# Patient Record
Sex: FEMALE | Race: WHITE | NOT HISPANIC OR LATINO | Employment: OTHER | URBAN - METROPOLITAN AREA
[De-identification: names, ages, dates, MRNs, and addresses within clinical notes are randomized per-mention and may not be internally consistent; named-entity substitution may affect disease eponyms.]

---

## 2017-07-18 ENCOUNTER — HOSPITAL ENCOUNTER (OUTPATIENT)
Dept: LAB | Facility: MEDICAL CENTER | Age: 77
End: 2017-07-18
Attending: INTERNAL MEDICINE
Payer: COMMERCIAL

## 2017-07-18 LAB
25(OH)D3 SERPL-MCNC: 98 NG/ML (ref 30–100)
ALBUMIN SERPL BCP-MCNC: 4.1 G/DL (ref 3.2–4.9)
ALBUMIN/GLOB SERPL: 1.6 G/DL
ALP SERPL-CCNC: 63 U/L (ref 30–99)
ALT SERPL-CCNC: 18 U/L (ref 2–50)
ANION GAP SERPL CALC-SCNC: 12 MMOL/L (ref 0–11.9)
AST SERPL-CCNC: 22 U/L (ref 12–45)
BILIRUB SERPL-MCNC: 0.9 MG/DL (ref 0.1–1.5)
BUN SERPL-MCNC: 15 MG/DL (ref 8–22)
CALCIUM SERPL-MCNC: 9.8 MG/DL (ref 8.5–10.5)
CHLORIDE SERPL-SCNC: 106 MMOL/L (ref 96–112)
CHOLEST SERPL-MCNC: 105 MG/DL (ref 100–199)
CO2 SERPL-SCNC: 20 MMOL/L (ref 20–33)
CREAT SERPL-MCNC: 1.48 MG/DL (ref 0.5–1.4)
CREAT UR-MCNC: 92 MG/DL
EST. AVERAGE GLUCOSE BLD GHB EST-MCNC: 214 MG/DL
GFR SERPL CREATININE-BSD FRML MDRD: 34 ML/MIN/1.73 M 2
GLOBULIN SER CALC-MCNC: 2.5 G/DL (ref 1.9–3.5)
GLUCOSE SERPL-MCNC: 166 MG/DL (ref 65–99)
HBA1C MFR BLD: 9.1 % (ref 0–5.6)
HDLC SERPL-MCNC: 39 MG/DL
LDLC SERPL CALC-MCNC: 38 MG/DL
MICROALBUMIN UR-MCNC: 26.3 MG/DL
MICROALBUMIN/CREAT UR: 286 MG/G (ref 0–30)
POTASSIUM SERPL-SCNC: 3.8 MMOL/L (ref 3.6–5.5)
PROT SERPL-MCNC: 6.6 G/DL (ref 6–8.2)
SODIUM SERPL-SCNC: 138 MMOL/L (ref 135–145)
T4 FREE SERPL-MCNC: 1.24 NG/DL (ref 0.53–1.43)
TRIGL SERPL-MCNC: 141 MG/DL (ref 0–149)
TSH SERPL DL<=0.005 MIU/L-ACNC: 3.28 UIU/ML (ref 0.3–3.7)

## 2017-07-18 PROCEDURE — 82306 VITAMIN D 25 HYDROXY: CPT

## 2017-07-18 PROCEDURE — 82570 ASSAY OF URINE CREATININE: CPT

## 2017-07-18 PROCEDURE — 80053 COMPREHEN METABOLIC PANEL: CPT

## 2017-07-18 PROCEDURE — 84439 ASSAY OF FREE THYROXINE: CPT

## 2017-07-18 PROCEDURE — 84443 ASSAY THYROID STIM HORMONE: CPT

## 2017-07-18 PROCEDURE — 83036 HEMOGLOBIN GLYCOSYLATED A1C: CPT | Mod: GA

## 2017-07-18 PROCEDURE — 36415 COLL VENOUS BLD VENIPUNCTURE: CPT

## 2017-07-18 PROCEDURE — 82043 UR ALBUMIN QUANTITATIVE: CPT

## 2017-07-18 PROCEDURE — 80061 LIPID PANEL: CPT

## 2017-08-03 ENCOUNTER — TELEPHONE (OUTPATIENT)
Dept: RADIOLOGY | Facility: MEDICAL CENTER | Age: 77
End: 2017-08-03

## 2017-08-03 NOTE — TELEPHONE ENCOUNTER
LM to conf apt @ [facility] on 8/4 @ 9:30 check in @ 9:15, reviewed prep and location

## 2017-08-04 ENCOUNTER — HOSPITAL ENCOUNTER (OUTPATIENT)
Dept: RADIOLOGY | Facility: MEDICAL CENTER | Age: 77
End: 2017-08-04
Attending: FAMILY MEDICINE
Payer: MEDICARE

## 2017-08-14 ENCOUNTER — APPOINTMENT (OUTPATIENT)
Dept: RADIOLOGY | Facility: MEDICAL CENTER | Age: 77
DRG: 481 | End: 2017-08-14
Attending: ORTHOPAEDIC SURGERY
Payer: COMMERCIAL

## 2017-08-14 ENCOUNTER — HOSPITAL ENCOUNTER (INPATIENT)
Facility: MEDICAL CENTER | Age: 77
LOS: 2 days | DRG: 481 | End: 2017-08-16
Attending: EMERGENCY MEDICINE | Admitting: INTERNAL MEDICINE
Payer: COMMERCIAL

## 2017-08-14 ENCOUNTER — APPOINTMENT (OUTPATIENT)
Dept: RADIOLOGY | Facility: MEDICAL CENTER | Age: 77
DRG: 481 | End: 2017-08-14
Attending: EMERGENCY MEDICINE
Payer: COMMERCIAL

## 2017-08-14 ENCOUNTER — RESOLUTE PROFESSIONAL BILLING HOSPITAL PROF FEE (OUTPATIENT)
Dept: HOSPITALIST | Facility: MEDICAL CENTER | Age: 77
End: 2017-08-14
Payer: COMMERCIAL

## 2017-08-14 DIAGNOSIS — S72.001M: ICD-10-CM

## 2017-08-14 DIAGNOSIS — E55.9 VITAMIN D DEFICIENCY DISEASE: ICD-10-CM

## 2017-08-14 DIAGNOSIS — I10 ESSENTIAL HYPERTENSION, MALIGNANT: ICD-10-CM

## 2017-08-14 DIAGNOSIS — S72.001Q: ICD-10-CM

## 2017-08-14 DIAGNOSIS — E78.5 DYSLIPIDEMIA: ICD-10-CM

## 2017-08-14 DIAGNOSIS — N28.9 RENAL INSUFFICIENCY: ICD-10-CM

## 2017-08-14 DIAGNOSIS — E87.1 HYPONATREMIA: ICD-10-CM

## 2017-08-14 DIAGNOSIS — E87.6 HYPOKALEMIA: ICD-10-CM

## 2017-08-14 DIAGNOSIS — R73.9 HYPERGLYCEMIA: ICD-10-CM

## 2017-08-14 DIAGNOSIS — N18.30 CHRONIC KIDNEY DISEASE, STAGE III (MODERATE) (HCC): ICD-10-CM

## 2017-08-14 DIAGNOSIS — I10 ESSENTIAL HYPERTENSION: ICD-10-CM

## 2017-08-14 DIAGNOSIS — R80.9 MICROALBUMINURIA: ICD-10-CM

## 2017-08-14 DIAGNOSIS — S72.001A CLOSED FRACTURE OF NECK OF RIGHT FEMUR, INITIAL ENCOUNTER (HCC): ICD-10-CM

## 2017-08-14 PROBLEM — E11.9 DIABETES MELLITUS, TYPE II (HCC): Status: ACTIVE | Noted: 2017-08-14

## 2017-08-14 PROBLEM — S72.90XA FEMUR FRACTURE (HCC): Status: ACTIVE | Noted: 2017-08-14

## 2017-08-14 LAB
ANION GAP SERPL CALC-SCNC: 8 MMOL/L (ref 0–11.9)
APTT PPP: 26.3 SEC (ref 24.7–36)
BUN SERPL-MCNC: 34 MG/DL (ref 8–22)
CALCIUM SERPL-MCNC: 10 MG/DL (ref 8.5–10.5)
CHLORIDE SERPL-SCNC: 101 MMOL/L (ref 96–112)
CO2 SERPL-SCNC: 23 MMOL/L (ref 20–33)
CREAT SERPL-MCNC: 1.49 MG/DL (ref 0.5–1.4)
EKG IMPRESSION: NORMAL
ERYTHROCYTE [DISTWIDTH] IN BLOOD BY AUTOMATED COUNT: 43.2 FL (ref 35.9–50)
GFR SERPL CREATININE-BSD FRML MDRD: 34 ML/MIN/1.73 M 2
GLUCOSE BLD-MCNC: 337 MG/DL (ref 65–99)
GLUCOSE BLD-MCNC: 348 MG/DL (ref 65–99)
GLUCOSE BLD-MCNC: 361 MG/DL (ref 65–99)
GLUCOSE SERPL-MCNC: 459 MG/DL (ref 65–99)
HCT VFR BLD AUTO: 38.5 % (ref 37–47)
HGB BLD-MCNC: 13.7 G/DL (ref 12–16)
INR PPP: 1.02 (ref 0.87–1.13)
MCH RBC QN AUTO: 32.5 PG (ref 27–33)
MCHC RBC AUTO-ENTMCNC: 35.6 G/DL (ref 33.6–35)
MCV RBC AUTO: 91.4 FL (ref 81.4–97.8)
PLATELET # BLD AUTO: 169 K/UL (ref 164–446)
PMV BLD AUTO: 10.3 FL (ref 9–12.9)
POTASSIUM SERPL-SCNC: 3.7 MMOL/L (ref 3.6–5.5)
PROTHROMBIN TIME: 13.7 SEC (ref 12–14.6)
RBC # BLD AUTO: 4.21 M/UL (ref 4.2–5.4)
SODIUM SERPL-SCNC: 132 MMOL/L (ref 135–145)
WBC # BLD AUTO: 7.3 K/UL (ref 4.8–10.8)

## 2017-08-14 PROCEDURE — 501838 HCHG SUTURE GENERAL: Performed by: ORTHOPAEDIC SURGERY

## 2017-08-14 PROCEDURE — C1713 ANCHOR/SCREW BN/BN,TIS/BN: HCPCS | Performed by: ORTHOPAEDIC SURGERY

## 2017-08-14 PROCEDURE — 72170 X-RAY EXAM OF PELVIS: CPT

## 2017-08-14 PROCEDURE — 85610 PROTHROMBIN TIME: CPT

## 2017-08-14 PROCEDURE — 80048 BASIC METABOLIC PNL TOTAL CA: CPT

## 2017-08-14 PROCEDURE — 700101 HCHG RX REV CODE 250

## 2017-08-14 PROCEDURE — 160035 HCHG PACU - 1ST 60 MINS PHASE I: Performed by: ORTHOPAEDIC SURGERY

## 2017-08-14 PROCEDURE — A9270 NON-COVERED ITEM OR SERVICE: HCPCS | Performed by: INTERNAL MEDICINE

## 2017-08-14 PROCEDURE — 0QS634Z REPOSITION RIGHT UPPER FEMUR WITH INTERNAL FIXATION DEVICE, PERCUTANEOUS APPROACH: ICD-10-PCS | Performed by: ORTHOPAEDIC SURGERY

## 2017-08-14 PROCEDURE — 93010 ELECTROCARDIOGRAM REPORT: CPT | Performed by: INTERNAL MEDICINE

## 2017-08-14 PROCEDURE — 93010 ELECTROCARDIOGRAM REPORT: CPT | Mod: 77 | Performed by: INTERNAL MEDICINE

## 2017-08-14 PROCEDURE — 700102 HCHG RX REV CODE 250 W/ 637 OVERRIDE(OP): Performed by: INTERNAL MEDICINE

## 2017-08-14 PROCEDURE — 500891 HCHG PACK, ORTHO MAJOR: Performed by: ORTHOPAEDIC SURGERY

## 2017-08-14 PROCEDURE — 770006 HCHG ROOM/CARE - MED/SURG/GYN SEMI*

## 2017-08-14 PROCEDURE — 700111 HCHG RX REV CODE 636 W/ 250 OVERRIDE (IP): Performed by: EMERGENCY MEDICINE

## 2017-08-14 PROCEDURE — A9270 NON-COVERED ITEM OR SERVICE: HCPCS | Performed by: ORTHOPAEDIC SURGERY

## 2017-08-14 PROCEDURE — 96361 HYDRATE IV INFUSION ADD-ON: CPT

## 2017-08-14 PROCEDURE — 73502 X-RAY EXAM HIP UNI 2-3 VIEWS: CPT | Mod: RT

## 2017-08-14 PROCEDURE — 82962 GLUCOSE BLOOD TEST: CPT

## 2017-08-14 PROCEDURE — 160002 HCHG RECOVERY MINUTES (STAT): Performed by: ORTHOPAEDIC SURGERY

## 2017-08-14 PROCEDURE — 160009 HCHG ANES TIME/MIN: Performed by: ORTHOPAEDIC SURGERY

## 2017-08-14 PROCEDURE — 96375 TX/PRO/DX INJ NEW DRUG ADDON: CPT

## 2017-08-14 PROCEDURE — 160048 HCHG OR STATISTICAL LEVEL 1-5: Performed by: ORTHOPAEDIC SURGERY

## 2017-08-14 PROCEDURE — 99291 CRITICAL CARE FIRST HOUR: CPT

## 2017-08-14 PROCEDURE — 503036 HCHG GUIDE PIN,OIC: Performed by: ORTHOPAEDIC SURGERY

## 2017-08-14 PROCEDURE — 700102 HCHG RX REV CODE 250 W/ 637 OVERRIDE(OP)

## 2017-08-14 PROCEDURE — 160041 HCHG SURGERY MINUTES - EA ADDL 1 MIN LEVEL 4: Performed by: ORTHOPAEDIC SURGERY

## 2017-08-14 PROCEDURE — 700111 HCHG RX REV CODE 636 W/ 250 OVERRIDE (IP): Performed by: ORTHOPAEDIC SURGERY

## 2017-08-14 PROCEDURE — 700105 HCHG RX REV CODE 258: Performed by: EMERGENCY MEDICINE

## 2017-08-14 PROCEDURE — A9270 NON-COVERED ITEM OR SERVICE: HCPCS

## 2017-08-14 PROCEDURE — 85730 THROMBOPLASTIN TIME PARTIAL: CPT

## 2017-08-14 PROCEDURE — 700102 HCHG RX REV CODE 250 W/ 637 OVERRIDE(OP): Performed by: ORTHOPAEDIC SURGERY

## 2017-08-14 PROCEDURE — 160036 HCHG PACU - EA ADDL 30 MINS PHASE I: Performed by: ORTHOPAEDIC SURGERY

## 2017-08-14 PROCEDURE — 700111 HCHG RX REV CODE 636 W/ 250 OVERRIDE (IP)

## 2017-08-14 PROCEDURE — 99223 1ST HOSP IP/OBS HIGH 75: CPT | Performed by: INTERNAL MEDICINE

## 2017-08-14 PROCEDURE — 93005 ELECTROCARDIOGRAM TRACING: CPT | Performed by: ORTHOPAEDIC SURGERY

## 2017-08-14 PROCEDURE — 73552 X-RAY EXAM OF FEMUR 2/>: CPT | Mod: RT

## 2017-08-14 PROCEDURE — 85027 COMPLETE CBC AUTOMATED: CPT

## 2017-08-14 PROCEDURE — 93005 ELECTROCARDIOGRAM TRACING: CPT | Performed by: INTERNAL MEDICINE

## 2017-08-14 PROCEDURE — 700112 HCHG RX REV CODE 229: Performed by: ORTHOPAEDIC SURGERY

## 2017-08-14 PROCEDURE — 700105 HCHG RX REV CODE 258: Performed by: INTERNAL MEDICINE

## 2017-08-14 PROCEDURE — 160029 HCHG SURGERY MINUTES - 1ST 30 MINS LEVEL 4: Performed by: ORTHOPAEDIC SURGERY

## 2017-08-14 PROCEDURE — 96374 THER/PROPH/DIAG INJ IV PUSH: CPT

## 2017-08-14 PROCEDURE — A6402 STERILE GAUZE <= 16 SQ IN: HCPCS | Performed by: ORTHOPAEDIC SURGERY

## 2017-08-14 DEVICE — SCREW CANNNULATED 16MM THREAD 7.3MM X 90MM (3TX3=9): Type: IMPLANTABLE DEVICE | Site: HIP | Status: FUNCTIONAL

## 2017-08-14 DEVICE — SCREW CANNNULATED 16MM THREAD 7.3MM X 85MM (3TX3=9): Type: IMPLANTABLE DEVICE | Site: HIP | Status: FUNCTIONAL

## 2017-08-14 RX ORDER — POLYETHYLENE GLYCOL 3350 17 G/17G
1 POWDER, FOR SOLUTION ORAL
Status: DISCONTINUED | OUTPATIENT
Start: 2017-08-14 | End: 2017-08-14

## 2017-08-14 RX ORDER — AMOXICILLIN 250 MG
2 CAPSULE ORAL 2 TIMES DAILY
Status: DISCONTINUED | OUTPATIENT
Start: 2017-08-14 | End: 2017-08-14

## 2017-08-14 RX ORDER — SODIUM CHLORIDE 9 MG/ML
INJECTION, SOLUTION INTRAVENOUS CONTINUOUS
Status: DISCONTINUED | OUTPATIENT
Start: 2017-08-14 | End: 2017-08-14

## 2017-08-14 RX ORDER — DEXAMETHASONE SODIUM PHOSPHATE 4 MG/ML
4 INJECTION, SOLUTION INTRA-ARTICULAR; INTRALESIONAL; INTRAMUSCULAR; INTRAVENOUS; SOFT TISSUE
Status: DISCONTINUED | OUTPATIENT
Start: 2017-08-14 | End: 2017-08-16 | Stop reason: HOSPADM

## 2017-08-14 RX ORDER — CEFAZOLIN SODIUM 2 G/100ML
2 INJECTION, SOLUTION INTRAVENOUS EVERY 8 HOURS
Status: COMPLETED | OUTPATIENT
Start: 2017-08-15 | End: 2017-08-15

## 2017-08-14 RX ORDER — DOCUSATE SODIUM 100 MG/1
100 CAPSULE, LIQUID FILLED ORAL 2 TIMES DAILY
Status: DISCONTINUED | OUTPATIENT
Start: 2017-08-14 | End: 2017-08-16 | Stop reason: HOSPADM

## 2017-08-14 RX ORDER — ENEMA 19; 7 G/133ML; G/133ML
1 ENEMA RECTAL
Status: DISCONTINUED | OUTPATIENT
Start: 2017-08-14 | End: 2017-08-16 | Stop reason: HOSPADM

## 2017-08-14 RX ORDER — AMOXICILLIN 250 MG
1 CAPSULE ORAL
Status: DISCONTINUED | OUTPATIENT
Start: 2017-08-14 | End: 2017-08-16 | Stop reason: HOSPADM

## 2017-08-14 RX ORDER — SODIUM CHLORIDE 9 MG/ML
1000 INJECTION, SOLUTION INTRAVENOUS ONCE
Status: COMPLETED | OUTPATIENT
Start: 2017-08-14 | End: 2017-08-14

## 2017-08-14 RX ORDER — ONDANSETRON 2 MG/ML
4 INJECTION INTRAMUSCULAR; INTRAVENOUS EVERY 4 HOURS PRN
Status: DISCONTINUED | OUTPATIENT
Start: 2017-08-14 | End: 2017-08-14

## 2017-08-14 RX ORDER — AMOXICILLIN 250 MG
1 CAPSULE ORAL NIGHTLY
Status: DISCONTINUED | OUTPATIENT
Start: 2017-08-14 | End: 2017-08-16 | Stop reason: HOSPADM

## 2017-08-14 RX ORDER — ACETAMINOPHEN 325 MG/1
650 TABLET ORAL EVERY 6 HOURS PRN
Status: DISCONTINUED | OUTPATIENT
Start: 2017-08-14 | End: 2017-08-16 | Stop reason: HOSPADM

## 2017-08-14 RX ORDER — HALOPERIDOL 5 MG/ML
1 INJECTION INTRAMUSCULAR EVERY 6 HOURS PRN
Status: DISCONTINUED | OUTPATIENT
Start: 2017-08-14 | End: 2017-08-16 | Stop reason: HOSPADM

## 2017-08-14 RX ORDER — OXYCODONE HYDROCHLORIDE 5 MG/1
5 TABLET ORAL
Status: DISCONTINUED | OUTPATIENT
Start: 2017-08-14 | End: 2017-08-16 | Stop reason: HOSPADM

## 2017-08-14 RX ORDER — OXYCODONE HYDROCHLORIDE 5 MG/1
5 TABLET ORAL
Status: DISCONTINUED | OUTPATIENT
Start: 2017-08-14 | End: 2017-08-14

## 2017-08-14 RX ORDER — POLYETHYLENE GLYCOL 3350 17 G/17G
1 POWDER, FOR SOLUTION ORAL 2 TIMES DAILY PRN
Status: DISCONTINUED | OUTPATIENT
Start: 2017-08-14 | End: 2017-08-16 | Stop reason: HOSPADM

## 2017-08-14 RX ORDER — ONDANSETRON 2 MG/ML
4 INJECTION INTRAMUSCULAR; INTRAVENOUS EVERY 4 HOURS PRN
Status: DISCONTINUED | OUTPATIENT
Start: 2017-08-14 | End: 2017-08-16 | Stop reason: HOSPADM

## 2017-08-14 RX ORDER — ATORVASTATIN CALCIUM 20 MG/1
20 TABLET, FILM COATED ORAL
Status: DISCONTINUED | OUTPATIENT
Start: 2017-08-14 | End: 2017-08-16 | Stop reason: HOSPADM

## 2017-08-14 RX ORDER — ONDANSETRON 4 MG/1
4 TABLET, ORALLY DISINTEGRATING ORAL EVERY 4 HOURS PRN
Status: DISCONTINUED | OUTPATIENT
Start: 2017-08-14 | End: 2017-08-16 | Stop reason: HOSPADM

## 2017-08-14 RX ORDER — ONDANSETRON 2 MG/ML
4 INJECTION INTRAMUSCULAR; INTRAVENOUS ONCE
Status: COMPLETED | OUTPATIENT
Start: 2017-08-14 | End: 2017-08-14

## 2017-08-14 RX ORDER — MORPHINE SULFATE 4 MG/ML
4 INJECTION, SOLUTION INTRAMUSCULAR; INTRAVENOUS
Status: DISCONTINUED | OUTPATIENT
Start: 2017-08-14 | End: 2017-08-14

## 2017-08-14 RX ORDER — SODIUM CHLORIDE, SODIUM LACTATE, POTASSIUM CHLORIDE, CALCIUM CHLORIDE 600; 310; 30; 20 MG/100ML; MG/100ML; MG/100ML; MG/100ML
INJECTION, SOLUTION INTRAVENOUS CONTINUOUS
Status: DISCONTINUED | OUTPATIENT
Start: 2017-08-14 | End: 2017-08-15

## 2017-08-14 RX ORDER — LISINOPRIL 10 MG/1
10 TABLET ORAL DAILY
COMMUNITY
End: 2022-01-01

## 2017-08-14 RX ORDER — METOPROLOL SUCCINATE 25 MG/1
25 TABLET, EXTENDED RELEASE ORAL DAILY
Status: DISCONTINUED | OUTPATIENT
Start: 2017-08-14 | End: 2017-08-14

## 2017-08-14 RX ORDER — OXYCODONE HYDROCHLORIDE 10 MG/1
10 TABLET ORAL
Status: DISCONTINUED | OUTPATIENT
Start: 2017-08-14 | End: 2017-08-14

## 2017-08-14 RX ORDER — BISACODYL 10 MG
10 SUPPOSITORY, RECTAL RECTAL
Status: DISCONTINUED | OUTPATIENT
Start: 2017-08-14 | End: 2017-08-14

## 2017-08-14 RX ORDER — BISACODYL 10 MG
10 SUPPOSITORY, RECTAL RECTAL
Status: DISCONTINUED | OUTPATIENT
Start: 2017-08-14 | End: 2017-08-16 | Stop reason: HOSPADM

## 2017-08-14 RX ORDER — KETOROLAC TROMETHAMINE 30 MG/ML
15 INJECTION, SOLUTION INTRAMUSCULAR; INTRAVENOUS EVERY 6 HOURS
Status: DISCONTINUED | OUTPATIENT
Start: 2017-08-14 | End: 2017-08-16 | Stop reason: HOSPADM

## 2017-08-14 RX ORDER — SCOLOPAMINE TRANSDERMAL SYSTEM 1 MG/1
1 PATCH, EXTENDED RELEASE TRANSDERMAL
Status: DISCONTINUED | OUTPATIENT
Start: 2017-08-14 | End: 2017-08-16 | Stop reason: HOSPADM

## 2017-08-14 RX ORDER — MAGNESIUM HYDROXIDE 1200 MG/15ML
LIQUID ORAL
Status: DISCONTINUED | OUTPATIENT
Start: 2017-08-14 | End: 2017-08-14 | Stop reason: HOSPADM

## 2017-08-14 RX ORDER — DEXTROSE MONOHYDRATE 25 G/50ML
25 INJECTION, SOLUTION INTRAVENOUS
Status: DISCONTINUED | OUTPATIENT
Start: 2017-08-14 | End: 2017-08-16 | Stop reason: HOSPADM

## 2017-08-14 RX ORDER — HEPARIN SODIUM 5000 [USP'U]/ML
5000 INJECTION, SOLUTION INTRAVENOUS; SUBCUTANEOUS EVERY 8 HOURS
Status: DISCONTINUED | OUTPATIENT
Start: 2017-08-14 | End: 2017-08-14

## 2017-08-14 RX ORDER — LABETALOL HYDROCHLORIDE 5 MG/ML
10 INJECTION, SOLUTION INTRAVENOUS EVERY 4 HOURS PRN
Status: DISCONTINUED | OUTPATIENT
Start: 2017-08-14 | End: 2017-08-16 | Stop reason: HOSPADM

## 2017-08-14 RX ORDER — OXYCODONE HYDROCHLORIDE 10 MG/1
10 TABLET ORAL
Status: DISCONTINUED | OUTPATIENT
Start: 2017-08-14 | End: 2017-08-16 | Stop reason: HOSPADM

## 2017-08-14 RX ORDER — DIPHENHYDRAMINE HYDROCHLORIDE 50 MG/ML
25 INJECTION INTRAMUSCULAR; INTRAVENOUS EVERY 6 HOURS PRN
Status: DISCONTINUED | OUTPATIENT
Start: 2017-08-14 | End: 2017-08-16 | Stop reason: HOSPADM

## 2017-08-14 RX ADMIN — STANDARDIZED SENNA CONCENTRATE AND DOCUSATE SODIUM 1 TABLET: 8.6; 5 TABLET, FILM COATED ORAL at 20:58

## 2017-08-14 RX ADMIN — INSULIN HUMAN 6 UNITS: 100 INJECTION, SOLUTION PARENTERAL at 15:03

## 2017-08-14 RX ADMIN — ATORVASTATIN CALCIUM 20 MG: 20 TABLET, FILM COATED ORAL at 20:58

## 2017-08-14 RX ADMIN — SODIUM CHLORIDE, POTASSIUM CHLORIDE, SODIUM LACTATE AND CALCIUM CHLORIDE: 600; 310; 30; 20 INJECTION, SOLUTION INTRAVENOUS at 17:58

## 2017-08-14 RX ADMIN — INSULIN HUMAN 10 UNITS: 100 INJECTION, SOLUTION PARENTERAL at 14:15

## 2017-08-14 RX ADMIN — INSULIN LISPRO 5 UNITS: 100 INJECTION, SOLUTION INTRAVENOUS; SUBCUTANEOUS at 19:08

## 2017-08-14 RX ADMIN — SODIUM CHLORIDE 1000 ML: 9 INJECTION, SOLUTION INTRAVENOUS at 12:39

## 2017-08-14 RX ADMIN — KETOROLAC TROMETHAMINE 15 MG: 30 INJECTION, SOLUTION INTRAMUSCULAR at 18:54

## 2017-08-14 RX ADMIN — FENTANYL CITRATE 25 MCG: 50 INJECTION, SOLUTION INTRAMUSCULAR; INTRAVENOUS at 15:15

## 2017-08-14 RX ADMIN — HYDROCODONE BITARTRATE AND ACETAMINOPHEN 15 ML: 2.5; 108 SOLUTION ORAL at 16:30

## 2017-08-14 RX ADMIN — DOCUSATE SODIUM 100 MG: 100 CAPSULE ORAL at 20:58

## 2017-08-14 RX ADMIN — ONDANSETRON 4 MG: 2 INJECTION INTRAMUSCULAR; INTRAVENOUS at 12:38

## 2017-08-14 RX ADMIN — HYDROMORPHONE HYDROCHLORIDE 0.5 MG: 1 INJECTION, SOLUTION INTRAMUSCULAR; INTRAVENOUS; SUBCUTANEOUS at 12:39

## 2017-08-14 ASSESSMENT — ENCOUNTER SYMPTOMS
INSOMNIA: 0
NERVOUS/ANXIOUS: 0
WEAKNESS: 0
VOMITING: 0
CONSTIPATION: 0
CHILLS: 0
FOCAL WEAKNESS: 0
DOUBLE VISION: 0
NECK PAIN: 0
NAUSEA: 0
FEVER: 0
BLOOD IN STOOL: 0
SENSORY CHANGE: 0
BACK PAIN: 0
DEPRESSION: 0
TINGLING: 0
MYALGIAS: 0
FALLS: 1
PALPITATIONS: 0
WHEEZING: 0
WEIGHT LOSS: 0
ORTHOPNEA: 0
HALLUCINATIONS: 0
HEARTBURN: 0
MEMORY LOSS: 0
LOSS OF CONSCIOUSNESS: 0
SPEECH CHANGE: 0
DIARRHEA: 0
HEMOPTYSIS: 0
HEADACHES: 0
SEIZURES: 0
PND: 0
TREMORS: 0
FLANK PAIN: 0
COUGH: 0
SHORTNESS OF BREATH: 0
DIZZINESS: 0
DIAPHORESIS: 0
BLURRED VISION: 0
CLAUDICATION: 0
SPUTUM PRODUCTION: 0
ABDOMINAL PAIN: 0

## 2017-08-14 ASSESSMENT — PATIENT HEALTH QUESTIONNAIRE - PHQ9
SUM OF ALL RESPONSES TO PHQ QUESTIONS 1-9: 0
SUM OF ALL RESPONSES TO PHQ9 QUESTIONS 1 AND 2: 0
1. LITTLE INTEREST OR PLEASURE IN DOING THINGS: NOT AT ALL
2. FEELING DOWN, DEPRESSED, IRRITABLE, OR HOPELESS: NOT AT ALL

## 2017-08-14 ASSESSMENT — PAIN SCALES - GENERAL
PAINLEVEL_OUTOF10: 2
PAINLEVEL_OUTOF10: 2
PAINLEVEL_OUTOF10: 0
PAINLEVEL_OUTOF10: 6
PAINLEVEL_OUTOF10: 2
PAINLEVEL_OUTOF10: 1
PAINLEVEL_OUTOF10: 2

## 2017-08-14 ASSESSMENT — LIFESTYLE VARIABLES
ALCOHOL_USE: NO
EVER_SMOKED: NEVER
EVER_SMOKED: NEVER
SUBSTANCE_ABUSE: 0
DO YOU DRINK ALCOHOL: NO

## 2017-08-14 ASSESSMENT — COPD QUESTIONNAIRES
COPD SCREENING SCORE: 2
HAVE YOU SMOKED AT LEAST 100 CIGARETTES IN YOUR ENTIRE LIFE: NO/DON'T KNOW
DO YOU EVER COUGH UP ANY MUCUS OR PHLEGM?: NO/ONLY WITH OCCASIONAL COLDS OR INFECTIONS
DURING THE PAST 4 WEEKS HOW MUCH DID YOU FEEL SHORT OF BREATH: NONE/LITTLE OF THE TIME

## 2017-08-14 NOTE — IP AVS SNAPSHOT
" <p align=\"LEFT\"><IMG SRC=\"//[email or web address]$/[email or web address]\" alt=\"Image\" WIDTH=\"50%\" HEIGHT=\"200\" BORDER=\"\"></p>                   Name:Jannet Bruno  Medical Record Number:[id number]  CSN: [id number]    YOB: 1940   Age: 76 y.o.  Sex: female  HT:1.575 m (5' 2.01\") WT: 72.576 kg (160 lb)          Admit Date: 8/14/2017     Discharge Date:   Today's Date: 8/16/2017  Attending Doctor:  Sidney Parker M.D.                  Allergies:  Codeine; Percocet; and Tape          Your appointments     Aug 18, 2017 12:30 PM   MA DX30 with [facility] [place] [facility]   [facility] ([place])    [address]  [place] [id number] [phone]              Follow-up Information     1. Follow up with Ar Huerta M.D. In 2 weeks.    Specialty:  Orthopaedics    Contact information    [address]  [address]  [place] [address]  [phone]           Medication List      Take these Medications        Instructions    amlodipine 10 MG Tabs   Commonly known as:  NORVASC    Take 1 Tab by mouth every day.   Dose:  10 mg       atorvastatin 20 MG Tabs   Commonly known as:  LIPITOR    Take 1 Tab by mouth every bedtime.   Dose:  20 mg       furosemide 40 MG Tabs   Commonly known as:  LASIX    Take 40 mg by mouth every day.   Dose:  40 mg       lisinopril 10 MG Tabs   Commonly known as:  PRINIVIL    Take 10 mg by mouth every day.   Dose:  10 mg       oxycodone immediate-release 5 MG Tabs   Commonly known as:  ROXICODONE    Take 1 Tab by mouth every 6 hours as needed.   Dose:  5 mg       potassium chloride ER 10 MEQ tablet   Commonly known as:  KLOR-CON    Take 10 mEq by mouth every day.   Dose:  10 mEq       VICTOZA 18 MG/3ML Sopn injection   Generic drug:  liraglutide    Inject 1.8 mg as instructed every day.   Dose:  1.8 mg         "

## 2017-08-14 NOTE — ED PROVIDER NOTES
ED Provider Note    Scribed for Kevin Thomas M.D. by Tay Anaya. 8/14/2017  11:32 AM    Primary care provider: Henry Forrest D.O.  Means of arrival: EMS  History obtained from: Patient  History limited by: None    CHIEF COMPLAINT  Chief Complaint   Patient presents with   • Hip Pain     right   • T-5000 FALL       HPI  Jannet Bruno is a 76 y.o. female who presents to the Emergency Department for evaluation after a ground level fall just prior to arrival. Patient was stepping off of a curb at work when she fell and landed on her right side. She reports associated right hip pain with mild muscle spasm to the area. She does not report any alleviating factors to the pain. Patient does not report any recent fevers or shortness of breath. She denies any prior hip surgeries.    REVIEW OF SYSTEMS  Pertinent positives include right hip pain, muscle spasm to right hip area.   Pertinent negatives include no recent fevers, shortness of breath.    All other systems reviewed and negative.    C    PAST MEDICAL HISTORY   has a past medical history of Hypertension; Arthritis; Unspecified urinary incontinence; Heart burn; Indigestion; Other specified disorder of intestines; Unspecified hemorrhagic conditions; Urinary bladder disorder; Breath shortness; Snoring; Anesthesia; Sleep apnea, obstructive; Diabetes; and CATARACT.    SURGICAL HISTORY   has past surgical history that includes other; other orthopedic surgery; other abdominal surgery; appendectomy; gyn surgery; ventral hernia repair laparoscopic (3/7/2012); cataract phaco with iol (5/30/2014); and cataract phaco with iol (6/11/2014).    SOCIAL HISTORY  Social History   Substance Use Topics   • Smoking status: Former Smoker -- 1.00 packs/day for 20 years     Types: Cigarettes     Quit date: 03/05/1979   • Smokeless tobacco: Never Used   • Alcohol Use: No      History   Drug Use No       FAMILY HISTORY  History reviewed. No pertinent family  "history.    CURRENT MEDICATIONS  Home Medications     Reviewed by Gennaro Márquez R.N. (Registered Nurse) on 08/14/17 at 1116  Med List Status: Partial    Medication Last Dose Status    alendronate (FOSAMAX) 70 MG Tab  Active    amlodipine (NORVASC) 10 MG Tab  Active    atorvastatin (LIPITOR) 20 MG Tab  Active    exenatide (BYETTA 10 MCG PEN) 10 MCG/0.04ML Solution Pen-injector  Active    furosemide (LASIX) 40 MG TABS  Active    glimepiride (AMARYL) 4 MG Tab  Active    metoprolol SR (TOPROL XL) 25 MG TB24  Active    oxycodone immediate-release (ROXICODONE) 5 MG Tab  Active    potassium chloride CR (K-DUR) 10 MEQ tablet  Active                ALLERGIES  Allergies   Allergen Reactions   • Codeine      Personality changes   • Percocet [Oxycodone-Acetaminophen]      Personality change   • Tape Rash     Adhesive tape-rash, paper tape ok.       PHYSICAL EXAM  VITAL SIGNS: Pulse 73  Temp(Src) 36.3 °C (97.3 °F)  Resp 16  Ht 1.575 m (5' 2.01\")  Wt 72.576 kg (160 lb)  BMI 29.26 kg/m2  SpO2 97%  Nursing note and vitals reviewed.  Constitutional: Well-developed and well-nourished. Mild distress.   HENT: Head is normocephalic and atraumatic. Oropharynx is clear and moist without exudate or erythema.   Eyes: Pupils are equal, round, and reactive to light. Conjunctiva are normal.   Cardiovascular: Normal rate and regular rhythm. No murmur heard. Normal radial pulses.  Pulmonary/Chest: Breath sounds normal. No wheezes or rales.   Abdominal: Soft and non-tender. No distention    Musculoskeletal: Extremities exhibit normal range of motion without edema. Tenderness of right hip with minimal shortening of right lower extremity.  Neurological: Awake, alert and oriented to person, place, and time. No focal deficits noted.  Skin: Skin is warm and dry. No rash.   Psychiatric: Normal mood and affect. Appropriate for clinical situation      DIAGNOSTIC STUDIES / PROCEDURES    LABS  Results for orders placed or performed during the " hospital encounter of 08/14/17   CBC WITHOUT DIFFERENTIAL   Result Value Ref Range    WBC 7.3 4.8 - 10.8 K/uL    RBC 4.21 4.20 - 5.40 M/uL    Hemoglobin 13.7 12.0 - 16.0 g/dL    Hematocrit 38.5 37.0 - 47.0 %    MCV 91.4 81.4 - 97.8 fL    MCH 32.5 27.0 - 33.0 pg    MCHC 35.6 (H) 33.6 - 35.0 g/dL    RDW 43.2 35.9 - 50.0 fL    Platelet Count 169 164 - 446 K/uL    MPV 10.3 9.0 - 12.9 fL   BASIC METABOLIC PANEL   Result Value Ref Range    Sodium 132 (L) 135 - 145 mmol/L    Potassium 3.7 3.6 - 5.5 mmol/L    Chloride 101 96 - 112 mmol/L    Co2 23 20 - 33 mmol/L    Glucose 459 (H) 65 - 99 mg/dL    Bun 34 (H) 8 - 22 mg/dL    Creatinine 1.49 (H) 0.50 - 1.40 mg/dL    Calcium 10.0 8.5 - 10.5 mg/dL    Anion Gap 8.0 0.0 - 11.9   PROTHROMBIN TIME   Result Value Ref Range    PT 13.7 12.0 - 14.6 sec    INR 1.02 0.87 - 1.13   APTT   Result Value Ref Range    APTT 26.3 24.7 - 36.0 sec   ESTIMATED GFR   Result Value Ref Range    GFR If  41 (A) >60 mL/min/1.73 m 2    GFR If Non  34 (A) >60 mL/min/1.73 m 2      All labs reviewed by me.    RADIOLOGY  DX-PELVIS-1 OR 2 VIEWS   Final Result      1.  Impacted RIGHT femoral neck fracture.   2.  No pelvic fracture.   3.  Moderate degenerative change of both hips.      DX-FEMUR-2+ RIGHT   Final Result      1.  Acute impacted RIGHT femoral neck fracture.   2.  No RIGHT hip dislocation.        The radiologist's interpretation of all radiological studies have been reviewed by me.    COURSE & MEDICAL DECISION MAKING  Nursing notes, VS, PMSFHx reviewed in chart.     Review of past medical records shows the patient was last in the ER October 2016 for elevated blood sugar.     11:32 AM - Patient seen and examined at bedside. Patient declined pain medication on exam. The patient will be resuscitated with NS IV due to hyperglycemia. Ordered Dx pelvis, DX femur right, CBC, BMP, PT, APTT, estimated GFR to evaluate her symptoms. The differential diagnoses include but are not  limited to: highly suspicious for right hip fracture     12:32 PM Paged ortho    12:36 PM - I discussed the patient's case and the above findings with Dr. Schulte (hospitalist) who will admit the patient     12:57 PM - I discussed the patient's case and the above findings with Dr. Huerta (ortho) who will take patient to OR this afternoon.       DISPOSITION:  Patient will be admitted to Dr. Schulte in stable condition.     FINAL IMPRESSION  1. Closed fracture of neck of right femur, initial encounter (CMS-[facility])    2. Hyperglycemia          Tay ESCOBAR (Scribe), am scribing for, and in the presence of, Kevin Thomas M.D..    Electronically signed by: Tay Anaya (Scribe), 8/14/2017    Kevin ESCOBAR M.D. personally performed the services described in this documentation, as scribed by Tay Anaya in my presence, and it is both accurate and complete.    The note accurately reflects work and decisions made by me.  Kevin Thomas  8/14/2017  4:52 PM

## 2017-08-14 NOTE — ED NOTES
Pt bib EMS with c/o right hip pain after fall when stepping off of curb at work before getting on her bus. Pt unable to extend right hip. Pulses present. Pre arrival VS: 168/89, 71, 18, 96%RA, [id number].

## 2017-08-14 NOTE — OP REPORT
DATE OF SERVICE:  08/14/2017    PREOPERATIVE DIAGNOSIS:  Right valgus impacted femoral neck fracture.    POSTOPERATIVE DIAGNOSIS:  Right valgus impacted femoral neck fracture.    PROCEDURE:  Right hip pinning.    SURGEON:  Ar Samayoa MD    ASSISTANT:  Alvaro Marcus PA-C.    ESTIMATED BLOOD LOSS:  Minimal.    INDICATIONS:  This 76-year-old female status post fall who sustained a   minimally displaced femoral neck fracture.  Risks and benefits of percutaneous   pin fixation versus arthroplasty were discussed at length which include but   not limited to bleeding, infection, neurovascular damage, pain, stiffness,   malunion, nonunion, need for further surgery.  She understands all these risks   and wishes to proceed with pinning.    DESCRIPTION OF PROCEDURE:  Patient was sedated with LMA anesthesia,   administered preoperative antibiotics.  She was placed on fracture table and   the fracture reduced with site traction and internal rotation.  Right hip and   lower extremity were prepped and draped in the sterile fashion and 3 guide   pins for OIC 7.0 mm cannulated screws were inserted in an inverted triangular   fashion.  This was followed by 3 partially threaded cannulated screws, each   screw had good bite and good overall bony alignment.  Wounds were irrigated,   closed with staples.  Sterile dressings were applied.  Patient tolerated the   procedure well.      POSTOPERATIVE PLAN:  The patient will be weightbearing as tolerated, admitted   by the medicine service for perioperative antibiotics, DVT prophylaxis, and   pain control.       ____________________________________     AR SAMAYOA MD [facility] / [facility]    DD:  08/14/2017 14:40:25  DT:  08/14/2017 15:17:03    D#:  [id number]  Job#:  [id number]

## 2017-08-14 NOTE — CONSULTS
8/14/2017    Jannet Bruno is a 76 y.o. female who presents after a fall with a right hip fracture and is here for operative management. Patient denies numbness, parasthesias, loss of concousness or other trauma    Past Medical History   Diagnosis Date   • Hypertension    • Arthritis    • Unspecified urinary incontinence    • Heart burn    • Indigestion    • Other specified disorder of intestines    • Unspecified hemorrhagic conditions      bruise easily   • Urinary bladder disorder      incontinence   • Breath shortness    • Snoring    • Anesthesia      oxygen level dropped   • Sleep apnea, obstructive      no c-pap   • Diabetes    • CATARACT      right IOL       Past Surgical History   Procedure Laterality Date   • Other       T&A   • Other orthopedic surgery       R & l Shoulder repair   • Other abdominal surgery       abd tramua- horse stepped on abd   • Appendectomy     • Gyn surgery       hysterectomy   • Ventral hernia repair laparoscopic  3/7/2012     Performed by HUNTER SERNA at SURGERY SAME DAY [facility] ORS   • Cataract phaco with iol  5/30/2014     Performed by Rudolph Barclay M.D. at SURGERY SAME DAY [facility] ORS   • Cataract phaco with iol  6/11/2014     Performed by Rudolph Barclay M.D. at SURGERY SAME DAY [facility] ORS       Medications  No current facility-administered medications on file prior to encounter.     Current Outpatient Prescriptions on File Prior to Encounter   Medication Sig Dispense Refill   • amlodipine (NORVASC) 10 MG Tab Take 1 Tab by mouth every day. 30 Tab 1   • glimepiride (AMARYL) 4 MG Tab Take 4 mg by mouth every morning.     • alendronate (FOSAMAX) 70 MG Tab Take 70 mg by mouth every 7 days. On Mon     • exenatide (BYETTA 10 MCG PEN) 10 MCG/0.04ML Solution Pen-injector Inject 10 mcg as instructed 2 times daily, before breakfast and dinner.     • atorvastatin (LIPITOR) 20 MG Tab Take 1 Tab by mouth every bedtime. 30 Tab 11   • oxycodone immediate-release  "(ROXICODONE) 5 MG Tab Take 1 Tab by mouth every four hours as needed (moderate pain). 30 Tab 0   • metoprolol SR (TOPROL XL) 25 MG TB24 Take 25 mg by mouth every day.     • furosemide (LASIX) 40 MG TABS Take 40 mg by mouth every day.     • potassium chloride CR (K-DUR) 10 MEQ tablet Take 10 mEq by mouth every day.         Allergies  Codeine; Percocet; and Tape    ROS  Right hip pain. All other systems were reviewed and found to be negative    History reviewed. No pertinent family history.    Social History     Social History   • Marital Status: Single     Spouse Name: N/A   • Number of Children: N/A   • Years of Education: N/A     Social History Main Topics   • Smoking status: Former Smoker -- 1.00 packs/day for 20 years     Types: Cigarettes     Quit date: 03/05/1979   • Smokeless tobacco: Never Used   • Alcohol Use: No   • Drug Use: No   • Sexual Activity: Not Asked     Other Topics Concern   • None     Social History Narrative       Physical Exam  Vitals  Pulse 66, temperature 36.3 °C (97.3 °F), resp. rate 16, height 1.575 m (5' 2.01\"), weight 72.576 kg (160 lb), SpO2 96 %.  General: Well Developed, Well Nourished, no acute distress  HEENT: Normocephalic, atraumatic  Eyes: Anicteric, PERRLA, EOMI  Neck: Supple, nontender, no masses  Lungs: CTA, no wheezes or crackles  Heart: RRR, no murmurs, rubs or gallops  Abdomen: Soft, NT, ND  Pelvis: Stable to AP and Lateral Compression  Skin: Intact, no open wounds  Extremities: Right hip pain with motion, LLE  Neuro: NVI  Vascular: 2+DP/PT, Capillary refill <2 seconds    Radiographs:  DX-PELVIS-1 OR 2 VIEWS   Final Result      1.  Impacted RIGHT femoral neck fracture.   2.  No pelvic fracture.   3.  Moderate degenerative change of both hips.      DX-FEMUR-2+ RIGHT   Final Result      1.  Acute impacted RIGHT femoral neck fracture.   2.  No RIGHT hip dislocation.          Laboratory Values  Recent Labs      08/14/17   1121   WBC  7.3   RBC  4.21   HEMOGLOBIN  13.7 "   HEMATOCRIT  38.5   MCV  91.4   MCH  32.5   MCHC  35.6*   RDW  43.2   PLATELETCT  169   MPV  10.3     Recent Labs      08/14/17   1121   SODIUM  132*   POTASSIUM  3.7   CHLORIDE  101   CO2  23   GLUCOSE  459*   BUN  34*     Recent Labs      08/14/17   1121   APTT  26.3   INR  1.02         Impression:Right femoral neck fracture    Plan:Operative intervention. Risks and benefits of surgery were discussed which include but are not limited to bleeding, infection, neurovascular damage, malunion, nonunion, instability, limb length discrepancy, DVT, PE, MI, Stroke and death. They understand these risks and wish to proceed.

## 2017-08-14 NOTE — IP AVS SNAPSHOT
" Home Care Instructions                                                                                                                  Name:Jannet Bruno  Medical Record Number:[id number]  CSN: [id number]    YOB: 1940   Age: 76 y.o.  Sex: female  HT:1.575 m (5' 2.01\") WT: 72.576 kg (160 lb)          Admit Date: 8/14/2017     Discharge Date:   Today's Date: 8/16/2017  Attending Doctor:  Sidney Parker M.D.                  Allergies:  Codeine; Percocet; and Tape            Discharge Instructions       Discharge Instructions    Discharged to home by car with relative. Discharged via wheelchair, hospital escort: Yes.  Special equipment needed: Not Applicable    Be sure to schedule a follow-up appointment with your primary care doctor or any specialists as instructed.     Discharge Plan:   Influenza Vaccine Indication: Not indicated: Previously immunized this influenza season and > 8 years of age    I understand that a diet low in cholesterol, fat, and sodium is recommended for good health. Unless I have been given specific instructions below for another diet, I accept this instruction as my diet prescription.   Other diet: Diabetic diet    Special Instructions: Discharge instructions for the Orthopedic Patient    Follow up with Dr. Huerta in 2 weeks  Take medications as prescribed  For any questions or concerns contact MD    Follow up with Primary Care Physician within 2 weeks of discharge to home, regarding:  Review of medications and diagnostic testing.  Surveillance for medical complications.  Workup and treatment of osteoporosis, if appropriate.     -Is this a Joint Replacement patient? No    -Is this patient being discharged with medication to prevent blood clots?  No    · Is patient discharged on Warfarin / Coumadin?   No     · Is patient Post Blood Transfusion?  No    Femoral Shaft Fracture  A femoral shaft fracture is a break (fracture) in the shaft of the thigh bone (femur). The femur is " the long bone that connects the hip joint to the knee joint. Most femoral shaft fractures are closed fractures. A closed fracture is a break in a bone that happens without any cuts (lacerations) through the skin that is near the fracture site. Some femoral shaft fractures are open fractures. An open fracture is a break in a bone that happens along with lacerations through the skin that is near the fracture site.   CAUSES  A healthy femur may break from a forceful impact, such as from:  · A fall, especially from a great height.  · A high-impact sports injury.  · A car or motorcycle accident.  A weakened femur may break from minimal impact or force due to:  · Certain medical conditions.  · Age.  RISK FACTORS  This condition is more likely to develop in:  · Older people.  · People who have certain medical conditions that cause bones to become weak or thin, such as:  · Osteoporosis.  · Cancer.  · Osteogenesis imperfecta. This is a condition that involves bone weakness that is due to abnormal bone development.  · People who take certain medicines (bisphosphonates) that are used to treat osteoporosis.  · People who participate in high-risk sports or impact sports.  SYMPTOMS  Symptoms of this condition include:  · Severe pain.  · Inability to walk.  · Bruising.  · Swelling or visible deformity of the leg.  · Substantial bleeding, if it is an open fracture.  DIAGNOSIS  This condition is diagnosed based on your symptoms and a physical exam. You may also have other tests, including:  · X-rays of the femur. Since the force required to break a healthy femur can break other bones, X-rays are often taken of the hip, knee, and pelvis as well.  · Evaluation of the blood vessels with specialized X-rays (arteriogram).  · Evaluation of the nerves in the area of the break.  · CT scan or MRI.  TREATMENT  A femoral shaft fracture usually requires surgery. You may have one or more of the following surgical treatments:  · External  fixation. This involves using pins and screws to hold the bones in place if there is extensive soft tissue injury. After some time, you may need an additional surgical treatment, such as intramedullary nailing.  · Intramedullary nailing. This involves inserting a radha (intramedullary nail) through an incision. The intramedullary nail goes down the center of the shaft of the femur. It may be inserted in the knee joint or from the top of the femur near the hip. Generally, screws are placed through the radha at both ends to prevent shortening or rotation of the femur as it heals.  · Plates to stabilize the fracture. These may be used, especially when the fracture is at either end of the bone, near the hip or the knee.  In rare cases for which surgery is not an option, a cast or a splint may be used to hold (immobilize) the bone while it heals.  PREVENTION  If factors such as certain medical conditions or age increase your risk for another femoral shaft fracture, you may be able to prevent one if you follow these instructions:  · Use aids for walking, such as a walker or cane, as directed by your health care provider.  · Follow instructions from your health care provider about how to strengthen your bones if you have osteoporosis.     This information is not intended to replace advice given to you by your health care provider. Make sure you discuss any questions you have with your health care provider.     Document Released: 09/27/2006 Document Revised: 05/03/2016 Document Reviewed: 08/03/2015  [employer] Interactive Patient Education ©2016 [employer]      Incision Care  An incision is when a surgeon cuts into your body. After surgery, the incision needs to be cared for properly to prevent infection.   HOW TO CARE FOR YOUR INCISION  · Take medicines only as directed by your health care provider.  · There are many different ways to close and cover an incision, including stitches, skin glue, and adhesive strips. Follow your  health care provider's instructions on:  ¨ Incision care.  ¨ Bandage (dressing) changes and removal.  ¨ Incision closure removal.  · Do not take baths, swim, or use a hot tub until your health care provider approves. You may shower as directed by your health care provider.  · Resume your normal diet and activities as directed.  · Use anti-itch medicine (such as an antihistamine) as directed by your health care provider. The incision may itch while it is healing. Do not pick or scratch at the incision.  · Drink enough fluid to keep your urine clear or pale yellow.  SEEK MEDICAL CARE IF:   · You have drainage, redness, swelling, or pain at your incision site.  · You have muscle aches, chills, or a general ill feeling.  · You notice a bad smell coming from the incision or dressing.  · Your incision edges separate after the sutures, staples, or skin adhesive strips have been removed.  · You have persistent nausea or vomiting.  · You have a fever.  · You are dizzy.  SEEK IMMEDIATE MEDICAL CARE IF:   · You have a rash.  · You faint.  · You have difficulty breathing.  MAKE SURE YOU:   · Understand these instructions.  · Will watch your condition.  · Will get help right away if you are not doing well or get worse.     This information is not intended to replace advice given to you by your health care provider. Make sure you discuss any questions you have with your health care provider.     Document Released: 07/07/2006 Document Revised: 01/08/2016 Document Reviewed: 02/11/2015  [employer] Interactive Patient Education ©2016 [employer]    Depression / Suicide Risk    As you are discharged from this [facility] facility, it is important to learn how to keep safe from harming yourself.    Recognize the warning signs:  · Abrupt changes in personality, positive or negative- including increase in energy   · Giving away possessions  · Change in eating patterns- significant weight changes-  positive or negative  · Change in  sleeping patterns- unable to sleep or sleeping all the time   · Unwillingness or inability to communicate  · Depression  · Unusual sadness, discouragement and loneliness  · Talk of wanting to die  · Neglect of personal appearance   · Rebelliousness- reckless behavior  · Withdrawal from people/activities they love  · Confusion- inability to concentrate     If you or a loved one observes any of these behaviors or has concerns about self-harm, here's what you can do:  · Talk about it- your feelings and reasons for harming yourself  · Remove any means that you might use to hurt yourself (examples: pills, rope, extension cords, firearm)  · Get professional help from the community (Mental Health, Substance Abuse, psychological counseling)  · Do not be alone:Call your Safe Contact- someone whom you trust who will be there for you.  · Call your local CRISIS HOTLINE 899-5988 or 306-295-6664  · Call your local Children's Mobile Crisis Response Team [place] (879) 940-3445 or www.Thinkful  · Call the toll free National Suicide Prevention Hotlines   · National Suicide Prevention Lifeline 658-927-PSPG (9719)  · National Hope Line Network 800-SUICIDE (387-0641)        Your appointments     Aug 18, 2017 12:30 PM   MA DX30 with CATHERINE AMES MG 1   [facility] ([place])    [address]  [place] [id number] [phone]              Follow-up Information     1. Follow up with Ar Huerta M.D. In 2 weeks.    Specialty:  Orthopaedics    Contact information    [address]  [place] [address]  [phone]           Discharge Medication Instructions:    Below are the medications your physician expects you to take upon discharge:    Review all your home medications and newly ordered medications with your doctor and/or pharmacist. Follow medication instructions as directed by your doctor and/or pharmacist.    Please keep your medication list with you and  share with your physician.               Medication List      START taking these medications        Instructions    Morning Afternoon Evening Bedtime    oxycodone immediate-release 5 MG Tabs   Last time this was given:  10 mg on 8/15/2017  8:08 AM   Commonly known as:  ROXICODONE        Take 1 Tab by mouth every 6 hours as needed.   Dose:  5 mg                          CONTINUE taking these medications        Instructions    Morning Afternoon Evening Bedtime    amlodipine 10 MG Tabs   Commonly known as:  NORVASC        Take 1 Tab by mouth every day.   Dose:  10 mg                        atorvastatin 20 MG Tabs   Last time this was given:  20 mg on 8/15/2017  7:46 PM   Commonly known as:  LIPITOR        Take 1 Tab by mouth every bedtime.   Dose:  20 mg                        furosemide 40 MG Tabs   Commonly known as:  LASIX        Take 40 mg by mouth every day.   Dose:  40 mg                        lisinopril 10 MG Tabs   Commonly known as:  PRINIVIL        Take 10 mg by mouth every day.   Dose:  10 mg                        potassium chloride ER 10 MEQ tablet   Commonly known as:  KLOR-CON        Take 10 mEq by mouth every day.   Dose:  10 mEq                        VICTOZA 18 MG/3ML Sopn injection   Generic drug:  liraglutide        Inject 1.8 mg as instructed every day.   Dose:  1.8 mg                             Where to Get Your Medications      Information about where to get these medications is not yet available     ! Ask your nurse or doctor about these medications    - oxycodone immediate-release 5 MG Tabs            Orders for after discharge     DME Walker    Complete by:  As directed        REFERRAL TO HOME HEALTH    Complete by:  As directed    Home health will create and establish a plan of care       REFERRAL TO SKILLED NURSING FACILITY    Complete by:  As directed              Instructions           Diet / Nutrition:    Follow any diet instructions given to you by your doctor or the dietician,  including how much salt (sodium) you are allowed each day.    If you are overweight, talk to your doctor about a weight reduction plan.    Activity:    Remain physically active following your doctor's instructions about exercise and activity.    Rest often.     Any time you become even a little tired or short of breath, SIT DOWN and rest.    Worsening Symptoms:    Report any of the following signs and symptoms to the doctor's office immediately:    *Pain of jaw, arm, or neck  *Chest pain not relieved by medication                               *Dizziness or loss of consciousness  *Difficulty breathing even when at rest   *More tired than usual                                       *Bleeding drainage or swelling of surgical site  *Swelling of feet, ankles, legs or stomach                 *Fever (>100ºF)  *Pink or blood tinged sputum  *Weight gain (3lbs/day or 5lbs /week)           *Shock from internal defibrillator (if applicable)  *Palpitations or irregular heartbeats                *Cool and/or numb extremities    Stroke Awareness    Common Risk Factors for Stroke include:    Age  Atrial Fibrillation  Carotid Artery Stenosis  Diabetes Mellitus  Excessive alcohol consumption  High blood pressure  Overweight   Physical inactivity  Smoking    Warning signs and symptoms of a stroke include:    *Sudden numbness or weakness of the face, arm or leg (especially on one side of the body).  *Sudden confusion, trouble speaking or understanding.  *Sudden trouble seeing in one or both eyes.  *Sudden trouble walking, dizziness, loss of balance or coordination.Sudden severe headache with no known cause.    It is very important to get treatment quickly when a stroke occurs. If you experience any of the above warning signs, call 911 immediately.                   Disclaimer         Quit Smoking / Tobacco Use:    I understand the use of any tobacco products increases my chance of suffering from future heart disease or stroke and  could cause other illnesses which may shorten my life. Quitting the use of tobacco products is the single most important thing I can do to improve my health. For further information on smoking / tobacco cessation call a Toll Free Quit Line at 1-534.348.9571 (*National Cancer [place]) or 1-656.993.1134 (American Lung Association) or you can access the web based program at www.lungusa.org.    [place] Tobacco Users Help Line:  (744) 245-7461       Toll Free: 1-345.860.3587  Quit Tobacco Program [facility] (286)186-5533    Crisis Hotline:    [place] Crisis Hotline:  6-968-ECFNOJB or 1-619.765.3488    [place] Crisis Hotline:    1-943.871.4481 or 499-458-3333    Discharge Survey:   Thank you for choosing [facility]. We hope we did everything we could to make your hospital stay a pleasant one. You may be receiving a phone survey and we would appreciate your time and participation in answering the questions. Your input is very valuable to us in our efforts to improve our service to our patients and their families.        My signature on this form indicates that:    1. I have reviewed and understand the above information.  2. My questions regarding this information have been answered to my satisfaction.  3. I have formulated a plan with my discharge nurse to obtain my prescribed medications for home.                  Disclaimer         __________________________________                     __________       ________                       Patient Signature                                                 Date                    Time

## 2017-08-14 NOTE — IP AVS SNAPSHOT
[employer] Access Code: [email or web address]  Expires: 9/15/2017  5:17 PM    [employer]  A secure, online tool to manage your health information     [employer]’s [employer]® is a secure, online tool that connects you to your personalized health information from the privacy of your home -- day or night - making it very easy for you to manage your healthcare. Once the activation process is completed, you can even access your medical information using the [employer] ta, which is available for free in the Apple Ta store or Google Play store.     [employer] provides the following levels of access (as shown below):   My Chart Features   [facility] Primary Care Doctor [facility]  Specialists [facility]  Urgent  Care Non-[facility]  Primary Care  Doctor   Email your healthcare team securely and privately 24/7 X X X X   Manage appointments: schedule your next appointment; view details of past/upcoming appointments X      Request prescription refills. X      View recent personal medical records, including lab and immunizations X X X X   View health record, including health history, allergies, medications X X X X   Read reports about your outpatient visits, procedures, consult and ER notes X X X X   See your discharge summary, which is a recap of your hospital and/or ER visit that includes your diagnosis, lab results, and care plan. X X       How to register for [employer]:  1. Go to  [email or web address].  2. Click on the Sign Up Now box, which takes you to the New Member Sign Up page. You will need to provide the following information:  a. Enter your [employer] Access Code exactly as it appears at the top of this page. (You will not need to use this code after you’ve completed the sign-up process. If you do not sign up before the expiration date, you must request a new code.)   b. Enter your date of birth.   c. Enter your home email address.   d. Click Submit, and follow the next screen’s instructions.  3. Create a [employer] ID. This will be your [employer]  login ID and cannot be changed, so think of one that is secure and easy to remember.  4. Create a [employer] password. You can change your password at any time.  5. Enter your Password Reset Question and Answer. This can be used at a later time if you forget your password.   6. Enter your e-mail address. This allows you to receive e-mail notifications when new information is available in [employer]and[employer].  7. Click Sign Up. You can now view your health information.    For assistance activating your [employer] account, call [phone]

## 2017-08-14 NOTE — IP AVS SNAPSHOT
8/16/2017    Jannet Bruno  [address]  [place] [address]    Dear Jannet:    [facility] wants to ensure your discharge home is safe and you or your loved ones have had all of your questions answered regarding your care after you leave the hospital.    Below is a list of resources and contact information should you have any questions regarding your hospital stay, follow-up instructions, or active medical symptoms.    Questions or Concerns Regarding… Contact   Medical Questions Related to Your Discharge  (7 days a week, 8am-5pm) Contact a Nurse Care Coordinator   [phone]   Medical Questions Not Related to Your Discharge  (24 hours a day / 7 days a week)  Contact the Nurse Health Line   [phone]    Medications or Discharge Instructions Refer to your discharge packet   or contact your [facility] Primary Care Provider   [phone]   Follow-up Appointment(s) Schedule your appointment via MyCube   or contact Scheduling [phone]   Billing Review your statement via MyCube  or contact Billing [phone]   Medical Records Review your records via MyCube   or contact Medical Records [phone]     You may receive a telephone call within two days of discharge. This call is to make certain you understand your discharge instructions and have the opportunity to have any questions answered. You can also easily access your medical information, test results and upcoming appointments via the MyCube free online health management tool. You can learn more and sign up at [employer]/MyCube. For assistance setting up your MyCube account, please call [phone].    Once again, we want to ensure your discharge home is safe and that you have a clear understanding of any next steps in your care. If you have any questions or concerns, please do not hesitate to contact us, we are here for you. Thank you for choosing [facility] for your healthcare needs.    Sincerely,    Your [facility] Healthcare Team

## 2017-08-14 NOTE — LETTER
"  FORM C-4:  EMPLOYEE’S CLAIM FOR COMPENSATION/ REPORT OF INITIAL TREATMENT  EMPLOYEE’S CLAIM - PROVIDE ALL INFORMATION REQUESTED   First Name  Jannet Last Name  Damion Birthdate             Age  1940 76 y.o. Sex  female Claim Number   Home Employee Address  [address]  [facility]                                     Zip  [address] Height  1.575 m (5' 2.01\") Weight  72.576 kg (160 lb) SSN  [id number]   Mailing Employee Address                           [address]   [facility]               Zip  [address] Telephone  [phone] (home)  Primary Language Spoken  ENGLISH   Insurer  [employer]   [employer] Employee's Occupation (Job Title) When Injury or Occupational Disease Occurred     Employer's Name  [employer] Telephone  [phone]    Employer Address  [address] [facility] [address]] Zip  [address]   Date of Injury  8/14/2017       Hour of Injury  10:55 AM Date Employer Notified  8/14/2017 Last Day of Work after Injury or Occupational Disease  8/14/2017 Supervisor to Whom Injury Reported  [facility]   Address or Location of Accident (if applicable)  [[address] ]   What were you doing at the time of accident? (if applicable)  Walking thru the garage    How did this injury or occupational disease occur? Be specific and answer in detail. Use additional sheet if necessary)  Walking thru the garage the level change to a 6 inch drop which caused me to stumble and fall to the floor.   If you believe that you have an occupational disease, when did you first have knowledge of the disability and it relationship to your employment?  n/a Witnesses to the Accident  None     Nature of Injury or Occupational Disease  Contusion  Part(s) of Body Injured or Affected  Hip (R), N/A, N/A    I certify that the above is true and correct to the best of my knowledge and that I have provided " this information in order to obtain the benefits of [place]  I hereby authorize any physician, chiropractor, surgeon, practitioner, or other person, any hospital, including [facility] or [facility], any medical service organization, any insurance company, or other institution or organization to release to each other, any medical or other information, including benefits paid or payable, pertinent to this injury or disease, except information relative to diagnosis, treatment and/or counseling for AIDS, psychological conditions, alcohol or controlled substances, for which I must give specific authorization.  A Photostat of this authorization shall be as valid as the original.   Date 08/14/2017 [facility] Employee’s Signature   THIS REPORT MUST BE COMPLETED AND MAILED WITHIN 3 WORKING DAYS OF TREATMENT   Place  [facility], EMERGENCY DEPT  Name of Facility   [facility]   Date  8/14/2017 Diagnosis  (S72.001A) Closed fracture of neck of right femur, initial encounter (CMS-HCC)  (R73.9) Hyperglycemia Is there evidence the injured employee was under the influence of alcohol and/or another controlled substance at the time of accident?   Hour  1:19 PM Description of Injury or Disease  Closed fracture of neck of right femur, initial encounter (CMS-HCC)  Hyperglycemia No   Treatment  Exam, admission for surgery  Have you advised the patient to remain off work five days or more?         Yes   X-Ray Findings  Positive   If Yes   From Date    To Date      From information given by the employee, together with medical evidence, can you directly connect this injury or occupational disease as job incurred?  Yes If No, is the employee capable of: Full Duty  No Modified Duty  No   Is additional medical care by a physician indicated?  Yes If Modified Duty,  "Specify any Limitations / Restrictions        Do you know of any previous injury or disease contributing to this condition or occupational disease?  No   Date  8/14/2017 Print Doctor’s Name  Kevin Thomas certify the employer’s copy of this form was mailed on: 08/14/2017   Address  [address] [place] [address]  [place] [address] [phone] Insurer’s Use Only   [facility]  [address]    Provider’s Tax ID Number  [id number] Telephone  Dept: [phone]    Doctor’s Signature  e-KEVIN Segura M.D. Degree   MD    Original - TREATING PHYSICIAN OR CHIROPRACTOR   Pg 2-Insurer/TPA   Pg 3-Employer   Pg 4-Employee                                                                                                  Form C-4 (rev01/03)     BRIEF DESCRIPTION OF RIGHTS AND BENEFITS  (Pursuant to [place] 616C.050)    Notice of Injury or Occupational Disease (Incident Report Form C-1): If an injury or occupational disease (OD) arises out of and in the course of employment, you must provide written notice to your employer as soon as practicable, but no later than 7 days after the accident or OD. Your employer shall maintain a sufficient supply of the required forms.    Claim for Compensation (Form C-4): If medical treatment is sought, the form C-4 is available at the place of initial treatment. A completed \"Claim for Compensation\" (Form C-4) must be filed within 90 days after an accident or OD. The treating physician or chiropractor must, within 3 working days after treatment, complete and mail to the employer, the employer's insurer and third-party , the Claim for Compensation.    Medical Treatment: If you require medical treatment for your on-the-job injury or OD, you may be required to select a physician or chiropractor from a list provided by your workers’ compensation insurer, if it has contracted with an Organization for Managed Care (MCO) or Preferred Provider Organization (PPO) or providers of " health care. If your employer has not entered into a contract with an MCO or PPO, you may select a physician or chiropractor from the Panel of Physicians and Chiropractors. Any medical costs related to your industrial injury or OD will be paid by your insurer.    Temporary Total Disability (TTD): If your doctor has certified that you are unable to work for a period of at least 5 consecutive days, or 5 cumulative days in a 20-day period, or places restrictions on you that your employer does not accommodate, you may be entitled to TTD compensation.    Temporary Partial Disability (TPD): If the wage you receive upon reemployment is less than the compensation for TTD to which you are entitled, the insurer may be required to pay you TPD compensation to make up the difference. TPD can only be paid for a maximum of 24 months.    Permanent Partial Disability (PPD): When your medical condition is stable and there is an indication of a PPD as a result of your injury or OD, within 30 days, your insurer must arrange for an evaluation by a rating physician or chiropractor to determine the degree of your PPD. The amount of your PPD award depends on the date of injury, the results of the PPD evaluation and your age and wage.    Permanent Total Disability (PTD): If you are medically certified by a treating physician or chiropractor as permanently and totally disabled and have been granted a PTD status by your insurer, you are entitled to receive monthly benefits not to exceed 66 2/3% of your average monthly wage. The amount of your PTD payments is subject to reduction if you previously received a PPD award.    Vocational Rehabilitation Services: You may be eligible for vocational rehabilitation services if you are unable to return to the job due to a permanent physical impairment or permanent restrictions as a result of your injury or occupational disease.    Transportation and Per Georgina Reimbursement: You may be eligible for travel  expenses and per fartun associated with medical treatment.  Reopening: You may be able to reopen your claim if your condition worsens after claim closure.    Appeal Process: If you disagree with a written determination issued by the insurer or the insurer does not respond to your request, you may appeal to the Department of Administration, , by following the instructions contained in your determination letter. You must appeal the determination within 70 days from the date of the determination letter at [address], [address], [place], [place] [address], or [address], [place], [place] [address]. If you disagree with the  decision, you may appeal to the Department of Administration, . You must file your appeal within 30 days from the date of the  decision letter at [address], [address], [place], [place] [address], or [address] [facility] [address], [place], [place] [address]. If you disagree with a decision of an , you may file a petition for judicial review with the District Court. You must do so within 30 days of the Appeal Officer’s decision. You may be represented by an  at your own expense or you may contact the [facility] for possible representation.    [place]  for Injured Workers (NAIW): If you disagree with a  decision, you may request that NAIW represent you without charge at an  Hearing. For information regarding denial of benefits, you may contact the [facility] at: [address], [address], [place] [address], [phone], or [address] [facility] [address], [place] [address], [phone]    To File a Complaint with the Division: If you wish to file a complaint with the  of the Division of Industrial Relations (DIR), please contact the Workers’ Compensation Section, [address], [address], [place], [place] [address],  telephone [phone], or [address], [address], [place], [place] [address], telephone [phone].    For assistance with Workers’ Compensation Issues: you may contact the Office of the Governor Consumer Health Assistance, [address] [place] [address], [address], [place], [place] [address], Toll Free 1-320.533.6274, Web site: [email or web address]., E-mail [email or web address].                                                                                                                                                                               __________________________________________________________________                                    _________08/14/2017________            Employee Name / Signature                                                                                                                            Date                                       D-2 (rev. 10/07)

## 2017-08-15 LAB
ALBUMIN SERPL BCP-MCNC: 3.2 G/DL (ref 3.2–4.9)
ALBUMIN/GLOB SERPL: 1.3 G/DL
ALP SERPL-CCNC: 72 U/L (ref 30–99)
ALT SERPL-CCNC: 45 U/L (ref 2–50)
ANION GAP SERPL CALC-SCNC: 9 MMOL/L (ref 0–11.9)
AST SERPL-CCNC: 50 U/L (ref 12–45)
BASOPHILS # BLD AUTO: 0.7 % (ref 0–1.8)
BASOPHILS # BLD: 0.06 K/UL (ref 0–0.12)
BILIRUB SERPL-MCNC: 0.6 MG/DL (ref 0.1–1.5)
BUN SERPL-MCNC: 25 MG/DL (ref 8–22)
CALCIUM SERPL-MCNC: 9.2 MG/DL (ref 8.5–10.5)
CHLORIDE SERPL-SCNC: 104 MMOL/L (ref 96–112)
CO2 SERPL-SCNC: 25 MMOL/L (ref 20–33)
CREAT SERPL-MCNC: 1.36 MG/DL (ref 0.5–1.4)
EKG IMPRESSION: NORMAL
EOSINOPHIL # BLD AUTO: 0.3 K/UL (ref 0–0.51)
EOSINOPHIL NFR BLD: 3.6 % (ref 0–6.9)
ERYTHROCYTE [DISTWIDTH] IN BLOOD BY AUTOMATED COUNT: 44.5 FL (ref 35.9–50)
GFR SERPL CREATININE-BSD FRML MDRD: 38 ML/MIN/1.73 M 2
GLOBULIN SER CALC-MCNC: 2.5 G/DL (ref 1.9–3.5)
GLUCOSE BLD-MCNC: 188 MG/DL (ref 65–99)
GLUCOSE BLD-MCNC: 267 MG/DL (ref 65–99)
GLUCOSE BLD-MCNC: 281 MG/DL (ref 65–99)
GLUCOSE BLD-MCNC: 314 MG/DL (ref 65–99)
GLUCOSE BLD-MCNC: 315 MG/DL (ref 65–99)
GLUCOSE SERPL-MCNC: 199 MG/DL (ref 65–99)
HCT VFR BLD AUTO: 34.5 % (ref 37–47)
HGB BLD-MCNC: 12.2 G/DL (ref 12–16)
IMM GRANULOCYTES # BLD AUTO: 0.04 K/UL (ref 0–0.11)
IMM GRANULOCYTES NFR BLD AUTO: 0.5 % (ref 0–0.9)
LYMPHOCYTES # BLD AUTO: 1.28 K/UL (ref 1–4.8)
LYMPHOCYTES NFR BLD: 15.4 % (ref 22–41)
MAGNESIUM SERPL-MCNC: 1.9 MG/DL (ref 1.5–2.5)
MCH RBC QN AUTO: 32.6 PG (ref 27–33)
MCHC RBC AUTO-ENTMCNC: 35.4 G/DL (ref 33.6–35)
MCV RBC AUTO: 92.2 FL (ref 81.4–97.8)
MONOCYTES # BLD AUTO: 0.46 K/UL (ref 0–0.85)
MONOCYTES NFR BLD AUTO: 5.5 % (ref 0–13.4)
NEUTROPHILS # BLD AUTO: 6.16 K/UL (ref 2–7.15)
NEUTROPHILS NFR BLD: 74.3 % (ref 44–72)
NRBC # BLD AUTO: 0 K/UL
NRBC BLD AUTO-RTO: 0 /100 WBC
PHOSPHATE SERPL-MCNC: 3.1 MG/DL (ref 2.5–4.5)
PLATELET # BLD AUTO: 133 K/UL (ref 164–446)
PMV BLD AUTO: 10.1 FL (ref 9–12.9)
POTASSIUM SERPL-SCNC: 3.5 MMOL/L (ref 3.6–5.5)
PROT SERPL-MCNC: 5.7 G/DL (ref 6–8.2)
RBC # BLD AUTO: 3.74 M/UL (ref 4.2–5.4)
SODIUM SERPL-SCNC: 138 MMOL/L (ref 135–145)
WBC # BLD AUTO: 8.3 K/UL (ref 4.8–10.8)

## 2017-08-15 PROCEDURE — 700102 HCHG RX REV CODE 250 W/ 637 OVERRIDE(OP): Performed by: INTERNAL MEDICINE

## 2017-08-15 PROCEDURE — A9270 NON-COVERED ITEM OR SERVICE: HCPCS | Performed by: INTERNAL MEDICINE

## 2017-08-15 PROCEDURE — 700112 HCHG RX REV CODE 229: Performed by: ORTHOPAEDIC SURGERY

## 2017-08-15 PROCEDURE — 700111 HCHG RX REV CODE 636 W/ 250 OVERRIDE (IP): Performed by: ORTHOPAEDIC SURGERY

## 2017-08-15 PROCEDURE — 82962 GLUCOSE BLOOD TEST: CPT

## 2017-08-15 PROCEDURE — 700105 HCHG RX REV CODE 258: Performed by: INTERNAL MEDICINE

## 2017-08-15 PROCEDURE — 700102 HCHG RX REV CODE 250 W/ 637 OVERRIDE(OP): Performed by: ORTHOPAEDIC SURGERY

## 2017-08-15 PROCEDURE — 80053 COMPREHEN METABOLIC PANEL: CPT

## 2017-08-15 PROCEDURE — 97166 OT EVAL MOD COMPLEX 45 MIN: CPT

## 2017-08-15 PROCEDURE — 97162 PT EVAL MOD COMPLEX 30 MIN: CPT

## 2017-08-15 PROCEDURE — 84100 ASSAY OF PHOSPHORUS: CPT

## 2017-08-15 PROCEDURE — G8978 MOBILITY CURRENT STATUS: HCPCS | Mod: CJ

## 2017-08-15 PROCEDURE — A9270 NON-COVERED ITEM OR SERVICE: HCPCS | Performed by: ORTHOPAEDIC SURGERY

## 2017-08-15 PROCEDURE — 99232 SBSQ HOSP IP/OBS MODERATE 35: CPT | Performed by: HOSPITALIST

## 2017-08-15 PROCEDURE — G8979 MOBILITY GOAL STATUS: HCPCS | Mod: CI

## 2017-08-15 PROCEDURE — 700102 HCHG RX REV CODE 250 W/ 637 OVERRIDE(OP): Performed by: HOSPITALIST

## 2017-08-15 PROCEDURE — 85025 COMPLETE CBC W/AUTO DIFF WBC: CPT

## 2017-08-15 PROCEDURE — G8988 SELF CARE GOAL STATUS: HCPCS | Mod: CI

## 2017-08-15 PROCEDURE — 36415 COLL VENOUS BLD VENIPUNCTURE: CPT

## 2017-08-15 PROCEDURE — 83735 ASSAY OF MAGNESIUM: CPT

## 2017-08-15 PROCEDURE — G8987 SELF CARE CURRENT STATUS: HCPCS | Mod: CJ

## 2017-08-15 PROCEDURE — 770006 HCHG ROOM/CARE - MED/SURG/GYN SEMI*

## 2017-08-15 RX ORDER — INSULIN GLARGINE 100 [IU]/ML
20 INJECTION, SOLUTION SUBCUTANEOUS EVERY EVENING
Status: DISCONTINUED | OUTPATIENT
Start: 2017-08-15 | End: 2017-08-16 | Stop reason: HOSPADM

## 2017-08-15 RX ADMIN — STANDARDIZED SENNA CONCENTRATE AND DOCUSATE SODIUM 1 TABLET: 8.6; 5 TABLET, FILM COATED ORAL at 19:46

## 2017-08-15 RX ADMIN — DOCUSATE SODIUM 100 MG: 100 CAPSULE ORAL at 19:46

## 2017-08-15 RX ADMIN — KETOROLAC TROMETHAMINE 15 MG: 30 INJECTION, SOLUTION INTRAMUSCULAR at 05:44

## 2017-08-15 RX ADMIN — INSULIN LISPRO 2 UNITS: 100 INJECTION, SOLUTION INTRAVENOUS; SUBCUTANEOUS at 00:22

## 2017-08-15 RX ADMIN — KETOROLAC TROMETHAMINE 15 MG: 30 INJECTION, SOLUTION INTRAMUSCULAR at 00:17

## 2017-08-15 RX ADMIN — CEFAZOLIN SODIUM 2 G: 2 INJECTION, SOLUTION INTRAVENOUS at 00:17

## 2017-08-15 RX ADMIN — ATORVASTATIN CALCIUM 20 MG: 20 TABLET, FILM COATED ORAL at 19:46

## 2017-08-15 RX ADMIN — KETOROLAC TROMETHAMINE 15 MG: 30 INJECTION, SOLUTION INTRAMUSCULAR at 11:50

## 2017-08-15 RX ADMIN — CEFAZOLIN SODIUM 2 G: 2 INJECTION, SOLUTION INTRAVENOUS at 10:29

## 2017-08-15 RX ADMIN — INSULIN LISPRO 6 UNITS: 100 INJECTION, SOLUTION INTRAVENOUS; SUBCUTANEOUS at 16:47

## 2017-08-15 RX ADMIN — SODIUM CHLORIDE, POTASSIUM CHLORIDE, SODIUM LACTATE AND CALCIUM CHLORIDE: 600; 310; 30; 20 INJECTION, SOLUTION INTRAVENOUS at 05:56

## 2017-08-15 RX ADMIN — DOCUSATE SODIUM 100 MG: 100 CAPSULE ORAL at 08:08

## 2017-08-15 RX ADMIN — INSULIN LISPRO 6 UNITS: 100 INJECTION, SOLUTION INTRAVENOUS; SUBCUTANEOUS at 11:37

## 2017-08-15 RX ADMIN — OXYCODONE HYDROCHLORIDE 10 MG: 10 TABLET ORAL at 08:08

## 2017-08-15 RX ADMIN — KETOROLAC TROMETHAMINE 15 MG: 30 INJECTION, SOLUTION INTRAMUSCULAR at 17:19

## 2017-08-15 RX ADMIN — INSULIN LISPRO 5 UNITS: 100 INJECTION, SOLUTION INTRAVENOUS; SUBCUTANEOUS at 05:52

## 2017-08-15 RX ADMIN — ENOXAPARIN SODIUM 40 MG: 100 INJECTION SUBCUTANEOUS at 05:43

## 2017-08-15 RX ADMIN — INSULIN GLARGINE 20 UNITS: 100 INJECTION, SOLUTION SUBCUTANEOUS at 21:09

## 2017-08-15 ASSESSMENT — COGNITIVE AND FUNCTIONAL STATUS - GENERAL
SUGGESTED CMS G CODE MODIFIER MOBILITY: CK
DRESSING REGULAR LOWER BODY CLOTHING: A LITTLE
MOBILITY SCORE: 18
SUGGESTED CMS G CODE MODIFIER DAILY ACTIVITY: CJ
TURNING FROM BACK TO SIDE WHILE IN FLAT BAD: A LITTLE
HELP NEEDED FOR BATHING: A LITTLE
MOVING TO AND FROM BED TO CHAIR: A LITTLE
CLIMB 3 TO 5 STEPS WITH RAILING: A LITTLE
STANDING UP FROM CHAIR USING ARMS: A LITTLE
MOVING FROM LYING ON BACK TO SITTING ON SIDE OF FLAT BED: A LITTLE
DAILY ACTIVITIY SCORE: 22
WALKING IN HOSPITAL ROOM: A LITTLE

## 2017-08-15 ASSESSMENT — ENCOUNTER SYMPTOMS
HEADACHES: 0
TINGLING: 0
WEIGHT LOSS: 0
SENSORY CHANGE: 0
VOMITING: 0
NAUSEA: 0
CHILLS: 0
ABDOMINAL PAIN: 0
FEVER: 0
SPUTUM PRODUCTION: 0
PALPITATIONS: 0
COUGH: 0
MYALGIAS: 1
SPEECH CHANGE: 0
CLAUDICATION: 0
FLANK PAIN: 0
HEARTBURN: 0
TREMORS: 0
DIZZINESS: 0

## 2017-08-15 ASSESSMENT — GAIT ASSESSMENTS
ASSISTIVE DEVICE: FRONT WHEEL WALKER
DISTANCE (FEET): 100
GAIT LEVEL OF ASSIST: CONTACT GUARD ASSIST

## 2017-08-15 ASSESSMENT — PAIN SCALES - GENERAL
PAINLEVEL_OUTOF10: 3
PAINLEVEL_OUTOF10: 0
PAINLEVEL_OUTOF10: 5
PAINLEVEL_OUTOF10: 5
PAINLEVEL_OUTOF10: ASSUMED PAIN PRESENT
PAINLEVEL_OUTOF10: 3
PAINLEVEL_OUTOF10: 1

## 2017-08-15 ASSESSMENT — ACTIVITIES OF DAILY LIVING (ADL): TOILETING: INDEPENDENT

## 2017-08-15 NOTE — PROGRESS NOTES
"      Orthopaedic Progress Note    Interval changes:  Patient doing well  Possible DC home if cleared by therapy -as soon as tomorrow  Nausea and emesis with initial mobilization today    ROS - Patient denies any new issues.  Pain well controlled.    Blood pressure 136/58, pulse 78, temperature 36.4 °C (97.6 °F), resp. rate 16, height 1.575 m (5' 2.01\"), weight 72.576 kg (160 lb), SpO2 93 %.      Patient seen and examined  No acute distress  Breathing non labored  RRR  Left hip surgical dressing is clean, dry, and intact. Patient clearly fires tibialis anterior, EHL, and gastrocnemius/soleus. Sensation is intact to light touch throughout superficial peroneal, deep peroneal, tibial, saphenous, and sural nerve distributions. Strong and palpable 2+ dorsalis pedis and posterior tibial pulses with capillary refill less than 2 seconds. No lower leg tenderness or discomfort.        Recent Labs      08/14/17   1121  08/15/17   0320   WBC  7.3  8.3   RBC  4.21  3.74*   HEMOGLOBIN  13.7  12.2   HEMATOCRIT  38.5  34.5*   MCV  91.4  92.2   MCH  32.5  32.6   MCHC  35.6*  35.4*   RDW  43.2  44.5   PLATELETCT  169  133*   MPV  10.3  10.1       Active Hospital Problems    Diagnosis   • Femur fracture (CMS-[facility]) [S72.90XA]     Priority: High   • Diabetes mellitus, type II (CMS-[facility]) [E11.9]     Priority: Low   • Hypertension [I10]     Priority: Low   • Chronic kidney disease, stage III (moderate) [N18.3]     Priority: Low   • Hyponatremia [E87.1]     Priority: Low   • Dyslipidemia [E78.5]     Priority: Low               Assessment/Plan:  Cleared for DC home by ortho pending medicine and therapy clearance  POD#1 S/P right hip percutaneous pin fixation  Wt bearing status - WBAT  Wound care/Drains - dressing change tomorrow  Future Procedures - none planned   Lovenox: Start 8/15, Duration-until ambulatory > 150'  Sutures/Staples out- 10-14 days post operatively  PT/OT-initiated  Antibiotics: completed  DVT Prophylaxis- TEDS/SCDs/Foot " pumps  López-none  Case Coordination for Discharge Planning - Disposition home vs SNF

## 2017-08-15 NOTE — ASSESSMENT & PLAN NOTE
Uncontrolled. Manifestations of nephropathy.    Sliding scale insulin during hospital stay.  Added lantus 20 units qhs

## 2017-08-15 NOTE — THERAPY
"Occupational Therapy Evaluation completed.   Functional Status:  Up in chair, SBA w/LB dressing to don/doff sock,  walked to bathroom w/SBA and fww, sba w/toilet txf and standing at sink c/o pain, spv w/sit>Supine BTB sudden c/o nausea pt vomitted RN notified, remained in bed w/RN   Plan of Care: Will benefit from Occupational Therapy 4 times per week  Discharge Recommendations:  Equipment: Front-Wheel Walker and Will Continue to Assess for Equipment Needs. Post-acute therapy Discharge to home with outpatient or home health for additional skilled therapy services.    76 yr old female s/p fall at work resulting in femur fx pt is s/p R hip pinning, pt is WBAT pt has good support at home, pt presents w/decreased activity tolerance and pain however will likely progress during hospital course and recommend HH after d/c home     See \"Rehab Therapy-Acute\" Patient Summary Report for complete documentation.    "

## 2017-08-15 NOTE — PROGRESS NOTES
Pt arrived to the unit with the transporter via gurney.   Dressing to right hip in place. CDI  LR running at 125 mL/hr.   SCDs on   on.    Safety precautions in place. Call light within reach. Bed in low and locked position. Hourly rounding in place. Updated on plan of care. Questions answered.

## 2017-08-15 NOTE — PROGRESS NOTES
[facility] Hospitalist Progress Note    Date of Service: 8/15/2017    Chief Complaint  76 y.o. female admitted 2017 with right leg pain s/p fall    Interval Problem Update  Pod #1 repair right leg'    Afebrile    No sob    Sugars are uncontrolled and elevated    Consultants/Specialty  ortho    Disposition  snf vs home        Review of Systems   Constitutional: Negative for fever, chills and weight loss.   HENT: Negative for hearing loss and tinnitus.    Respiratory: Negative for cough and sputum production.    Cardiovascular: Negative for chest pain, palpitations and claudication.   Gastrointestinal: Negative for heartburn, nausea, vomiting and abdominal pain.   Genitourinary: Negative for dysuria, urgency, frequency, hematuria and flank pain.   Musculoskeletal: Positive for myalgias and joint pain.   Neurological: Negative for dizziness, tingling, tremors, sensory change, speech change and headaches.   All other systems reviewed and are negative.     Physical Exam  Laboratory/Imaging   Hemodynamics  Temp (24hrs), Av.3 °C (97.3 °F), Min:35.8 °C (96.5 °F), Max:36.7 °C (98.1 °F)   Temperature: 36.4 °C (97.6 °F)  Pulse  Av.7  Min: 55  Max: 98 Heart Rate (Monitored): 65  Blood Pressure : 136/58 mmHg, NIBP: 155/65 mmHg      Respiratory      Respiration: 16, Pulse Oximetry: 93 %, O2 Daily Delivery Respiratory : Silicone Nasal Cannula     Work Of Breathing / Effort: Mild  RUL Breath Sounds: Clear, RML Breath Sounds: Clear, RLL Breath Sounds: Diminished, POOL Breath Sounds: Clear, LLL Breath Sounds: Diminished    Fluids    Intake/Output Summary (Last 24 hours) at 08/15/17 1429  Last data filed at 08/15/17 1200   Gross per 24 hour   Intake   1680 ml   Output    800 ml   Net    880 ml       Nutrition  Orders Placed This Encounter   Procedures   • DIET ORDER     Standing Status: Standing      Number of Occurrences: 1      Standing Expiration Date:      Order Specific Question:  Diet:     Answer:  Diabetic [3]      Physical Exam   Constitutional: She is oriented to person, place, and time. No distress.   Neck: No tracheal deviation present. No thyromegaly present.   Cardiovascular: Normal rate.  Exam reveals no gallop and no friction rub.    No murmur heard.  Pulmonary/Chest: No respiratory distress. She has no wheezes. She has no rales.   Abdominal: She exhibits no distension. There is no tenderness. There is no rebound.   Musculoskeletal:   Right hip tenderness   Neurological: She is alert and oriented to person, place, and time. No cranial nerve deficit. Coordination normal.   Skin: She is not diaphoretic.       Recent Labs      08/14/17   1121  08/15/17   0320   WBC  7.3  8.3   RBC  4.21  3.74*   HEMOGLOBIN  13.7  12.2   HEMATOCRIT  38.5  34.5*   MCV  91.4  92.2   MCH  32.5  32.6   MCHC  35.6*  35.4*   RDW  43.2  44.5   PLATELETCT  169  133*   MPV  10.3  10.1     Recent Labs      08/14/17   1121  08/15/17   0320   SODIUM  132*  138   POTASSIUM  3.7  3.5*   CHLORIDE  101  104   CO2  23  25   GLUCOSE  459*  199*   BUN  34*  25*   CREATININE  1.49*  1.36   CALCIUM  10.0  9.2     Recent Labs      08/14/17   1121   APTT  26.3   INR  1.02                  Assessment/Plan     Femur fracture (CMS-[facility]) (present on admission)  Assessment & Plan  Closed. Acute impacted RIGHT femoral neck fracture.  Pod # 1 repair    Pt and ot ordered    Dyslipidemia (present on admission)  Assessment & Plan  Statins    Hyponatremia (present on admission)  Assessment & Plan  Hold diuretics. Monitor. Repeat in the morning.    Diabetes mellitus, type II (CMS-[facility]) (present on admission)  Assessment & Plan  Uncontrolled. Manifestations of nephropathy.    Sliding scale insulin during hospital stay.  Added lantus 20 units qhs    Hypertension (present on admission)  Assessment & Plan  Hold Ace inhibitor in the perioperative setting.     Will probably resume tomorrow morning    Chronic kidney disease, stage III (moderate) (present on  admission)  Assessment & Plan  Monitor renal function, avoid nephrotoxins and dosage of medications renally    heplock IV        López catheter: No López      DVT Prophylaxis: Enoxaparin (Lovenox)

## 2017-08-15 NOTE — CARE PLAN
Problem: Communication  Goal: The ability to communicate needs accurately and effectively will improve  Intervention: Educate patient and significant other/support system about the plan of care, procedures, treatments, medications and allow for questions  POC discussed along with medications and night shift routine.       Problem: Mobility  Goal: Risk for activity intolerance will decrease  Intervention: Encourage patient to increase activity level in collaboration with Interdisciplinary Team  Pt at first wanted to only use bedpan but was able to encourage pt to get up and use bedside commode and was able to do so with FWW, pt encouraged to ambulate to restroom next time.

## 2017-08-15 NOTE — THERAPY
"Physical Therapy Evaluation completed.   Bed Mobility:  Supine to Sit:  (up in chair)  Transfers: Sit to Stand: Contact Guard Assist  Gait: Level Of Assist: Contact Guard Assist with Front-Wheel Walker       Plan of Care: Will benefit from Physical Therapy 4 times per week  Discharge Recommendations: Equipment: Front-Wheel Walker. Post-acute therapy Discharge to home with outpatient or home health for additional skilled therapy services.    See \"Rehab Therapy-Acute\" Patient Summary Report for complete documentation.     "

## 2017-08-15 NOTE — CARE PLAN
Problem: Safety  Goal: Will remain free from injury  Outcome: PROGRESSING AS EXPECTED  Call light within reach, pt calls for assistance at all times. Bed in low position and locked, upper bedside rails up, proper mobility signs placed, yellow slipper socks on pt, yellow fall risk sign outside room, personal possessions within reach, treaded socks on.Hourly rounding in place.          Problem: Venous Thromboembolism (VTW)/Deep Vein Thrombosis (DVT) Prevention:  Goal: Patient will participate in Venous Thrombosis (VTE)/Deep Vein Thrombosis (DVT)Prevention Measures  Outcome: PROGRESSING AS EXPECTED  SCDS to BLE. Lovenox given subq per MAR for DVT prophylaxis.

## 2017-08-15 NOTE — PROGRESS NOTES
Bedside report received by MAURO Denson and care assumed for patient at 0700. Pt A&O X 4,  Lung sounds diminished in bases, On 1 L oxygen via NC. Bowel sounds active all four quadrants. Voiding via bedside commode or up to bathroom with FWW. CMS +.  SCDs on.  Pt turns self side to side.  Pt calls appropriatley.  Surgical wound to R. Hip, dressing clean, dry, intact. PIV to L AC, intact and patent. Pt sitting up in bed.  Plan of care discussed including pain management, mobilization. Hourly rounding in place.  Call light and belongings at bedside and within reach.

## 2017-08-15 NOTE — H&P
Hospital Medicine History and Physical    Date of Service  8/14/2017    Chief Complaint  Chief Complaint   Patient presents with   • Hip Pain     right   • T-5000 FALL       History of Presenting Illness  76 y.o. female who presented 8/14/2017 with fall complicated by hip fracture. Patient works as a  for the [employer]. She had a fall in the garage at her workplace today. She reports that she stumbled and fell on her right side. Subsequently she was in intractable pain and could not stand up or bear weight on her right lower extremity. Sharp right-sided hip pain. 10 out of 10 in intensity. Nonradiating. Denies any fevers or chills. Denies any chest pain. Denies history of coronary artery disease. Denies lower extremity swelling, palpitations, orthopnea or paroxysmal nocturnal dyspnea. Has history of congestive heart failure. History hypertension, hyperlipidemia, diabetes mellitus type II and chronic kidney disease. At baseline able to go up and down a flight of stairs without having any chest pain or shortness of breath. At baseline able to walk more than 4-5 blocks without any chest pain or shortness of breath. No other complaints per patient.    Primary Care Physician  Henry Forrest D.O.    Consultants  Orthopedics    Code Status  Full code    Review of Systems  Review of Systems   Constitutional: Negative for fever, chills, weight loss, malaise/fatigue and diaphoresis.   HENT: Negative for hearing loss.    Eyes: Negative for blurred vision and double vision.   Respiratory: Negative for cough, hemoptysis, sputum production, shortness of breath and wheezing.    Cardiovascular: Negative for chest pain, palpitations, orthopnea, claudication, leg swelling and PND.   Gastrointestinal: Negative for heartburn, nausea, vomiting, abdominal pain, diarrhea, constipation, blood in stool and melena.   Genitourinary: Negative for dysuria, urgency, frequency, hematuria and flank pain.    Musculoskeletal: Positive for joint pain and falls. Negative for myalgias, back pain and neck pain.   Skin: Negative for itching and rash.   Neurological: Negative for dizziness, tingling, tremors, sensory change, speech change, focal weakness, seizures, loss of consciousness, weakness and headaches.   Psychiatric/Behavioral: Negative for depression, suicidal ideas, hallucinations, memory loss and substance abuse. The patient is not nervous/anxious and does not have insomnia.           Past Medical History  Past Medical History   Diagnosis Date   • Hypertension    • Arthritis    • Unspecified urinary incontinence    • Heart burn    • Indigestion    • Other specified disorder of intestines    • Unspecified hemorrhagic conditions      bruise easily   • Urinary bladder disorder      incontinence   • Breath shortness    • Snoring    • Anesthesia      oxygen level dropped   • Sleep apnea, obstructive      no c-pap   • Diabetes    • CATARACT      right IOL       Surgical History  Past Surgical History   Procedure Laterality Date   • Other       T&A   • Other orthopedic surgery       R & l Shoulder repair   • Other abdominal surgery       abd tramua- horse stepped on abd   • Appendectomy     • Gyn surgery       hysterectomy   • Ventral hernia repair laparoscopic  3/7/2012     Performed by HUNTER SERNA at SURGERY SAME DAY [facility] ORS   • Cataract phaco with iol  5/30/2014     Performed by Rudolph Barclay M.D. at SURGERY SAME DAY [facility] ORS   • Cataract phaco with iol  6/11/2014     Performed by Rudolph Barclay M.D. at SURGERY SAME DAY [facility] ORS       Medications  No current facility-administered medications on file prior to encounter.     Current Outpatient Prescriptions on File Prior to Encounter   Medication Sig Dispense Refill   • amlodipine (NORVASC) 10 MG Tab Take 1 Tab by mouth every day. 30 Tab 1   • atorvastatin (LIPITOR) 20 MG Tab Take 1 Tab by mouth every bedtime. 30 Tab 11   • furosemide  (LASIX) 40 MG TABS Take 40 mg by mouth every day.     • potassium chloride CR (K-DUR) 10 MEQ tablet Take 10 mEq by mouth every day.         Family History  Father with a history of leukemia. Mother with history of Alzheimer's disease. Also congestive heart failure for her mother.    Social History  Social History   Substance Use Topics   • Smoking status: Former Smoker -- 1.00 packs/day for 20 years     Types: Cigarettes     Quit date: 1979   • Smokeless tobacco: Never Used   • Alcohol Use: No       Allergies  Allergies   Allergen Reactions   • Codeine      Personality changes   • Percocet [Oxycodone-Acetaminophen]      Personality change   • Tape Rash     Adhesive tape-rash, paper tape ok.        Physical Exam  Laboratory   Hemodynamics  Temp (24hrs), Av.4 °C (97.6 °F), Min:36.2 °C (97.2 °F), Max:36.9 °C (98.4 °F)   Temperature: 36.2 °C (97.2 °F)  Pulse  Av.3  Min: 57  Max: 98 Heart Rate (Monitored): 65  Blood Pressure : 159/77 mmHg, NIBP: 155/65 mmHg      Respiratory      Respiration: 16, Pulse Oximetry: 95 %             Physical Exam   Constitutional: She is oriented to person, place, and time. She appears well-developed and well-nourished. No distress.   HENT:   Head: Normocephalic.   Mouth/Throat: Oropharynx is clear and moist. No oropharyngeal exudate.   Eyes: Conjunctivae are normal. Pupils are equal, round, and reactive to light. No scleral icterus.   Neck: No JVD present.   Cardiovascular: Normal rate, regular rhythm and normal heart sounds.    No murmur heard.  Pulmonary/Chest: Effort normal and breath sounds normal. No stridor. No respiratory distress. She has no wheezes. She has no rales.   Abdominal: Soft. Bowel sounds are normal. She exhibits no distension. There is no tenderness. There is no rebound and no guarding.   Musculoskeletal: She exhibits tenderness. She exhibits no edema.   Right hip tenderness. Full distal pulses.   Neurological: She is alert and oriented to person, place,  and time. No cranial nerve deficit.   Skin: Skin is warm and dry. She is not diaphoretic. No erythema.   Psychiatric: She has a normal mood and affect. Her behavior is normal. Judgment and thought content normal.       Recent Labs      08/14/17   1121   WBC  7.3   RBC  4.21   HEMOGLOBIN  13.7   HEMATOCRIT  38.5   MCV  91.4   MCH  32.5   MCHC  35.6*   RDW  43.2   PLATELETCT  169   MPV  10.3     Recent Labs      08/14/17   1121   SODIUM  132*   POTASSIUM  3.7   CHLORIDE  101   CO2  23   GLUCOSE  459*   BUN  34*   CREATININE  1.49*   CALCIUM  10.0     Recent Labs      08/14/17   1121   GLUCOSE  459*     Recent Labs      08/14/17   1121   APTT  26.3   INR  1.02             No results found for: TROPONINI    Imaging  Reviewed    Assessment/Plan     I anticipate this patient will require at least two midnights for appropriate medical management, necessitating inpatient admission.    Femur fracture (CMS-[facility]) (present on admission)  Assessment & Plan  Closed. Acute impacted RIGHT femoral neck fracture.  Keep nothing by mouth. Orthopedic consultation obtained. Surgical intervention per orthopedic team.    Intermediate risk surgery.   History hypertension, diabetes mellitus, hyperlipidemia and chronic kidney disease.   Denies coronary artery disease.  Exceptional functional capacity. Denies any symptoms of active angina or congestive heart failure.  Recommend preoperative EKG. Proceed to the OR without any further evaluations or interventions.    Dyslipidemia (present on admission)  Assessment & Plan  Statins    Hyponatremia (present on admission)  Assessment & Plan  Hold diuretics. Monitor. Repeat in the morning.    Diabetes mellitus, type II (CMS-[facility]) (present on admission)  Assessment & Plan  Uncontrolled. Manifestations of nephropathy.  Holding oral hypoglycemic agents.  Sliding scale insulin during hospital stay.    Hypertension (present on admission)  Assessment & Plan  Hold Ace inhibitor in the perioperative setting.  Resume as clinically appropriate.    Chronic kidney disease, stage III (moderate) (present on admission)  Assessment & Plan  Monitor renal function, avoid nephrotoxins and dosage of medications renally      VTE prophylaxis: SCDs and subcutaneous heparin .

## 2017-08-15 NOTE — PROGRESS NOTES
2 RN skin check done with Sharon WADE. Surgical dressing to right hip in place. CDI. Scab to the right elbow noticed. Skin intact otherwise.

## 2017-08-16 ENCOUNTER — HOME HEALTH ADMISSION (OUTPATIENT)
Dept: HOME HEALTH SERVICES | Facility: HOME HEALTHCARE | Age: 77
End: 2017-08-16
Payer: COMMERCIAL

## 2017-08-16 VITALS
SYSTOLIC BLOOD PRESSURE: 133 MMHG | RESPIRATION RATE: 16 BRPM | DIASTOLIC BLOOD PRESSURE: 58 MMHG | BODY MASS INDEX: 29.44 KG/M2 | OXYGEN SATURATION: 98 % | HEIGHT: 62 IN | WEIGHT: 160 LBS | TEMPERATURE: 97.5 F | HEART RATE: 80 BPM

## 2017-08-16 PROBLEM — S72.90XA FEMUR FRACTURE (HCC): Status: RESOLVED | Noted: 2017-08-14 | Resolved: 2017-08-16

## 2017-08-16 LAB
GLUCOSE BLD-MCNC: 232 MG/DL (ref 65–99)
GLUCOSE BLD-MCNC: 365 MG/DL (ref 65–99)
GLUCOSE BLD-MCNC: 376 MG/DL (ref 65–99)

## 2017-08-16 PROCEDURE — 97535 SELF CARE MNGMENT TRAINING: CPT

## 2017-08-16 PROCEDURE — A9270 NON-COVERED ITEM OR SERVICE: HCPCS | Performed by: ORTHOPAEDIC SURGERY

## 2017-08-16 PROCEDURE — 99239 HOSP IP/OBS DSCHRG MGMT >30: CPT | Performed by: HOSPITALIST

## 2017-08-16 PROCEDURE — 700111 HCHG RX REV CODE 636 W/ 250 OVERRIDE (IP): Performed by: ORTHOPAEDIC SURGERY

## 2017-08-16 PROCEDURE — 700112 HCHG RX REV CODE 229: Performed by: ORTHOPAEDIC SURGERY

## 2017-08-16 PROCEDURE — 97116 GAIT TRAINING THERAPY: CPT

## 2017-08-16 PROCEDURE — 82962 GLUCOSE BLOOD TEST: CPT

## 2017-08-16 PROCEDURE — 700102 HCHG RX REV CODE 250 W/ 637 OVERRIDE(OP): Performed by: ORTHOPAEDIC SURGERY

## 2017-08-16 RX ORDER — OXYCODONE HYDROCHLORIDE 5 MG/1
5 TABLET ORAL EVERY 6 HOURS PRN
Qty: 30 TAB | Refills: 0 | Status: ON HOLD | OUTPATIENT
Start: 2017-08-16 | End: 2019-02-15

## 2017-08-16 RX ADMIN — DOCUSATE SODIUM 100 MG: 100 CAPSULE ORAL at 09:00

## 2017-08-16 RX ADMIN — KETOROLAC TROMETHAMINE 15 MG: 30 INJECTION, SOLUTION INTRAMUSCULAR at 06:32

## 2017-08-16 RX ADMIN — POLYETHYLENE GLYCOL 3350 1 PACKET: 17 POWDER, FOR SOLUTION ORAL at 09:00

## 2017-08-16 RX ADMIN — KETOROLAC TROMETHAMINE 15 MG: 30 INJECTION, SOLUTION INTRAMUSCULAR at 01:26

## 2017-08-16 RX ADMIN — KETOROLAC TROMETHAMINE 15 MG: 30 INJECTION, SOLUTION INTRAMUSCULAR at 10:44

## 2017-08-16 RX ADMIN — ENOXAPARIN SODIUM 40 MG: 100 INJECTION SUBCUTANEOUS at 01:27

## 2017-08-16 ASSESSMENT — COGNITIVE AND FUNCTIONAL STATUS - GENERAL
MOBILITY SCORE: 23
CLIMB 3 TO 5 STEPS WITH RAILING: A LITTLE
SUGGESTED CMS G CODE MODIFIER MOBILITY: CI

## 2017-08-16 ASSESSMENT — GAIT ASSESSMENTS
DISTANCE (FEET): 150
DEVIATION: ANTALGIC;STEP TO
GAIT LEVEL OF ASSIST: SUPERVISED
ASSISTIVE DEVICE: FRONT WHEEL WALKER

## 2017-08-16 NOTE — DISCHARGE PLANNING
TCN met with patient at bedside to discuss therapy's recommendation for HH. Patient is agreeable to HH and prefers HH to SNF. Patient requested choice be [facility] although is agreeable to anyone who contracts with her insurance. Choice faxed to [facility]. TCN to follow as needed.

## 2017-08-16 NOTE — DISCHARGE INSTRUCTIONS
Discharge Instructions    Discharged to home by car with relative. Discharged via wheelchair, hospital escort: Yes.  Special equipment needed: Not Applicable    Be sure to schedule a follow-up appointment with your primary care doctor or any specialists as instructed.     Discharge Plan:   Influenza Vaccine Indication: Not indicated: Previously immunized this influenza season and > 8 years of age    I understand that a diet low in cholesterol, fat, and sodium is recommended for good health. Unless I have been given specific instructions below for another diet, I accept this instruction as my diet prescription.   Other diet: Diabetic diet    Special Instructions: Discharge instructions for the Orthopedic Patient    Follow up with Dr. Huerta in 2 weeks  Take medications as prescribed  For any questions or concerns contact MD    Follow up with Primary Care Physician within 2 weeks of discharge to home, regarding:  Review of medications and diagnostic testing.  Surveillance for medical complications.  Workup and treatment of osteoporosis, if appropriate.     -Is this a Joint Replacement patient? No    -Is this patient being discharged with medication to prevent blood clots?  No    · Is patient discharged on Warfarin / Coumadin?   No     · Is patient Post Blood Transfusion?  No    Femoral Shaft Fracture  A femoral shaft fracture is a break (fracture) in the shaft of the thigh bone (femur). The femur is the long bone that connects the hip joint to the knee joint. Most femoral shaft fractures are closed fractures. A closed fracture is a break in a bone that happens without any cuts (lacerations) through the skin that is near the fracture site. Some femoral shaft fractures are open fractures. An open fracture is a break in a bone that happens along with lacerations through the skin that is near the fracture site.   CAUSES  A healthy femur may break from a forceful impact, such as from:  · A fall, especially from a great  height.  · A high-impact sports injury.  · A car or motorcycle accident.  A weakened femur may break from minimal impact or force due to:  · Certain medical conditions.  · Age.  RISK FACTORS  This condition is more likely to develop in:  · Older people.  · People who have certain medical conditions that cause bones to become weak or thin, such as:  · Osteoporosis.  · Cancer.  · Osteogenesis imperfecta. This is a condition that involves bone weakness that is due to abnormal bone development.  · People who take certain medicines (bisphosphonates) that are used to treat osteoporosis.  · People who participate in high-risk sports or impact sports.  SYMPTOMS  Symptoms of this condition include:  · Severe pain.  · Inability to walk.  · Bruising.  · Swelling or visible deformity of the leg.  · Substantial bleeding, if it is an open fracture.  DIAGNOSIS  This condition is diagnosed based on your symptoms and a physical exam. You may also have other tests, including:  · X-rays of the femur. Since the force required to break a healthy femur can break other bones, X-rays are often taken of the hip, knee, and pelvis as well.  · Evaluation of the blood vessels with specialized X-rays (arteriogram).  · Evaluation of the nerves in the area of the break.  · CT scan or MRI.  TREATMENT  A femoral shaft fracture usually requires surgery. You may have one or more of the following surgical treatments:  · External fixation. This involves using pins and screws to hold the bones in place if there is extensive soft tissue injury. After some time, you may need an additional surgical treatment, such as intramedullary nailing.  · Intramedullary nailing. This involves inserting a radha (intramedullary nail) through an incision. The intramedullary nail goes down the center of the shaft of the femur. It may be inserted in the knee joint or from the top of the femur near the hip. Generally, screws are placed through the radha at both ends to prevent  shortening or rotation of the femur as it heals.  · Plates to stabilize the fracture. These may be used, especially when the fracture is at either end of the bone, near the hip or the knee.  In rare cases for which surgery is not an option, a cast or a splint may be used to hold (immobilize) the bone while it heals.  PREVENTION  If factors such as certain medical conditions or age increase your risk for another femoral shaft fracture, you may be able to prevent one if you follow these instructions:  · Use aids for walking, such as a walker or cane, as directed by your health care provider.  · Follow instructions from your health care provider about how to strengthen your bones if you have osteoporosis.     This information is not intended to replace advice given to you by your health care provider. Make sure you discuss any questions you have with your health care provider.     Document Released: 09/27/2006 Document Revised: 05/03/2016 Document Reviewed: 08/03/2015  [employer] Interactive Patient Education ©2016 [employer]      Incision Care  An incision is when a surgeon cuts into your body. After surgery, the incision needs to be cared for properly to prevent infection.   HOW TO CARE FOR YOUR INCISION  · Take medicines only as directed by your health care provider.  · There are many different ways to close and cover an incision, including stitches, skin glue, and adhesive strips. Follow your health care provider's instructions on:  ¨ Incision care.  ¨ Bandage (dressing) changes and removal.  ¨ Incision closure removal.  · Do not take baths, swim, or use a hot tub until your health care provider approves. You may shower as directed by your health care provider.  · Resume your normal diet and activities as directed.  · Use anti-itch medicine (such as an antihistamine) as directed by your health care provider. The incision may itch while it is healing. Do not pick or scratch at the incision.  · Drink enough fluid to  keep your urine clear or pale yellow.  SEEK MEDICAL CARE IF:   · You have drainage, redness, swelling, or pain at your incision site.  · You have muscle aches, chills, or a general ill feeling.  · You notice a bad smell coming from the incision or dressing.  · Your incision edges separate after the sutures, staples, or skin adhesive strips have been removed.  · You have persistent nausea or vomiting.  · You have a fever.  · You are dizzy.  SEEK IMMEDIATE MEDICAL CARE IF:   · You have a rash.  · You faint.  · You have difficulty breathing.  MAKE SURE YOU:   · Understand these instructions.  · Will watch your condition.  · Will get help right away if you are not doing well or get worse.     This information is not intended to replace advice given to you by your health care provider. Make sure you discuss any questions you have with your health care provider.     Document Released: 07/07/2006 Document Revised: 01/08/2016 Document Reviewed: 02/11/2015  [employer] Interactive Patient Education ©2016 [employer]    Depression / Suicide Risk    As you are discharged from this [facility] facility, it is important to learn how to keep safe from harming yourself.    Recognize the warning signs:  · Abrupt changes in personality, positive or negative- including increase in energy   · Giving away possessions  · Change in eating patterns- significant weight changes-  positive or negative  · Change in sleeping patterns- unable to sleep or sleeping all the time   · Unwillingness or inability to communicate  · Depression  · Unusual sadness, discouragement and loneliness  · Talk of wanting to die  · Neglect of personal appearance   · Rebelliousness- reckless behavior  · Withdrawal from people/activities they love  · Confusion- inability to concentrate     If you or a loved one observes any of these behaviors or has concerns about self-harm, here's what you can do:  · Talk about it- your feelings and reasons for harming  yourself  · Remove any means that you might use to hurt yourself (examples: pills, rope, extension cords, firearm)  · Get professional help from the community (Mental Health, Substance Abuse, psychological counseling)  · Do not be alone:Call your Safe Contact- someone whom you trust who will be there for you.  · Call your local CRISIS HOTLINE 121-2464 or 252-187-3637  · Call your local Children's Mobile Crisis Response Team [place] (873) 982-9044 or www.[employer]  · Call the toll free National Suicide Prevention Hotlines   · National Suicide Prevention Lifeline 039-866-FRMG (3849)  · National Hope Line Network 800-SUICIDE (930-3498)

## 2017-08-16 NOTE — PROGRESS NOTES
Patient is calm and cooperative, updated on POC. Patient denies pain at this time. Dressing to R hip is clean, dry, intact. Edema to BLE. Patient up using walker and standby assist.

## 2017-08-16 NOTE — PROGRESS NOTES
"Patient seen and examined  Complains of  No  Complaints, \" I want to go home and get back to work\"    Blood pressure 138/49, pulse 100, temperature 36.4 °C (97.6 °F), resp. rate 16, height 1.575 m (5' 2.01\"), weight 72.576 kg (160 lb), SpO2 100 %.    Recent Labs      08/14/17   1121  08/15/17   0320   WBC  7.3  8.3   RBC  4.21  3.74*   HEMOGLOBIN  13.7  12.2   HEMATOCRIT  38.5  34.5*   MCV  91.4  92.2   MCH  32.5  32.6   MCHC  35.6*  35.4*   RDW  43.2  44.5   PLATELETCT  169  133*   MPV  10.3  10.1       No acute distress  Dressing clean dry and intact  Neurovascularly intact  Dressing CDI    POD# 2 right hip pinning    Plan:  DVT Prophylaxis- TEDS/SCDs, LMWH  Weight Bearing Status- wbat  PT/OT  Antibiotics: perioperative complete  Case Coordination  Okay for DC from ortho perspective            "

## 2017-08-16 NOTE — DISCHARGE PLANNING
Sw notified by [facility] that pt has been accepted to [facility] .  SW notified Bedside Nurse.

## 2017-08-16 NOTE — FACE TO FACE
Face to Face Supporting Documentation - Home Health    The encounter with this patient was in whole or in part the primary reason for home health admission.    Date of encounter:   Patient:                    MRN:                       YOB: 2017  Jannet Bruno  [id number]  1940     Home health to see patient for:  Physical Therapy evaluation and treatment    Skilled need for:  Surgical Aftercare hip fracture repair    Skilled nursing interventions to include:  Wound Care    Homebound status evidenced by:  Need the aid of supportive devices such as crutches, canes, wheelchairs or walkers. Leaving home requires a considerable and taxing effort. There is a normal inability to leave the home.    Community Physician to provide follow up care: Henry Forrest D.O.     Optional Interventions? No      I certify the face to face encounter for this home health care referral meets the CMS requirements and the encounter/clinical assessment with the patient was, in whole, or in part, for the medical condition(s) listed above, which is the primary reason for home health care. Based on my clinical findings: the service(s) are medically necessary, support the need for home health care, and the homebound criteria are met.  I certify that this patient has had a face to face encounter by myself.  Sidney Parker M.D. - NPI: [id number]

## 2017-08-16 NOTE — THERAPY
"Pt agreeable to tx today. Pt presents w/improved mobility compared to prev tx. Pt able to perform sit<->stands and transfers @ Leobardo level and amb @ SPV level. pt able to amb further today and ascend/descend 3 steps. Pt tolerated well. Pt tolerated seated LE therexs well. Pt R LE strength is weaker than L, but is improving. Pt educated on benefits of HH. Discussed w/pt home situation and if she would have support. Pt educated on healing and not rushing it , and that everyone heals differently. Pt understood. Pt would benefit from further acute PT txs while in house to progress amb endurance and  RLE strength. Pt ok to return home w/HH services and family support.    Physical Therapy Treatment completed.   Bed Mobility:  Supine to Sit:  (found in chair)  Transfers: Sit to Stand: Modified Independent  Gait: Level Of Assist: Supervised with Front-Wheel Walker       Plan of Care: Will benefit from Physical Therapy 4 times per week  Discharge Recommendations: Equipment: Will Continue to Assess for Equipment Needs. Post-acute therapy Discharge to home with outpatient or home health for additional skilled therapy services.     See \"Rehab Therapy-Acute\" Patient Summary Report for complete documentation.       "

## 2017-08-17 ENCOUNTER — PATIENT OUTREACH (OUTPATIENT)
Dept: HEALTH INFORMATION MANAGEMENT | Facility: OTHER | Age: 77
End: 2017-08-17

## 2017-08-17 NOTE — PROGRESS NOTES
Pt. D/C'd home with  Peter.  Discharge instructions provided to pt.  Pt states understanding.  Pt states all questions have been answered.  Copy of discharge provided to pt.  Signed copy in chart.  Prescriptions provided to pt, copy in chart. Pt states that all personal belongings are in possession.  Pt escorted off unit with assistance from CNA without incident.

## 2017-08-17 NOTE — DISCHARGE SUMMARY
CHIEF COMPLAINT ON ADMISSION  Chief Complaint   Patient presents with   • Hip Pain     right   • T-5000 FALL       CODE STATUS  Prior    HPI & HOSPITAL COURSE  76 y.o. female who presented 8/14/2017 with fall complicated by hip fracture. Patient works as a  for the [employer]. She had a fall in the garage at her workplace today. She reports that she stumbled and fell on her right side. Subsequently she was in intractable pain and could not stand up or bear weight on her right lower extremity.she had right femur fracture that was repaired on 8/14. She received pain management and pt and ot. She was stable for dc home with     Therefore, she is discharged in good and stable condition with close outpatient follow-up.    SPECIFIC OUTPATIENT FOLLOW-UP  Dr huerta 1-2 weeks    DISCHARGE PROBLEM LIST  Active Problems:    Dyslipidemia POA: Yes      Overview:      Diabetes mellitus, type II (CMS-HCC) POA: Yes    Hypertension POA: Yes    Chronic kidney disease, stage III (moderate) POA: Yes  Resolved Problems:    Femur fracture (CMS-[facility]) POA: Yes    Hyponatremia POA: Yes      FOLLOW UP  Future Appointments  Date Time Provider Department Center   8/18/2017 12:30 PM CATHERINE AMES [facility]     rA Huerta M.D.  [address]  [address]  [facility] [address]  [phone]    In 2 weeks      Henry Forrest D.O.  [address] [facility] [address]  [phone]          [facility]  [address] [place] [facility] [address]            MEDICATIONS ON DISCHARGE   Jannet Bruno   Home Medication Instructions ADNREW:[id number]    Printed on:08/17/17 0918   Medication Information                      amlodipine (NORVASC) 10 MG Tab  Take 1 Tab by mouth every day.             atorvastatin (LIPITOR) 20 MG Tab  Take 1 Tab by mouth every bedtime.             furosemide (LASIX) 40 MG TABS  Take 40 mg by mouth every day.             liraglutide (VICTOZA) 18 MG/3ML Solution Pen-injector  injection  Inject 1.8 mg as instructed every day.             lisinopril (PRINIVIL) 10 MG Tab  Take 10 mg by mouth every day.             oxycodone immediate-release (ROXICODONE) 5 MG Tab  Take 1 Tab by mouth every 6 hours as needed.             potassium chloride CR (K-DUR) 10 MEQ tablet  Take 10 mEq by mouth every day.                 DIET  No orders of the defined types were placed in this encounter.       ACTIVITY  As tolerated.  Weight bearing as tolerated      CONSULTATIONS  Dr ye ortho    PROCEDURES  DATE OF SERVICE:  08/14/2017     PREOPERATIVE DIAGNOSIS:  Right valgus impacted femoral neck fracture.     POSTOPERATIVE DIAGNOSIS:  Right valgus impacted femoral neck fracture.     PROCEDURE:  Right hip pinning.     SURGEON:  Ar Ye MD     ASSISTANT:  Alvaro Marcus PA-C.     ESTIMATED BLOOD LOSS:  Minimal.    LABORATORY  Lab Results   Component Value Date/Time    SODIUM 138 08/15/2017 03:20 AM    POTASSIUM 3.5* 08/15/2017 03:20 AM    CHLORIDE 104 08/15/2017 03:20 AM    CO2 25 08/15/2017 03:20 AM    GLUCOSE 199* 08/15/2017 03:20 AM    BUN 25* 08/15/2017 03:20 AM    CREATININE 1.36 08/15/2017 03:20 AM        Lab Results   Component Value Date/Time    WBC 8.3 08/15/2017 03:20 AM    HEMOGLOBIN 12.2 08/15/2017 03:20 AM    HEMATOCRIT 34.5* 08/15/2017 03:20 AM    PLATELET COUNT 133* 08/15/2017 03:20 AM        Total time of the discharge process exceeds 38 minutes

## 2017-08-18 ENCOUNTER — HOSPITAL ENCOUNTER (OUTPATIENT)
Dept: RADIOLOGY | Facility: MEDICAL CENTER | Age: 77
End: 2017-08-18
Attending: FAMILY MEDICINE
Payer: COMMERCIAL

## 2017-08-18 DIAGNOSIS — N64.4 BREAST PAIN: ICD-10-CM

## 2017-08-18 PROCEDURE — G0204 DX MAMMO INCL CAD BI: HCPCS

## 2017-08-25 ENCOUNTER — HOME HEALTH ADMISSION (OUTPATIENT)
Dept: HOME HEALTH SERVICES | Facility: HOME HEALTHCARE | Age: 77
End: 2017-08-25
Payer: COMMERCIAL

## 2017-08-26 ENCOUNTER — HOME CARE VISIT (OUTPATIENT)
Dept: HOME HEALTH SERVICES | Facility: HOME HEALTHCARE | Age: 77
End: 2017-08-26
Payer: COMMERCIAL

## 2017-08-26 VITALS
TEMPERATURE: 97.9 F | RESPIRATION RATE: 18 BRPM | BODY MASS INDEX: 27.29 KG/M2 | HEIGHT: 63 IN | SYSTOLIC BLOOD PRESSURE: 125 MMHG | HEART RATE: 76 BPM | DIASTOLIC BLOOD PRESSURE: 75 MMHG | OXYGEN SATURATION: 97 % | WEIGHT: 154 LBS

## 2017-08-26 PROCEDURE — 665001 SOC-HOME HEALTH

## 2017-08-26 PROCEDURE — G0162 HHC RN E&M PLAN SVS, 15 MIN: HCPCS

## 2017-08-26 SDOH — ECONOMIC STABILITY: HOUSING INSECURITY: UNSAFE APPLIANCES: 0

## 2017-08-26 SDOH — ECONOMIC STABILITY: HOUSING INSECURITY: UNSAFE COOKING RANGE AREA: 0

## 2017-08-26 ASSESSMENT — ACTIVITIES OF DAILY LIVING (ADL)
HOME_HEALTH_OASIS: 01
OASIS_M1830: 03

## 2017-08-26 ASSESSMENT — PATIENT HEALTH QUESTIONNAIRE - PHQ9
1. LITTLE INTEREST OR PLEASURE IN DOING THINGS: 00
2. FEELING DOWN, DEPRESSED, IRRITABLE, OR HOPELESS: 00

## 2017-08-29 ENCOUNTER — HOME CARE VISIT (OUTPATIENT)
Dept: HOME HEALTH SERVICES | Facility: HOME HEALTHCARE | Age: 77
End: 2017-08-29
Payer: COMMERCIAL

## 2017-08-29 VITALS
TEMPERATURE: 99 F | RESPIRATION RATE: 18 BRPM | OXYGEN SATURATION: 97 % | DIASTOLIC BLOOD PRESSURE: 68 MMHG | HEART RATE: 91 BPM | SYSTOLIC BLOOD PRESSURE: 118 MMHG

## 2017-08-29 VITALS
OXYGEN SATURATION: 97 % | SYSTOLIC BLOOD PRESSURE: 118 MMHG | HEART RATE: 91 BPM | DIASTOLIC BLOOD PRESSURE: 68 MMHG | RESPIRATION RATE: 18 BRPM | TEMPERATURE: 99 F

## 2017-08-29 PROCEDURE — G0151 HHCP-SERV OF PT,EA 15 MIN: HCPCS

## 2017-08-29 PROCEDURE — G0152 HHCP-SERV OF OT,EA 15 MIN: HCPCS

## 2017-08-29 SDOH — ECONOMIC STABILITY: HOUSING INSECURITY
HOME SAFETY: PT HOME HAS SOME CLUTTER BUT CLEAN PATHWAYS FOR GETTING AROUND, HAS NO STEPS INTO HOME AND WILL HAVE HELP FROM FAMILY

## 2017-08-29 SDOH — ECONOMIC STABILITY: HOUSING INSECURITY: UNSAFE APPLIANCES: 0

## 2017-08-29 SDOH — ECONOMIC STABILITY: HOUSING INSECURITY: UNSAFE COOKING RANGE AREA: 0

## 2017-08-29 ASSESSMENT — ACTIVITIES OF DAILY LIVING (ADL)
BATHING_ASSISTANCE: 0
DRESSING_LB_ASSISTANCE: 0
BATHING_ASSISTIVE_EQUIPMENT_USED: WALKER
GROOMING_ASSISTANCE: 0
MEAL_PREP_ASSISTANCE: 0
TELEPHONE_ASSISTANCE: 0
EATING_ASSISTANCE: 0
TOILETING_ASSISTANCE: 0
MEAL_PREP_ASSISTANCE: 0
IADLS_COMMENTS: <!--EPICS-->PLEASE SEE OT EVAL FOR MORE DETAILS. <!--EPICE-->
TOILETING_ASSISTANCE: 0
SHOPPING_ASSISTANCE: 6
BATHING_ASSISTANCE: 0
SHOPPING_ASSISTANCE: 0
DRESSING_UB_ASSISTANCE: 0
TRANSPORTATION_ASSISTANCE: 6
LAUNDRY_ASSISTANCE: 0
EATING_ASSISTANCE: 0
GROOMING_ASSISTANCE: 0
BATHING_ASSISTIVE_EQUIPMENT_NEEDED: SHOWER CHAIR, GRAB BAR
ORAL_CARE_ASSISTANCE: 0
LAUNDRY_ASSISTANCE: 0
DRESSING_UB_ASSISTANCE: 0
TELEPHONE_ASSISTANCE: 0
HOUSEKEEPING_ASSISTANCE: 4
TRANSPORTATION_ASSISTANCE: 0
DRESSING_LB_ASSISTANCE: 0
HOUSEKEEPING_ASSISTANCE: 0
ORAL_CARE_ASSISTANCE: 0

## 2017-08-30 SDOH — ECONOMIC STABILITY: HOUSING INSECURITY: HOME SAFETY: PT'S HOME IS CLUTTERED BUT CLEAR WALKWAYS AVAILABLE.

## 2017-08-31 ENCOUNTER — HOME CARE VISIT (OUTPATIENT)
Dept: HOME HEALTH SERVICES | Facility: HOME HEALTHCARE | Age: 77
End: 2017-08-31
Payer: COMMERCIAL

## 2017-08-31 VITALS
DIASTOLIC BLOOD PRESSURE: 60 MMHG | TEMPERATURE: 97.2 F | SYSTOLIC BLOOD PRESSURE: 98 MMHG | RESPIRATION RATE: 18 BRPM | OXYGEN SATURATION: 98 %

## 2017-08-31 PROCEDURE — G0299 HHS/HOSPICE OF RN EA 15 MIN: HCPCS

## 2017-08-31 SDOH — ECONOMIC STABILITY: HOUSING INSECURITY: UNSAFE COOKING RANGE AREA: 0

## 2017-08-31 SDOH — ECONOMIC STABILITY: HOUSING INSECURITY: UNSAFE APPLIANCES: 0

## 2017-08-31 ASSESSMENT — ENCOUNTER SYMPTOMS
VOMITING: DENIES
NAUSEA: DENIES
RESPIRATORY SYMPTOMS COMMENTS: NO APPARENT RESPIRATORY DISTRESS

## 2017-09-01 ENCOUNTER — HOME CARE VISIT (OUTPATIENT)
Dept: HOME HEALTH SERVICES | Facility: HOME HEALTHCARE | Age: 77
End: 2017-09-01
Payer: COMMERCIAL

## 2017-09-01 PROCEDURE — G0151 HHCP-SERV OF PT,EA 15 MIN: HCPCS

## 2017-09-02 VITALS
TEMPERATURE: 98.8 F | RESPIRATION RATE: 18 BRPM | DIASTOLIC BLOOD PRESSURE: 80 MMHG | OXYGEN SATURATION: 97 % | SYSTOLIC BLOOD PRESSURE: 142 MMHG | HEART RATE: 80 BPM

## 2017-09-05 ENCOUNTER — HOME CARE VISIT (OUTPATIENT)
Dept: HOME HEALTH SERVICES | Facility: HOME HEALTHCARE | Age: 77
End: 2017-09-05
Payer: COMMERCIAL

## 2017-09-05 VITALS
OXYGEN SATURATION: 98 % | RESPIRATION RATE: 18 BRPM | HEART RATE: 73 BPM | SYSTOLIC BLOOD PRESSURE: 118 MMHG | TEMPERATURE: 99.1 F | DIASTOLIC BLOOD PRESSURE: 70 MMHG

## 2017-09-05 VITALS
TEMPERATURE: 99.1 F | RESPIRATION RATE: 20 BRPM | DIASTOLIC BLOOD PRESSURE: 70 MMHG | HEART RATE: 73 BPM | SYSTOLIC BLOOD PRESSURE: 118 MMHG | OXYGEN SATURATION: 98 %

## 2017-09-05 PROCEDURE — G0299 HHS/HOSPICE OF RN EA 15 MIN: HCPCS

## 2017-09-05 PROCEDURE — G0151 HHCP-SERV OF PT,EA 15 MIN: HCPCS

## 2017-09-06 SDOH — ECONOMIC STABILITY: HOUSING INSECURITY: UNSAFE APPLIANCES: 0

## 2017-09-06 SDOH — ECONOMIC STABILITY: HOUSING INSECURITY: UNSAFE COOKING RANGE AREA: 0

## 2017-09-06 ASSESSMENT — ENCOUNTER SYMPTOMS
DEBILITATING PAIN: 1
RESPIRATORY SYMPTOMS COMMENTS: NO APPARENT RESPIRATORY DISTRESS

## 2017-09-07 ENCOUNTER — HOME CARE VISIT (OUTPATIENT)
Dept: HOME HEALTH SERVICES | Facility: HOME HEALTHCARE | Age: 77
End: 2017-09-07
Payer: COMMERCIAL

## 2017-09-07 VITALS
OXYGEN SATURATION: 97 % | RESPIRATION RATE: 20 BRPM | SYSTOLIC BLOOD PRESSURE: 128 MMHG | DIASTOLIC BLOOD PRESSURE: 72 MMHG | TEMPERATURE: 98.8 F

## 2017-09-07 VITALS
DIASTOLIC BLOOD PRESSURE: 72 MMHG | RESPIRATION RATE: 18 BRPM | TEMPERATURE: 98.8 F | HEART RATE: 75 BPM | SYSTOLIC BLOOD PRESSURE: 128 MMHG | OXYGEN SATURATION: 97 %

## 2017-09-07 PROCEDURE — G0151 HHCP-SERV OF PT,EA 15 MIN: HCPCS

## 2017-09-07 PROCEDURE — G0299 HHS/HOSPICE OF RN EA 15 MIN: HCPCS

## 2017-09-11 SDOH — ECONOMIC STABILITY: HOUSING INSECURITY: UNSAFE APPLIANCES: 0

## 2017-09-11 SDOH — ECONOMIC STABILITY: HOUSING INSECURITY: UNSAFE COOKING RANGE AREA: 0

## 2017-09-14 ENCOUNTER — HOME CARE VISIT (OUTPATIENT)
Dept: HOME HEALTH SERVICES | Facility: HOME HEALTHCARE | Age: 77
End: 2017-09-14
Payer: COMMERCIAL

## 2017-09-14 VITALS
HEART RATE: 77 BPM | OXYGEN SATURATION: 96 % | TEMPERATURE: 99.2 F | RESPIRATION RATE: 18 BRPM | SYSTOLIC BLOOD PRESSURE: 114 MMHG | DIASTOLIC BLOOD PRESSURE: 68 MMHG

## 2017-09-14 PROCEDURE — G0151 HHCP-SERV OF PT,EA 15 MIN: HCPCS

## 2017-09-19 ENCOUNTER — HOME CARE VISIT (OUTPATIENT)
Dept: HOME HEALTH SERVICES | Facility: HOME HEALTHCARE | Age: 77
End: 2017-09-19
Payer: COMMERCIAL

## 2017-09-19 PROCEDURE — G0151 HHCP-SERV OF PT,EA 15 MIN: HCPCS

## 2017-09-20 ENCOUNTER — HOME CARE VISIT (OUTPATIENT)
Dept: HOME HEALTH SERVICES | Facility: HOME HEALTHCARE | Age: 77
End: 2017-09-20
Payer: COMMERCIAL

## 2017-09-20 VITALS
RESPIRATION RATE: 18 BRPM | TEMPERATURE: 99.5 F | HEART RATE: 79 BPM | DIASTOLIC BLOOD PRESSURE: 78 MMHG | SYSTOLIC BLOOD PRESSURE: 118 MMHG | OXYGEN SATURATION: 99 %

## 2017-09-20 SDOH — ECONOMIC STABILITY: HOUSING INSECURITY
HOME SAFETY: PT LIVES WITH HUSBAND AND SON AND THE HOME HAS SOME CLUTTER BUT CLEARED PATHWAYS FOR AMB, PT IS ABLE TO GET AROUND VERY EASILY.

## 2017-09-20 SDOH — ECONOMIC STABILITY: HOUSING INSECURITY: UNSAFE COOKING RANGE AREA: 0

## 2017-09-20 SDOH — ECONOMIC STABILITY: HOUSING INSECURITY: UNSAFE APPLIANCES: 0

## 2017-09-20 ASSESSMENT — ACTIVITIES OF DAILY LIVING (ADL)
OASIS_M1830: 00
HOME_HEALTH_OASIS: 00

## 2017-09-21 ENCOUNTER — PATIENT OUTREACH (OUTPATIENT)
Dept: HEALTH INFORMATION MANAGEMENT | Facility: OTHER | Age: 77
End: 2017-09-21

## 2017-09-21 DIAGNOSIS — E11.65 UNCONTROLLED TYPE 2 DIABETES MELLITUS WITH COMPLICATION, UNSPECIFIED LONG TERM INSULIN USE STATUS: ICD-10-CM

## 2017-09-21 DIAGNOSIS — E11.8 UNCONTROLLED TYPE 2 DIABETES MELLITUS WITH COMPLICATION, UNSPECIFIED LONG TERM INSULIN USE STATUS: ICD-10-CM

## 2017-09-25 ENCOUNTER — PATIENT OUTREACH (OUTPATIENT)
Dept: HEALTH INFORMATION MANAGEMENT | Facility: OTHER | Age: 77
End: 2017-09-25

## 2017-09-25 NOTE — PROGRESS NOTES
Outbound call to patient, introduced self and explained Chronic Care Management Program. Patient I snot interested as she states she has everything she needs right now.    Patient will not be followed

## 2019-02-08 ENCOUNTER — HOSPITAL ENCOUNTER (OUTPATIENT)
Facility: MEDICAL CENTER | Age: 79
End: 2019-02-08
Attending: SURGERY | Admitting: SURGERY
Payer: COMMERCIAL

## 2019-02-15 ENCOUNTER — HOSPITAL ENCOUNTER (OUTPATIENT)
Facility: MEDICAL CENTER | Age: 79
End: 2019-02-15
Attending: SURGERY | Admitting: SURGERY
Payer: COMMERCIAL

## 2019-02-15 VITALS
BODY MASS INDEX: 22.03 KG/M2 | TEMPERATURE: 98.2 F | OXYGEN SATURATION: 92 % | DIASTOLIC BLOOD PRESSURE: 92 MMHG | SYSTOLIC BLOOD PRESSURE: 191 MMHG | HEART RATE: 90 BPM | HEIGHT: 63 IN | WEIGHT: 124.34 LBS | RESPIRATION RATE: 20 BRPM

## 2019-02-15 DIAGNOSIS — G89.18 POST-OP PAIN: ICD-10-CM

## 2019-02-15 LAB
ANION GAP SERPL CALC-SCNC: 14 MMOL/L (ref 0–11.9)
BUN SERPL-MCNC: 39 MG/DL (ref 8–22)
CALCIUM SERPL-MCNC: 11 MG/DL (ref 8.5–10.5)
CHLORIDE SERPL-SCNC: 99 MMOL/L (ref 96–112)
CO2 SERPL-SCNC: 26 MMOL/L (ref 20–33)
CREAT SERPL-MCNC: 5.42 MG/DL (ref 0.5–1.4)
EKG IMPRESSION: NORMAL
ERYTHROCYTE [DISTWIDTH] IN BLOOD BY AUTOMATED COUNT: 50.8 FL (ref 35.9–50)
GLUCOSE SERPL-MCNC: 152 MG/DL (ref 65–99)
HCT VFR BLD AUTO: 33.8 % (ref 37–47)
HGB BLD-MCNC: 11.5 G/DL (ref 12–16)
MCH RBC QN AUTO: 33 PG (ref 27–33)
MCHC RBC AUTO-ENTMCNC: 34 G/DL (ref 33.6–35)
MCV RBC AUTO: 96.8 FL (ref 81.4–97.8)
PLATELET # BLD AUTO: 177 K/UL (ref 164–446)
PMV BLD AUTO: 9.5 FL (ref 9–12.9)
POTASSIUM SERPL-SCNC: 3.8 MMOL/L (ref 3.6–5.5)
RBC # BLD AUTO: 3.49 M/UL (ref 4.2–5.4)
SODIUM SERPL-SCNC: 139 MMOL/L (ref 135–145)
WBC # BLD AUTO: 6.2 K/UL (ref 4.8–10.8)

## 2019-02-15 PROCEDURE — 85027 COMPLETE CBC AUTOMATED: CPT

## 2019-02-15 PROCEDURE — 160041 HCHG SURGERY MINUTES - EA ADDL 1 MIN LEVEL 4: Performed by: SURGERY

## 2019-02-15 PROCEDURE — 93010 ELECTROCARDIOGRAM REPORT: CPT | Performed by: INTERNAL MEDICINE

## 2019-02-15 PROCEDURE — A9270 NON-COVERED ITEM OR SERVICE: HCPCS | Performed by: ANESTHESIOLOGY

## 2019-02-15 PROCEDURE — 700111 HCHG RX REV CODE 636 W/ 250 OVERRIDE (IP): Performed by: ANESTHESIOLOGY

## 2019-02-15 PROCEDURE — 160036 HCHG PACU - EA ADDL 30 MINS PHASE I: Performed by: SURGERY

## 2019-02-15 PROCEDURE — 160029 HCHG SURGERY MINUTES - 1ST 30 MINS LEVEL 4: Performed by: SURGERY

## 2019-02-15 PROCEDURE — 160009 HCHG ANES TIME/MIN: Performed by: SURGERY

## 2019-02-15 PROCEDURE — 80048 BASIC METABOLIC PNL TOTAL CA: CPT

## 2019-02-15 PROCEDURE — 700101 HCHG RX REV CODE 250

## 2019-02-15 PROCEDURE — A6402 STERILE GAUZE <= 16 SQ IN: HCPCS | Performed by: SURGERY

## 2019-02-15 PROCEDURE — 160035 HCHG PACU - 1ST 60 MINS PHASE I: Performed by: SURGERY

## 2019-02-15 PROCEDURE — 501838 HCHG SUTURE GENERAL: Performed by: SURGERY

## 2019-02-15 PROCEDURE — 160048 HCHG OR STATISTICAL LEVEL 1-5: Performed by: SURGERY

## 2019-02-15 PROCEDURE — 700102 HCHG RX REV CODE 250 W/ 637 OVERRIDE(OP): Performed by: ANESTHESIOLOGY

## 2019-02-15 PROCEDURE — 160025 RECOVERY II MINUTES (STATS): Performed by: SURGERY

## 2019-02-15 PROCEDURE — 700111 HCHG RX REV CODE 636 W/ 250 OVERRIDE (IP)

## 2019-02-15 PROCEDURE — 93005 ELECTROCARDIOGRAM TRACING: CPT | Performed by: SURGERY

## 2019-02-15 PROCEDURE — 160046 HCHG PACU - 1ST 60 MINS PHASE II: Performed by: SURGERY

## 2019-02-15 PROCEDURE — 500002 HCHG ADHESIVE, DERMABOND: Performed by: SURGERY

## 2019-02-15 PROCEDURE — 160002 HCHG RECOVERY MINUTES (STAT): Performed by: SURGERY

## 2019-02-15 PROCEDURE — 501837 HCHG SUTURE CV: Performed by: SURGERY

## 2019-02-15 RX ORDER — ACETAMINOPHEN 500 MG
500 TABLET ORAL EVERY 6 HOURS PRN
COMMUNITY
End: 2022-01-01

## 2019-02-15 RX ORDER — HALOPERIDOL 5 MG/ML
0.5 INJECTION INTRAMUSCULAR
Status: DISCONTINUED | OUTPATIENT
Start: 2019-02-15 | End: 2019-02-15 | Stop reason: HOSPADM

## 2019-02-15 RX ORDER — CALCITRIOL 0.25 UG/1
0.25 CAPSULE, LIQUID FILLED ORAL DAILY
COMMUNITY
End: 2022-01-01

## 2019-02-15 RX ORDER — ATORVASTATIN CALCIUM 20 MG/1
20 TABLET, FILM COATED ORAL DAILY
COMMUNITY
End: 2022-01-01

## 2019-02-15 RX ORDER — ONDANSETRON 2 MG/ML
INJECTION INTRAMUSCULAR; INTRAVENOUS
Status: DISCONTINUED
Start: 2019-02-15 | End: 2019-02-15 | Stop reason: HOSPADM

## 2019-02-15 RX ORDER — TRAMADOL HYDROCHLORIDE 50 MG/1
50 TABLET ORAL EVERY 4 HOURS PRN
Qty: 10 TAB | Refills: 0 | Status: SHIPPED | OUTPATIENT
Start: 2019-02-15 | End: 2019-02-17

## 2019-02-15 RX ORDER — ONDANSETRON 2 MG/ML
4 INJECTION INTRAMUSCULAR; INTRAVENOUS
Status: DISCONTINUED | OUTPATIENT
Start: 2019-02-15 | End: 2019-02-15 | Stop reason: HOSPADM

## 2019-02-15 RX ORDER — MAGNESIUM HYDROXIDE 1200 MG/15ML
LIQUID ORAL
Status: COMPLETED | OUTPATIENT
Start: 2019-02-15 | End: 2019-02-15

## 2019-02-15 RX ORDER — SEVELAMER HYDROCHLORIDE 800 MG/1
800 TABLET, FILM COATED ORAL
COMMUNITY
End: 2022-01-01

## 2019-02-15 RX ORDER — SODIUM CHLORIDE 9 MG/ML
INJECTION, SOLUTION INTRAVENOUS ONCE
Status: DISCONTINUED | OUTPATIENT
Start: 2019-02-15 | End: 2019-02-15 | Stop reason: HOSPADM

## 2019-02-15 RX ORDER — NIFEDIPINE 90 MG/1
90 TABLET, FILM COATED, EXTENDED RELEASE ORAL DAILY
COMMUNITY
End: 2022-01-01

## 2019-02-15 RX ORDER — ROPINIROLE 0.25 MG/1
0.25 TABLET, FILM COATED ORAL DAILY
COMMUNITY
End: 2022-01-01

## 2019-02-15 RX ORDER — HEPARIN SODIUM,PORCINE 1000/ML
VIAL (ML) INJECTION
Status: DISCONTINUED | OUTPATIENT
Start: 2019-02-15 | End: 2019-02-15 | Stop reason: HOSPADM

## 2019-02-15 RX ORDER — SODIUM CHLORIDE 9 MG/ML
INJECTION, SOLUTION INTRAVENOUS CONTINUOUS
Status: DISCONTINUED | OUTPATIENT
Start: 2019-02-15 | End: 2019-02-15 | Stop reason: HOSPADM

## 2019-02-15 RX ADMIN — HYDROCODONE BITARTRATE AND ACETAMINOPHEN 15 ML: 2.5; 108 SOLUTION ORAL at 13:42

## 2019-02-15 NOTE — H&P
Date of Service:  2/15/2019      Dialysis Access History and Physical     PCP: Henry Forrest D.O.    CC:  Need for AVF revision    HPI: This is a 78 y.o. female with chronic kidney disease who needs AVF revision.    ROS: As above. The remainder of a complete review of systems is negative in all systems except as noted.    PMHx:  Active Ambulatory Problems     Diagnosis Date Noted   • DM (diabetes mellitus), type 2, uncontrolled ([facility]) 10/15/2013   • Albuminuria manifested in a patient with type 2 DM 10/15/2013   • Essential hypertension, malignant 10/15/2013   • Dyslipidemia 10/15/2013   • Vitamin D deficiency 10/15/2013   • Senile nuclear sclerosis 06/11/2014   • Renal insufficiency 07/23/2015   • Closed right humeral fracture 12/13/2015   • Fall due to slipping on ice or snow 12/14/2015   • Hypokalemia 12/14/2015   • Diabetic nephropathy ([facility]) 12/15/2015   • Essential hypertension 10/23/2016   • Diabetes mellitus, type II ([facility]) 08/14/2017   • Hypertension 08/14/2017   • Chronic kidney disease, stage III (moderate) ([facility]) 08/14/2017     Resolved Ambulatory Problems     Diagnosis Date Noted   • CKD (chronic kidney disease) stage 4, GFR 15-29 ml/min ([facility]) 12/14/2015   • Hyponatremia 12/14/2015   • Hyperosmolar syndrome 10/22/2016   • Hyperglycemia 10/23/2016   • Transaminitis 10/23/2016   • Femur fracture ([facility]) 08/14/2017     Past Medical History:   Diagnosis Date   • Anesthesia    • Arthritis    • Breath shortness    • CATARACT    • Diabetes    • Heart burn    • Hypertension    • Indigestion    • Other specified disorder of intestines    • Sleep apnea, obstructive    • Snoring    • Unspecified hemorrhagic conditions    • Unspecified urinary incontinence    • Urinary bladder disorder        SHx:  Social History     Social History   • Marital status: Single     Spouse name: N/A   • Number of children: N/A   • Years of education: N/A     Occupational History   • Not on file.     Social History Main Topics   • Smoking  "status: Former Smoker     Packs/day: 1.00     Years: 20.00     Types: Cigarettes     Quit date: 3/5/1979   • Smokeless tobacco: Never Used   • Alcohol use No   • Drug use: No   • Sexual activity: Not on file     Other Topics Concern   • Not on file     Social History Narrative   • No narrative on file       FHx:  family history is not on file.    Allergies:  Allergies   Allergen Reactions   • Codeine      Personality changes   • Oxycodone      Personality changes   • Tape Rash     Adhesive tape-rash, paper tape ok.       Medications:  No current facility-administered medications on file prior to encounter.      Current Outpatient Prescriptions on File Prior to Encounter   Medication Sig Dispense Refill   • liraglutide (VICTOZA) 18 MG/3ML Solution Pen-injector injection Inject 1.8 mg as instructed every day.     • lisinopril (PRINIVIL) 10 MG Tab Take 10 mg by mouth every day.     • furosemide (LASIX) 40 MG TABS Take 80 mg by mouth every day.         Objective Exam:  Vitals:    02/15/19 0922 02/15/19 0944   BP: (!) 191/92    Pulse: 89    Resp: 14    Temp: 37.1 °C (98.7 °F)    TempSrc: Temporal    SpO2: 94%    Weight:  56.4 kg (124 lb 5.4 oz)   Height:  1.6 m (5' 3\")         General: Awake, alert  HEENT: Normocephalic, atraumatic  Chest: Clear  CV: RRR  Abd: Soft  Neuro: Intact  Skin: Intact  Ext:  Left AVF      Laboratory--reviewed personally and are as follows:  Lab Results   Component Value Date/Time    WBC 6.2 02/15/2019 10:14 AM    RBC 3.49 (L) 02/15/2019 10:14 AM    HEMOGLOBIN 11.5 (L) 02/15/2019 10:14 AM    HEMATOCRIT 33.8 (L) 02/15/2019 10:14 AM    MCV 96.8 02/15/2019 10:14 AM    MCH 33.0 02/15/2019 10:14 AM    MCHC 34.0 02/15/2019 10:14 AM    MPV 9.5 02/15/2019 10:14 AM    NEUTSPOLYS 74.30 (H) 08/15/2017 03:20 AM    LYMPHOCYTES 15.40 (L) 08/15/2017 03:20 AM    MONOCYTES 5.50 08/15/2017 03:20 AM    EOSINOPHILS 3.60 08/15/2017 03:20 AM    BASOPHILS 0.70 08/15/2017 03:20 AM      Lab Results   Component Value " Date/Time    SODIUM 139 02/15/2019 10:14 AM    POTASSIUM 3.8 02/15/2019 10:14 AM    CHLORIDE 99 02/15/2019 10:14 AM    CO2 26 02/15/2019 10:14 AM    GLUCOSE 152 (H) 02/15/2019 10:14 AM    BUN 39 (H) 02/15/2019 10:14 AM    CREATININE 5.42 (HH) 02/15/2019 10:14 AM    CREATININE 1.2 12/13/2007 11:40 AM      Lab Results   Component Value Date/Time    PROTHROMBTM 13.7 08/14/2017 11:21 AM    INR 1.02 08/14/2017 11:21 AM            MDM:  78 y.o. female here for AVF revision    Assessment/Plan:  Active Problems:    * No active hospital problems. *  Resolved Problems:    * No resolved hospital problems. *      Planned Procedure:  AVF revision

## 2019-02-15 NOTE — OP REPORT
02/15/19    OPERATIVE NOTE    PRE-OPERATIVE DIAGNOSIS - Renal Failure, arterio-venous steal    POST-OPERATIVE DIAGNOSIS - Same     PROCEDURE PERFORMED - AV fistula banding, ligation tributary     SURGEON - Fernie Nazario M.D.    ASSISTANT - SARITA Barnard    TYPE OF ANESTHESIA - General     ANESTHESIOLOGIST  - Dr. Cook       SPECIMENS - none      PROCEDURE IN DETAIL -     Patient was taken to the operating suite placed in the supine position.  After adequate anesthesia the patient's left upper extremity was circumferentially prepped and draped in sterile fashion.  A longitudinal incision was made over the proximal portion of the brachiocephalic fistula.  Incision was carried down to the subcutaneous tissue and the proximal portion of the brachiocephalic fistula was circumferentially dissected and isolated.  A delicate vascular clamp was placed longitudinally over a length of approximately 2 cm of the proximal fistula.  A 5-0 Prolene suture was used to band the fistula by running a vertical mattress and subsequently running suture to exclude approximately 60% of the volume of the fistula.  There was good flow through the fistula at the completion of the banding procedure.  Next the dissection took place little further distally where a dominant tributary was emanating.  That tributary was ligated using a 3-0 silk tie.  Hemostasis was assured and 3-0 Vicryl subcutaneous sutures were placed followed by Dermabond.      Fernie Nazario M.D.

## 2019-02-15 NOTE — DISCHARGE INSTRUCTIONS
ACTIVITY: Rest and take it easy for the first 24 hours.  A responsible adult is recommended to remain with you during that time.  It is normal to feel sleepy.  We encourage you to not do anything that requires balance, judgment or coordination.    MILD FLU-LIKE SYMPTOMS ARE NORMAL. YOU MAY EXPERIENCE GENERALIZED MUSCLE ACHES, THROAT IRRITATION, HEADACHE AND/OR SOME NAUSEA.    FOR 24 HOURS DO NOT:  Drive, operate machinery or run household appliances.  Drink beer or alcoholic beverages.   Make important decisions or sign legal documents.    SPECIAL INSTRUCTIONS:     AV Fistula D/C instructions:    * Remove ace wrap 2 hours post-procedure if present.    1. DIET: Upon discharge from the hospital you may resume your normal preoperative diet. Depending on how you are feeling and whether you have nausea or not, you may wish to stay with a bland diet for the first few days. However, you can advance this as quickly as you feel ready.    2. ACTIVITIES: After discharge from the hospital, you may resume full routine activities. However, there should be no heavy lifting (greater than 15 pounds) and no strenuous activities until after your follow-up visit. Otherwise, routine activities of daily living are acceptable.    3. DRIVING: You may drive whenever you are off pain medications and are able to perform the activities needed to drive, i.e. turning, bending, twisting, etc.    4. BATHING: You may get the wound wet at any time after leaving the hospital. You may shower, but do not submerge in a bath for at least a week. Dressings may come off after 48 hours. Leave steri strips on until they fall off.  It may be necessary to trim the edges.     5. BOWEL FUNCTION: Constipation is common after an operation, especially with pain medications. The combination of pain medication and decreased activity level can cause constipation in otherwise normal patients. If you feel this is occurring, take a laxative (Milk of Magnesia, Ex-Lax,  Senokot, etc.) until the problem has resolved.    6. PAIN MEDICATION: You will be given a prescription for pain medication at discharge. Please take these as directed. It is important to remember not to take medications on an empty stomach as this may cause nausea.    7.CALL IF YOU HAVE: (1) Fevers to more than 101.0 F, (2) Unusual chest or leg pain, (3) Drainage or fluid from incision that may be foul smelling, increased tenderness or soreness at the wound or the wound edges are no longer together, redness or swelling at the incision site. Please do not hesitate to call with any other questions.     8. APPOINTMENT: Follow up [facility] as instructed.      DIET: To avoid nausea, slowly advance diet as tolerated, avoiding spicy or greasy foods for the first day.  Add more substantial food to your diet according to your physician's instructions.  Babies can be fed formula or breast milk as soon as they are hungry.  INCREASE FLUIDS AND FIBER TO AVOID CONSTIPATION.    SURGICAL DRESSING/BATHING: see above    FOLLOW-UP APPOINTMENT:  A follow-up appointment should be arranged with your doctor in as directed; call to schedule.    You should CALL YOUR PHYSICIAN if you develop:  Fever greater than 101 degrees F.  Pain not relieved by medication, or persistent nausea or vomiting.  Excessive bleeding (blood soaking through dressing) or unexpected drainage from the wound.  Extreme redness or swelling around the incision site, drainage of pus or foul smelling drainage.  Inability to urinate or empty your bladder within 8 hours.  Problems with breathing or chest pain.    You should call 911 if you develop problems with breathing or chest pain.  If you are unable to contact your doctor or surgical center, you should go to the nearest emergency room or urgent care center.  Physician's telephone #: Dr Nazario [phone]    If any questions arise, call your doctor.  If your doctor is not available, please feel free  to call the Surgical Center at [phone].  The Center is open Monday through Friday from 7AM to 7PM.  You can also call the HEALTH HOTLINE open 24 hours/day, 7 days/week and speak to a nurse at [phone], or toll free at [phone].    A registered nurse may call you a few days after your surgery to see how you are doing after your procedure.    MEDICATIONS: Resume taking daily medication.  Take prescribed pain medication with food.  If no medication is prescribed, you may take non-aspirin pain medication if needed.  PAIN MEDICATION CAN BE VERY CONSTIPATING.  Take a stool softener or laxative such as senokot, pericolace, or milk of magnesia if needed.    Prescription given for Tramadol.  Last pain medication given at 1:42pm.    If your physician has prescribed pain medication that includes Acetaminophen (Tylenol), do not take additional Acetaminophen (Tylenol) while taking the prescribed medication.    Depression / Suicide Risk    As you are discharged from this [facility] facility, it is important to learn how to keep safe from harming yourself.    Recognize the warning signs:  · Abrupt changes in personality, positive or negative- including increase in energy   · Giving away possessions  · Change in eating patterns- significant weight changes-  positive or negative  · Change in sleeping patterns- unable to sleep or sleeping all the time   · Unwillingness or inability to communicate  · Depression  · Unusual sadness, discouragement and loneliness  · Talk of wanting to die  · Neglect of personal appearance   · Rebelliousness- reckless behavior  · Withdrawal from people/activities they love  · Confusion- inability to concentrate     If you or a loved one observes any of these behaviors or has concerns about self-harm, here's what you can do:  · Talk about it- your feelings and reasons for harming yourself  · Remove any means that you might use to hurt yourself (examples: pills, rope, extension cords,  firearm)  · Get professional help from the community (Mental Health, Substance Abuse, psychological counseling)  · Do not be alone:Call your Safe Contact- someone whom you trust who will be there for you.  · Call your local CRISIS HOTLINE 552-2655 or 258-000-2213  · Call your local Children's Mobile Crisis Response Team [place] (023) 054-7240 or www.Nuve  · Call the toll free National Suicide Prevention Hotlines   · National Suicide Prevention Lifeline 436-360-KBLF (6304)  · National Hope Line Network 800-SUICIDE (368-1219)

## 2020-09-01 ENCOUNTER — HOSPITAL ENCOUNTER (EMERGENCY)
Facility: MEDICAL CENTER | Age: 80
End: 2020-09-01
Attending: EMERGENCY MEDICINE
Payer: MEDICARE

## 2020-09-01 ENCOUNTER — APPOINTMENT (OUTPATIENT)
Dept: RADIOLOGY | Facility: MEDICAL CENTER | Age: 80
End: 2020-09-01
Attending: EMERGENCY MEDICINE
Payer: MEDICARE

## 2020-09-01 VITALS
BODY MASS INDEX: 20.49 KG/M2 | HEART RATE: 73 BPM | RESPIRATION RATE: 18 BRPM | TEMPERATURE: 98.4 F | DIASTOLIC BLOOD PRESSURE: 75 MMHG | SYSTOLIC BLOOD PRESSURE: 185 MMHG | OXYGEN SATURATION: 97 % | WEIGHT: 120 LBS | HEIGHT: 64 IN
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
## 2020-09-01 DIAGNOSIS — F41.8 SITUATIONAL ANXIETY: ICD-10-CM

## 2020-09-01 DIAGNOSIS — S00.03XA CONTUSION OF SCALP, INITIAL ENCOUNTER: ICD-10-CM

## 2020-09-01 PROCEDURE — 90715 TDAP VACCINE 7 YRS/> IM: CPT | Performed by: EMERGENCY MEDICINE

## 2020-09-01 PROCEDURE — 96372 THER/PROPH/DIAG INJ SC/IM: CPT

## 2020-09-01 PROCEDURE — 90471 IMMUNIZATION ADMIN: CPT

## 2020-09-01 PROCEDURE — 70450 CT HEAD/BRAIN W/O DYE: CPT

## 2020-09-01 PROCEDURE — 700111 HCHG RX REV CODE 636 W/ 250 OVERRIDE (IP): Performed by: EMERGENCY MEDICINE

## 2020-09-01 PROCEDURE — 99284 EMERGENCY DEPT VISIT MOD MDM: CPT

## 2020-09-01 PROCEDURE — 72125 CT NECK SPINE W/O DYE: CPT

## 2020-09-01 RX ADMIN — CLOSTRIDIUM TETANI TOXOID ANTIGEN (FORMALDEHYDE INACTIVATED), CORYNEBACTERIUM DIPHTHERIAE TOXOID ANTIGEN (FORMALDEHYDE INACTIVATED), BORDETELLA PERTUSSIS TOXOID ANTIGEN (GLUTARALDEHYDE INACTIVATED), BORDETELLA PERTUSSIS FILAMENTOUS HEMAGGLUTININ ANTIGEN (FORMALDEHYDE INACTIVATED), BORDETELLA PERTUSSIS PERTACTIN ANTIGEN, AND BORDETELLA PERTUSSIS FIMBRIAE 2/3 ANTIGEN 0.5 ML: 5; 2; 2.5; 5; 3; 5 INJECTION, SUSPENSION INTRAMUSCULAR at 18:41

## 2020-09-01 RX ADMIN — FENTANYL CITRATE 25 MCG: 50 INJECTION INTRAMUSCULAR; INTRAVENOUS at 18:04

## 2020-09-02 NOTE — ED TRIAGE NOTES
Chief Complaint   Patient presents with   • T-5000 FALL     Pt was attempting to get on an escalator when she fell backwards onto the platform of the escalator.  Pt c/o head pain, cervical pain, and right ankle pain.  Pt has wound on back of head and right heal.  Bleeding controlled pta.  States takes ASA daily.  Denies LOC.  Pt to CT and then blue 13.  Report to Vidhi RN.  Connected to cardiac monitor, BP cuff, and continuous pulse ox upon arrival.  Pt in gown, call light in reach, bed in lowest position.

## 2020-09-02 NOTE — ED NOTES
"Pt had requested pain medication. Began to give pt im injection on left deltoid when she screamed and pulled arm away. Pt began crying uncontrollably and would not respond to my efforts to comfort her and told me to just \"go away\". I rounded back with pt shortly after and she was still crying offered comfort and support measures but was not open to my comfort. Made her aware she did not really receive much of the medication and if would like another rn to administer to different part of body and she declined. Pt did not want a warm pac either. Pt did not express to me what I could do to assist/releive her pain. Notified md of incident as well. Md rounded on pt as well  "

## 2020-09-02 NOTE — DISCHARGE INSTRUCTIONS
Your head CT and CT of your neck did not show any injuries from this fall, other than the bump on the back of your head.  There is no sign of any dangerous injury.  You can return to all normal activities.  You should expect to be quite sore for the next few days.  Heat, staying as active as you can, and alternating every few hours between Tylenol and ibuprofen can be very helpful.

## 2020-09-02 NOTE — ED PROVIDER NOTES
ED Provider Note    Scribed for Ha Russell M.D. by Ha Russell M.D.. 2020,  5:04 PM.    CHIEF COMPLAINT  Chief Complaint   Patient presents with   • T-5000 FALL       HPI  Jannet Bruno is a 79 y.o. female who presents to the Emergency Department brought in by EMS as a code TBI.  She was getting on an escalator at [facility], lost her balance, and fell, hitting her head on the flat part of the escalator.  She did not hit her head on the corner of the stairs.  She is on daily aspirin, otherwise no blood thinners.  She did not lose consciousness.  She denies confusion or blurry vision.  She has a small laceration on the back of her scalp, according to EMS, that is no longer bleeding.  She was complaining of some neck pain, so was brought in as a code TBI.  She is having a lot of discomfort from the c-collar, which fits her poorly.  She is awake and alert and compliant, so I removed the collar, and explained the importance of keeping her head and neck as still as she can until we have the results of her studies.    REVIEW OF SYSTEMS  See HPI for further details. All other systems are negative.     PAST MEDICAL HISTORY   has a past medical history of Anesthesia, Arthritis, Breath shortness, CATARACT, Diabetes, Heart burn, Hypertension, Indigestion, Other specified disorder of intestines, Sleep apnea, obstructive, Snoring, Unspecified hemorrhagic conditions, Unspecified urinary incontinence, and Urinary bladder disorder.    SOCIAL HISTORY  Social History     Tobacco Use   • Smoking status: Former Smoker     Packs/day: 1.00     Years: 20.00     Pack years: 20.00     Types: Cigarettes     Quit date: 3/5/1979     Years since quittin.5   • Smokeless tobacco: Never Used   Substance and Sexual Activity   • Alcohol use: No   • Drug use: No   • Sexual activity: Not on file     Social History     Substance and Sexual Activity   Drug Use No       SURGICAL HISTORY   has a past surgical history that  "includes other; other orthopedic surgery; other abdominal surgery; appendectomy; gyn surgery; ventral hernia repair laparoscopic (3/7/2012); cataract phaco with iol (5/30/2014); cataract phaco with iol (6/11/2014); hip cannulated screw (Right, 8/14/2017); and av fistula creation (Left, 2/15/2019).    CURRENT MEDICATIONS  Home Medications    **Home medications have not yet been reviewed for this encounter**         ALLERGIES  Allergies   Allergen Reactions   • Codeine      Personality changes   • Oxycodone      Personality changes   • Tape Rash     Adhesive tape-rash, paper tape ok.       PHYSICAL EXAM  VITAL SIGNS: BP (!) 185/75   Pulse 73   Temp 36.9 °C (98.4 °F) (Temporal)   Resp 18   Ht 1.626 m (5' 4\")   Wt 54.4 kg (120 lb)   SpO2 97%   BMI 20.60 kg/m²   Pulse ox interpretation: I interpret this pulse ox as normal.  Constitutional: Alert in some distress from anxiety.  HENT: Patient has a small high occipital midline scalp hematoma.  There was apparently a small associated laceration causing a small amount of bleeding, but this is closed, there is no laceration present requiring closure now.  Eyes: Conjunctiva normal, Non-icteric.   Neck: Normal range of motion, Supple, No stridor.   Lymphatic: No lymphadenopathy noted.   Cardiovascular: Regular rate and rhythm, no murmurs.   Thorax & Lungs: Normal breath sounds, No respiratory distress, No wheezing, No chest tenderness.   Abdomen: Bowel sounds normal, Soft, No tenderness, No masses, No pulsatile masses. No peritoneal signs.  Skin: Warm, Dry, No erythema, No rash.   Back: No midline bony tenderness.   Extremities: Intact distal pulses, No edema, No cyanosis.  Musculoskeletal: Good range of motion in all major joints. No or major deformities noted.   Neurologic: Alert , Normal motor function, Normal sensory function, No focal deficits noted.   Psychiatric: Affect jumpy, anxious, unusual.    DIAGNOSTIC STUDIES / PROCEDURES      RADIOLOGY  CT-HEAD W/O   Final " Result      1.  No acute intracranial findings.      2.  Diffuse atrophy and periventricular white matter changes, consistent with chronic small vessel disease         CT-CSPINE WITHOUT PLUS RECONS   Final Result      1.  No evidence of cervical spine fracture.      2.  Multilevel degenerative disc disease and facet degeneration.        The radiologist's interpretation of all radiological studies have been reviewed by me.    COURSE & MEDICAL DECISION MAKING  Nursing notes, VS, PMSFHx reviewed in chart.     5:04 PM Patient seen and examined at bedside. Differential diagnosis includes but is not limited to fall, skull fracture, intracranial bleeding, C-spine injury. Ordered for head CT and C-spine CT per protocol to evaluate. Patient will be treated with fentanyl for her symptoms.     5:28 PM CTs of the head and C-spine are negative for acute injury.  The patient's wound will be irrigated, and requested examined to determine need for closure.    6:27 PM after fully clearing the patient's spine via CT and reevaluation of the bedside, I could take a better look at her scalp, there is no laceration requiring closure.  She is appropriate for discharge.     The patient will return for new or worsening symptoms and is stable at the time of discharge.    The patient is referred to a primary physician for blood pressure management, diabetic screening, and for all other preventative health concerns.    DISPOSITION:  Patient will be discharged home in stable condition.    FOLLOW UP:  Henry Forrest D.O.  [address] [place] [address] [id number] [phone]    Schedule an appointment as soon as possible for a visit         OUTPATIENT MEDICATIONS:  Discharge Medication List as of 9/1/2020  6:37 PM            FINAL IMPRESSION  1. Fall, initial encounter    2. Contusion of scalp, initial encounter    3. Situational anxiety

## 2021-01-14 DIAGNOSIS — Z23 NEED FOR VACCINATION: ICD-10-CM

## 2021-02-17 ENCOUNTER — HOSPITAL ENCOUNTER (OUTPATIENT)
Dept: LAB | Facility: MEDICAL CENTER | Age: 81
End: 2021-02-17
Attending: INTERNAL MEDICINE
Payer: COMMERCIAL

## 2022-01-01 ENCOUNTER — APPOINTMENT (OUTPATIENT)
Dept: RADIOLOGY | Facility: MEDICAL CENTER | Age: 82
DRG: 682 | End: 2022-01-01
Attending: STUDENT IN AN ORGANIZED HEALTH CARE EDUCATION/TRAINING PROGRAM
Payer: MEDICARE

## 2022-01-01 ENCOUNTER — HOME CARE VISIT (OUTPATIENT)
Dept: HOSPICE | Facility: HOSPICE | Age: 82
End: 2022-01-01
Payer: MEDICARE

## 2022-01-01 ENCOUNTER — APPOINTMENT (OUTPATIENT)
Dept: RADIOLOGY | Facility: MEDICAL CENTER | Age: 82
DRG: 682 | End: 2022-01-01
Payer: MEDICARE

## 2022-01-01 ENCOUNTER — APPOINTMENT (OUTPATIENT)
Dept: RADIOLOGY | Facility: MEDICAL CENTER | Age: 82
DRG: 682 | End: 2022-01-01
Attending: INTERNAL MEDICINE
Payer: MEDICARE

## 2022-01-01 ENCOUNTER — APPOINTMENT (OUTPATIENT)
Dept: RADIOLOGY | Facility: MEDICAL CENTER | Age: 82
DRG: 682 | End: 2022-01-01
Attending: EMERGENCY MEDICINE
Payer: MEDICARE

## 2022-01-01 ENCOUNTER — HOSPITAL ENCOUNTER (INPATIENT)
Facility: MEDICAL CENTER | Age: 82
LOS: 68 days | DRG: 682 | End: 2022-08-31
Attending: EMERGENCY MEDICINE | Admitting: FAMILY MEDICINE
Payer: MEDICARE

## 2022-01-01 ENCOUNTER — APPOINTMENT (OUTPATIENT)
Dept: CARDIOLOGY | Facility: MEDICAL CENTER | Age: 82
DRG: 682 | End: 2022-01-01
Payer: MEDICARE

## 2022-01-01 ENCOUNTER — PHARMACY VISIT (OUTPATIENT)
Dept: PHARMACY | Facility: MEDICAL CENTER | Age: 82
End: 2022-01-01
Payer: COMMERCIAL

## 2022-01-01 ENCOUNTER — HOSPICE ADMISSION (OUTPATIENT)
Dept: HOSPICE | Facility: HOSPICE | Age: 82
End: 2022-01-01
Payer: MEDICARE

## 2022-01-01 VITALS
TEMPERATURE: 98.6 F | OXYGEN SATURATION: 95 % | DIASTOLIC BLOOD PRESSURE: 70 MMHG | BODY MASS INDEX: 20.7 KG/M2 | HEART RATE: 85 BPM | WEIGHT: 121.25 LBS | RESPIRATION RATE: 16 BRPM | SYSTOLIC BLOOD PRESSURE: 94 MMHG | HEIGHT: 64 IN

## 2022-01-01 VITALS — RESPIRATION RATE: 18 BRPM

## 2022-01-01 VITALS — HEART RATE: 76 BPM | RESPIRATION RATE: 16 BRPM

## 2022-01-01 DIAGNOSIS — R60.9 PERIPHERAL EDEMA: ICD-10-CM

## 2022-01-01 DIAGNOSIS — I35.0 SEVERE AORTIC STENOSIS: ICD-10-CM

## 2022-01-01 DIAGNOSIS — N18.6 ESRD (END STAGE RENAL DISEASE) ON DIALYSIS (HCC): ICD-10-CM

## 2022-01-01 DIAGNOSIS — G25.81 RESTLESS LEGS SYNDROME (RLS): ICD-10-CM

## 2022-01-01 DIAGNOSIS — Z99.2 ESRD (END STAGE RENAL DISEASE) ON DIALYSIS (HCC): ICD-10-CM

## 2022-01-01 DIAGNOSIS — R73.9 HYPERGLYCEMIA: ICD-10-CM

## 2022-01-01 DIAGNOSIS — Z51.5 HOSPICE CARE: ICD-10-CM

## 2022-01-01 DIAGNOSIS — R53.1 WEAKNESS: ICD-10-CM

## 2022-01-01 DIAGNOSIS — K92.1 HEMATOCHEZIA: ICD-10-CM

## 2022-01-01 DIAGNOSIS — I35.0 AORTIC STENOSIS, SEVERE: ICD-10-CM

## 2022-01-01 DIAGNOSIS — R53.1 GENERALIZED WEAKNESS: ICD-10-CM

## 2022-01-01 LAB
25(OH)D3 SERPL-MCNC: 35 NG/ML (ref 30–100)
ALBUMIN SERPL BCP-MCNC: 2.6 G/DL (ref 3.2–4.9)
ALBUMIN SERPL BCP-MCNC: 2.7 G/DL (ref 3.2–4.9)
ALBUMIN SERPL BCP-MCNC: 2.7 G/DL (ref 3.2–4.9)
ALBUMIN SERPL BCP-MCNC: 2.8 G/DL (ref 3.2–4.9)
ALBUMIN SERPL BCP-MCNC: 2.9 G/DL (ref 3.2–4.9)
ALBUMIN SERPL BCP-MCNC: 3 G/DL (ref 3.2–4.9)
ALBUMIN SERPL BCP-MCNC: 3.1 G/DL (ref 3.2–4.9)
ALBUMIN SERPL BCP-MCNC: 3.2 G/DL (ref 3.2–4.9)
ALBUMIN SERPL BCP-MCNC: 3.2 G/DL (ref 3.2–4.9)
ALBUMIN SERPL BCP-MCNC: 3.3 G/DL (ref 3.2–4.9)
ALBUMIN SERPL BCP-MCNC: 3.5 G/DL (ref 3.2–4.9)
ALBUMIN/GLOB SERPL: 0.6 G/DL
ALBUMIN/GLOB SERPL: 0.6 G/DL
ALBUMIN/GLOB SERPL: 0.7 G/DL
ALBUMIN/GLOB SERPL: 0.8 G/DL
ALBUMIN/GLOB SERPL: 0.9 G/DL
ALBUMIN/GLOB SERPL: 0.9 G/DL
ALBUMIN/GLOB SERPL: 1 G/DL
ALBUMIN/GLOB SERPL: 1 G/DL
ALP SERPL-CCNC: 114 U/L (ref 30–99)
ALP SERPL-CCNC: 114 U/L (ref 30–99)
ALP SERPL-CCNC: 115 U/L (ref 30–99)
ALP SERPL-CCNC: 116 U/L (ref 30–99)
ALP SERPL-CCNC: 117 U/L (ref 30–99)
ALP SERPL-CCNC: 119 U/L (ref 30–99)
ALP SERPL-CCNC: 135 U/L (ref 30–99)
ALP SERPL-CCNC: 137 U/L (ref 30–99)
ALP SERPL-CCNC: 139 U/L (ref 30–99)
ALP SERPL-CCNC: 147 U/L (ref 30–99)
ALP SERPL-CCNC: 149 U/L (ref 30–99)
ALP SERPL-CCNC: 149 U/L (ref 30–99)
ALP SERPL-CCNC: 152 U/L (ref 30–99)
ALP SERPL-CCNC: 158 U/L (ref 30–99)
ALP SERPL-CCNC: 177 U/L (ref 30–99)
ALP SERPL-CCNC: 178 U/L (ref 30–99)
ALP SERPL-CCNC: 181 U/L (ref 30–99)
ALP SERPL-CCNC: 188 U/L (ref 30–99)
ALP SERPL-CCNC: 197 U/L (ref 30–99)
ALP SERPL-CCNC: 206 U/L (ref 30–99)
ALP SERPL-CCNC: 98 U/L (ref 30–99)
ALT SERPL-CCNC: 10 U/L (ref 2–50)
ALT SERPL-CCNC: 11 U/L (ref 2–50)
ALT SERPL-CCNC: 13 U/L (ref 2–50)
ALT SERPL-CCNC: 5 U/L (ref 2–50)
ALT SERPL-CCNC: 5 U/L (ref 2–50)
ALT SERPL-CCNC: 6 U/L (ref 2–50)
ALT SERPL-CCNC: 9 U/L (ref 2–50)
ALT SERPL-CCNC: <5 U/L (ref 2–50)
ANION GAP SERPL CALC-SCNC: 12 MMOL/L (ref 7–16)
ANION GAP SERPL CALC-SCNC: 13 MMOL/L (ref 7–16)
ANION GAP SERPL CALC-SCNC: 14 MMOL/L (ref 7–16)
ANION GAP SERPL CALC-SCNC: 15 MMOL/L (ref 7–16)
ANION GAP SERPL CALC-SCNC: 16 MMOL/L (ref 7–16)
ANION GAP SERPL CALC-SCNC: 17 MMOL/L (ref 7–16)
ANION GAP SERPL CALC-SCNC: 18 MMOL/L (ref 7–16)
ANION GAP SERPL CALC-SCNC: 20 MMOL/L (ref 7–16)
ANION GAP SERPL CALC-SCNC: 22 MMOL/L (ref 7–16)
ANISOCYTOSIS BLD QL SMEAR: ABNORMAL
APTT PPP: 30.4 SEC (ref 24.7–36)
APTT PPP: 36.9 SEC (ref 24.7–36)
AST SERPL-CCNC: 12 U/L (ref 12–45)
AST SERPL-CCNC: 13 U/L (ref 12–45)
AST SERPL-CCNC: 14 U/L (ref 12–45)
AST SERPL-CCNC: 15 U/L (ref 12–45)
AST SERPL-CCNC: 15 U/L (ref 12–45)
AST SERPL-CCNC: 16 U/L (ref 12–45)
AST SERPL-CCNC: 17 U/L (ref 12–45)
AST SERPL-CCNC: 18 U/L (ref 12–45)
AST SERPL-CCNC: 19 U/L (ref 12–45)
AST SERPL-CCNC: 20 U/L (ref 12–45)
AST SERPL-CCNC: 22 U/L (ref 12–45)
AST SERPL-CCNC: 23 U/L (ref 12–45)
AST SERPL-CCNC: 26 U/L (ref 12–45)
B-OH-BUTYR SERPL-MCNC: 0.99 MMOL/L (ref 0.02–0.27)
BACTERIA BLD CULT: NORMAL
BACTERIA BLD CULT: NORMAL
BACTERIA WND AEROBE CULT: ABNORMAL
BACTERIA WND AEROBE CULT: ABNORMAL
BASOPHILS # BLD AUTO: 0.5 % (ref 0–1.8)
BASOPHILS # BLD AUTO: 0.6 % (ref 0–1.8)
BASOPHILS # BLD AUTO: 0.7 % (ref 0–1.8)
BASOPHILS # BLD AUTO: 0.8 % (ref 0–1.8)
BASOPHILS # BLD AUTO: 0.8 % (ref 0–1.8)
BASOPHILS # BLD AUTO: 0.9 % (ref 0–1.8)
BASOPHILS # BLD AUTO: 0.9 % (ref 0–1.8)
BASOPHILS # BLD AUTO: 1.1 % (ref 0–1.8)
BASOPHILS # BLD: 0.05 K/UL (ref 0–0.12)
BASOPHILS # BLD: 0.05 K/UL (ref 0–0.12)
BASOPHILS # BLD: 0.06 K/UL (ref 0–0.12)
BASOPHILS # BLD: 0.07 K/UL (ref 0–0.12)
BASOPHILS # BLD: 0.08 K/UL (ref 0–0.12)
BASOPHILS # BLD: 0.08 K/UL (ref 0–0.12)
BASOPHILS # BLD: 0.09 K/UL (ref 0–0.12)
BASOPHILS # BLD: 0.1 K/UL (ref 0–0.12)
BASOPHILS # BLD: 0.11 K/UL (ref 0–0.12)
BILIRUB SERPL-MCNC: 0.3 MG/DL (ref 0.1–1.5)
BILIRUB SERPL-MCNC: 0.4 MG/DL (ref 0.1–1.5)
BILIRUB SERPL-MCNC: 0.5 MG/DL (ref 0.1–1.5)
BILIRUB SERPL-MCNC: 0.6 MG/DL (ref 0.1–1.5)
BILIRUB SERPL-MCNC: 0.7 MG/DL (ref 0.1–1.5)
BILIRUB SERPL-MCNC: 0.8 MG/DL (ref 0.1–1.5)
BUN SERPL-MCNC: 22 MG/DL (ref 8–22)
BUN SERPL-MCNC: 22 MG/DL (ref 8–22)
BUN SERPL-MCNC: 23 MG/DL (ref 8–22)
BUN SERPL-MCNC: 23 MG/DL (ref 8–22)
BUN SERPL-MCNC: 24 MG/DL (ref 8–22)
BUN SERPL-MCNC: 28 MG/DL (ref 8–22)
BUN SERPL-MCNC: 30 MG/DL (ref 8–22)
BUN SERPL-MCNC: 30 MG/DL (ref 8–22)
BUN SERPL-MCNC: 31 MG/DL (ref 8–22)
BUN SERPL-MCNC: 32 MG/DL (ref 8–22)
BUN SERPL-MCNC: 33 MG/DL (ref 8–22)
BUN SERPL-MCNC: 33 MG/DL (ref 8–22)
BUN SERPL-MCNC: 34 MG/DL (ref 8–22)
BUN SERPL-MCNC: 35 MG/DL (ref 8–22)
BUN SERPL-MCNC: 36 MG/DL (ref 8–22)
BUN SERPL-MCNC: 36 MG/DL (ref 8–22)
BUN SERPL-MCNC: 37 MG/DL (ref 8–22)
BUN SERPL-MCNC: 38 MG/DL (ref 8–22)
BUN SERPL-MCNC: 38 MG/DL (ref 8–22)
BUN SERPL-MCNC: 39 MG/DL (ref 8–22)
BUN SERPL-MCNC: 40 MG/DL (ref 8–22)
BUN SERPL-MCNC: 40 MG/DL (ref 8–22)
BUN SERPL-MCNC: 42 MG/DL (ref 8–22)
BUN SERPL-MCNC: 44 MG/DL (ref 8–22)
BUN SERPL-MCNC: 45 MG/DL (ref 8–22)
BUN SERPL-MCNC: 46 MG/DL (ref 8–22)
BUN SERPL-MCNC: 48 MG/DL (ref 8–22)
BUN SERPL-MCNC: 49 MG/DL (ref 8–22)
BUN SERPL-MCNC: 50 MG/DL (ref 8–22)
BUN SERPL-MCNC: 51 MG/DL (ref 8–22)
BUN SERPL-MCNC: 52 MG/DL (ref 8–22)
BUN SERPL-MCNC: 52 MG/DL (ref 8–22)
BUN SERPL-MCNC: 54 MG/DL (ref 8–22)
BUN SERPL-MCNC: 56 MG/DL (ref 8–22)
BUN SERPL-MCNC: 57 MG/DL (ref 8–22)
BUN SERPL-MCNC: 59 MG/DL (ref 8–22)
BUN SERPL-MCNC: 61 MG/DL (ref 8–22)
BUN SERPL-MCNC: 63 MG/DL (ref 8–22)
BUN SERPL-MCNC: 64 MG/DL (ref 8–22)
BUN SERPL-MCNC: 69 MG/DL (ref 8–22)
CA-I SERPL-SCNC: 1.1 MMOL/L (ref 1.1–1.3)
CALCIUM SERPL-MCNC: 7.7 MG/DL (ref 8.5–10.5)
CALCIUM SERPL-MCNC: 7.9 MG/DL (ref 8.5–10.5)
CALCIUM SERPL-MCNC: 8.2 MG/DL (ref 8.5–10.5)
CALCIUM SERPL-MCNC: 8.4 MG/DL (ref 8.5–10.5)
CALCIUM SERPL-MCNC: 8.5 MG/DL (ref 8.5–10.5)
CALCIUM SERPL-MCNC: 8.7 MG/DL (ref 8.5–10.5)
CALCIUM SERPL-MCNC: 8.8 MG/DL (ref 8.5–10.5)
CALCIUM SERPL-MCNC: 8.9 MG/DL (ref 8.5–10.5)
CALCIUM SERPL-MCNC: 9 MG/DL (ref 8.5–10.5)
CALCIUM SERPL-MCNC: 9.1 MG/DL (ref 8.5–10.5)
CALCIUM SERPL-MCNC: 9.2 MG/DL (ref 8.5–10.5)
CALCIUM SERPL-MCNC: 9.3 MG/DL (ref 8.5–10.5)
CALCIUM SERPL-MCNC: 9.4 MG/DL (ref 8.5–10.5)
CALCIUM SERPL-MCNC: 9.6 MG/DL (ref 8.5–10.5)
CALCIUM SERPL-MCNC: 9.7 MG/DL (ref 8.5–10.5)
CALCIUM SERPL-MCNC: 9.7 MG/DL (ref 8.5–10.5)
CALCIUM SERPL-MCNC: 9.8 MG/DL (ref 8.5–10.5)
CHLORIDE SERPL-SCNC: 86 MMOL/L (ref 96–112)
CHLORIDE SERPL-SCNC: 88 MMOL/L (ref 96–112)
CHLORIDE SERPL-SCNC: 88 MMOL/L (ref 96–112)
CHLORIDE SERPL-SCNC: 89 MMOL/L (ref 96–112)
CHLORIDE SERPL-SCNC: 89 MMOL/L (ref 96–112)
CHLORIDE SERPL-SCNC: 90 MMOL/L (ref 96–112)
CHLORIDE SERPL-SCNC: 91 MMOL/L (ref 96–112)
CHLORIDE SERPL-SCNC: 92 MMOL/L (ref 96–112)
CHLORIDE SERPL-SCNC: 93 MMOL/L (ref 96–112)
CHLORIDE SERPL-SCNC: 94 MMOL/L (ref 96–112)
CHLORIDE SERPL-SCNC: 95 MMOL/L (ref 96–112)
CHLORIDE SERPL-SCNC: 96 MMOL/L (ref 96–112)
CO2 SERPL-SCNC: 21 MMOL/L (ref 20–33)
CO2 SERPL-SCNC: 23 MMOL/L (ref 20–33)
CO2 SERPL-SCNC: 24 MMOL/L (ref 20–33)
CO2 SERPL-SCNC: 25 MMOL/L (ref 20–33)
CO2 SERPL-SCNC: 26 MMOL/L (ref 20–33)
CO2 SERPL-SCNC: 27 MMOL/L (ref 20–33)
CO2 SERPL-SCNC: 28 MMOL/L (ref 20–33)
CO2 SERPL-SCNC: 29 MMOL/L (ref 20–33)
COMMENT 1642: NORMAL
CREAT SERPL-MCNC: 2.35 MG/DL (ref 0.5–1.4)
CREAT SERPL-MCNC: 2.54 MG/DL (ref 0.5–1.4)
CREAT SERPL-MCNC: 2.63 MG/DL (ref 0.5–1.4)
CREAT SERPL-MCNC: 2.75 MG/DL (ref 0.5–1.4)
CREAT SERPL-MCNC: 2.83 MG/DL (ref 0.5–1.4)
CREAT SERPL-MCNC: 2.98 MG/DL (ref 0.5–1.4)
CREAT SERPL-MCNC: 3.07 MG/DL (ref 0.5–1.4)
CREAT SERPL-MCNC: 3.42 MG/DL (ref 0.5–1.4)
CREAT SERPL-MCNC: 3.48 MG/DL (ref 0.5–1.4)
CREAT SERPL-MCNC: 3.56 MG/DL (ref 0.5–1.4)
CREAT SERPL-MCNC: 3.56 MG/DL (ref 0.5–1.4)
CREAT SERPL-MCNC: 3.62 MG/DL (ref 0.5–1.4)
CREAT SERPL-MCNC: 3.64 MG/DL (ref 0.5–1.4)
CREAT SERPL-MCNC: 3.73 MG/DL (ref 0.5–1.4)
CREAT SERPL-MCNC: 3.74 MG/DL (ref 0.5–1.4)
CREAT SERPL-MCNC: 3.74 MG/DL (ref 0.5–1.4)
CREAT SERPL-MCNC: 3.79 MG/DL (ref 0.5–1.4)
CREAT SERPL-MCNC: 3.86 MG/DL (ref 0.5–1.4)
CREAT SERPL-MCNC: 4.01 MG/DL (ref 0.5–1.4)
CREAT SERPL-MCNC: 4.07 MG/DL (ref 0.5–1.4)
CREAT SERPL-MCNC: 4.07 MG/DL (ref 0.5–1.4)
CREAT SERPL-MCNC: 4.14 MG/DL (ref 0.5–1.4)
CREAT SERPL-MCNC: 4.16 MG/DL (ref 0.5–1.4)
CREAT SERPL-MCNC: 4.25 MG/DL (ref 0.5–1.4)
CREAT SERPL-MCNC: 4.26 MG/DL (ref 0.5–1.4)
CREAT SERPL-MCNC: 4.29 MG/DL (ref 0.5–1.4)
CREAT SERPL-MCNC: 4.3 MG/DL (ref 0.5–1.4)
CREAT SERPL-MCNC: 4.49 MG/DL (ref 0.5–1.4)
CREAT SERPL-MCNC: 4.54 MG/DL (ref 0.5–1.4)
CREAT SERPL-MCNC: 4.54 MG/DL (ref 0.5–1.4)
CREAT SERPL-MCNC: 4.56 MG/DL (ref 0.5–1.4)
CREAT SERPL-MCNC: 4.56 MG/DL (ref 0.5–1.4)
CREAT SERPL-MCNC: 4.61 MG/DL (ref 0.5–1.4)
CREAT SERPL-MCNC: 4.66 MG/DL (ref 0.5–1.4)
CREAT SERPL-MCNC: 4.68 MG/DL (ref 0.5–1.4)
CREAT SERPL-MCNC: 4.79 MG/DL (ref 0.5–1.4)
CREAT SERPL-MCNC: 4.79 MG/DL (ref 0.5–1.4)
CREAT SERPL-MCNC: 5.22 MG/DL (ref 0.5–1.4)
CREAT SERPL-MCNC: 5.25 MG/DL (ref 0.5–1.4)
CREAT SERPL-MCNC: 5.35 MG/DL (ref 0.5–1.4)
CREAT SERPL-MCNC: 5.45 MG/DL (ref 0.5–1.4)
CREAT SERPL-MCNC: 5.51 MG/DL (ref 0.5–1.4)
CREAT SERPL-MCNC: 5.61 MG/DL (ref 0.5–1.4)
CREAT SERPL-MCNC: 5.94 MG/DL (ref 0.5–1.4)
CREAT SERPL-MCNC: 5.96 MG/DL (ref 0.5–1.4)
CREAT SERPL-MCNC: 6.05 MG/DL (ref 0.5–1.4)
CREAT SERPL-MCNC: 6.39 MG/DL (ref 0.5–1.4)
CREAT SERPL-MCNC: 6.43 MG/DL (ref 0.5–1.4)
CREAT SERPL-MCNC: 7.44 MG/DL (ref 0.5–1.4)
CRP SERPL HS-MCNC: 7.07 MG/DL (ref 0–0.75)
D DIMER PPP IA.FEU-MCNC: 1.06 UG/ML (FEU) (ref 0–0.5)
D DIMER PPP IA.FEU-MCNC: 1.86 UG/ML (FEU) (ref 0–0.5)
EKG IMPRESSION: NORMAL
EOSINOPHIL # BLD AUTO: 0.14 K/UL (ref 0–0.51)
EOSINOPHIL # BLD AUTO: 0.17 K/UL (ref 0–0.51)
EOSINOPHIL # BLD AUTO: 0.26 K/UL (ref 0–0.51)
EOSINOPHIL # BLD AUTO: 0.41 K/UL (ref 0–0.51)
EOSINOPHIL # BLD AUTO: 0.41 K/UL (ref 0–0.51)
EOSINOPHIL # BLD AUTO: 0.48 K/UL (ref 0–0.51)
EOSINOPHIL # BLD AUTO: 0.5 K/UL (ref 0–0.51)
EOSINOPHIL # BLD AUTO: 0.51 K/UL (ref 0–0.51)
EOSINOPHIL # BLD AUTO: 0.6 K/UL (ref 0–0.51)
EOSINOPHIL # BLD AUTO: 0.63 K/UL (ref 0–0.51)
EOSINOPHIL # BLD AUTO: 0.64 K/UL (ref 0–0.51)
EOSINOPHIL # BLD AUTO: 0.91 K/UL (ref 0–0.51)
EOSINOPHIL NFR BLD: 0.9 % (ref 0–6.9)
EOSINOPHIL NFR BLD: 1.5 % (ref 0–6.9)
EOSINOPHIL NFR BLD: 11 % (ref 0–6.9)
EOSINOPHIL NFR BLD: 2.5 % (ref 0–6.9)
EOSINOPHIL NFR BLD: 3.8 % (ref 0–6.9)
EOSINOPHIL NFR BLD: 4.3 % (ref 0–6.9)
EOSINOPHIL NFR BLD: 4.3 % (ref 0–6.9)
EOSINOPHIL NFR BLD: 4.7 % (ref 0–6.9)
EOSINOPHIL NFR BLD: 5.6 % (ref 0–6.9)
EOSINOPHIL NFR BLD: 6.8 % (ref 0–6.9)
EOSINOPHIL NFR BLD: 7.7 % (ref 0–6.9)
EOSINOPHIL NFR BLD: 8.4 % (ref 0–6.9)
ERYTHROCYTE [DISTWIDTH] IN BLOOD BY AUTOMATED COUNT: 62.9 FL (ref 35.9–50)
ERYTHROCYTE [DISTWIDTH] IN BLOOD BY AUTOMATED COUNT: 63.8 FL (ref 35.9–50)
ERYTHROCYTE [DISTWIDTH] IN BLOOD BY AUTOMATED COUNT: 63.9 FL (ref 35.9–50)
ERYTHROCYTE [DISTWIDTH] IN BLOOD BY AUTOMATED COUNT: 64.4 FL (ref 35.9–50)
ERYTHROCYTE [DISTWIDTH] IN BLOOD BY AUTOMATED COUNT: 65.6 FL (ref 35.9–50)
ERYTHROCYTE [DISTWIDTH] IN BLOOD BY AUTOMATED COUNT: 65.8 FL (ref 35.9–50)
ERYTHROCYTE [DISTWIDTH] IN BLOOD BY AUTOMATED COUNT: 66.2 FL (ref 35.9–50)
ERYTHROCYTE [DISTWIDTH] IN BLOOD BY AUTOMATED COUNT: 67 FL (ref 35.9–50)
ERYTHROCYTE [DISTWIDTH] IN BLOOD BY AUTOMATED COUNT: 67.7 FL (ref 35.9–50)
ERYTHROCYTE [DISTWIDTH] IN BLOOD BY AUTOMATED COUNT: 68.4 FL (ref 35.9–50)
ERYTHROCYTE [DISTWIDTH] IN BLOOD BY AUTOMATED COUNT: 69.1 FL (ref 35.9–50)
ERYTHROCYTE [DISTWIDTH] IN BLOOD BY AUTOMATED COUNT: 71.5 FL (ref 35.9–50)
ERYTHROCYTE [DISTWIDTH] IN BLOOD BY AUTOMATED COUNT: 72.9 FL (ref 35.9–50)
ERYTHROCYTE [DISTWIDTH] IN BLOOD BY AUTOMATED COUNT: 74.1 FL (ref 35.9–50)
ERYTHROCYTE [DISTWIDTH] IN BLOOD BY AUTOMATED COUNT: 74.4 FL (ref 35.9–50)
ERYTHROCYTE [DISTWIDTH] IN BLOOD BY AUTOMATED COUNT: 74.9 FL (ref 35.9–50)
ERYTHROCYTE [DISTWIDTH] IN BLOOD BY AUTOMATED COUNT: 76.6 FL (ref 35.9–50)
EST. AVERAGE GLUCOSE BLD GHB EST-MCNC: 289 MG/DL
FERRITIN SERPL-MCNC: 1419 NG/ML (ref 10–291)
GAMMA INTERFERON BACKGROUND BLD IA-ACNC: 0.03 IU/ML
GAMMA INTERFERON BACKGROUND BLD IA-ACNC: 0.04 IU/ML
GFR SERPLBLD CREATININE-BSD FMLA CKD-EPI: 10 ML/MIN/1.73 M 2
GFR SERPLBLD CREATININE-BSD FMLA CKD-EPI: 11 ML/MIN/1.73 M 2
GFR SERPLBLD CREATININE-BSD FMLA CKD-EPI: 12 ML/MIN/1.73 M 2
GFR SERPLBLD CREATININE-BSD FMLA CKD-EPI: 13 ML/MIN/1.73 M 2
GFR SERPLBLD CREATININE-BSD FMLA CKD-EPI: 13 ML/MIN/1.73 M 2
GFR SERPLBLD CREATININE-BSD FMLA CKD-EPI: 15 ML/MIN/1.73 M 2
GFR SERPLBLD CREATININE-BSD FMLA CKD-EPI: 15 ML/MIN/1.73 M 2
GFR SERPLBLD CREATININE-BSD FMLA CKD-EPI: 16 ML/MIN/1.73 M 2
GFR SERPLBLD CREATININE-BSD FMLA CKD-EPI: 17 ML/MIN/1.73 M 2
GFR SERPLBLD CREATININE-BSD FMLA CKD-EPI: 18 ML/MIN/1.73 M 2
GFR SERPLBLD CREATININE-BSD FMLA CKD-EPI: 18 ML/MIN/1.73 M 2
GFR SERPLBLD CREATININE-BSD FMLA CKD-EPI: 20 ML/MIN/1.73 M 2
GFR SERPLBLD CREATININE-BSD FMLA CKD-EPI: 5 ML/MIN/1.73 M 2
GFR SERPLBLD CREATININE-BSD FMLA CKD-EPI: 6 ML/MIN/1.73 M 2
GFR SERPLBLD CREATININE-BSD FMLA CKD-EPI: 7 ML/MIN/1.73 M 2
GFR SERPLBLD CREATININE-BSD FMLA CKD-EPI: 8 ML/MIN/1.73 M 2
GFR SERPLBLD CREATININE-BSD FMLA CKD-EPI: 9 ML/MIN/1.73 M 2
GIANT PLATELETS BLD QL SMEAR: NORMAL
GLOBULIN SER CALC-MCNC: 3.1 G/DL (ref 1.9–3.5)
GLOBULIN SER CALC-MCNC: 3.3 G/DL (ref 1.9–3.5)
GLOBULIN SER CALC-MCNC: 3.4 G/DL (ref 1.9–3.5)
GLOBULIN SER CALC-MCNC: 3.5 G/DL (ref 1.9–3.5)
GLOBULIN SER CALC-MCNC: 3.7 G/DL (ref 1.9–3.5)
GLOBULIN SER CALC-MCNC: 3.7 G/DL (ref 1.9–3.5)
GLOBULIN SER CALC-MCNC: 3.8 G/DL (ref 1.9–3.5)
GLOBULIN SER CALC-MCNC: 3.8 G/DL (ref 1.9–3.5)
GLOBULIN SER CALC-MCNC: 3.9 G/DL (ref 1.9–3.5)
GLOBULIN SER CALC-MCNC: 3.9 G/DL (ref 1.9–3.5)
GLOBULIN SER CALC-MCNC: 4 G/DL (ref 1.9–3.5)
GLOBULIN SER CALC-MCNC: 4 G/DL (ref 1.9–3.5)
GLOBULIN SER CALC-MCNC: 4.1 G/DL (ref 1.9–3.5)
GLOBULIN SER CALC-MCNC: 4.2 G/DL (ref 1.9–3.5)
GLOBULIN SER CALC-MCNC: 4.5 G/DL (ref 1.9–3.5)
GLOBULIN SER CALC-MCNC: 4.5 G/DL (ref 1.9–3.5)
GLUCOSE BLD STRIP.AUTO-MCNC: 100 MG/DL (ref 65–99)
GLUCOSE BLD STRIP.AUTO-MCNC: 100 MG/DL (ref 65–99)
GLUCOSE BLD STRIP.AUTO-MCNC: 102 MG/DL (ref 65–99)
GLUCOSE BLD STRIP.AUTO-MCNC: 102 MG/DL (ref 65–99)
GLUCOSE BLD STRIP.AUTO-MCNC: 103 MG/DL (ref 65–99)
GLUCOSE BLD STRIP.AUTO-MCNC: 104 MG/DL (ref 65–99)
GLUCOSE BLD STRIP.AUTO-MCNC: 105 MG/DL (ref 65–99)
GLUCOSE BLD STRIP.AUTO-MCNC: 106 MG/DL (ref 65–99)
GLUCOSE BLD STRIP.AUTO-MCNC: 107 MG/DL (ref 65–99)
GLUCOSE BLD STRIP.AUTO-MCNC: 108 MG/DL (ref 65–99)
GLUCOSE BLD STRIP.AUTO-MCNC: 109 MG/DL (ref 65–99)
GLUCOSE BLD STRIP.AUTO-MCNC: 110 MG/DL (ref 65–99)
GLUCOSE BLD STRIP.AUTO-MCNC: 111 MG/DL (ref 65–99)
GLUCOSE BLD STRIP.AUTO-MCNC: 112 MG/DL (ref 65–99)
GLUCOSE BLD STRIP.AUTO-MCNC: 114 MG/DL (ref 65–99)
GLUCOSE BLD STRIP.AUTO-MCNC: 115 MG/DL (ref 65–99)
GLUCOSE BLD STRIP.AUTO-MCNC: 115 MG/DL (ref 65–99)
GLUCOSE BLD STRIP.AUTO-MCNC: 116 MG/DL (ref 65–99)
GLUCOSE BLD STRIP.AUTO-MCNC: 116 MG/DL (ref 65–99)
GLUCOSE BLD STRIP.AUTO-MCNC: 117 MG/DL (ref 65–99)
GLUCOSE BLD STRIP.AUTO-MCNC: 118 MG/DL (ref 65–99)
GLUCOSE BLD STRIP.AUTO-MCNC: 118 MG/DL (ref 65–99)
GLUCOSE BLD STRIP.AUTO-MCNC: 119 MG/DL (ref 65–99)
GLUCOSE BLD STRIP.AUTO-MCNC: 120 MG/DL (ref 65–99)
GLUCOSE BLD STRIP.AUTO-MCNC: 121 MG/DL (ref 65–99)
GLUCOSE BLD STRIP.AUTO-MCNC: 122 MG/DL (ref 65–99)
GLUCOSE BLD STRIP.AUTO-MCNC: 123 MG/DL (ref 65–99)
GLUCOSE BLD STRIP.AUTO-MCNC: 124 MG/DL (ref 65–99)
GLUCOSE BLD STRIP.AUTO-MCNC: 125 MG/DL (ref 65–99)
GLUCOSE BLD STRIP.AUTO-MCNC: 125 MG/DL (ref 65–99)
GLUCOSE BLD STRIP.AUTO-MCNC: 126 MG/DL (ref 65–99)
GLUCOSE BLD STRIP.AUTO-MCNC: 126 MG/DL (ref 65–99)
GLUCOSE BLD STRIP.AUTO-MCNC: 127 MG/DL (ref 65–99)
GLUCOSE BLD STRIP.AUTO-MCNC: 128 MG/DL (ref 65–99)
GLUCOSE BLD STRIP.AUTO-MCNC: 129 MG/DL (ref 65–99)
GLUCOSE BLD STRIP.AUTO-MCNC: 130 MG/DL (ref 65–99)
GLUCOSE BLD STRIP.AUTO-MCNC: 131 MG/DL (ref 65–99)
GLUCOSE BLD STRIP.AUTO-MCNC: 134 MG/DL (ref 65–99)
GLUCOSE BLD STRIP.AUTO-MCNC: 134 MG/DL (ref 65–99)
GLUCOSE BLD STRIP.AUTO-MCNC: 135 MG/DL (ref 65–99)
GLUCOSE BLD STRIP.AUTO-MCNC: 136 MG/DL (ref 65–99)
GLUCOSE BLD STRIP.AUTO-MCNC: 137 MG/DL (ref 65–99)
GLUCOSE BLD STRIP.AUTO-MCNC: 138 MG/DL (ref 65–99)
GLUCOSE BLD STRIP.AUTO-MCNC: 139 MG/DL (ref 65–99)
GLUCOSE BLD STRIP.AUTO-MCNC: 139 MG/DL (ref 65–99)
GLUCOSE BLD STRIP.AUTO-MCNC: 140 MG/DL (ref 65–99)
GLUCOSE BLD STRIP.AUTO-MCNC: 141 MG/DL (ref 65–99)
GLUCOSE BLD STRIP.AUTO-MCNC: 141 MG/DL (ref 65–99)
GLUCOSE BLD STRIP.AUTO-MCNC: 142 MG/DL (ref 65–99)
GLUCOSE BLD STRIP.AUTO-MCNC: 143 MG/DL (ref 65–99)
GLUCOSE BLD STRIP.AUTO-MCNC: 145 MG/DL (ref 65–99)
GLUCOSE BLD STRIP.AUTO-MCNC: 145 MG/DL (ref 65–99)
GLUCOSE BLD STRIP.AUTO-MCNC: 147 MG/DL (ref 65–99)
GLUCOSE BLD STRIP.AUTO-MCNC: 148 MG/DL (ref 65–99)
GLUCOSE BLD STRIP.AUTO-MCNC: 149 MG/DL (ref 65–99)
GLUCOSE BLD STRIP.AUTO-MCNC: 149 MG/DL (ref 65–99)
GLUCOSE BLD STRIP.AUTO-MCNC: 150 MG/DL (ref 65–99)
GLUCOSE BLD STRIP.AUTO-MCNC: 153 MG/DL (ref 65–99)
GLUCOSE BLD STRIP.AUTO-MCNC: 153 MG/DL (ref 65–99)
GLUCOSE BLD STRIP.AUTO-MCNC: 154 MG/DL (ref 65–99)
GLUCOSE BLD STRIP.AUTO-MCNC: 154 MG/DL (ref 65–99)
GLUCOSE BLD STRIP.AUTO-MCNC: 156 MG/DL (ref 65–99)
GLUCOSE BLD STRIP.AUTO-MCNC: 158 MG/DL (ref 65–99)
GLUCOSE BLD STRIP.AUTO-MCNC: 159 MG/DL (ref 65–99)
GLUCOSE BLD STRIP.AUTO-MCNC: 160 MG/DL (ref 65–99)
GLUCOSE BLD STRIP.AUTO-MCNC: 160 MG/DL (ref 65–99)
GLUCOSE BLD STRIP.AUTO-MCNC: 161 MG/DL (ref 65–99)
GLUCOSE BLD STRIP.AUTO-MCNC: 161 MG/DL (ref 65–99)
GLUCOSE BLD STRIP.AUTO-MCNC: 164 MG/DL (ref 65–99)
GLUCOSE BLD STRIP.AUTO-MCNC: 164 MG/DL (ref 65–99)
GLUCOSE BLD STRIP.AUTO-MCNC: 165 MG/DL (ref 65–99)
GLUCOSE BLD STRIP.AUTO-MCNC: 166 MG/DL (ref 65–99)
GLUCOSE BLD STRIP.AUTO-MCNC: 167 MG/DL (ref 65–99)
GLUCOSE BLD STRIP.AUTO-MCNC: 168 MG/DL (ref 65–99)
GLUCOSE BLD STRIP.AUTO-MCNC: 169 MG/DL (ref 65–99)
GLUCOSE BLD STRIP.AUTO-MCNC: 170 MG/DL (ref 65–99)
GLUCOSE BLD STRIP.AUTO-MCNC: 172 MG/DL (ref 65–99)
GLUCOSE BLD STRIP.AUTO-MCNC: 173 MG/DL (ref 65–99)
GLUCOSE BLD STRIP.AUTO-MCNC: 173 MG/DL (ref 65–99)
GLUCOSE BLD STRIP.AUTO-MCNC: 174 MG/DL (ref 65–99)
GLUCOSE BLD STRIP.AUTO-MCNC: 175 MG/DL (ref 65–99)
GLUCOSE BLD STRIP.AUTO-MCNC: 177 MG/DL (ref 65–99)
GLUCOSE BLD STRIP.AUTO-MCNC: 178 MG/DL (ref 65–99)
GLUCOSE BLD STRIP.AUTO-MCNC: 179 MG/DL (ref 65–99)
GLUCOSE BLD STRIP.AUTO-MCNC: 180 MG/DL (ref 65–99)
GLUCOSE BLD STRIP.AUTO-MCNC: 180 MG/DL (ref 65–99)
GLUCOSE BLD STRIP.AUTO-MCNC: 181 MG/DL (ref 65–99)
GLUCOSE BLD STRIP.AUTO-MCNC: 182 MG/DL (ref 65–99)
GLUCOSE BLD STRIP.AUTO-MCNC: 183 MG/DL (ref 65–99)
GLUCOSE BLD STRIP.AUTO-MCNC: 184 MG/DL (ref 65–99)
GLUCOSE BLD STRIP.AUTO-MCNC: 185 MG/DL (ref 65–99)
GLUCOSE BLD STRIP.AUTO-MCNC: 186 MG/DL (ref 65–99)
GLUCOSE BLD STRIP.AUTO-MCNC: 187 MG/DL (ref 65–99)
GLUCOSE BLD STRIP.AUTO-MCNC: 188 MG/DL (ref 65–99)
GLUCOSE BLD STRIP.AUTO-MCNC: 188 MG/DL (ref 65–99)
GLUCOSE BLD STRIP.AUTO-MCNC: 189 MG/DL (ref 65–99)
GLUCOSE BLD STRIP.AUTO-MCNC: 190 MG/DL (ref 65–99)
GLUCOSE BLD STRIP.AUTO-MCNC: 190 MG/DL (ref 65–99)
GLUCOSE BLD STRIP.AUTO-MCNC: 191 MG/DL (ref 65–99)
GLUCOSE BLD STRIP.AUTO-MCNC: 192 MG/DL (ref 65–99)
GLUCOSE BLD STRIP.AUTO-MCNC: 193 MG/DL (ref 65–99)
GLUCOSE BLD STRIP.AUTO-MCNC: 196 MG/DL (ref 65–99)
GLUCOSE BLD STRIP.AUTO-MCNC: 200 MG/DL (ref 65–99)
GLUCOSE BLD STRIP.AUTO-MCNC: 201 MG/DL (ref 65–99)
GLUCOSE BLD STRIP.AUTO-MCNC: 202 MG/DL (ref 65–99)
GLUCOSE BLD STRIP.AUTO-MCNC: 203 MG/DL (ref 65–99)
GLUCOSE BLD STRIP.AUTO-MCNC: 204 MG/DL (ref 65–99)
GLUCOSE BLD STRIP.AUTO-MCNC: 204 MG/DL (ref 65–99)
GLUCOSE BLD STRIP.AUTO-MCNC: 205 MG/DL (ref 65–99)
GLUCOSE BLD STRIP.AUTO-MCNC: 205 MG/DL (ref 65–99)
GLUCOSE BLD STRIP.AUTO-MCNC: 206 MG/DL (ref 65–99)
GLUCOSE BLD STRIP.AUTO-MCNC: 206 MG/DL (ref 65–99)
GLUCOSE BLD STRIP.AUTO-MCNC: 207 MG/DL (ref 65–99)
GLUCOSE BLD STRIP.AUTO-MCNC: 208 MG/DL (ref 65–99)
GLUCOSE BLD STRIP.AUTO-MCNC: 208 MG/DL (ref 65–99)
GLUCOSE BLD STRIP.AUTO-MCNC: 210 MG/DL (ref 65–99)
GLUCOSE BLD STRIP.AUTO-MCNC: 211 MG/DL (ref 65–99)
GLUCOSE BLD STRIP.AUTO-MCNC: 212 MG/DL (ref 65–99)
GLUCOSE BLD STRIP.AUTO-MCNC: 213 MG/DL (ref 65–99)
GLUCOSE BLD STRIP.AUTO-MCNC: 214 MG/DL (ref 65–99)
GLUCOSE BLD STRIP.AUTO-MCNC: 215 MG/DL (ref 65–99)
GLUCOSE BLD STRIP.AUTO-MCNC: 215 MG/DL (ref 65–99)
GLUCOSE BLD STRIP.AUTO-MCNC: 219 MG/DL (ref 65–99)
GLUCOSE BLD STRIP.AUTO-MCNC: 220 MG/DL (ref 65–99)
GLUCOSE BLD STRIP.AUTO-MCNC: 220 MG/DL (ref 65–99)
GLUCOSE BLD STRIP.AUTO-MCNC: 221 MG/DL (ref 65–99)
GLUCOSE BLD STRIP.AUTO-MCNC: 221 MG/DL (ref 65–99)
GLUCOSE BLD STRIP.AUTO-MCNC: 222 MG/DL (ref 65–99)
GLUCOSE BLD STRIP.AUTO-MCNC: 224 MG/DL (ref 65–99)
GLUCOSE BLD STRIP.AUTO-MCNC: 225 MG/DL (ref 65–99)
GLUCOSE BLD STRIP.AUTO-MCNC: 227 MG/DL (ref 65–99)
GLUCOSE BLD STRIP.AUTO-MCNC: 228 MG/DL (ref 65–99)
GLUCOSE BLD STRIP.AUTO-MCNC: 231 MG/DL (ref 65–99)
GLUCOSE BLD STRIP.AUTO-MCNC: 232 MG/DL (ref 65–99)
GLUCOSE BLD STRIP.AUTO-MCNC: 235 MG/DL (ref 65–99)
GLUCOSE BLD STRIP.AUTO-MCNC: 236 MG/DL (ref 65–99)
GLUCOSE BLD STRIP.AUTO-MCNC: 237 MG/DL (ref 65–99)
GLUCOSE BLD STRIP.AUTO-MCNC: 240 MG/DL (ref 65–99)
GLUCOSE BLD STRIP.AUTO-MCNC: 240 MG/DL (ref 65–99)
GLUCOSE BLD STRIP.AUTO-MCNC: 244 MG/DL (ref 65–99)
GLUCOSE BLD STRIP.AUTO-MCNC: 248 MG/DL (ref 65–99)
GLUCOSE BLD STRIP.AUTO-MCNC: 249 MG/DL (ref 65–99)
GLUCOSE BLD STRIP.AUTO-MCNC: 252 MG/DL (ref 65–99)
GLUCOSE BLD STRIP.AUTO-MCNC: 253 MG/DL (ref 65–99)
GLUCOSE BLD STRIP.AUTO-MCNC: 253 MG/DL (ref 65–99)
GLUCOSE BLD STRIP.AUTO-MCNC: 257 MG/DL (ref 65–99)
GLUCOSE BLD STRIP.AUTO-MCNC: 259 MG/DL (ref 65–99)
GLUCOSE BLD STRIP.AUTO-MCNC: 260 MG/DL (ref 65–99)
GLUCOSE BLD STRIP.AUTO-MCNC: 262 MG/DL (ref 65–99)
GLUCOSE BLD STRIP.AUTO-MCNC: 264 MG/DL (ref 65–99)
GLUCOSE BLD STRIP.AUTO-MCNC: 264 MG/DL (ref 65–99)
GLUCOSE BLD STRIP.AUTO-MCNC: 266 MG/DL (ref 65–99)
GLUCOSE BLD STRIP.AUTO-MCNC: 269 MG/DL (ref 65–99)
GLUCOSE BLD STRIP.AUTO-MCNC: 270 MG/DL (ref 65–99)
GLUCOSE BLD STRIP.AUTO-MCNC: 272 MG/DL (ref 65–99)
GLUCOSE BLD STRIP.AUTO-MCNC: 276 MG/DL (ref 65–99)
GLUCOSE BLD STRIP.AUTO-MCNC: 284 MG/DL (ref 65–99)
GLUCOSE BLD STRIP.AUTO-MCNC: 284 MG/DL (ref 65–99)
GLUCOSE BLD STRIP.AUTO-MCNC: 319 MG/DL (ref 65–99)
GLUCOSE BLD STRIP.AUTO-MCNC: 365 MG/DL (ref 65–99)
GLUCOSE BLD STRIP.AUTO-MCNC: 40 MG/DL (ref 65–99)
GLUCOSE BLD STRIP.AUTO-MCNC: 42 MG/DL (ref 65–99)
GLUCOSE BLD STRIP.AUTO-MCNC: 57 MG/DL (ref 65–99)
GLUCOSE BLD STRIP.AUTO-MCNC: 61 MG/DL (ref 65–99)
GLUCOSE BLD STRIP.AUTO-MCNC: 69 MG/DL (ref 65–99)
GLUCOSE BLD STRIP.AUTO-MCNC: 71 MG/DL (ref 65–99)
GLUCOSE BLD STRIP.AUTO-MCNC: 72 MG/DL (ref 65–99)
GLUCOSE BLD STRIP.AUTO-MCNC: 72 MG/DL (ref 65–99)
GLUCOSE BLD STRIP.AUTO-MCNC: 74 MG/DL (ref 65–99)
GLUCOSE BLD STRIP.AUTO-MCNC: 76 MG/DL (ref 65–99)
GLUCOSE BLD STRIP.AUTO-MCNC: 77 MG/DL (ref 65–99)
GLUCOSE BLD STRIP.AUTO-MCNC: 80 MG/DL (ref 65–99)
GLUCOSE BLD STRIP.AUTO-MCNC: 80 MG/DL (ref 65–99)
GLUCOSE BLD STRIP.AUTO-MCNC: 83 MG/DL (ref 65–99)
GLUCOSE BLD STRIP.AUTO-MCNC: 83 MG/DL (ref 65–99)
GLUCOSE BLD STRIP.AUTO-MCNC: 89 MG/DL (ref 65–99)
GLUCOSE BLD STRIP.AUTO-MCNC: 90 MG/DL (ref 65–99)
GLUCOSE BLD STRIP.AUTO-MCNC: 92 MG/DL (ref 65–99)
GLUCOSE BLD STRIP.AUTO-MCNC: 92 MG/DL (ref 65–99)
GLUCOSE BLD STRIP.AUTO-MCNC: 94 MG/DL (ref 65–99)
GLUCOSE BLD STRIP.AUTO-MCNC: 95 MG/DL (ref 65–99)
GLUCOSE BLD STRIP.AUTO-MCNC: 96 MG/DL (ref 65–99)
GLUCOSE BLD STRIP.AUTO-MCNC: 97 MG/DL (ref 65–99)
GLUCOSE BLD STRIP.AUTO-MCNC: 98 MG/DL (ref 65–99)
GLUCOSE SERPL-MCNC: 101 MG/DL (ref 65–99)
GLUCOSE SERPL-MCNC: 105 MG/DL (ref 65–99)
GLUCOSE SERPL-MCNC: 112 MG/DL (ref 65–99)
GLUCOSE SERPL-MCNC: 112 MG/DL (ref 65–99)
GLUCOSE SERPL-MCNC: 116 MG/DL (ref 65–99)
GLUCOSE SERPL-MCNC: 119 MG/DL (ref 65–99)
GLUCOSE SERPL-MCNC: 119 MG/DL (ref 65–99)
GLUCOSE SERPL-MCNC: 120 MG/DL (ref 65–99)
GLUCOSE SERPL-MCNC: 121 MG/DL (ref 65–99)
GLUCOSE SERPL-MCNC: 124 MG/DL (ref 65–99)
GLUCOSE SERPL-MCNC: 124 MG/DL (ref 65–99)
GLUCOSE SERPL-MCNC: 125 MG/DL (ref 65–99)
GLUCOSE SERPL-MCNC: 126 MG/DL (ref 65–99)
GLUCOSE SERPL-MCNC: 128 MG/DL (ref 65–99)
GLUCOSE SERPL-MCNC: 130 MG/DL (ref 65–99)
GLUCOSE SERPL-MCNC: 132 MG/DL (ref 65–99)
GLUCOSE SERPL-MCNC: 133 MG/DL (ref 65–99)
GLUCOSE SERPL-MCNC: 137 MG/DL (ref 65–99)
GLUCOSE SERPL-MCNC: 138 MG/DL (ref 65–99)
GLUCOSE SERPL-MCNC: 139 MG/DL (ref 65–99)
GLUCOSE SERPL-MCNC: 145 MG/DL (ref 65–99)
GLUCOSE SERPL-MCNC: 146 MG/DL (ref 65–99)
GLUCOSE SERPL-MCNC: 153 MG/DL (ref 65–99)
GLUCOSE SERPL-MCNC: 158 MG/DL (ref 65–99)
GLUCOSE SERPL-MCNC: 160 MG/DL (ref 65–99)
GLUCOSE SERPL-MCNC: 167 MG/DL (ref 65–99)
GLUCOSE SERPL-MCNC: 168 MG/DL (ref 65–99)
GLUCOSE SERPL-MCNC: 172 MG/DL (ref 65–99)
GLUCOSE SERPL-MCNC: 172 MG/DL (ref 65–99)
GLUCOSE SERPL-MCNC: 174 MG/DL (ref 65–99)
GLUCOSE SERPL-MCNC: 180 MG/DL (ref 65–99)
GLUCOSE SERPL-MCNC: 182 MG/DL (ref 65–99)
GLUCOSE SERPL-MCNC: 190 MG/DL (ref 65–99)
GLUCOSE SERPL-MCNC: 195 MG/DL (ref 65–99)
GLUCOSE SERPL-MCNC: 198 MG/DL (ref 65–99)
GLUCOSE SERPL-MCNC: 198 MG/DL (ref 65–99)
GLUCOSE SERPL-MCNC: 203 MG/DL (ref 65–99)
GLUCOSE SERPL-MCNC: 250 MG/DL (ref 65–99)
GLUCOSE SERPL-MCNC: 42 MG/DL (ref 65–99)
GLUCOSE SERPL-MCNC: 423 MG/DL (ref 65–99)
GLUCOSE SERPL-MCNC: 86 MG/DL (ref 65–99)
GLUCOSE SERPL-MCNC: 87 MG/DL (ref 65–99)
GLUCOSE SERPL-MCNC: 89 MG/DL (ref 65–99)
GLUCOSE SERPL-MCNC: 90 MG/DL (ref 65–99)
GLUCOSE SERPL-MCNC: 99 MG/DL (ref 65–99)
GRAM STN SPEC: ABNORMAL
GRAM STN SPEC: NORMAL
H PYLORI AG STL QL IA: NOT DETECTED
HAV IGM SERPL QL IA: NORMAL
HAV IGM SERPL QL IA: NORMAL
HBA1C MFR BLD: 11.7 % (ref 4–5.6)
HBV CORE IGM SER QL: NORMAL
HBV CORE IGM SER QL: NORMAL
HBV SURFACE AB SERPL IA-ACNC: 171 MIU/ML (ref 0–10)
HBV SURFACE AB SERPL IA-ACNC: 242 MIU/ML (ref 0–10)
HBV SURFACE AG SER QL: NORMAL
HBV SURFACE AG SER QL: NORMAL
HCT VFR BLD AUTO: 22.1 % (ref 37–47)
HCT VFR BLD AUTO: 22.7 % (ref 37–47)
HCT VFR BLD AUTO: 24.5 % (ref 37–47)
HCT VFR BLD AUTO: 25.5 % (ref 37–47)
HCT VFR BLD AUTO: 25.5 % (ref 37–47)
HCT VFR BLD AUTO: 25.8 % (ref 37–47)
HCT VFR BLD AUTO: 25.8 % (ref 37–47)
HCT VFR BLD AUTO: 25.9 % (ref 37–47)
HCT VFR BLD AUTO: 26.1 % (ref 37–47)
HCT VFR BLD AUTO: 26.7 % (ref 37–47)
HCT VFR BLD AUTO: 27.2 % (ref 37–47)
HCT VFR BLD AUTO: 27.3 % (ref 37–47)
HCT VFR BLD AUTO: 28.6 % (ref 37–47)
HCT VFR BLD AUTO: 29.8 % (ref 37–47)
HCT VFR BLD AUTO: 30.4 % (ref 37–47)
HCT VFR BLD AUTO: 32.4 % (ref 37–47)
HCT VFR BLD AUTO: 32.5 % (ref 37–47)
HCT VFR BLD AUTO: 33.5 % (ref 37–47)
HCT VFR BLD AUTO: 35.1 % (ref 37–47)
HCT VFR BLD AUTO: 36.3 % (ref 37–47)
HCT VFR BLD AUTO: 36.9 % (ref 37–47)
HCT VFR BLD AUTO: 38.6 % (ref 37–47)
HCT VFR BLD AUTO: 39 % (ref 37–47)
HCT VFR BLD AUTO: 39.7 % (ref 37–47)
HCT VFR BLD AUTO: 41.4 % (ref 37–47)
HCV AB SER QL: NORMAL
HCV AB SER QL: NORMAL
HEMOCCULT STL QL: POSITIVE
HGB BLD-MCNC: 10.4 G/DL (ref 12–16)
HGB BLD-MCNC: 10.4 G/DL (ref 12–16)
HGB BLD-MCNC: 10.7 G/DL (ref 12–16)
HGB BLD-MCNC: 11.2 G/DL (ref 12–16)
HGB BLD-MCNC: 11.6 G/DL (ref 12–16)
HGB BLD-MCNC: 11.7 G/DL (ref 12–16)
HGB BLD-MCNC: 12.4 G/DL (ref 12–16)
HGB BLD-MCNC: 12.8 G/DL (ref 12–16)
HGB BLD-MCNC: 12.9 G/DL (ref 12–16)
HGB BLD-MCNC: 13 G/DL (ref 12–16)
HGB BLD-MCNC: 7 G/DL (ref 12–16)
HGB BLD-MCNC: 7.2 G/DL (ref 12–16)
HGB BLD-MCNC: 7.7 G/DL (ref 12–16)
HGB BLD-MCNC: 7.9 G/DL (ref 12–16)
HGB BLD-MCNC: 8.1 G/DL (ref 12–16)
HGB BLD-MCNC: 8.2 G/DL (ref 12–16)
HGB BLD-MCNC: 8.3 G/DL (ref 12–16)
HGB BLD-MCNC: 8.4 G/DL (ref 12–16)
HGB BLD-MCNC: 8.5 G/DL (ref 12–16)
HGB BLD-MCNC: 8.7 G/DL (ref 12–16)
HGB BLD-MCNC: 8.7 G/DL (ref 12–16)
HGB BLD-MCNC: 9.3 G/DL (ref 12–16)
HGB BLD-MCNC: 9.5 G/DL (ref 12–16)
HGB BLD-MCNC: 9.7 G/DL (ref 12–16)
HGB RETIC QN AUTO: 32.9 PG/CELL (ref 29–35)
IMM GRANULOCYTES # BLD AUTO: 0.04 K/UL (ref 0–0.11)
IMM GRANULOCYTES # BLD AUTO: 0.05 K/UL (ref 0–0.11)
IMM GRANULOCYTES # BLD AUTO: 0.07 K/UL (ref 0–0.11)
IMM GRANULOCYTES # BLD AUTO: 0.07 K/UL (ref 0–0.11)
IMM GRANULOCYTES # BLD AUTO: 0.08 K/UL (ref 0–0.11)
IMM GRANULOCYTES # BLD AUTO: 0.09 K/UL (ref 0–0.11)
IMM GRANULOCYTES # BLD AUTO: 0.1 K/UL (ref 0–0.11)
IMM GRANULOCYTES # BLD AUTO: 0.15 K/UL (ref 0–0.11)
IMM GRANULOCYTES NFR BLD AUTO: 0.5 % (ref 0–0.9)
IMM GRANULOCYTES NFR BLD AUTO: 0.6 % (ref 0–0.9)
IMM GRANULOCYTES NFR BLD AUTO: 0.6 % (ref 0–0.9)
IMM GRANULOCYTES NFR BLD AUTO: 0.7 % (ref 0–0.9)
IMM GRANULOCYTES NFR BLD AUTO: 0.8 % (ref 0–0.9)
IMM GRANULOCYTES NFR BLD AUTO: 0.9 % (ref 0–0.9)
IMM GRANULOCYTES NFR BLD AUTO: 1 % (ref 0–0.9)
IMM RETICS NFR: 23 % (ref 9.3–17.4)
INR PPP: 1.2 (ref 0.87–1.13)
INR PPP: 1.25 (ref 0.87–1.13)
IRON SATN MFR SERPL: 19 % (ref 15–55)
IRON SERPL-MCNC: 27 UG/DL (ref 40–170)
LACTATE BLD-SCNC: 1.5 MMOL/L (ref 0.5–2)
LACTATE BLD-SCNC: 1.7 MMOL/L (ref 0.5–2)
LACTATE BLD-SCNC: 3.3 MMOL/L (ref 0.5–2)
LACTATE BLD-SCNC: 3.3 MMOL/L (ref 0.5–2)
LV EJECT FRACT MOD 2C 99903: 38.3
LV EJECT FRACT MOD 4C 99902: 6.74
LV EJECT FRACT MOD BP 99901: 24.57
LYMPHOCYTES # BLD AUTO: 0.86 K/UL (ref 1–4.8)
LYMPHOCYTES # BLD AUTO: 1 K/UL (ref 1–4.8)
LYMPHOCYTES # BLD AUTO: 1.15 K/UL (ref 1–4.8)
LYMPHOCYTES # BLD AUTO: 1.24 K/UL (ref 1–4.8)
LYMPHOCYTES # BLD AUTO: 1.25 K/UL (ref 1–4.8)
LYMPHOCYTES # BLD AUTO: 1.28 K/UL (ref 1–4.8)
LYMPHOCYTES # BLD AUTO: 1.33 K/UL (ref 1–4.8)
LYMPHOCYTES # BLD AUTO: 1.34 K/UL (ref 1–4.8)
LYMPHOCYTES # BLD AUTO: 1.35 K/UL (ref 1–4.8)
LYMPHOCYTES # BLD AUTO: 1.39 K/UL (ref 1–4.8)
LYMPHOCYTES # BLD AUTO: 1.42 K/UL (ref 1–4.8)
LYMPHOCYTES # BLD AUTO: 1.64 K/UL (ref 1–4.8)
LYMPHOCYTES NFR BLD: 10 % (ref 22–41)
LYMPHOCYTES NFR BLD: 11.5 % (ref 22–41)
LYMPHOCYTES NFR BLD: 11.9 % (ref 22–41)
LYMPHOCYTES NFR BLD: 12 % (ref 22–41)
LYMPHOCYTES NFR BLD: 13.4 % (ref 22–41)
LYMPHOCYTES NFR BLD: 14.2 % (ref 22–41)
LYMPHOCYTES NFR BLD: 14.4 % (ref 22–41)
LYMPHOCYTES NFR BLD: 15 % (ref 22–41)
LYMPHOCYTES NFR BLD: 15.3 % (ref 22–41)
LYMPHOCYTES NFR BLD: 17.1 % (ref 22–41)
LYMPHOCYTES NFR BLD: 9 % (ref 22–41)
LYMPHOCYTES NFR BLD: 9.9 % (ref 22–41)
M TB IFN-G BLD-IMP: NEGATIVE
M TB IFN-G BLD-IMP: NEGATIVE
M TB IFN-G CD4+ BCKGRND COR BLD-ACNC: -0.01 IU/ML
M TB IFN-G CD4+ BCKGRND COR BLD-ACNC: 0 IU/ML
MACROCYTES BLD QL SMEAR: ABNORMAL
MAGNESIUM SERPL-MCNC: 2.1 MG/DL (ref 1.5–2.5)
MAGNESIUM SERPL-MCNC: 2.2 MG/DL (ref 1.5–2.5)
MAGNESIUM SERPL-MCNC: 2.3 MG/DL (ref 1.5–2.5)
MAGNESIUM SERPL-MCNC: 2.4 MG/DL (ref 1.5–2.5)
MCH RBC QN AUTO: 32.9 PG (ref 27–33)
MCH RBC QN AUTO: 33 PG (ref 27–33)
MCH RBC QN AUTO: 33.2 PG (ref 27–33)
MCH RBC QN AUTO: 33.3 PG (ref 27–33)
MCH RBC QN AUTO: 33.5 PG (ref 27–33)
MCH RBC QN AUTO: 33.7 PG (ref 27–33)
MCH RBC QN AUTO: 33.7 PG (ref 27–33)
MCH RBC QN AUTO: 33.8 PG (ref 27–33)
MCH RBC QN AUTO: 33.9 PG (ref 27–33)
MCH RBC QN AUTO: 33.9 PG (ref 27–33)
MCH RBC QN AUTO: 34.1 PG (ref 27–33)
MCH RBC QN AUTO: 34.8 PG (ref 27–33)
MCH RBC QN AUTO: 35.1 PG (ref 27–33)
MCH RBC QN AUTO: 35.5 PG (ref 27–33)
MCH RBC QN AUTO: 35.5 PG (ref 27–33)
MCHC RBC AUTO-ENTMCNC: 31.4 G/DL (ref 33.6–35)
MCHC RBC AUTO-ENTMCNC: 31.4 G/DL (ref 33.6–35)
MCHC RBC AUTO-ENTMCNC: 31.7 G/DL (ref 33.6–35)
MCHC RBC AUTO-ENTMCNC: 31.7 G/DL (ref 33.6–35)
MCHC RBC AUTO-ENTMCNC: 31.8 G/DL (ref 33.6–35)
MCHC RBC AUTO-ENTMCNC: 31.9 G/DL (ref 33.6–35)
MCHC RBC AUTO-ENTMCNC: 32 G/DL (ref 33.6–35)
MCHC RBC AUTO-ENTMCNC: 32.1 G/DL (ref 33.6–35)
MCHC RBC AUTO-ENTMCNC: 32.2 G/DL (ref 33.6–35)
MCHC RBC AUTO-ENTMCNC: 32.2 G/DL (ref 33.6–35)
MCHC RBC AUTO-ENTMCNC: 32.5 G/DL (ref 33.6–35)
MCHC RBC AUTO-ENTMCNC: 32.5 G/DL (ref 33.6–35)
MCHC RBC AUTO-ENTMCNC: 33.2 G/DL (ref 33.6–35)
MCV RBC AUTO: 102.1 FL (ref 81.4–97.8)
MCV RBC AUTO: 102.9 FL (ref 81.4–97.8)
MCV RBC AUTO: 103.4 FL (ref 81.4–97.8)
MCV RBC AUTO: 103.4 FL (ref 81.4–97.8)
MCV RBC AUTO: 104.5 FL (ref 81.4–97.8)
MCV RBC AUTO: 104.5 FL (ref 81.4–97.8)
MCV RBC AUTO: 104.6 FL (ref 81.4–97.8)
MCV RBC AUTO: 104.8 FL (ref 81.4–97.8)
MCV RBC AUTO: 104.9 FL (ref 81.4–97.8)
MCV RBC AUTO: 106 FL (ref 81.4–97.8)
MCV RBC AUTO: 106 FL (ref 81.4–97.8)
MCV RBC AUTO: 106.2 FL (ref 81.4–97.8)
MCV RBC AUTO: 106.3 FL (ref 81.4–97.8)
MCV RBC AUTO: 108.9 FL (ref 81.4–97.8)
MCV RBC AUTO: 109.2 FL (ref 81.4–97.8)
MCV RBC AUTO: 111.8 FL (ref 81.4–97.8)
MCV RBC AUTO: 112.2 FL (ref 81.4–97.8)
MICROCYTES BLD QL SMEAR: ABNORMAL
MICROCYTES BLD QL SMEAR: ABNORMAL
MITOGEN IGNF BCKGRD COR BLD-ACNC: 1.22 IU/ML
MITOGEN IGNF BCKGRD COR BLD-ACNC: >10 IU/ML
MONOCYTES # BLD AUTO: 0.55 K/UL (ref 0–0.85)
MONOCYTES # BLD AUTO: 0.56 K/UL (ref 0–0.85)
MONOCYTES # BLD AUTO: 0.6 K/UL (ref 0–0.85)
MONOCYTES # BLD AUTO: 0.61 K/UL (ref 0–0.85)
MONOCYTES # BLD AUTO: 0.64 K/UL (ref 0–0.85)
MONOCYTES # BLD AUTO: 0.67 K/UL (ref 0–0.85)
MONOCYTES # BLD AUTO: 0.69 K/UL (ref 0–0.85)
MONOCYTES # BLD AUTO: 0.72 K/UL (ref 0–0.85)
MONOCYTES # BLD AUTO: 0.72 K/UL (ref 0–0.85)
MONOCYTES # BLD AUTO: 0.91 K/UL (ref 0–0.85)
MONOCYTES # BLD AUTO: 0.93 K/UL (ref 0–0.85)
MONOCYTES # BLD AUTO: 1.35 K/UL (ref 0–0.85)
MONOCYTES NFR BLD AUTO: 10.3 % (ref 0–13.4)
MONOCYTES NFR BLD AUTO: 5.4 % (ref 0–13.4)
MONOCYTES NFR BLD AUTO: 5.9 % (ref 0–13.4)
MONOCYTES NFR BLD AUTO: 6.4 % (ref 0–13.4)
MONOCYTES NFR BLD AUTO: 6.9 % (ref 0–13.4)
MONOCYTES NFR BLD AUTO: 7 % (ref 0–13.4)
MONOCYTES NFR BLD AUTO: 7.1 % (ref 0–13.4)
MONOCYTES NFR BLD AUTO: 7.2 % (ref 0–13.4)
MONOCYTES NFR BLD AUTO: 7.4 % (ref 0–13.4)
MONOCYTES NFR BLD AUTO: 7.7 % (ref 0–13.4)
MONOCYTES NFR BLD AUTO: 8 % (ref 0–13.4)
MONOCYTES NFR BLD AUTO: 8.8 % (ref 0–13.4)
MORPHOLOGY BLD-IMP: NORMAL
MYELOPEROXIDASE AB SER-ACNC: 0 AU/ML (ref 0–19)
NEUTROPHILS # BLD AUTO: 10.64 K/UL (ref 2–7.15)
NEUTROPHILS # BLD AUTO: 12.24 K/UL (ref 2–7.15)
NEUTROPHILS # BLD AUTO: 5.2 K/UL (ref 2–7.15)
NEUTROPHILS # BLD AUTO: 5.33 K/UL (ref 2–7.15)
NEUTROPHILS # BLD AUTO: 5.9 K/UL (ref 2–7.15)
NEUTROPHILS # BLD AUTO: 5.98 K/UL (ref 2–7.15)
NEUTROPHILS # BLD AUTO: 6.18 K/UL (ref 2–7.15)
NEUTROPHILS # BLD AUTO: 7.03 K/UL (ref 2–7.15)
NEUTROPHILS # BLD AUTO: 7.1 K/UL (ref 2–7.15)
NEUTROPHILS # BLD AUTO: 7.73 K/UL (ref 2–7.15)
NEUTROPHILS # BLD AUTO: 8.12 K/UL (ref 2–7.15)
NEUTROPHILS # BLD AUTO: 9.19 K/UL (ref 2–7.15)
NEUTROPHILS NFR BLD: 62.6 % (ref 44–72)
NEUTROPHILS NFR BLD: 66.5 % (ref 44–72)
NEUTROPHILS NFR BLD: 71.3 % (ref 44–72)
NEUTROPHILS NFR BLD: 71.7 % (ref 44–72)
NEUTROPHILS NFR BLD: 71.8 % (ref 44–72)
NEUTROPHILS NFR BLD: 72.2 % (ref 44–72)
NEUTROPHILS NFR BLD: 73.9 % (ref 44–72)
NEUTROPHILS NFR BLD: 74.4 % (ref 44–72)
NEUTROPHILS NFR BLD: 77.3 % (ref 44–72)
NEUTROPHILS NFR BLD: 79 % (ref 44–72)
NEUTROPHILS NFR BLD: 79.5 % (ref 44–72)
NEUTROPHILS NFR BLD: 79.6 % (ref 44–72)
NRBC # BLD AUTO: 0 K/UL
NRBC # BLD AUTO: 0.02 K/UL
NRBC BLD-RTO: 0 /100 WBC
NRBC BLD-RTO: 0.2 /100 WBC
NT-PROBNP SERPL IA-MCNC: ABNORMAL PG/ML (ref 0–125)
NUCLEAR IGG SER QL IA: NORMAL
PATHOLOGY CONSULT NOTE: NORMAL
PATHOLOGY CONSULT NOTE: NORMAL
PHOSPHATE SERPL-MCNC: 2.9 MG/DL (ref 2.5–4.5)
PHOSPHATE SERPL-MCNC: 3.5 MG/DL (ref 2.5–4.5)
PHOSPHATE SERPL-MCNC: 3.5 MG/DL (ref 2.5–4.5)
PHOSPHATE SERPL-MCNC: 3.8 MG/DL (ref 2.5–4.5)
PHOSPHATE SERPL-MCNC: 4 MG/DL (ref 2.5–4.5)
PHOSPHATE SERPL-MCNC: 4.2 MG/DL (ref 2.5–4.5)
PHOSPHATE SERPL-MCNC: 4.3 MG/DL (ref 2.5–4.5)
PHOSPHATE SERPL-MCNC: 4.4 MG/DL (ref 2.5–4.5)
PHOSPHATE SERPL-MCNC: 4.6 MG/DL (ref 2.5–4.5)
PHOSPHATE SERPL-MCNC: 4.7 MG/DL (ref 2.5–4.5)
PHOSPHATE SERPL-MCNC: 4.9 MG/DL (ref 2.5–4.5)
PHOSPHATE SERPL-MCNC: 4.9 MG/DL (ref 2.5–4.5)
PHOSPHATE SERPL-MCNC: 5 MG/DL (ref 2.5–4.5)
PHOSPHATE SERPL-MCNC: 5 MG/DL (ref 2.5–4.5)
PHOSPHATE SERPL-MCNC: 5.1 MG/DL (ref 2.5–4.5)
PHOSPHATE SERPL-MCNC: 5.2 MG/DL (ref 2.5–4.5)
PHOSPHATE SERPL-MCNC: 5.5 MG/DL (ref 2.5–4.5)
PHOSPHATE SERPL-MCNC: 5.5 MG/DL (ref 2.5–4.5)
PHOSPHATE SERPL-MCNC: 6.1 MG/DL (ref 2.5–4.5)
PHOSPHATE SERPL-MCNC: 6.4 MG/DL (ref 2.5–4.5)
PHOSPHATE SERPL-MCNC: 7.6 MG/DL (ref 2.5–4.5)
PLATELET # BLD AUTO: 143 K/UL (ref 164–446)
PLATELET # BLD AUTO: 171 K/UL (ref 164–446)
PLATELET # BLD AUTO: 190 K/UL (ref 164–446)
PLATELET # BLD AUTO: 212 K/UL (ref 164–446)
PLATELET # BLD AUTO: 212 K/UL (ref 164–446)
PLATELET # BLD AUTO: 215 K/UL (ref 164–446)
PLATELET # BLD AUTO: 217 K/UL (ref 164–446)
PLATELET # BLD AUTO: 220 K/UL (ref 164–446)
PLATELET # BLD AUTO: 223 K/UL (ref 164–446)
PLATELET # BLD AUTO: 229 K/UL (ref 164–446)
PLATELET # BLD AUTO: 233 K/UL (ref 164–446)
PLATELET # BLD AUTO: 239 K/UL (ref 164–446)
PLATELET # BLD AUTO: 246 K/UL (ref 164–446)
PLATELET # BLD AUTO: 262 K/UL (ref 164–446)
PLATELET # BLD AUTO: 264 K/UL (ref 164–446)
PLATELET # BLD AUTO: 273 K/UL (ref 164–446)
PLATELET # BLD AUTO: 339 K/UL (ref 164–446)
PLATELET BLD QL SMEAR: NORMAL
PMV BLD AUTO: 8.4 FL (ref 9–12.9)
PMV BLD AUTO: 8.5 FL (ref 9–12.9)
PMV BLD AUTO: 8.6 FL (ref 9–12.9)
PMV BLD AUTO: 8.7 FL (ref 9–12.9)
PMV BLD AUTO: 8.8 FL (ref 9–12.9)
PMV BLD AUTO: 8.9 FL (ref 9–12.9)
PMV BLD AUTO: 8.9 FL (ref 9–12.9)
PMV BLD AUTO: 9.1 FL (ref 9–12.9)
PMV BLD AUTO: 9.1 FL (ref 9–12.9)
PMV BLD AUTO: 9.2 FL (ref 9–12.9)
PMV BLD AUTO: 9.7 FL (ref 9–12.9)
POLYCHROMASIA BLD QL SMEAR: NORMAL
POLYCHROMASIA BLD QL SMEAR: NORMAL
POTASSIUM SERPL-SCNC: 4.1 MMOL/L (ref 3.6–5.5)
POTASSIUM SERPL-SCNC: 4.1 MMOL/L (ref 3.6–5.5)
POTASSIUM SERPL-SCNC: 4.2 MMOL/L (ref 3.6–5.5)
POTASSIUM SERPL-SCNC: 4.3 MMOL/L (ref 3.6–5.5)
POTASSIUM SERPL-SCNC: 4.4 MMOL/L (ref 3.6–5.5)
POTASSIUM SERPL-SCNC: 4.5 MMOL/L (ref 3.6–5.5)
POTASSIUM SERPL-SCNC: 4.5 MMOL/L (ref 3.6–5.5)
POTASSIUM SERPL-SCNC: 4.6 MMOL/L (ref 3.6–5.5)
POTASSIUM SERPL-SCNC: 4.7 MMOL/L (ref 3.6–5.5)
POTASSIUM SERPL-SCNC: 4.7 MMOL/L (ref 3.6–5.5)
POTASSIUM SERPL-SCNC: 4.8 MMOL/L (ref 3.6–5.5)
POTASSIUM SERPL-SCNC: 4.9 MMOL/L (ref 3.6–5.5)
POTASSIUM SERPL-SCNC: 5 MMOL/L (ref 3.6–5.5)
POTASSIUM SERPL-SCNC: 5.1 MMOL/L (ref 3.6–5.5)
POTASSIUM SERPL-SCNC: 5.2 MMOL/L (ref 3.6–5.5)
POTASSIUM SERPL-SCNC: 5.3 MMOL/L (ref 3.6–5.5)
POTASSIUM SERPL-SCNC: 5.4 MMOL/L (ref 3.6–5.5)
POTASSIUM SERPL-SCNC: 5.5 MMOL/L (ref 3.6–5.5)
POTASSIUM SERPL-SCNC: 5.6 MMOL/L (ref 3.6–5.5)
POTASSIUM SERPL-SCNC: 5.6 MMOL/L (ref 3.6–5.5)
POTASSIUM SERPL-SCNC: 5.7 MMOL/L (ref 3.6–5.5)
POTASSIUM SERPL-SCNC: 5.7 MMOL/L (ref 3.6–5.5)
POTASSIUM SERPL-SCNC: 5.8 MMOL/L (ref 3.6–5.5)
POTASSIUM SERPL-SCNC: 5.8 MMOL/L (ref 3.6–5.5)
POTASSIUM SERPL-SCNC: 5.9 MMOL/L (ref 3.6–5.5)
POTASSIUM SERPL-SCNC: 6 MMOL/L (ref 3.6–5.5)
POTASSIUM SERPL-SCNC: 6.1 MMOL/L (ref 3.6–5.5)
POTASSIUM SERPL-SCNC: 6.2 MMOL/L (ref 3.6–5.5)
POTASSIUM SERPL-SCNC: 6.6 MMOL/L (ref 3.6–5.5)
POTASSIUM SERPL-SCNC: 6.8 MMOL/L (ref 3.6–5.5)
POTASSIUM SERPL-SCNC: 6.9 MMOL/L (ref 3.6–5.5)
PROT SERPL-MCNC: 5.9 G/DL (ref 6–8.2)
PROT SERPL-MCNC: 6 G/DL (ref 6–8.2)
PROT SERPL-MCNC: 6.3 G/DL (ref 6–8.2)
PROT SERPL-MCNC: 6.4 G/DL (ref 6–8.2)
PROT SERPL-MCNC: 6.5 G/DL (ref 6–8.2)
PROT SERPL-MCNC: 6.5 G/DL (ref 6–8.2)
PROT SERPL-MCNC: 6.6 G/DL (ref 6–8.2)
PROT SERPL-MCNC: 6.6 G/DL (ref 6–8.2)
PROT SERPL-MCNC: 6.7 G/DL (ref 6–8.2)
PROT SERPL-MCNC: 6.7 G/DL (ref 6–8.2)
PROT SERPL-MCNC: 6.8 G/DL (ref 6–8.2)
PROT SERPL-MCNC: 6.8 G/DL (ref 6–8.2)
PROT SERPL-MCNC: 6.9 G/DL (ref 6–8.2)
PROT SERPL-MCNC: 7 G/DL (ref 6–8.2)
PROT SERPL-MCNC: 7.1 G/DL (ref 6–8.2)
PROT SERPL-MCNC: 7.2 G/DL (ref 6–8.2)
PROT SERPL-MCNC: 7.3 G/DL (ref 6–8.2)
PROTEINASE3 AB SER-ACNC: 1 AU/ML (ref 0–19)
PROTHROMBIN TIME: 14.9 SEC (ref 12–14.6)
PROTHROMBIN TIME: 15.6 SEC (ref 12–14.6)
PTH-INTACT SERPL-MCNC: 99.4 PG/ML (ref 14–72)
QFT TB2 - NIL TBQ2: -0.01 IU/ML
QFT TB2 - NIL TBQ2: 0.01 IU/ML
RBC # BLD AUTO: 1.97 M/UL (ref 4.2–5.4)
RBC # BLD AUTO: 2.03 M/UL (ref 4.2–5.4)
RBC # BLD AUTO: 2.25 M/UL (ref 4.2–5.4)
RBC # BLD AUTO: 2.5 M/UL (ref 4.2–5.4)
RBC # BLD AUTO: 2.8 M/UL (ref 4.2–5.4)
RBC # BLD AUTO: 2.81 M/UL (ref 4.2–5.4)
RBC # BLD AUTO: 2.86 M/UL (ref 4.2–5.4)
RBC # BLD AUTO: 3.09 M/UL (ref 4.2–5.4)
RBC # BLD AUTO: 3.1 M/UL (ref 4.2–5.4)
RBC # BLD AUTO: 3.24 M/UL (ref 4.2–5.4)
RBC # BLD AUTO: 3.36 M/UL (ref 4.2–5.4)
RBC # BLD AUTO: 3.47 M/UL (ref 4.2–5.4)
RBC # BLD AUTO: 3.48 M/UL (ref 4.2–5.4)
RBC # BLD AUTO: 3.75 M/UL (ref 4.2–5.4)
RBC # BLD AUTO: 3.77 M/UL (ref 4.2–5.4)
RBC # BLD AUTO: 3.8 M/UL (ref 4.2–5.4)
RBC # BLD AUTO: 3.9 M/UL (ref 4.2–5.4)
RBC BLD AUTO: PRESENT
RETICS # AUTO: 0.09 M/UL (ref 0.04–0.06)
RETICS/RBC NFR: 2.6 % (ref 0.8–2.1)
SARS-COV+SARS-COV-2 AG RESP QL IA.RAPID: NOTDETECTED
SIGNIFICANT IND 70042: ABNORMAL
SIGNIFICANT IND 70042: NORMAL
SITE SITE: ABNORMAL
SITE SITE: NORMAL
SODIUM SERPL-SCNC: 127 MMOL/L (ref 135–145)
SODIUM SERPL-SCNC: 128 MMOL/L (ref 135–145)
SODIUM SERPL-SCNC: 129 MMOL/L (ref 135–145)
SODIUM SERPL-SCNC: 130 MMOL/L (ref 135–145)
SODIUM SERPL-SCNC: 131 MMOL/L (ref 135–145)
SODIUM SERPL-SCNC: 132 MMOL/L (ref 135–145)
SODIUM SERPL-SCNC: 133 MMOL/L (ref 135–145)
SODIUM SERPL-SCNC: 134 MMOL/L (ref 135–145)
SODIUM SERPL-SCNC: 135 MMOL/L (ref 135–145)
SODIUM SERPL-SCNC: 136 MMOL/L (ref 135–145)
SODIUM SERPL-SCNC: 137 MMOL/L (ref 135–145)
SODIUM SERPL-SCNC: 138 MMOL/L (ref 135–145)
SOURCE SOURCE: ABNORMAL
SOURCE SOURCE: NORMAL
SPECIMEN SOURCE: NORMAL
T4 FREE SERPL-MCNC: 0.89 NG/DL (ref 0.93–1.7)
TIBC SERPL-MCNC: 142 UG/DL (ref 250–450)
TROPONIN T SERPL-MCNC: 212 NG/L (ref 6–19)
TROPONIN T SERPL-MCNC: 221 NG/L (ref 6–19)
TROPONIN T SERPL-MCNC: 221 NG/L (ref 6–19)
TROPONIN T SERPL-MCNC: 235 NG/L (ref 6–19)
TROPONIN T SERPL-MCNC: 241 NG/L (ref 6–19)
TROPONIN T SERPL-MCNC: 374 NG/L (ref 6–19)
TROPONIN T SERPL-MCNC: 396 NG/L (ref 6–19)
TSH SERPL DL<=0.005 MIU/L-ACNC: 2.15 UIU/ML (ref 0.38–5.33)
TSH SERPL DL<=0.005 MIU/L-ACNC: 6.82 UIU/ML (ref 0.38–5.33)
UFH PPP CHRO-ACNC: 0.34 IU/ML
UFH PPP CHRO-ACNC: 0.52 IU/ML
UFH PPP CHRO-ACNC: <0.1 IU/ML
UIBC SERPL-MCNC: 115 UG/DL (ref 110–370)
VIT B12 SERPL-MCNC: 1108 PG/ML (ref 211–911)
WBC # BLD AUTO: 10.5 K/UL (ref 4.8–10.8)
WBC # BLD AUTO: 10.7 K/UL (ref 4.8–10.8)
WBC # BLD AUTO: 10.9 K/UL (ref 4.8–10.8)
WBC # BLD AUTO: 11.6 K/UL (ref 4.8–10.8)
WBC # BLD AUTO: 13.4 K/UL (ref 4.8–10.8)
WBC # BLD AUTO: 15.4 K/UL (ref 4.8–10.8)
WBC # BLD AUTO: 7.3 K/UL (ref 4.8–10.8)
WBC # BLD AUTO: 7.5 K/UL (ref 4.8–10.8)
WBC # BLD AUTO: 8.3 K/UL (ref 4.8–10.8)
WBC # BLD AUTO: 8.3 K/UL (ref 4.8–10.8)
WBC # BLD AUTO: 8.5 K/UL (ref 4.8–10.8)
WBC # BLD AUTO: 8.6 K/UL (ref 4.8–10.8)
WBC # BLD AUTO: 8.6 K/UL (ref 4.8–10.8)
WBC # BLD AUTO: 8.9 K/UL (ref 4.8–10.8)
WBC # BLD AUTO: 9.4 K/UL (ref 4.8–10.8)
WBC # BLD AUTO: 9.5 K/UL (ref 4.8–10.8)
WBC # BLD AUTO: 9.5 K/UL (ref 4.8–10.8)

## 2022-01-01 PROCEDURE — 82962 GLUCOSE BLOOD TEST: CPT | Mod: 91

## 2022-01-01 PROCEDURE — 700102 HCHG RX REV CODE 250 W/ 637 OVERRIDE(OP): Performed by: INTERNAL MEDICINE

## 2022-01-01 PROCEDURE — 80069 RENAL FUNCTION PANEL: CPT

## 2022-01-01 PROCEDURE — 700111 HCHG RX REV CODE 636 W/ 250 OVERRIDE (IP): Performed by: STUDENT IN AN ORGANIZED HEALTH CARE EDUCATION/TRAINING PROGRAM

## 2022-01-01 PROCEDURE — 700102 HCHG RX REV CODE 250 W/ 637 OVERRIDE(OP)

## 2022-01-01 PROCEDURE — A9270 NON-COVERED ITEM OR SERVICE: HCPCS

## 2022-01-01 PROCEDURE — A9270 NON-COVERED ITEM OR SERVICE: HCPCS | Performed by: STUDENT IN AN ORGANIZED HEALTH CARE EDUCATION/TRAINING PROGRAM

## 2022-01-01 PROCEDURE — 700102 HCHG RX REV CODE 250 W/ 637 OVERRIDE(OP): Performed by: NURSE PRACTITIONER

## 2022-01-01 PROCEDURE — 36415 COLL VENOUS BLD VENIPUNCTURE: CPT

## 2022-01-01 PROCEDURE — A9270 NON-COVERED ITEM OR SERVICE: HCPCS | Performed by: NURSE PRACTITIONER

## 2022-01-01 PROCEDURE — 700102 HCHG RX REV CODE 250 W/ 637 OVERRIDE(OP): Performed by: STUDENT IN AN ORGANIZED HEALTH CARE EDUCATION/TRAINING PROGRAM

## 2022-01-01 PROCEDURE — 700111 HCHG RX REV CODE 636 W/ 250 OVERRIDE (IP): Performed by: FAMILY MEDICINE

## 2022-01-01 PROCEDURE — 770001 HCHG ROOM/CARE - MED/SURG/GYN PRIV*

## 2022-01-01 PROCEDURE — 80053 COMPREHEN METABOLIC PANEL: CPT

## 2022-01-01 PROCEDURE — A9270 NON-COVERED ITEM OR SERVICE: HCPCS | Performed by: INTERNAL MEDICINE

## 2022-01-01 PROCEDURE — 90935 HEMODIALYSIS ONE EVALUATION: CPT

## 2022-01-01 PROCEDURE — 770020 HCHG ROOM/CARE - TELE (206)

## 2022-01-01 PROCEDURE — 85025 COMPLETE CBC W/AUTO DIFF WBC: CPT

## 2022-01-01 PROCEDURE — 82330 ASSAY OF CALCIUM: CPT

## 2022-01-01 PROCEDURE — 700111 HCHG RX REV CODE 636 W/ 250 OVERRIDE (IP): Performed by: INTERNAL MEDICINE

## 2022-01-01 PROCEDURE — 770006 HCHG ROOM/CARE - MED/SURG/GYN SEMI*

## 2022-01-01 PROCEDURE — 700101 HCHG RX REV CODE 250: Performed by: INTERNAL MEDICINE

## 2022-01-01 PROCEDURE — 90832 PSYTX W PT 30 MINUTES: CPT | Performed by: SOCIAL WORKER

## 2022-01-01 PROCEDURE — 97530 THERAPEUTIC ACTIVITIES: CPT

## 2022-01-01 PROCEDURE — 99231 SBSQ HOSP IP/OBS SF/LOW 25: CPT | Mod: GC | Performed by: FAMILY MEDICINE

## 2022-01-01 PROCEDURE — 700101 HCHG RX REV CODE 250

## 2022-01-01 PROCEDURE — 84100 ASSAY OF PHOSPHORUS: CPT

## 2022-01-01 PROCEDURE — 99232 SBSQ HOSP IP/OBS MODERATE 35: CPT | Performed by: INTERNAL MEDICINE

## 2022-01-01 PROCEDURE — 86480 TB TEST CELL IMMUN MEASURE: CPT

## 2022-01-01 PROCEDURE — 93005 ELECTROCARDIOGRAM TRACING: CPT | Performed by: INTERNAL MEDICINE

## 2022-01-01 PROCEDURE — 97602 WOUND(S) CARE NON-SELECTIVE: CPT

## 2022-01-01 PROCEDURE — 83605 ASSAY OF LACTIC ACID: CPT

## 2022-01-01 PROCEDURE — 99285 EMERGENCY DEPT VISIT HI MDM: CPT

## 2022-01-01 PROCEDURE — 83036 HEMOGLOBIN GLYCOSYLATED A1C: CPT

## 2022-01-01 PROCEDURE — 700111 HCHG RX REV CODE 636 W/ 250 OVERRIDE (IP)

## 2022-01-01 PROCEDURE — 83735 ASSAY OF MAGNESIUM: CPT

## 2022-01-01 PROCEDURE — 97535 SELF CARE MNGMENT TRAINING: CPT | Mod: CO

## 2022-01-01 PROCEDURE — S9126 HOSPICE CARE, IN THE HOME, P: HCPCS

## 2022-01-01 PROCEDURE — 80048 BASIC METABOLIC PNL TOTAL CA: CPT

## 2022-01-01 PROCEDURE — 80074 ACUTE HEPATITIS PANEL: CPT

## 2022-01-01 PROCEDURE — 83605 ASSAY OF LACTIC ACID: CPT | Mod: 91

## 2022-01-01 PROCEDURE — 92526 ORAL FUNCTION THERAPY: CPT

## 2022-01-01 PROCEDURE — 99232 SBSQ HOSP IP/OBS MODERATE 35: CPT | Mod: 25 | Performed by: STUDENT IN AN ORGANIZED HEALTH CARE EDUCATION/TRAINING PROGRAM

## 2022-01-01 PROCEDURE — 93010 ELECTROCARDIOGRAM REPORT: CPT | Performed by: INTERNAL MEDICINE

## 2022-01-01 PROCEDURE — 700101 HCHG RX REV CODE 250: Performed by: FAMILY MEDICINE

## 2022-01-01 PROCEDURE — 85018 HEMOGLOBIN: CPT | Mod: 91

## 2022-01-01 PROCEDURE — 87426 SARSCOV CORONAVIRUS AG IA: CPT

## 2022-01-01 PROCEDURE — 97530 THERAPEUTIC ACTIVITIES: CPT | Mod: CO

## 2022-01-01 PROCEDURE — 97530 THERAPEUTIC ACTIVITIES: CPT | Mod: CQ

## 2022-01-01 PROCEDURE — 99232 SBSQ HOSP IP/OBS MODERATE 35: CPT | Mod: GC | Performed by: STUDENT IN AN ORGANIZED HEALTH CARE EDUCATION/TRAINING PROGRAM

## 2022-01-01 PROCEDURE — 82962 GLUCOSE BLOOD TEST: CPT

## 2022-01-01 PROCEDURE — 73130 X-RAY EXAM OF HAND: CPT | Mod: LT

## 2022-01-01 PROCEDURE — 5A1D70Z PERFORMANCE OF URINARY FILTRATION, INTERMITTENT, LESS THAN 6 HOURS PER DAY: ICD-10-PCS | Performed by: INTERNAL MEDICINE

## 2022-01-01 PROCEDURE — 85027 COMPLETE CBC AUTOMATED: CPT

## 2022-01-01 PROCEDURE — 99231 SBSQ HOSP IP/OBS SF/LOW 25: CPT | Performed by: STUDENT IN AN ORGANIZED HEALTH CARE EDUCATION/TRAINING PROGRAM

## 2022-01-01 PROCEDURE — 99498 ADVNCD CARE PLAN ADDL 30 MIN: CPT | Performed by: NURSE PRACTITIONER

## 2022-01-01 PROCEDURE — 307059 PAD,EAR PROTECTOR: Performed by: FAMILY MEDICINE

## 2022-01-01 PROCEDURE — 93010 ELECTROCARDIOGRAM REPORT: CPT | Mod: 76 | Performed by: INTERNAL MEDICINE

## 2022-01-01 PROCEDURE — 87186 SC STD MICRODIL/AGAR DIL: CPT

## 2022-01-01 PROCEDURE — 99232 SBSQ HOSP IP/OBS MODERATE 35: CPT | Mod: GC | Performed by: FAMILY MEDICINE

## 2022-01-01 PROCEDURE — 87077 CULTURE AEROBIC IDENTIFY: CPT

## 2022-01-01 PROCEDURE — 82306 VITAMIN D 25 HYDROXY: CPT

## 2022-01-01 PROCEDURE — 83540 ASSAY OF IRON: CPT

## 2022-01-01 PROCEDURE — 99497 ADVNCD CARE PLAN 30 MIN: CPT | Performed by: NURSE PRACTITIONER

## 2022-01-01 PROCEDURE — C9113 INJ PANTOPRAZOLE SODIUM, VIA: HCPCS

## 2022-01-01 PROCEDURE — 99221 1ST HOSP IP/OBS SF/LOW 40: CPT | Mod: GC | Performed by: FAMILY MEDICINE

## 2022-01-01 PROCEDURE — 99233 SBSQ HOSP IP/OBS HIGH 50: CPT | Performed by: INTERNAL MEDICINE

## 2022-01-01 PROCEDURE — 700111 HCHG RX REV CODE 636 W/ 250 OVERRIDE (IP): Performed by: EMERGENCY MEDICINE

## 2022-01-01 PROCEDURE — 84439 ASSAY OF FREE THYROXINE: CPT

## 2022-01-01 PROCEDURE — 700102 HCHG RX REV CODE 250 W/ 637 OVERRIDE(OP): Performed by: EMERGENCY MEDICINE

## 2022-01-01 PROCEDURE — G0299 HHS/HOSPICE OF RN EA 15 MIN: HCPCS

## 2022-01-01 PROCEDURE — 99231 SBSQ HOSP IP/OBS SF/LOW 25: CPT | Performed by: INTERNAL MEDICINE

## 2022-01-01 PROCEDURE — 87147 CULTURE TYPE IMMUNOLOGIC: CPT

## 2022-01-01 PROCEDURE — 0054A HCHG COVID-19 30MCG/0.3ML TRIS-SUC BOOSTER: CPT

## 2022-01-01 PROCEDURE — 88305 TISSUE EXAM BY PATHOLOGIST: CPT

## 2022-01-01 PROCEDURE — 85018 HEMOGLOBIN: CPT

## 2022-01-01 PROCEDURE — 700111 HCHG RX REV CODE 636 W/ 250 OVERRIDE (IP): Performed by: NURSE PRACTITIONER

## 2022-01-01 PROCEDURE — 85379 FIBRIN DEGRADATION QUANT: CPT

## 2022-01-01 PROCEDURE — 93306 TTE W/DOPPLER COMPLETE: CPT

## 2022-01-01 PROCEDURE — 97535 SELF CARE MNGMENT TRAINING: CPT

## 2022-01-01 PROCEDURE — 93306 TTE W/DOPPLER COMPLETE: CPT | Mod: 26 | Performed by: STUDENT IN AN ORGANIZED HEALTH CARE EDUCATION/TRAINING PROGRAM

## 2022-01-01 PROCEDURE — 83516 IMMUNOASSAY NONANTIBODY: CPT | Mod: 91

## 2022-01-01 PROCEDURE — 665036 HSPC NOTICE OF ELECTION NOE

## 2022-01-01 PROCEDURE — 99221 1ST HOSP IP/OBS SF/LOW 40: CPT | Mod: 25 | Performed by: NURSE PRACTITIONER

## 2022-01-01 PROCEDURE — 99232 SBSQ HOSP IP/OBS MODERATE 35: CPT | Performed by: STUDENT IN AN ORGANIZED HEALTH CARE EDUCATION/TRAINING PROGRAM

## 2022-01-01 PROCEDURE — 93005 ELECTROCARDIOGRAM TRACING: CPT

## 2022-01-01 PROCEDURE — 91300 HCHG RX REV CODE 636 W/ 250 OVERRIDE (IP): CPT | Performed by: FAMILY MEDICINE

## 2022-01-01 PROCEDURE — 700101 HCHG RX REV CODE 250: Performed by: STUDENT IN AN ORGANIZED HEALTH CARE EDUCATION/TRAINING PROGRAM

## 2022-01-01 PROCEDURE — 87205 SMEAR GRAM STAIN: CPT

## 2022-01-01 PROCEDURE — 85046 RETICYTE/HGB CONCENTRATE: CPT

## 2022-01-01 PROCEDURE — 96372 THER/PROPH/DIAG INJ SC/IM: CPT

## 2022-01-01 PROCEDURE — 71045 X-RAY EXAM CHEST 1 VIEW: CPT

## 2022-01-01 PROCEDURE — 97162 PT EVAL MOD COMPLEX 30 MIN: CPT

## 2022-01-01 PROCEDURE — RXMED WILLOW AMBULATORY MEDICATION CHARGE: Performed by: STUDENT IN AN ORGANIZED HEALTH CARE EDUCATION/TRAINING PROGRAM

## 2022-01-01 PROCEDURE — 94760 N-INVAS EAR/PLS OXIMETRY 1: CPT

## 2022-01-01 PROCEDURE — 99221 1ST HOSP IP/OBS SF/LOW 40: CPT | Performed by: SURGERY

## 2022-01-01 PROCEDURE — 88312 SPECIAL STAINS GROUP 1: CPT

## 2022-01-01 PROCEDURE — 85014 HEMATOCRIT: CPT

## 2022-01-01 PROCEDURE — 83880 ASSAY OF NATRIURETIC PEPTIDE: CPT

## 2022-01-01 PROCEDURE — 91305 HCHG RX REV CODE 636 W/ 250 OVERRIDE (IP): CPT | Performed by: FAMILY MEDICINE

## 2022-01-01 PROCEDURE — 84484 ASSAY OF TROPONIN QUANT: CPT

## 2022-01-01 PROCEDURE — 93005 ELECTROCARDIOGRAM TRACING: CPT | Performed by: EMERGENCY MEDICINE

## 2022-01-01 PROCEDURE — C9113 INJ PANTOPRAZOLE SODIUM, VIA: HCPCS | Performed by: STUDENT IN AN ORGANIZED HEALTH CARE EDUCATION/TRAINING PROGRAM

## 2022-01-01 PROCEDURE — 83550 IRON BINDING TEST: CPT

## 2022-01-01 PROCEDURE — 86140 C-REACTIVE PROTEIN: CPT

## 2022-01-01 PROCEDURE — 87338 HPYLORI STOOL AG IA: CPT

## 2022-01-01 PROCEDURE — G0155 HHCP-SVS OF CSW,EA 15 MIN: HCPCS

## 2022-01-01 PROCEDURE — 86706 HEP B SURFACE ANTIBODY: CPT

## 2022-01-01 PROCEDURE — 700102 HCHG RX REV CODE 250 W/ 637 OVERRIDE(OP): Performed by: FAMILY MEDICINE

## 2022-01-01 PROCEDURE — 82010 KETONE BODYS QUAN: CPT

## 2022-01-01 PROCEDURE — 87070 CULTURE OTHR SPECIMN AEROBIC: CPT

## 2022-01-01 PROCEDURE — 73200 CT UPPER EXTREMITY W/O DYE: CPT | Mod: LT,MC

## 2022-01-01 PROCEDURE — 99497 ADVNCD CARE PLAN 30 MIN: CPT | Performed by: STUDENT IN AN ORGANIZED HEALTH CARE EDUCATION/TRAINING PROGRAM

## 2022-01-01 PROCEDURE — 96374 THER/PROPH/DIAG INJ IV PUSH: CPT

## 2022-01-01 PROCEDURE — 11104 PUNCH BX SKIN SINGLE LESION: CPT | Performed by: STUDENT IN AN ORGANIZED HEALTH CARE EDUCATION/TRAINING PROGRAM

## 2022-01-01 PROCEDURE — 82728 ASSAY OF FERRITIN: CPT

## 2022-01-01 PROCEDURE — 83970 ASSAY OF PARATHORMONE: CPT

## 2022-01-01 PROCEDURE — 84443 ASSAY THYROID STIM HORMONE: CPT

## 2022-01-01 PROCEDURE — 99233 SBSQ HOSP IP/OBS HIGH 50: CPT

## 2022-01-01 PROCEDURE — 99223 1ST HOSP IP/OBS HIGH 75: CPT | Mod: FS | Performed by: INTERNAL MEDICINE

## 2022-01-01 PROCEDURE — 73200 CT UPPER EXTREMITY W/O DYE: CPT | Mod: LT,ME

## 2022-01-01 PROCEDURE — 97110 THERAPEUTIC EXERCISES: CPT

## 2022-01-01 PROCEDURE — 85520 HEPARIN ASSAY: CPT

## 2022-01-01 PROCEDURE — 302015 SILVER POWDER: Performed by: INTERNAL MEDICINE

## 2022-01-01 PROCEDURE — 73206 CT ANGIO UPR EXTRM W/O&W/DYE: CPT | Mod: LT,MG

## 2022-01-01 PROCEDURE — 93922 UPR/L XTREMITY ART 2 LEVELS: CPT

## 2022-01-01 PROCEDURE — 93931 UPPER EXTREMITY STUDY: CPT | Mod: LT

## 2022-01-01 PROCEDURE — 96376 TX/PRO/DX INJ SAME DRUG ADON: CPT

## 2022-01-01 PROCEDURE — 86038 ANTINUCLEAR ANTIBODIES: CPT

## 2022-01-01 PROCEDURE — 82607 VITAMIN B-12: CPT

## 2022-01-01 PROCEDURE — 93925 LOWER EXTREMITY STUDY: CPT

## 2022-01-01 PROCEDURE — 97166 OT EVAL MOD COMPLEX 45 MIN: CPT

## 2022-01-01 PROCEDURE — 93971 EXTREMITY STUDY: CPT | Mod: LT

## 2022-01-01 PROCEDURE — 90837 PSYTX W PT 60 MINUTES: CPT | Performed by: SOCIAL WORKER

## 2022-01-01 PROCEDURE — 85730 THROMBOPLASTIN TIME PARTIAL: CPT

## 2022-01-01 PROCEDURE — 0HBGXZX EXCISION OF LEFT HAND SKIN, EXTERNAL APPROACH, DIAGNOSTIC: ICD-10-PCS | Performed by: STUDENT IN AN ORGANIZED HEALTH CARE EDUCATION/TRAINING PROGRAM

## 2022-01-01 PROCEDURE — 87040 BLOOD CULTURE FOR BACTERIA: CPT

## 2022-01-01 PROCEDURE — 82272 OCCULT BLD FECES 1-3 TESTS: CPT

## 2022-01-01 PROCEDURE — 96375 TX/PRO/DX INJ NEW DRUG ADDON: CPT

## 2022-01-01 PROCEDURE — 93005 ELECTROCARDIOGRAM TRACING: CPT | Performed by: NURSE PRACTITIONER

## 2022-01-01 PROCEDURE — 700105 HCHG RX REV CODE 258

## 2022-01-01 PROCEDURE — 85610 PROTHROMBIN TIME: CPT

## 2022-01-01 PROCEDURE — 99239 HOSP IP/OBS DSCHRG MGMT >30: CPT | Performed by: STUDENT IN AN ORGANIZED HEALTH CARE EDUCATION/TRAINING PROGRAM

## 2022-01-01 PROCEDURE — 99233 SBSQ HOSP IP/OBS HIGH 50: CPT | Mod: FS | Performed by: INTERNAL MEDICINE

## 2022-01-01 PROCEDURE — 700117 HCHG RX CONTRAST REV CODE 255

## 2022-01-01 PROCEDURE — 92610 EVALUATE SWALLOWING FUNCTION: CPT

## 2022-01-01 PROCEDURE — 93005 ELECTROCARDIOGRAM TRACING: CPT | Performed by: STUDENT IN AN ORGANIZED HEALTH CARE EDUCATION/TRAINING PROGRAM

## 2022-01-01 PROCEDURE — 700105 HCHG RX REV CODE 258: Performed by: STUDENT IN AN ORGANIZED HEALTH CARE EDUCATION/TRAINING PROGRAM

## 2022-01-01 RX ORDER — OXYCODONE HCL 20 MG/ML
10 CONCENTRATE, ORAL ORAL
Qty: 240 ML | Refills: 0 | Status: SHIPPED | OUTPATIENT
Start: 2022-01-01 | End: 2023-08-30

## 2022-01-01 RX ORDER — BACITRACIN ZINC AND POLYMYXIN B SULFATE 500; 1000 [USP'U]/G; [USP'U]/G
OINTMENT TOPICAL 2 TIMES DAILY
Status: DISCONTINUED | OUTPATIENT
Start: 2022-01-01 | End: 2022-01-01 | Stop reason: ALTCHOICE

## 2022-01-01 RX ORDER — OXYCODONE HYDROCHLORIDE 5 MG/1
2.5 TABLET ORAL
Status: CANCELLED | OUTPATIENT
Start: 2022-01-01

## 2022-01-01 RX ORDER — HYDROMORPHONE HYDROCHLORIDE 1 MG/ML
0.25 INJECTION, SOLUTION INTRAMUSCULAR; INTRAVENOUS; SUBCUTANEOUS ONCE
Status: COMPLETED | OUTPATIENT
Start: 2022-01-01 | End: 2022-01-01

## 2022-01-01 RX ORDER — OXYCODONE HCL 20 MG/ML
2.5 CONCENTRATE, ORAL ORAL
Status: DISCONTINUED | OUTPATIENT
Start: 2022-01-01 | End: 2022-01-01

## 2022-01-01 RX ORDER — SODIUM CHLORIDE 9 MG/ML
500 INJECTION, SOLUTION INTRAVENOUS ONCE
Status: COMPLETED | OUTPATIENT
Start: 2022-01-01 | End: 2022-01-01

## 2022-01-01 RX ORDER — ASPIRIN 325 MG
325 TABLET ORAL DAILY
Status: DISCONTINUED | OUTPATIENT
Start: 2022-01-01 | End: 2022-01-01

## 2022-01-01 RX ORDER — OXYCODONE HYDROCHLORIDE 5 MG/1
2.5 TABLET ORAL ONCE
Status: COMPLETED | OUTPATIENT
Start: 2022-01-01 | End: 2022-01-01

## 2022-01-01 RX ORDER — DEXTROSE MONOHYDRATE 25 G/50ML
25 INJECTION, SOLUTION INTRAVENOUS
Status: DISCONTINUED | OUTPATIENT
Start: 2022-01-01 | End: 2022-01-01

## 2022-01-01 RX ORDER — ONDANSETRON 2 MG/ML
4 INJECTION INTRAMUSCULAR; INTRAVENOUS EVERY 6 HOURS PRN
Status: DISCONTINUED | OUTPATIENT
Start: 2022-01-01 | End: 2022-01-01

## 2022-01-01 RX ORDER — GABAPENTIN 100 MG/1
100 CAPSULE ORAL DAILY
Status: DISCONTINUED | OUTPATIENT
Start: 2022-01-01 | End: 2022-01-01

## 2022-01-01 RX ORDER — HYDROCODONE BITARTRATE AND ACETAMINOPHEN 5; 325 MG/1; MG/1
1 TABLET ORAL EVERY 6 HOURS PRN
Status: DISCONTINUED | OUTPATIENT
Start: 2022-01-01 | End: 2022-01-01

## 2022-01-01 RX ORDER — ACETAMINOPHEN 325 MG/1
650 TABLET ORAL EVERY 6 HOURS PRN
Status: DISCONTINUED | OUTPATIENT
Start: 2022-01-01 | End: 2022-01-01

## 2022-01-01 RX ORDER — HYDROMORPHONE HYDROCHLORIDE 1 MG/ML
0.25 INJECTION, SOLUTION INTRAMUSCULAR; INTRAVENOUS; SUBCUTANEOUS
Status: DISCONTINUED | OUTPATIENT
Start: 2022-01-01 | End: 2022-01-01

## 2022-01-01 RX ORDER — LEVOTHYROXINE SODIUM 0.03 MG/1
25 TABLET ORAL
Status: DISCONTINUED | OUTPATIENT
Start: 2022-01-01 | End: 2022-01-01 | Stop reason: HOSPADM

## 2022-01-01 RX ORDER — ROPINIROLE 0.25 MG/1
0.25 TABLET, FILM COATED ORAL DAILY
Qty: 90 TABLET | Refills: 0
Start: 2022-01-01 | End: 2022-01-01

## 2022-01-01 RX ORDER — NIFEDIPINE 30 MG/1
30 TABLET, EXTENDED RELEASE ORAL
Status: DISCONTINUED | OUTPATIENT
Start: 2022-01-01 | End: 2022-01-01

## 2022-01-01 RX ORDER — ATORVASTATIN CALCIUM 20 MG/1
20 TABLET, FILM COATED ORAL DAILY
Status: DISCONTINUED | OUTPATIENT
Start: 2022-01-01 | End: 2022-01-01

## 2022-01-01 RX ORDER — METOPROLOL SUCCINATE 25 MG/1
12.5 TABLET, EXTENDED RELEASE ORAL
Status: DISCONTINUED | OUTPATIENT
Start: 2022-01-01 | End: 2022-01-01

## 2022-01-01 RX ORDER — MORPHINE SULFATE 4 MG/ML
1-2 INJECTION INTRAVENOUS
Status: DISCONTINUED | OUTPATIENT
Start: 2022-01-01 | End: 2022-01-01

## 2022-01-01 RX ORDER — HYDROMORPHONE HYDROCHLORIDE 1 MG/ML
0.5 INJECTION, SOLUTION INTRAMUSCULAR; INTRAVENOUS; SUBCUTANEOUS
Status: DISCONTINUED | OUTPATIENT
Start: 2022-01-01 | End: 2022-01-01

## 2022-01-01 RX ORDER — HEPARIN SODIUM 1000 [USP'U]/ML
60 INJECTION, SOLUTION INTRAVENOUS; SUBCUTANEOUS ONCE
Status: COMPLETED | OUTPATIENT
Start: 2022-01-01 | End: 2022-01-01

## 2022-01-01 RX ORDER — MIDODRINE HYDROCHLORIDE 5 MG/1
5 TABLET ORAL
Status: DISCONTINUED | OUTPATIENT
Start: 2022-01-01 | End: 2022-01-01

## 2022-01-01 RX ORDER — CHOLECALCIFEROL (VITAMIN D3) 125 MCG
5 CAPSULE ORAL NIGHTLY PRN
Status: DISCONTINUED | OUTPATIENT
Start: 2022-01-01 | End: 2022-01-01 | Stop reason: HOSPADM

## 2022-01-01 RX ORDER — HYDROMORPHONE HYDROCHLORIDE 1 MG/ML
1 INJECTION, SOLUTION INTRAMUSCULAR; INTRAVENOUS; SUBCUTANEOUS
Status: DISCONTINUED | OUTPATIENT
Start: 2022-01-01 | End: 2022-01-01

## 2022-01-01 RX ORDER — ACETAMINOPHEN 325 MG/1
650 TABLET ORAL EVERY 6 HOURS
Status: DISCONTINUED | OUTPATIENT
Start: 2022-01-01 | End: 2022-01-01

## 2022-01-01 RX ORDER — INSULIN LISPRO 100 [IU]/ML
1-6 INJECTION, SOLUTION INTRAVENOUS; SUBCUTANEOUS
Status: DISCONTINUED | OUTPATIENT
Start: 2022-01-01 | End: 2022-01-01

## 2022-01-01 RX ORDER — ONDANSETRON 4 MG/1
4 TABLET, ORALLY DISINTEGRATING ORAL EVERY 4 HOURS PRN
Status: DISCONTINUED | OUTPATIENT
Start: 2022-01-01 | End: 2022-01-01 | Stop reason: HOSPADM

## 2022-01-01 RX ORDER — OXYCODONE HYDROCHLORIDE 5 MG/1
2.5 TABLET ORAL EVERY 4 HOURS PRN
Status: DISCONTINUED | OUTPATIENT
Start: 2022-01-01 | End: 2022-01-01

## 2022-01-01 RX ORDER — OXYCODONE HCL 20 MG/ML
10 CONCENTRATE, ORAL ORAL
Status: DISCONTINUED | OUTPATIENT
Start: 2022-01-01 | End: 2022-01-01 | Stop reason: HOSPADM

## 2022-01-01 RX ORDER — LIDOCAINE 50 MG/G
2 PATCH TOPICAL EVERY 24 HOURS
Status: DISCONTINUED | OUTPATIENT
Start: 2022-01-01 | End: 2022-01-01 | Stop reason: HOSPADM

## 2022-01-01 RX ORDER — HEPARIN SODIUM 1000 [USP'U]/ML
1300 INJECTION, SOLUTION INTRAVENOUS; SUBCUTANEOUS PRN
Status: DISCONTINUED | OUTPATIENT
Start: 2022-01-01 | End: 2022-01-01

## 2022-01-01 RX ORDER — MORPHINE SULFATE 4 MG/ML
1 INJECTION INTRAVENOUS
Status: DISCONTINUED | OUTPATIENT
Start: 2022-01-01 | End: 2022-01-01

## 2022-01-01 RX ORDER — ACETAMINOPHEN 500 MG
1000 TABLET ORAL EVERY 6 HOURS
Status: DISCONTINUED | OUTPATIENT
Start: 2022-01-01 | End: 2022-01-01

## 2022-01-01 RX ORDER — LORAZEPAM 2 MG/ML
0.5 CONCENTRATE ORAL
Status: DISCONTINUED | OUTPATIENT
Start: 2022-01-01 | End: 2022-01-01 | Stop reason: HOSPADM

## 2022-01-01 RX ORDER — HEPARIN SODIUM 1000 [USP'U]/ML
30 INJECTION, SOLUTION INTRAVENOUS; SUBCUTANEOUS PRN
Status: DISCONTINUED | OUTPATIENT
Start: 2022-01-01 | End: 2022-01-01

## 2022-01-01 RX ORDER — AMOXICILLIN 250 MG
2 CAPSULE ORAL 2 TIMES DAILY
Status: DISCONTINUED | OUTPATIENT
Start: 2022-01-01 | End: 2022-01-01 | Stop reason: HOSPADM

## 2022-01-01 RX ORDER — GABAPENTIN 100 MG/1
200 CAPSULE ORAL ONCE
Status: COMPLETED | OUTPATIENT
Start: 2022-01-01 | End: 2022-01-01

## 2022-01-01 RX ORDER — SEVELAMER CARBONATE 800 MG/1
800 TABLET, FILM COATED ORAL
Status: DISCONTINUED | OUTPATIENT
Start: 2022-01-01 | End: 2022-01-01

## 2022-01-01 RX ORDER — AMOXICILLIN 250 MG
2 CAPSULE ORAL 2 TIMES DAILY
Status: DISCONTINUED | OUTPATIENT
Start: 2022-01-01 | End: 2022-01-01

## 2022-01-01 RX ORDER — NIFEDIPINE 30 MG/1
60 TABLET, EXTENDED RELEASE ORAL
Status: DISCONTINUED | OUTPATIENT
Start: 2022-01-01 | End: 2022-01-01

## 2022-01-01 RX ORDER — POLYETHYLENE GLYCOL 3350 17 G/17G
1 POWDER, FOR SOLUTION ORAL DAILY
Status: DISCONTINUED | OUTPATIENT
Start: 2022-01-01 | End: 2022-01-01 | Stop reason: HOSPADM

## 2022-01-01 RX ORDER — OXYCODONE HCL 20 MG/ML
5 CONCENTRATE, ORAL ORAL
Status: DISCONTINUED | OUTPATIENT
Start: 2022-01-01 | End: 2022-01-01 | Stop reason: HOSPADM

## 2022-01-01 RX ORDER — SEVELAMER CARBONATE 800 MG/1
1600 TABLET, FILM COATED ORAL
Status: DISCONTINUED | OUTPATIENT
Start: 2022-01-01 | End: 2022-01-01

## 2022-01-01 RX ORDER — BISACODYL 10 MG
10 SUPPOSITORY, RECTAL RECTAL
Status: DISCONTINUED | OUTPATIENT
Start: 2022-01-01 | End: 2022-01-01 | Stop reason: HOSPADM

## 2022-01-01 RX ORDER — BISACODYL 10 MG
10 SUPPOSITORY, RECTAL RECTAL
Status: DISCONTINUED | OUTPATIENT
Start: 2022-01-01 | End: 2022-01-01

## 2022-01-01 RX ORDER — ACETAMINOPHEN 500 MG
1000 TABLET ORAL EVERY 6 HOURS PRN
Status: DISCONTINUED | OUTPATIENT
Start: 2022-01-01 | End: 2022-01-01 | Stop reason: HOSPADM

## 2022-01-01 RX ORDER — HYDROMORPHONE HYDROCHLORIDE 1 MG/ML
0.5 INJECTION, SOLUTION INTRAMUSCULAR; INTRAVENOUS; SUBCUTANEOUS ONCE
Status: DISCONTINUED | OUTPATIENT
Start: 2022-01-01 | End: 2022-01-01

## 2022-01-01 RX ORDER — HALOPERIDOL 2 MG/ML
2 SOLUTION ORAL EVERY 6 HOURS PRN
Qty: 360 ML | Refills: 0 | Status: SHIPPED | OUTPATIENT
Start: 2022-01-01 | End: 2023-08-30

## 2022-01-01 RX ORDER — PANTOPRAZOLE SODIUM 40 MG/10ML
40 INJECTION, POWDER, LYOPHILIZED, FOR SOLUTION INTRAVENOUS DAILY
Status: DISCONTINUED | OUTPATIENT
Start: 2022-01-01 | End: 2022-01-01

## 2022-01-01 RX ORDER — DIAZEPAM 5 MG/ML
2.5 INJECTION, SOLUTION INTRAMUSCULAR; INTRAVENOUS
Status: DISCONTINUED | OUTPATIENT
Start: 2022-01-01 | End: 2022-01-01 | Stop reason: HOSPADM

## 2022-01-01 RX ORDER — OMEPRAZOLE 20 MG/1
20 CAPSULE, DELAYED RELEASE ORAL 2 TIMES DAILY
Status: DISCONTINUED | OUTPATIENT
Start: 2022-01-01 | End: 2022-01-01

## 2022-01-01 RX ORDER — HYDROMORPHONE HYDROCHLORIDE 1 MG/ML
0.5 INJECTION, SOLUTION INTRAMUSCULAR; INTRAVENOUS; SUBCUTANEOUS
Status: DISCONTINUED | OUTPATIENT
Start: 2022-01-01 | End: 2022-01-01 | Stop reason: HOSPADM

## 2022-01-01 RX ORDER — ONDANSETRON 2 MG/ML
4 INJECTION INTRAMUSCULAR; INTRAVENOUS EVERY 4 HOURS PRN
Status: DISCONTINUED | OUTPATIENT
Start: 2022-01-01 | End: 2022-01-01 | Stop reason: HOSPADM

## 2022-01-01 RX ORDER — OXYCODONE HYDROCHLORIDE 5 MG/1
5 TABLET ORAL
Status: DISCONTINUED | OUTPATIENT
Start: 2022-01-01 | End: 2022-01-01

## 2022-01-01 RX ORDER — OXYCODONE HCL 20 MG/ML
5 CONCENTRATE, ORAL ORAL EVERY 4 HOURS PRN
Status: DISCONTINUED | OUTPATIENT
Start: 2022-01-01 | End: 2022-01-01

## 2022-01-01 RX ORDER — ROPINIROLE 0.5 MG/1
0.25 TABLET, FILM COATED ORAL DAILY
Status: DISCONTINUED | OUTPATIENT
Start: 2022-01-01 | End: 2022-01-01 | Stop reason: HOSPADM

## 2022-01-01 RX ORDER — CALCITRIOL 0.25 UG/1
0.25 CAPSULE, LIQUID FILLED ORAL DAILY
Status: DISCONTINUED | OUTPATIENT
Start: 2022-01-01 | End: 2022-01-01

## 2022-01-01 RX ORDER — CEFAZOLIN SODIUM 1 G/50ML
1 INJECTION, SOLUTION INTRAVENOUS EVERY 24 HOURS
Status: COMPLETED | OUTPATIENT
Start: 2022-01-01 | End: 2022-01-01

## 2022-01-01 RX ORDER — POLYETHYLENE GLYCOL 3350 17 G/17G
1 POWDER, FOR SOLUTION ORAL
Status: DISCONTINUED | OUTPATIENT
Start: 2022-01-01 | End: 2022-01-01

## 2022-01-01 RX ORDER — LIDOCAINE HYDROCHLORIDE 20 MG/ML
1 JELLY TOPICAL
Status: DISCONTINUED | OUTPATIENT
Start: 2022-01-01 | End: 2022-01-01

## 2022-01-01 RX ORDER — OMEPRAZOLE 20 MG/1
20 CAPSULE, DELAYED RELEASE ORAL DAILY
Status: DISCONTINUED | OUTPATIENT
Start: 2022-01-01 | End: 2022-01-01 | Stop reason: HOSPADM

## 2022-01-01 RX ORDER — LISINOPRIL 10 MG/1
10 TABLET ORAL DAILY
Status: DISCONTINUED | OUTPATIENT
Start: 2022-01-01 | End: 2022-01-01

## 2022-01-01 RX ORDER — LIDOCAINE HYDROCHLORIDE 40 MG/ML
SOLUTION TOPICAL
Status: DISCONTINUED | OUTPATIENT
Start: 2022-01-01 | End: 2022-01-01

## 2022-01-01 RX ORDER — HYDROMORPHONE HYDROCHLORIDE 1 MG/ML
0.5 INJECTION, SOLUTION INTRAMUSCULAR; INTRAVENOUS; SUBCUTANEOUS EVERY 4 HOURS PRN
Status: DISCONTINUED | OUTPATIENT
Start: 2022-01-01 | End: 2022-01-01

## 2022-01-01 RX ORDER — OMEPRAZOLE 20 MG/1
40 CAPSULE, DELAYED RELEASE ORAL 2 TIMES DAILY
Status: DISCONTINUED | OUTPATIENT
Start: 2022-01-01 | End: 2022-01-01

## 2022-01-01 RX ORDER — FUROSEMIDE 10 MG/ML
80 INJECTION INTRAMUSCULAR; INTRAVENOUS
Status: DISCONTINUED | OUTPATIENT
Start: 2022-01-01 | End: 2022-01-01

## 2022-01-01 RX ORDER — GABAPENTIN 100 MG/1
200 CAPSULE ORAL
Status: DISCONTINUED | OUTPATIENT
Start: 2022-01-01 | End: 2022-01-01

## 2022-01-01 RX ORDER — HEPARIN SODIUM 5000 [USP'U]/100ML
0-30 INJECTION, SOLUTION INTRAVENOUS CONTINUOUS
Status: DISCONTINUED | OUTPATIENT
Start: 2022-01-01 | End: 2022-01-01

## 2022-01-01 RX ORDER — LORAZEPAM 2 MG/ML
1 CONCENTRATE ORAL EVERY 4 HOURS PRN
Qty: 360 ML | Refills: 0 | Status: SHIPPED | OUTPATIENT
Start: 2022-01-01 | End: 2023-08-30

## 2022-01-01 RX ORDER — ONDANSETRON 4 MG/1
4 TABLET, ORALLY DISINTEGRATING ORAL EVERY 4 HOURS PRN
Status: DISCONTINUED | OUTPATIENT
Start: 2022-01-01 | End: 2022-01-01

## 2022-01-01 RX ORDER — DIPHENHYDRAMINE HYDROCHLORIDE 50 MG/ML
25 INJECTION INTRAMUSCULAR; INTRAVENOUS ONCE
Status: COMPLETED | OUTPATIENT
Start: 2022-01-01 | End: 2022-01-01

## 2022-01-01 RX ORDER — HEPARIN SODIUM 1000 [USP'U]/ML
INJECTION, SOLUTION INTRAVENOUS; SUBCUTANEOUS
Status: COMPLETED
Start: 2022-01-01 | End: 2022-01-01

## 2022-01-01 RX ORDER — NIFEDIPINE 30 MG/1
90 TABLET, EXTENDED RELEASE ORAL
Status: DISCONTINUED | OUTPATIENT
Start: 2022-01-01 | End: 2022-01-01

## 2022-01-01 RX ORDER — FUROSEMIDE 40 MG/1
80 TABLET ORAL DAILY
Status: DISCONTINUED | OUTPATIENT
Start: 2022-01-01 | End: 2022-01-01

## 2022-01-01 RX ORDER — HEPARIN SODIUM 5000 [USP'U]/ML
5000 INJECTION, SOLUTION INTRAVENOUS; SUBCUTANEOUS EVERY 8 HOURS
Status: DISCONTINUED | OUTPATIENT
Start: 2022-01-01 | End: 2022-01-01

## 2022-01-01 RX ORDER — GABAPENTIN 100 MG/1
100 CAPSULE ORAL 3 TIMES DAILY
Status: DISCONTINUED | OUTPATIENT
Start: 2022-01-01 | End: 2022-01-01 | Stop reason: HOSPADM

## 2022-01-01 RX ORDER — GABAPENTIN 100 MG/1
200 CAPSULE ORAL EVERY EVENING
Status: DISCONTINUED | OUTPATIENT
Start: 2022-01-01 | End: 2022-01-01

## 2022-01-01 RX ORDER — POLYVINYL ALCOHOL 14 MG/ML
2 SOLUTION/ DROPS OPHTHALMIC EVERY 6 HOURS PRN
Status: DISCONTINUED | OUTPATIENT
Start: 2022-01-01 | End: 2022-01-01 | Stop reason: HOSPADM

## 2022-01-01 RX ORDER — OXYCODONE HYDROCHLORIDE 5 MG/1
5 TABLET ORAL ONCE
Status: COMPLETED | OUTPATIENT
Start: 2022-01-01 | End: 2022-01-01

## 2022-01-01 RX ORDER — PANTOPRAZOLE SODIUM 40 MG/10ML
40 INJECTION, POWDER, LYOPHILIZED, FOR SOLUTION INTRAVENOUS 2 TIMES DAILY
Status: DISCONTINUED | OUTPATIENT
Start: 2022-01-01 | End: 2022-01-01

## 2022-01-01 RX ORDER — LIDOCAINE HYDROCHLORIDE 20 MG/ML
20 INJECTION, SOLUTION INFILTRATION; PERINEURAL ONCE
Status: COMPLETED | OUTPATIENT
Start: 2022-01-01 | End: 2022-01-01

## 2022-01-01 RX ADMIN — INSULIN LISPRO 1 UNITS: 100 INJECTION, SOLUTION INTRAVENOUS; SUBCUTANEOUS at 21:48

## 2022-01-01 RX ADMIN — LIDOCAINE 2 PATCH: 700 PATCH TOPICAL at 12:46

## 2022-01-01 RX ADMIN — SEVELAMER CARBONATE 1600 MG: 800 TABLET, FILM COATED ORAL at 08:35

## 2022-01-01 RX ADMIN — PATIROMER 8.4 G: 8.4 POWDER, FOR SUSPENSION ORAL at 14:17

## 2022-01-01 RX ADMIN — INSULIN GLARGINE-YFGN 5 UNITS: 100 INJECTION, SOLUTION SUBCUTANEOUS at 18:35

## 2022-01-01 RX ADMIN — OXYCODONE 2.5 MG: 5 TABLET ORAL at 14:00

## 2022-01-01 RX ADMIN — ATORVASTATIN CALCIUM 20 MG: 20 TABLET, FILM COATED ORAL at 04:52

## 2022-01-01 RX ADMIN — PANTOPRAZOLE SODIUM 40 MG: 40 INJECTION, POWDER, FOR SOLUTION INTRAVENOUS at 05:06

## 2022-01-01 RX ADMIN — CALCITRIOL CAPSULES 0.25 MCG 0.25 MCG: 0.25 CAPSULE ORAL at 05:11

## 2022-01-01 RX ADMIN — GABAPENTIN 100 MG: 100 CAPSULE ORAL at 05:17

## 2022-01-01 RX ADMIN — INSULIN LISPRO 1 UNITS: 100 INJECTION, SOLUTION INTRAVENOUS; SUBCUTANEOUS at 13:41

## 2022-01-01 RX ADMIN — PANTOPRAZOLE SODIUM 40 MG: 40 INJECTION, POWDER, LYOPHILIZED, FOR SOLUTION INTRAVENOUS at 04:24

## 2022-01-01 RX ADMIN — LEVOTHYROXINE SODIUM 25 MCG: 0.03 TABLET ORAL at 05:00

## 2022-01-01 RX ADMIN — DOCUSATE SODIUM 50 MG AND SENNOSIDES 8.6 MG 2 TABLET: 8.6; 5 TABLET, FILM COATED ORAL at 16:59

## 2022-01-01 RX ADMIN — ACETAMINOPHEN 650 MG: 325 TABLET, FILM COATED ORAL at 23:22

## 2022-01-01 RX ADMIN — ROPINIROLE HYDROCHLORIDE 0.25 MG: 0.5 TABLET, FILM COATED ORAL at 05:13

## 2022-01-01 RX ADMIN — GABAPENTIN 100 MG: 100 CAPSULE ORAL at 13:29

## 2022-01-01 RX ADMIN — ACETAMINOPHEN 1000 MG: 500 TABLET, FILM COATED ORAL at 13:14

## 2022-01-01 RX ADMIN — INSULIN LISPRO 2 UNITS: 100 INJECTION, SOLUTION INTRAVENOUS; SUBCUTANEOUS at 21:07

## 2022-01-01 RX ADMIN — INSULIN LISPRO 2 UNITS: 100 INJECTION, SOLUTION INTRAVENOUS; SUBCUTANEOUS at 21:52

## 2022-01-01 RX ADMIN — ACETAMINOPHEN 650 MG: 325 TABLET, FILM COATED ORAL at 16:59

## 2022-01-01 RX ADMIN — HEPARIN SODIUM 5000 UNITS: 5000 INJECTION, SOLUTION INTRAVENOUS; SUBCUTANEOUS at 13:52

## 2022-01-01 RX ADMIN — SEVELAMER CARBONATE 1600 MG: 800 TABLET, FILM COATED ORAL at 06:33

## 2022-01-01 RX ADMIN — LIDOCAINE 2 PATCH: 700 PATCH TOPICAL at 08:40

## 2022-01-01 RX ADMIN — SEVELAMER CARBONATE 1600 MG: 800 TABLET, FILM COATED ORAL at 17:54

## 2022-01-01 RX ADMIN — INSULIN LISPRO 1 UNITS: 100 INJECTION, SOLUTION INTRAVENOUS; SUBCUTANEOUS at 14:10

## 2022-01-01 RX ADMIN — HYDROCODONE BITARTRATE AND ACETAMINOPHEN 1 TABLET: 5; 325 TABLET ORAL at 08:41

## 2022-01-01 RX ADMIN — FUROSEMIDE 80 MG: 10 INJECTION, SOLUTION INTRAMUSCULAR; INTRAVENOUS at 05:28

## 2022-01-01 RX ADMIN — GABAPENTIN 100 MG: 100 CAPSULE ORAL at 05:10

## 2022-01-01 RX ADMIN — PATIROMER 8.4 G: 8.4 POWDER, FOR SUSPENSION ORAL at 12:00

## 2022-01-01 RX ADMIN — LIDOCAINE 2 PATCH: 700 PATCH TOPICAL at 09:08

## 2022-01-01 RX ADMIN — Medication 5 MG: at 01:06

## 2022-01-01 RX ADMIN — OXYCODONE 2.5 MG: 5 TABLET ORAL at 08:43

## 2022-01-01 RX ADMIN — INSULIN LISPRO 2 UNITS: 100 INJECTION, SOLUTION INTRAVENOUS; SUBCUTANEOUS at 17:18

## 2022-01-01 RX ADMIN — HEPARIN SODIUM 5000 UNITS: 5000 INJECTION, SOLUTION INTRAVENOUS; SUBCUTANEOUS at 12:02

## 2022-01-01 RX ADMIN — OXYCODONE 2.5 MG: 5 TABLET ORAL at 08:57

## 2022-01-01 RX ADMIN — POLYETHYLENE GLYCOL 3350 1 PACKET: 17 POWDER, FOR SOLUTION ORAL at 13:04

## 2022-01-01 RX ADMIN — INSULIN LISPRO 1 UNITS: 100 INJECTION, SOLUTION INTRAVENOUS; SUBCUTANEOUS at 18:27

## 2022-01-01 RX ADMIN — SEVELAMER CARBONATE 1600 MG: 800 TABLET, FILM COATED ORAL at 18:39

## 2022-01-01 RX ADMIN — ROPINIROLE HYDROCHLORIDE 0.25 MG: 0.5 TABLET, FILM COATED ORAL at 17:06

## 2022-01-01 RX ADMIN — OXYCODONE 2.5 MG: 5 TABLET ORAL at 14:03

## 2022-01-01 RX ADMIN — ACETAMINOPHEN 1000 MG: 500 TABLET, FILM COATED ORAL at 00:29

## 2022-01-01 RX ADMIN — INSULIN LISPRO 1 UNITS: 100 INJECTION, SOLUTION INTRAVENOUS; SUBCUTANEOUS at 12:11

## 2022-01-01 RX ADMIN — OXYCODONE 2.5 MG: 5 TABLET ORAL at 09:06

## 2022-01-01 RX ADMIN — GABAPENTIN 100 MG: 100 CAPSULE ORAL at 13:13

## 2022-01-01 RX ADMIN — LEVOTHYROXINE SODIUM 25 MCG: 0.03 TABLET ORAL at 04:38

## 2022-01-01 RX ADMIN — SEVELAMER CARBONATE 1600 MG: 800 TABLET, FILM COATED ORAL at 09:20

## 2022-01-01 RX ADMIN — OXYCODONE 2.5 MG: 5 TABLET ORAL at 14:12

## 2022-01-01 RX ADMIN — ACETAMINOPHEN 1000 MG: 500 TABLET, FILM COATED ORAL at 18:06

## 2022-01-01 RX ADMIN — INSULIN LISPRO 1 UNITS: 100 INJECTION, SOLUTION INTRAVENOUS; SUBCUTANEOUS at 08:57

## 2022-01-01 RX ADMIN — ACETAMINOPHEN 1000 MG: 500 TABLET, FILM COATED ORAL at 16:34

## 2022-01-01 RX ADMIN — OXYCODONE 2.5 MG: 5 TABLET ORAL at 09:24

## 2022-01-01 RX ADMIN — GABAPENTIN 100 MG: 100 CAPSULE ORAL at 18:06

## 2022-01-01 RX ADMIN — INSULIN GLARGINE-YFGN 5 UNITS: 100 INJECTION, SOLUTION SUBCUTANEOUS at 18:24

## 2022-01-01 RX ADMIN — EPOETIN ALFA-EPBX 5000 UNITS: 3000 INJECTION, SOLUTION INTRAVENOUS; SUBCUTANEOUS at 09:33

## 2022-01-01 RX ADMIN — INSULIN LISPRO 2 UNITS: 100 INJECTION, SOLUTION INTRAVENOUS; SUBCUTANEOUS at 21:17

## 2022-01-01 RX ADMIN — OXYCODONE 2.5 MG: 5 TABLET ORAL at 21:04

## 2022-01-01 RX ADMIN — LIDOCAINE 2 PATCH: 700 PATCH TOPICAL at 12:31

## 2022-01-01 RX ADMIN — LIDOCAINE 2 PATCH: 700 PATCH TOPICAL at 08:14

## 2022-01-01 RX ADMIN — INSULIN LISPRO 3 UNITS: 100 INJECTION, SOLUTION INTRAVENOUS; SUBCUTANEOUS at 22:08

## 2022-01-01 RX ADMIN — SEVELAMER CARBONATE 1600 MG: 800 TABLET, FILM COATED ORAL at 08:59

## 2022-01-01 RX ADMIN — INSULIN GLARGINE-YFGN 5 UNITS: 100 INJECTION, SOLUTION SUBCUTANEOUS at 18:29

## 2022-01-01 RX ADMIN — INSULIN GLARGINE-YFGN 5 UNITS: 100 INJECTION, SOLUTION SUBCUTANEOUS at 18:27

## 2022-01-01 RX ADMIN — ACETAMINOPHEN 1000 MG: 500 TABLET, FILM COATED ORAL at 13:41

## 2022-01-01 RX ADMIN — INSULIN GLARGINE-YFGN 5 UNITS: 100 INJECTION, SOLUTION SUBCUTANEOUS at 17:47

## 2022-01-01 RX ADMIN — CALCITRIOL CAPSULES 0.25 MCG 0.25 MCG: 0.25 CAPSULE ORAL at 06:27

## 2022-01-01 RX ADMIN — PATIROMER 8.4 G: 8.4 POWDER, FOR SUSPENSION ORAL at 10:41

## 2022-01-01 RX ADMIN — GABAPENTIN 100 MG: 100 CAPSULE ORAL at 06:10

## 2022-01-01 RX ADMIN — OXYCODONE 2.5 MG: 5 TABLET ORAL at 20:37

## 2022-01-01 RX ADMIN — ATORVASTATIN CALCIUM 20 MG: 20 TABLET, FILM COATED ORAL at 04:50

## 2022-01-01 RX ADMIN — HYDROMORPHONE HYDROCHLORIDE 0.25 MG: 1 INJECTION, SOLUTION INTRAMUSCULAR; INTRAVENOUS; SUBCUTANEOUS at 13:30

## 2022-01-01 RX ADMIN — ACETAMINOPHEN 1000 MG: 500 TABLET, FILM COATED ORAL at 12:11

## 2022-01-01 RX ADMIN — HEPARIN SODIUM 5000 UNITS: 5000 INJECTION, SOLUTION INTRAVENOUS; SUBCUTANEOUS at 04:08

## 2022-01-01 RX ADMIN — INSULIN LISPRO 1 UNITS: 100 INJECTION, SOLUTION INTRAVENOUS; SUBCUTANEOUS at 21:12

## 2022-01-01 RX ADMIN — INSULIN GLARGINE-YFGN 10 UNITS: 100 INJECTION, SOLUTION SUBCUTANEOUS at 18:40

## 2022-01-01 RX ADMIN — DOCUSATE SODIUM 50 MG AND SENNOSIDES 8.6 MG 2 TABLET: 8.6; 5 TABLET, FILM COATED ORAL at 17:36

## 2022-01-01 RX ADMIN — HEPARIN SODIUM 5000 UNITS: 5000 INJECTION, SOLUTION INTRAVENOUS; SUBCUTANEOUS at 04:53

## 2022-01-01 RX ADMIN — GABAPENTIN 100 MG: 100 CAPSULE ORAL at 04:08

## 2022-01-01 RX ADMIN — HEPARIN SODIUM 5000 UNITS: 5000 INJECTION, SOLUTION INTRAVENOUS; SUBCUTANEOUS at 04:41

## 2022-01-01 RX ADMIN — ACETAMINOPHEN 1000 MG: 500 TABLET, FILM COATED ORAL at 12:03

## 2022-01-01 RX ADMIN — ROPINIROLE HYDROCHLORIDE 0.25 MG: 0.5 TABLET, FILM COATED ORAL at 06:02

## 2022-01-01 RX ADMIN — INSULIN GLARGINE-YFGN 5 UNITS: 100 INJECTION, SOLUTION SUBCUTANEOUS at 18:22

## 2022-01-01 RX ADMIN — DOCUSATE SODIUM 50 MG AND SENNOSIDES 8.6 MG 2 TABLET: 8.6; 5 TABLET, FILM COATED ORAL at 17:14

## 2022-01-01 RX ADMIN — PANTOPRAZOLE SODIUM 40 MG: 40 INJECTION, POWDER, FOR SOLUTION INTRAVENOUS at 09:20

## 2022-01-01 RX ADMIN — PATIROMER 16.8 G: 16.8 POWDER, FOR SUSPENSION ORAL at 19:13

## 2022-01-01 RX ADMIN — HYDROMORPHONE HYDROCHLORIDE 0.5 MG: 1 INJECTION, SOLUTION INTRAMUSCULAR; INTRAVENOUS; SUBCUTANEOUS at 10:03

## 2022-01-01 RX ADMIN — ACETAMINOPHEN 1000 MG: 500 TABLET, FILM COATED ORAL at 06:21

## 2022-01-01 RX ADMIN — LIDOCAINE 2 PATCH: 700 PATCH TOPICAL at 09:40

## 2022-01-01 RX ADMIN — CALCITRIOL CAPSULES 0.25 MCG 0.25 MCG: 0.25 CAPSULE ORAL at 05:14

## 2022-01-01 RX ADMIN — OXYCODONE HYDROCHLORIDE 5 MG: 20 SOLUTION ORAL at 06:11

## 2022-01-01 RX ADMIN — INSULIN LISPRO 1 UNITS: 100 INJECTION, SOLUTION INTRAVENOUS; SUBCUTANEOUS at 18:26

## 2022-01-01 RX ADMIN — ACETAMINOPHEN 1000 MG: 500 TABLET, FILM COATED ORAL at 17:54

## 2022-01-01 RX ADMIN — DIPHENHYDRAMINE HYDROCHLORIDE 25 MG: 50 INJECTION INTRAMUSCULAR; INTRAVENOUS at 03:56

## 2022-01-01 RX ADMIN — SEVELAMER CARBONATE 1600 MG: 800 TABLET, FILM COATED ORAL at 09:45

## 2022-01-01 RX ADMIN — INSULIN LISPRO 2 UNITS: 100 INJECTION, SOLUTION INTRAVENOUS; SUBCUTANEOUS at 21:02

## 2022-01-01 RX ADMIN — HEPARIN SODIUM 5000 UNITS: 5000 INJECTION, SOLUTION INTRAVENOUS; SUBCUTANEOUS at 13:12

## 2022-01-01 RX ADMIN — DOCUSATE SODIUM 50 MG AND SENNOSIDES 8.6 MG 2 TABLET: 8.6; 5 TABLET, FILM COATED ORAL at 05:10

## 2022-01-01 RX ADMIN — GABAPENTIN 100 MG: 100 CAPSULE ORAL at 12:00

## 2022-01-01 RX ADMIN — CALCITRIOL CAPSULES 0.25 MCG 0.25 MCG: 0.25 CAPSULE ORAL at 06:38

## 2022-01-01 RX ADMIN — INSULIN GLARGINE-YFGN 5 UNITS: 100 INJECTION, SOLUTION SUBCUTANEOUS at 18:26

## 2022-01-01 RX ADMIN — SEVELAMER CARBONATE 1600 MG: 800 TABLET, FILM COATED ORAL at 12:44

## 2022-01-01 RX ADMIN — INSULIN LISPRO 1 UNITS: 100 INJECTION, SOLUTION INTRAVENOUS; SUBCUTANEOUS at 20:54

## 2022-01-01 RX ADMIN — HEPARIN SODIUM 5000 UNITS: 5000 INJECTION, SOLUTION INTRAVENOUS; SUBCUTANEOUS at 13:38

## 2022-01-01 RX ADMIN — ACETAMINOPHEN 1000 MG: 500 TABLET, FILM COATED ORAL at 12:21

## 2022-01-01 RX ADMIN — INSULIN LISPRO 2 UNITS: 100 INJECTION, SOLUTION INTRAVENOUS; SUBCUTANEOUS at 21:16

## 2022-01-01 RX ADMIN — HYDROMORPHONE HYDROCHLORIDE 0.5 MG: 1 INJECTION, SOLUTION INTRAMUSCULAR; INTRAVENOUS; SUBCUTANEOUS at 01:47

## 2022-01-01 RX ADMIN — SEVELAMER CARBONATE 1600 MG: 800 TABLET, FILM COATED ORAL at 12:25

## 2022-01-01 RX ADMIN — ACETAMINOPHEN 650 MG: 325 TABLET, FILM COATED ORAL at 04:41

## 2022-01-01 RX ADMIN — HYDROMORPHONE HYDROCHLORIDE 1 MG: 1 INJECTION, SOLUTION INTRAMUSCULAR; INTRAVENOUS; SUBCUTANEOUS at 06:30

## 2022-01-01 RX ADMIN — SEVELAMER CARBONATE 1600 MG: 800 TABLET, FILM COATED ORAL at 13:12

## 2022-01-01 RX ADMIN — POLYETHYLENE GLYCOL 3350 1 PACKET: 17 POWDER, FOR SOLUTION ORAL at 04:28

## 2022-01-01 RX ADMIN — INSULIN GLARGINE-YFGN 10 UNITS: 100 INJECTION, SOLUTION SUBCUTANEOUS at 20:41

## 2022-01-01 RX ADMIN — GABAPENTIN 100 MG: 100 CAPSULE ORAL at 05:45

## 2022-01-01 RX ADMIN — INSULIN LISPRO 1 UNITS: 100 INJECTION, SOLUTION INTRAVENOUS; SUBCUTANEOUS at 13:11

## 2022-01-01 RX ADMIN — OXYCODONE 2.5 MG: 5 TABLET ORAL at 10:45

## 2022-01-01 RX ADMIN — ACETAMINOPHEN 1000 MG: 500 TABLET, FILM COATED ORAL at 05:16

## 2022-01-01 RX ADMIN — ACETAMINOPHEN 1000 MG: 500 TABLET, FILM COATED ORAL at 06:08

## 2022-01-01 RX ADMIN — INSULIN GLARGINE-YFGN 5 UNITS: 100 INJECTION, SOLUTION SUBCUTANEOUS at 17:48

## 2022-01-01 RX ADMIN — INSULIN LISPRO 2 UNITS: 100 INJECTION, SOLUTION INTRAVENOUS; SUBCUTANEOUS at 20:58

## 2022-01-01 RX ADMIN — HEPARIN SODIUM 5000 UNITS: 5000 INJECTION, SOLUTION INTRAVENOUS; SUBCUTANEOUS at 20:17

## 2022-01-01 RX ADMIN — INSULIN LISPRO 1 UNITS: 100 INJECTION, SOLUTION INTRAVENOUS; SUBCUTANEOUS at 17:50

## 2022-01-01 RX ADMIN — INSULIN LISPRO 3 UNITS: 100 INJECTION, SOLUTION INTRAVENOUS; SUBCUTANEOUS at 18:48

## 2022-01-01 RX ADMIN — INSULIN LISPRO 1 UNITS: 100 INJECTION, SOLUTION INTRAVENOUS; SUBCUTANEOUS at 21:30

## 2022-01-01 RX ADMIN — ACETAMINOPHEN 1000 MG: 500 TABLET, FILM COATED ORAL at 05:27

## 2022-01-01 RX ADMIN — INSULIN LISPRO 1 UNITS: 100 INJECTION, SOLUTION INTRAVENOUS; SUBCUTANEOUS at 18:51

## 2022-01-01 RX ADMIN — OXYCODONE 2.5 MG: 5 TABLET ORAL at 21:21

## 2022-01-01 RX ADMIN — INSULIN LISPRO 1 UNITS: 100 INJECTION, SOLUTION INTRAVENOUS; SUBCUTANEOUS at 17:18

## 2022-01-01 RX ADMIN — INSULIN GLARGINE-YFGN 5 UNITS: 100 INJECTION, SOLUTION SUBCUTANEOUS at 17:34

## 2022-01-01 RX ADMIN — ACETAMINOPHEN 1000 MG: 500 TABLET, FILM COATED ORAL at 04:02

## 2022-01-01 RX ADMIN — OXYCODONE 5 MG: 5 TABLET ORAL at 21:06

## 2022-01-01 RX ADMIN — ACETAMINOPHEN 1000 MG: 500 TABLET, FILM COATED ORAL at 23:34

## 2022-01-01 RX ADMIN — GABAPENTIN 100 MG: 100 CAPSULE ORAL at 18:31

## 2022-01-01 RX ADMIN — DOCUSATE SODIUM 50 MG AND SENNOSIDES 8.6 MG 2 TABLET: 8.6; 5 TABLET, FILM COATED ORAL at 18:46

## 2022-01-01 RX ADMIN — SEVELAMER CARBONATE 1600 MG: 800 TABLET, FILM COATED ORAL at 17:00

## 2022-01-01 RX ADMIN — SEVELAMER CARBONATE 1600 MG: 800 TABLET, FILM COATED ORAL at 18:30

## 2022-01-01 RX ADMIN — GABAPENTIN 100 MG: 100 CAPSULE ORAL at 13:46

## 2022-01-01 RX ADMIN — CALCITRIOL CAPSULES 0.25 MCG 0.25 MCG: 0.25 CAPSULE ORAL at 05:07

## 2022-01-01 RX ADMIN — INSULIN LISPRO 3 UNITS: 100 INJECTION, SOLUTION INTRAVENOUS; SUBCUTANEOUS at 19:12

## 2022-01-01 RX ADMIN — INSULIN GLARGINE-YFGN 5 UNITS: 100 INJECTION, SOLUTION SUBCUTANEOUS at 18:30

## 2022-01-01 RX ADMIN — ACETAMINOPHEN 1000 MG: 500 TABLET, FILM COATED ORAL at 18:14

## 2022-01-01 RX ADMIN — ATORVASTATIN CALCIUM 20 MG: 20 TABLET, FILM COATED ORAL at 05:06

## 2022-01-01 RX ADMIN — Medication 5 MG: at 23:20

## 2022-01-01 RX ADMIN — LIDOCAINE 2 PATCH: 700 PATCH TOPICAL at 12:03

## 2022-01-01 RX ADMIN — GABAPENTIN 100 MG: 100 CAPSULE ORAL at 12:43

## 2022-01-01 RX ADMIN — SEVELAMER CARBONATE 800 MG: 800 TABLET, FILM COATED ORAL at 13:42

## 2022-01-01 RX ADMIN — INSULIN GLARGINE-YFGN 5 UNITS: 100 INJECTION, SOLUTION SUBCUTANEOUS at 17:57

## 2022-01-01 RX ADMIN — ACETAMINOPHEN 1000 MG: 500 TABLET, FILM COATED ORAL at 13:07

## 2022-01-01 RX ADMIN — HEPARIN SODIUM 5000 UNITS: 5000 INJECTION, SOLUTION INTRAVENOUS; SUBCUTANEOUS at 16:33

## 2022-01-01 RX ADMIN — GABAPENTIN 100 MG: 100 CAPSULE ORAL at 13:43

## 2022-01-01 RX ADMIN — GABAPENTIN 100 MG: 100 CAPSULE ORAL at 18:23

## 2022-01-01 RX ADMIN — OXYCODONE 2.5 MG: 5 TABLET ORAL at 03:39

## 2022-01-01 RX ADMIN — ACETAMINOPHEN 1000 MG: 500 TABLET, FILM COATED ORAL at 23:54

## 2022-01-01 RX ADMIN — ATORVASTATIN CALCIUM 20 MG: 20 TABLET, FILM COATED ORAL at 04:41

## 2022-01-01 RX ADMIN — HEPARIN SODIUM 5000 UNITS: 5000 INJECTION, SOLUTION INTRAVENOUS; SUBCUTANEOUS at 21:33

## 2022-01-01 RX ADMIN — GABAPENTIN 100 MG: 100 CAPSULE ORAL at 12:14

## 2022-01-01 RX ADMIN — INSULIN LISPRO 1 UNITS: 100 INJECTION, SOLUTION INTRAVENOUS; SUBCUTANEOUS at 22:07

## 2022-01-01 RX ADMIN — ROPINIROLE HYDROCHLORIDE 0.25 MG: 0.5 TABLET, FILM COATED ORAL at 18:02

## 2022-01-01 RX ADMIN — INSULIN LISPRO 1 UNITS: 100 INJECTION, SOLUTION INTRAVENOUS; SUBCUTANEOUS at 18:33

## 2022-01-01 RX ADMIN — FENTANYL CITRATE 50 MCG: 50 INJECTION, SOLUTION INTRAMUSCULAR; INTRAVENOUS at 01:51

## 2022-01-01 RX ADMIN — EPOETIN ALFA-EPBX 3000 UNITS: 3000 INJECTION, SOLUTION INTRAVENOUS; SUBCUTANEOUS at 08:01

## 2022-01-01 RX ADMIN — GABAPENTIN 200 MG: 100 CAPSULE ORAL at 22:28

## 2022-01-01 RX ADMIN — ACETAMINOPHEN 1000 MG: 500 TABLET, FILM COATED ORAL at 12:14

## 2022-01-01 RX ADMIN — DOCUSATE SODIUM 50 MG AND SENNOSIDES 8.6 MG 2 TABLET: 8.6; 5 TABLET, FILM COATED ORAL at 05:46

## 2022-01-01 RX ADMIN — POLYETHYLENE GLYCOL 3350 1 PACKET: 17 POWDER, FOR SOLUTION ORAL at 04:47

## 2022-01-01 RX ADMIN — LEVOTHYROXINE SODIUM 25 MCG: 0.03 TABLET ORAL at 04:32

## 2022-01-01 RX ADMIN — POLYETHYLENE GLYCOL 3350 1 PACKET: 17 POWDER, FOR SOLUTION ORAL at 04:29

## 2022-01-01 RX ADMIN — LIDOCAINE 2 PATCH: 700 PATCH TOPICAL at 08:41

## 2022-01-01 RX ADMIN — SEVELAMER CARBONATE 1600 MG: 800 TABLET, FILM COATED ORAL at 17:30

## 2022-01-01 RX ADMIN — CALCITRIOL CAPSULES 0.25 MCG 0.25 MCG: 0.25 CAPSULE ORAL at 04:41

## 2022-01-01 RX ADMIN — ATORVASTATIN CALCIUM 20 MG: 20 TABLET, FILM COATED ORAL at 05:15

## 2022-01-01 RX ADMIN — GABAPENTIN 100 MG: 100 CAPSULE ORAL at 18:15

## 2022-01-01 RX ADMIN — LEVOTHYROXINE SODIUM 25 MCG: 0.03 TABLET ORAL at 05:30

## 2022-01-01 RX ADMIN — ACETAMINOPHEN 1000 MG: 500 TABLET, FILM COATED ORAL at 16:25

## 2022-01-01 RX ADMIN — ACETAMINOPHEN 1000 MG: 500 TABLET, FILM COATED ORAL at 23:19

## 2022-01-01 RX ADMIN — INSULIN LISPRO 1 UNITS: 100 INJECTION, SOLUTION INTRAVENOUS; SUBCUTANEOUS at 07:34

## 2022-01-01 RX ADMIN — INSULIN LISPRO 2 UNITS: 100 INJECTION, SOLUTION INTRAVENOUS; SUBCUTANEOUS at 18:08

## 2022-01-01 RX ADMIN — PANTOPRAZOLE SODIUM 40 MG: 40 INJECTION, POWDER, FOR SOLUTION INTRAVENOUS at 16:30

## 2022-01-01 RX ADMIN — INSULIN LISPRO 1 UNITS: 100 INJECTION, SOLUTION INTRAVENOUS; SUBCUTANEOUS at 21:26

## 2022-01-01 RX ADMIN — GABAPENTIN 100 MG: 100 CAPSULE ORAL at 11:41

## 2022-01-01 RX ADMIN — OXYCODONE 2.5 MG: 5 TABLET ORAL at 15:34

## 2022-01-01 RX ADMIN — GABAPENTIN 100 MG: 100 CAPSULE ORAL at 11:59

## 2022-01-01 RX ADMIN — LIDOCAINE 2 PATCH: 700 PATCH TOPICAL at 09:15

## 2022-01-01 RX ADMIN — HEPARIN SODIUM 5000 UNITS: 5000 INJECTION, SOLUTION INTRAVENOUS; SUBCUTANEOUS at 20:40

## 2022-01-01 RX ADMIN — HEPARIN SODIUM 5000 UNITS: 5000 INJECTION, SOLUTION INTRAVENOUS; SUBCUTANEOUS at 22:23

## 2022-01-01 RX ADMIN — ATORVASTATIN CALCIUM 20 MG: 20 TABLET, FILM COATED ORAL at 05:16

## 2022-01-01 RX ADMIN — OXYCODONE HYDROCHLORIDE 5 MG: 20 SOLUTION ORAL at 23:49

## 2022-01-01 RX ADMIN — INSULIN LISPRO 2 UNITS: 100 INJECTION, SOLUTION INTRAVENOUS; SUBCUTANEOUS at 17:59

## 2022-01-01 RX ADMIN — LIDOCAINE 2 PATCH: 700 PATCH TOPICAL at 08:57

## 2022-01-01 RX ADMIN — INSULIN LISPRO 1 UNITS: 100 INJECTION, SOLUTION INTRAVENOUS; SUBCUTANEOUS at 18:17

## 2022-01-01 RX ADMIN — INSULIN GLARGINE-YFGN 10 UNITS: 100 INJECTION, SOLUTION SUBCUTANEOUS at 18:06

## 2022-01-01 RX ADMIN — ACETAMINOPHEN 1000 MG: 500 TABLET, FILM COATED ORAL at 23:39

## 2022-01-01 RX ADMIN — ATORVASTATIN CALCIUM 20 MG: 20 TABLET, FILM COATED ORAL at 06:03

## 2022-01-01 RX ADMIN — ACETAMINOPHEN 1000 MG: 500 TABLET, FILM COATED ORAL at 16:45

## 2022-01-01 RX ADMIN — LEVOTHYROXINE SODIUM 25 MCG: 0.03 TABLET ORAL at 06:06

## 2022-01-01 RX ADMIN — ROPINIROLE HYDROCHLORIDE 0.25 MG: 0.5 TABLET, FILM COATED ORAL at 17:12

## 2022-01-01 RX ADMIN — DOCUSATE SODIUM 50 MG AND SENNOSIDES 8.6 MG 2 TABLET: 8.6; 5 TABLET, FILM COATED ORAL at 06:10

## 2022-01-01 RX ADMIN — DOCUSATE SODIUM 50 MG AND SENNOSIDES 8.6 MG 2 TABLET: 8.6; 5 TABLET, FILM COATED ORAL at 06:15

## 2022-01-01 RX ADMIN — SEVELAMER CARBONATE 1600 MG: 800 TABLET, FILM COATED ORAL at 16:45

## 2022-01-01 RX ADMIN — ACETAMINOPHEN 1000 MG: 500 TABLET, FILM COATED ORAL at 18:03

## 2022-01-01 RX ADMIN — INSULIN LISPRO 2 UNITS: 100 INJECTION, SOLUTION INTRAVENOUS; SUBCUTANEOUS at 18:15

## 2022-01-01 RX ADMIN — HEPARIN SODIUM 5000 UNITS: 5000 INJECTION, SOLUTION INTRAVENOUS; SUBCUTANEOUS at 14:15

## 2022-01-01 RX ADMIN — SEVELAMER CARBONATE 1600 MG: 800 TABLET, FILM COATED ORAL at 17:56

## 2022-01-01 RX ADMIN — DOCUSATE SODIUM 50 MG AND SENNOSIDES 8.6 MG 2 TABLET: 8.6; 5 TABLET, FILM COATED ORAL at 05:26

## 2022-01-01 RX ADMIN — SEVELAMER CARBONATE 1600 MG: 800 TABLET, FILM COATED ORAL at 09:36

## 2022-01-01 RX ADMIN — ACETAMINOPHEN 1000 MG: 500 TABLET, FILM COATED ORAL at 12:02

## 2022-01-01 RX ADMIN — HEPARIN SODIUM 5000 UNITS: 5000 INJECTION, SOLUTION INTRAVENOUS; SUBCUTANEOUS at 06:00

## 2022-01-01 RX ADMIN — GABAPENTIN 100 MG: 100 CAPSULE ORAL at 05:08

## 2022-01-01 RX ADMIN — ACETAMINOPHEN 650 MG: 325 TABLET, FILM COATED ORAL at 16:57

## 2022-01-01 RX ADMIN — DOCUSATE SODIUM 50 MG AND SENNOSIDES 8.6 MG 2 TABLET: 8.6; 5 TABLET, FILM COATED ORAL at 17:56

## 2022-01-01 RX ADMIN — ROPINIROLE HYDROCHLORIDE 0.25 MG: 0.5 TABLET, FILM COATED ORAL at 06:21

## 2022-01-01 RX ADMIN — INSULIN GLARGINE-YFGN 5 UNITS: 100 INJECTION, SOLUTION SUBCUTANEOUS at 17:55

## 2022-01-01 RX ADMIN — INSULIN GLARGINE-YFGN 5 UNITS: 100 INJECTION, SOLUTION SUBCUTANEOUS at 18:08

## 2022-01-01 RX ADMIN — DOCUSATE SODIUM 50 MG AND SENNOSIDES 8.6 MG 2 TABLET: 8.6; 5 TABLET, FILM COATED ORAL at 05:40

## 2022-01-01 RX ADMIN — SEVELAMER CARBONATE 1600 MG: 800 TABLET, FILM COATED ORAL at 18:18

## 2022-01-01 RX ADMIN — ACETAMINOPHEN 1000 MG: 500 TABLET, FILM COATED ORAL at 04:40

## 2022-01-01 RX ADMIN — LIDOCAINE HYDROCHLORIDE 20 ML: 20 INJECTION, SOLUTION INFILTRATION; PERINEURAL at 18:10

## 2022-01-01 RX ADMIN — CALCITRIOL CAPSULES 0.25 MCG 0.25 MCG: 0.25 CAPSULE ORAL at 05:28

## 2022-01-01 RX ADMIN — HEPARIN SODIUM 5000 UNITS: 5000 INJECTION, SOLUTION INTRAVENOUS; SUBCUTANEOUS at 21:31

## 2022-01-01 RX ADMIN — INSULIN GLARGINE-YFGN 5 UNITS: 100 INJECTION, SOLUTION SUBCUTANEOUS at 18:56

## 2022-01-01 RX ADMIN — SEVELAMER CARBONATE 1600 MG: 800 TABLET, FILM COATED ORAL at 08:56

## 2022-01-01 RX ADMIN — HEPARIN SODIUM 5000 UNITS: 5000 INJECTION, SOLUTION INTRAVENOUS; SUBCUTANEOUS at 13:15

## 2022-01-01 RX ADMIN — SEVELAMER CARBONATE 1600 MG: 800 TABLET, FILM COATED ORAL at 11:24

## 2022-01-01 RX ADMIN — ROPINIROLE HYDROCHLORIDE 0.25 MG: 0.5 TABLET, FILM COATED ORAL at 05:28

## 2022-01-01 RX ADMIN — INSULIN LISPRO 2 UNITS: 100 INJECTION, SOLUTION INTRAVENOUS; SUBCUTANEOUS at 18:03

## 2022-01-01 RX ADMIN — GABAPENTIN 100 MG: 100 CAPSULE ORAL at 19:13

## 2022-01-01 RX ADMIN — INSULIN LISPRO 1 UNITS: 100 INJECTION, SOLUTION INTRAVENOUS; SUBCUTANEOUS at 21:45

## 2022-01-01 RX ADMIN — GABAPENTIN 100 MG: 100 CAPSULE ORAL at 12:15

## 2022-01-01 RX ADMIN — CEFAZOLIN SODIUM 1 G: 1 INJECTION, SOLUTION INTRAVENOUS at 19:13

## 2022-01-01 RX ADMIN — OXYCODONE 2.5 MG: 5 TABLET ORAL at 00:09

## 2022-01-01 RX ADMIN — LIDOCAINE 2 PATCH: 700 PATCH TOPICAL at 08:22

## 2022-01-01 RX ADMIN — DOCUSATE SODIUM 50 MG AND SENNOSIDES 8.6 MG 2 TABLET: 8.6; 5 TABLET, FILM COATED ORAL at 06:06

## 2022-01-01 RX ADMIN — HEPARIN SODIUM 5000 UNITS: 5000 INJECTION, SOLUTION INTRAVENOUS; SUBCUTANEOUS at 21:42

## 2022-01-01 RX ADMIN — ATORVASTATIN CALCIUM 20 MG: 20 TABLET, FILM COATED ORAL at 05:27

## 2022-01-01 RX ADMIN — SEVELAMER CARBONATE 1600 MG: 800 TABLET, FILM COATED ORAL at 10:22

## 2022-01-01 RX ADMIN — GABAPENTIN 100 MG: 100 CAPSULE ORAL at 17:14

## 2022-01-01 RX ADMIN — ATORVASTATIN CALCIUM 20 MG: 20 TABLET, FILM COATED ORAL at 04:28

## 2022-01-01 RX ADMIN — OXYCODONE 2.5 MG: 5 TABLET ORAL at 16:23

## 2022-01-01 RX ADMIN — ACETAMINOPHEN 1000 MG: 500 TABLET, FILM COATED ORAL at 10:53

## 2022-01-01 RX ADMIN — ROPINIROLE HYDROCHLORIDE 0.25 MG: 0.5 TABLET, FILM COATED ORAL at 17:49

## 2022-01-01 RX ADMIN — LEVOTHYROXINE SODIUM 25 MCG: 0.03 TABLET ORAL at 05:27

## 2022-01-01 RX ADMIN — GABAPENTIN 100 MG: 100 CAPSULE ORAL at 05:06

## 2022-01-01 RX ADMIN — ATORVASTATIN CALCIUM 20 MG: 20 TABLET, FILM COATED ORAL at 06:24

## 2022-01-01 RX ADMIN — ROPINIROLE HYDROCHLORIDE 0.25 MG: 0.5 TABLET, FILM COATED ORAL at 05:27

## 2022-01-01 RX ADMIN — ACETAMINOPHEN 1000 MG: 500 TABLET, FILM COATED ORAL at 23:38

## 2022-01-01 RX ADMIN — ATORVASTATIN CALCIUM 20 MG: 20 TABLET, FILM COATED ORAL at 05:13

## 2022-01-01 RX ADMIN — GABAPENTIN 100 MG: 100 CAPSULE ORAL at 17:49

## 2022-01-01 RX ADMIN — INSULIN GLARGINE-YFGN 5 UNITS: 100 INJECTION, SOLUTION SUBCUTANEOUS at 17:12

## 2022-01-01 RX ADMIN — ROPINIROLE HYDROCHLORIDE 0.25 MG: 0.5 TABLET, FILM COATED ORAL at 17:57

## 2022-01-01 RX ADMIN — SEVELAMER CARBONATE 1600 MG: 800 TABLET, FILM COATED ORAL at 12:21

## 2022-01-01 RX ADMIN — ROPINIROLE HYDROCHLORIDE 0.25 MG: 0.5 TABLET, FILM COATED ORAL at 05:10

## 2022-01-01 RX ADMIN — SEVELAMER CARBONATE 1600 MG: 800 TABLET, FILM COATED ORAL at 14:14

## 2022-01-01 RX ADMIN — ACETAMINOPHEN 1000 MG: 500 TABLET, FILM COATED ORAL at 18:39

## 2022-01-01 RX ADMIN — GABAPENTIN 100 MG: 100 CAPSULE ORAL at 04:30

## 2022-01-01 RX ADMIN — DOCUSATE SODIUM 50 MG AND SENNOSIDES 8.6 MG 2 TABLET: 8.6; 5 TABLET, FILM COATED ORAL at 21:02

## 2022-01-01 RX ADMIN — PANTOPRAZOLE SODIUM 40 MG: 40 INJECTION, POWDER, FOR SOLUTION INTRAVENOUS at 17:00

## 2022-01-01 RX ADMIN — INSULIN LISPRO 1 UNITS: 100 INJECTION, SOLUTION INTRAVENOUS; SUBCUTANEOUS at 20:22

## 2022-01-01 RX ADMIN — GABAPENTIN 100 MG: 100 CAPSULE ORAL at 13:26

## 2022-01-01 RX ADMIN — SEVELAMER CARBONATE 1600 MG: 800 TABLET, FILM COATED ORAL at 13:04

## 2022-01-01 RX ADMIN — CALCITRIOL CAPSULES 0.25 MCG 0.25 MCG: 0.25 CAPSULE ORAL at 05:46

## 2022-01-01 RX ADMIN — GABAPENTIN 100 MG: 100 CAPSULE ORAL at 06:07

## 2022-01-01 RX ADMIN — ACETAMINOPHEN 1000 MG: 500 TABLET, FILM COATED ORAL at 14:59

## 2022-01-01 RX ADMIN — LIDOCAINE 2 PATCH: 700 PATCH TOPICAL at 09:13

## 2022-01-01 RX ADMIN — ASPIRIN 325 MG: 325 TABLET ORAL at 04:40

## 2022-01-01 RX ADMIN — LIDOCAINE 2 PATCH: 700 PATCH TOPICAL at 10:24

## 2022-01-01 RX ADMIN — ACETAMINOPHEN 1000 MG: 500 TABLET, FILM COATED ORAL at 06:27

## 2022-01-01 RX ADMIN — SEVELAMER CARBONATE 1600 MG: 800 TABLET, FILM COATED ORAL at 08:41

## 2022-01-01 RX ADMIN — HEPARIN SODIUM 5000 UNITS: 5000 INJECTION, SOLUTION INTRAVENOUS; SUBCUTANEOUS at 05:10

## 2022-01-01 RX ADMIN — SEVELAMER CARBONATE 1600 MG: 800 TABLET, FILM COATED ORAL at 08:01

## 2022-01-01 RX ADMIN — ROPINIROLE HYDROCHLORIDE 0.25 MG: 0.5 TABLET, FILM COATED ORAL at 05:39

## 2022-01-01 RX ADMIN — HEPARIN SODIUM 5000 UNITS: 5000 INJECTION, SOLUTION INTRAVENOUS; SUBCUTANEOUS at 21:39

## 2022-01-01 RX ADMIN — LIDOCAINE 2 PATCH: 700 PATCH TOPICAL at 09:56

## 2022-01-01 RX ADMIN — Medication 5 MG: at 20:32

## 2022-01-01 RX ADMIN — SEVELAMER CARBONATE 1600 MG: 800 TABLET, FILM COATED ORAL at 09:23

## 2022-01-01 RX ADMIN — INSULIN GLARGINE-YFGN 10 UNITS: 100 INJECTION, SOLUTION SUBCUTANEOUS at 17:02

## 2022-01-01 RX ADMIN — HYDROMORPHONE HYDROCHLORIDE 0.25 MG: 1 INJECTION, SOLUTION INTRAMUSCULAR; INTRAVENOUS; SUBCUTANEOUS at 13:41

## 2022-01-01 RX ADMIN — INSULIN LISPRO 3 UNITS: 100 INJECTION, SOLUTION INTRAVENOUS; SUBCUTANEOUS at 17:37

## 2022-01-01 RX ADMIN — HEPARIN SODIUM 5000 UNITS: 5000 INJECTION, SOLUTION INTRAVENOUS; SUBCUTANEOUS at 13:13

## 2022-01-01 RX ADMIN — OXYCODONE 2.5 MG: 5 TABLET ORAL at 15:26

## 2022-01-01 RX ADMIN — HEPARIN SODIUM 5000 UNITS: 5000 INJECTION, SOLUTION INTRAVENOUS; SUBCUTANEOUS at 21:50

## 2022-01-01 RX ADMIN — LEVOTHYROXINE SODIUM 25 MCG: 0.03 TABLET ORAL at 05:22

## 2022-01-01 RX ADMIN — INSULIN LISPRO 3 UNITS: 100 INJECTION, SOLUTION INTRAVENOUS; SUBCUTANEOUS at 18:00

## 2022-01-01 RX ADMIN — ACETAMINOPHEN 1000 MG: 500 TABLET, FILM COATED ORAL at 05:30

## 2022-01-01 RX ADMIN — HEPARIN SODIUM 5000 UNITS: 5000 INJECTION, SOLUTION INTRAVENOUS; SUBCUTANEOUS at 21:44

## 2022-01-01 RX ADMIN — POLYETHYLENE GLYCOL 3350 1 PACKET: 17 POWDER, FOR SOLUTION ORAL at 05:25

## 2022-01-01 RX ADMIN — SEVELAMER CARBONATE 1600 MG: 800 TABLET, FILM COATED ORAL at 17:02

## 2022-01-01 RX ADMIN — INSULIN LISPRO 2 UNITS: 100 INJECTION, SOLUTION INTRAVENOUS; SUBCUTANEOUS at 17:26

## 2022-01-01 RX ADMIN — ROPINIROLE HYDROCHLORIDE 0.25 MG: 0.5 TABLET, FILM COATED ORAL at 05:09

## 2022-01-01 RX ADMIN — INSULIN LISPRO 2 UNITS: 100 INJECTION, SOLUTION INTRAVENOUS; SUBCUTANEOUS at 18:34

## 2022-01-01 RX ADMIN — HEPARIN SODIUM 5000 UNITS: 5000 INJECTION, SOLUTION INTRAVENOUS; SUBCUTANEOUS at 20:27

## 2022-01-01 RX ADMIN — SEVELAMER CARBONATE 1600 MG: 800 TABLET, FILM COATED ORAL at 18:06

## 2022-01-01 RX ADMIN — HEPARIN SODIUM 5000 UNITS: 5000 INJECTION, SOLUTION INTRAVENOUS; SUBCUTANEOUS at 05:09

## 2022-01-01 RX ADMIN — ACETAMINOPHEN 1000 MG: 500 TABLET, FILM COATED ORAL at 15:23

## 2022-01-01 RX ADMIN — SEVELAMER CARBONATE 1600 MG: 800 TABLET, FILM COATED ORAL at 13:52

## 2022-01-01 RX ADMIN — LIDOCAINE 2 PATCH: 700 PATCH TOPICAL at 08:32

## 2022-01-01 RX ADMIN — ACETAMINOPHEN 1000 MG: 500 TABLET, FILM COATED ORAL at 18:11

## 2022-01-01 RX ADMIN — HYDROMORPHONE HYDROCHLORIDE 0.25 MG: 1 INJECTION, SOLUTION INTRAMUSCULAR; INTRAVENOUS; SUBCUTANEOUS at 03:02

## 2022-01-01 RX ADMIN — ACETAMINOPHEN 1000 MG: 500 TABLET, FILM COATED ORAL at 13:53

## 2022-01-01 RX ADMIN — INSULIN LISPRO 2 UNITS: 100 INJECTION, SOLUTION INTRAVENOUS; SUBCUTANEOUS at 13:01

## 2022-01-01 RX ADMIN — ROPINIROLE HYDROCHLORIDE 0.25 MG: 0.5 TABLET, FILM COATED ORAL at 16:34

## 2022-01-01 RX ADMIN — HEPARIN SODIUM 5000 UNITS: 5000 INJECTION, SOLUTION INTRAVENOUS; SUBCUTANEOUS at 13:14

## 2022-01-01 RX ADMIN — OXYCODONE 5 MG: 5 TABLET ORAL at 17:16

## 2022-01-01 RX ADMIN — POLYETHYLENE GLYCOL 3350 1 PACKET: 17 POWDER, FOR SOLUTION ORAL at 05:24

## 2022-01-01 RX ADMIN — ACETAMINOPHEN 1000 MG: 500 TABLET, FILM COATED ORAL at 04:48

## 2022-01-01 RX ADMIN — OXYCODONE 2.5 MG: 5 TABLET ORAL at 06:21

## 2022-01-01 RX ADMIN — ACETAMINOPHEN 1000 MG: 500 TABLET, FILM COATED ORAL at 13:09

## 2022-01-01 RX ADMIN — LIDOCAINE 2 PATCH: 700 PATCH TOPICAL at 09:42

## 2022-01-01 RX ADMIN — LEVOTHYROXINE SODIUM 25 MCG: 0.03 TABLET ORAL at 06:08

## 2022-01-01 RX ADMIN — OXYCODONE 2.5 MG: 5 TABLET ORAL at 04:46

## 2022-01-01 RX ADMIN — OMEPRAZOLE 20 MG: 20 CAPSULE, DELAYED RELEASE ORAL at 18:08

## 2022-01-01 RX ADMIN — GABAPENTIN 100 MG: 100 CAPSULE ORAL at 04:35

## 2022-01-01 RX ADMIN — CEFAZOLIN SODIUM 1 G: 1 INJECTION, SOLUTION INTRAVENOUS at 15:56

## 2022-01-01 RX ADMIN — INSULIN LISPRO 1 UNITS: 100 INJECTION, SOLUTION INTRAVENOUS; SUBCUTANEOUS at 21:39

## 2022-01-01 RX ADMIN — HEPARIN SODIUM 5000 UNITS: 5000 INJECTION, SOLUTION INTRAVENOUS; SUBCUTANEOUS at 14:13

## 2022-01-01 RX ADMIN — ATORVASTATIN CALCIUM 20 MG: 20 TABLET, FILM COATED ORAL at 04:40

## 2022-01-01 RX ADMIN — INSULIN GLARGINE-YFGN 5 UNITS: 100 INJECTION, SOLUTION SUBCUTANEOUS at 17:25

## 2022-01-01 RX ADMIN — ACETAMINOPHEN 1000 MG: 500 TABLET, FILM COATED ORAL at 16:52

## 2022-01-01 RX ADMIN — HEPARIN SODIUM 5000 UNITS: 5000 INJECTION, SOLUTION INTRAVENOUS; SUBCUTANEOUS at 20:52

## 2022-01-01 RX ADMIN — ACETAMINOPHEN 1000 MG: 500 TABLET, FILM COATED ORAL at 12:39

## 2022-01-01 RX ADMIN — LIDOCAINE 2 PATCH: 700 PATCH TOPICAL at 09:38

## 2022-01-01 RX ADMIN — CEFAZOLIN SODIUM 1 G: 1 INJECTION, SOLUTION INTRAVENOUS at 17:54

## 2022-01-01 RX ADMIN — CALCITRIOL CAPSULES 0.25 MCG 0.25 MCG: 0.25 CAPSULE ORAL at 06:23

## 2022-01-01 RX ADMIN — SEVELAMER CARBONATE 1600 MG: 800 TABLET, FILM COATED ORAL at 09:25

## 2022-01-01 RX ADMIN — SEVELAMER CARBONATE 1600 MG: 800 TABLET, FILM COATED ORAL at 13:56

## 2022-01-01 RX ADMIN — INSULIN LISPRO 2 UNITS: 100 INJECTION, SOLUTION INTRAVENOUS; SUBCUTANEOUS at 21:32

## 2022-01-01 RX ADMIN — SEVELAMER CARBONATE 1600 MG: 800 TABLET, FILM COATED ORAL at 18:11

## 2022-01-01 RX ADMIN — DOCUSATE SODIUM 50 MG AND SENNOSIDES 8.6 MG 2 TABLET: 8.6; 5 TABLET, FILM COATED ORAL at 16:26

## 2022-01-01 RX ADMIN — LEVOTHYROXINE SODIUM 25 MCG: 0.03 TABLET ORAL at 04:23

## 2022-01-01 RX ADMIN — GABAPENTIN 100 MG: 100 CAPSULE ORAL at 17:42

## 2022-01-01 RX ADMIN — ACETAMINOPHEN 650 MG: 325 TABLET, FILM COATED ORAL at 13:04

## 2022-01-01 RX ADMIN — ACETAMINOPHEN 650 MG: 325 TABLET, FILM COATED ORAL at 05:46

## 2022-01-01 RX ADMIN — LIDOCAINE 2 PATCH: 700 PATCH TOPICAL at 09:02

## 2022-01-01 RX ADMIN — OXYCODONE 2.5 MG: 5 TABLET ORAL at 09:39

## 2022-01-01 RX ADMIN — SEVELAMER CARBONATE 1600 MG: 800 TABLET, FILM COATED ORAL at 16:58

## 2022-01-01 RX ADMIN — GABAPENTIN 100 MG: 100 CAPSULE ORAL at 18:01

## 2022-01-01 RX ADMIN — ROPINIROLE HYDROCHLORIDE 0.25 MG: 0.5 TABLET, FILM COATED ORAL at 04:47

## 2022-01-01 RX ADMIN — CALCITRIOL CAPSULES 0.25 MCG 0.25 MCG: 0.25 CAPSULE ORAL at 05:24

## 2022-01-01 RX ADMIN — INSULIN LISPRO 1 UNITS: 100 INJECTION, SOLUTION INTRAVENOUS; SUBCUTANEOUS at 13:15

## 2022-01-01 RX ADMIN — GABAPENTIN 100 MG: 100 CAPSULE ORAL at 06:32

## 2022-01-01 RX ADMIN — GABAPENTIN 200 MG: 100 CAPSULE ORAL at 21:09

## 2022-01-01 RX ADMIN — SEVELAMER CARBONATE 1600 MG: 800 TABLET, FILM COATED ORAL at 17:26

## 2022-01-01 RX ADMIN — HEPARIN SODIUM 5000 UNITS: 5000 INJECTION, SOLUTION INTRAVENOUS; SUBCUTANEOUS at 13:09

## 2022-01-01 RX ADMIN — INSULIN LISPRO 1 UNITS: 100 INJECTION, SOLUTION INTRAVENOUS; SUBCUTANEOUS at 09:11

## 2022-01-01 RX ADMIN — HEPARIN SODIUM 5000 UNITS: 5000 INJECTION, SOLUTION INTRAVENOUS; SUBCUTANEOUS at 13:20

## 2022-01-01 RX ADMIN — OXYCODONE 2.5 MG: 5 TABLET ORAL at 04:49

## 2022-01-01 RX ADMIN — ROPINIROLE HYDROCHLORIDE 0.25 MG: 0.5 TABLET, FILM COATED ORAL at 16:25

## 2022-01-01 RX ADMIN — SEVELAMER CARBONATE 1600 MG: 800 TABLET, FILM COATED ORAL at 09:12

## 2022-01-01 RX ADMIN — LEVOTHYROXINE SODIUM 25 MCG: 0.03 TABLET ORAL at 04:24

## 2022-01-01 RX ADMIN — EPOETIN ALFA-EPBX 5000 UNITS: 3000 INJECTION, SOLUTION INTRAVENOUS; SUBCUTANEOUS at 17:23

## 2022-01-01 RX ADMIN — INSULIN LISPRO 1 UNITS: 100 INJECTION, SOLUTION INTRAVENOUS; SUBCUTANEOUS at 21:40

## 2022-01-01 RX ADMIN — OXYCODONE 2.5 MG: 5 TABLET ORAL at 16:16

## 2022-01-01 RX ADMIN — HEPARIN SODIUM 5000 UNITS: 5000 INJECTION, SOLUTION INTRAVENOUS; SUBCUTANEOUS at 16:39

## 2022-01-01 RX ADMIN — ACETAMINOPHEN 1000 MG: 500 TABLET, FILM COATED ORAL at 13:12

## 2022-01-01 RX ADMIN — LEVOTHYROXINE SODIUM 25 MCG: 0.03 TABLET ORAL at 05:09

## 2022-01-01 RX ADMIN — ACETAMINOPHEN 650 MG: 325 TABLET, FILM COATED ORAL at 13:41

## 2022-01-01 RX ADMIN — SEVELAMER CARBONATE 1600 MG: 800 TABLET, FILM COATED ORAL at 12:19

## 2022-01-01 RX ADMIN — OXYCODONE 2.5 MG: 5 TABLET ORAL at 03:28

## 2022-01-01 RX ADMIN — INSULIN LISPRO 1 UNITS: 100 INJECTION, SOLUTION INTRAVENOUS; SUBCUTANEOUS at 13:36

## 2022-01-01 RX ADMIN — HYDROMORPHONE HYDROCHLORIDE 0.25 MG: 1 INJECTION, SOLUTION INTRAMUSCULAR; INTRAVENOUS; SUBCUTANEOUS at 23:59

## 2022-01-01 RX ADMIN — SEVELAMER CARBONATE 1600 MG: 800 TABLET, FILM COATED ORAL at 18:04

## 2022-01-01 RX ADMIN — ATORVASTATIN CALCIUM 20 MG: 20 TABLET, FILM COATED ORAL at 05:07

## 2022-01-01 RX ADMIN — ROPINIROLE HYDROCHLORIDE 0.25 MG: 0.5 TABLET, FILM COATED ORAL at 06:16

## 2022-01-01 RX ADMIN — OXYCODONE 2.5 MG: 5 TABLET ORAL at 00:20

## 2022-01-01 RX ADMIN — DOCUSATE SODIUM 50 MG AND SENNOSIDES 8.6 MG 2 TABLET: 8.6; 5 TABLET, FILM COATED ORAL at 18:13

## 2022-01-01 RX ADMIN — ATORVASTATIN CALCIUM 20 MG: 20 TABLET, FILM COATED ORAL at 05:46

## 2022-01-01 RX ADMIN — DOCUSATE SODIUM 50 MG AND SENNOSIDES 8.6 MG 2 TABLET: 8.6; 5 TABLET, FILM COATED ORAL at 18:39

## 2022-01-01 RX ADMIN — LEVOTHYROXINE SODIUM 25 MCG: 0.03 TABLET ORAL at 06:15

## 2022-01-01 RX ADMIN — ACETAMINOPHEN 1000 MG: 500 TABLET, FILM COATED ORAL at 17:50

## 2022-01-01 RX ADMIN — HEPARIN SODIUM 5000 UNITS: 5000 INJECTION, SOLUTION INTRAVENOUS; SUBCUTANEOUS at 21:11

## 2022-01-01 RX ADMIN — INSULIN LISPRO 2 UNITS: 100 INJECTION, SOLUTION INTRAVENOUS; SUBCUTANEOUS at 18:38

## 2022-01-01 RX ADMIN — HEPARIN SODIUM 5000 UNITS: 5000 INJECTION, SOLUTION INTRAVENOUS; SUBCUTANEOUS at 21:04

## 2022-01-01 RX ADMIN — OXYCODONE 2.5 MG: 5 TABLET ORAL at 17:43

## 2022-01-01 RX ADMIN — ATORVASTATIN CALCIUM 20 MG: 20 TABLET, FILM COATED ORAL at 05:02

## 2022-01-01 RX ADMIN — INSULIN LISPRO 1 UNITS: 100 INJECTION, SOLUTION INTRAVENOUS; SUBCUTANEOUS at 21:11

## 2022-01-01 RX ADMIN — HEPARIN SODIUM 5000 UNITS: 5000 INJECTION, SOLUTION INTRAVENOUS; SUBCUTANEOUS at 05:38

## 2022-01-01 RX ADMIN — LIDOCAINE 2 PATCH: 700 PATCH TOPICAL at 09:23

## 2022-01-01 RX ADMIN — INSULIN LISPRO 2 UNITS: 100 INJECTION, SOLUTION INTRAVENOUS; SUBCUTANEOUS at 20:39

## 2022-01-01 RX ADMIN — MIDODRINE HYDROCHLORIDE 5 MG: 5 TABLET ORAL at 06:33

## 2022-01-01 RX ADMIN — INSULIN LISPRO 2 UNITS: 100 INJECTION, SOLUTION INTRAVENOUS; SUBCUTANEOUS at 12:52

## 2022-01-01 RX ADMIN — HEPARIN SODIUM 5000 UNITS: 5000 INJECTION, SOLUTION INTRAVENOUS; SUBCUTANEOUS at 14:24

## 2022-01-01 RX ADMIN — ACETAMINOPHEN 1000 MG: 500 TABLET, FILM COATED ORAL at 21:52

## 2022-01-01 RX ADMIN — INSULIN LISPRO 1 UNITS: 100 INJECTION, SOLUTION INTRAVENOUS; SUBCUTANEOUS at 19:01

## 2022-01-01 RX ADMIN — HYDROMORPHONE HYDROCHLORIDE 0.25 MG: 1 INJECTION, SOLUTION INTRAMUSCULAR; INTRAVENOUS; SUBCUTANEOUS at 21:36

## 2022-01-01 RX ADMIN — INSULIN LISPRO 2 UNITS: 100 INJECTION, SOLUTION INTRAVENOUS; SUBCUTANEOUS at 17:13

## 2022-01-01 RX ADMIN — GABAPENTIN 100 MG: 100 CAPSULE ORAL at 18:07

## 2022-01-01 RX ADMIN — ACETAMINOPHEN 1000 MG: 500 TABLET, FILM COATED ORAL at 23:43

## 2022-01-01 RX ADMIN — ATORVASTATIN CALCIUM 20 MG: 20 TABLET, FILM COATED ORAL at 04:32

## 2022-01-01 RX ADMIN — GABAPENTIN 100 MG: 100 CAPSULE ORAL at 17:15

## 2022-01-01 RX ADMIN — HEPARIN SODIUM 5000 UNITS: 5000 INJECTION, SOLUTION INTRAVENOUS; SUBCUTANEOUS at 15:22

## 2022-01-01 RX ADMIN — SEVELAMER CARBONATE 1600 MG: 800 TABLET, FILM COATED ORAL at 12:11

## 2022-01-01 RX ADMIN — SEVELAMER CARBONATE 1600 MG: 800 TABLET, FILM COATED ORAL at 09:07

## 2022-01-01 RX ADMIN — POLYETHYLENE GLYCOL 3350 1 PACKET: 17 POWDER, FOR SOLUTION ORAL at 06:06

## 2022-01-01 RX ADMIN — HEPARIN SODIUM 5000 UNITS: 5000 INJECTION, SOLUTION INTRAVENOUS; SUBCUTANEOUS at 13:51

## 2022-01-01 RX ADMIN — ACETAMINOPHEN 1000 MG: 500 TABLET, FILM COATED ORAL at 18:00

## 2022-01-01 RX ADMIN — SEVELAMER CARBONATE 800 MG: 800 TABLET, FILM COATED ORAL at 09:00

## 2022-01-01 RX ADMIN — DOCUSATE SODIUM 50 MG AND SENNOSIDES 8.6 MG 2 TABLET: 8.6; 5 TABLET, FILM COATED ORAL at 05:24

## 2022-01-01 RX ADMIN — HEPARIN SODIUM 5000 UNITS: 5000 INJECTION, SOLUTION INTRAVENOUS; SUBCUTANEOUS at 21:07

## 2022-01-01 RX ADMIN — LEVOTHYROXINE SODIUM 25 MCG: 0.03 TABLET ORAL at 06:03

## 2022-01-01 RX ADMIN — ACETAMINOPHEN 1000 MG: 500 TABLET, FILM COATED ORAL at 18:18

## 2022-01-01 RX ADMIN — INSULIN GLARGINE-YFGN 5 UNITS: 100 INJECTION, SOLUTION SUBCUTANEOUS at 18:17

## 2022-01-01 RX ADMIN — GABAPENTIN 100 MG: 100 CAPSULE ORAL at 17:50

## 2022-01-01 RX ADMIN — INSULIN GLARGINE-YFGN 15 UNITS: 100 INJECTION, SOLUTION SUBCUTANEOUS at 17:03

## 2022-01-01 RX ADMIN — Medication 5 MG: at 00:28

## 2022-01-01 RX ADMIN — LIDOCAINE 2 PATCH: 700 PATCH TOPICAL at 11:23

## 2022-01-01 RX ADMIN — GABAPENTIN 100 MG: 100 CAPSULE ORAL at 04:39

## 2022-01-01 RX ADMIN — INSULIN LISPRO 1 UNITS: 100 INJECTION, SOLUTION INTRAVENOUS; SUBCUTANEOUS at 17:51

## 2022-01-01 RX ADMIN — DOCUSATE SODIUM 50 MG AND SENNOSIDES 8.6 MG 2 TABLET: 8.6; 5 TABLET, FILM COATED ORAL at 06:39

## 2022-01-01 RX ADMIN — INSULIN GLARGINE-YFGN 5 UNITS: 100 INJECTION, SOLUTION SUBCUTANEOUS at 18:16

## 2022-01-01 RX ADMIN — HEPARIN SODIUM 5000 UNITS: 5000 INJECTION, SOLUTION INTRAVENOUS; SUBCUTANEOUS at 21:55

## 2022-01-01 RX ADMIN — DOCUSATE SODIUM 50 MG AND SENNOSIDES 8.6 MG 2 TABLET: 8.6; 5 TABLET, FILM COATED ORAL at 05:28

## 2022-01-01 RX ADMIN — SENNOSIDES AND DOCUSATE SODIUM 2 TABLET: 50; 8.6 TABLET ORAL at 18:04

## 2022-01-01 RX ADMIN — SEVELAMER CARBONATE 1600 MG: 800 TABLET, FILM COATED ORAL at 17:49

## 2022-01-01 RX ADMIN — INSULIN GLARGINE-YFGN 5 UNITS: 100 INJECTION, SOLUTION SUBCUTANEOUS at 19:12

## 2022-01-01 RX ADMIN — OMEPRAZOLE 20 MG: 20 CAPSULE, DELAYED RELEASE ORAL at 05:12

## 2022-01-01 RX ADMIN — GABAPENTIN 100 MG: 100 CAPSULE ORAL at 17:56

## 2022-01-01 RX ADMIN — LEVOTHYROXINE SODIUM 25 MCG: 0.03 TABLET ORAL at 04:35

## 2022-01-01 RX ADMIN — ACETAMINOPHEN 650 MG: 325 TABLET, FILM COATED ORAL at 18:08

## 2022-01-01 RX ADMIN — CALCITRIOL CAPSULES 0.25 MCG 0.25 MCG: 0.25 CAPSULE ORAL at 06:17

## 2022-01-01 RX ADMIN — ROPINIROLE HYDROCHLORIDE 0.25 MG: 0.5 TABLET, FILM COATED ORAL at 05:02

## 2022-01-01 RX ADMIN — DOCUSATE SODIUM 50 MG AND SENNOSIDES 8.6 MG 2 TABLET: 8.6; 5 TABLET, FILM COATED ORAL at 06:33

## 2022-01-01 RX ADMIN — Medication 1 EACH: at 10:09

## 2022-01-01 RX ADMIN — CALCITRIOL CAPSULES 0.25 MCG 0.25 MCG: 0.25 CAPSULE ORAL at 05:17

## 2022-01-01 RX ADMIN — INSULIN GLARGINE-YFGN 5 UNITS: 100 INJECTION, SOLUTION SUBCUTANEOUS at 18:51

## 2022-01-01 RX ADMIN — ACETAMINOPHEN 1000 MG: 500 TABLET, FILM COATED ORAL at 09:12

## 2022-01-01 RX ADMIN — ROPINIROLE HYDROCHLORIDE 0.25 MG: 0.5 TABLET, FILM COATED ORAL at 18:14

## 2022-01-01 RX ADMIN — GABAPENTIN 100 MG: 100 CAPSULE ORAL at 05:30

## 2022-01-01 RX ADMIN — PANTOPRAZOLE SODIUM 40 MG: 40 INJECTION, POWDER, LYOPHILIZED, FOR SOLUTION INTRAVENOUS at 04:38

## 2022-01-01 RX ADMIN — ACETAMINOPHEN 1000 MG: 500 TABLET, FILM COATED ORAL at 09:28

## 2022-01-01 RX ADMIN — INSULIN GLARGINE-YFGN 5 UNITS: 100 INJECTION, SOLUTION SUBCUTANEOUS at 18:37

## 2022-01-01 RX ADMIN — SEVELAMER CARBONATE 1600 MG: 800 TABLET, FILM COATED ORAL at 13:43

## 2022-01-01 RX ADMIN — INSULIN LISPRO 1 UNITS: 100 INJECTION, SOLUTION INTRAVENOUS; SUBCUTANEOUS at 12:28

## 2022-01-01 RX ADMIN — INSULIN LISPRO 2 UNITS: 100 INJECTION, SOLUTION INTRAVENOUS; SUBCUTANEOUS at 20:23

## 2022-01-01 RX ADMIN — OXYCODONE 5 MG: 5 TABLET ORAL at 04:28

## 2022-01-01 RX ADMIN — OXYCODONE 2.5 MG: 5 TABLET ORAL at 00:32

## 2022-01-01 RX ADMIN — ATORVASTATIN CALCIUM 20 MG: 20 TABLET, FILM COATED ORAL at 05:25

## 2022-01-01 RX ADMIN — HYDROMORPHONE HYDROCHLORIDE 0.5 MG: 1 INJECTION, SOLUTION INTRAMUSCULAR; INTRAVENOUS; SUBCUTANEOUS at 05:27

## 2022-01-01 RX ADMIN — ACETAMINOPHEN 1000 MG: 500 TABLET, FILM COATED ORAL at 17:58

## 2022-01-01 RX ADMIN — PANTOPRAZOLE SODIUM 40 MG: 40 INJECTION, POWDER, FOR SOLUTION INTRAVENOUS at 17:14

## 2022-01-01 RX ADMIN — GABAPENTIN 100 MG: 100 CAPSULE ORAL at 04:38

## 2022-01-01 RX ADMIN — POLYETHYLENE GLYCOL 3350 1 PACKET: 17 POWDER, FOR SOLUTION ORAL at 04:24

## 2022-01-01 RX ADMIN — FUROSEMIDE 80 MG: 10 INJECTION, SOLUTION INTRAMUSCULAR; INTRAVENOUS at 14:22

## 2022-01-01 RX ADMIN — HEPARIN SODIUM 5000 UNITS: 5000 INJECTION, SOLUTION INTRAVENOUS; SUBCUTANEOUS at 12:25

## 2022-01-01 RX ADMIN — ROPINIROLE HYDROCHLORIDE 0.25 MG: 0.5 TABLET, FILM COATED ORAL at 04:41

## 2022-01-01 RX ADMIN — DOCUSATE SODIUM 50 MG AND SENNOSIDES 8.6 MG 2 TABLET: 8.6; 5 TABLET, FILM COATED ORAL at 06:27

## 2022-01-01 RX ADMIN — LIDOCAINE 2 PATCH: 700 PATCH TOPICAL at 10:06

## 2022-01-01 RX ADMIN — POLYETHYLENE GLYCOL 3350 1 PACKET: 17 POWDER, FOR SOLUTION ORAL at 04:33

## 2022-01-01 RX ADMIN — HEPARIN SODIUM 5000 UNITS: 5000 INJECTION, SOLUTION INTRAVENOUS; SUBCUTANEOUS at 21:08

## 2022-01-01 RX ADMIN — FENTANYL CITRATE 50 MCG: 50 INJECTION, SOLUTION INTRAMUSCULAR; INTRAVENOUS at 03:52

## 2022-01-01 RX ADMIN — HYDROMORPHONE HYDROCHLORIDE 0.25 MG: 1 INJECTION, SOLUTION INTRAMUSCULAR; INTRAVENOUS; SUBCUTANEOUS at 03:34

## 2022-01-01 RX ADMIN — HEPARIN SODIUM 5000 UNITS: 5000 INJECTION, SOLUTION INTRAVENOUS; SUBCUTANEOUS at 06:10

## 2022-01-01 RX ADMIN — GABAPENTIN 100 MG: 100 CAPSULE ORAL at 13:19

## 2022-01-01 RX ADMIN — LIDOCAINE 2 PATCH: 700 PATCH TOPICAL at 09:00

## 2022-01-01 RX ADMIN — LEVOTHYROXINE SODIUM 25 MCG: 0.03 TABLET ORAL at 04:08

## 2022-01-01 RX ADMIN — SEVELAMER CARBONATE 1600 MG: 800 TABLET, FILM COATED ORAL at 13:09

## 2022-01-01 RX ADMIN — OXYCODONE 2.5 MG: 5 TABLET ORAL at 11:11

## 2022-01-01 RX ADMIN — SEVELAMER CARBONATE 1600 MG: 800 TABLET, FILM COATED ORAL at 13:27

## 2022-01-01 RX ADMIN — ACETAMINOPHEN 1000 MG: 500 TABLET, FILM COATED ORAL at 11:41

## 2022-01-01 RX ADMIN — OXYCODONE 2.5 MG: 5 TABLET ORAL at 13:23

## 2022-01-01 RX ADMIN — ACETAMINOPHEN 1000 MG: 500 TABLET, FILM COATED ORAL at 23:46

## 2022-01-01 RX ADMIN — HYDROMORPHONE HYDROCHLORIDE 0.5 MG: 1 INJECTION, SOLUTION INTRAMUSCULAR; INTRAVENOUS; SUBCUTANEOUS at 05:09

## 2022-01-01 RX ADMIN — INSULIN GLARGINE-YFGN 5 UNITS: 100 INJECTION, SOLUTION SUBCUTANEOUS at 18:33

## 2022-01-01 RX ADMIN — SEVELAMER CARBONATE 1600 MG: 800 TABLET, FILM COATED ORAL at 07:56

## 2022-01-01 RX ADMIN — GABAPENTIN 100 MG: 100 CAPSULE ORAL at 17:36

## 2022-01-01 RX ADMIN — ACETAMINOPHEN 1000 MG: 500 TABLET, FILM COATED ORAL at 04:08

## 2022-01-01 RX ADMIN — GABAPENTIN 100 MG: 100 CAPSULE ORAL at 16:37

## 2022-01-01 RX ADMIN — INSULIN LISPRO 1 UNITS: 100 INJECTION, SOLUTION INTRAVENOUS; SUBCUTANEOUS at 13:56

## 2022-01-01 RX ADMIN — SEVELAMER CARBONATE 1600 MG: 800 TABLET, FILM COATED ORAL at 16:35

## 2022-01-01 RX ADMIN — SEVELAMER CARBONATE 1600 MG: 800 TABLET, FILM COATED ORAL at 08:52

## 2022-01-01 RX ADMIN — LIDOCAINE 2 PATCH: 700 PATCH TOPICAL at 07:28

## 2022-01-01 RX ADMIN — OXYCODONE 2.5 MG: 5 TABLET ORAL at 09:34

## 2022-01-01 RX ADMIN — ATORVASTATIN CALCIUM 20 MG: 20 TABLET, FILM COATED ORAL at 05:30

## 2022-01-01 RX ADMIN — HEPARIN SODIUM 5000 UNITS: 5000 INJECTION, SOLUTION INTRAVENOUS; SUBCUTANEOUS at 22:06

## 2022-01-01 RX ADMIN — OXYCODONE 2.5 MG: 5 TABLET ORAL at 18:16

## 2022-01-01 RX ADMIN — HEPARIN SODIUM 5000 UNITS: 5000 INJECTION, SOLUTION INTRAVENOUS; SUBCUTANEOUS at 13:43

## 2022-01-01 RX ADMIN — ROPINIROLE HYDROCHLORIDE 0.25 MG: 0.5 TABLET, FILM COATED ORAL at 16:46

## 2022-01-01 RX ADMIN — INSULIN LISPRO 1 UNITS: 100 INJECTION, SOLUTION INTRAVENOUS; SUBCUTANEOUS at 22:03

## 2022-01-01 RX ADMIN — ROPINIROLE HYDROCHLORIDE 0.25 MG: 0.5 TABLET, FILM COATED ORAL at 16:38

## 2022-01-01 RX ADMIN — PANTOPRAZOLE SODIUM 40 MG: 40 INJECTION, POWDER, FOR SOLUTION INTRAVENOUS at 17:06

## 2022-01-01 RX ADMIN — ROPINIROLE HYDROCHLORIDE 0.25 MG: 0.5 TABLET, FILM COATED ORAL at 05:25

## 2022-01-01 RX ADMIN — ROPINIROLE HYDROCHLORIDE 0.25 MG: 0.5 TABLET, FILM COATED ORAL at 04:44

## 2022-01-01 RX ADMIN — OXYCODONE HYDROCHLORIDE 5 MG: 20 SOLUTION ORAL at 10:34

## 2022-01-01 RX ADMIN — HEPARIN SODIUM 5000 UNITS: 5000 INJECTION, SOLUTION INTRAVENOUS; SUBCUTANEOUS at 06:08

## 2022-01-01 RX ADMIN — OXYCODONE 2.5 MG: 5 TABLET ORAL at 08:23

## 2022-01-01 RX ADMIN — POLYETHYLENE GLYCOL 3350 1 PACKET: 17 POWDER, FOR SOLUTION ORAL at 06:39

## 2022-01-01 RX ADMIN — ROPINIROLE HYDROCHLORIDE 0.25 MG: 0.5 TABLET, FILM COATED ORAL at 18:31

## 2022-01-01 RX ADMIN — ACETAMINOPHEN 1000 MG: 500 TABLET, FILM COATED ORAL at 12:15

## 2022-01-01 RX ADMIN — ACETAMINOPHEN 1000 MG: 500 TABLET, FILM COATED ORAL at 16:30

## 2022-01-01 RX ADMIN — HEPARIN SODIUM 5000 UNITS: 5000 INJECTION, SOLUTION INTRAVENOUS; SUBCUTANEOUS at 06:25

## 2022-01-01 RX ADMIN — GABAPENTIN 100 MG: 100 CAPSULE ORAL at 13:41

## 2022-01-01 RX ADMIN — HEPARIN SODIUM 5000 UNITS: 5000 INJECTION, SOLUTION INTRAVENOUS; SUBCUTANEOUS at 21:06

## 2022-01-01 RX ADMIN — HEPARIN SODIUM 5000 UNITS: 5000 INJECTION, SOLUTION INTRAVENOUS; SUBCUTANEOUS at 05:03

## 2022-01-01 RX ADMIN — GABAPENTIN 100 MG: 100 CAPSULE ORAL at 05:09

## 2022-01-01 RX ADMIN — HEPARIN SODIUM 5000 UNITS: 5000 INJECTION, SOLUTION INTRAVENOUS; SUBCUTANEOUS at 05:11

## 2022-01-01 RX ADMIN — ACETAMINOPHEN 1000 MG: 500 TABLET, FILM COATED ORAL at 13:56

## 2022-01-01 RX ADMIN — ATORVASTATIN CALCIUM 20 MG: 20 TABLET, FILM COATED ORAL at 05:10

## 2022-01-01 RX ADMIN — LEVOTHYROXINE SODIUM 25 MCG: 0.03 TABLET ORAL at 05:40

## 2022-01-01 RX ADMIN — GABAPENTIN 100 MG: 100 CAPSULE ORAL at 16:46

## 2022-01-01 RX ADMIN — SEVELAMER CARBONATE 1600 MG: 800 TABLET, FILM COATED ORAL at 12:55

## 2022-01-01 RX ADMIN — SEVELAMER CARBONATE 1600 MG: 800 TABLET, FILM COATED ORAL at 17:44

## 2022-01-01 RX ADMIN — INSULIN LISPRO 1 UNITS: 100 INJECTION, SOLUTION INTRAVENOUS; SUBCUTANEOUS at 20:40

## 2022-01-01 RX ADMIN — INSULIN GLARGINE-YFGN 5 UNITS: 100 INJECTION, SOLUTION SUBCUTANEOUS at 17:58

## 2022-01-01 RX ADMIN — LIDOCAINE 2 PATCH: 700 PATCH TOPICAL at 13:30

## 2022-01-01 RX ADMIN — ROPINIROLE HYDROCHLORIDE 0.25 MG: 0.5 TABLET, FILM COATED ORAL at 18:15

## 2022-01-01 RX ADMIN — LEVOTHYROXINE SODIUM 25 MCG: 0.03 TABLET ORAL at 04:29

## 2022-01-01 RX ADMIN — LEVOTHYROXINE SODIUM 25 MCG: 0.03 TABLET ORAL at 05:10

## 2022-01-01 RX ADMIN — SEVELAMER CARBONATE 1600 MG: 800 TABLET, FILM COATED ORAL at 18:47

## 2022-01-01 RX ADMIN — SEVELAMER CARBONATE 1600 MG: 800 TABLET, FILM COATED ORAL at 08:18

## 2022-01-01 RX ADMIN — DOCUSATE SODIUM 50 MG AND SENNOSIDES 8.6 MG 2 TABLET: 8.6; 5 TABLET, FILM COATED ORAL at 05:14

## 2022-01-01 RX ADMIN — Medication 5 MG: at 23:19

## 2022-01-01 RX ADMIN — PATIROMER 16.8 G: 16.8 POWDER, FOR SUSPENSION ORAL at 15:56

## 2022-01-01 RX ADMIN — INSULIN LISPRO 2 UNITS: 100 INJECTION, SOLUTION INTRAVENOUS; SUBCUTANEOUS at 17:49

## 2022-01-01 RX ADMIN — ACETAMINOPHEN 1000 MG: 500 TABLET, FILM COATED ORAL at 11:38

## 2022-01-01 RX ADMIN — HEPARIN SODIUM 5000 UNITS: 5000 INJECTION, SOLUTION INTRAVENOUS; SUBCUTANEOUS at 13:30

## 2022-01-01 RX ADMIN — OXYCODONE 2.5 MG: 5 TABLET ORAL at 06:15

## 2022-01-01 RX ADMIN — LIDOCAINE 2 PATCH: 700 PATCH TOPICAL at 10:01

## 2022-01-01 RX ADMIN — OXYCODONE 2.5 MG: 5 TABLET ORAL at 20:16

## 2022-01-01 RX ADMIN — LEVOTHYROXINE SODIUM 25 MCG: 0.03 TABLET ORAL at 04:51

## 2022-01-01 RX ADMIN — ACETAMINOPHEN 1000 MG: 500 TABLET, FILM COATED ORAL at 17:49

## 2022-01-01 RX ADMIN — LEVOTHYROXINE SODIUM 25 MCG: 0.03 TABLET ORAL at 04:40

## 2022-01-01 RX ADMIN — CALCITRIOL CAPSULES 0.25 MCG 0.25 MCG: 0.25 CAPSULE ORAL at 05:39

## 2022-01-01 RX ADMIN — SEVELAMER CARBONATE 1600 MG: 800 TABLET, FILM COATED ORAL at 21:54

## 2022-01-01 RX ADMIN — HEPARIN SODIUM 5000 UNITS: 5000 INJECTION, SOLUTION INTRAVENOUS; SUBCUTANEOUS at 21:12

## 2022-01-01 RX ADMIN — ACETAMINOPHEN 1000 MG: 500 TABLET, FILM COATED ORAL at 23:32

## 2022-01-01 RX ADMIN — OXYCODONE 2.5 MG: 5 TABLET ORAL at 05:18

## 2022-01-01 RX ADMIN — ROPINIROLE HYDROCHLORIDE 0.25 MG: 0.5 TABLET, FILM COATED ORAL at 17:15

## 2022-01-01 RX ADMIN — ONDANSETRON 4 MG: 4 TABLET, ORALLY DISINTEGRATING ORAL at 13:02

## 2022-01-01 RX ADMIN — CALCITRIOL CAPSULES 0.25 MCG 0.25 MCG: 0.25 CAPSULE ORAL at 04:45

## 2022-01-01 RX ADMIN — SEVELAMER CARBONATE 1600 MG: 800 TABLET, FILM COATED ORAL at 06:18

## 2022-01-01 RX ADMIN — LIDOCAINE 2 PATCH: 700 PATCH TOPICAL at 09:36

## 2022-01-01 RX ADMIN — LIDOCAINE 2 PATCH: 700 PATCH TOPICAL at 09:45

## 2022-01-01 RX ADMIN — HEPARIN SODIUM 5000 UNITS: 5000 INJECTION, SOLUTION INTRAVENOUS; SUBCUTANEOUS at 05:08

## 2022-01-01 RX ADMIN — ROPINIROLE HYDROCHLORIDE 0.25 MG: 0.5 TABLET, FILM COATED ORAL at 18:47

## 2022-01-01 RX ADMIN — ACETAMINOPHEN 1000 MG: 500 TABLET, FILM COATED ORAL at 18:04

## 2022-01-01 RX ADMIN — HEPARIN SODIUM 5000 UNITS: 5000 INJECTION, SOLUTION INTRAVENOUS; SUBCUTANEOUS at 13:01

## 2022-01-01 RX ADMIN — SEVELAMER CARBONATE 1600 MG: 800 TABLET, FILM COATED ORAL at 13:28

## 2022-01-01 RX ADMIN — OXYCODONE 5 MG: 5 TABLET ORAL at 16:54

## 2022-01-01 RX ADMIN — ACETAMINOPHEN 650 MG: 325 TABLET, FILM COATED ORAL at 23:09

## 2022-01-01 RX ADMIN — HEPARIN SODIUM 5000 UNITS: 5000 INJECTION, SOLUTION INTRAVENOUS; SUBCUTANEOUS at 13:26

## 2022-01-01 RX ADMIN — GABAPENTIN 100 MG: 100 CAPSULE ORAL at 04:50

## 2022-01-01 RX ADMIN — POLYETHYLENE GLYCOL 3350 1 PACKET: 17 POWDER, FOR SOLUTION ORAL at 04:08

## 2022-01-01 RX ADMIN — HEPARIN SODIUM 5000 UNITS: 5000 INJECTION, SOLUTION INTRAVENOUS; SUBCUTANEOUS at 13:23

## 2022-01-01 RX ADMIN — OXYCODONE 2.5 MG: 5 TABLET ORAL at 20:59

## 2022-01-01 RX ADMIN — GABAPENTIN 100 MG: 100 CAPSULE ORAL at 12:29

## 2022-01-01 RX ADMIN — LIDOCAINE 2 PATCH: 700 PATCH TOPICAL at 09:21

## 2022-01-01 RX ADMIN — LEVOTHYROXINE SODIUM 25 MCG: 0.03 TABLET ORAL at 05:12

## 2022-01-01 RX ADMIN — SEVELAMER CARBONATE 1600 MG: 800 TABLET, FILM COATED ORAL at 17:58

## 2022-01-01 RX ADMIN — LIDOCAINE 2 PATCH: 700 PATCH TOPICAL at 09:07

## 2022-01-01 RX ADMIN — INSULIN GLARGINE-YFGN 5 UNITS: 100 INJECTION, SOLUTION SUBCUTANEOUS at 18:48

## 2022-01-01 RX ADMIN — LIDOCAINE 2 PATCH: 700 PATCH TOPICAL at 12:15

## 2022-01-01 RX ADMIN — DOCUSATE SODIUM 50 MG AND SENNOSIDES 8.6 MG 2 TABLET: 8.6; 5 TABLET, FILM COATED ORAL at 04:39

## 2022-01-01 RX ADMIN — ACETAMINOPHEN 1000 MG: 500 TABLET, FILM COATED ORAL at 13:51

## 2022-01-01 RX ADMIN — ACETAMINOPHEN 1000 MG: 500 TABLET, FILM COATED ORAL at 04:29

## 2022-01-01 RX ADMIN — ACETAMINOPHEN 1000 MG: 500 TABLET, FILM COATED ORAL at 10:15

## 2022-01-01 RX ADMIN — OXYCODONE HYDROCHLORIDE 10 MG: 20 SOLUTION ORAL at 08:18

## 2022-01-01 RX ADMIN — ACETAMINOPHEN 1000 MG: 500 TABLET, FILM COATED ORAL at 23:55

## 2022-01-01 RX ADMIN — HEPARIN SODIUM 5000 UNITS: 5000 INJECTION, SOLUTION INTRAVENOUS; SUBCUTANEOUS at 13:37

## 2022-01-01 RX ADMIN — INSULIN LISPRO 2 UNITS: 100 INJECTION, SOLUTION INTRAVENOUS; SUBCUTANEOUS at 17:34

## 2022-01-01 RX ADMIN — HEPARIN SODIUM 5000 UNITS: 5000 INJECTION, SOLUTION INTRAVENOUS; SUBCUTANEOUS at 21:10

## 2022-01-01 RX ADMIN — INSULIN LISPRO 2 UNITS: 100 INJECTION, SOLUTION INTRAVENOUS; SUBCUTANEOUS at 14:23

## 2022-01-01 RX ADMIN — NIFEDIPINE 90 MG: 30 TABLET, FILM COATED, EXTENDED RELEASE ORAL at 06:23

## 2022-01-01 RX ADMIN — OXYCODONE 2.5 MG: 5 TABLET ORAL at 22:14

## 2022-01-01 RX ADMIN — ACETAMINOPHEN 1000 MG: 500 TABLET, FILM COATED ORAL at 12:48

## 2022-01-01 RX ADMIN — HEPARIN SODIUM 5000 UNITS: 5000 INJECTION, SOLUTION INTRAVENOUS; SUBCUTANEOUS at 21:52

## 2022-01-01 RX ADMIN — GABAPENTIN 100 MG: 100 CAPSULE ORAL at 17:58

## 2022-01-01 RX ADMIN — INSULIN LISPRO 1 UNITS: 100 INJECTION, SOLUTION INTRAVENOUS; SUBCUTANEOUS at 18:57

## 2022-01-01 RX ADMIN — ACETAMINOPHEN 1000 MG: 500 TABLET, FILM COATED ORAL at 05:14

## 2022-01-01 RX ADMIN — SEVELAMER CARBONATE 1600 MG: 800 TABLET, FILM COATED ORAL at 14:20

## 2022-01-01 RX ADMIN — ATORVASTATIN CALCIUM 20 MG: 20 TABLET, FILM COATED ORAL at 04:08

## 2022-01-01 RX ADMIN — OXYCODONE 2.5 MG: 5 TABLET ORAL at 10:08

## 2022-01-01 RX ADMIN — FUROSEMIDE 80 MG: 10 INJECTION, SOLUTION INTRAMUSCULAR; INTRAVENOUS at 06:24

## 2022-01-01 RX ADMIN — INSULIN LISPRO 1 UNITS: 100 INJECTION, SOLUTION INTRAVENOUS; SUBCUTANEOUS at 18:40

## 2022-01-01 RX ADMIN — SEVELAMER CARBONATE 1600 MG: 800 TABLET, FILM COATED ORAL at 17:47

## 2022-01-01 RX ADMIN — HEPARIN SODIUM 5000 UNITS: 5000 INJECTION, SOLUTION INTRAVENOUS; SUBCUTANEOUS at 14:40

## 2022-01-01 RX ADMIN — GABAPENTIN 100 MG: 100 CAPSULE ORAL at 05:56

## 2022-01-01 RX ADMIN — GABAPENTIN 100 MG: 100 CAPSULE ORAL at 12:49

## 2022-01-01 RX ADMIN — LEVOTHYROXINE SODIUM 25 MCG: 0.03 TABLET ORAL at 05:14

## 2022-01-01 RX ADMIN — GABAPENTIN 100 MG: 100 CAPSULE ORAL at 12:03

## 2022-01-01 RX ADMIN — ACETAMINOPHEN 1000 MG: 500 TABLET, FILM COATED ORAL at 18:07

## 2022-01-01 RX ADMIN — ACETAMINOPHEN 1000 MG: 500 TABLET, FILM COATED ORAL at 21:37

## 2022-01-01 RX ADMIN — DOCUSATE SODIUM 50 MG AND SENNOSIDES 8.6 MG 2 TABLET: 8.6; 5 TABLET, FILM COATED ORAL at 18:11

## 2022-01-01 RX ADMIN — DOCUSATE SODIUM 50 MG AND SENNOSIDES 8.6 MG 2 TABLET: 8.6; 5 TABLET, FILM COATED ORAL at 04:29

## 2022-01-01 RX ADMIN — GABAPENTIN 100 MG: 100 CAPSULE ORAL at 16:45

## 2022-01-01 RX ADMIN — INSULIN LISPRO 3 UNITS: 100 INJECTION, SOLUTION INTRAVENOUS; SUBCUTANEOUS at 12:15

## 2022-01-01 RX ADMIN — SEVELAMER CARBONATE 1600 MG: 800 TABLET, FILM COATED ORAL at 16:38

## 2022-01-01 RX ADMIN — DOCUSATE SODIUM 50 MG AND SENNOSIDES 8.6 MG 2 TABLET: 8.6; 5 TABLET, FILM COATED ORAL at 06:24

## 2022-01-01 RX ADMIN — OXYCODONE 2.5 MG: 5 TABLET ORAL at 01:01

## 2022-01-01 RX ADMIN — HEPARIN SODIUM 5000 UNITS: 5000 INJECTION, SOLUTION INTRAVENOUS; SUBCUTANEOUS at 21:51

## 2022-01-01 RX ADMIN — ACETAMINOPHEN 1000 MG: 500 TABLET, FILM COATED ORAL at 17:40

## 2022-01-01 RX ADMIN — OXYCODONE 2.5 MG: 5 TABLET ORAL at 18:39

## 2022-01-01 RX ADMIN — INSULIN LISPRO 1 UNITS: 100 INJECTION, SOLUTION INTRAVENOUS; SUBCUTANEOUS at 20:57

## 2022-01-01 RX ADMIN — HEPARIN SODIUM 5000 UNITS: 5000 INJECTION, SOLUTION INTRAVENOUS; SUBCUTANEOUS at 21:25

## 2022-01-01 RX ADMIN — OXYCODONE 2.5 MG: 5 TABLET ORAL at 16:43

## 2022-01-01 RX ADMIN — HEPARIN SODIUM 5000 UNITS: 5000 INJECTION, SOLUTION INTRAVENOUS; SUBCUTANEOUS at 04:51

## 2022-01-01 RX ADMIN — LEVOTHYROXINE SODIUM 25 MCG: 0.03 TABLET ORAL at 06:10

## 2022-01-01 RX ADMIN — GABAPENTIN 100 MG: 100 CAPSULE ORAL at 13:09

## 2022-01-01 RX ADMIN — CALCITRIOL CAPSULES 0.25 MCG 0.25 MCG: 0.25 CAPSULE ORAL at 05:12

## 2022-01-01 RX ADMIN — POLYETHYLENE GLYCOL 3350 1 PACKET: 17 POWDER, FOR SOLUTION ORAL at 05:16

## 2022-01-01 RX ADMIN — ASPIRIN 325 MG: 325 TABLET ORAL at 05:06

## 2022-01-01 RX ADMIN — SEVELAMER CARBONATE 1600 MG: 800 TABLET, FILM COATED ORAL at 09:04

## 2022-01-01 RX ADMIN — HEPARIN SODIUM 5000 UNITS: 5000 INJECTION, SOLUTION INTRAVENOUS; SUBCUTANEOUS at 21:38

## 2022-01-01 RX ADMIN — HEPARIN SODIUM 5000 UNITS: 5000 INJECTION, SOLUTION INTRAVENOUS; SUBCUTANEOUS at 05:28

## 2022-01-01 RX ADMIN — HEPARIN SODIUM 5000 UNITS: 5000 INJECTION, SOLUTION INTRAVENOUS; SUBCUTANEOUS at 21:22

## 2022-01-01 RX ADMIN — INSULIN LISPRO 1 UNITS: 100 INJECTION, SOLUTION INTRAVENOUS; SUBCUTANEOUS at 21:31

## 2022-01-01 RX ADMIN — ACETAMINOPHEN 650 MG: 325 TABLET, FILM COATED ORAL at 04:44

## 2022-01-01 RX ADMIN — DOCUSATE SODIUM 50 MG AND SENNOSIDES 8.6 MG 2 TABLET: 8.6; 5 TABLET, FILM COATED ORAL at 04:41

## 2022-01-01 RX ADMIN — CALCITRIOL CAPSULES 0.25 MCG 0.25 MCG: 0.25 CAPSULE ORAL at 05:02

## 2022-01-01 RX ADMIN — DOCUSATE SODIUM 50 MG AND SENNOSIDES 8.6 MG 2 TABLET: 8.6; 5 TABLET, FILM COATED ORAL at 17:12

## 2022-01-01 RX ADMIN — ATORVASTATIN CALCIUM 20 MG: 20 TABLET, FILM COATED ORAL at 05:40

## 2022-01-01 RX ADMIN — ATORVASTATIN CALCIUM 20 MG: 20 TABLET, FILM COATED ORAL at 05:28

## 2022-01-01 RX ADMIN — HEPARIN SODIUM 5000 UNITS: 5000 INJECTION, SOLUTION INTRAVENOUS; SUBCUTANEOUS at 05:45

## 2022-01-01 RX ADMIN — LIDOCAINE 2 PATCH: 700 PATCH TOPICAL at 07:52

## 2022-01-01 RX ADMIN — SEVELAMER CARBONATE 1600 MG: 800 TABLET, FILM COATED ORAL at 21:01

## 2022-01-01 RX ADMIN — INSULIN LISPRO 1 UNITS: 100 INJECTION, SOLUTION INTRAVENOUS; SUBCUTANEOUS at 18:20

## 2022-01-01 RX ADMIN — DOCUSATE SODIUM 50 MG AND SENNOSIDES 8.6 MG 2 TABLET: 8.6; 5 TABLET, FILM COATED ORAL at 04:45

## 2022-01-01 RX ADMIN — HEPARIN SODIUM 1300 UNITS: 1000 INJECTION, SOLUTION INTRAVENOUS; SUBCUTANEOUS at 18:28

## 2022-01-01 RX ADMIN — INSULIN LISPRO 1 UNITS: 100 INJECTION, SOLUTION INTRAVENOUS; SUBCUTANEOUS at 13:28

## 2022-01-01 RX ADMIN — CALCITRIOL CAPSULES 0.25 MCG 0.25 MCG: 0.25 CAPSULE ORAL at 05:27

## 2022-01-01 RX ADMIN — GABAPENTIN 100 MG: 100 CAPSULE ORAL at 17:24

## 2022-01-01 RX ADMIN — GABAPENTIN 100 MG: 100 CAPSULE ORAL at 05:03

## 2022-01-01 RX ADMIN — POLYETHYLENE GLYCOL 3350 1 PACKET: 17 POWDER, FOR SOLUTION ORAL at 05:19

## 2022-01-01 RX ADMIN — ONDANSETRON HYDROCHLORIDE 4 MG: 2 SOLUTION INTRAMUSCULAR; INTRAVENOUS at 10:18

## 2022-01-01 RX ADMIN — DOCUSATE SODIUM 50 MG AND SENNOSIDES 8.6 MG 2 TABLET: 8.6; 5 TABLET, FILM COATED ORAL at 17:54

## 2022-01-01 RX ADMIN — LIDOCAINE 2 PATCH: 700 PATCH TOPICAL at 08:45

## 2022-01-01 RX ADMIN — HEPARIN SODIUM 5000 UNITS: 5000 INJECTION, SOLUTION INTRAVENOUS; SUBCUTANEOUS at 06:07

## 2022-01-01 RX ADMIN — OXYCODONE 2.5 MG: 5 TABLET ORAL at 05:09

## 2022-01-01 RX ADMIN — ACETAMINOPHEN 1000 MG: 500 TABLET, FILM COATED ORAL at 23:22

## 2022-01-01 RX ADMIN — SEVELAMER CARBONATE 1600 MG: 800 TABLET, FILM COATED ORAL at 06:38

## 2022-01-01 RX ADMIN — HEPARIN SODIUM 5000 UNITS: 5000 INJECTION, SOLUTION INTRAVENOUS; SUBCUTANEOUS at 20:53

## 2022-01-01 RX ADMIN — LIDOCAINE 2 PATCH: 700 PATCH TOPICAL at 09:12

## 2022-01-01 RX ADMIN — ROPINIROLE HYDROCHLORIDE 0.25 MG: 0.5 TABLET, FILM COATED ORAL at 05:21

## 2022-01-01 RX ADMIN — POLYETHYLENE GLYCOL 3350 1 PACKET: 17 POWDER, FOR SOLUTION ORAL at 05:11

## 2022-01-01 RX ADMIN — OXYCODONE 2.5 MG: 5 TABLET ORAL at 20:26

## 2022-01-01 RX ADMIN — DOCUSATE SODIUM 50 MG AND SENNOSIDES 8.6 MG 2 TABLET: 8.6; 5 TABLET, FILM COATED ORAL at 04:28

## 2022-01-01 RX ADMIN — ACETAMINOPHEN 1000 MG: 500 TABLET, FILM COATED ORAL at 12:34

## 2022-01-01 RX ADMIN — LIDOCAINE 2 PATCH: 700 PATCH TOPICAL at 09:33

## 2022-01-01 RX ADMIN — LEVOTHYROXINE SODIUM 25 MCG: 0.03 TABLET ORAL at 04:39

## 2022-01-01 RX ADMIN — SEVELAMER CARBONATE 1600 MG: 800 TABLET, FILM COATED ORAL at 18:03

## 2022-01-01 RX ADMIN — ROPINIROLE HYDROCHLORIDE 0.25 MG: 0.5 TABLET, FILM COATED ORAL at 17:23

## 2022-01-01 RX ADMIN — ATORVASTATIN CALCIUM 20 MG: 20 TABLET, FILM COATED ORAL at 06:33

## 2022-01-01 RX ADMIN — INSULIN LISPRO 1 UNITS: 100 INJECTION, SOLUTION INTRAVENOUS; SUBCUTANEOUS at 09:20

## 2022-01-01 RX ADMIN — GABAPENTIN 100 MG: 100 CAPSULE ORAL at 05:21

## 2022-01-01 RX ADMIN — INSULIN GLARGINE-YFGN 5 UNITS: 100 INJECTION, SOLUTION SUBCUTANEOUS at 18:14

## 2022-01-01 RX ADMIN — DOCUSATE SODIUM 50 MG AND SENNOSIDES 8.6 MG 2 TABLET: 8.6; 5 TABLET, FILM COATED ORAL at 19:13

## 2022-01-01 RX ADMIN — ROPINIROLE HYDROCHLORIDE 0.25 MG: 0.5 TABLET, FILM COATED ORAL at 05:24

## 2022-01-01 RX ADMIN — DOCUSATE SODIUM 50 MG AND SENNOSIDES 8.6 MG 2 TABLET: 8.6; 5 TABLET, FILM COATED ORAL at 17:57

## 2022-01-01 RX ADMIN — LIDOCAINE 2 PATCH: 700 PATCH TOPICAL at 08:01

## 2022-01-01 RX ADMIN — HEPARIN SODIUM 5000 UNITS: 5000 INJECTION, SOLUTION INTRAVENOUS; SUBCUTANEOUS at 05:52

## 2022-01-01 RX ADMIN — SEVELAMER CARBONATE 1600 MG: 800 TABLET, FILM COATED ORAL at 17:06

## 2022-01-01 RX ADMIN — SEVELAMER CARBONATE 1600 MG: 800 TABLET, FILM COATED ORAL at 09:21

## 2022-01-01 RX ADMIN — OXYCODONE 2.5 MG: 5 TABLET ORAL at 20:32

## 2022-01-01 RX ADMIN — CEFAZOLIN SODIUM 1 G: 1 INJECTION, SOLUTION INTRAVENOUS at 18:00

## 2022-01-01 RX ADMIN — GABAPENTIN 100 MG: 100 CAPSULE ORAL at 11:38

## 2022-01-01 RX ADMIN — LEVOTHYROXINE SODIUM 25 MCG: 0.03 TABLET ORAL at 05:07

## 2022-01-01 RX ADMIN — HEPARIN SODIUM 5000 UNITS: 5000 INJECTION, SOLUTION INTRAVENOUS; SUBCUTANEOUS at 06:15

## 2022-01-01 RX ADMIN — LIDOCAINE 2 PATCH: 700 PATCH TOPICAL at 08:34

## 2022-01-01 RX ADMIN — ACETAMINOPHEN 1000 MG: 500 TABLET, FILM COATED ORAL at 17:15

## 2022-01-01 RX ADMIN — INSULIN LISPRO 1 UNITS: 100 INJECTION, SOLUTION INTRAVENOUS; SUBCUTANEOUS at 17:01

## 2022-01-01 RX ADMIN — SEVELAMER CARBONATE 1600 MG: 800 TABLET, FILM COATED ORAL at 10:05

## 2022-01-01 RX ADMIN — INSULIN LISPRO 3 UNITS: 100 INJECTION, SOLUTION INTRAVENOUS; SUBCUTANEOUS at 21:13

## 2022-01-01 RX ADMIN — OXYCODONE 2.5 MG: 5 TABLET ORAL at 02:00

## 2022-01-01 RX ADMIN — LEVOTHYROXINE SODIUM 25 MCG: 0.03 TABLET ORAL at 05:08

## 2022-01-01 RX ADMIN — ACETAMINOPHEN 1000 MG: 500 TABLET, FILM COATED ORAL at 11:56

## 2022-01-01 RX ADMIN — HEPARIN SODIUM 5000 UNITS: 5000 INJECTION, SOLUTION INTRAVENOUS; SUBCUTANEOUS at 13:58

## 2022-01-01 RX ADMIN — ACETAMINOPHEN 1000 MG: 500 TABLET, FILM COATED ORAL at 23:33

## 2022-01-01 RX ADMIN — OXYCODONE 2.5 MG: 5 TABLET ORAL at 18:08

## 2022-01-01 RX ADMIN — ATORVASTATIN CALCIUM 20 MG: 20 TABLET, FILM COATED ORAL at 04:29

## 2022-01-01 RX ADMIN — SEVELAMER CARBONATE 1600 MG: 800 TABLET, FILM COATED ORAL at 18:36

## 2022-01-01 RX ADMIN — SEVELAMER CARBONATE 1600 MG: 800 TABLET, FILM COATED ORAL at 12:29

## 2022-01-01 RX ADMIN — HEPARIN SODIUM 5000 UNITS: 5000 INJECTION, SOLUTION INTRAVENOUS; SUBCUTANEOUS at 15:19

## 2022-01-01 RX ADMIN — OXYCODONE 5 MG: 5 TABLET ORAL at 23:21

## 2022-01-01 RX ADMIN — ACETAMINOPHEN 1000 MG: 500 TABLET, FILM COATED ORAL at 17:56

## 2022-01-01 RX ADMIN — HEPARIN SODIUM 5000 UNITS: 5000 INJECTION, SOLUTION INTRAVENOUS; SUBCUTANEOUS at 06:16

## 2022-01-01 RX ADMIN — INSULIN LISPRO 1 UNITS: 100 INJECTION, SOLUTION INTRAVENOUS; SUBCUTANEOUS at 17:54

## 2022-01-01 RX ADMIN — OXYCODONE 2.5 MG: 5 TABLET ORAL at 13:20

## 2022-01-01 RX ADMIN — OXYCODONE 2.5 MG: 5 TABLET ORAL at 09:45

## 2022-01-01 RX ADMIN — INSULIN LISPRO 3 UNITS: 100 INJECTION, SOLUTION INTRAVENOUS; SUBCUTANEOUS at 21:26

## 2022-01-01 RX ADMIN — ACETAMINOPHEN 1000 MG: 500 TABLET, FILM COATED ORAL at 05:08

## 2022-01-01 RX ADMIN — SEVELAMER CARBONATE 1600 MG: 800 TABLET, FILM COATED ORAL at 13:53

## 2022-01-01 RX ADMIN — ATORVASTATIN CALCIUM 20 MG: 20 TABLET, FILM COATED ORAL at 04:44

## 2022-01-01 RX ADMIN — SEVELAMER CARBONATE 1600 MG: 800 TABLET, FILM COATED ORAL at 10:25

## 2022-01-01 RX ADMIN — LIDOCAINE 2 PATCH: 700 PATCH TOPICAL at 07:56

## 2022-01-01 RX ADMIN — OXYCODONE 2.5 MG: 5 TABLET ORAL at 16:29

## 2022-01-01 RX ADMIN — HYDROMORPHONE HYDROCHLORIDE 0.5 MG: 1 INJECTION, SOLUTION INTRAMUSCULAR; INTRAVENOUS; SUBCUTANEOUS at 15:04

## 2022-01-01 RX ADMIN — INSULIN LISPRO 1 UNITS: 100 INJECTION, SOLUTION INTRAVENOUS; SUBCUTANEOUS at 13:48

## 2022-01-01 RX ADMIN — HYDROMORPHONE HYDROCHLORIDE 0.5 MG: 1 INJECTION, SOLUTION INTRAMUSCULAR; INTRAVENOUS; SUBCUTANEOUS at 10:43

## 2022-01-01 RX ADMIN — DOCUSATE SODIUM 50 MG AND SENNOSIDES 8.6 MG 2 TABLET: 8.6; 5 TABLET, FILM COATED ORAL at 05:12

## 2022-01-01 RX ADMIN — GABAPENTIN 200 MG: 100 CAPSULE ORAL at 20:53

## 2022-01-01 RX ADMIN — ATORVASTATIN CALCIUM 20 MG: 20 TABLET, FILM COATED ORAL at 04:39

## 2022-01-01 RX ADMIN — POLYETHYLENE GLYCOL 3350 1 PACKET: 17 POWDER, FOR SOLUTION ORAL at 06:08

## 2022-01-01 RX ADMIN — LIDOCAINE 2 PATCH: 700 PATCH TOPICAL at 10:21

## 2022-01-01 RX ADMIN — INSULIN GLARGINE-YFGN 5 UNITS: 100 INJECTION, SOLUTION SUBCUTANEOUS at 18:03

## 2022-01-01 RX ADMIN — EPOETIN ALFA-EPBX 3000 UNITS: 3000 INJECTION, SOLUTION INTRAVENOUS; SUBCUTANEOUS at 10:38

## 2022-01-01 RX ADMIN — PATIROMER 16.8 G: 16.8 POWDER, FOR SUSPENSION ORAL at 17:26

## 2022-01-01 RX ADMIN — DOCUSATE SODIUM 50 MG AND SENNOSIDES 8.6 MG 2 TABLET: 8.6; 5 TABLET, FILM COATED ORAL at 18:08

## 2022-01-01 RX ADMIN — INSULIN LISPRO 2 UNITS: 100 INJECTION, SOLUTION INTRAVENOUS; SUBCUTANEOUS at 17:47

## 2022-01-01 RX ADMIN — ACETAMINOPHEN 1000 MG: 500 TABLET, FILM COATED ORAL at 06:38

## 2022-01-01 RX ADMIN — ATORVASTATIN CALCIUM 20 MG: 20 TABLET, FILM COATED ORAL at 06:27

## 2022-01-01 RX ADMIN — INSULIN GLARGINE-YFGN 5 UNITS: 100 INJECTION, SOLUTION SUBCUTANEOUS at 17:19

## 2022-01-01 RX ADMIN — ONDANSETRON 4 MG: 2 INJECTION INTRAMUSCULAR; INTRAVENOUS at 12:49

## 2022-01-01 RX ADMIN — DOCUSATE SODIUM 50 MG AND SENNOSIDES 8.6 MG 2 TABLET: 8.6; 5 TABLET, FILM COATED ORAL at 17:50

## 2022-01-01 RX ADMIN — GABAPENTIN 100 MG: 100 CAPSULE ORAL at 12:02

## 2022-01-01 RX ADMIN — ACETAMINOPHEN 1000 MG: 500 TABLET, FILM COATED ORAL at 12:28

## 2022-01-01 RX ADMIN — HEPARIN SODIUM 5000 UNITS: 5000 INJECTION, SOLUTION INTRAVENOUS; SUBCUTANEOUS at 13:29

## 2022-01-01 RX ADMIN — ACETAMINOPHEN 1000 MG: 500 TABLET, FILM COATED ORAL at 05:06

## 2022-01-01 RX ADMIN — DEXTROSE MONOHYDRATE 25 G: 25 INJECTION, SOLUTION INTRAVENOUS at 08:30

## 2022-01-01 RX ADMIN — LEVOTHYROXINE SODIUM 25 MCG: 0.03 TABLET ORAL at 06:21

## 2022-01-01 RX ADMIN — HEPARIN SODIUM 5000 UNITS: 5000 INJECTION, SOLUTION INTRAVENOUS; SUBCUTANEOUS at 21:30

## 2022-01-01 RX ADMIN — DOCUSATE SODIUM 50 MG AND SENNOSIDES 8.6 MG 2 TABLET: 8.6; 5 TABLET, FILM COATED ORAL at 04:32

## 2022-01-01 RX ADMIN — CALCITRIOL CAPSULES 0.25 MCG 0.25 MCG: 0.25 CAPSULE ORAL at 06:06

## 2022-01-01 RX ADMIN — ACETAMINOPHEN 1000 MG: 500 TABLET, FILM COATED ORAL at 03:35

## 2022-01-01 RX ADMIN — DEXTROSE MONOHYDRATE 25 G: 25 INJECTION, SOLUTION INTRAVENOUS at 08:02

## 2022-01-01 RX ADMIN — ACETAMINOPHEN 1000 MG: 500 TABLET, FILM COATED ORAL at 13:08

## 2022-01-01 RX ADMIN — LIDOCAINE 2 PATCH: 700 PATCH TOPICAL at 10:16

## 2022-01-01 RX ADMIN — ACETAMINOPHEN 1000 MG: 500 TABLET, FILM COATED ORAL at 06:04

## 2022-01-01 RX ADMIN — ACETAMINOPHEN 650 MG: 325 TABLET, FILM COATED ORAL at 02:42

## 2022-01-01 RX ADMIN — GABAPENTIN 100 MG: 100 CAPSULE ORAL at 12:57

## 2022-01-01 RX ADMIN — Medication 5 MG: at 21:51

## 2022-01-01 RX ADMIN — HYDROMORPHONE HYDROCHLORIDE 0.5 MG: 1 INJECTION, SOLUTION INTRAMUSCULAR; INTRAVENOUS; SUBCUTANEOUS at 01:55

## 2022-01-01 RX ADMIN — CALCITRIOL CAPSULES 0.25 MCG 0.25 MCG: 0.25 CAPSULE ORAL at 04:42

## 2022-01-01 RX ADMIN — LIDOCAINE 2 PATCH: 700 PATCH TOPICAL at 09:41

## 2022-01-01 RX ADMIN — GABAPENTIN 100 MG: 100 CAPSULE ORAL at 17:06

## 2022-01-01 RX ADMIN — HEPARIN SODIUM 5000 UNITS: 5000 INJECTION, SOLUTION INTRAVENOUS; SUBCUTANEOUS at 13:53

## 2022-01-01 RX ADMIN — HEPARIN SODIUM 5000 UNITS: 5000 INJECTION, SOLUTION INTRAVENOUS; SUBCUTANEOUS at 21:48

## 2022-01-01 RX ADMIN — HEPARIN SODIUM 5000 UNITS: 5000 INJECTION, SOLUTION INTRAVENOUS; SUBCUTANEOUS at 14:21

## 2022-01-01 RX ADMIN — LEVOTHYROXINE SODIUM 25 MCG: 0.03 TABLET ORAL at 05:17

## 2022-01-01 RX ADMIN — ASPIRIN 325 MG: 325 TABLET ORAL at 05:10

## 2022-01-01 RX ADMIN — ACETAMINOPHEN 1000 MG: 500 TABLET, FILM COATED ORAL at 00:49

## 2022-01-01 RX ADMIN — INSULIN LISPRO 1 UNITS: 100 INJECTION, SOLUTION INTRAVENOUS; SUBCUTANEOUS at 09:32

## 2022-01-01 RX ADMIN — ACETAMINOPHEN 1000 MG: 500 TABLET, FILM COATED ORAL at 18:29

## 2022-01-01 RX ADMIN — EPOETIN ALFA-EPBX 3000 UNITS: 3000 INJECTION, SOLUTION INTRAVENOUS; SUBCUTANEOUS at 09:52

## 2022-01-01 RX ADMIN — HEPARIN SODIUM 5000 UNITS: 5000 INJECTION, SOLUTION INTRAVENOUS; SUBCUTANEOUS at 04:45

## 2022-01-01 RX ADMIN — HEPARIN SODIUM 5000 UNITS: 5000 INJECTION, SOLUTION INTRAVENOUS; SUBCUTANEOUS at 22:19

## 2022-01-01 RX ADMIN — SEVELAMER CARBONATE 1600 MG: 800 TABLET, FILM COATED ORAL at 09:17

## 2022-01-01 RX ADMIN — ACETAMINOPHEN 1000 MG: 500 TABLET, FILM COATED ORAL at 18:23

## 2022-01-01 RX ADMIN — SEVELAMER CARBONATE 1600 MG: 800 TABLET, FILM COATED ORAL at 18:21

## 2022-01-01 RX ADMIN — ACETAMINOPHEN 1000 MG: 500 TABLET, FILM COATED ORAL at 05:56

## 2022-01-01 RX ADMIN — ACETAMINOPHEN 1000 MG: 500 TABLET, FILM COATED ORAL at 06:15

## 2022-01-01 RX ADMIN — GABAPENTIN 100 MG: 100 CAPSULE ORAL at 11:11

## 2022-01-01 RX ADMIN — OXYCODONE 2.5 MG: 5 TABLET ORAL at 05:38

## 2022-01-01 RX ADMIN — ACETAMINOPHEN 1000 MG: 500 TABLET, FILM COATED ORAL at 04:50

## 2022-01-01 RX ADMIN — DOCUSATE SODIUM 50 MG AND SENNOSIDES 8.6 MG 2 TABLET: 8.6; 5 TABLET, FILM COATED ORAL at 17:15

## 2022-01-01 RX ADMIN — PATIROMER 16.8 G: 16.8 POWDER, FOR SUSPENSION ORAL at 16:03

## 2022-01-01 RX ADMIN — DOCUSATE SODIUM 50 MG AND SENNOSIDES 8.6 MG 2 TABLET: 8.6; 5 TABLET, FILM COATED ORAL at 18:29

## 2022-01-01 RX ADMIN — SEVELAMER CARBONATE 1600 MG: 800 TABLET, FILM COATED ORAL at 12:10

## 2022-01-01 RX ADMIN — ACETAMINOPHEN 1000 MG: 500 TABLET, FILM COATED ORAL at 07:42

## 2022-01-01 RX ADMIN — ACETAMINOPHEN 1000 MG: 500 TABLET, FILM COATED ORAL at 05:02

## 2022-01-01 RX ADMIN — INSULIN LISPRO 2 UNITS: 100 INJECTION, SOLUTION INTRAVENOUS; SUBCUTANEOUS at 21:57

## 2022-01-01 RX ADMIN — INSULIN LISPRO 2 UNITS: 100 INJECTION, SOLUTION INTRAVENOUS; SUBCUTANEOUS at 21:18

## 2022-01-01 RX ADMIN — INSULIN GLARGINE-YFGN 5 UNITS: 100 INJECTION, SOLUTION SUBCUTANEOUS at 17:53

## 2022-01-01 RX ADMIN — POLYETHYLENE GLYCOL 3350 1 PACKET: 17 POWDER, FOR SOLUTION ORAL at 06:10

## 2022-01-01 RX ADMIN — SEVELAMER CARBONATE 1600 MG: 800 TABLET, FILM COATED ORAL at 10:19

## 2022-01-01 RX ADMIN — HEPARIN SODIUM 5000 UNITS: 5000 INJECTION, SOLUTION INTRAVENOUS; SUBCUTANEOUS at 06:24

## 2022-01-01 RX ADMIN — ATORVASTATIN CALCIUM 20 MG: 20 TABLET, FILM COATED ORAL at 05:12

## 2022-01-01 RX ADMIN — ACETAMINOPHEN 1000 MG: 500 TABLET, FILM COATED ORAL at 04:23

## 2022-01-01 RX ADMIN — CALCITRIOL CAPSULES 0.25 MCG 0.25 MCG: 0.25 CAPSULE ORAL at 05:21

## 2022-01-01 RX ADMIN — POLYETHYLENE GLYCOL 3350 1 PACKET: 17 POWDER, FOR SOLUTION ORAL at 05:40

## 2022-01-01 RX ADMIN — GABAPENTIN 100 MG: 100 CAPSULE ORAL at 11:47

## 2022-01-01 RX ADMIN — ACETAMINOPHEN 1000 MG: 500 TABLET, FILM COATED ORAL at 23:50

## 2022-01-01 RX ADMIN — DOCUSATE SODIUM 50 MG AND SENNOSIDES 8.6 MG 2 TABLET: 8.6; 5 TABLET, FILM COATED ORAL at 18:00

## 2022-01-01 RX ADMIN — ACETAMINOPHEN 1000 MG: 500 TABLET, FILM COATED ORAL at 17:12

## 2022-01-01 RX ADMIN — ROPINIROLE HYDROCHLORIDE 0.25 MG: 0.5 TABLET, FILM COATED ORAL at 06:24

## 2022-01-01 RX ADMIN — ACETAMINOPHEN 1000 MG: 500 TABLET, FILM COATED ORAL at 17:26

## 2022-01-01 RX ADMIN — CALCITRIOL CAPSULES 0.25 MCG 0.25 MCG: 0.25 CAPSULE ORAL at 06:32

## 2022-01-01 RX ADMIN — GABAPENTIN 100 MG: 100 CAPSULE ORAL at 04:40

## 2022-01-01 RX ADMIN — HYDROMORPHONE HYDROCHLORIDE 0.5 MG: 1 INJECTION, SOLUTION INTRAMUSCULAR; INTRAVENOUS; SUBCUTANEOUS at 23:12

## 2022-01-01 RX ADMIN — GABAPENTIN 100 MG: 100 CAPSULE ORAL at 05:38

## 2022-01-01 RX ADMIN — CALCITRIOL CAPSULES 0.25 MCG 0.25 MCG: 0.25 CAPSULE ORAL at 06:03

## 2022-01-01 RX ADMIN — INSULIN LISPRO 2 UNITS: 100 INJECTION, SOLUTION INTRAVENOUS; SUBCUTANEOUS at 18:09

## 2022-01-01 RX ADMIN — DOCUSATE SODIUM 50 MG AND SENNOSIDES 8.6 MG 2 TABLET: 8.6; 5 TABLET, FILM COATED ORAL at 04:23

## 2022-01-01 RX ADMIN — ACETAMINOPHEN 1000 MG: 500 TABLET, FILM COATED ORAL at 16:35

## 2022-01-01 RX ADMIN — ACETAMINOPHEN 1000 MG: 500 TABLET, FILM COATED ORAL at 05:10

## 2022-01-01 RX ADMIN — POLYETHYLENE GLYCOL 3350 1 PACKET: 17 POWDER, FOR SOLUTION ORAL at 05:07

## 2022-01-01 RX ADMIN — LIDOCAINE 2 PATCH: 700 PATCH TOPICAL at 09:27

## 2022-01-01 RX ADMIN — HEPARIN SODIUM: 1000 INJECTION, SOLUTION INTRAVENOUS; SUBCUTANEOUS at 11:15

## 2022-01-01 RX ADMIN — ACETAMINOPHEN 1000 MG: 500 TABLET, FILM COATED ORAL at 23:28

## 2022-01-01 RX ADMIN — ATORVASTATIN CALCIUM 20 MG: 20 TABLET, FILM COATED ORAL at 05:20

## 2022-01-01 RX ADMIN — ACETAMINOPHEN 1000 MG: 500 TABLET, FILM COATED ORAL at 06:26

## 2022-01-01 RX ADMIN — ROPINIROLE HYDROCHLORIDE 0.25 MG: 0.5 TABLET, FILM COATED ORAL at 05:08

## 2022-01-01 RX ADMIN — ATORVASTATIN CALCIUM 20 MG: 20 TABLET, FILM COATED ORAL at 05:03

## 2022-01-01 RX ADMIN — INSULIN LISPRO 1 UNITS: 100 INJECTION, SOLUTION INTRAVENOUS; SUBCUTANEOUS at 21:25

## 2022-01-01 RX ADMIN — ACETAMINOPHEN 1000 MG: 500 TABLET, FILM COATED ORAL at 05:09

## 2022-01-01 RX ADMIN — ACETAMINOPHEN 650 MG: 325 TABLET, FILM COATED ORAL at 18:40

## 2022-01-01 RX ADMIN — SEVELAMER CARBONATE 1600 MG: 800 TABLET, FILM COATED ORAL at 16:16

## 2022-01-01 RX ADMIN — Medication 5 MG: at 00:36

## 2022-01-01 RX ADMIN — LEVOTHYROXINE SODIUM 25 MCG: 0.03 TABLET ORAL at 06:33

## 2022-01-01 RX ADMIN — ACETAMINOPHEN 1000 MG: 500 TABLET, FILM COATED ORAL at 20:57

## 2022-01-01 RX ADMIN — ACETAMINOPHEN 1000 MG: 500 TABLET, FILM COATED ORAL at 05:28

## 2022-01-01 RX ADMIN — ROPINIROLE HYDROCHLORIDE 0.25 MG: 0.5 TABLET, FILM COATED ORAL at 18:11

## 2022-01-01 RX ADMIN — HEPARIN SODIUM 5000 UNITS: 5000 INJECTION, SOLUTION INTRAVENOUS; SUBCUTANEOUS at 20:58

## 2022-01-01 RX ADMIN — ACETAMINOPHEN 1000 MG: 500 TABLET, FILM COATED ORAL at 06:07

## 2022-01-01 RX ADMIN — ACETAMINOPHEN 1000 MG: 500 TABLET, FILM COATED ORAL at 08:35

## 2022-01-01 RX ADMIN — SEVELAMER CARBONATE 1600 MG: 800 TABLET, FILM COATED ORAL at 12:14

## 2022-01-01 RX ADMIN — DEXTROSE MONOHYDRATE 25 G: 25 INJECTION, SOLUTION INTRAVENOUS at 09:19

## 2022-01-01 RX ADMIN — HYDROCODONE BITARTRATE AND ACETAMINOPHEN 1 TABLET: 5; 325 TABLET ORAL at 01:49

## 2022-01-01 RX ADMIN — INSULIN LISPRO 2 UNITS: 100 INJECTION, SOLUTION INTRAVENOUS; SUBCUTANEOUS at 16:45

## 2022-01-01 RX ADMIN — GABAPENTIN 100 MG: 100 CAPSULE ORAL at 06:16

## 2022-01-01 RX ADMIN — SEVELAMER CARBONATE 1600 MG: 800 TABLET, FILM COATED ORAL at 12:49

## 2022-01-01 RX ADMIN — CALCITRIOL CAPSULES 0.25 MCG 0.25 MCG: 0.25 CAPSULE ORAL at 04:40

## 2022-01-01 RX ADMIN — ACETAMINOPHEN 1000 MG: 500 TABLET, FILM COATED ORAL at 22:09

## 2022-01-01 RX ADMIN — PATIROMER 16.8 G: 16.8 POWDER, FOR SUSPENSION ORAL at 16:51

## 2022-01-01 RX ADMIN — CEFAZOLIN SODIUM 1 G: 1 INJECTION, SOLUTION INTRAVENOUS at 17:56

## 2022-01-01 RX ADMIN — OXYCODONE 2.5 MG: 5 TABLET ORAL at 21:39

## 2022-01-01 RX ADMIN — LIDOCAINE 2 PATCH: 700 PATCH TOPICAL at 08:36

## 2022-01-01 RX ADMIN — GABAPENTIN 100 MG: 100 CAPSULE ORAL at 16:52

## 2022-01-01 RX ADMIN — ACETAMINOPHEN 650 MG: 325 TABLET, FILM COATED ORAL at 21:02

## 2022-01-01 RX ADMIN — HEPARIN SODIUM 5000 UNITS: 5000 INJECTION, SOLUTION INTRAVENOUS; SUBCUTANEOUS at 13:04

## 2022-01-01 RX ADMIN — OXYCODONE 2.5 MG: 5 TABLET ORAL at 10:00

## 2022-01-01 RX ADMIN — INSULIN GLARGINE-YFGN 5 UNITS: 100 INJECTION, SOLUTION SUBCUTANEOUS at 18:38

## 2022-01-01 RX ADMIN — SEVELAMER CARBONATE 1600 MG: 800 TABLET, FILM COATED ORAL at 14:11

## 2022-01-01 RX ADMIN — OXYCODONE 2.5 MG: 5 TABLET ORAL at 17:21

## 2022-01-01 RX ADMIN — LIDOCAINE 2 PATCH: 700 PATCH TOPICAL at 10:34

## 2022-01-01 RX ADMIN — ACETAMINOPHEN 1000 MG: 500 TABLET, FILM COATED ORAL at 00:48

## 2022-01-01 RX ADMIN — SEVELAMER CARBONATE 1600 MG: 800 TABLET, FILM COATED ORAL at 09:19

## 2022-01-01 RX ADMIN — ACETAMINOPHEN 1000 MG: 500 TABLET, FILM COATED ORAL at 17:36

## 2022-01-01 RX ADMIN — OXYCODONE 2.5 MG: 5 TABLET ORAL at 23:28

## 2022-01-01 RX ADMIN — GABAPENTIN 100 MG: 100 CAPSULE ORAL at 18:46

## 2022-01-01 RX ADMIN — HEPARIN SODIUM 5000 UNITS: 5000 INJECTION, SOLUTION INTRAVENOUS; SUBCUTANEOUS at 05:46

## 2022-01-01 RX ADMIN — ROPINIROLE HYDROCHLORIDE 0.25 MG: 0.5 TABLET, FILM COATED ORAL at 04:24

## 2022-01-01 RX ADMIN — OXYCODONE 2.5 MG: 5 TABLET ORAL at 01:48

## 2022-01-01 RX ADMIN — LEVOTHYROXINE SODIUM 25 MCG: 0.03 TABLET ORAL at 05:19

## 2022-01-01 RX ADMIN — GABAPENTIN 100 MG: 100 CAPSULE ORAL at 04:23

## 2022-01-01 RX ADMIN — LEVOTHYROXINE SODIUM 25 MCG: 0.03 TABLET ORAL at 06:07

## 2022-01-01 RX ADMIN — LIDOCAINE 2 PATCH: 700 PATCH TOPICAL at 08:50

## 2022-01-01 RX ADMIN — HEPARIN SODIUM 5000 UNITS: 5000 INJECTION, SOLUTION INTRAVENOUS; SUBCUTANEOUS at 21:13

## 2022-01-01 RX ADMIN — INSULIN LISPRO 1 UNITS: 100 INJECTION, SOLUTION INTRAVENOUS; SUBCUTANEOUS at 20:37

## 2022-01-01 RX ADMIN — ATORVASTATIN CALCIUM 20 MG: 20 TABLET, FILM COATED ORAL at 04:38

## 2022-01-01 RX ADMIN — INSULIN LISPRO 2 UNITS: 100 INJECTION, SOLUTION INTRAVENOUS; SUBCUTANEOUS at 17:56

## 2022-01-01 RX ADMIN — INSULIN GLARGINE-YFGN 5 UNITS: 100 INJECTION, SOLUTION SUBCUTANEOUS at 18:28

## 2022-01-01 RX ADMIN — DOCUSATE SODIUM 50 MG AND SENNOSIDES 8.6 MG 2 TABLET: 8.6; 5 TABLET, FILM COATED ORAL at 17:49

## 2022-01-01 RX ADMIN — HYDROMORPHONE HYDROCHLORIDE 0.25 MG: 1 INJECTION, SOLUTION INTRAMUSCULAR; INTRAVENOUS; SUBCUTANEOUS at 20:55

## 2022-01-01 RX ADMIN — ASPIRIN 325 MG: 325 TABLET ORAL at 05:09

## 2022-01-01 RX ADMIN — LIDOCAINE 2 PATCH: 700 PATCH TOPICAL at 09:20

## 2022-01-01 RX ADMIN — LIDOCAINE 2 PATCH: 700 PATCH TOPICAL at 08:24

## 2022-01-01 RX ADMIN — OXYCODONE 2.5 MG: 5 TABLET ORAL at 05:15

## 2022-01-01 RX ADMIN — ROPINIROLE HYDROCHLORIDE 0.25 MG: 0.5 TABLET, FILM COATED ORAL at 18:06

## 2022-01-01 RX ADMIN — MIDODRINE HYDROCHLORIDE 5 MG: 5 TABLET ORAL at 09:45

## 2022-01-01 RX ADMIN — INSULIN GLARGINE-YFGN 5 UNITS: 100 INJECTION, SOLUTION SUBCUTANEOUS at 19:41

## 2022-01-01 RX ADMIN — HEPARIN SODIUM 5000 UNITS: 5000 INJECTION, SOLUTION INTRAVENOUS; SUBCUTANEOUS at 21:05

## 2022-01-01 RX ADMIN — SEVELAMER CARBONATE 1600 MG: 800 TABLET, FILM COATED ORAL at 17:17

## 2022-01-01 RX ADMIN — INSULIN LISPRO 1 UNITS: 100 INJECTION, SOLUTION INTRAVENOUS; SUBCUTANEOUS at 21:16

## 2022-01-01 RX ADMIN — ACETAMINOPHEN 1000 MG: 500 TABLET, FILM COATED ORAL at 05:24

## 2022-01-01 RX ADMIN — INSULIN GLARGINE-YFGN 5 UNITS: 100 INJECTION, SOLUTION SUBCUTANEOUS at 17:38

## 2022-01-01 RX ADMIN — INSULIN LISPRO 1 UNITS: 100 INJECTION, SOLUTION INTRAVENOUS; SUBCUTANEOUS at 20:53

## 2022-01-01 RX ADMIN — MORPHINE SULFATE 1 MG: 4 INJECTION INTRAVENOUS at 23:40

## 2022-01-01 RX ADMIN — ROPINIROLE HYDROCHLORIDE 0.25 MG: 0.5 TABLET, FILM COATED ORAL at 06:26

## 2022-01-01 RX ADMIN — LIDOCAINE 2 PATCH: 700 PATCH TOPICAL at 09:26

## 2022-01-01 RX ADMIN — HEPARIN SODIUM 5000 UNITS: 5000 INJECTION, SOLUTION INTRAVENOUS; SUBCUTANEOUS at 14:37

## 2022-01-01 RX ADMIN — HEPARIN SODIUM 5000 UNITS: 5000 INJECTION, SOLUTION INTRAVENOUS; SUBCUTANEOUS at 06:06

## 2022-01-01 RX ADMIN — GABAPENTIN 100 MG: 100 CAPSULE ORAL at 13:12

## 2022-01-01 RX ADMIN — ASPIRIN 325 MG: 325 TABLET ORAL at 04:29

## 2022-01-01 RX ADMIN — HEPARIN SODIUM 5000 UNITS: 5000 INJECTION, SOLUTION INTRAVENOUS; SUBCUTANEOUS at 05:16

## 2022-01-01 RX ADMIN — POLYETHYLENE GLYCOL 3350 1 PACKET: 17 POWDER, FOR SOLUTION ORAL at 04:52

## 2022-01-01 RX ADMIN — ACETAMINOPHEN 1000 MG: 500 TABLET, FILM COATED ORAL at 17:57

## 2022-01-01 RX ADMIN — OXYCODONE 2.5 MG: 5 TABLET ORAL at 09:08

## 2022-01-01 RX ADMIN — GABAPENTIN 100 MG: 100 CAPSULE ORAL at 04:47

## 2022-01-01 RX ADMIN — GABAPENTIN 100 MG: 100 CAPSULE ORAL at 04:28

## 2022-01-01 RX ADMIN — ACETAMINOPHEN 1000 MG: 500 TABLET, FILM COATED ORAL at 12:19

## 2022-01-01 RX ADMIN — HYDROMORPHONE HYDROCHLORIDE 0.25 MG: 1 INJECTION, SOLUTION INTRAMUSCULAR; INTRAVENOUS; SUBCUTANEOUS at 22:49

## 2022-01-01 RX ADMIN — ACETAMINOPHEN 650 MG: 325 TABLET, FILM COATED ORAL at 18:04

## 2022-01-01 RX ADMIN — SEVELAMER CARBONATE 1600 MG: 800 TABLET, FILM COATED ORAL at 16:25

## 2022-01-01 RX ADMIN — DOCUSATE SODIUM 50 MG AND SENNOSIDES 8.6 MG 2 TABLET: 8.6; 5 TABLET, FILM COATED ORAL at 21:40

## 2022-01-01 RX ADMIN — SEVELAMER CARBONATE 1600 MG: 800 TABLET, FILM COATED ORAL at 18:07

## 2022-01-01 RX ADMIN — INSULIN LISPRO 3 UNITS: 100 INJECTION, SOLUTION INTRAVENOUS; SUBCUTANEOUS at 21:49

## 2022-01-01 RX ADMIN — GABAPENTIN 100 MG: 100 CAPSULE ORAL at 13:04

## 2022-01-01 RX ADMIN — OMEPRAZOLE 20 MG: 20 CAPSULE, DELAYED RELEASE ORAL at 06:11

## 2022-01-01 RX ADMIN — SEVELAMER CARBONATE 1600 MG: 800 TABLET, FILM COATED ORAL at 10:28

## 2022-01-01 RX ADMIN — HEPARIN SODIUM 5000 UNITS: 5000 INJECTION, SOLUTION INTRAVENOUS; SUBCUTANEOUS at 17:49

## 2022-01-01 RX ADMIN — DOCUSATE SODIUM 50 MG AND SENNOSIDES 8.6 MG 2 TABLET: 8.6; 5 TABLET, FILM COATED ORAL at 04:11

## 2022-01-01 RX ADMIN — POLYETHYLENE GLYCOL 3350 1 PACKET: 17 POWDER, FOR SOLUTION ORAL at 05:13

## 2022-01-01 RX ADMIN — ACETAMINOPHEN 1000 MG: 500 TABLET, FILM COATED ORAL at 23:20

## 2022-01-01 RX ADMIN — LIDOCAINE 2 PATCH: 700 PATCH TOPICAL at 09:03

## 2022-01-01 RX ADMIN — LIDOCAINE 2 PATCH: 700 PATCH TOPICAL at 08:39

## 2022-01-01 RX ADMIN — GABAPENTIN 100 MG: 100 CAPSULE ORAL at 05:48

## 2022-01-01 RX ADMIN — HEPARIN SODIUM 5000 UNITS: 5000 INJECTION, SOLUTION INTRAVENOUS; SUBCUTANEOUS at 13:19

## 2022-01-01 RX ADMIN — OXYCODONE 2.5 MG: 5 TABLET ORAL at 05:06

## 2022-01-01 RX ADMIN — OXYCODONE 2.5 MG: 5 TABLET ORAL at 04:39

## 2022-01-01 RX ADMIN — ROPINIROLE HYDROCHLORIDE 0.25 MG: 0.5 TABLET, FILM COATED ORAL at 05:16

## 2022-01-01 RX ADMIN — SEVELAMER CARBONATE 1600 MG: 800 TABLET, FILM COATED ORAL at 16:53

## 2022-01-01 RX ADMIN — GABAPENTIN 100 MG: 100 CAPSULE ORAL at 06:08

## 2022-01-01 RX ADMIN — SEVELAMER CARBONATE 1600 MG: 800 TABLET, FILM COATED ORAL at 20:40

## 2022-01-01 RX ADMIN — HEPARIN SODIUM 5000 UNITS: 5000 INJECTION, SOLUTION INTRAVENOUS; SUBCUTANEOUS at 05:56

## 2022-01-01 RX ADMIN — HYDROMORPHONE HYDROCHLORIDE 0.25 MG: 1 INJECTION, SOLUTION INTRAMUSCULAR; INTRAVENOUS; SUBCUTANEOUS at 02:19

## 2022-01-01 RX ADMIN — DOCUSATE SODIUM 50 MG AND SENNOSIDES 8.6 MG 2 TABLET: 8.6; 5 TABLET, FILM COATED ORAL at 18:03

## 2022-01-01 RX ADMIN — OXYCODONE 2.5 MG: 5 TABLET ORAL at 11:01

## 2022-01-01 RX ADMIN — GABAPENTIN 200 MG: 100 CAPSULE ORAL at 20:43

## 2022-01-01 RX ADMIN — ACETAMINOPHEN 1000 MG: 500 TABLET, FILM COATED ORAL at 12:54

## 2022-01-01 RX ADMIN — INSULIN GLARGINE-YFGN 5 UNITS: 100 INJECTION, SOLUTION SUBCUTANEOUS at 18:19

## 2022-01-01 RX ADMIN — HEPARIN SODIUM 5000 UNITS: 5000 INJECTION, SOLUTION INTRAVENOUS; SUBCUTANEOUS at 23:40

## 2022-01-01 RX ADMIN — OXYCODONE 2.5 MG: 5 TABLET ORAL at 22:38

## 2022-01-01 RX ADMIN — GABAPENTIN 200 MG: 100 CAPSULE ORAL at 21:07

## 2022-01-01 RX ADMIN — HEPARIN SODIUM 5000 UNITS: 5000 INJECTION, SOLUTION INTRAVENOUS; SUBCUTANEOUS at 04:59

## 2022-01-01 RX ADMIN — INSULIN LISPRO 1 UNITS: 100 INJECTION, SOLUTION INTRAVENOUS; SUBCUTANEOUS at 18:07

## 2022-01-01 RX ADMIN — INSULIN LISPRO 3 UNITS: 100 INJECTION, SOLUTION INTRAVENOUS; SUBCUTANEOUS at 17:03

## 2022-01-01 RX ADMIN — INSULIN GLARGINE-YFGN 10 UNITS: 100 INJECTION, SOLUTION SUBCUTANEOUS at 21:26

## 2022-01-01 RX ADMIN — SEVELAMER CARBONATE 1600 MG: 800 TABLET, FILM COATED ORAL at 14:07

## 2022-01-01 RX ADMIN — DOCUSATE SODIUM 50 MG AND SENNOSIDES 8.6 MG 2 TABLET: 8.6; 5 TABLET, FILM COATED ORAL at 06:08

## 2022-01-01 RX ADMIN — HEPARIN SODIUM 5000 UNITS: 5000 INJECTION, SOLUTION INTRAVENOUS; SUBCUTANEOUS at 14:34

## 2022-01-01 RX ADMIN — ACETAMINOPHEN 1000 MG: 500 TABLET, FILM COATED ORAL at 18:02

## 2022-01-01 RX ADMIN — ACETAMINOPHEN 1000 MG: 500 TABLET, FILM COATED ORAL at 04:39

## 2022-01-01 RX ADMIN — SEVELAMER CARBONATE 1600 MG: 800 TABLET, FILM COATED ORAL at 13:39

## 2022-01-01 RX ADMIN — ATORVASTATIN CALCIUM 20 MG: 20 TABLET, FILM COATED ORAL at 05:48

## 2022-01-01 RX ADMIN — SEVELAMER CARBONATE 1600 MG: 800 TABLET, FILM COATED ORAL at 17:36

## 2022-01-01 RX ADMIN — DOCUSATE SODIUM 50 MG AND SENNOSIDES 8.6 MG 2 TABLET: 8.6; 5 TABLET, FILM COATED ORAL at 17:24

## 2022-01-01 RX ADMIN — GABAPENTIN 100 MG: 100 CAPSULE ORAL at 18:00

## 2022-01-01 RX ADMIN — ONDANSETRON 4 MG: 4 TABLET, ORALLY DISINTEGRATING ORAL at 12:22

## 2022-01-01 RX ADMIN — ACETAMINOPHEN 1000 MG: 500 TABLET, FILM COATED ORAL at 11:11

## 2022-01-01 RX ADMIN — HEPARIN SODIUM 5000 UNITS: 5000 INJECTION, SOLUTION INTRAVENOUS; SUBCUTANEOUS at 05:17

## 2022-01-01 RX ADMIN — OXYCODONE 2.5 MG: 5 TABLET ORAL at 04:38

## 2022-01-01 RX ADMIN — DOCUSATE SODIUM 50 MG AND SENNOSIDES 8.6 MG 2 TABLET: 8.6; 5 TABLET, FILM COATED ORAL at 16:45

## 2022-01-01 RX ADMIN — DOCUSATE SODIUM 50 MG AND SENNOSIDES 8.6 MG 2 TABLET: 8.6; 5 TABLET, FILM COATED ORAL at 05:48

## 2022-01-01 RX ADMIN — CALCITRIOL CAPSULES 0.25 MCG 0.25 MCG: 0.25 CAPSULE ORAL at 04:59

## 2022-01-01 RX ADMIN — DOCUSATE SODIUM 50 MG AND SENNOSIDES 8.6 MG 2 TABLET: 8.6; 5 TABLET, FILM COATED ORAL at 04:47

## 2022-01-01 RX ADMIN — GABAPENTIN 100 MG: 100 CAPSULE ORAL at 17:57

## 2022-01-01 RX ADMIN — OXYCODONE 2.5 MG: 5 TABLET ORAL at 21:30

## 2022-01-01 RX ADMIN — HEPARIN SODIUM 5000 UNITS: 5000 INJECTION, SOLUTION INTRAVENOUS; SUBCUTANEOUS at 21:17

## 2022-01-01 RX ADMIN — SEVELAMER CARBONATE 1600 MG: 800 TABLET, FILM COATED ORAL at 12:46

## 2022-01-01 RX ADMIN — SEVELAMER CARBONATE 1600 MG: 800 TABLET, FILM COATED ORAL at 09:55

## 2022-01-01 RX ADMIN — INSULIN GLARGINE-YFGN 5 UNITS: 100 INJECTION, SOLUTION SUBCUTANEOUS at 18:00

## 2022-01-01 RX ADMIN — SEVELAMER CARBONATE 1600 MG: 800 TABLET, FILM COATED ORAL at 12:57

## 2022-01-01 RX ADMIN — LIDOCAINE 2 PATCH: 700 PATCH TOPICAL at 08:56

## 2022-01-01 RX ADMIN — ROPINIROLE HYDROCHLORIDE 0.25 MG: 0.5 TABLET, FILM COATED ORAL at 05:46

## 2022-01-01 RX ADMIN — GABAPENTIN 100 MG: 100 CAPSULE ORAL at 12:54

## 2022-01-01 RX ADMIN — PATIROMER 8.4 G: 8.4 POWDER, FOR SUSPENSION ORAL at 13:53

## 2022-01-01 RX ADMIN — ACETAMINOPHEN 1000 MG: 500 TABLET, FILM COATED ORAL at 00:30

## 2022-01-01 RX ADMIN — INSULIN LISPRO 4 UNITS: 100 INJECTION, SOLUTION INTRAVENOUS; SUBCUTANEOUS at 14:34

## 2022-01-01 RX ADMIN — DOCUSATE SODIUM 50 MG AND SENNOSIDES 8.6 MG 2 TABLET: 8.6; 5 TABLET, FILM COATED ORAL at 17:22

## 2022-01-01 RX ADMIN — ATORVASTATIN CALCIUM 20 MG: 20 TABLET, FILM COATED ORAL at 06:38

## 2022-01-01 RX ADMIN — HEPARIN SODIUM 5000 UNITS: 5000 INJECTION, SOLUTION INTRAVENOUS; SUBCUTANEOUS at 06:03

## 2022-01-01 RX ADMIN — ACETAMINOPHEN 1000 MG: 500 TABLET, FILM COATED ORAL at 17:00

## 2022-01-01 RX ADMIN — INSULIN LISPRO 1 UNITS: 100 INJECTION, SOLUTION INTRAVENOUS; SUBCUTANEOUS at 15:02

## 2022-01-01 RX ADMIN — ACETAMINOPHEN 1000 MG: 500 TABLET, FILM COATED ORAL at 12:43

## 2022-01-01 RX ADMIN — ATORVASTATIN CALCIUM 20 MG: 20 TABLET, FILM COATED ORAL at 04:42

## 2022-01-01 RX ADMIN — EPOETIN ALFA-EPBX 3000 UNITS: 3000 INJECTION, SOLUTION INTRAVENOUS; SUBCUTANEOUS at 09:33

## 2022-01-01 RX ADMIN — SEVELAMER CARBONATE 1600 MG: 800 TABLET, FILM COATED ORAL at 09:13

## 2022-01-01 RX ADMIN — HEPARIN SODIUM 5000 UNITS: 5000 INJECTION, SOLUTION INTRAVENOUS; SUBCUTANEOUS at 05:06

## 2022-01-01 RX ADMIN — ROPINIROLE HYDROCHLORIDE 0.25 MG: 0.5 TABLET, FILM COATED ORAL at 06:08

## 2022-01-01 RX ADMIN — SEVELAMER CARBONATE 1600 MG: 800 TABLET, FILM COATED ORAL at 09:40

## 2022-01-01 RX ADMIN — HEPARIN SODIUM 5000 UNITS: 5000 INJECTION, SOLUTION INTRAVENOUS; SUBCUTANEOUS at 06:14

## 2022-01-01 RX ADMIN — DOCUSATE SODIUM 50 MG AND SENNOSIDES 8.6 MG 2 TABLET: 8.6; 5 TABLET, FILM COATED ORAL at 18:06

## 2022-01-01 RX ADMIN — PANTOPRAZOLE SODIUM 40 MG: 40 INJECTION, POWDER, FOR SOLUTION INTRAVENOUS at 05:30

## 2022-01-01 RX ADMIN — ACETAMINOPHEN 1000 MG: 500 TABLET, FILM COATED ORAL at 18:46

## 2022-01-01 RX ADMIN — ACETAMINOPHEN 1000 MG: 500 TABLET, FILM COATED ORAL at 23:21

## 2022-01-01 RX ADMIN — ROPINIROLE HYDROCHLORIDE 0.25 MG: 0.5 TABLET, FILM COATED ORAL at 04:39

## 2022-01-01 RX ADMIN — OXYCODONE 2.5 MG: 5 TABLET ORAL at 04:45

## 2022-01-01 RX ADMIN — ASPIRIN 325 MG: 325 TABLET ORAL at 23:20

## 2022-01-01 RX ADMIN — OXYCODONE 2.5 MG: 5 TABLET ORAL at 04:52

## 2022-01-01 RX ADMIN — GABAPENTIN 100 MG: 100 CAPSULE ORAL at 04:52

## 2022-01-01 RX ADMIN — LEVOTHYROXINE SODIUM 25 MCG: 0.03 TABLET ORAL at 04:50

## 2022-01-01 RX ADMIN — GABAPENTIN 100 MG: 100 CAPSULE ORAL at 12:21

## 2022-01-01 RX ADMIN — OXYCODONE 2.5 MG: 5 TABLET ORAL at 22:20

## 2022-01-01 RX ADMIN — ACETAMINOPHEN 1000 MG: 500 TABLET, FILM COATED ORAL at 00:20

## 2022-01-01 RX ADMIN — ACETAMINOPHEN 1000 MG: 500 TABLET, FILM COATED ORAL at 17:14

## 2022-01-01 RX ADMIN — SEVELAMER CARBONATE 1600 MG: 800 TABLET, FILM COATED ORAL at 07:26

## 2022-01-01 RX ADMIN — ATORVASTATIN CALCIUM 20 MG: 20 TABLET, FILM COATED ORAL at 04:23

## 2022-01-01 RX ADMIN — ATORVASTATIN CALCIUM 20 MG: 20 TABLET, FILM COATED ORAL at 05:22

## 2022-01-01 RX ADMIN — INSULIN LISPRO 4 UNITS: 100 INJECTION, SOLUTION INTRAVENOUS; SUBCUTANEOUS at 12:28

## 2022-01-01 RX ADMIN — POLYETHYLENE GLYCOL 3350 1 PACKET: 17 POWDER, FOR SOLUTION ORAL at 04:39

## 2022-01-01 RX ADMIN — HYDROMORPHONE HYDROCHLORIDE 0.25 MG: 1 INJECTION, SOLUTION INTRAMUSCULAR; INTRAVENOUS; SUBCUTANEOUS at 05:19

## 2022-01-01 RX ADMIN — GABAPENTIN 100 MG: 100 CAPSULE ORAL at 04:45

## 2022-01-01 RX ADMIN — ROPINIROLE HYDROCHLORIDE 0.25 MG: 0.5 TABLET, FILM COATED ORAL at 06:05

## 2022-01-01 RX ADMIN — INSULIN GLARGINE-YFGN 5 UNITS: 100 INJECTION, SOLUTION SUBCUTANEOUS at 17:15

## 2022-01-01 RX ADMIN — HEPARIN SODIUM 5000 UNITS: 5000 INJECTION, SOLUTION INTRAVENOUS; SUBCUTANEOUS at 05:12

## 2022-01-01 RX ADMIN — ATORVASTATIN CALCIUM 20 MG: 20 TABLET, FILM COATED ORAL at 06:15

## 2022-01-01 RX ADMIN — LIDOCAINE 2 PATCH: 700 PATCH TOPICAL at 09:18

## 2022-01-01 RX ADMIN — INSULIN HUMAN 5 UNITS: 100 INJECTION, SOLUTION PARENTERAL at 02:41

## 2022-01-01 RX ADMIN — DOCUSATE SODIUM 50 MG AND SENNOSIDES 8.6 MG 2 TABLET: 8.6; 5 TABLET, FILM COATED ORAL at 05:20

## 2022-01-01 RX ADMIN — DOCUSATE SODIUM 50 MG AND SENNOSIDES 8.6 MG 2 TABLET: 8.6; 5 TABLET, FILM COATED ORAL at 18:48

## 2022-01-01 RX ADMIN — ACETAMINOPHEN 1000 MG: 500 TABLET, FILM COATED ORAL at 08:57

## 2022-01-01 RX ADMIN — ROPINIROLE HYDROCHLORIDE 0.25 MG: 0.5 TABLET, FILM COATED ORAL at 04:28

## 2022-01-01 RX ADMIN — POLYETHYLENE GLYCOL 3350 1 PACKET: 17 POWDER, FOR SOLUTION ORAL at 05:28

## 2022-01-01 RX ADMIN — ROPINIROLE HYDROCHLORIDE 0.25 MG: 0.5 TABLET, FILM COATED ORAL at 05:07

## 2022-01-01 RX ADMIN — SEVELAMER CARBONATE 1600 MG: 800 TABLET, FILM COATED ORAL at 12:34

## 2022-01-01 RX ADMIN — DOCUSATE SODIUM 50 MG AND SENNOSIDES 8.6 MG 2 TABLET: 8.6; 5 TABLET, FILM COATED ORAL at 17:47

## 2022-01-01 RX ADMIN — DOCUSATE SODIUM 50 MG AND SENNOSIDES 8.6 MG 2 TABLET: 8.6; 5 TABLET, FILM COATED ORAL at 05:31

## 2022-01-01 RX ADMIN — NIFEDIPINE 90 MG: 30 TABLET, FILM COATED, EXTENDED RELEASE ORAL at 05:28

## 2022-01-01 RX ADMIN — PATIROMER 16.8 G: 16.8 POWDER, FOR SUSPENSION ORAL at 14:47

## 2022-01-01 RX ADMIN — ACETAMINOPHEN 1000 MG: 500 TABLET, FILM COATED ORAL at 12:09

## 2022-01-01 RX ADMIN — ASPIRIN 325 MG: 325 TABLET ORAL at 04:32

## 2022-01-01 RX ADMIN — POLYETHYLENE GLYCOL 3350 1 PACKET: 17 POWDER, FOR SOLUTION ORAL at 05:31

## 2022-01-01 RX ADMIN — PANTOPRAZOLE SODIUM 40 MG: 40 INJECTION, POWDER, LYOPHILIZED, FOR SOLUTION INTRAVENOUS at 06:22

## 2022-01-01 RX ADMIN — POLYETHYLENE GLYCOL 3350 1 PACKET: 17 POWDER, FOR SOLUTION ORAL at 05:44

## 2022-01-01 RX ADMIN — SENNOSIDES AND DOCUSATE SODIUM 2 TABLET: 50; 8.6 TABLET ORAL at 05:28

## 2022-01-01 RX ADMIN — ROPINIROLE HYDROCHLORIDE 0.25 MG: 0.5 TABLET, FILM COATED ORAL at 17:17

## 2022-01-01 RX ADMIN — HYDROCODONE BITARTRATE AND ACETAMINOPHEN 1 TABLET: 5; 325 TABLET ORAL at 11:59

## 2022-01-01 RX ADMIN — ROPINIROLE HYDROCHLORIDE 0.25 MG: 0.5 TABLET, FILM COATED ORAL at 06:38

## 2022-01-01 RX ADMIN — INSULIN GLARGINE-YFGN 5 UNITS: 100 INJECTION, SOLUTION SUBCUTANEOUS at 16:49

## 2022-01-01 RX ADMIN — SEVELAMER CARBONATE 1600 MG: 800 TABLET, FILM COATED ORAL at 18:14

## 2022-01-01 RX ADMIN — GABAPENTIN 100 MG: 100 CAPSULE ORAL at 05:25

## 2022-01-01 RX ADMIN — ACETAMINOPHEN 1000 MG: 500 TABLET, FILM COATED ORAL at 05:20

## 2022-01-01 RX ADMIN — GABAPENTIN 100 MG: 100 CAPSULE ORAL at 17:00

## 2022-01-01 RX ADMIN — DOCUSATE SODIUM 50 MG AND SENNOSIDES 8.6 MG 2 TABLET: 8.6; 5 TABLET, FILM COATED ORAL at 18:14

## 2022-01-01 RX ADMIN — OXYCODONE 2.5 MG: 5 TABLET ORAL at 12:34

## 2022-01-01 RX ADMIN — POLYETHYLENE GLYCOL 3350 1 PACKET: 17 POWDER, FOR SOLUTION ORAL at 06:24

## 2022-01-01 RX ADMIN — ACETAMINOPHEN 1000 MG: 500 TABLET, FILM COATED ORAL at 00:54

## 2022-01-01 RX ADMIN — SEVELAMER CARBONATE 1600 MG: 800 TABLET, FILM COATED ORAL at 17:15

## 2022-01-01 RX ADMIN — SEVELAMER CARBONATE 1600 MG: 800 TABLET, FILM COATED ORAL at 19:12

## 2022-01-01 RX ADMIN — SEVELAMER CARBONATE 1600 MG: 800 TABLET, FILM COATED ORAL at 17:14

## 2022-01-01 RX ADMIN — HEPARIN SODIUM 5000 UNITS: 5000 INJECTION, SOLUTION INTRAVENOUS; SUBCUTANEOUS at 04:39

## 2022-01-01 RX ADMIN — HYDROMORPHONE HYDROCHLORIDE 0.5 MG: 1 INJECTION, SOLUTION INTRAMUSCULAR; INTRAVENOUS; SUBCUTANEOUS at 21:55

## 2022-01-01 RX ADMIN — PATIROMER 8.4 G: 8.4 POWDER, FOR SUSPENSION ORAL at 14:04

## 2022-01-01 RX ADMIN — PATIROMER 8.4 G: 8.4 POWDER, FOR SUSPENSION ORAL at 14:20

## 2022-01-01 RX ADMIN — CALCITRIOL CAPSULES 0.25 MCG 0.25 MCG: 0.25 CAPSULE ORAL at 05:16

## 2022-01-01 RX ADMIN — ATORVASTATIN CALCIUM 20 MG: 20 TABLET, FILM COATED ORAL at 05:56

## 2022-01-01 RX ADMIN — HEPARIN SODIUM 5000 UNITS: 5000 INJECTION, SOLUTION INTRAVENOUS; SUBCUTANEOUS at 05:27

## 2022-01-01 RX ADMIN — OXYCODONE 2.5 MG: 5 TABLET ORAL at 10:06

## 2022-01-01 RX ADMIN — ACETAMINOPHEN 1000 MG: 500 TABLET, FILM COATED ORAL at 13:46

## 2022-01-01 RX ADMIN — CALCITRIOL CAPSULES 0.25 MCG 0.25 MCG: 0.25 CAPSULE ORAL at 04:47

## 2022-01-01 RX ADMIN — ACETAMINOPHEN 1000 MG: 500 TABLET, FILM COATED ORAL at 06:16

## 2022-01-01 RX ADMIN — SEVELAMER CARBONATE 1600 MG: 800 TABLET, FILM COATED ORAL at 08:32

## 2022-01-01 RX ADMIN — HYDROMORPHONE HYDROCHLORIDE 1 MG: 1 INJECTION, SOLUTION INTRAMUSCULAR; INTRAVENOUS; SUBCUTANEOUS at 12:20

## 2022-01-01 RX ADMIN — LIDOCAINE 2 PATCH: 700 PATCH TOPICAL at 08:52

## 2022-01-01 RX ADMIN — OXYCODONE 5 MG: 5 TABLET ORAL at 10:48

## 2022-01-01 RX ADMIN — OXYCODONE 2.5 MG: 5 TABLET ORAL at 13:42

## 2022-01-01 RX ADMIN — SEVELAMER CARBONATE 1600 MG: 800 TABLET, FILM COATED ORAL at 13:14

## 2022-01-01 RX ADMIN — ACETAMINOPHEN 1000 MG: 500 TABLET, FILM COATED ORAL at 05:48

## 2022-01-01 RX ADMIN — INSULIN GLARGINE-YFGN 10 UNITS: 100 INJECTION, SOLUTION SUBCUTANEOUS at 21:51

## 2022-01-01 RX ADMIN — SEVELAMER CARBONATE 1600 MG: 800 TABLET, FILM COATED ORAL at 08:15

## 2022-01-01 RX ADMIN — INSULIN GLARGINE-YFGN 5 UNITS: 100 INJECTION, SOLUTION SUBCUTANEOUS at 16:45

## 2022-01-01 RX ADMIN — ATORVASTATIN CALCIUM 20 MG: 20 TABLET, FILM COATED ORAL at 06:22

## 2022-01-01 RX ADMIN — HEPARIN SODIUM 5000 UNITS: 5000 INJECTION, SOLUTION INTRAVENOUS; SUBCUTANEOUS at 14:04

## 2022-01-01 RX ADMIN — HEPARIN SODIUM 5000 UNITS: 5000 INJECTION, SOLUTION INTRAVENOUS; SUBCUTANEOUS at 12:55

## 2022-01-01 RX ADMIN — HEPARIN SODIUM 3500 UNITS: 1000 INJECTION, SOLUTION INTRAVENOUS; SUBCUTANEOUS at 23:20

## 2022-01-01 RX ADMIN — INSULIN LISPRO 2 UNITS: 100 INJECTION, SOLUTION INTRAVENOUS; SUBCUTANEOUS at 08:44

## 2022-01-01 RX ADMIN — CALCITRIOL CAPSULES 0.25 MCG 0.25 MCG: 0.25 CAPSULE ORAL at 04:23

## 2022-01-01 RX ADMIN — SEVELAMER CARBONATE 1600 MG: 800 TABLET, FILM COATED ORAL at 12:02

## 2022-01-01 RX ADMIN — ACETAMINOPHEN 1000 MG: 500 TABLET, FILM COATED ORAL at 06:33

## 2022-01-01 RX ADMIN — HEPARIN SODIUM 5000 UNITS: 5000 INJECTION, SOLUTION INTRAVENOUS; SUBCUTANEOUS at 21:40

## 2022-01-01 RX ADMIN — GABAPENTIN 100 MG: 100 CAPSULE ORAL at 05:37

## 2022-01-01 RX ADMIN — INSULIN LISPRO 1 UNITS: 100 INJECTION, SOLUTION INTRAVENOUS; SUBCUTANEOUS at 18:30

## 2022-01-01 RX ADMIN — POLYETHYLENE GLYCOL 3350 1 PACKET: 17 POWDER, FOR SOLUTION ORAL at 06:00

## 2022-01-01 RX ADMIN — GABAPENTIN 100 MG: 100 CAPSULE ORAL at 18:03

## 2022-01-01 RX ADMIN — ACETAMINOPHEN 1000 MG: 500 TABLET, FILM COATED ORAL at 04:32

## 2022-01-01 RX ADMIN — DOCUSATE SODIUM 50 MG AND SENNOSIDES 8.6 MG 2 TABLET: 8.6; 5 TABLET, FILM COATED ORAL at 04:42

## 2022-01-01 RX ADMIN — INSULIN LISPRO 2 UNITS: 100 INJECTION, SOLUTION INTRAVENOUS; SUBCUTANEOUS at 21:24

## 2022-01-01 RX ADMIN — ROPINIROLE HYDROCHLORIDE 0.25 MG: 0.5 TABLET, FILM COATED ORAL at 04:42

## 2022-01-01 RX ADMIN — CALCITRIOL CAPSULES 0.25 MCG 0.25 MCG: 0.25 CAPSULE ORAL at 05:25

## 2022-01-01 RX ADMIN — HYDROMORPHONE HYDROCHLORIDE 0.5 MG: 1 INJECTION, SOLUTION INTRAMUSCULAR; INTRAVENOUS; SUBCUTANEOUS at 12:49

## 2022-01-01 RX ADMIN — ACETAMINOPHEN 1000 MG: 500 TABLET, FILM COATED ORAL at 12:04

## 2022-01-01 RX ADMIN — SEVELAMER CARBONATE 1600 MG: 800 TABLET, FILM COATED ORAL at 09:32

## 2022-01-01 RX ADMIN — ATORVASTATIN CALCIUM 20 MG: 20 TABLET, FILM COATED ORAL at 05:08

## 2022-01-01 RX ADMIN — Medication 5 MG: at 00:49

## 2022-01-01 RX ADMIN — DOCUSATE SODIUM 50 MG AND SENNOSIDES 8.6 MG 2 TABLET: 8.6; 5 TABLET, FILM COATED ORAL at 04:51

## 2022-01-01 RX ADMIN — SEVELAMER CARBONATE 1600 MG: 800 TABLET, FILM COATED ORAL at 17:22

## 2022-01-01 RX ADMIN — HEPARIN SODIUM 12 UNITS/KG/HR: 5000 INJECTION, SOLUTION INTRAVENOUS at 23:23

## 2022-01-01 RX ADMIN — INSULIN LISPRO 1 UNITS: 100 INJECTION, SOLUTION INTRAVENOUS; SUBCUTANEOUS at 14:21

## 2022-01-01 RX ADMIN — SEVELAMER CARBONATE 1600 MG: 800 TABLET, FILM COATED ORAL at 17:24

## 2022-01-01 RX ADMIN — ACETAMINOPHEN 650 MG: 325 TABLET, FILM COATED ORAL at 12:50

## 2022-01-01 RX ADMIN — SEVELAMER CARBONATE 1600 MG: 800 TABLET, FILM COATED ORAL at 16:59

## 2022-01-01 RX ADMIN — SEVELAMER CARBONATE 1600 MG: 800 TABLET, FILM COATED ORAL at 16:28

## 2022-01-01 RX ADMIN — ROPINIROLE HYDROCHLORIDE 0.25 MG: 0.5 TABLET, FILM COATED ORAL at 17:35

## 2022-01-01 RX ADMIN — CALCITRIOL CAPSULES 0.25 MCG 0.25 MCG: 0.25 CAPSULE ORAL at 05:08

## 2022-01-01 RX ADMIN — ATORVASTATIN CALCIUM 20 MG: 20 TABLET, FILM COATED ORAL at 05:00

## 2022-01-01 RX ADMIN — ROPINIROLE HYDROCHLORIDE 0.25 MG: 0.5 TABLET, FILM COATED ORAL at 05:14

## 2022-01-01 RX ADMIN — OXYCODONE 2.5 MG: 5 TABLET ORAL at 18:02

## 2022-01-01 RX ADMIN — GABAPENTIN 100 MG: 100 CAPSULE ORAL at 06:21

## 2022-01-01 RX ADMIN — PATIROMER 8.4 G: 8.4 POWDER, FOR SUSPENSION ORAL at 13:16

## 2022-01-01 RX ADMIN — HEPARIN SODIUM 5000 UNITS: 5000 INJECTION, SOLUTION INTRAVENOUS; SUBCUTANEOUS at 12:46

## 2022-01-01 RX ADMIN — ROPINIROLE HYDROCHLORIDE 0.25 MG: 0.5 TABLET, FILM COATED ORAL at 18:08

## 2022-01-01 RX ADMIN — CALCITRIOL CAPSULES 0.25 MCG 0.25 MCG: 0.25 CAPSULE ORAL at 06:16

## 2022-01-01 RX ADMIN — ACETAMINOPHEN 1000 MG: 500 TABLET, FILM COATED ORAL at 23:00

## 2022-01-01 RX ADMIN — SEVELAMER CARBONATE 1600 MG: 800 TABLET, FILM COATED ORAL at 12:39

## 2022-01-01 RX ADMIN — ROPINIROLE HYDROCHLORIDE 0.25 MG: 0.5 TABLET, FILM COATED ORAL at 17:01

## 2022-01-01 RX ADMIN — ACETAMINOPHEN 650 MG: 325 TABLET, FILM COATED ORAL at 06:23

## 2022-01-01 RX ADMIN — OXYCODONE 2.5 MG: 5 TABLET ORAL at 17:40

## 2022-01-01 RX ADMIN — OMEPRAZOLE 20 MG: 20 CAPSULE, DELAYED RELEASE ORAL at 04:28

## 2022-01-01 RX ADMIN — GABAPENTIN 100 MG: 100 CAPSULE ORAL at 18:14

## 2022-01-01 RX ADMIN — HEPARIN SODIUM 5000 UNITS: 5000 INJECTION, SOLUTION INTRAVENOUS; SUBCUTANEOUS at 21:57

## 2022-01-01 RX ADMIN — LIDOCAINE 2 PATCH: 700 PATCH TOPICAL at 10:29

## 2022-01-01 RX ADMIN — GABAPENTIN 100 MG: 100 CAPSULE ORAL at 12:44

## 2022-01-01 RX ADMIN — ROPINIROLE HYDROCHLORIDE 0.25 MG: 0.5 TABLET, FILM COATED ORAL at 16:52

## 2022-01-01 RX ADMIN — HEPARIN SODIUM 5000 UNITS: 5000 INJECTION, SOLUTION INTRAVENOUS; SUBCUTANEOUS at 22:09

## 2022-01-01 RX ADMIN — ACETAMINOPHEN 1000 MG: 500 TABLET, FILM COATED ORAL at 00:23

## 2022-01-01 RX ADMIN — GABAPENTIN 100 MG: 100 CAPSULE ORAL at 11:56

## 2022-01-01 RX ADMIN — GABAPENTIN 100 MG: 100 CAPSULE ORAL at 13:42

## 2022-01-01 RX ADMIN — SEVELAMER CARBONATE 1600 MG: 800 TABLET, FILM COATED ORAL at 11:38

## 2022-01-01 RX ADMIN — SEVELAMER CARBONATE 1600 MG: 800 TABLET, FILM COATED ORAL at 07:29

## 2022-01-01 RX ADMIN — ACETAMINOPHEN 1000 MG: 500 TABLET, FILM COATED ORAL at 00:17

## 2022-01-01 RX ADMIN — IOHEXOL 75 ML: 350 INJECTION, SOLUTION INTRAVENOUS at 12:55

## 2022-01-01 RX ADMIN — HEPARIN SODIUM 5000 UNITS: 5000 INJECTION, SOLUTION INTRAVENOUS; SUBCUTANEOUS at 06:39

## 2022-01-01 RX ADMIN — SEVELAMER CARBONATE 1600 MG: 800 TABLET, FILM COATED ORAL at 13:46

## 2022-01-01 RX ADMIN — GABAPENTIN 100 MG: 100 CAPSULE ORAL at 04:32

## 2022-01-01 RX ADMIN — DOCUSATE SODIUM 50 MG AND SENNOSIDES 8.6 MG 2 TABLET: 8.6; 5 TABLET, FILM COATED ORAL at 06:07

## 2022-01-01 RX ADMIN — OXYCODONE 5 MG: 5 TABLET ORAL at 16:34

## 2022-01-01 RX ADMIN — LEVOTHYROXINE SODIUM 25 MCG: 0.03 TABLET ORAL at 05:49

## 2022-01-01 RX ADMIN — GABAPENTIN 100 MG: 100 CAPSULE ORAL at 18:11

## 2022-01-01 RX ADMIN — DOCUSATE SODIUM 50 MG AND SENNOSIDES 8.6 MG 2 TABLET: 8.6; 5 TABLET, FILM COATED ORAL at 05:06

## 2022-01-01 RX ADMIN — LIDOCAINE 2 PATCH: 700 PATCH TOPICAL at 08:58

## 2022-01-01 RX ADMIN — LEVOTHYROXINE SODIUM 25 MCG: 0.03 TABLET ORAL at 06:27

## 2022-01-01 RX ADMIN — POLYETHYLENE GLYCOL 3350 1 PACKET: 17 POWDER, FOR SOLUTION ORAL at 06:07

## 2022-01-01 RX ADMIN — DOCUSATE SODIUM 50 MG AND SENNOSIDES 8.6 MG 2 TABLET: 8.6; 5 TABLET, FILM COATED ORAL at 17:26

## 2022-01-01 RX ADMIN — HEPARIN SODIUM 5000 UNITS: 5000 INJECTION, SOLUTION INTRAVENOUS; SUBCUTANEOUS at 13:07

## 2022-01-01 RX ADMIN — HEPARIN SODIUM 5000 UNITS: 5000 INJECTION, SOLUTION INTRAVENOUS; SUBCUTANEOUS at 04:52

## 2022-01-01 RX ADMIN — INSULIN LISPRO 2 UNITS: 100 INJECTION, SOLUTION INTRAVENOUS; SUBCUTANEOUS at 21:33

## 2022-01-01 RX ADMIN — INSULIN LISPRO 3 UNITS: 100 INJECTION, SOLUTION INTRAVENOUS; SUBCUTANEOUS at 21:51

## 2022-01-01 RX ADMIN — GABAPENTIN 100 MG: 100 CAPSULE ORAL at 17:23

## 2022-01-01 RX ADMIN — SEVELAMER CARBONATE 1600 MG: 800 TABLET, FILM COATED ORAL at 10:01

## 2022-01-01 RX ADMIN — ACETAMINOPHEN 1000 MG: 500 TABLET, FILM COATED ORAL at 18:31

## 2022-01-01 RX ADMIN — SEVELAMER CARBONATE 1600 MG: 800 TABLET, FILM COATED ORAL at 09:09

## 2022-01-01 RX ADMIN — OXYCODONE 2.5 MG: 5 TABLET ORAL at 01:05

## 2022-01-01 RX ADMIN — Medication 5 MG: at 22:10

## 2022-01-01 RX ADMIN — EPOETIN ALFA-EPBX 3000 UNITS: 3000 INJECTION, SOLUTION INTRAVENOUS; SUBCUTANEOUS at 09:00

## 2022-01-01 RX ADMIN — PANTOPRAZOLE SODIUM 40 MG: 40 INJECTION, POWDER, FOR SOLUTION INTRAVENOUS at 16:38

## 2022-01-01 RX ADMIN — LEVOTHYROXINE SODIUM 25 MCG: 0.03 TABLET ORAL at 04:47

## 2022-01-01 RX ADMIN — ATORVASTATIN CALCIUM 20 MG: 20 TABLET, FILM COATED ORAL at 04:47

## 2022-01-01 RX ADMIN — INSULIN GLARGINE-YFGN 5 UNITS: 100 INJECTION, SOLUTION SUBCUTANEOUS at 18:20

## 2022-01-01 RX ADMIN — CALCITRIOL CAPSULES 0.25 MCG 0.25 MCG: 0.25 CAPSULE ORAL at 05:30

## 2022-01-01 RX ADMIN — DOCUSATE SODIUM 50 MG AND SENNOSIDES 8.6 MG 2 TABLET: 8.6; 5 TABLET, FILM COATED ORAL at 18:04

## 2022-01-01 RX ADMIN — MORPHINE SULFATE 1 MG: 4 INJECTION INTRAVENOUS at 21:33

## 2022-01-01 RX ADMIN — HEPARIN SODIUM 5000 UNITS: 5000 INJECTION, SOLUTION INTRAVENOUS; SUBCUTANEOUS at 04:24

## 2022-01-01 RX ADMIN — ATORVASTATIN CALCIUM 20 MG: 20 TABLET, FILM COATED ORAL at 06:05

## 2022-01-01 RX ADMIN — SEVELAMER CARBONATE 1600 MG: 800 TABLET, FILM COATED ORAL at 17:11

## 2022-01-01 RX ADMIN — HEPARIN SODIUM 5000 UNITS: 5000 INJECTION, SOLUTION INTRAVENOUS; SUBCUTANEOUS at 06:27

## 2022-01-01 RX ADMIN — SEVELAMER CARBONATE 1600 MG: 800 TABLET, FILM COATED ORAL at 18:08

## 2022-01-01 RX ADMIN — LEVOTHYROXINE SODIUM 25 MCG: 0.03 TABLET ORAL at 05:24

## 2022-01-01 RX ADMIN — ROPINIROLE HYDROCHLORIDE 0.25 MG: 0.5 TABLET, FILM COATED ORAL at 04:59

## 2022-01-01 RX ADMIN — OXYCODONE 2.5 MG: 5 TABLET ORAL at 17:50

## 2022-01-01 RX ADMIN — INSULIN GLARGINE-YFGN 5 UNITS: 100 INJECTION, SOLUTION SUBCUTANEOUS at 17:21

## 2022-01-01 RX ADMIN — ACETAMINOPHEN 1000 MG: 500 TABLET, FILM COATED ORAL at 00:16

## 2022-01-01 RX ADMIN — ACETAMINOPHEN 1000 MG: 500 TABLET, FILM COATED ORAL at 18:48

## 2022-01-01 RX ADMIN — ACETAMINOPHEN 1000 MG: 500 TABLET, FILM COATED ORAL at 04:51

## 2022-01-01 RX ADMIN — INSULIN GLARGINE-YFGN 10 UNITS: 100 INJECTION, SOLUTION SUBCUTANEOUS at 17:00

## 2022-01-01 RX ADMIN — LEVOTHYROXINE SODIUM 25 MCG: 0.03 TABLET ORAL at 05:45

## 2022-01-01 RX ADMIN — HEPARIN SODIUM 5000 UNITS: 5000 INJECTION, SOLUTION INTRAVENOUS; SUBCUTANEOUS at 06:32

## 2022-01-01 RX ADMIN — HEPARIN SODIUM 5000 UNITS: 5000 INJECTION, SOLUTION INTRAVENOUS; SUBCUTANEOUS at 04:34

## 2022-01-01 RX ADMIN — SEVELAMER CARBONATE 1600 MG: 800 TABLET, FILM COATED ORAL at 18:15

## 2022-01-01 RX ADMIN — LEVOTHYROXINE SODIUM 25 MCG: 0.03 TABLET ORAL at 05:03

## 2022-01-01 RX ADMIN — ACETAMINOPHEN 650 MG: 325 TABLET, FILM COATED ORAL at 05:10

## 2022-01-01 RX ADMIN — INSULIN GLARGINE-YFGN 5 UNITS: 100 INJECTION, SOLUTION SUBCUTANEOUS at 18:09

## 2022-01-01 RX ADMIN — DOCUSATE SODIUM 50 MG AND SENNOSIDES 8.6 MG 2 TABLET: 8.6; 5 TABLET, FILM COATED ORAL at 06:17

## 2022-01-01 RX ADMIN — ACETAMINOPHEN 1000 MG: 500 TABLET, FILM COATED ORAL at 05:39

## 2022-01-01 RX ADMIN — SEVELAMER CARBONATE 1600 MG: 800 TABLET, FILM COATED ORAL at 13:26

## 2022-01-01 RX ADMIN — OXYCODONE 2.5 MG: 5 TABLET ORAL at 14:45

## 2022-01-01 RX ADMIN — HEPARIN SODIUM 5000 UNITS: 5000 INJECTION, SOLUTION INTRAVENOUS; SUBCUTANEOUS at 05:21

## 2022-01-01 RX ADMIN — DOCUSATE SODIUM 50 MG AND SENNOSIDES 8.6 MG 2 TABLET: 8.6; 5 TABLET, FILM COATED ORAL at 05:16

## 2022-01-01 RX ADMIN — OXYCODONE 2.5 MG: 5 TABLET ORAL at 18:14

## 2022-01-01 RX ADMIN — ACETAMINOPHEN 1000 MG: 500 TABLET, FILM COATED ORAL at 04:59

## 2022-01-01 RX ADMIN — ACETAMINOPHEN 1000 MG: 500 TABLET, FILM COATED ORAL at 16:00

## 2022-01-01 RX ADMIN — ACETAMINOPHEN 650 MG: 325 TABLET, FILM COATED ORAL at 12:25

## 2022-01-01 RX ADMIN — ACETAMINOPHEN 1000 MG: 500 TABLET, FILM COATED ORAL at 04:35

## 2022-01-01 RX ADMIN — BNT162B2 0.3 ML: 0.23 INJECTION, SUSPENSION INTRAMUSCULAR at 18:30

## 2022-01-01 RX ADMIN — ROPINIROLE HYDROCHLORIDE 0.25 MG: 0.5 TABLET, FILM COATED ORAL at 18:39

## 2022-01-01 RX ADMIN — ACETAMINOPHEN 650 MG: 325 TABLET, FILM COATED ORAL at 11:24

## 2022-01-01 RX ADMIN — GABAPENTIN 100 MG: 100 CAPSULE ORAL at 10:53

## 2022-01-01 RX ADMIN — ACETAMINOPHEN 650 MG: 325 TABLET, FILM COATED ORAL at 12:02

## 2022-01-01 RX ADMIN — ACETAMINOPHEN 1000 MG: 500 TABLET, FILM COATED ORAL at 06:02

## 2022-01-01 RX ADMIN — SEVELAMER CARBONATE 1600 MG: 800 TABLET, FILM COATED ORAL at 08:50

## 2022-01-01 RX ADMIN — ACETAMINOPHEN 1000 MG: 500 TABLET, FILM COATED ORAL at 12:55

## 2022-01-01 RX ADMIN — INSULIN LISPRO 3 UNITS: 100 INJECTION, SOLUTION INTRAVENOUS; SUBCUTANEOUS at 21:29

## 2022-01-01 RX ADMIN — GABAPENTIN 100 MG: 100 CAPSULE ORAL at 16:25

## 2022-01-01 RX ADMIN — OXYCODONE 2.5 MG: 5 TABLET ORAL at 14:21

## 2022-01-01 RX ADMIN — ACETAMINOPHEN 1000 MG: 500 TABLET, FILM COATED ORAL at 12:44

## 2022-01-01 RX ADMIN — INSULIN LISPRO 1 UNITS: 100 INJECTION, SOLUTION INTRAVENOUS; SUBCUTANEOUS at 18:36

## 2022-01-01 RX ADMIN — SEVELAMER CARBONATE 1600 MG: 800 TABLET, FILM COATED ORAL at 08:06

## 2022-01-01 RX ADMIN — ACETAMINOPHEN 1000 MG: 500 TABLET, FILM COATED ORAL at 18:27

## 2022-01-01 RX ADMIN — GABAPENTIN 100 MG: 100 CAPSULE ORAL at 05:13

## 2022-01-01 RX ADMIN — ROPINIROLE HYDROCHLORIDE 0.25 MG: 0.5 TABLET, FILM COATED ORAL at 06:32

## 2022-01-01 RX ADMIN — LEVOTHYROXINE SODIUM 25 MCG: 0.03 TABLET ORAL at 06:38

## 2022-01-01 RX ADMIN — GABAPENTIN 100 MG: 100 CAPSULE ORAL at 05:12

## 2022-01-01 RX ADMIN — OXYCODONE 2.5 MG: 5 TABLET ORAL at 05:08

## 2022-01-01 RX ADMIN — GABAPENTIN 100 MG: 100 CAPSULE ORAL at 04:24

## 2022-01-01 RX ADMIN — SEVELAMER CARBONATE 1600 MG: 800 TABLET, FILM COATED ORAL at 11:12

## 2022-01-01 RX ADMIN — DOCUSATE SODIUM 50 MG AND SENNOSIDES 8.6 MG 2 TABLET: 8.6; 5 TABLET, FILM COATED ORAL at 18:23

## 2022-01-01 RX ADMIN — CALCITRIOL CAPSULES 0.25 MCG 0.25 MCG: 0.25 CAPSULE ORAL at 06:24

## 2022-01-01 RX ADMIN — ATORVASTATIN CALCIUM 20 MG: 20 TABLET, FILM COATED ORAL at 06:07

## 2022-01-01 RX ADMIN — DOCUSATE SODIUM 50 MG AND SENNOSIDES 8.6 MG 2 TABLET: 8.6; 5 TABLET, FILM COATED ORAL at 16:28

## 2022-01-01 RX ADMIN — SEVELAMER CARBONATE 1600 MG: 800 TABLET, FILM COATED ORAL at 17:50

## 2022-01-01 RX ADMIN — SEVELAMER CARBONATE 1600 MG: 800 TABLET, FILM COATED ORAL at 12:28

## 2022-01-01 RX ADMIN — GABAPENTIN 200 MG: 100 CAPSULE ORAL at 20:40

## 2022-01-01 RX ADMIN — LEVOTHYROXINE SODIUM 25 MCG: 0.03 TABLET ORAL at 05:56

## 2022-01-01 RX ADMIN — ACETAMINOPHEN 1000 MG: 500 TABLET, FILM COATED ORAL at 05:13

## 2022-01-01 RX ADMIN — DOCUSATE SODIUM 50 MG AND SENNOSIDES 8.6 MG 2 TABLET: 8.6; 5 TABLET, FILM COATED ORAL at 05:39

## 2022-01-01 RX ADMIN — SEVELAMER CARBONATE 1600 MG: 800 TABLET, FILM COATED ORAL at 13:51

## 2022-01-01 RX ADMIN — ATORVASTATIN CALCIUM 20 MG: 20 TABLET, FILM COATED ORAL at 06:17

## 2022-01-01 RX ADMIN — ACETAMINOPHEN 1000 MG: 500 TABLET, FILM COATED ORAL at 21:39

## 2022-01-01 RX ADMIN — POLYETHYLENE GLYCOL 3350 1 PACKET: 17 POWDER, FOR SOLUTION ORAL at 06:16

## 2022-01-01 RX ADMIN — POLYETHYLENE GLYCOL 3350 1 PACKET: 17 POWDER, FOR SOLUTION ORAL at 05:06

## 2022-01-01 RX ADMIN — ATORVASTATIN CALCIUM 20 MG: 20 TABLET, FILM COATED ORAL at 04:24

## 2022-01-01 RX ADMIN — SEVELAMER CARBONATE 1600 MG: 800 TABLET, FILM COATED ORAL at 08:45

## 2022-01-01 RX ADMIN — SEVELAMER CARBONATE 1600 MG: 800 TABLET, FILM COATED ORAL at 09:38

## 2022-01-01 RX ADMIN — ASPIRIN 325 MG: 325 TABLET ORAL at 05:30

## 2022-01-01 RX ADMIN — ACETAMINOPHEN 1000 MG: 500 TABLET, FILM COATED ORAL at 12:57

## 2022-01-01 RX ADMIN — HEPARIN SODIUM 5000 UNITS: 5000 INJECTION, SOLUTION INTRAVENOUS; SUBCUTANEOUS at 13:42

## 2022-01-01 RX ADMIN — OXYCODONE 2.5 MG: 5 TABLET ORAL at 17:49

## 2022-01-01 RX ADMIN — POLYETHYLENE GLYCOL 3350 1 PACKET: 17 POWDER, FOR SOLUTION ORAL at 05:46

## 2022-01-01 RX ADMIN — INSULIN LISPRO 1 UNITS: 100 INJECTION, SOLUTION INTRAVENOUS; SUBCUTANEOUS at 21:18

## 2022-01-01 RX ADMIN — HEPARIN SODIUM 5000 UNITS: 5000 INJECTION, SOLUTION INTRAVENOUS; SUBCUTANEOUS at 04:38

## 2022-01-01 RX ADMIN — SODIUM CHLORIDE 500 ML: 9 INJECTION, SOLUTION INTRAVENOUS at 09:17

## 2022-01-01 RX ADMIN — GABAPENTIN 100 MG: 100 CAPSULE ORAL at 17:13

## 2022-01-01 RX ADMIN — ASPIRIN 325 MG: 325 TABLET ORAL at 04:28

## 2022-01-01 RX ADMIN — HYDROMORPHONE HYDROCHLORIDE 0.5 MG: 1 INJECTION, SOLUTION INTRAMUSCULAR; INTRAVENOUS; SUBCUTANEOUS at 09:19

## 2022-01-01 RX ADMIN — LIDOCAINE 2 PATCH: 700 PATCH TOPICAL at 09:31

## 2022-01-01 RX ADMIN — INSULIN LISPRO 2 UNITS: 100 INJECTION, SOLUTION INTRAVENOUS; SUBCUTANEOUS at 13:42

## 2022-01-01 RX ADMIN — ACETAMINOPHEN 1000 MG: 500 TABLET, FILM COATED ORAL at 21:50

## 2022-01-01 RX ADMIN — ATORVASTATIN CALCIUM 20 MG: 20 TABLET, FILM COATED ORAL at 06:08

## 2022-01-01 RX ADMIN — SEVELAMER CARBONATE 1600 MG: 800 TABLET, FILM COATED ORAL at 18:23

## 2022-01-01 RX ADMIN — LIDOCAINE 2 PATCH: 700 PATCH TOPICAL at 10:58

## 2022-01-01 RX ADMIN — CALCITRIOL CAPSULES 0.25 MCG 0.25 MCG: 0.25 CAPSULE ORAL at 06:08

## 2022-01-01 RX ADMIN — HEPARIN SODIUM 5000 UNITS: 5000 INJECTION, SOLUTION INTRAVENOUS; SUBCUTANEOUS at 12:22

## 2022-01-01 RX ADMIN — LEVOTHYROXINE SODIUM 25 MCG: 0.03 TABLET ORAL at 04:28

## 2022-01-01 RX ADMIN — ACETAMINOPHEN 650 MG: 325 TABLET, FILM COATED ORAL at 05:02

## 2022-01-01 RX ADMIN — ACETAMINOPHEN 1000 MG: 500 TABLET, FILM COATED ORAL at 16:24

## 2022-01-01 RX ADMIN — SEVELAMER CARBONATE 1600 MG: 800 TABLET, FILM COATED ORAL at 13:38

## 2022-01-01 RX ADMIN — DOCUSATE SODIUM 50 MG AND SENNOSIDES 8.6 MG 2 TABLET: 8.6; 5 TABLET, FILM COATED ORAL at 04:35

## 2022-01-01 RX ADMIN — GABAPENTIN 100 MG: 100 CAPSULE ORAL at 05:46

## 2022-01-01 RX ADMIN — ATORVASTATIN CALCIUM 20 MG: 20 TABLET, FILM COATED ORAL at 05:39

## 2022-01-01 RX ADMIN — SODIUM CHLORIDE: 4 INJECTION, SOLUTION, CONCENTRATE INTRAVENOUS at 10:04

## 2022-01-01 RX ADMIN — LEVOTHYROXINE SODIUM 25 MCG: 0.03 TABLET ORAL at 04:52

## 2022-01-01 RX ADMIN — ACETAMINOPHEN 1000 MG: 500 TABLET, FILM COATED ORAL at 00:01

## 2022-01-01 RX ADMIN — HEPARIN SODIUM 5000 UNITS: 5000 INJECTION, SOLUTION INTRAVENOUS; SUBCUTANEOUS at 13:46

## 2022-01-01 RX ADMIN — SEVELAMER CARBONATE 1600 MG: 800 TABLET, FILM COATED ORAL at 10:57

## 2022-01-01 RX ADMIN — LIDOCAINE 2 PATCH: 700 PATCH TOPICAL at 09:04

## 2022-01-01 RX ADMIN — OXYCODONE 2.5 MG: 5 TABLET ORAL at 04:36

## 2022-01-01 RX ADMIN — ATORVASTATIN CALCIUM 20 MG: 20 TABLET, FILM COATED ORAL at 04:35

## 2022-01-01 RX ADMIN — HYDROMORPHONE HYDROCHLORIDE 0.5 MG: 1 INJECTION, SOLUTION INTRAMUSCULAR; INTRAVENOUS; SUBCUTANEOUS at 14:30

## 2022-01-01 RX ADMIN — GABAPENTIN 100 MG: 100 CAPSULE ORAL at 18:48

## 2022-01-01 RX ADMIN — ACETAMINOPHEN 1000 MG: 500 TABLET, FILM COATED ORAL at 16:37

## 2022-01-01 RX ADMIN — HEPARIN SODIUM 5000 UNITS: 5000 INJECTION, SOLUTION INTRAVENOUS; SUBCUTANEOUS at 05:02

## 2022-01-01 RX ADMIN — SEVELAMER CARBONATE 1600 MG: 800 TABLET, FILM COATED ORAL at 14:15

## 2022-01-01 RX ADMIN — GABAPENTIN 100 MG: 100 CAPSULE ORAL at 18:39

## 2022-01-01 RX ADMIN — ROPINIROLE HYDROCHLORIDE 0.25 MG: 0.5 TABLET, FILM COATED ORAL at 05:30

## 2022-01-01 RX ADMIN — SEVELAMER CARBONATE 1600 MG: 800 TABLET, FILM COATED ORAL at 18:49

## 2022-01-01 RX ADMIN — ATORVASTATIN CALCIUM 20 MG: 20 TABLET, FILM COATED ORAL at 04:51

## 2022-01-01 RX ADMIN — SEVELAMER CARBONATE 1600 MG: 800 TABLET, FILM COATED ORAL at 09:28

## 2022-01-01 RX ADMIN — INSULIN LISPRO 1 UNITS: 100 INJECTION, SOLUTION INTRAVENOUS; SUBCUTANEOUS at 20:55

## 2022-01-01 RX ADMIN — ATORVASTATIN CALCIUM 20 MG: 20 TABLET, FILM COATED ORAL at 05:17

## 2022-01-01 RX ADMIN — SEVELAMER CARBONATE 1600 MG: 800 TABLET, FILM COATED ORAL at 05:39

## 2022-01-01 RX ADMIN — HEPARIN SODIUM 5000 UNITS: 5000 INJECTION, SOLUTION INTRAVENOUS; SUBCUTANEOUS at 06:51

## 2022-01-01 RX ADMIN — SEVELAMER CARBONATE 1600 MG: 800 TABLET, FILM COATED ORAL at 16:52

## 2022-01-01 RX ADMIN — ACETAMINOPHEN 1000 MG: 500 TABLET, FILM COATED ORAL at 00:18

## 2022-01-01 RX ADMIN — INSULIN LISPRO 2 UNITS: 100 INJECTION, SOLUTION INTRAVENOUS; SUBCUTANEOUS at 18:35

## 2022-01-01 RX ADMIN — SEVELAMER CARBONATE 1600 MG: 800 TABLET, FILM COATED ORAL at 09:39

## 2022-01-01 RX ADMIN — GABAPENTIN 100 MG: 100 CAPSULE ORAL at 16:34

## 2022-01-01 RX ADMIN — LEVOTHYROXINE SODIUM 25 MCG: 0.03 TABLET ORAL at 05:06

## 2022-01-01 RX ADMIN — ACETAMINOPHEN 1000 MG: 500 TABLET, FILM COATED ORAL at 03:36

## 2022-01-01 RX ADMIN — PANTOPRAZOLE SODIUM 40 MG: 40 INJECTION, POWDER, FOR SOLUTION INTRAVENOUS at 04:40

## 2022-01-01 RX ADMIN — ATORVASTATIN CALCIUM 20 MG: 20 TABLET, FILM COATED ORAL at 05:09

## 2022-01-01 RX ADMIN — INSULIN GLARGINE-YFGN 5 UNITS: 100 INJECTION, SOLUTION SUBCUTANEOUS at 17:50

## 2022-01-01 RX ADMIN — DOCUSATE SODIUM 50 MG AND SENNOSIDES 8.6 MG 2 TABLET: 8.6; 5 TABLET, FILM COATED ORAL at 17:40

## 2022-01-01 RX ADMIN — HEPARIN SODIUM 5000 UNITS: 5000 INJECTION, SOLUTION INTRAVENOUS; SUBCUTANEOUS at 05:24

## 2022-01-01 RX ADMIN — SEVELAMER CARBONATE 1600 MG: 800 TABLET, FILM COATED ORAL at 12:04

## 2022-01-01 RX ADMIN — LEVOTHYROXINE SODIUM 25 MCG: 0.03 TABLET ORAL at 04:42

## 2022-01-01 RX ADMIN — ONDANSETRON HYDROCHLORIDE 4 MG: 2 SOLUTION INTRAMUSCULAR; INTRAVENOUS at 21:33

## 2022-01-01 RX ADMIN — SEVELAMER CARBONATE 1600 MG: 800 TABLET, FILM COATED ORAL at 17:57

## 2022-01-01 RX ADMIN — OXYCODONE 2.5 MG: 5 TABLET ORAL at 21:28

## 2022-01-01 RX ADMIN — SEVELAMER CARBONATE 1600 MG: 800 TABLET, FILM COATED ORAL at 13:41

## 2022-01-01 RX ADMIN — ROPINIROLE HYDROCHLORIDE 0.25 MG: 0.5 TABLET, FILM COATED ORAL at 17:24

## 2022-01-01 RX ADMIN — INSULIN LISPRO 1 UNITS: 100 INJECTION, SOLUTION INTRAVENOUS; SUBCUTANEOUS at 12:29

## 2022-01-01 RX ADMIN — OXYCODONE 5 MG: 5 TABLET ORAL at 10:36

## 2022-01-01 RX ADMIN — SEVELAMER CARBONATE 1600 MG: 800 TABLET, FILM COATED ORAL at 11:59

## 2022-01-01 RX ADMIN — GABAPENTIN 100 MG: 100 CAPSULE ORAL at 12:10

## 2022-01-01 RX ADMIN — GABAPENTIN 100 MG: 100 CAPSULE ORAL at 13:57

## 2022-01-01 RX ADMIN — GABAPENTIN 200 MG: 100 CAPSULE ORAL at 18:04

## 2022-01-01 RX ADMIN — INSULIN LISPRO 2 UNITS: 100 INJECTION, SOLUTION INTRAVENOUS; SUBCUTANEOUS at 18:31

## 2022-01-01 RX ADMIN — HEPARIN SODIUM 5000 UNITS: 5000 INJECTION, SOLUTION INTRAVENOUS; SUBCUTANEOUS at 20:44

## 2022-01-01 RX ADMIN — INSULIN LISPRO 2 UNITS: 100 INJECTION, SOLUTION INTRAVENOUS; SUBCUTANEOUS at 17:02

## 2022-01-01 RX ADMIN — GABAPENTIN 100 MG: 100 CAPSULE ORAL at 17:54

## 2022-01-01 RX ADMIN — ACETAMINOPHEN 1000 MG: 500 TABLET, FILM COATED ORAL at 13:19

## 2022-01-01 RX ADMIN — OXYCODONE 2.5 MG: 5 TABLET ORAL at 12:15

## 2022-01-01 RX ADMIN — SEVELAMER CARBONATE 1600 MG: 800 TABLET, FILM COATED ORAL at 06:32

## 2022-01-01 RX ADMIN — ACETAMINOPHEN 1000 MG: 500 TABLET, FILM COATED ORAL at 17:22

## 2022-01-01 RX ADMIN — ACETAMINOPHEN 1000 MG: 500 TABLET, FILM COATED ORAL at 04:53

## 2022-01-01 RX ADMIN — SEVELAMER CARBONATE 1600 MG: 800 TABLET, FILM COATED ORAL at 14:12

## 2022-01-01 RX ADMIN — OXYCODONE 5 MG: 5 TABLET ORAL at 09:20

## 2022-01-01 RX ADMIN — INSULIN LISPRO 2 UNITS: 100 INJECTION, SOLUTION INTRAVENOUS; SUBCUTANEOUS at 20:57

## 2022-01-01 RX ADMIN — OXYCODONE 2.5 MG: 5 TABLET ORAL at 13:07

## 2022-01-01 RX ADMIN — OXYCODONE 5 MG: 5 TABLET ORAL at 05:39

## 2022-01-01 RX ADMIN — ACETAMINOPHEN 650 MG: 325 TABLET, FILM COATED ORAL at 06:13

## 2022-01-01 RX ADMIN — Medication 5 MG: at 20:37

## 2022-01-01 RX ADMIN — GABAPENTIN 200 MG: 100 CAPSULE ORAL at 21:01

## 2022-01-01 RX ADMIN — ACETAMINOPHEN 1000 MG: 500 TABLET, FILM COATED ORAL at 04:42

## 2022-01-01 RX ADMIN — OXYCODONE 2.5 MG: 5 TABLET ORAL at 20:50

## 2022-01-01 RX ADMIN — SEVELAMER CARBONATE 1600 MG: 800 TABLET, FILM COATED ORAL at 13:08

## 2022-01-01 RX ADMIN — ACETAMINOPHEN 1000 MG: 500 TABLET, FILM COATED ORAL at 21:10

## 2022-01-01 RX ADMIN — INSULIN LISPRO 1 UNITS: 100 INJECTION, SOLUTION INTRAVENOUS; SUBCUTANEOUS at 22:15

## 2022-01-01 RX ADMIN — HEPARIN SODIUM 5000 UNITS: 5000 INJECTION, SOLUTION INTRAVENOUS; SUBCUTANEOUS at 04:28

## 2022-01-01 RX ADMIN — LIDOCAINE 2 PATCH: 700 PATCH TOPICAL at 13:13

## 2022-01-01 RX ADMIN — PANTOPRAZOLE SODIUM 40 MG: 40 INJECTION, POWDER, FOR SOLUTION INTRAVENOUS at 04:28

## 2022-01-01 RX ADMIN — SEVELAMER CARBONATE 1600 MG: 800 TABLET, FILM COATED ORAL at 18:29

## 2022-01-01 RX ADMIN — INSULIN LISPRO 3 UNITS: 100 INJECTION, SOLUTION INTRAVENOUS; SUBCUTANEOUS at 17:58

## 2022-01-01 RX ADMIN — INSULIN LISPRO 1 UNITS: 100 INJECTION, SOLUTION INTRAVENOUS; SUBCUTANEOUS at 23:20

## 2022-01-01 RX ADMIN — HEPARIN SODIUM 5000 UNITS: 5000 INJECTION, SOLUTION INTRAVENOUS; SUBCUTANEOUS at 05:31

## 2022-01-01 RX ADMIN — ACETAMINOPHEN 1000 MG: 500 TABLET, FILM COATED ORAL at 13:04

## 2022-01-01 RX ADMIN — LEVOTHYROXINE SODIUM 25 MCG: 0.03 TABLET ORAL at 06:16

## 2022-01-01 RX ADMIN — ACETAMINOPHEN 1000 MG: 500 TABLET, FILM COATED ORAL at 17:44

## 2022-01-01 RX ADMIN — INSULIN LISPRO 2 UNITS: 100 INJECTION, SOLUTION INTRAVENOUS; SUBCUTANEOUS at 18:24

## 2022-01-01 RX ADMIN — OXYCODONE 2.5 MG: 5 TABLET ORAL at 21:13

## 2022-01-01 RX ADMIN — SEVELAMER CARBONATE 1600 MG: 800 TABLET, FILM COATED ORAL at 13:58

## 2022-01-01 RX ADMIN — GABAPENTIN 100 MG: 100 CAPSULE ORAL at 13:53

## 2022-01-01 RX ADMIN — SEVELAMER CARBONATE 1600 MG: 800 TABLET, FILM COATED ORAL at 17:40

## 2022-01-01 RX ADMIN — DOCUSATE SODIUM 50 MG AND SENNOSIDES 8.6 MG 2 TABLET: 8.6; 5 TABLET, FILM COATED ORAL at 17:42

## 2022-01-01 RX ADMIN — HEPARIN SODIUM 5000 UNITS: 5000 INJECTION, SOLUTION INTRAVENOUS; SUBCUTANEOUS at 20:51

## 2022-01-01 RX ADMIN — GABAPENTIN 100 MG: 100 CAPSULE ORAL at 12:19

## 2022-01-01 RX ADMIN — DOCUSATE SODIUM 50 MG AND SENNOSIDES 8.6 MG 2 TABLET: 8.6; 5 TABLET, FILM COATED ORAL at 05:56

## 2022-01-01 RX ADMIN — LIDOCAINE 2 PATCH: 700 PATCH TOPICAL at 09:19

## 2022-01-01 SDOH — ECONOMIC STABILITY: GENERAL

## 2022-01-01 SDOH — ECONOMIC STABILITY: HOUSING INSECURITY: EVIDENCE OF SMOKING MATERIAL: 0

## 2022-01-01 ASSESSMENT — ENCOUNTER SYMPTOMS
NECK PAIN: 0
COUGH: 0
ORTHOPNEA: 0
BOWEL PATTERN NORMAL: 1
CHILLS: 0
ORTHOPNEA: 0
WEIGHT LOSS: 0
FEVER: 0
ORTHOPNEA: 0
SENSORY CHANGE: 0
CHILLS: 0
LAST BOWEL MOVEMENT: 66353
BACK PAIN: 1
PALPITATIONS: 1
COUGH: 0
SORE THROAT: 0
SINUS PAIN: 0
FOCAL WEAKNESS: 0
SHORTNESS OF BREATH: 1
BRUISES/BLEEDS EASILY: 1
BACK PAIN: 1
MYALGIAS: 1
SENSORY CHANGE: 0
EYE PAIN: 0
ABDOMINAL PAIN: 0
COUGH: 0
NAUSEA: 0
COUGH: 0
TINGLING: 0
PND: 0
CONSTIPATION: 0
CONSTIPATION: 0
PALPITATIONS: 0
HEADACHES: 0
TINGLING: 0
EYE PAIN: 0
WEIGHT LOSS: 0
DIZZINESS: 0
DIZZINESS: 0
ORTHOPNEA: 0
NECK PAIN: 0
SENSORY CHANGE: 0
FEVER: 0
SENSORY CHANGE: 0
ORTHOPNEA: 0
SENSORY CHANGE: 0
NAUSEA: 0
EYE PAIN: 0
BLOOD IN STOOL: 0
SHORTNESS OF BREATH: 0
CONSTIPATION: 0
SPUTUM PRODUCTION: 0
FEVER: 0
TREMORS: 0
PAIN SEVERITY GOAL: 0/10
STOOL FREQUENCY: LESS THAN DAILY
PHOTOPHOBIA: 0
VOMITING: 0
ABDOMINAL PAIN: 0
HALLUCINATIONS: 0
DIARRHEA: 0
SPUTUM PRODUCTION: 0
COUGH: 0
DOUBLE VISION: 0
UNABLE TO COMMUNICATE PAIN: 1
PALPITATIONS: 0
TREMORS: 0
RESPIRATORY NEGATIVE: 1
SPUTUM PRODUCTION: 0
DIZZINESS: 0
NAUSEA: 0
DOUBLE VISION: 0
NAUSEA: 0
SHORTNESS OF BREATH: 0
SPUTUM PRODUCTION: 0
HALLUCINATIONS: 0
PSYCHIATRIC NEGATIVE: 1
EYE PAIN: 0
BACK PAIN: 1
NERVOUS/ANXIOUS: 0
SENSORY CHANGE: 0
WEIGHT LOSS: 0
VOMITING: 0
PERSON REPORTING PAIN: PATIENT
NECK PAIN: 0
WEIGHT LOSS: 0
EYE PAIN: 0
FEVER: 0
SPUTUM PRODUCTION: 0
DIARRHEA: 0
BLURRED VISION: 0
DIAPHORESIS: 0
NECK PAIN: 0
CHILLS: 0
VOMITING: 0
EYES NEGATIVE: 1
DIARRHEA: 0
ORTHOPNEA: 0
NAUSEA: 0
ABDOMINAL PAIN: 0
MYALGIAS: 1
DIARRHEA: 0
HEADACHES: 0
MYALGIAS: 1
ABDOMINAL PAIN: 0
DOUBLE VISION: 0
NECK PAIN: 0
NERVOUS/ANXIOUS: 0
STOOL FREQUENCY: LESS THAN DAILY
DIZZINESS: 0
PALPITATIONS: 0
NECK PAIN: 0
UNABLE TO COMMUNICATE PAIN: 1
WEIGHT LOSS: 0
SLEEP QUALITY: ADEQUATE
MUSCLE WEAKNESS: 1
ORTHOPNEA: 0
PHOTOPHOBIA: 0
DIZZINESS: 0
ORTHOPNEA: 0
SHORTNESS OF BREATH: 0
DIARRHEA: 0
PALPITATIONS: 0
WEIGHT LOSS: 0
BLURRED VISION: 0
DIARRHEA: 1
SINUS PAIN: 0
ABDOMINAL PAIN: 0
MYALGIAS: 1
MYALGIAS: 1
BACK PAIN: 1
COUGH: 0
SHORTNESS OF BREATH: 0
SHORTNESS OF BREATH: 0
BLURRED VISION: 0
SENSORY CHANGE: 0
LOWEST PAIN SEVERITY IN PAST 24 HOURS: 0/10
ABDOMINAL PAIN: 0
FOCAL WEAKNESS: 0
PALPITATIONS: 0
EYE PAIN: 0
PERSON REPORTING PAIN: PATIENT
BLURRED VISION: 0
FOCAL WEAKNESS: 0
PHOTOPHOBIA: 0
ABDOMINAL PAIN: 0
MYALGIAS: 1
PERSON REPORTING PAIN: PATIENT
FEVER: 0
FOCAL WEAKNESS: 0
CHILLS: 0
DIARRHEA: 0
ORTHOPNEA: 0
SHORTNESS OF BREATH: 0
SPEECH CHANGE: 0
FOCAL WEAKNESS: 0
BLURRED VISION: 0
VOMITING: 0
DIZZINESS: 0
NAUSEA: 0
PHOTOPHOBIA: 0
CONSTIPATION: 0
SPUTUM PRODUCTION: 0
TREMORS: 0
HEADACHES: 0
DIARRHEA: 0
BLURRED VISION: 0
DIARRHEA: 0
COUGH: 0
CONSTIPATION: 0
FEVER: 0
PAIN LOCATION - PAIN DURATION: CONSTANT
FOCAL WEAKNESS: 0
VOMITING: 0
NECK PAIN: 0
FATIGUES EASILY: 1
BLOOD IN STOOL: 0
DIZZINESS: 0
LOSS OF CONSCIOUSNESS: 0
BACK PAIN: 1
TINGLING: 0
TREMORS: 0
ORTHOPNEA: 0
COUGH: 0
BLOOD IN STOOL: 0
DENIES PAIN: 1
SHORTNESS OF BREATH: 0
EYE PAIN: 0
SHORTNESS OF BREATH: 0
SEIZURES: 1
BACK PAIN: 1
SPUTUM PRODUCTION: 0
DIZZINESS: 0
PAIN LOCATION - PAIN SEVERITY: 0/10
MYALGIAS: 0
SHORTNESS OF BREATH: 0
VOMITING: 0
PHOTOPHOBIA: 0
DIARRHEA: 0
COUGH: 0
BACK PAIN: 1
PALPITATIONS: 0
SHORTNESS OF BREATH: 0
TINGLING: 0
DEPRESSION: 0
HEADACHES: 0
CONSTIPATION: 0
HALLUCINATIONS: 0
NERVOUS/ANXIOUS: 0
VOMITING: 0
ORTHOPNEA: 0
EYE PAIN: 0
SENSORY CHANGE: 0
SPEECH CHANGE: 0
SLEEP QUALITY: ADEQUATE
CONSTIPATION: 0
FEVER: 0
MYALGIAS: 1
HALLUCINATIONS: 0
NAUSEA: 0
SPEECH CHANGE: 0
TREMORS: 0
SPEECH CHANGE: 0
NAUSEA: 0
HEADACHES: 0
NECK PAIN: 0
NAUSEA: 0
SPEECH CHANGE: 0
BLURRED VISION: 0
CONSTIPATION: 0
SPUTUM PRODUCTION: 0
BACK PAIN: 0
DOUBLE VISION: 0
EYE PAIN: 0
NAUSEA: 1
SPEECH CHANGE: 0
TINGLING: 0
PAIN LOCATION - PAIN QUALITY: ACHE
ORTHOPNEA: 0
BACK PAIN: 0
WEIGHT LOSS: 0
PALPITATIONS: 0
HIGHEST PAIN SEVERITY IN PAST 24 HOURS: 10/10
PALPITATIONS: 0
VOMITING: 0
TINGLING: 0
CONSTIPATION: 0
PAIN LOCATION - PAIN FREQUENCY: CONSTANT
SPEECH CHANGE: 0
HALLUCINATIONS: 0
DIARRHEA: 0
HALLUCINATIONS: 0
MYALGIAS: 0
TINGLING: 0
CONSTIPATION: 0
COUGH: 0
PHOTOPHOBIA: 0
MYALGIAS: 1
CARDIOVASCULAR NEGATIVE: 1
ABDOMINAL PAIN: 0
ROS GI COMMENTS: OCCASIONAL BOWEL INCONTINENCE
ABDOMINAL PAIN: 0
HEADACHES: 0
VOMITING: 0
CHILLS: 0
DEPRESSION: 0
SINUS PAIN: 0
SENSORY CHANGE: 0
FATIGUES EASILY: 1
CHILLS: 0
HEADACHES: 0
CHILLS: 0
SHORTNESS OF BREATH: 0
WEIGHT LOSS: 0
HALLUCINATIONS: 0
BACK PAIN: 1
DEPRESSION: 0
HEADACHES: 0
COUGH: 0
DIZZINESS: 0
SORE THROAT: 0
FOCAL WEAKNESS: 0
TREMORS: 0
NECK PAIN: 0
PALPITATIONS: 0
HEADACHES: 0
WEIGHT LOSS: 0
SORE THROAT: 0
EYE PAIN: 0
FEVER: 0
HEADACHES: 0
WEIGHT LOSS: 0
ABDOMINAL PAIN: 0
SPUTUM PRODUCTION: 0
MYALGIAS: 1
MUSCULOSKELETAL NEGATIVE: 1
VOMITING: 0
SPUTUM PRODUCTION: 0
PALPITATIONS: 0
VOMITING: 0
NAUSEA: 0
PHOTOPHOBIA: 0
PALPITATIONS: 0
TREMORS: 0
SHORTNESS OF BREATH: 0
DOUBLE VISION: 0
DOUBLE VISION: 0
CHILLS: 0
SHORTNESS OF BREATH: 1
NAUSEA: 0
SENSORY CHANGE: 0
ABDOMINAL PAIN: 0
DOUBLE VISION: 0
CONSTIPATION: 1
NECK PAIN: 0

## 2022-01-01 ASSESSMENT — COGNITIVE AND FUNCTIONAL STATUS - GENERAL
TURNING FROM BACK TO SIDE WHILE IN FLAT BAD: A LITTLE
PERSONAL GROOMING: A LITTLE
PERSONAL GROOMING: A LITTLE
TOILETING: A LOT
MOVING TO AND FROM BED TO CHAIR: A LITTLE
MOVING TO AND FROM BED TO CHAIR: A LITTLE
EATING MEALS: A LITTLE
SUGGESTED CMS G CODE MODIFIER MOBILITY: CL
SUGGESTED CMS G CODE MODIFIER DAILY ACTIVITY: CK
SUGGESTED CMS G CODE MODIFIER DAILY ACTIVITY: CK
PERSONAL GROOMING: A LITTLE
PERSONAL GROOMING: A LITTLE
TOILETING: A LOT
MOBILITY SCORE: 9
MOBILITY SCORE: 17
SUGGESTED CMS G CODE MODIFIER MOBILITY: CL
STANDING UP FROM CHAIR USING ARMS: A LOT
DRESSING REGULAR UPPER BODY CLOTHING: A LOT
TURNING FROM BACK TO SIDE WHILE IN FLAT BAD: A LITTLE
MOBILITY SCORE: 13
SUGGESTED CMS G CODE MODIFIER DAILY ACTIVITY: CK
STANDING UP FROM CHAIR USING ARMS: A LOT
TURNING FROM BACK TO SIDE WHILE IN FLAT BAD: A LOT
TOILETING: A LOT
MOVING TO AND FROM BED TO CHAIR: UNABLE
TOILETING: A LOT
CLIMB 3 TO 5 STEPS WITH RAILING: TOTAL
DAILY ACTIVITIY SCORE: 15
TOILETING: A LOT
MOVING TO AND FROM BED TO CHAIR: A LOT
DRESSING REGULAR UPPER BODY CLOTHING: A LITTLE
EATING MEALS: A LITTLE
DRESSING REGULAR LOWER BODY CLOTHING: A LOT
SUGGESTED CMS G CODE MODIFIER MOBILITY: CM
DRESSING REGULAR LOWER BODY CLOTHING: A LOT
EATING MEALS: A LITTLE
DRESSING REGULAR LOWER BODY CLOTHING: A LOT
SUGGESTED CMS G CODE MODIFIER MOBILITY: CK
DAILY ACTIVITIY SCORE: 15
STANDING UP FROM CHAIR USING ARMS: A LOT
TURNING FROM BACK TO SIDE WHILE IN FLAT BAD: A LOT
MOVING TO AND FROM BED TO CHAIR: A LOT
HELP NEEDED FOR BATHING: A LOT
EATING MEALS: A LITTLE
MOBILITY SCORE: 7
CLIMB 3 TO 5 STEPS WITH RAILING: TOTAL
TOILETING: A LOT
DAILY ACTIVITIY SCORE: 15
PERSONAL GROOMING: A LOT
STANDING UP FROM CHAIR USING ARMS: TOTAL
SUGGESTED CMS G CODE MODIFIER DAILY ACTIVITY: CK
MOVING FROM LYING ON BACK TO SITTING ON SIDE OF FLAT BED: UNABLE
MOBILITY SCORE: 11
PERSONAL GROOMING: A LITTLE
HELP NEEDED FOR BATHING: A LOT
STANDING UP FROM CHAIR USING ARMS: A LITTLE
WALKING IN HOSPITAL ROOM: A LOT
CLIMB 3 TO 5 STEPS WITH RAILING: TOTAL
EATING MEALS: A LITTLE
MOVING TO AND FROM BED TO CHAIR: A LITTLE
TOILETING: A LOT
SUGGESTED CMS G CODE MODIFIER MOBILITY: CL
TOILETING: A LOT
MOVING FROM LYING ON BACK TO SITTING ON SIDE OF FLAT BED: UNABLE
STANDING UP FROM CHAIR USING ARMS: A LOT
MOVING TO AND FROM BED TO CHAIR: UNABLE
CLIMB 3 TO 5 STEPS WITH RAILING: TOTAL
DRESSING REGULAR LOWER BODY CLOTHING: A LOT
DAILY ACTIVITIY SCORE: 15
MOBILITY SCORE: 10
DRESSING REGULAR LOWER BODY CLOTHING: A LOT
TOILETING: TOTAL
PERSONAL GROOMING: A LITTLE
SUGGESTED CMS G CODE MODIFIER DAILY ACTIVITY: CL
SUGGESTED CMS G CODE MODIFIER MOBILITY: CM
SUGGESTED CMS G CODE MODIFIER DAILY ACTIVITY: CK
PERSONAL GROOMING: A LITTLE
MOBILITY SCORE: 10
DRESSING REGULAR UPPER BODY CLOTHING: A LITTLE
MOVING FROM LYING ON BACK TO SITTING ON SIDE OF FLAT BED: UNABLE
WALKING IN HOSPITAL ROOM: A LOT
HELP NEEDED FOR BATHING: A LOT
DRESSING REGULAR LOWER BODY CLOTHING: A LOT
DRESSING REGULAR UPPER BODY CLOTHING: A LITTLE
WALKING IN HOSPITAL ROOM: A LOT
MOBILITY SCORE: 15
EATING MEALS: A LITTLE
CLIMB 3 TO 5 STEPS WITH RAILING: TOTAL
DAILY ACTIVITIY SCORE: 15
DRESSING REGULAR UPPER BODY CLOTHING: A LITTLE
MOVING TO AND FROM BED TO CHAIR: UNABLE
STANDING UP FROM CHAIR USING ARMS: A LOT
MOVING FROM LYING ON BACK TO SITTING ON SIDE OF FLAT BED: A LOT
MOVING FROM LYING ON BACK TO SITTING ON SIDE OF FLAT BED: UNABLE
MOVING FROM LYING ON BACK TO SITTING ON SIDE OF FLAT BED: UNABLE
SUGGESTED CMS G CODE MODIFIER DAILY ACTIVITY: CK
MOVING FROM LYING ON BACK TO SITTING ON SIDE OF FLAT BED: UNABLE
SUGGESTED CMS G CODE MODIFIER MOBILITY: CL
PERSONAL GROOMING: A LITTLE
TURNING FROM BACK TO SIDE WHILE IN FLAT BAD: A LITTLE
MOVING FROM LYING ON BACK TO SITTING ON SIDE OF FLAT BED: A LITTLE
WALKING IN HOSPITAL ROOM: A LOT
MOBILITY SCORE: 8
DRESSING REGULAR LOWER BODY CLOTHING: A LOT
MOBILITY SCORE: 11
TOILETING: TOTAL
DRESSING REGULAR UPPER BODY CLOTHING: A LITTLE
CLIMB 3 TO 5 STEPS WITH RAILING: A LOT
MOVING FROM LYING ON BACK TO SITTING ON SIDE OF FLAT BED: UNABLE
PERSONAL GROOMING: A LITTLE
DRESSING REGULAR LOWER BODY CLOTHING: A LOT
HELP NEEDED FOR BATHING: A LOT
TURNING FROM BACK TO SIDE WHILE IN FLAT BAD: A LOT
WALKING IN HOSPITAL ROOM: A LOT
HELP NEEDED FOR BATHING: A LOT
PERSONAL GROOMING: A LOT
CLIMB 3 TO 5 STEPS WITH RAILING: A LOT
HELP NEEDED FOR BATHING: A LOT
STANDING UP FROM CHAIR USING ARMS: A LOT
STANDING UP FROM CHAIR USING ARMS: A LOT
SUGGESTED CMS G CODE MODIFIER MOBILITY: CL
MOVING TO AND FROM BED TO CHAIR: UNABLE
DRESSING REGULAR LOWER BODY CLOTHING: A LOT
WALKING IN HOSPITAL ROOM: A LOT
DAILY ACTIVITIY SCORE: 15
MOVING TO AND FROM BED TO CHAIR: UNABLE
MOBILITY SCORE: 8
TURNING FROM BACK TO SIDE WHILE IN FLAT BAD: A LITTLE
CLIMB 3 TO 5 STEPS WITH RAILING: TOTAL
MOVING FROM LYING ON BACK TO SITTING ON SIDE OF FLAT BED: UNABLE
WALKING IN HOSPITAL ROOM: A LITTLE
SUGGESTED CMS G CODE MODIFIER DAILY ACTIVITY: CK
HELP NEEDED FOR BATHING: A LOT
MOVING FROM LYING ON BACK TO SITTING ON SIDE OF FLAT BED: A LOT
EATING MEALS: A LITTLE
TURNING FROM BACK TO SIDE WHILE IN FLAT BAD: A LOT
SUGGESTED CMS G CODE MODIFIER DAILY ACTIVITY: CK
DRESSING REGULAR UPPER BODY CLOTHING: A LOT
HELP NEEDED FOR BATHING: A LOT
MOVING TO AND FROM BED TO CHAIR: UNABLE
MOBILITY SCORE: 9
EATING MEALS: A LITTLE
MOVING TO AND FROM BED TO CHAIR: A LOT
DAILY ACTIVITIY SCORE: 16
CLIMB 3 TO 5 STEPS WITH RAILING: TOTAL
SUGGESTED CMS G CODE MODIFIER DAILY ACTIVITY: CK
EATING MEALS: A LITTLE
WALKING IN HOSPITAL ROOM: TOTAL
MOBILITY SCORE: 11
DAILY ACTIVITIY SCORE: 12
DAILY ACTIVITIY SCORE: 12
TURNING FROM BACK TO SIDE WHILE IN FLAT BAD: UNABLE
STANDING UP FROM CHAIR USING ARMS: A LOT
DRESSING REGULAR UPPER BODY CLOTHING: A LITTLE
SUGGESTED CMS G CODE MODIFIER MOBILITY: CM
EATING MEALS: A LITTLE
TURNING FROM BACK TO SIDE WHILE IN FLAT BAD: UNABLE
HELP NEEDED FOR BATHING: A LOT
STANDING UP FROM CHAIR USING ARMS: A LITTLE
MOBILITY SCORE: 11
TOILETING: TOTAL
SUGGESTED CMS G CODE MODIFIER MOBILITY: CM
WALKING IN HOSPITAL ROOM: A LOT
CLIMB 3 TO 5 STEPS WITH RAILING: A LOT
SUGGESTED CMS G CODE MODIFIER DAILY ACTIVITY: CL
TURNING FROM BACK TO SIDE WHILE IN FLAT BAD: A LOT
MOVING FROM LYING ON BACK TO SITTING ON SIDE OF FLAT BED: UNABLE
DRESSING REGULAR UPPER BODY CLOTHING: A LITTLE
TURNING FROM BACK TO SIDE WHILE IN FLAT BAD: A LITTLE
STANDING UP FROM CHAIR USING ARMS: A LOT
HELP NEEDED FOR BATHING: A LOT
DRESSING REGULAR UPPER BODY CLOTHING: A LITTLE
CLIMB 3 TO 5 STEPS WITH RAILING: TOTAL
TURNING FROM BACK TO SIDE WHILE IN FLAT BAD: A LITTLE
DAILY ACTIVITIY SCORE: 14
SUGGESTED CMS G CODE MODIFIER MOBILITY: CK
TURNING FROM BACK TO SIDE WHILE IN FLAT BAD: A LOT
SUGGESTED CMS G CODE MODIFIER DAILY ACTIVITY: CK
SUGGESTED CMS G CODE MODIFIER MOBILITY: CM
SUGGESTED CMS G CODE MODIFIER MOBILITY: CL
DAILY ACTIVITIY SCORE: 14
WALKING IN HOSPITAL ROOM: TOTAL
WALKING IN HOSPITAL ROOM: A LOT
MOVING FROM LYING ON BACK TO SITTING ON SIDE OF FLAT BED: UNABLE
DAILY ACTIVITIY SCORE: 16
DRESSING REGULAR LOWER BODY CLOTHING: TOTAL
HELP NEEDED FOR BATHING: A LOT
DRESSING REGULAR UPPER BODY CLOTHING: A LOT
DRESSING REGULAR UPPER BODY CLOTHING: A LITTLE
DRESSING REGULAR LOWER BODY CLOTHING: A LOT
MOVING TO AND FROM BED TO CHAIR: A LITTLE
WALKING IN HOSPITAL ROOM: A LOT
HELP NEEDED FOR BATHING: A LOT
WALKING IN HOSPITAL ROOM: A LOT
STANDING UP FROM CHAIR USING ARMS: A LOT
CLIMB 3 TO 5 STEPS WITH RAILING: TOTAL
MOVING TO AND FROM BED TO CHAIR: UNABLE
STANDING UP FROM CHAIR USING ARMS: A LOT
CLIMB 3 TO 5 STEPS WITH RAILING: A LOT
CLIMB 3 TO 5 STEPS WITH RAILING: TOTAL
TOILETING: A LOT
DRESSING REGULAR LOWER BODY CLOTHING: A LOT
MOVING FROM LYING ON BACK TO SITTING ON SIDE OF FLAT BED: A LOT
SUGGESTED CMS G CODE MODIFIER MOBILITY: CL
WALKING IN HOSPITAL ROOM: A LOT

## 2022-01-01 ASSESSMENT — FIBROSIS 4 INDEX
FIB4 SCORE: 2.45
FIB4 SCORE: 3.8
FIB4 SCORE: 2.34
FIB4 SCORE: 3.12
FIB4 SCORE: 1.88
FIB4 SCORE: 1.34
FIB4 SCORE: 2.14
FIB4 SCORE: 3.06
FIB4 SCORE: 2.39
FIB4 SCORE: 1.88
FIB4 SCORE: 2.17
FIB4 SCORE: 2.39
FIB4 SCORE: 1.82
FIB4 SCORE: 3.06
FIB4 SCORE: 2.39
FIB4 SCORE: 2.39
FIB4 SCORE: 2.52
FIB4 SCORE: 2.52
FIB4 SCORE: 2.45
FIB4 SCORE: 2.14
FIB4 SCORE: 2.42
FIB4 SCORE: 1.89
FIB4 SCORE: 2.52
FIB4 SCORE: 2.34
FIB4 SCORE: 2.39
FIB4 SCORE: 3.12
FIB4 SCORE: 2.45
FIB4 SCORE: 2.13
FIB4 SCORE: 1.79
FIB4 SCORE: 1.79
FIB4 SCORE: 2.45
FIB4 SCORE: 1.54

## 2022-01-01 ASSESSMENT — PAIN DESCRIPTION - PAIN TYPE
TYPE: ACUTE PAIN
TYPE: ACUTE PAIN;NEUROPATHIC PAIN
TYPE: ACUTE PAIN
TYPE: ACUTE PAIN;CHRONIC PAIN
TYPE: ACUTE PAIN
TYPE: CHRONIC PAIN
TYPE: ACUTE PAIN
TYPE: CHRONIC PAIN
TYPE: ACUTE PAIN
TYPE: CHRONIC PAIN
TYPE: CHRONIC PAIN
TYPE: ACUTE PAIN
TYPE: ACUTE PAIN;CHRONIC PAIN
TYPE: ACUTE PAIN
TYPE: ACUTE PAIN;CHRONIC PAIN
TYPE: ACUTE PAIN
TYPE: CHRONIC PAIN
TYPE: ACUTE PAIN
TYPE: CHRONIC PAIN
TYPE: ACUTE PAIN
TYPE: CHRONIC PAIN
TYPE: CHRONIC PAIN
TYPE: ACUTE PAIN
TYPE: CHRONIC PAIN
TYPE: ACUTE PAIN
TYPE: ACUTE PAIN;CHRONIC PAIN
TYPE: ACUTE PAIN
TYPE: CHRONIC PAIN
TYPE: CHRONIC PAIN
TYPE: ACUTE PAIN
TYPE: CHRONIC PAIN
TYPE: ACUTE PAIN
TYPE: ACUTE PAIN
TYPE: ACUTE PAIN;CHRONIC PAIN
TYPE: ACUTE PAIN
TYPE: ACUTE PAIN
TYPE: ACUTE PAIN;CHRONIC PAIN
TYPE: ACUTE PAIN
TYPE: CHRONIC PAIN
TYPE: ACUTE PAIN
TYPE: ACUTE PAIN;CHRONIC PAIN
TYPE: ACUTE PAIN
TYPE: CHRONIC PAIN
TYPE: ACUTE PAIN
TYPE: CHRONIC PAIN
TYPE: ACUTE PAIN
TYPE: ACUTE PAIN

## 2022-01-01 ASSESSMENT — ACTIVITIES OF DAILY LIVING (ADL)
MONEY MANAGEMENT (EXPENSES/BILLS): TOTALLY DEPENDENT
AMBULATION ASSISTANCE: NON-AMBULATORY
BATHING_REQUIRES_ASSISTANCE: 1
MONEY MANAGEMENT (EXPENSES/BILLS): TOTALLY DEPENDENT
AMBULATION_REQUIRES_ASSISTANCE: 1
BATHING_REQUIRES_ASSISTANCE: 1
DRESSING_REQUIRES_ASSISTANCE: 1
TOILETING: INDEPENDENT
PHYSICAL_TRANSFER_REQUIRES_ASSISTANCE: 1
DRESSING_REQUIRES_ASSISTANCE: 1
AMBULATION ASSISTANCE: NON-AMBULATORY
EATING_REQUIRES_ASSISTANCE: 1
AMBULATION_REQUIRES_ASSISTANCE: 1
PHYSICAL_TRANSFER_REQUIRES_ASSISTANCE: 1
CONTINENCE_REQUIRES_ASSISTANCE: 1
CONTINENCE_REQUIRES_ASSISTANCE: 1

## 2022-01-01 ASSESSMENT — GAIT ASSESSMENTS
ASSISTIVE DEVICE: FRONT WHEEL WALKER
DISTANCE (FEET): 3
GAIT LEVEL OF ASSIST: MODERATE ASSIST
DISTANCE (FEET): 2
DISTANCE (FEET): 2
DEVIATION: DECREASED BASE OF SUPPORT
ASSISTIVE DEVICE: HAND HELD ASSIST
ASSISTIVE DEVICE: HAND HELD ASSIST
DISTANCE (FEET): 3
DEVIATION: DECREASED BASE OF SUPPORT;SHUFFLED GAIT
GAIT LEVEL OF ASSIST: UNABLE TO PARTICIPATE
GAIT LEVEL OF ASSIST: UNABLE TO PARTICIPATE
DEVIATION: DECREASED BASE OF SUPPORT
GAIT LEVEL OF ASSIST: UNABLE TO PARTICIPATE
DISTANCE (FEET): 6
ASSISTIVE DEVICE: HAND HELD ASSIST
GAIT LEVEL OF ASSIST: REFUSED
ASSISTIVE DEVICE: HAND HELD ASSIST
DISTANCE (FEET): 5
GAIT LEVEL OF ASSIST: REFUSED
ASSISTIVE DEVICE: HAND HELD ASSIST
DEVIATION: DECREASED BASE OF SUPPORT;SHUFFLED GAIT
DISTANCE (FEET): 3
DISTANCE (FEET): 4
GAIT LEVEL OF ASSIST: MODERATE ASSIST
GAIT LEVEL OF ASSIST: CONTACT GUARD ASSIST
GAIT LEVEL OF ASSIST: UNABLE TO PARTICIPATE
GAIT LEVEL OF ASSIST: MODERATE ASSIST
ASSISTIVE DEVICE: FRONT WHEEL WALKER
GAIT LEVEL OF ASSIST: MODERATE ASSIST
GAIT LEVEL OF ASSIST: MODERATE ASSIST
DEVIATION: DECREASED BASE OF SUPPORT
DISTANCE (FEET): 5
DEVIATION: DECREASED BASE OF SUPPORT;SHUFFLED GAIT

## 2022-01-01 ASSESSMENT — LIFESTYLE VARIABLES
TOTAL SCORE: 0
DOES PATIENT WANT TO STOP DRINKING: NO
HAVE YOU EVER FELT YOU SHOULD CUT DOWN ON YOUR DRINKING: NO
HAVE PEOPLE ANNOYED YOU BY CRITICIZING YOUR DRINKING: NO
ON A TYPICAL DAY WHEN YOU DRINK ALCOHOL HOW MANY DRINKS DO YOU HAVE: 0
EVER FELT BAD OR GUILTY ABOUT YOUR DRINKING: NO
SUBSTANCE_ABUSE: 0
EVER HAD A DRINK FIRST THING IN THE MORNING TO STEADY YOUR NERVES TO GET RID OF A HANGOVER: NO
SUBSTANCE_ABUSE: 0
SUBSTANCE_ABUSE: 0
CONSUMPTION TOTAL: NEGATIVE
SUBSTANCE_ABUSE: 0
SUBSTANCE_ABUSE: 0
ALCOHOL_USE: NO
SUBSTANCE_ABUSE: 0
HOW MANY TIMES IN THE PAST YEAR HAVE YOU HAD 5 OR MORE DRINKS IN A DAY: 0
TOTAL SCORE: 0
TOTAL SCORE: 0
SUBSTANCE_ABUSE: 0
AVERAGE NUMBER OF DAYS PER WEEK YOU HAVE A DRINK CONTAINING ALCOHOL: 0
SUBSTANCE_ABUSE: 0

## 2022-01-01 ASSESSMENT — SOCIAL DETERMINANTS OF HEALTH (SDOH)
ACTIVE STRESSOR - HEALTH CHANGES: 1
ACTIVE STRESSOR - HEALTH CHANGES: 1

## 2022-01-01 ASSESSMENT — PATIENT HEALTH QUESTIONNAIRE - PHQ9
8. MOVING OR SPEAKING SO SLOWLY THAT OTHER PEOPLE COULD HAVE NOTICED. OR THE OPPOSITE, BEING SO FIGETY OR RESTLESS THAT YOU HAVE BEEN MOVING AROUND A LOT MORE THAN USUAL: NOT AT ALL
SUM OF ALL RESPONSES TO PHQ9 QUESTIONS 1 AND 2: 2
9. THOUGHTS THAT YOU WOULD BE BETTER OFF DEAD, OR OF HURTING YOURSELF: NOT AT ALL
2. FEELING DOWN, DEPRESSED, IRRITABLE, OR HOPELESS: SEVERAL DAYS
CLINICAL INTERPRETATION OF PHQ2 SCORE: 0
3. TROUBLE FALLING OR STAYING ASLEEP OR SLEEPING TOO MUCH: NOT AT ALL
5. POOR APPETITE OR OVEREATING: NOT AT ALL
SUM OF ALL RESPONSES TO PHQ QUESTIONS 1-9: 2
4. FEELING TIRED OR HAVING LITTLE ENERGY: NOT AT ALL
1. LITTLE INTEREST OR PLEASURE IN DOING THINGS: SEVERAL DAYS
6. FEELING BAD ABOUT YOURSELF - OR THAT YOU ARE A FAILURE OR HAVE LET YOURSELF OR YOUR FAMILY DOWN: NOT AL ALL
7. TROUBLE CONCENTRATING ON THINGS, SUCH AS READING THE NEWSPAPER OR WATCHING TELEVISION: NOT AT ALL

## 2022-01-01 ASSESSMENT — PAIN SCALES - PAIN ASSESSMENT IN ADVANCED DEMENTIA (PAINAD)
CONSOLABILITY: 0 - NO NEED TO CONSOLE.
NEGVOCALIZATION: 0 - NONE.
BODYLANGUAGE: 0 - RELAXED.
FACIALEXPRESSION: 0 - SMILING OR INEXPRESSIVE.
NEGVOCALIZATION: 0 - NONE.
CONSOLABILITY: 0 - NO NEED TO CONSOLE.
BODYLANGUAGE: 0 - RELAXED.
TOTALSCORE: 2
NEGVOCALIZATION: 0 - NONE.
TOTALSCORE: 0
BODYLANGUAGE: 0 - RELAXED.
FACIALEXPRESSION: 0 - SMILING OR INEXPRESSIVE.
CONSOLABILITY: 0 - NO NEED TO CONSOLE.
TOTALSCORE: 0
FACIALEXPRESSION: 2 - FACIAL GRIMACING.

## 2022-01-01 ASSESSMENT — PAIN SCALES - WONG BAKER
WONGBAKER_NUMERICALRESPONSE: HURTS A LITTLE MORE
WONGBAKER_NUMERICALRESPONSE: DOESN'T HURT AT ALL
WONGBAKER_NUMERICALRESPONSE: HURTS A LITTLE MORE

## 2022-06-24 PROBLEM — Z99.2 ESRD (END STAGE RENAL DISEASE) ON DIALYSIS (HCC): Chronic | Status: ACTIVE | Noted: 2022-01-01

## 2022-06-24 PROBLEM — E78.49 OTHER HYPERLIPIDEMIA: Status: ACTIVE | Noted: 2022-01-01

## 2022-06-24 PROBLEM — Z99.2 ESRD (END STAGE RENAL DISEASE) ON DIALYSIS (HCC): Status: ACTIVE | Noted: 2022-01-01

## 2022-06-24 PROBLEM — R60.0 EDEMA OF LEFT UPPER EXTREMITY: Status: ACTIVE | Noted: 2022-01-01

## 2022-06-24 PROBLEM — N18.6 ESRD (END STAGE RENAL DISEASE) ON DIALYSIS (HCC): Status: ACTIVE | Noted: 2022-01-01

## 2022-06-24 PROBLEM — E11.9 DIABETES MELLITUS, TYPE II (HCC): Chronic | Status: ACTIVE | Noted: 2017-08-14

## 2022-06-24 PROBLEM — N18.6 ESRD (END STAGE RENAL DISEASE) ON DIALYSIS (HCC): Chronic | Status: ACTIVE | Noted: 2022-01-01

## 2022-06-24 PROBLEM — R53.1 GENERALIZED WEAKNESS: Status: ACTIVE | Noted: 2022-01-01

## 2022-06-24 PROBLEM — I10 HYPERTENSION: Chronic | Status: ACTIVE | Noted: 2017-08-14

## 2022-06-24 PROBLEM — I10 HYPERTENSION: Chronic | Status: RESOLVED | Noted: 2017-08-14 | Resolved: 2022-01-01

## 2022-06-24 PROBLEM — E78.49 OTHER HYPERLIPIDEMIA: Chronic | Status: ACTIVE | Noted: 2022-01-01

## 2022-06-24 NOTE — ED TRIAGE NOTES
"Chief Complaint   Patient presents with   • Peripheral Edema   • Weakness     BIBA from home for weakness, nausea, L arm pain  HX of dialysis with L arm fistula, pt states she has missed 3 dialysis days due to being so weak she cannot drive or walk now  3 weeks ago had a procedure done to her fistula which has lead to L arm swelling     +CMS of L arm/hand  BP (!) 171/71   Pulse 88   Temp 36.8 °C (98.2 °F)   Resp 20   Ht 1.626 m (5' 4\")   Wt 54.4 kg (120 lb)   SpO2 100%   BMI 20.60 kg/m²       "

## 2022-06-24 NOTE — ED NOTES
Pt transported to the floor via gurney by ACLS RN. Pt alert and oriented at this time. Pt belongings and paper work sent with patient.

## 2022-06-24 NOTE — H&P
"[facility] MEDICINE HISTORY AND PHYSICAL     PATIENT ID:  NAME:  Jannet Bruno  MRN:               [id number]  YOB: 1940    Date of Admission: 6/24/2022     Attending: Kasey Lord MD     Resident: Rashid Wilcox MD     Primary Care Physician:  Henry Forrest DO    CC:  Weakness, nausea, and left arm pain      HPI: Jannet Bruno is a 81 y.o. female with PMHx notable for ESRD on dialysis, HTN, HLD, and T2DM who presented with increasing peripheral edema and generalized weakness. Difficult to obtain history secondary to pain and dry heaving. She states that she missed her last 3 dialysis appointments secondary to weakness/fatigue. Since then she has noticed increasing peripheral edema in her legs and her left arm where her fistula is located has also become increasingly swollen. She normally gets dialysis through the fistula in her left arm, this fistula was created in 2019 by Dr. Nazario. She states that she recently had a \"procedure\" done to this fistula about 2 weeks ago and has noticed the swelling since. Could not locate this procedure in the records and patient could not remember exactly what the procedure was. Additional symptoms include SOB, anorexia, nausea, and vomiting. She denies any abdominal pain or diarrhea. She denies any fever/chills, headache, vision changes, chest pain, palpitations, or urinary symptoms. Did not clarify if the patient still produces urine.     Of note, patient is currently on 2L of O2 NC and does not use oxygen at home.     ERCourse:  In the ER, her vitals were significant for hypertension of 171/71, she was afebrile, HR 91, and she required 2L of O2 via NC to maintain sats > 90%  Labs notable for Na 133, K of 5.2, Anion gap of 22, glucose of 423, BUN of 52, Cr of 6.39, ALP of 139, lactic acid of 3.3, BNP > 06322  CXR showed diffuse interstitial infiltrates and appeared like pulmonary edema  US venous of left arm showed no DVT and patent fistula.     REVIEW OF " SYSTEMS:   Ten systems reviewed and were negative except as noted in the HPI.                PAST MEDICAL HISTORY:  Past Medical History:   Diagnosis Date   • Anesthesia     oxygen level dropped   • Arthritis    • Breath shortness    • CATARACT     right IOL   • Diabetes    • Heart burn    • Hypertension    • Indigestion    • Other specified disorder of intestines    • Sleep apnea, obstructive     no c-pap   • Snoring    • Unspecified hemorrhagic conditions     bruise easily   • Unspecified urinary incontinence    • Urinary bladder disorder     incontinence       PAST SURGICAL HISTORY:  Past Surgical History:   Procedure Laterality Date   • AV FISTULA CREATION Left 2/15/2019    Procedure: AV FISTULA CREATION-  UPPER EXTREMITY BRACHIAL CEPHALIC BANDING;  Surgeon: Fernie Nazario M.D.;  Location: SURGERY [facility];  Service: General   • HIP CANNULATED SCREW Right 8/14/2017    Procedure: HIP CANNULATED SCREW;  Surgeon: Ar Huerta M.D.;  Location: SURGERY [facility];  Service:    • CATARACT PHACO WITH IOL  6/11/2014    Performed by Rudolph Barclay M.D. at SURGERY SAME DAY [facility] ORS   • CATARACT PHACO WITH IOL  5/30/2014    Performed by Rudolph Barclay M.D. at SURGERY SAME DAY [facility] ORS   • VENTRAL HERNIA REPAIR LAPAROSCOPIC  3/7/2012    Performed by HUNTER SERNA at SURGERY SAME DAY [facility] ORS   • APPENDECTOMY     • GYN SURGERY      hysterectomy   • OTHER      T&A   • OTHER ABDOMINAL SURGERY      abd tramua- horse stepped on abd   • OTHER ORTHOPEDIC SURGERY      R & l Shoulder repair       FAMILY HISTORY:  No family history on file.    SOCIAL HISTORY:   Could not obtain secondary to patient's clinical status     DIET:   No orders of the defined types were placed in this encounter.      ALLERGIES:  Allergies   Allergen Reactions   • Codeine      Personality changes   • Oxycodone      Personality changes   • Tape Rash     Adhesive tape-rash, paper tape ok.       OUTPATIENT  MEDICATIONS:    Current Facility-Administered Medications:   •  senna-docusate (PERICOLACE or SENOKOT S) 8.6-50 MG per tablet 2 Tablet, 2 Tablet, Oral, BID **AND** polyethylene glycol/lytes (MIRALAX) PACKET 1 Packet, 1 Packet, Oral, QDAY PRN **AND** [DISCONTINUED] magnesium hydroxide (MILK OF MAGNESIA) suspension 30 mL, 30 mL, Oral, QDAY PRN **AND** bisacodyl (DULCOLAX) suppository 10 mg, 10 mg, Rectal, QDAY PRN, Rashid Wilcox M.D.  •  heparin injection 5,000 Units, 5,000 Units, Subcutaneous, Q8HRS, Rashid Wilcox M.D.  •  acetaminophen (Tylenol) tablet 650 mg, 650 mg, Oral, Q6HRS PRN, Rashid Wilcox M.D.  •  HYDROmorphone (Dilaudid) injection 1 mg, 1 mg, Intravenous, Q2HRS PRN, Rashid Wilcox M.D.  •  insulin GLARGINE (Lantus,Semglee) injection, 5 Units, Subcutaneous, Q EVENING **AND** insulin lispro (AdmeLOG,HumaLOG) injection, 1-6 Units, Subcutaneous, 4X/DAY ACHS **AND** POC blood glucose manual result, , , Q AC AND BEDTIME(S) **AND** NOTIFY MD and PharmD, , , Once **AND** Administer 20 grams of glucose (approximately 8 ounces of fruit juice) every 15 minutes PRN FSBG less than 70 mg/dL, , , PRN **AND** dextrose 50% (D50W) injection 25 g, 25 g, Intravenous, Q15 MIN PRN, Rashid Wilcox M.D.    Current Outpatient Medications:   •  acetaminophen (TYLENOL) 500 MG Tab, Take 500 mg by mouth every 6 hours as needed., Disp: , Rfl:   •  NIFEdipine (ADALAT CC) 90 MG CR tablet, Take 90 mg by mouth every day., Disp: , Rfl:   •  sevelamer (RENAGEL) 800 MG Tab, Take 800 mg by mouth 3 times a day, with meals. And snacks, Disp: , Rfl:   •  atorvastatin (LIPITOR) 20 MG Tab, Take 20 mg by mouth every day., Disp: , Rfl:   •  calcitRIOL (ROCALTROL) 0.25 MCG Cap, Take 0.25 mcg by mouth every day., Disp: , Rfl:   •  ROPINIRole (REQUIP) 0.25 MG Tab, Take 0.25 mg by mouth every day., Disp: , Rfl:   •  liraglutide (VICTOZA) 18 MG/3ML Solution Pen-injector injection, Inject 1.8 mg as instructed every day., Disp: , Rfl:   •   lisinopril (PRINIVIL) 10 MG Tab, Take 10 mg by mouth every day., Disp: , Rfl:   •  furosemide (LASIX) 40 MG TABS, Take 80 mg by mouth every day., Disp: , Rfl:     PHYSICAL EXAM:  Vitals:    22 0044 22 0100 22 0200 22 0300   BP: (!) 171/71 (!) 163/79 (!) 156/67 138/64   Pulse: 88 90 91 91   Resp:    Temp: 36.8 °C (98.2 °F)      SpO2: 100% 99% 99% 93%   Weight:       Height:       , Temp (24hrs), Av.8 °C (98.2 °F), Min:36.8 °C (98.2 °F), Max:36.8 °C (98.2 °F)  , Pulse Oximetry: 93 %, O2 (LPM): 2, O2 Delivery Device: Nasal Cannula    General: Patient is moderate distress from pain, cooperative   Skin:  Pink, warm and dry.  No rashes  HEENT: NC/AT. PERRL. EOMI. MMM. No nasal discharge. Oropharynx nonerythematous without exudate/plaques  Neck:  Supple without lymphadenopathy or rigidity.   Lungs:  Symmetrical.  Coarse crackles auscultated at lung bases, no wheeze. Good air movement   Cardiovascular:  S1/S2 RRR with harsh systolic murmur   Abdomen:  Abdomen is soft, nontender, nondistended. +BS. No masses noted.  Extremities:  Full range of motion. No gross deformities noted. 2+ pulses in all extremities. 2+ pitting edema in the LE bilaterally. The left upper extremity also edematous. AV fistula has palpable thrill.   Spine:  Straight without vertebral anomalies.  CNS:  Muscle tone is normal. No gross focal neurologic deficits      LAB TESTS:   Recent Labs     22  0100   WBC 10.5   RBC 3.75*   HEMOGLOBIN 12.8   HEMATOCRIT 38.6   .9*   MCH 34.1*   RDW 62.9*   PLATELETCT 215   MPV 9.7   NEUTSPOLYS 77.30*   LYMPHOCYTES 11.90*   MONOCYTES 6.90   EOSINOPHILS 2.50   BASOPHILS 0.60         Recent Labs     22  0100   SODIUM 133*   POTASSIUM 5.2   CHLORIDE 88*   CO2 23   BUN 52*   CREATININE 6.39*   CALCIUM 9.2   ALBUMIN 3.5       CULTURES:   Results     ** No results found for the last 168 hours. **          IMAGES:  US-EXTREMITY VENOUS UPPER UNILAT LEFT   Final Result       DX-CHEST-PORTABLE (1 VIEW)   Final Result         1.  Interstitial pulmonary parenchymal prominence suggest chronic underlying lung disease, component of interstitial edema and/or infiltrates not excluded.   2.  Small left pleural effusion   3.  Cardiomegaly   4.  Atherosclerosis          CONSULTS:   None     ASSESSMENT/PLAN:   81 y.o. female with ESRD admitted for missed dialysis.     * ESRD (end stage renal disease) on dialysis ([facility])- (present on admission)  Assessment & Plan  Patient has history of ESRD currently on dialysis.  Patient is followed by [facility] and receives dialysis on [facility].  Patient missed the last 3 dialysis appointments secondary to generalized weakness.  Patient grossly fluid overloaded on exam today with peripheral and pulmonary edema.  Plan:  Consult nephrology in AM for dialysis, recs appreciated  Avoid nephrotoxic agents  Renally dose medications      Edema of left upper extremity- (present on admission)  Assessment & Plan  Patient received procedure on left upper extremity 2 to 3 weeks ago and has had edema since.  Unsure what this procedure was.  Left upper extremity is grossly edematous with 2+ pitting edema throughout.  Palpable thrill over fistula.  Left upper extremity ultrasound showed patent AV fistula and no evidence of DVT.  Etiology of this edema is unknown at this point.  Plan:  Continue to monitor, will likely improve after dialysis    Generalized weakness- (present on admission)  Assessment & Plan  Patient noted to have generalized weakness which led her to miss her dialysis appointments.  Unsure etiology of this generalized weakness at this point.  Could be secondary to worsening uremia/acidemia secondary to ESRD. We will reassess once patient is more clinically stable and in less pain after dialysis.    Hyperglycemia- (present on admission)  Assessment & Plan  Patient noted to have hyperglycemia in the 400s in the ER.  No recent HbA1c in the system.  Patient  also had elevated beta hydroxybutyrate.  Patient given 5 units of regular insulin in the ED.  Plan:  Hold home antihyperglycemic's  Start insulin sliding scale with basal insulin at night  Hypoglycemic protocol  Titrate insulin as needed      Other hyperlipidemia- (present on admission)  Assessment & Plan  Continue proper home medications after complete med rec    Diabetes mellitus, type II (HCC)- (present on admission)  Assessment & Plan  See hyperglycemia plan    Essential hypertension- (present on admission)  Assessment & Plan  Continue home medications after proper med rec completed      Lines: PIV  Abx: None  DVT prophylaxis: Heparin  CODE Status: FULL    Rashid Wilcox MD   PGY-2 Family Medicine Resident   [facility]

## 2022-06-24 NOTE — ED NOTES
Pt asleep on gurney in upright position. Pt arouses to verbal stimuli.    I have reviewed and confirmed nurses' notes...

## 2022-06-24 NOTE — ED NOTES
Pt unable to stay awake to participate in interview at this time.  Unable to complete Med Rec  Unable to assess allergies .

## 2022-06-24 NOTE — ASSESSMENT & PLAN NOTE
Patient noted to have generalized weakness which led her to miss her dialysis appointments.  Unsure etiology of this generalized weakness at this point. As per daughter patient has had general decline in health over the past 6 months.    Believe it unlikely to be acute intracranial process given the history provided by daughter and the indolent worsening or the patient's general health/wellness.    Plan:  - PT/OT consult  - Low threshold for head imaging

## 2022-06-24 NOTE — DISCHARGE PLANNING
Outpatient Dialysis Note    Confirmed patient is active at:    [facility]    [address]  [place] [address]    Schedule: Monday, Wednesday, Friday   Time: 6:15am     Patient is seen by Dr. Gates in HD clinic.    Spoke with Sudha at facility who confirmed.    Forwarded records for review.    Nery Howe- Senior Dialysis Coordinator Ph# [phone]  [facility]

## 2022-06-24 NOTE — ASSESSMENT & PLAN NOTE
Pt with elevated pressures on admission. Restarted on home nifedipine. Now with depressed Bps. Believe that it is unlikely she was taking her medications as prescribed.    Plan:  - Nifedipine held. Will consider anti-HTN medications if pt becomes hypertensive again.

## 2022-06-24 NOTE — ASSESSMENT & PLAN NOTE
CTA of LUE without evidence of central stenosis. Vascular recommending wrapping arm with ace bandage      Plan:  - Wrap upper arm with ace bandage  - Dialysis as per nephro

## 2022-06-24 NOTE — ED PROVIDER NOTES
"ED Provider Note    Scribed for Rafael Florian II, M* by Socorro Rivera. 6/24/2022  1:00 AM    Means of Arrival: EMS  History obtained by: patient  Limitations: none    CHIEF COMPLAINT  Chief Complaint   Patient presents with   • Peripheral Edema   • Weakness     BIBA from home for weakness, nausea, L arm pain  HX of dialysis with L arm fistula, pt states she has missed 3 dialysis days due to being so weak she cannot drive or walk now  3 weeks ago had a procedure done to her fistula which has lead to L arm swelling       HPI  Jannet Bruno is a 81 y.o. female who has a history of diabetes, hypertension, and renal disease presents to the Emergency Department for evaluation of a severe amount peripheral edema onset a few weeks ago. For the past few weeks Jannet's peripheral edema has been gradually worsening on her left arm and in her bilateral legs. For her past few days, Jannet has been feeling more fatigue than usual. Jannet states that she has missed two of her dialysis appointments due to her increased fatigue. She gets dialysis through her fistula on her left arm. Right now she feels moderately fatigue, nauseous, and short of breath. She is unable to walk or drive secondary to her swelling. When asked what the treatment was going to be for her arm she was told \"to drink less water\". She denies any fever, chills, chest pain, vomiting, or abdominal pain. No alleviating or exacerbating factors were reported. Three weeks ago, Jannet had surgery on her left arm. She normally controls her blood sugar via diet. Today her blood sugar was found to be above 500.    REVIEW OF SYSTEMS  Review of Systems   Constitutional: Positive for malaise/fatigue. Negative for chills and fever.   Respiratory: Positive for shortness of breath.    Cardiovascular: Positive for leg swelling (and left arm swelling). Negative for chest pain.   Gastrointestinal: Positive for nausea. Negative for abdominal pain and vomiting. "   All other systems reviewed and are negative.    See HPI for further details.    PAST MEDICAL HISTORY   has a past medical history of Anesthesia, Arthritis, Breath shortness, CATARACT, Diabetes, Heart burn, Hypertension, Indigestion, Other specified disorder of intestines, Sleep apnea, obstructive, Snoring, Unspecified hemorrhagic conditions, Unspecified urinary incontinence, and Urinary bladder disorder.    SOCIAL HISTORY  Social History     Tobacco Use   • Smoking status: Former Smoker     Packs/day: 1.00     Years: 20.00     Pack years: 20.00     Types: Cigarettes     Quit date: 3/5/1979     Years since quittin.3   • Smokeless tobacco: Never Used   Substance and Sexual Activity   • Alcohol use: No   • Drug use: No   • Sexual activity: Not reported       SURGICAL HISTORY   has a past surgical history that includes other; other orthopedic surgery; other abdominal surgery; appendectomy; gyn surgery; ventral hernia repair laparoscopic (3/7/2012); cataract phaco with iol (2014); cataract phaco with iol (2014); hip cannulated screw (Right, 2017); and av fistula creation (Left, 2/15/2019).    CURRENT MEDICATIONS  Home Medications     Reviewed by Alfredo Lundberg (Pharmacy Tech) on 22 at 0632  Med List Status: Unable to Obtain   Medication Last Dose Status   acetaminophen (TYLENOL) 500 MG Tab  Active   atorvastatin (LIPITOR) 20 MG Tab  Active   calcitRIOL (ROCALTROL) 0.25 MCG Cap  Active   furosemide (LASIX) 40 MG TABS  Active   liraglutide (VICTOZA) 18 MG/3ML Solution Pen-injector injection  Active   lisinopril (PRINIVIL) 10 MG Tab  Active   NIFEdipine (ADALAT CC) 90 MG CR tablet  Active   ROPINIRole (REQUIP) 0.25 MG Tab  Active   sevelamer (RENAGEL) 800 MG Tab  Active                ALLERGIES  Allergies   Allergen Reactions   • Codeine      Personality changes   • Oxycodone      Personality changes   • Tape Rash     Adhesive tape-rash, paper tape ok.       PHYSICAL EXAM  VITAL SIGNS: BP  "(!) 171/71   Pulse 88   Temp 36.8 °C (98.2 °F)   Resp 20   Ht 1.626 m (5' 4\")   Wt 54.4 kg (120 lb)   SpO2 100%   BMI 20.60 kg/m²     Pulse ox interpretation: I interpret this pulse ox as normal.  Constitutional: Alert calm elderly woman laying in ED bed in no apparent distress.   HENT: No signs of trauma, Bilateral external ears normal, Nose normal.   Eyes: Pupils are equal, Conjunctiva normal, Non-icteric.   Neck: Normal range of motion, No tenderness, Supple, No stridor.   Cardiovascular: Regular rate and rhythm, no murmurs. Symmetric distal pulses. No cyanosis of extremities. No peripheral edema of extremities.  Thorax & Lungs: Normal breath sounds, No respiratory distress, No wheezing, No chest tenderness.   Abdomen: Soft, No tenderness, No masses, No pulsatile masses. No peritoneal signs.  Skin: Warm, Dry, No erythema, No rash.   Back: No midline bony tenderness, No CVA tenderness.   Musculoskeletal: Fistula on left upper extremity, palpable thrill present, left arm is nearly 5 times as big as her right arm. 3 + Pitting edema to bilateral lower extremity to knees. Some edema in her thighs. Left breast is edematous compared to right. No edema in right upper extremity.   Neurologic: Alert , Normal motor function, Normal sensory function, No focal deficits noted.   Psychiatric: Affect normal, Judgment normal, Mood normal.     DIAGNOSTIC STUDIES / PROCEDURES  EKG Interpretation:  Interpreted by me    Results for orders placed or performed during the hospital encounter of 22   EKG   Result Value Ref Range    Report       [facility] Emergency Dept.    Test Date:  2022  Pt Name:    DIPAK GILLESPIE               Department: ER  MRN:        [id number]                      Room:       RD 02  Gender:     Female                       Technician: [id number]  :        [id number]                   Requested By:ER TRIAGE PROTOCOL  Order #:    [id number]                    Reading MD: Rafael " CONSTANCE Florian MD    Measurements  Intervals                                Axis  Rate:       87                           P:          51  IA:         172                          QRS:        -25  QRSD:       100                          T:          112  QT:         428  QTc:        515    Interpretive Statements  SINUS RHYTHM  PROBABLE LEFT ATRIAL ABNORMALITY  LEFT VENTRICULAR HYPERTROPHY  PROLONGED QT INTERVAL  No ST elevation. Slight ST depression in lead II. More prominent twaves in  lateral leads.  Impression: NSR, prolonged QT, LVH, more prominent twaves than previous.  Compared to ECG 02/15/2019 10:33 :42  Left ventricular hypertrophy now present  T-wave abnormality no longer present  Electronically Signed On 6- 1:12:45 PDT by Rafael Florian II, MD         LABS  Pertinent Labs & Imaging studies reviewed. (See chart for details)  Labs Reviewed   CBC WITH DIFFERENTIAL - Abnormal; Notable for the following components:       Result Value    RBC 3.75 (*)     .9 (*)     MCH 34.1 (*)     MCHC 33.2 (*)     RDW 62.9 (*)     Neutrophils-Polys 77.30 (*)     Lymphocytes 11.90 (*)     Neutrophils (Absolute) 8.12 (*)     All other components within normal limits   COMP METABOLIC PANEL - Abnormal; Notable for the following components:    Sodium 133 (*)     Chloride 88 (*)     Anion Gap 22.0 (*)     Glucose 423 (*)     Bun 52 (*)     Creatinine 6.39 (*)     Alkaline Phosphatase 139 (*)     All other components within normal limits   PROBRAIN NATRIURETIC PEPTIDE, NT - Abnormal; Notable for the following components:    NT-proBNP >60568 (*)     All other components within normal limits   ESTIMATED GFR - Abnormal; Notable for the following components:    GFR (CKD-EPI) 6 (*)     All other components within normal limits   LACTIC ACID - Abnormal; Notable for the following components:    Lactic Acid 3.3 (*)     All other components within normal limits   BETA-HYDROXYBUTYRIC ACID - Abnormal; Notable for the following  components:    beta-Hydroxybutyric Acid 0.99 (*)     All other components within normal limits   POCT GLUCOSE DEVICE RESULTS - Abnormal; Notable for the following components:    POC Glucose, Blood 365 (*)     All other components within normal limits     RADIOLOGY  US-EXTREMITY VENOUS UPPER UNILAT LEFT   Final Result      DX-CHEST-PORTABLE (1 VIEW)   Final Result         1.  Interstitial pulmonary parenchymal prominence suggest chronic underlying lung disease, component of interstitial edema and/or infiltrates not excluded.   2.  Small left pleural effusion   3.  Cardiomegaly   4.  Atherosclerosis        Pertinent Labs & Imaging studies reviewed. (See chart for details)    COURSE & MEDICAL DECISION MAKING  Pertinent Labs & Imaging studies reviewed. (See chart for details)    1:00 AM This is a 81 y.o. female who presents with fatigue, peripheral edema, hyperglycemia, nausea, and fatigue and the differential diagnosis includes but is not limited to fluid overload, fistula problem, DVT. Ordered for an EKG, DX-Chest, US-Extremity-Venous Upper-left, proBNP, CMP, and CBC with diff to evaluate.     1:33 AM Patient will be treated with fentanyl 50 mcg injection for her pain. Her proBNP is above 89644. Creatinine 6.39. Sodium 133. Chloride 88. Anion Gap 22. Bun 52. Alkaline phosphate is 139    1:56 AM Her blood sugar is 423. Patient will be treated with insulin regular injection.     2:35 AM Paging [facility] family medicine. Ultrasound shows no DVT. Normal fistula flow.  Unclear why she has such dramatic edema at her left upper extremity.  It is disabling to her.  I do not see any recent notes describing the same finding.  I believe she should be hospitalized for this.    2:53 AM I discussed the patient's case and the above findings with [facility] family medicine who agrees to evaluate the patient.      3:12 AM Patient is resting comfortably in ED bed.She is on 2 L nasal cannula. Discussed plans for admission.    DISPOSITION:  Patient  will be hospitalized by Dr. Lord in guarded condition.    FINAL IMPRESSION  1. Peripheral edema    2. Weakness    3. Hyperglycemia          I, Socorro Rivera (Scribe), am scribing for, and in the presence of, SHIRLEY Pavon II.    Electronically signed by: Socorro Rivera (Scribe), 6/24/2022    Rafael ESCOBAR II, M* personally performed the services described in this documentation, as scribed by Socorro Rivera in my presence, and it is both accurate and complete.    The note accurately reflects work and decisions made by me.  Rafael Florian II, M.D.  6/24/2022  7:41 AM

## 2022-06-24 NOTE — ASSESSMENT & PLAN NOTE
Patient noted to have hyperglycemia in the 400s in the ER.  A1C of 11.7     Plan:  - Hold home antihyperglycemic's  - Start insulin sliding scale with basal insulin at night. Currently at 10 units Lantus QHS  - Hypoglycemic protocol  - Continue to titrate insulin as needed

## 2022-06-24 NOTE — ED NOTES
Pharmacy Medication Reconciliation      ~Medication reconciliation updated and complete per patient at bedside & patient home pharmacy  ~Allergies have been verified and updated   ~No oral ABX within the last 30 days  ~Patient home pharmacy:[facility]    ~Patient reports she took her medications 2 days ago and filled them 2 months ago. [facility] called patient home pharmacy and other pharmacies but, patient has not filled medications in over 6 months

## 2022-06-24 NOTE — ASSESSMENT & PLAN NOTE
Patient has history of ESRD currently on dialysis.  Pt followed by [facility]. Had multiple missed appointments prior to admission. Edematous left arm known to nephrology. Planned to have pt go to access center to fix central stenosis this coming week.    As per daughter pt has had significant decrease in mobility and cognition in the past 3-6 months. Pt's story shifting. Chronic pain has been increasing.    CTA of LUE without evidence of central stenosis    Plan:  - Nephrology consulted. Appreciate the recs and help. Dialysis as per nephro (M, W, F)  - Continue sevelamer and calcitriol  - Home lasix dced  - Renally dose medications

## 2022-06-24 NOTE — CONSULTS
[facility] -  CONSULTATION NOTE               Author: Janina Bender M.D. Date & Time: 6/24/2022  4:37 PM       REASON FOR CONSULTATION:   Inpatient hemodialysis management.    HISTORY OF PRESENT ILLNESS:    81yoF with PMH significant for ESRD on HD MWF via left arm AVF, HTN, DM II, Anemia secondary to CKD, CKD Bone mineral disorder, admitted with increased edema and weakness and having missed her last last 2-3 outpt HD treatments. Pt is very lethargic at this time and unable to provide much history, majority of the history was obtained through review of the medical record and discussion with primary svc. Pt had reported increased LE edema and fatigue and weakness and worsening of her left arm edema so she came to the ED for evaluation. She apparently has missed her last 2-3 outpt dialysis treatments. Per regular outpt Nephrologist Dr. Gates, she has been non-compliant with her fluid restriction and was told that she needed 4x/week dialysis until her volume status could be optimized but she was non-compliant with that recommendation. She has edema of her left arm where her AVF is located and there is concern for a central stenosis that could be the etiology. She had an appointment at the outpt access center to evaluate for central stenosis and undergo angioplasty if needed on 6/9/22 but pt did not go to the appointment. She has not had any other procedures done to the arm in the past couple of months. She had a left arm us done today that showed no DVT and patent AVF and soft tissue edema. She apparently received pain medication fentanyl and dilaudid as well as benadryl earlier today and she is very drowsy.     REVIEW OF SYSTEMS:    Unobtainable, pt lethargic and just repeats herself, unable to answer questions    PAST MEDICAL HISTORY:   Past Medical History:   Diagnosis Date   • Anesthesia     oxygen level dropped   • Arthritis    • Breath shortness    • CATARACT     right IOL   •  Diabetes    • Heart burn    • Hypertension    • Indigestion    • Other specified disorder of intestines    • Sleep apnea, obstructive     no c-pap   • Snoring    • Unspecified hemorrhagic conditions     bruise easily   • Unspecified urinary incontinence    • Urinary bladder disorder     incontinence       PAST SURGICAL HISTORY:   Past Surgical History:   Procedure Laterality Date   • AV FISTULA CREATION Left 2/15/2019    Procedure: AV FISTULA CREATION-  UPPER EXTREMITY BRACHIAL CEPHALIC BANDING;  Surgeon: Fernie Nazario M.D.;  Location: SURGERY [facility];  Service: General   • HIP CANNULATED SCREW Right 2017    Procedure: HIP CANNULATED SCREW;  Surgeon: Ar Huerta M.D.;  Location: SURGERY [facility];  Service:    • CATARACT PHACO WITH IOL  2014    Performed by Rudolph Barclay M.D. at SURGERY SAME DAY [facility] ORS   • CATARACT PHACO WITH IOL  2014    Performed by Rudolph Barclay M.D. at SURGERY SAME DAY [facility] ORS   • VENTRAL HERNIA REPAIR LAPAROSCOPIC  3/7/2012    Performed by HUNTER SERNA at SURGERY SAME DAY [facility] ORS   • APPENDECTOMY     • GYN SURGERY      hysterectomy   • OTHER      T&A   • OTHER ABDOMINAL SURGERY      abd tramua- horse stepped on abd   • OTHER ORTHOPEDIC SURGERY      R & l Shoulder repair       FAMILY HISTORY:   No family history on file.  Unobtainable, pt lethargic and unable to answer    SOCIAL HISTORY:   Social History     Tobacco Use   Smoking Status Former Smoker   • Packs/day: 1.00   • Years: 20.00   • Pack years: 20.00   • Types: Cigarettes   • Quit date: 3/5/1979   • Years since quittin.3   Smokeless Tobacco Never Used     Social History     Substance and Sexual Activity   Alcohol Use No     Social History     Substance and Sexual Activity   Drug Use No       HOME MEDICATIONS:   Reviewed and documented in chart.    LABORATORY STUDIES:   Recent Labs     22  0100   SODIUM 133*   POTASSIUM 5.2   CHLORIDE 88*   CO2 23   GLUCOSE  "423*   BUN 52*   CREATININE 6.39*   CALCIUM 9.2       ALLERGIES:  Codeine, Oxycodone, and Tape    VS:  /63   Pulse 79   Temp 36.4 °C (97.5 °F) (Temporal)   Resp 16   Ht 1.626 m (5' 4\")   Wt 54.4 kg (120 lb)   SpO2 96%   BMI 20.60 kg/m²     Physical Exam  Nursing note reviewed.   Constitutional:       General: She is not in acute distress.     Appearance: She is ill-appearing.      Comments: Lethargic   HENT:      Head: Normocephalic and atraumatic.   Eyes:      General: No scleral icterus.  Cardiovascular:      Rate and Rhythm: Normal rate and regular rhythm.   Pulmonary:      Effort: Pulmonary effort is normal. No respiratory distress.      Breath sounds: Examination of the right-lower field reveals decreased breath sounds. Examination of the left-lower field reveals decreased breath sounds. Decreased breath sounds present.   Abdominal:      General: Bowel sounds are normal. There is no distension.      Palpations: Abdomen is soft.   Musculoskeletal:         General: Swelling (Left arm) present. No deformity.      Right lower leg: Edema present.      Left lower leg: Edema present.      Comments: +left arm AVF with thrill/bruit   Skin:     General: Skin is warm and dry.      Findings: No rash.   Neurological:      Mental Status: She is lethargic.      Comments: Lethargic and confused  Spontaneously moves all extremities   Psychiatric:         Attention and Perception: She is inattentive.      Comments: Lethargic and repeats herself            FLUID BALANCE:  No intake/output data recorded.    IMAGING:  All imaging reviewed from admission to present day    IMPRESSION:  # ESRD  - HD via left arm AVF, missed last 2-3 outpt treatments  # Fluid Overload  - Secondary to non-compliance with fluid restriction on HD and non-compliance with treatments  # Left arm edema  - concern for central stenosis  - AVF patent on left arm us and no DVT  - Pt no showed to outpt appt to evaluate and treat possible central " stensosis  # Altered Mental Status  - Pt very lethargic, received multiple pain meds and sedating med earlier  - Monitor  # HTN  - Goal BP < 140/90  - At goal  # Anemia of CKD  - Goal Hgb 10-11  - At goal  # CKD-MBD  - Ca 9.2  - PO4 pen  # DM II--management per primary svc    PLAN:  - HD today and continue qMWF dialysis schedule  - May need additional HD tomorrow to optimize volume status  - Outpt access center appointment rescheduled to this week to evaluate and treat possible central stenosis in the left arm  - No need for ROGELIO  - Check phos level  - Continue home BP meds  - Limit sedating meds  - No dietary protein restrictions  - Dose all meds per ESRD    **Discussed with primary svc    Thank you for the consultation!

## 2022-06-25 PROBLEM — E78.49 OTHER HYPERLIPIDEMIA: Chronic | Status: RESOLVED | Noted: 2022-01-01 | Resolved: 2022-01-01

## 2022-06-25 PROBLEM — M79.605 PAIN IN BOTH LOWER EXTREMITIES: Status: ACTIVE | Noted: 2022-01-01

## 2022-06-25 PROBLEM — M79.604 PAIN IN BOTH LOWER EXTREMITIES: Status: ACTIVE | Noted: 2022-01-01

## 2022-06-25 PROBLEM — Z71.89 GOALS OF CARE, COUNSELING/DISCUSSION: Status: ACTIVE | Noted: 2022-01-01

## 2022-06-25 NOTE — PROGRESS NOTES
"S: Paged by nursing for continued pain in bilateral lower extremities symmetrically. This has been progressive and patient is now developing firm swelling to affected areas    At bedside patient's pain is minimally controlled by dilaudid 0.5 mg.    She reports burning pain to her bilateral lower extremities that extends up to her mid thighs. She is curled in her bed such that her legs do not touch the blankets as even thee slightest touch causes her pain. Other associated symptoms include ongoing nausea and swelling to affected areas of her legs.     O:   /41   Pulse 86   Temp 35.9 °C (96.6 °F) (Temporal)   Resp 16   Ht 1.626 m (5' 4\")   Wt 72.5 kg (159 lb 13.3 oz)   SpO2 95%   BMI 27.44 kg/m²     Gen: Alert, Perseverative and whimpering. Clearly in pain. No respiratory distress  HEENT: NCAT, PERRLA, 3mm bilaterally  CV: RRR, no murmurs appreciated.   Resp: CTAB, transmitted upper airway sounds  Abd: soft, non tender, non distended  Ext: significant non pitting edema to LUE, soft, minimally tender. BLE with firm nodular swellings over entire surface, much like the rind on an orange. Similar feel to varicosities noted in upper legs with lower legs hard and indurated throughout from ankle to circumferential line just below the knee. No ulcerations, however skin flaking and breakdown appears imminent over anterior shins. No weeping or drainage. Exquisitely tender to touch. Curled in bed such that legs do not touch any surfaces due to pain.   Neuro: alert, oriented to person, not oriented to place timing and situation. Speaks clearly, non linear. Speech content not relevant to current conversation \"you were good before you pulled away, there is a ball inside me that comes and goes and explodes\" Non focal strength examination, moves all extremities. Sensitive to light touch throughout.     A/P: Very perplexing new skin findings. Subacute onset overnight, seems to be limited to cutaneous tissues based on " exam. No change in VS-- very stable.  Must r/o new DVT however clinical presentation would be very atypical of this. Findings concerning for inflammatory process such as a cutaneous vasculitis, cutaneous polyarteritis nodosa. Currently clinically stable and in need of pain control.    - basic labs ordered: CBC, CMP, mag, phos   - Coags    - Lactic acid   - Basic inflammatory and autoimmune markers ordered: CRP, CIRO, and ANCA, further workup per day team.    - Compression stockings to combat cutaneous edema acutely if patient will tolerate   - Pain control with dilaudid. Ordered topical Voltaren for adjunct. Pain medication escalation is challenging given her renal clearance, will increase frequency of dilaudid administration to q 2 hours prn    Brittany Tse MD  PGY2 [employer] [facility] Resident

## 2022-06-25 NOTE — ASSESSMENT & PLAN NOTE
Chronic in nature with likely indolent worsening. As per patient and daughter >1 year. At home pt uses tylenol and showers to control the pain.    The pain appears to be multi-factorial. Some residual from old hip fractures but the significant pain seems to come from her bilateral lower legs. Chronic. Likely vascular in nature, however, not emergent. Pt not a candidate for surgery, unless she desires amputations. Please see vascular note for more details.    Plan:  - Continue goals of care conversation with patient and family.  - Palliative consulted. Appreciate the help  - DC narcotics  - Tylenol 650 mg scheduled  - Gabapentin qHS 200 mg  - Vascular consult. Appreciate the recommendations and help.

## 2022-06-25 NOTE — ASSESSMENT & PLAN NOTE
"Pt with recent (6 months - 1 year) decline in mobility and cognition. Has been on dialysis for years. Echo during this hospitalization with severe aortic stenosis and EF of 25-30%. Pt intermittently in significant pain. There is no DPOA. Pt is currently full code and is adamant about this. She is also adamant that her family is NOT to make medical decisions for her. She is still \"thinking\" about who can make decisions for her if she is unable to make them.      Plan:  - Poor prognosis given multiple severe comorbidities. Pt is not a candidate for aggressive treatment/therapy for vascular and cardiac issues. Please see their notes for details.  - Palliative consulted. Appreciate the recs and the help  - Cardiology and vascular consulted in line with patient's goals of care. Given the patient's multiple severe co-morbidities further cardiac/vascular interventions are extremely limited. Please see their notes for details. Appreciate the recs and the help  - Will continue discussions with patient and when appropriate family  "

## 2022-06-25 NOTE — PROGRESS NOTES
Assumed care of patient, bedside report received from MAURO Morrison. Patient back in room from dialysis. Patient A&O x4, 2L NC, and complaining of 6/10 pain in her lower legs. Updated on POC, call light within reach and fall precautions in place. Bed locked and in lowest position. Patient instructed to call for assistance before getting out of bed. All questions answered, no other needs at this time.

## 2022-06-25 NOTE — PROGRESS NOTES
Hemodialysis ordered by Dr. Bender. Treatment started at 1737 and ended at 2037. Pt stable, vss, no c/o post tx. Net UF 2.5 L. Report to DICK Welsh RN. Lt ua avf dsg cdi,

## 2022-06-25 NOTE — PROGRESS NOTES
4 Eyes Skin Assessment Completed by Josephine CHANEY RN and Prince ATKINSON RN.    Head WDL  Ears WDL  Nose WDL  Mouth WDL  Neck WDL  Breast/Chest WDL  Shoulder Blades WDL  Spine WDL  (R) Arm/Elbow/Hand WDL  (L) Arm/Elbow/Hand Redness, Blanching, Swelling, Weeping, Shiny and Edema  Abdomen WDL  Groin WDL  Scrotum/Coccyx/Buttocks Redness and Blanching  (R) Leg Redness, Blanching, Swelling and Edema  (L) Leg Redness, Blanching, Swelling and Edema  (R) Heel/Foot/Toe Redness, Blanching and Boggy  (L) Heel/Foot/Toe Redness, Blanching, Boggy and Swelling          Devices In Places Tele Box, Blood Pressure Cuff, Pulse Ox and Nasal Cannula      Interventions In Place Pillows, Q2 Turns, Heels Loaded W/Pillows and Pressure Redistribution Mattress    Possible Skin Injury No    Pictures Uploaded Into Epic N/A  Wound Consult Placed N/A  RN Wound Prevention Protocol Ordered No

## 2022-06-25 NOTE — PROGRESS NOTES
[facility] -  PROGRESS NOTE               Author: Janina Bender M.D. Date & Time: 6/25/2022  1:58 PM     HPI:  81yoF with PMH significant for ESRD on HD MWF via left arm AVF, HTN, DM II, Anemia secondary to CKD, CKD Bone mineral disorder, admitted with increased edema and weakness and having missed her last last 2-3 outpt HD treatments. Pt is very lethargic at this time and unable to provide much history, majority of the history was obtained through review of the medical record and discussion with primary svc. Pt had reported increased LE edema and fatigue and weakness and worsening of her left arm edema so she came to the ED for evaluation. She apparently has missed her last 2-3 outpt dialysis treatments. Per regular outpt Nephrologist Dr. Gates, she has been non-compliant with her fluid restriction and was told that she needed 4x/week dialysis until her volume status could be optimized but she was non-compliant with that recommendation. She has edema of her left arm where her AVF is located and there is concern for a central stenosis that could be the etiology. She had an appointment at the outpt access center to evaluate for central stenosis and undergo angioplasty if needed on 6/9/22 but pt did not go to the appointment. She has not had any other procedures done to the arm in the past couple of months. She had a left arm us done today that showed no DVT and patent AVF and soft tissue edema. She apparently received pain medication fentanyl and dilaudid as well as benadryl earlier today and she is very drowsy.     DAILY NEPHROLOGY SUMMARY:  6/25: rapid response overnight for severe leg pain, pt very lethargic today, moans with palpation of legs, arouses but does not answer questions and falls back asleep, tolerated HD yest with 2.5L UF, BP stable overnight but low this am    REVIEW OF SYSTEMS:    Unobtainable, pt lethargic and does not answer questions    PMH/PSH/SH/FH:   Reviewed  "and unchanged since admission note    CURRENT MEDICATIONS:   Reviewed from admission to present day    VS:  BP (!) 94/30   Pulse 82   Temp 37 °C (98.6 °F) (Temporal)   Resp 18   Ht 1.626 m (5' 4\")   Wt 72.5 kg (159 lb 13.3 oz)   SpO2 93%   BMI 27.44 kg/m²     Physical Exam  Nursing note reviewed.   Constitutional:       Appearance: Normal appearance.   HENT:      Head: Normocephalic and atraumatic.   Eyes:      General: No scleral icterus.  Cardiovascular:      Rate and Rhythm: Normal rate and regular rhythm.   Pulmonary:      Effort: Pulmonary effort is normal. No respiratory distress.      Breath sounds: Examination of the right-lower field reveals decreased breath sounds. Examination of the left-lower field reveals decreased breath sounds. Decreased breath sounds present.   Abdominal:      General: Bowel sounds are normal. There is no distension.   Musculoskeletal:         General: No deformity.      Right lower leg: Edema (trace) present.      Left lower leg: Edema (trace) present.   Skin:     General: Skin is warm.      Findings: No rash.      Comments: +Chronic skin changes in bilaterally lower legs and firm to touch   Neurological:      Mental Status: She is alert.      Comments: Withdraws to painful stimuli, oriented to self only   Psychiatric:      Comments: Unable to assess, pt lethargic and does not answer questions         Fluids:  In: 500 [Dialysis:500]  Out: 3000     LABS:  Recent Labs     06/24/22  0100 06/25/22  0446   SODIUM 133* 137   POTASSIUM 5.2 4.1   CHLORIDE 88* 93*   CO2 23 28   GLUCOSE 423* 87   BUN 52* 34*   CREATININE 6.39* 5.22*   CALCIUM 9.2 8.8       IMAGING:   All imaging reviewed from admission to present day    IMPRESSION:  # ESRD  - HD via left arm AVF, missed last 2-3 outpt treatments  # Fluid Overload  - Secondary to non-compliance with fluid restriction on HD and non-compliance with treatments  # Left arm edema  - concern for central stenosis  - AVF patent on left arm us " and no DVT  - Pt no showed to outpt appt to evaluate and treat possible central stensosis  # Altered Mental Status  - Pt very lethargic, received pain meds  - Monitor  # HTN  - Goal BP < 140/90  - BP low this am  # Anemia of CKD  - Goal Hgb 10-11  - At goal  # CKD-MBD  - Ca 9.2  - PO4 6.4  # DM II--management per primary svc  # Leg Pain--chronic skin changes and subcutaneous tissue firm to touch  - Unclear etiology  - Eval and work-up per primary svc     PLAN:  - No HD today, continue qMWF dialysis schedule  - May need additional HD tomorrow to optimize volume status, will reassess in am  - Outpt access center appointment rescheduled to this week to evaluate and treat possible central stenosis in the left arm  - Consider checking CTA of left arm to eval for central stenosis  - No need for ROGELIO  - Increase renvela  - Avoid aggressive lowering of BP, may need to hold/decrease current BP meds if BP remains low  - PRN NS boluses for hypotension  - Limit sedating meds if possible  - No dietary protein restrictions  - Dose all meds per ESRD     **Discussed with [employer] Resident

## 2022-06-25 NOTE — PROGRESS NOTES
Patients pain level is increasing, legs rigid and hard. Which is a change from start of shift. MD paged to come to bedside to further assess patient.

## 2022-06-25 NOTE — PROGRESS NOTES
[facility] PROGRESS NOTE     Attending: Tyree Prescott M.d.    PATIENT: Jannet Gillespie; [id number]; 1940     ID: 81 y.o. female who was admitted on 6/24/2022 for left upper arm swelling and lower extremity edema.    SUBJECTIVE: Rapid called on patient for bilateral lower leg pain. ZAHRAA demonstrated vascular disease. Arterial ultrasound indicated     OBJECTIVE:     Vitals:    06/25/22 0813 06/25/22 1232 06/25/22 1236 06/25/22 1548   BP: (!) 96/57 (!) 75/49 (!) 94/30 (!) 80/50   Pulse: 78 82  79   Resp: 16 18  18   Temp: 36.6 °C (97.9 °F) 37 °C (98.6 °F)  37.1 °C (98.7 °F)   TempSrc: Temporal Temporal  Temporal   SpO2: 96% 93%  97%   Weight:       Height:           Intake/Output Summary (Last 24 hours) at 6/25/2022 1352  Last data filed at 6/24/2022 2037  Gross per 24 hour   Intake 500 ml   Output 3000 ml   Net -2500 ml       PE:   General: Patient is moderate distress from pain, cooperative   Skin:  Pink, warm and dry.  No rashes  HEENT: NC/AT. PERRL. EOMI. MMM. No nasal discharge. Oropharynx nonerythematous without exudate/plaques  Neck:  Supple without lymphadenopathy or rigidity.   Lungs:  Symmetrical.  Coarse crackles auscultated at lung bases, no wheezes. Good air movement. 3 liters  Cardiovascular:  S1/S2 RRR with harsh systolic murmur   Abdomen:  Abdomen is soft, nontender, nondistended. +BS. No masses noted.  Extremities:  Full range of motion. No gross deformities noted. 2+ pulses in all extremities. Induration and tenderness in BLE below knee. The left upper extremity also edematous. AV fistula has palpable thrill.   Spine:  Straight without vertebral anomalies.  CNS:  Muscle tone is normal. No gross focal neurologic deficits      LABS x 2 DAYS:    Admission on 06/24/2022   Component Date Value Ref Range Status   • Report 06/24/2022    Final                    Value:[facility].    Test Date:  2022-06-24  Pt Name:    JANNET GILLESPIE               Department:  ER  MRN:        [id number]                      Room:        02  Gender:     Female                       Technician: [id number]  :        [id number]                   Requested By:ER TRIAGE PROTOCOL  Order #:    [id number]                    Reading MD: Rafael Florian II, MD    Measurements  Intervals                                Axis  Rate:       87                           P:          51  NC:         172                          QRS:        -25  QRSD:       100                          T:          112  QT:         428  QTc:        515    Interpretive Statements  SINUS RHYTHM  PROBABLE LEFT ATRIAL ABNORMALITY  LEFT VENTRICULAR HYPERTROPHY  PROLONGED QT INTERVAL  No ST elevation. Slight ST depression in lead II. More prominent twaves in  lateral leads.  Impression: NSR, prolonged QT, LVH, more prominent twaves than previous.  Compared to ECG 02/15/2019 10:33                          :42  Left ventricular hypertrophy now present  T-wave abnormality no longer present  Electronically Signed On 2022 1:12:45 PDT by Rafael Florian II, MD     • WBC 2022 10.5  4.8 - 10.8 K/uL Final   • RBC 2022 3.75 (A) 4.20 - 5.40 M/uL Final   • Hemoglobin 2022 12.8  12.0 - 16.0 g/dL Final   • Hematocrit 2022 38.6  37.0 - 47.0 % Final   • MCV 2022 102.9 (A) 81.4 - 97.8 fL Final   • MCH 2022 34.1 (A) 27.0 - 33.0 pg Final   • MCHC 2022 33.2 (A) 33.6 - 35.0 g/dL Final   • RDW 2022 62.9 (A) 35.9 - 50.0 fL Final   • Platelet Count 2022 215  164 - 446 K/uL Final   • MPV 2022 9.7  9.0 - 12.9 fL Final   • Neutrophils-Polys 2022 77.30 (A) 44.00 - 72.00 % Final   • Lymphocytes 2022 11.90 (A) 22.00 - 41.00 % Final   • Monocytes 2022 6.90  0.00 - 13.40 % Final   • Eosinophils 2022 2.50  0.00 - 6.90 % Final   • Basophils 2022 0.60  0.00 - 1.80 % Final   • Immature Granulocytes 2022 0.80  0.00 - 0.90 % Final   • Nucleated RBC 2022 0.00  /100  WBC Final   • Neutrophils (Absolute) 06/24/2022 8.12 (A) 2.00 - 7.15 K/uL Final    Includes immature neutrophils, if present.   • Lymphs (Absolute) 06/24/2022 1.25  1.00 - 4.80 K/uL Final   • Monos (Absolute) 06/24/2022 0.72  0.00 - 0.85 K/uL Final   • Eos (Absolute) 06/24/2022 0.26  0.00 - 0.51 K/uL Final   • Baso (Absolute) 06/24/2022 0.06  0.00 - 0.12 K/uL Final   • Immature Granulocytes (abs) 06/24/2022 0.08  0.00 - 0.11 K/uL Final   • NRBC (Absolute) 06/24/2022 0.00  K/uL Final   • Sodium 06/24/2022 133 (A) 135 - 145 mmol/L Final   • Potassium 06/24/2022 5.2  3.6 - 5.5 mmol/L Final   • Chloride 06/24/2022 88 (A) 96 - 112 mmol/L Final   • Co2 06/24/2022 23  20 - 33 mmol/L Final   • Anion Gap 06/24/2022 22.0 (A) 7.0 - 16.0 Final   • Glucose 06/24/2022 423 (A) 65 - 99 mg/dL Final   • Bun 06/24/2022 52 (A) 8 - 22 mg/dL Final   • Creatinine 06/24/2022 6.39 (A) 0.50 - 1.40 mg/dL Final   • Calcium 06/24/2022 9.2  8.5 - 10.5 mg/dL Final   • AST(SGOT) 06/24/2022 16  12 - 45 U/L Final    Comment: The hemolysis index of the specimen exceeds the allowed tolerance for the  test.  Result may be affected.  Specimen recollection is recommended to  confirm the result.     • ALT(SGPT) 06/24/2022 11  2 - 50 U/L Final   • Alkaline Phosphatase 06/24/2022 139 (A) 30 - 99 U/L Final   • Total Bilirubin 06/24/2022 0.8  0.1 - 1.5 mg/dL Final   • Albumin 06/24/2022 3.5  3.2 - 4.9 g/dL Final   • Total Protein 06/24/2022 7.0  6.0 - 8.2 g/dL Final   • Globulin 06/24/2022 3.5  1.9 - 3.5 g/dL Final   • A-G Ratio 06/24/2022 1.0  g/dL Final   • NT-proBNP 06/24/2022 >05266 (A) 0 - 125 pg/mL Final   • GFR (CKD-EPI) 06/24/2022 6 (A) >60 mL/min/1.73 m 2 Final    Comment: Effective 3/15/2022, estimated Glomerular Filtration Rate  is calculated using race neutral CKD-EPI 2021 equation  per NKF-ASN recommendations.     • Lactic Acid 06/24/2022 3.3 (A) 0.5 - 2.0 mmol/L Final   • beta-Hydroxybutyric Acid 06/24/2022 0.99 (A) 0.02 - 0.27 mmol/L Final   •  POC Glucose, Blood 06/24/2022 365 (A) 65 - 99 mg/dL Final   • Glycohemoglobin 06/24/2022 11.7 (A) 4.0 - 5.6 % Final    Comment: Increased risk for diabetes:  5.7 -6.4%  Diabetes:  >6.4%  Glycemic control for adults with diabetes:  <7.0%    The above interpretations are per ADA guidelines.  Diagnosis  of diabetes mellitus on the basis of elevated Hemoglobin A1c  should be confirmed by repeating the Hb A1c test.     • Est Avg Glucose 06/24/2022 289  mg/dL Final    Comment: The eAG calculation is based on the A1c-Derived Daily Glucose  (ADAG) study.  See the ADA's website for additional information.     • Vitamin B12 -True Cobalamin 06/24/2022 1108 (A) 211 - 911 pg/mL Final   • Lactic Acid 06/24/2022 3.3 (A) 0.5 - 2.0 mmol/L Final   • Hepatitis B Surface Antigen 06/24/2022 Non-Reactive  Non-Reactive Final    Comment: It has been reported that certain assays will not detect all HBV mutants.  If acute or chronic HBV infection is suspected and the HBsAg result is  negative, it is recommended that other serological markers be tested to  confirm the HBsAg nonreactivity.  HBsAg test results should always be  assessed in conjunction with the patient's medical history, clinical  examination, and other findings.    Note: Assay performance characteristics have not been established when the  Elecsys HBsAg II assay is used in conjunction with other manufacturers'  assays for specific HBV serological markers. Assay performance  characteristics have not been established for testing of newborns.       • Hepatitis B Cors Ab,IgM 06/24/2022 Non-Reactive  Non-Reactive Final    Comment: IgM antibodies to HBc were not detected.  The Roche HBc IgM is a diagnostic test for the qualitative determination of  IgM antibodies to the hepatitis B core antigen in human serum and plasma.  The results should be used and interpreted only in the context of the  overall clinical picture. A negative test result does not exclude the  possibility of  exposure to hepatitis B virus.  Note: Assay performance characteristics have not been established in  patients under the age of 21, pregnant women, or in populations of  immunocompromised or immunosuppressed patients.     • Hepatitis A Virus Ab, IgM 06/24/2022 Non-Reactive  Non-Reactive Final    Comment: The Roche HAV IgM assay is a diagnostic immunoassay for the qualitative  determination of IgM response to the hepatitis A virus in human serum and  plasma. The results should be used and interpreted only in the context of  the overall clinical picture. A negative test result does not exclude the  possibility of exposure to hepatitis A virus.  Note: Assay performance characteristics have not been established for  immunocompromised or immunosuppressed patients.     • Hepatitis C Antibody 06/24/2022 Non-Reactive  Non-Reactive Final    Comment: Antibodies to HCV were not detected.  The Roche anti-HCV is a diagnostic test for the qualitative determination of  antibodies to the hepatitis C virus in human serum and plasma. The results  should be used and interpreted only in the context of the overall clinical  picture. A negative test result does not exclude the possibility of exposure  to hepatitis C virus.  Note: Assay performance characteristics have not been established in  populations of immunocompromised or immunosuppressed patients.     • POC Glucose, Blood 06/24/2022 319 (A) 65 - 99 mg/dL Final   • Hep B Surface Antibody Quant 06/24/2022 171.00 (A) 0.00 - 10.00 mIU/mL Final    Comment: Anti HBs concentration detected at > 10 mIU/mL. Individual is considered to  be immune to infection with HBV.  Reference Interval: anti-HBs  < 8.5 mIU/mL..........Negative.  8.5 - 11.4 mIU/mL..........Indeterminate.  = 11.5 mIU/mL..........Positive.  Assay performance characteristics have not been established when the Elecsys  Anti HBs assay is used in conjunction with other manufacturers' assays for  specific HBV serological  markers.  For post-vaccination antibody testing guidelines for the general public,  refer to MMWR December 23, 2005/Vol. 54 (No.16);1-23, and for healthcare  workers, refer to MMWR December 20, 2013/Vol.62(No. 10);1-19.     • POC Glucose, Blood 06/24/2022 284 (A) 65 - 99 mg/dL Final   • POC Glucose, Blood 06/24/2022 125 (A) 65 - 99 mg/dL Final   • WBC 06/25/2022 9.5  4.8 - 10.8 K/uL Final   • RBC 06/25/2022 3.80 (A) 4.20 - 5.40 M/uL Final   • Hemoglobin 06/25/2022 12.9  12.0 - 16.0 g/dL Final   • Hematocrit 06/25/2022 39.7  37.0 - 47.0 % Final   • MCV 06/25/2022 104.5 (A) 81.4 - 97.8 fL Final   • MCH 06/25/2022 33.9 (A) 27.0 - 33.0 pg Final   • MCHC 06/25/2022 32.5 (A) 33.6 - 35.0 g/dL Final   • RDW 06/25/2022 63.9 (A) 35.9 - 50.0 fL Final   • Platelet Count 06/25/2022 190  164 - 446 K/uL Final   • MPV 06/25/2022 8.8 (A) 9.0 - 12.9 fL Final   • Neutrophils-Polys 06/25/2022 74.40 (A) 44.00 - 72.00 % Final   • Lymphocytes 06/25/2022 14.20 (A) 22.00 - 41.00 % Final   • Monocytes 06/25/2022 5.90  0.00 - 13.40 % Final   • Eosinophils 06/25/2022 4.30  0.00 - 6.90 % Final   • Basophils 06/25/2022 0.50  0.00 - 1.80 % Final   • Immature Granulocytes 06/25/2022 0.70  0.00 - 0.90 % Final   • Nucleated RBC 06/25/2022 0.20  /100 WBC Final   • Neutrophils (Absolute) 06/25/2022 7.10  2.00 - 7.15 K/uL Final    Includes immature neutrophils, if present.   • Lymphs (Absolute) 06/25/2022 1.35  1.00 - 4.80 K/uL Final   • Monos (Absolute) 06/25/2022 0.56  0.00 - 0.85 K/uL Final   • Eos (Absolute) 06/25/2022 0.41  0.00 - 0.51 K/uL Final   • Baso (Absolute) 06/25/2022 0.05  0.00 - 0.12 K/uL Final   • Immature Granulocytes (abs) 06/25/2022 0.07  0.00 - 0.11 K/uL Final   • NRBC (Absolute) 06/25/2022 0.02  K/uL Final   • PT 06/25/2022 14.9 (A) 12.0 - 14.6 sec Final   • INR 06/25/2022 1.20 (A) 0.87 - 1.13 Final    Comment: INR - Non-therapeutic Reference Range: 0.87-1.13  INR - Therapeutic Reference Range: 2.0-4.0     • Sodium 06/25/2022 137   135 - 145 mmol/L Final   • Potassium 06/25/2022 4.1  3.6 - 5.5 mmol/L Final   • Chloride 06/25/2022 93 (A) 96 - 112 mmol/L Final   • Co2 06/25/2022 28  20 - 33 mmol/L Final   • Anion Gap 06/25/2022 16.0  7.0 - 16.0 Final   • Glucose 06/25/2022 87  65 - 99 mg/dL Final   • Bun 06/25/2022 34 (A) 8 - 22 mg/dL Final   • Creatinine 06/25/2022 5.22 (A) 0.50 - 1.40 mg/dL Final   • Calcium 06/25/2022 8.8  8.5 - 10.5 mg/dL Final   • AST(SGOT) 06/25/2022 12  12 - 45 U/L Final   • ALT(SGPT) 06/25/2022 11  2 - 50 U/L Final   • Alkaline Phosphatase 06/25/2022 114 (A) 30 - 99 U/L Final   • Total Bilirubin 06/25/2022 0.7  0.1 - 1.5 mg/dL Final   • Albumin 06/25/2022 3.3  3.2 - 4.9 g/dL Final   • Total Protein 06/25/2022 6.7  6.0 - 8.2 g/dL Final   • Globulin 06/25/2022 3.4  1.9 - 3.5 g/dL Final   • A-G Ratio 06/25/2022 1.0  g/dL Final   • Magnesium 06/25/2022 2.1  1.5 - 2.5 mg/dL Final   • Phosphorus 06/25/2022 6.4 (A) 2.5 - 4.5 mg/dL Final   • Lactic Acid 06/25/2022 1.7  0.5 - 2.0 mmol/L Final   • Lactic Acid 06/25/2022 1.5  0.5 - 2.0 mmol/L Final   • D-Dimer Screen 06/25/2022 1.86 (A) 0.00 - 0.50 ug/mL (FEU) Final    Comment: A negative D-Dimer result when combined with a clinical  assessment of low probability has been shown to have a high  negative predictive value for DVT or PE.    This assay should not be used independently to exclude or  diagnose DVT or Pulmonary Embolism.    This test methodology does not differentiate between DVT and  PE when an elevation in results is demonstrated.     • APTT 06/25/2022 30.4  24.7 - 36.0 sec Final   • Stat C-Reactive Protein 06/25/2022 7.07 (A) 0.00 - 0.75 mg/dL Final   • GFR (CKD-EPI) 06/25/2022 8 (A) >60 mL/min/1.73 m 2 Final    Comment: Effective 3/15/2022, estimated Glomerular Filtration Rate  is calculated using race neutral CKD-EPI 2021 equation  per NKF-ASN recommendations.     • POC Glucose, Blood 06/25/2022 61 (A) 65 - 99 mg/dL Final   • POC Glucose, Blood 06/25/2022 74  65 - 99  mg/dL Final          MICROBIOLOGY:  Results     ** No results found for the last 168 hours. **              IMAGING:   US-ZAHRAA SINGLE LEVEL BILAT   Final Result      US-EXTREMITY ARTERY LOWER BILAT   Final Result      CT-EXTREMITY, UPPER W/O LEFT   Final Result      1.  There is diffuse subcutaneous edema of the imaged portions of the left upper extremity. There is also diffuse body wall edema in the visualized portions of the chest and abdomen.   2.  No focal fluid collection to suggest abscess.   3.  There is a small left pleural effusion and minimal fluid in the abdomen.   4.  There is postsurgical change of AV fistula. There is small vessel atherosclerosis.      US-EXTREMITY VENOUS UPPER UNILAT LEFT   Final Result      DX-CHEST-PORTABLE (1 VIEW)   Final Result         1.  Interstitial pulmonary parenchymal prominence suggest chronic underlying lung disease, component of interstitial edema and/or infiltrates not excluded.   2.  Small left pleural effusion   3.  Cardiomegaly   4.  Atherosclerosis      EC-ECHOCARDIOGRAM COMPLETE W/O CONT    (Results Pending)       MEDS:  Current Facility-Administered Medications   Medication Last Admin   • lidocaine (LIDODERM) 5 % 2 Patch 2 Patch at 06/25/22 1330   • HYDROmorphone (Dilaudid) injection 0.5 mg     • acetaminophen (Tylenol) tablet 650 mg     • gabapentin (NEURONTIN) capsule 200 mg     • insulin GLARGINE (Lantus,Semglee) injection      And   • insulin lispro (AdmeLOG,HumaLOG) injection      And   • dextrose 50% (D50W) injection 25 g     • senna-docusate (PERICOLACE or SENOKOT S) 8.6-50 MG per tablet 2 Tablet 2 Tablet at 06/25/22 0528    And   • polyethylene glycol/lytes (MIRALAX) PACKET 1 Packet      And   • bisacodyl (DULCOLAX) suppository 10 mg     • heparin injection 5,000 Units 5,000 Units at 06/25/22 1329   • atorvastatin (LIPITOR) tablet 20 mg 20 mg at 06/25/22 0528   • calcitRIOL (ROCALTROL) capsule 0.25 mcg 0.25 mcg at 06/25/22 0528   • NIFEdipine SR  (PROCARDIA-XL) tablet 90 mg 90 mg at 06/25/22 0528   • ROPINIRole (REQUIP) tablet 0.25 mg 0.25 mg at 06/25/22 0528   • sevelamer carbonate (RENVELA) tablet 800 mg     • ondansetron (ZOFRAN) syringe/vial injection 4 mg 4 mg at 06/24/22 2133   • furosemide (LASIX) injection 80 mg 80 mg at 06/25/22 0528       PROBLEM LIST:  Problem Noted   Goals of Care, Counseling/Discussion 6/25/2022   Pain in Both Lower Extremities 6/25/2022   Esrd (End Stage Renal Disease) On Dialysis ([facility]) 6/24/2022   Diabetes mellitus, type II ([facility]) 8/14/2017       ASSESSMENT/PLAN: Jannet Bruno is a 81 y.o. female here with:    * ESRD (end stage renal disease) on dialysis ([facility])- (present on admission)  Assessment & Plan  Patient has history of ESRD currently on dialysis.  Pt followed by [facility]. Had multiple missed appointments prior to admission. Edematous left arm known to nephrology. Planned to have pt go to access center to fix central stenosis this coming week.    As per daughter pt has had significant decrease in mobility and cognition in the past 3-6 months. Chronic pain has been increasing.    Plan:  - Nephrology consulted. Appreciate the recs and help. Dialysis as per nephro  - Avoid nephrotoxic agents  - Renally dose medications      Pain in both lower extremities  Assessment & Plan  First noticed 6/24-6/25 overnight. Rapid called 2/2 to the pain. On further history taking today, the pain is chronic in nature. As per patient and daughter >1 year. Unclear if significantly worsened recently but it does not appear to have. At home pt uses tylenol and showers to control the pain.    The pain appears to be multi-factorial. Some residual from old hip fractures but the significant pain seems to come from her bilateral lower legs. Chronic. Likely vascular in nature, however, not emergent. Pt likely not candidate for surgery, especially given goals of care conversation with daughter. Difficult to ascertain goals of care with  patient at this time.    Plan:  - Palliative consult  - Dilaudid .5 mg q2 hrs prn  - Tylenol 650 mg scheduled  - Gabapentin qHS 200 mg  - Will consider vascular consult pending goals of care conversation given the chronic nature of her disease and poor surgical candidate.    Goals of care, counseling/discussion  Assessment & Plan  Pt with recent decline in mobility and cognition. Pt in significant pain. Unclear what the patients goals are 2/2 to pain and AMS (2/2 to opioids) and daughter interested in talking to palliative care. There is no DPOA. Pt is currently Full code. Will continue to discuss with patient.    Plan:  - Palliative consulted  - Will continue discussions with family.    Edema of left upper extremity- (present on admission)  Assessment & Plan  Patient received procedure on left upper extremity 2 to 3 weeks ago and has had edema since.  Unsure what this procedure was.  Left upper extremity is grossly edematous with 2+ pitting edema throughout.  Palpable thrill over fistula.  Left upper extremity ultrasound showed patent AV fistula and no evidence of DVT.  Etiology of this edema is unknown at this point.  Plan:  Continue to monitor, will likely improve after dialysis    Generalized weakness- (present on admission)  Assessment & Plan  Patient noted to have generalized weakness which led her to miss her dialysis appointments.  Unsure etiology of this generalized weakness at this point.  Could be secondary to worsening uremia/acidemia secondary to ESRD. We will reassess once patient is more clinically stable and in less pain after dialysis.    Diabetes mellitus, type II (HCC)- (present on admission)  Assessment & Plan  Patient noted to have hyperglycemia in the 400s in the ER.  A1C of 11.7     Plan:  - Hold home antihyperglycemic's  - Start insulin sliding scale with basal insulin at night  - Hypoglycemic protocol  - Titrate insulin as needed      Essential hypertension- (present on  admission)  Assessment & Plan  Continue home medications after proper med rec completed        Core Measures:  Fluids: PO  Lines: PIV  Abx: None  Diet: Renal  PPX: Heparin  DISPO: Inpatient      CODE STATUS: Full.    Topher Rahman MD  PGY1  [facility]

## 2022-06-25 NOTE — CARE PLAN
The patient is Watcher - Medium risk of patient condition declining or worsening    Shift Goals  Clinical Goals: Pain Management  Patient Goals: Stop pain  Family Goals: N/A    Progress made toward(s) clinical / shift goals:      Problem: Pain - Standard  Goal: Alleviation of pain or a reduction in pain to the patient’s comfort goal  Outcome: Progressing   Medicating per MAR, repositioning, pillows for offloading.     Problem: Knowledge Deficit - Standard  Goal: Patient and family/care givers will demonstrate understanding of plan of care, disease process/condition, diagnostic tests and medications  Outcome: Progressing   Updated on plan of care and all questions answered.    Problem: Skin Integrity  Goal: Skin integrity is maintained or improved  Outcome: Progressing   Turning patient Q2, pillows for offloading, barrier creams.    Problem: Fall Risk  Goal: Patient will remain free from falls  Outcome: Progressing   All fall precautions in place.    Patient is not progressing towards the following goals:

## 2022-06-26 NOTE — PROGRESS NOTES
[facility] -  PROGRESS NOTE               Author: Janina Bender M.D. 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 Pt is very lethargic at this time and unable to provide much history, majority of the history was obtained through review of the medical record and discussion with primary svc. Pt had reported increased LE edema and fatigue and weakness and worsening of her left arm edema so she came to the ED for evaluation. She apparently has missed her last 2-3 outpt dialysis treatments. Per regular outpt Nephrologist Dr. Gates, she has been non-compliant with her fluid restriction and was told that she needed 4x/week dialysis until her volume status could be optimized but she was non-compliant with that recommendation. She has edema of her left arm where her AVF is located and there is concern for a central stenosis that could be the etiology. She had an appointment at the outpt access center to evaluate for central stenosis and undergo angioplasty if needed on 6/9/22 but pt did not go to the appointment. She has not had any other procedures done to the arm in the past couple of months. She had a left arm us done today that showed no DVT and patent AVF and soft tissue edema. She apparently received pain medication fentanyl and dilaudid as well as benadryl earlier today and she is very drowsy.     DAILY NEPHROLOGY SUMMARY:  6/25: rapid response overnight for severe leg pain, pt very lethargic today, moans with palpation of legs, arouses but does not answer questions and falls back asleep, tolerated HD yest with 2.5L UF, BP stable overnight but low this am  6/26: No events, BP low overnight and this am, more alert, reports pain in legs for the past 3 weeks, denies  "any CP/SOB/Abd pain, reports left arm edema a little better but no pain in left arm     REVIEW OF SYSTEMS:    10 point ROS reviewed and is as per HPI/daily summary or otherwise negative    PMH/PSH/SH/FH:   Reviewed and unchanged since admission note    CURRENT MEDICATIONS:   Reviewed from admission to present day    VS:  BP (!) 98/49   Pulse 77   Temp 36.9 °C (98.4 °F) (Temporal)   Resp 16   Ht 1.626 m (5' 4\")   Wt 70.8 kg (156 lb 1.4 oz)   SpO2 94%   BMI 26.79 kg/m²     Physical Exam  Nursing note reviewed.   Constitutional:       Appearance: Normal appearance.   HENT:      Head: Normocephalic and atraumatic.   Eyes:      General: No scleral icterus.  Cardiovascular:      Rate and Rhythm: Normal rate and regular rhythm.   Pulmonary:      Effort: Pulmonary effort is normal. No respiratory distress.      Breath sounds: Examination of the right-lower field reveals decreased breath sounds. Examination of the left-lower field reveals decreased breath sounds. Decreased breath sounds present.   Abdominal:      General: Bowel sounds are normal. There is no distension.      Palpations: Abdomen is soft.   Musculoskeletal:         General: Swelling (left arm) present. No deformity.      Right lower leg: Edema (trace) present.      Left lower leg: Edema (trace) present.   Skin:     General: Skin is warm.      Findings: No rash.      Comments: +Chronic skin changes in bilaterally lower legs   Neurological:      General: No focal deficit present.      Mental Status: She is alert and oriented to person, place, and time.   Psychiatric:         Mood and Affect: Mood normal.         Behavior: Behavior normal.         Fluids:  No intake/output data recorded.    LABS:  Recent Labs     06/24/22  0100 06/25/22  0446 06/26/22  1152   SODIUM 133* 137 135   POTASSIUM 5.2 4.1 4.3   CHLORIDE 88* 93* 92*   CO2 23 28 27   GLUCOSE 423* 87 180*   BUN 52* 34* 40*   CREATININE 6.39* 5.22* 6.43*   CALCIUM 9.2 8.8 7.9*       IMAGING:   All " imaging reviewed from admission to present day    IMPRESSION:  # ESRD  - HD via left arm AVF, missed last 2-3 outpt treatments  # Fluid Overload  - Secondary to non-compliance with fluid restriction on HD and non-compliance with treatments  # Left arm edema  - concern for central stenosis  - AVF patent on left arm us and no DVT  - Pt no showed to outpt appt to evaluate and treat possible central stensosis  # Altered Mental Status  - Improved  - Monitor  # HTN  - Goal BP < 140/90  - BP low this am  # Anemia of CKD  - Goal Hgb 10-11  - At goal  # CKD-MBD  - Ca 9.2  - PO4 6.4  # DM II--management per primary svc  # Leg Pain--chronic skin changes and subcutaneous tissue firm to touch  - Unclear etiology  - Eval and work-up per primary svc     PLAN:  - No acute need for HD today (SUN), continue qMWF dialysis schedule  - Consider checking CTA of chest and left arm to eval for central stenosis while pt is admitted  - No need for ROGELIO  - Increase renvela   - DC all BP meds and diuretics for now  - NS bolus ordered this am  - PRN NS boluses for hypotension  - Limit sedating meds if possible  - No dietary protein restrictions  - Dose all meds per ESRD  - Outpt access center appointment rescheduled to this week to evaluate and treat possible central stenosis in the left arm if pt is discharged in time  - Consider palliative care consult    **Discussed with RN

## 2022-06-26 NOTE — THERAPY
Missed Therapy     Patient Name: Jannet Bruno  Age:  81 y.o., Sex:  female  Medical Record #: [id number]  Today's Date: 6/26/2022    OT consult received. RN request to hold OT, reports pt with low BP, uncontrolled LE pain, and medically not appropriate to participate in therapy at this time. Will continue to re-attempt as able.    Marquis Curiel, BHAVIK, OTR/L, CKTP

## 2022-06-26 NOTE — THERAPY
Missed Therapy     Patient Name: Jannet Bruno  Age:  81 y.o., Sex:  female  Medical Record #: [id number]  Today's Date: 6/26/2022      PT consult received. RN requests hold PT, reports pt with low BP, uncontrolled LE pain, and medically not appropriate to participate at this time. Will re-attempt as able.

## 2022-06-27 NOTE — CONSULTS
"Date & Time note created:    6/27/2022   10:31 AM       Date of Consult: 6/27/2022    Requesting Provider: Kamille Minor M.D.  Consulting Provider: THERESE Carmen  Reason for Consult: Pain Management; Advance Care Planning    Chief Complaint: peripheral edema, weaknes.  HPI:   Jannet Bruno is a 81 y.o. female who was admitted on 6/24/2022 for left upper arm swelling and lower extremity edema.  Rapid response called in the early morning hours of 6/25 for bilateral lower leg pain. Ankle-brachial index demonstrated vascular disease. Arterial ultrasound indicated  right TBI 0.17 with toe pressures compatible with severe disease.  Left TBI unobtainable.  Flow not identified in right posterior tibial or dorsalis pedis branches.  Nephrology consulted and patient currently undergoing inpatient dialysis.  Per this APRN's discussion with Dr. Rahman this morning, patient somewhat lethargic earlier in admission, more alert and interactive today.    On my assessment, patient orient to self, place and situation.  Disoriented to time.  When I asked her the date, year she said, \"I have no idea.\"  She listed \"President Francesco\" as the President.  However, patient alert, answering questions about her pain appropriately, and able to describe her medical issues.  Patient reports muscle pain in BLE.  Endorses focal BLE and generalized weakness.  Affirms pain is worse with activity.  Denies joint pain, numbness/tingling.  Denies pain being worse at night, or being unable to sleep because of the pain.  Reports skin being ultra-sensitive to touch.      Pain History  Onset: \"2 weeks after they roto-rootered my fistula.\" However, per Dr. Rahman, pt/daughter Addie previously reported patient has been having BLE pain for ~1 year.  Location: left hand; bilateral shins, calves  Duration: constant (left hand); intermittent (BLE)  Characteristics: \"Terrible burning, like someone's using a blow torch on it or something\" (left " "hand).  Aggravating factors: BLE pain worsens with touch, palpation, movement; \"when I wiggle my ankles.\" Pt report skin on left hand BLE \"is very sensitive to touch.\"   Alleviating factors: immobility, pain medication  Radiation: reports left hand pain radiates up in LUE; no radiation in BLE  Treatments:  Tylenol 650 mg PO Q6 hours scheduled    Gabapentin 200 mg PO Q evening    Hydromorphone 0.25 mg IV Q2 hours PRN moderate pain    Hydromorphone 0.5 mg IV Q2 hours PRN severe pain  Severity: Currently 7/10.  Over last 24 hours: 3/10 (low), 8/10 (high).  Goal = 5/10     Home Medication Pain Regimen:  Tylenol (3) 500 mg tablets PO immediately prior to dialysis on M/W/F    Past Medical History:   Diagnosis Date    Anesthesia     oxygen level dropped    Arthritis     Breath shortness     CATARACT     right IOL    Diabetes     Heart burn     Hyperglycemia 10/23/2016    Hypertension     Indigestion     Other specified disorder of intestines     Sleep apnea, obstructive     no c-pap    Snoring     Unspecified hemorrhagic conditions     bruise easily    Unspecified urinary incontinence     Urinary bladder disorder     incontinence     Past Surgical History:   Procedure Laterality Date    AV FISTULA CREATION Left 2/15/2019    Procedure: AV FISTULA CREATION-  UPPER EXTREMITY BRACHIAL CEPHALIC BANDING;  Surgeon: Fernie Nazario M.D.;  Location: SURGERY [facility];  Service: General    HIP CANNULATED SCREW Right 8/14/2017    Procedure: HIP CANNULATED SCREW;  Surgeon: Ar Huerta M.D.;  Location: SURGERY [facility];  Service:     CATARACT PHACO WITH IOL  6/11/2014    Performed by Rudolph Barclay M.D. at SURGERY SAME DAY [facility]    CATARACT PHACO WITH IOL  5/30/2014    Performed by Rudolph Barcaly M.D. at SURGERY SAME DAY [facility]    VENTRAL HERNIA REPAIR LAPAROSCOPIC  3/7/2012    Performed by HUNTER SERNA at SURGERY SAME DAY [facility]    APPENDECTOMY      GYN SURGERY      hysterectomy    " "OTHER      T&A    OTHER ABDOMINAL SURGERY      abd tramua- horse stepped on abd    OTHER ORTHOPEDIC SURGERY      R & l Shoulder repair     Current Medications:  No current facility-administered medications on file prior to encounter.     No current outpatient medications on file prior to encounter.     Medication Allergy/Sensitivities:  Allergies   Allergen Reactions    Codeine Unspecified     Personality changes    Oxycodone Unspecified     Personality changes  Tolerated Dilaudid 06/2022    Tape Rash     Adhesive tape-rash, paper tape ok.     No family history on file.  Family History: Family history reviewed with patient. Diabetes and \"heart problem\" (mother).     Social History:  Patient reports smoking a pack/day of cigarettes for 20 years.  Reports she quite \"25 years ago.\"    Living situation: Patient is , retired, and lives with her ex-, Peter Bruno, in his home as a caregiver.  She and Peter have one daughter together, Beena.  Patient has 2 sons (Gonzales Sam & Maxx Sam) and 4 daughters total (2 from a previous marriage, 1 with Peter, and 1 adopted [Addie Sam]).  Her adopted daughter Addie Sam and son Gonzales Sam live in adjoining residences on the patient's property.       Palliative Performance Scale: 60%    Spiritual History: [employer]ism    ROS:    Review of Systems   Constitutional:  Positive for malaise/fatigue.   HENT: Negative.     Eyes: Negative.    Respiratory: Negative.  Negative for shortness of breath.    Cardiovascular: Negative.    Gastrointestinal:  Positive for diarrhea (chronic).        Occasional bowel incontinence   Genitourinary: Negative.    Musculoskeletal: Negative.    Skin: Negative.    Neurological:  Positive for seizures.        Generalized weakness   Endo/Heme/Allergies:  Bruises/bleeds easily.   Psychiatric/Behavioral: Negative.     PE:   Recent vital signs  Weight/BMI: Body mass index is 26.57 kg/m².  /50   Pulse 79   Temp 36.7 °C (98.1 °F) (Temporal) " "  Resp 20   Ht 1.626 m (5' 4\")   Wt 70.2 kg (154 lb 12.2 oz)   SpO2 92%   BMI 26.57 kg/m²   Vitals:    06/27/22 0009 06/27/22 0020 06/27/22 0433 06/27/22 0715   BP: (!) 94/51 (!) 99/45 111/55 102/50   Pulse: 80  76 79   Resp: 16  18 20   Temp: 36.7 °C (98.1 °F)  36.7 °C (98.1 °F)    TempSrc: Temporal  Temporal    SpO2: 93% 94% 93% 92%   Weight:       Height:         Oxygen Therapy:  Pulse Oximetry: 92 %, O2 (LPM): 2, O2 Delivery Device: Silicone Nasal Cannula  Physical Exam  Vitals and nursing note reviewed.   Constitutional:       General: She is awake.      Appearance: She is cachectic. She is ill-appearing.      Interventions: Nasal cannula in place.   HENT:      Head: Normocephalic.      Mouth/Throat:      Mouth: Mucous membranes are moist.      Pharynx: Oropharynx is clear.   Eyes:      Extraocular Movements: Extraocular movements intact.   Cardiovascular:      Rate and Rhythm: Normal rate and regular rhythm.      Heart sounds: Murmur heard.   Pulmonary:      Breath sounds: Examination of the right-lower field reveals decreased breath sounds. Examination of the left-lower field reveals decreased breath sounds. Decreased breath sounds and wheezing present.   Abdominal:      General: Abdomen is flat. Bowel sounds are normal. There is no distension.      Palpations: Abdomen is soft.      Tenderness: There is no abdominal tenderness.   Musculoskeletal:      Right lower leg: Edema (2+) present.      Left lower leg: Edema (2+) present.   Skin:     Coloration: Skin is pale.      Comments: BLE cool to touch   Neurological:      General: No focal deficit present.      Mental Status: She is alert and oriented to person, place, and time. Mental status is at baseline.      Comments: Disoriented to time   Psychiatric:         Attention and Perception: Attention normal.         Mood and Affect: Mood normal. Mood is not anxious.         Speech: Speech normal.         Behavior: Behavior normal. Behavior is cooperative.    "      Thought Content: Thought content normal.         Cognition and Memory: Memory is impaired.   Lab Data Review:  Recent Results (from the past 24 hour(s))   EC-ECHOCARDIOGRAM COMPLETE W/O CONT    Collection Time: 06/26/22 11:20 AM   Result Value Ref Range    Eject.Frac. MOD BP 24.57     Eject.Frac. MOD 4C 6.744     Eject.Frac. MOD 2C 38.3    Comp Metabolic Panel    Collection Time: 06/26/22 11:52 AM   Result Value Ref Range    Sodium 135 135 - 145 mmol/L    Potassium 4.3 3.6 - 5.5 mmol/L    Chloride 92 (L) 96 - 112 mmol/L    Co2 27 20 - 33 mmol/L    Anion Gap 16.0 7.0 - 16.0    Glucose 180 (H) 65 - 99 mg/dL    Bun 40 (H) 8 - 22 mg/dL    Creatinine 6.43 (HH) 0.50 - 1.40 mg/dL    Calcium 7.9 (L) 8.5 - 10.5 mg/dL    AST(SGOT) 17 12 - 45 U/L    ALT(SGPT) 9 2 - 50 U/L    Alkaline Phosphatase 116 (H) 30 - 99 U/L    Total Bilirubin 0.6 0.1 - 1.5 mg/dL    Albumin 2.9 (L) 3.2 - 4.9 g/dL    Total Protein 6.0 6.0 - 8.2 g/dL    Globulin 3.1 1.9 - 3.5 g/dL    A-G Ratio 0.9 g/dL   ESTIMATED GFR    Collection Time: 06/26/22 11:52 AM   Result Value Ref Range    GFR (CKD-EPI) 6 (A) >60 mL/min/1.73 m 2   POCT glucose device results    Collection Time: 06/26/22  1:26 PM   Result Value Ref Range    POC Glucose, Blood 196 (H) 65 - 99 mg/dL   POCT glucose device results    Collection Time: 06/26/22  5:57 PM   Result Value Ref Range    POC Glucose, Blood 259 (H) 65 - 99 mg/dL   POCT glucose device results    Collection Time: 06/26/22  9:35 PM   Result Value Ref Range    POC Glucose, Blood 253 (H) 65 - 99 mg/dL   POCT glucose device results    Collection Time: 06/26/22  9:47 PM   Result Value Ref Range    POC Glucose, Blood 240 (H) 65 - 99 mg/dL   Comp Metabolic Panel    Collection Time: 06/27/22  2:15 AM   Result Value Ref Range    Sodium 135 135 - 145 mmol/L    Potassium 4.9 3.6 - 5.5 mmol/L    Chloride 91 (L) 96 - 112 mmol/L    Co2 27 20 - 33 mmol/L    Anion Gap 17.0 (H) 7.0 - 16.0    Glucose 203 (H) 65 - 99 mg/dL    Bun 45 (H) 8 -  22 mg/dL    Creatinine 7.44 (HH) 0.50 - 1.40 mg/dL    Calcium 7.7 (L) 8.5 - 10.5 mg/dL    AST(SGOT) 14 12 - 45 U/L    ALT(SGPT) 10 2 - 50 U/L    Alkaline Phosphatase 115 (H) 30 - 99 U/L    Total Bilirubin 0.5 0.1 - 1.5 mg/dL    Albumin 2.6 (L) 3.2 - 4.9 g/dL    Total Protein 5.9 (L) 6.0 - 8.2 g/dL    Globulin 3.3 1.9 - 3.5 g/dL    A-G Ratio 0.8 g/dL   PHOSPHORUS    Collection Time: 06/27/22  2:15 AM   Result Value Ref Range    Phosphorus 7.6 (H) 2.5 - 4.5 mg/dL   ESTIMATED GFR    Collection Time: 06/27/22  2:15 AM   Result Value Ref Range    GFR (CKD-EPI) 5 (A) >60 mL/min/1.73 m 2   POCT glucose device results    Collection Time: 06/27/22  8:27 AM   Result Value Ref Range    POC Glucose, Blood 219 (H) 65 - 99 mg/dL     Imaging/Procedures Review:    CT-CTA UPPER EXT WITH & W/O-POST PROCESS LEFT 6/26/2022   Final Result      1.  Status post brachiocephalic fistula in the left arm. No focal stenosis is identified.      2.  Extensive edema in the subcutaneous tissues of the visualized left lateral chest and arm.      EC-ECHOCARDIOGRAM COMPLETE W/O CONT 6/26/22   Final Result      US-ZAHRAA SINGLE LEVEL BILAT 6/25/22   Final Result      US-EXTREMITY ARTERY LOWER BILAT 6/25/22   Final Result      CT-EXTREMITY, UPPER W/O LEFT 6/24/22   Final Result      1.  There is diffuse subcutaneous edema of the imaged portions of the left upper extremity. There is also diffuse body wall edema in the visualized portions of the chest and abdomen.   2.  No focal fluid collection to suggest abscess.   3.  There is a small left pleural effusion and minimal fluid in the abdomen.   4.  There is postsurgical change of AV fistula. There is small vessel atherosclerosis.      US-EXTREMITY VENOUS UPPER UNILAT LEFT 6/24/22   Final Result      DX-CHEST-PORTABLE (1 VIEW) 6/24/22   Final Result         1.  Interstitial pulmonary parenchymal prominence suggest chronic underlying lung disease, component of interstitial edema and/or infiltrates not  excluded.   2.  Small left pleural effusion   3.  Cardiomegaly   4.  Atherosclerosis         Problem List:  Acute-on-chronic BLE pain (active)  2.   Edema of left upper extremity (active)  3.   Generalized weakness (active)    4.   Diabetes mellitus, type II (chronic) - A1C = 11.7. AC&HS glucose checks, SSI per primary team    5.   Essential hypertension (chronic) - home nifedipine on hold per primary team   6.   Prophylaxis for opioid-induced constipation (active)    DISCUSSION/RECOMMENDATIONS:     Assessment/Plan:  Acute-on-chronic bilateral lower extremity pain,  Based on imaging and patient history, BLE pain likely secondary to acute-on-chronic vascular ischemia  MEDD (morphine equivalent daily dose) is approximately 7.5 mg:  - hydromorphone 0.25 mg IV x 2 doses = 0.5 mg x 15 ~ 7.5 mg MEDD    LONG-ACTING OPIOID  NONE at this time due to low MEDD    SHORT-ACTING OPIOID  Patient allergic to oxycodone, codeine  Agree with hydromorphone 0.25 mg IV Q2 hours PRN moderate pain  Agree with hydromorphone 0.5 mg IV Q2 hours PRN severe pain  - d/t  AMS, lethargy earlier in admission, prefer to keep to have pain managed exclusively by IV pain medication at this time, in order to avoid systemic opioid build-up, neurotoxicity  - d/t ESRD, low-dose methadone or low-dose fentanyl patch (if pain becomes chronic) could be considered, as they have no active metabolites.  However, with patient's history of questionable compliance (per chart review, primary team thinks she may not have been taking her antihypertensives as prescribed) methadone may not be appropriate choice    ADJUNCT  Continue Tylenol 650 mg PO Q6 hours scheduled  Change gabapentin from 200 mg PO Q evening to 200 mg PO QHS  Start gabapentin 100 mg PO daily; hold for sedation, altered mental status    NON-PHARMACOLOGIC  Supportive measures  Ambulation as patient tolerates  Continue Lidoderm 5% (2) patches to bilateral anterior shins Q24 hours  - patient reports patch  somewhat helpful  Patient declined offer for thermal (K-pad) and cold therapy (ice packs)  Patient amenable to Vascular Surgery consult to see if she is a candidate for artherectomy, angioplasty, and/or vessel graft surgery. Pt likely not candidate for surgery given ESRD and severe aortic stenosis.  However, formal recommendations pending    ESRD (end stage renal disease) on dialysis  Patient reports she has been on dialysis for ~3 years, M/W/F with [facility]  Followed by [facility]  Missed (3) dialysis appointments prior to admission secondary to generalized weakness  Edematous left arm known to nephrology  Plan for patient to go to access center to fix central stenosis this coming week  CTA of left upper extremity pending  Poor prognosis  Primary team to:       - continue sevelamer and calcitriol  - continue home lasix  - renally dose medications    Edema of left upper extremity  Chronic in nature with acute worsening 2 weeks prior to admission  Likely vascular in nature, however, not emergent  Patient wishing to pursue aggressive disease-modifying treatment at this time  Vascular Surgery consult pending    Generalized weakness  Per team discussion with daughter Addie, pt has had general decline in health over past 6 months  Unlikely to be acute intracranial process given the history provided by daughter and the indolent worsening or the patient's general health/wellness  - low threshold for head imaging  PT/OT consult per primary team    Prophylaxis for opioid-induced constipation  We anticipate and treat opioid-induced constipation. Plan to continue current bowel regimen as below and continue to monitor closely:  - Senna-docusate 2 tabs PO BID  - Change Miralax from 17 gram packet PO daily PRN to 17 gram packet PO daily, first dose now  - Milk of Magnesia 30 mL PO daily PRN  - Dulcolax suppository 10 mg MT daily PRN  Placed nursing communication order requesting nursing staff provide sun  "juice to patient 1-2 times/day for mild constipation    Code Status: Full     Advance Care Planning/Current Goals of Care: At one point in this APRN's pain assessment interview with patient, primary team came in and provided brief medical review of patient's renal, cardiac, and now vascular issues.  Attenting queried patient what her goals are for medical treatment.  Jannet said, \"I didn't know I have any goals.  I'm perfectly comfortable with that.\"  I shared that since she is on dialysis M/W/F, she is receiving dialysis with the goal of prolonging her life.  She affirmed this and said, 'I don't mind the dialysis.  She reports she has been on dialysis for 3 years and feels she tolerates it well, indicating she still drives herself to-and-from dialysis.  Jannet verbalized she is wishes to continue pursuing aggressive disease-modifying treatment, and is amenable to cardiac and vascular consults.  She indicated a \"simple procedure would be okay,\" but she would have to consider whether or not she would want to consider more invasive treatments.    After primary team exited, I advised patient that ultrasound has confirmed plaque in her BLE blood vessels, which is preventing oxygenated blood from getting to her lower extremities.  I shared this is likely the cause of her pain.  I explained that vascular team will evaluate her to see if she would qualify for any treatments to help alleviate her leg pain.  I provided simple explanation of artherectomy, angioplasty, and blood vessel graft.  Jannet affirms she would be willing to pursue anything that is offered, to help reduce her BLE pain stating, \"I think I got a lot of life left in me.  Well, maybe not a lot.  But I want to use up what I have before I go.\"  She reports her personal goals are \"to get the property in order.\"    Discussed Advance Directive with patient.  She reports Peter Bruno is her ex- and they have been  \"for 25 years.\"  However, she " "states that when patient became \"disabled, I moved in with him to help take care of him.\"  She was unable to tell me what his disability was, just stated, \"He can't take care of himself.\"  When I asked whom she would want to make medical decisions on her behalf, if she lacked capacity, she said, \"I don't think I have anyone.  I think they all want me to die because they want the property.  They're all very selfish.\"  She reports her parents are  and she has no siblings.  \"My family's gone.\"  When I advised Jannet her medical agent does not have to be related to her, she reports she is \"an introvert and I don't have many friends or neighbors.\"  I advised Jannet that with no AD in place, if an event occurred causing her to lack capacity, per [facility] law, her medical decisions would default to a majority concensus among her adult children.  She shared she wouldn't mind them all making a decision together, but she \"doesn't trust\" them individually.  The one exception to that is my son, Maxx.  He's faced death and he knows what it's like, so I would trust him more than the others.\"  I offered our team would be able to assist her with AD completion which she is in the hospital.  She indicated she would like time to reflect on our conversation before choosing a medical agent and completing AD, \"because it's a big decision.\"  I validated this as reasonable and reassured her I was not pressuring her to make a decision today.  I encouraged her to consider whom she would like to be her medical agent.    Discussed code status and educated patient about term.  She said, \"I want to be resuscitated and if it doesn't work, then that's the way it is.\"  When I asked patient if she would want to be kept alive on life support she said, \"Well, \"I've been on a ventilator and that was kind of fun.  It was a learning experience and I like those things.\"  When I queried if she had any \"line in the sand\" where she would not " "want to continue treatment, she said, \"I'd have to think about it.\"  30 minutes spent discussing advance care planning.     45 minutes spent with greater than 50% spent counseling and coordinating the patient's care regarding acute pain and symptom management.  Please refer to HPI and assessment/plan for details.     Thank you for allowing the palliative care team to participate in Jannet's care. Please call with any questions/needs.     Suma Gonzalez DNP, APRN, AGACNP-BC  Palliative Care Nurse Practitioner  [phone]  "

## 2022-06-27 NOTE — PROGRESS NOTES
From: Debora Gastelum P.A.-C.   Sent: 6/27/2022   2:56 PM PDT   To: THERESE Santo     Severe AS inpatient. [facility] insurance

## 2022-06-27 NOTE — DISCHARGE PLANNING
Received Choice form at [phone]  Agency/Facility Name: [facility] [facility] [place] [facility]  Referral sent per Choice form @ [id number]

## 2022-06-27 NOTE — CONSULTS
DATE OF CONSULTATION: 6/27/2022     REFERRING PHYSICIAN: Dr. Gifford    CONSULTING PHYSICIAN: Fernie Nazario M.D.      REASON FOR CONSULTATION: Evaluate patient with peripheral arterial disease as well as left arm swelling in the setting of the natural fistula      HISTORY OF PRESENT ILLNESS: The patient is a 81-year-old female who has multiple medical problems including chronic kidney disease and is on maintenance hemodialysis.  The patient also has a history of cardiomyopathy and heart failure.  Patient was admitted for generalized fatigue weakness and general deterioration.  The patient has been struggling with left upper extremity swelling.  The patient has a fistula in the left upper extremity which is being used for hemodialysis.  The patient is being considered for palliative care.  Noninvasive arterial studies of her lower extremities demonstrate severe peripheral arterial disease.  She has areas of dry gangrene on her left toe tips.  She does not complain of significant rest pain.    PAST MEDICAL HISTORY:  has a past medical history of Anesthesia, Arthritis, Breath shortness, CATARACT, Diabetes, Heart burn, Hyperglycemia (10/23/2016), Hypertension, Indigestion, Other specified disorder of intestines, Sleep apnea, obstructive, Snoring, Unspecified hemorrhagic conditions, Unspecified urinary incontinence, and Urinary bladder disorder.     PAST SURGICAL HISTORY:  has a past surgical history that includes other; other orthopedic surgery; other abdominal surgery; appendectomy; gyn surgery; ventral hernia repair laparoscopic (3/7/2012); cataract phaco with iol (5/30/2014); cataract phaco with iol (6/11/2014); hip cannulated screw (Right, 8/14/2017); and av fistula creation (Left, 2/15/2019).     ALLERGIES:   Allergies   Allergen Reactions   • Codeine Unspecified     Personality changes   • Oxycodone Unspecified     Personality changes  Tolerated Dilaudid 06/2022   • Tape Rash     Adhesive tape-rash, paper tape  ok.        CURRENT MEDICATIONS:   Home Medications     Reviewed by Alfredo Glass (Pharmacy Tech) on 22 at 0948  Med List Status: Complete   Medication Last Dose Status        Patient Benjamin Taking any Medications                       FAMILY HISTORY: No family history on file.        SOCIAL HISTORY:   Social History     Tobacco Use   • Smoking status: Former Smoker     Packs/day: 1.00     Years: 20.00     Pack years: 20.00     Types: Cigarettes     Quit date: 3/5/1979     Years since quittin.3   • Smokeless tobacco: Never Used   Substance and Sexual Activity   • Alcohol use: No   • Drug use: No   • Sexual activity: Not on file       Review of Systems:      Patient somnolent  Poor historian    PHYSICAL EXAMINATION:       Constitutional:   Severely deconditioned elderly lady lying in bed, somnolent  HENMT:  Normocephalic, Atraumatic, Oropharynx moist mucous membranes, No oral exudates, Nose normal.  No thyromegaly.  Lungs-coarse  Abdomen-soft  Extremities-poor muscle tone, generalized anasarca  Tips of toes left foot dry gangrene  Absent pedal pulses  Left upper extremity moderate edema  Left upper extremity fistula in place      LABORATORY VALUES:   Recent Labs     22   WBC 9.5   RBC 3.80*   HEMOGLOBIN 12.9   HEMATOCRIT 39.7   .5*   MCH 33.9*   MCHC 32.5*   RDW 63.9*   PLATELETCT 190   MPV 8.8*     Recent Labs     226 22  1152 22  0215   SODIUM 137 135 135   POTASSIUM 4.1 4.3 4.9   CHLORIDE 93* 92* 91*   CO2 28 27 27   GLUCOSE 87 180* 203*   BUN 34* 40* 45*   CREATININE 5.22* 6.43* 7.44*   CALCIUM 8.8 7.9* 7.7*     Recent Labs     22  0400 226 22  1152 22  0215   ASTSGOT  --  12 17 14   ALTSGPT  --  11 9 10   TBILIRUBIN  --  0.7 0.6 0.5   ALKPHOSPHAT  --  114* 116* 115*   GLOBULIN  --  3.4 3.1 3.3   INR 1.20*  --   --   --      Recent Labs     22  0400 22   APTT  --  30.4   INR 1.20*  --         IMAGING:   CT-CTA  UPPER EXT WITH & W/O-POST PROCESS LEFT   Final Result      1.  Status post brachiocephalic fistula in the left arm. No focal stenosis is identified.      2.  Extensive edema in the subcutaneous tissues of the visualized left lateral chest and arm.      EC-ECHOCARDIOGRAM COMPLETE W/O CONT   Final Result      US-ZAHRAA SINGLE LEVEL BILAT   Final Result      US-EXTREMITY ARTERY LOWER BILAT   Final Result      CT-EXTREMITY, UPPER W/O LEFT   Final Result      1.  There is diffuse subcutaneous edema of the imaged portions of the left upper extremity. There is also diffuse body wall edema in the visualized portions of the chest and abdomen.   2.  No focal fluid collection to suggest abscess.   3.  There is a small left pleural effusion and minimal fluid in the abdomen.   4.  There is postsurgical change of AV fistula. There is small vessel atherosclerosis.      US-EXTREMITY VENOUS UPPER UNILAT LEFT   Final Result      DX-CHEST-PORTABLE (1 VIEW)   Final Result         1.  Interstitial pulmonary parenchymal prominence suggest chronic underlying lung disease, component of interstitial edema and/or infiltrates not excluded.   2.  Small left pleural effusion   3.  Cardiomegaly   4.  Atherosclerosis          IMPRESSION AND PLAN:     Active Hospital Problems    Diagnosis    • Goals of care, counseling/discussion [Z71.89]    • Pain in both lower extremities [M79.604, M79.605]    • ESRD (end stage renal disease) on dialysis (HCC) [N18.6, Z99.2]    • Generalized weakness [R53.1]    • Edema of left upper extremity [R60.0]    • Diabetes mellitus, type II (HCC) [E11.9]    • Essential hypertension [I10]      With respect to the patient's peripheral arterial disease there is no indication for intervention.  The patient is very deconditioned and is not a candidate for any type of intervention with respect to improving circulation to her extremities.  If by some decision that needed to be made the only option would be an amputations.  With  respect to the swelling in her left upper extremity patient does have a functioning fistula.  There was a CT which did not demonstrate any type of stenosis however there is likely some type of outflow stenosis present.  Will recommend wrapping the left arm with an Ace bandage for moderate compression to help control edema.  Further decisions will be based on decisions about palliative care.  And another option would be to place a PermCath as her life expectancy is very short.  ____________________________________   Fernie Nazario M.D.          DD: 6/27/2022   DT: 12:40 PM

## 2022-06-27 NOTE — PROGRESS NOTES
[facility] -  PROGRESS NOTE               Author: THERESE Chaney Date & Time: 6/27/2022  12:05 PM     HPI:  81yoF with PMH significant for ESRD on HD MWF via left arm AVF, HTN, DM II, Anemia secondary to CKD, CKD Bone mineral disorder, admitted with increased edema and weakness and having missed her last last 2-3 outpt HD treatments. Pt is very lethargic at this time and unable to provide much history, majority of the history was obtained through review of the medical record and discussion with primary svc. Pt had reported increased LE edema and fatigue and weakness and worsening of her left arm edema so she came to the ED for evaluation. She apparently has missed her last 2-3 outpt dialysis treatments. Per regular outpt Nephrologist Dr. Gates, she has been non-compliant with her fluid restriction and was told that she needed 4x/week dialysis until her volume status could be optimized but she was non-compliant with that recommendation. She has edema of her left arm where her AVF is located and there is concern for a central stenosis that could be the etiology. She had an appointment at the outpt access center to evaluate for central stenosis and undergo angioplasty if needed on 6/9/22 but pt did not go to the appointment. She has not had any other procedures done to the arm in the past couple of months. She had a left arm us done today that showed no DVT and patent AVF and soft tissue edema. She apparently received pain medication fentanyl and dilaudid as well as benadryl earlier today and she is very drowsy.     DAILY NEPHROLOGY SUMMARY:  6/25: rapid response overnight for severe leg pain, pt very lethargic today, moans with palpation of legs, arouses but does not answer questions and falls back asleep, tolerated HD yest with 2.5L UF, BP stable overnight but low this am  6/26: No events, BP low overnight and this am, more alert, reports pain in legs for the past 3 weeks,  "denies any CP/SOB/Abd pain, reports left arm edema a little better but no pain in left arm   6/27: Pt resting in bed, subdued, Bps soft, on 4L supp O2 via NC, labs reviewed, due for HD    REVIEW OF SYSTEMS:    10 point ROS reviewed and is as per HPI/daily summary or otherwise negative    PMH/PSH/SH/FH:   Reviewed and unchanged since admission note    CURRENT MEDICATIONS:   Reviewed from admission to present day    VS:  /50   Pulse 79   Temp 36.7 °C (98.1 °F) (Temporal)   Resp 20   Ht 1.626 m (5' 4\")   Wt 70.2 kg (154 lb 12.2 oz)   SpO2 92%   BMI 26.57 kg/m²     Physical Exam  Nursing note reviewed.   Constitutional:       Appearance: Normal appearance.   HENT:      Head: Normocephalic and atraumatic.   Eyes:      General: No scleral icterus.  Cardiovascular:      Rate and Rhythm: Normal rate and regular rhythm.   Pulmonary:      Effort: Pulmonary effort is normal. No respiratory distress.      Breath sounds: Examination of the right-lower field reveals decreased breath sounds. Examination of the left-lower field reveals decreased breath sounds. Decreased breath sounds present.   Abdominal:      General: Bowel sounds are normal. There is no distension.      Palpations: Abdomen is soft.   Musculoskeletal:         General: Swelling (left arm) present. No deformity.      Right lower leg: Edema (trace) present.      Left lower leg: Edema (trace) present.   Skin:     General: Skin is warm.      Findings: No rash.      Comments: +Chronic skin changes in bilaterally lower legs   Neurological:      General: No focal deficit present.      Mental Status: She is alert and oriented to person, place, and time.   Psychiatric:         Mood and Affect: Mood normal.         Behavior: Behavior normal.         Fluids:  In: 120 [P.O.:120]  Out: -     LABS:  Recent Labs     06/25/22  0446 06/26/22  1152 06/27/22  0215   SODIUM 137 135 135   POTASSIUM 4.1 4.3 4.9   CHLORIDE 93* 92* 91*   CO2 28 27 27   GLUCOSE 87 180* 203* "   BUN 34* 40* 45*   CREATININE 5.22* 6.43* 7.44*   CALCIUM 8.8 7.9* 7.7*       IMAGING:   All imaging reviewed from admission to present day    IMPRESSION:  # ESRD, dependent on HD   - HD via LUE AVF, missed last 2-3 outpt treatments  # Fluid Overload  - Secondary to non-compliance with fluid restriction on HD and non-compliance with treatments  # Left arm edema  - CTA upper ext 6/26 w/o e/o focal stenosis + extensive edema  - AVF patent on left arm us and no DVT  - Pt no showed to outpt appt to evaluate   # Altered Mental Status, improved   - Monitor  # HTN, with hypotn   - Goal BP < 140/90  - Not on BP meds   # Anemia of CKD, at goal   - Goal Hgb 10-11  # CKD-MBD  - Ca 9.2  - PO4 6.4  - Managed at OP unit  - On calcitriol and sevelamer   # DM II--management per primary svc  # Leg Pain--chronic skin changes and subcutaneous tissue firm to touch  - Unclear etiology  - Eval and work-up per primary svc     PLAN:  - Continue qMWF iHD schedule, treatment due today (MON)  - UF as tolerated  - No current need for ROGELIO  - Continue phos binders WM  - Continue off of all BP meds and diuretics for now  - PRN NS boluses for symptomatic hypotension  - Limit sedating meds if possible  - No dietary protein restrictions  - Dose all meds per ESRD  - Outpt access center appointment rescheduled to this week  - Recommend palliative consult  - Pt at high risk for further morbidity/mortality  - Follow labs    Thank you,

## 2022-06-27 NOTE — PROGRESS NOTES
[facility] [place] [facility] MEDICINE PROGRESS NOTE     Attending: Kamille Minor M.d.    PATIENT: Jannet Gillespie; [id number]; 1940     ID: 81 y.o. female who was admitted on 6/24/2022 for left upper arm swelling and lower extremity edema.    SUBJECTIVE: Rapid called on patient for bilateral lower leg pain. ZAHRAA demonstrated vascular disease. Arterial ultrasound indicated     OBJECTIVE:     Vitals:    06/27/22 0009 06/27/22 0020 06/27/22 0433 06/27/22 0715   BP: (!) 94/51 (!) 99/45 111/55 102/50   Pulse: 80  76 79   Resp: 16  18 20   Temp: 36.7 °C (98.1 °F)  36.7 °C (98.1 °F)    TempSrc: Temporal  Temporal    SpO2: 93% 94% 93% 92%   Weight:       Height:           Intake/Output Summary (Last 24 hours) at 6/25/2022 1352  Last data filed at 6/24/2022 2037  Gross per 24 hour   Intake 500 ml   Output 3000 ml   Net -2500 ml       PE:   General: Patient is moderate distress from pain, cooperative   Skin:  Pink, warm and dry.  No rashes  HEENT: NC/AT. PERRL. EOMI. MMM. No nasal discharge. Oropharynx nonerythematous without exudate/plaques  Neck:  Supple without lymphadenopathy or rigidity.   Lungs:  Symmetrical.  Coarse crackles auscultated at lung bases, no wheezes. Good air movement. 3 liters  Cardiovascular:  S1/S2 RRR with harsh systolic murmur   Abdomen:  Abdomen is soft, nontender, nondistended. +BS. No masses noted.  Extremities:  Full range of motion. No gross deformities noted. 2+ pulses in all extremities. Induration and tenderness in BLE below knee. The left upper extremity also edematous. AV fistula has palpable thrill.   Spine:  Straight without vertebral anomalies.  CNS:  Muscle tone is normal. No gross focal neurologic deficits      LABS x 2 DAYS:    Admission on 06/24/2022   Component Date Value Ref Range Status   • Report 06/24/2022    Final                    Value:[facility].    Test Date:  2022-06-24  Pt Name:    JANNET GILLESPIE               Department: ER  MRN:        [id number]                       Room:       [facility]  Gender:     Female                       Technician: [id number]  :        1940                   Requested By:ER TRIAGE PROTOCOL  Order #:    [id number]                    Reading MD: Rafael Florian II, MD    Measurements  Intervals                                Axis  Rate:       87                           P:          51  MD:         172                          QRS:        -25  QRSD:       100                          T:          112  QT:         428  QTc:        515    Interpretive Statements  SINUS RHYTHM  PROBABLE LEFT ATRIAL ABNORMALITY  LEFT VENTRICULAR HYPERTROPHY  PROLONGED QT INTERVAL  No ST elevation. Slight ST depression in lead II. More prominent twaves in  lateral leads.  Impression: NSR, prolonged QT, LVH, more prominent twaves than previous.  Compared to ECG 02/15/2019 10:33                          :42  Left ventricular hypertrophy now present  T-wave abnormality no longer present  Electronically Signed On 2022 1:12:45 PDT by Rafael Florian II, MD     • WBC 2022 10.5  4.8 - 10.8 K/uL Final   • RBC 2022 3.75 (A) 4.20 - 5.40 M/uL Final   • Hemoglobin 2022 12.8  12.0 - 16.0 g/dL Final   • Hematocrit 2022 38.6  37.0 - 47.0 % Final   • MCV 2022 102.9 (A) 81.4 - 97.8 fL Final   • MCH 2022 34.1 (A) 27.0 - 33.0 pg Final   • MCHC 2022 33.2 (A) 33.6 - 35.0 g/dL Final   • RDW 2022 62.9 (A) 35.9 - 50.0 fL Final   • Platelet Count 2022 215  164 - 446 K/uL Final   • MPV 2022 9.7  9.0 - 12.9 fL Final   • Neutrophils-Polys 2022 77.30 (A) 44.00 - 72.00 % Final   • Lymphocytes 2022 11.90 (A) 22.00 - 41.00 % Final   • Monocytes 2022 6.90  0.00 - 13.40 % Final   • Eosinophils 2022 2.50  0.00 - 6.90 % Final   • Basophils 2022 0.60  0.00 - 1.80 % Final   • Immature Granulocytes 2022 0.80  0.00 - 0.90 % Final   • Nucleated RBC 2022 0.00  /100 WBC Final   • Neutrophils  (Absolute) 06/24/2022 8.12 (A) 2.00 - 7.15 K/uL Final    Includes immature neutrophils, if present.   • Lymphs (Absolute) 06/24/2022 1.25  1.00 - 4.80 K/uL Final   • Monos (Absolute) 06/24/2022 0.72  0.00 - 0.85 K/uL Final   • Eos (Absolute) 06/24/2022 0.26  0.00 - 0.51 K/uL Final   • Baso (Absolute) 06/24/2022 0.06  0.00 - 0.12 K/uL Final   • Immature Granulocytes (abs) 06/24/2022 0.08  0.00 - 0.11 K/uL Final   • NRBC (Absolute) 06/24/2022 0.00  K/uL Final   • Sodium 06/24/2022 133 (A) 135 - 145 mmol/L Final   • Potassium 06/24/2022 5.2  3.6 - 5.5 mmol/L Final   • Chloride 06/24/2022 88 (A) 96 - 112 mmol/L Final   • Co2 06/24/2022 23  20 - 33 mmol/L Final   • Anion Gap 06/24/2022 22.0 (A) 7.0 - 16.0 Final   • Glucose 06/24/2022 423 (A) 65 - 99 mg/dL Final   • Bun 06/24/2022 52 (A) 8 - 22 mg/dL Final   • Creatinine 06/24/2022 6.39 (A) 0.50 - 1.40 mg/dL Final   • Calcium 06/24/2022 9.2  8.5 - 10.5 mg/dL Final   • AST(SGOT) 06/24/2022 16  12 - 45 U/L Final    Comment: The hemolysis index of the specimen exceeds the allowed tolerance for the  test.  Result may be affected.  Specimen recollection is recommended to  confirm the result.     • ALT(SGPT) 06/24/2022 11  2 - 50 U/L Final   • Alkaline Phosphatase 06/24/2022 139 (A) 30 - 99 U/L Final   • Total Bilirubin 06/24/2022 0.8  0.1 - 1.5 mg/dL Final   • Albumin 06/24/2022 3.5  3.2 - 4.9 g/dL Final   • Total Protein 06/24/2022 7.0  6.0 - 8.2 g/dL Final   • Globulin 06/24/2022 3.5  1.9 - 3.5 g/dL Final   • A-G Ratio 06/24/2022 1.0  g/dL Final   • NT-proBNP 06/24/2022 >63153 (A) 0 - 125 pg/mL Final   • GFR (CKD-EPI) 06/24/2022 6 (A) >60 mL/min/1.73 m 2 Final    Comment: Effective 3/15/2022, estimated Glomerular Filtration Rate  is calculated using race neutral CKD-EPI 2021 equation  per NKF-ASN recommendations.     • Lactic Acid 06/24/2022 3.3 (A) 0.5 - 2.0 mmol/L Final   • beta-Hydroxybutyric Acid 06/24/2022 0.99 (A) 0.02 - 0.27 mmol/L Final   • POC Glucose, Blood  06/24/2022 365 (A) 65 - 99 mg/dL Final   • Glycohemoglobin 06/24/2022 11.7 (A) 4.0 - 5.6 % Final    Comment: Increased risk for diabetes:  5.7 -6.4%  Diabetes:  >6.4%  Glycemic control for adults with diabetes:  <7.0%    The above interpretations are per ADA guidelines.  Diagnosis  of diabetes mellitus on the basis of elevated Hemoglobin A1c  should be confirmed by repeating the Hb A1c test.     • Est Avg Glucose 06/24/2022 289  mg/dL Final    Comment: The eAG calculation is based on the A1c-Derived Daily Glucose  (ADAG) study.  See the ADA's website for additional information.     • Vitamin B12 -True Cobalamin 06/24/2022 1108 (A) 211 - 911 pg/mL Final   • Lactic Acid 06/24/2022 3.3 (A) 0.5 - 2.0 mmol/L Final   • Hepatitis B Surface Antigen 06/24/2022 Non-Reactive  Non-Reactive Final    Comment: It has been reported that certain assays will not detect all HBV mutants.  If acute or chronic HBV infection is suspected and the HBsAg result is  negative, it is recommended that other serological markers be tested to  confirm the HBsAg nonreactivity.  HBsAg test results should always be  assessed in conjunction with the patient's medical history, clinical  examination, and other findings.    Note: Assay performance characteristics have not been established when the  Elecsys HBsAg II assay is used in conjunction with other manufacturers'  assays for specific HBV serological markers. Assay performance  characteristics have not been established for testing of newborns.       • Hepatitis B Cors Ab,IgM 06/24/2022 Non-Reactive  Non-Reactive Final    Comment: IgM antibodies to HBc were not detected.  The Roche HBc IgM is a diagnostic test for the qualitative determination of  IgM antibodies to the hepatitis B core antigen in human serum and plasma.  The results should be used and interpreted only in the context of the  overall clinical picture. A negative test result does not exclude the  possibility of exposure to hepatitis B  virus.  Note: Assay performance characteristics have not been established in  patients under the age of 21, pregnant women, or in populations of  immunocompromised or immunosuppressed patients.     • Hepatitis A Virus Ab, IgM 06/24/2022 Non-Reactive  Non-Reactive Final    Comment: The Roche HAV IgM assay is a diagnostic immunoassay for the qualitative  determination of IgM response to the hepatitis A virus in human serum and  plasma. The results should be used and interpreted only in the context of  the overall clinical picture. A negative test result does not exclude the  possibility of exposure to hepatitis A virus.  Note: Assay performance characteristics have not been established for  immunocompromised or immunosuppressed patients.     • Hepatitis C Antibody 06/24/2022 Non-Reactive  Non-Reactive Final    Comment: Antibodies to HCV were not detected.  The Roche anti-HCV is a diagnostic test for the qualitative determination of  antibodies to the hepatitis C virus in human serum and plasma. The results  should be used and interpreted only in the context of the overall clinical  picture. A negative test result does not exclude the possibility of exposure  to hepatitis C virus.  Note: Assay performance characteristics have not been established in  populations of immunocompromised or immunosuppressed patients.     • POC Glucose, Blood 06/24/2022 319 (A) 65 - 99 mg/dL Final   • Hep B Surface Antibody Quant 06/24/2022 171.00 (A) 0.00 - 10.00 mIU/mL Final    Comment: Anti HBs concentration detected at > 10 mIU/mL. Individual is considered to  be immune to infection with HBV.  Reference Interval: anti-HBs  < 8.5 mIU/mL..........Negative.  8.5 - 11.4 mIU/mL..........Indeterminate.  = 11.5 mIU/mL..........Positive.  Assay performance characteristics have not been established when the Elecsys  Anti HBs assay is used in conjunction with other manufacturers' assays for  specific HBV serological markers.  For post-vaccination  antibody testing guidelines for the general public,  refer to MMWR December 23, 2005/Vol. 54 (No.16);1-23, and for healthcare  workers, refer to MMWR December 20, 2013/Vol.62(No. 10);1-19.     • POC Glucose, Blood 06/24/2022 284 (A) 65 - 99 mg/dL Final   • POC Glucose, Blood 06/24/2022 125 (A) 65 - 99 mg/dL Final   • WBC 06/25/2022 9.5  4.8 - 10.8 K/uL Final   • RBC 06/25/2022 3.80 (A) 4.20 - 5.40 M/uL Final   • Hemoglobin 06/25/2022 12.9  12.0 - 16.0 g/dL Final   • Hematocrit 06/25/2022 39.7  37.0 - 47.0 % Final   • MCV 06/25/2022 104.5 (A) 81.4 - 97.8 fL Final   • MCH 06/25/2022 33.9 (A) 27.0 - 33.0 pg Final   • MCHC 06/25/2022 32.5 (A) 33.6 - 35.0 g/dL Final   • RDW 06/25/2022 63.9 (A) 35.9 - 50.0 fL Final   • Platelet Count 06/25/2022 190  164 - 446 K/uL Final   • MPV 06/25/2022 8.8 (A) 9.0 - 12.9 fL Final   • Neutrophils-Polys 06/25/2022 74.40 (A) 44.00 - 72.00 % Final   • Lymphocytes 06/25/2022 14.20 (A) 22.00 - 41.00 % Final   • Monocytes 06/25/2022 5.90  0.00 - 13.40 % Final   • Eosinophils 06/25/2022 4.30  0.00 - 6.90 % Final   • Basophils 06/25/2022 0.50  0.00 - 1.80 % Final   • Immature Granulocytes 06/25/2022 0.70  0.00 - 0.90 % Final   • Nucleated RBC 06/25/2022 0.20  /100 WBC Final   • Neutrophils (Absolute) 06/25/2022 7.10  2.00 - 7.15 K/uL Final    Includes immature neutrophils, if present.   • Lymphs (Absolute) 06/25/2022 1.35  1.00 - 4.80 K/uL Final   • Monos (Absolute) 06/25/2022 0.56  0.00 - 0.85 K/uL Final   • Eos (Absolute) 06/25/2022 0.41  0.00 - 0.51 K/uL Final   • Baso (Absolute) 06/25/2022 0.05  0.00 - 0.12 K/uL Final   • Immature Granulocytes (abs) 06/25/2022 0.07  0.00 - 0.11 K/uL Final   • NRBC (Absolute) 06/25/2022 0.02  K/uL Final   • PT 06/25/2022 14.9 (A) 12.0 - 14.6 sec Final   • INR 06/25/2022 1.20 (A) 0.87 - 1.13 Final    Comment: INR - Non-therapeutic Reference Range: 0.87-1.13  INR - Therapeutic Reference Range: 2.0-4.0     • Sodium 06/25/2022 137  135 - 145 mmol/L Final   •  Potassium 06/25/2022 4.1  3.6 - 5.5 mmol/L Final   • Chloride 06/25/2022 93 (A) 96 - 112 mmol/L Final   • Co2 06/25/2022 28  20 - 33 mmol/L Final   • Anion Gap 06/25/2022 16.0  7.0 - 16.0 Final   • Glucose 06/25/2022 87  65 - 99 mg/dL Final   • Bun 06/25/2022 34 (A) 8 - 22 mg/dL Final   • Creatinine 06/25/2022 5.22 (A) 0.50 - 1.40 mg/dL Final   • Calcium 06/25/2022 8.8  8.5 - 10.5 mg/dL Final   • AST(SGOT) 06/25/2022 12  12 - 45 U/L Final   • ALT(SGPT) 06/25/2022 11  2 - 50 U/L Final   • Alkaline Phosphatase 06/25/2022 114 (A) 30 - 99 U/L Final   • Total Bilirubin 06/25/2022 0.7  0.1 - 1.5 mg/dL Final   • Albumin 06/25/2022 3.3  3.2 - 4.9 g/dL Final   • Total Protein 06/25/2022 6.7  6.0 - 8.2 g/dL Final   • Globulin 06/25/2022 3.4  1.9 - 3.5 g/dL Final   • A-G Ratio 06/25/2022 1.0  g/dL Final   • Magnesium 06/25/2022 2.1  1.5 - 2.5 mg/dL Final   • Phosphorus 06/25/2022 6.4 (A) 2.5 - 4.5 mg/dL Final   • Lactic Acid 06/25/2022 1.7  0.5 - 2.0 mmol/L Final   • Lactic Acid 06/25/2022 1.5  0.5 - 2.0 mmol/L Final   • D-Dimer Screen 06/25/2022 1.86 (A) 0.00 - 0.50 ug/mL (FEU) Final    Comment: A negative D-Dimer result when combined with a clinical  assessment of low probability has been shown to have a high  negative predictive value for DVT or PE.    This assay should not be used independently to exclude or  diagnose DVT or Pulmonary Embolism.    This test methodology does not differentiate between DVT and  PE when an elevation in results is demonstrated.     • APTT 06/25/2022 30.4  24.7 - 36.0 sec Final   • Stat C-Reactive Protein 06/25/2022 7.07 (A) 0.00 - 0.75 mg/dL Final   • GFR (CKD-EPI) 06/25/2022 8 (A) >60 mL/min/1.73 m 2 Final    Comment: Effective 3/15/2022, estimated Glomerular Filtration Rate  is calculated using race neutral CKD-EPI 2021 equation  per NKF-ASN recommendations.     • POC Glucose, Blood 06/25/2022 61 (A) 65 - 99 mg/dL Final   • POC Glucose, Blood 06/25/2022 74  65 - 99 mg/dL Final           MICROBIOLOGY:  Results     ** No results found for the last 168 hours. **              IMAGING:   CT-CTA UPPER EXT WITH & W/O-POST PROCESS LEFT   Final Result      1.  Status post brachiocephalic fistula in the left arm. No focal stenosis is identified.      2.  Extensive edema in the subcutaneous tissues of the visualized left lateral chest and arm.      EC-ECHOCARDIOGRAM COMPLETE W/O CONT   Final Result      US-ZAHRAA SINGLE LEVEL BILAT   Final Result      US-EXTREMITY ARTERY LOWER BILAT   Final Result      CT-EXTREMITY, UPPER W/O LEFT   Final Result      1.  There is diffuse subcutaneous edema of the imaged portions of the left upper extremity. There is also diffuse body wall edema in the visualized portions of the chest and abdomen.   2.  No focal fluid collection to suggest abscess.   3.  There is a small left pleural effusion and minimal fluid in the abdomen.   4.  There is postsurgical change of AV fistula. There is small vessel atherosclerosis.      US-EXTREMITY VENOUS UPPER UNILAT LEFT   Final Result      DX-CHEST-PORTABLE (1 VIEW)   Final Result         1.  Interstitial pulmonary parenchymal prominence suggest chronic underlying lung disease, component of interstitial edema and/or infiltrates not excluded.   2.  Small left pleural effusion   3.  Cardiomegaly   4.  Atherosclerosis          MEDS:  Current Facility-Administered Medications   Medication Last Admin   • insulin GLARGINE (Lantus,Semglee) injection      And   • insulin lispro (AdmeLOG,HumaLOG) injection 2 Units at 06/27/22 0844    And   • dextrose 50% (D50W) injection 25 g     • HYDROmorphone (Dilaudid) injection 0.25 mg 0.25 mg at 06/27/22 0219   • HYDROmorphone (Dilaudid) injection 0.5 mg     • sevelamer carbonate (RENVELA) tablet 1,600 mg 1,600 mg at 06/27/22 0845   • lidocaine (LIDODERM) 5 % 2 Patch 2 Patch at 06/27/22 0845   • acetaminophen (Tylenol) tablet 650 mg 650 mg at 06/26/22 1804   • gabapentin (NEURONTIN) capsule 200 mg 200 mg at  06/26/22 1804   • senna-docusate (PERICOLACE or SENOKOT S) 8.6-50 MG per tablet 2 Tablet 2 Tablet at 06/26/22 1804    And   • polyethylene glycol/lytes (MIRALAX) PACKET 1 Packet      And   • bisacodyl (DULCOLAX) suppository 10 mg     • heparin injection 5,000 Units 5,000 Units at 06/27/22 0651   • atorvastatin (LIPITOR) tablet 20 mg 20 mg at 06/26/22 0624   • calcitRIOL (ROCALTROL) capsule 0.25 mcg 0.25 mcg at 06/26/22 0623   • ROPINIRole (REQUIP) tablet 0.25 mg 0.25 mg at 06/26/22 0621   • ondansetron (ZOFRAN) syringe/vial injection 4 mg 4 mg at 06/24/22 2133       PROBLEM LIST:  Problem Noted   Goals of Care, Counseling/Discussion 6/25/2022   Pain in Both Lower Extremities 6/25/2022   Esrd (End Stage Renal Disease) On Dialysis ([facility]) 6/24/2022   Generalized Weakness 6/24/2022   Edema of Left Upper Extremity 6/24/2022   Diabetes mellitus, type II ([facility]) 8/14/2017   Essential Hypertension 10/23/2016       ASSESSMENT/PLAN: Jannet Bruno is a 81 y.o. female here with:    * ESRD (end stage renal disease) on dialysis ([facility])- (present on admission)  Assessment & Plan  Patient has history of ESRD currently on dialysis.  Pt followed by [facility]. Had multiple missed appointments prior to admission. Edematous left arm known to nephrology. Planned to have pt go to access center to fix central stenosis this coming week.    As per daughter pt has had significant decrease in mobility and cognition in the past 3-6 months. Chronic pain has been increasing.    Plan:  - Nephrology consulted. Appreciate the recs and help. Dialysis as per nephro  - CTA of left upper extremity pending.  - Continue sevelamer and calcitriol  - Continue home lasix  - Renally dose medications      Pain in both lower extremities  Assessment & Plan  Chronic in nature with likely indolent worsening. As per patient and daughter >1 year. At home pt uses tylenol and showers to control the pain.    The pain appears to be multi-factorial. Some  residual from old hip fractures but the significant pain seems to come from her bilateral lower legs. Chronic. Likely vascular in nature, however, not emergent. Pt likely not candidate for surgery given ESRD and severe aortic stenosis, however, formal recommendations pending by vascular surgery     Plan:  - Continue goals of care conversation with patient and family  - Palliative consult  - Dilaudid .25 mg or .5 mg q2 hrs prn  - Tylenol 650 mg scheduled  - Gabapentin qHS 200 mg  - Vascular consult. Appreciate the recommendations and help.    Goals of care, counseling/discussion  Assessment & Plan  Pt with recent (6 months - 1 year) decline in mobility and cognition. Has been on dialysis for years. Echo during this hospitalization with severe aortic stenosis and EF of 25-30%. Pt intermittently in significant pain. There is no DPOA. Pt is currently full code. Will continue to discuss with patient.      Plan:  - Poor prognosis given multiple severe comorbidities  - Palliative consulted. Appreciate the recs and the help  - Cardiology and vascular consulted in line with patient's goals of care. Appreciate the recs and the help  - Will continue discussions with family    Edema of left upper extremity- (present on admission)  Assessment & Plan  As per nephrology, pt with likely central stenosis. Pt no-showed outpatient procedure to fix this earlier this month.      Plan:  - CTA of left upper extremity    Generalized weakness- (present on admission)  Assessment & Plan  Patient noted to have generalized weakness which led her to miss her dialysis appointments.  Unsure etiology of this generalized weakness at this point. As per daughter patient has had general decline in health over the past 6 months.    Believe it unlikely to be acute intracranial process given the history provided by daughter and the indolent worsening or the patient's general health/wellness.    Plan:  - PT/OT consult  - Low threshold for head  imaging    Diabetes mellitus, type II (HCC)- (present on admission)  Assessment & Plan  Patient noted to have hyperglycemia in the 400s in the ER.  A1C of 11.7     Plan:  - Hold home antihyperglycemic's  - Start insulin sliding scale with basal insulin at night  - Hypoglycemic protocol  - Titrate insulin as needed      Essential hypertension- (present on admission)  Assessment & Plan  Pt with elevated pressures on admission. Restarted on home nifedipine. Now with depressed Bps. Believe that it is unlikely she was taking her medications as prescribed.    Plan:  - Nifedipine held. Will consider anti-HTN medications if pt becomes hypertensive again.        Core Measures:  Fluids: PO. NS boluses for hypotension  Lines: PIV  Abx: None  Diet: Renal  PPX: Heparin  DISPO: Inpatient      CODE STATUS: Full.    Topher Rahman MD  PGY1  [employer]

## 2022-06-27 NOTE — DISCHARGE PLANNING
Case Management Discharge Planning    Admission Date: 6/24/2022  GMLOS: 3.3  ALOS: 3    6-Clicks ADL Score:  Not available  6-Clicks Mobility Score:  Not available      Anticipated Discharge Dispo: Discharge Disposition: D/T to home under [facility] care in anticipation of covered skilled care (06)    DME Needed: No    Action(s) Taken: pt pending medical clearance, pt currently on 2 liters O2, pt is active at [facility] MWF at 0615. No 6 clicks available, case management needs undetermined.    Escalations Completed: None    Medically Clear: No    Next Steps: f/u with medical team and pt to discuss dc needs and barriers.    Barriers to Discharge: Medical clearance      6532 - Spoke to pt at bedside, pt states she would like to go to any accepting SNF that can work with her dialysis. Pt amenable to blanket choice and referrals to local SNF's. Choice form faxed to [facility]. Will complete PAS[facility].

## 2022-06-27 NOTE — THERAPY
"Occupational Therapy   Initial Evaluation     Patient Name: Jannet Bruno  Age:  81 y.o., Sex:  female  Medical Record #: [id number]  Today's Date: 6/27/2022     Precautions  Precautions: Fall Risk    Assessment  Patient is 81 y.o. female admitted for LUE and BLE edema; pt missed multiple dialysis appointments PTA, and generalized weakness. PMHx of recent LUE procedure on AVF, CKD on HD, cardiomyopathy, HTN, ESRD, DMII, and HF. Pt reports new R eye \"fuzziness\" and chronic L eye blurriness. Pt lethargic; difficulty keeping head up. Pt is a questionable historian; Recommend clarification on PLOF, home setup, and assist available w/ family/friends. Reports she lives with ex- who is unable to assist. However they live on same property as dtr who assists with IADLs, but is only available intermittently. Currently limited by decreased functional mobility, activity tolerance, cognition, sensation, strength, AROM, coordination, balance, and pain which are currently affecting pt's ability to complete ADLs/IADLs at baseline. Will continue to follow.     Plan    Recommend Occupational Therapy 3 times per week until therapy goals are met for the following treatments:  Adaptive Equipment, Neuro Re-Education / Balance, Self Care/Activities of Daily Living, Therapeutic Activities, and Therapeutic Exercises.    DC Equipment Recommendations: Unable to determine at this time  Discharge Recommendations: Recommend post-acute placement for additional occupational therapy services prior to discharge home     Objective     06/27/22 1008   Prior Living Situation   Prior Services Other (Comments)  (Assist for IADLs)   Housing / Facility 1 Story House   Steps Into Home   (ramp)   Bathroom Set up Walk In Shower;Shower Chair   Equipment Owned Tub / Shower Seat;Front-Wheel Walker;Single Point Cane;Oxygen  (on 2L)   Lives with - Patient's Self Care Capacity Other (Comments)   Comments Pt is a questionable historian; Recommend " clarification with family. reports she lives with ex- who is unable to assist. However they live on same property as dtr who assists with IADLs, but is only available intermittently.   Prior Level of ADL Function   Self Feeding Independent   Grooming / Hygiene Independent   Bathing Independent   Dressing Requires Assist   Toileting Independent   Prior Level of IADL Function   Medication Management Requires Assist   Laundry Dependent   Kitchen Mobility Requires Assist   Finances Requires Assist   Home Management Dependent   Shopping Dependent   Prior Level Of Mobility Independent With Device in Home;Independent With Device in Community   Driving / Transportation Driving Independent  (reports drives self to HD; questionable accuracy)   Precautions   Precautions Fall Risk   Vitals   O2 (LPM) 4   O2 Delivery Device Silicone Nasal Cannula   Vitals Comments reports wears 2L at baseline   Pain 0 - 10 Group   Location Leg;Arm  (R arm and BLEs)   Location Orientation Right;Left   Therapist Pain Assessment Post Activity;During Activity;Nurse Notified  (not quantified)   Cognition    Cognition / Consciousness X   Level of Consciousness Alert   Safety Awareness Impaired   New Learning Impaired   Attention Impaired   Comments lethargic; difficulty keeping head up. pleasant and cooperative   Passive ROM Upper Body   Passive ROM Upper Body WDL   Active ROM Upper Body   Active ROM Upper Body  WDL   Comments limited by pain and RUE edema   Strength Upper Body   Upper Body Strength  X   Gross Strength Generalized Weakness, Equal Bilaterally.    Sensation Upper Body   Upper Extremity Sensation  X   Comments LUE tingling   Coordination Upper Body   Coordination X   Fine Motor Coordination LUE FM limited by edema and pain   Balance Assessment   Sitting Balance (Static) Fair   Sitting Balance (Dynamic) Fair -   Standing Balance (Static) Poor +   Standing Balance (Dynamic) Poor +   Weight Shift Sitting Fair   Weight Shift Standing  "Fair   Comments LOB at EOB req min A to correct; posterior lean in standing   Bed Mobility    Supine to Sit Moderate Assist   Sit to Supine Moderate Assist   Scooting Minimal Assist   Comments HOB elevated   ADL Assessment   Eating Supervision   Grooming Supervision;Seated  (wiping face)   Lower Body Dressing Moderate Assist  (socks; fatigues quickly)   Toileting   (declined need)   Functional Mobility   Sit to Stand Minimal Assist  (steps toward HOB)   Toilet Transfers   (likely could get to BSC w/min A; req SPV thorughout)   Mobility w/FWW; sup>sit>STS x2> steps toward HOB>BTB   Visual Perception   Visual Perception  X   Comments Pt reports new R eye \"fuzzyness\" and chronic L eye blurriness   Edema / Skin Assessment   Edema / Skin  X   Comments LUE edema; educated on elevation and AROM   Activity Tolerance   Comments limited by fatigue and lethargy   Patient / Family Goals   Patient / Family Goal #1 to get better   Short Term Goals   Short Term Goal # 1 LB dressing with SPV   Short Term Goal # 2 seated G/h with SPV for >20 min   Short Term Goal # 3 BSC txf with min A   Education Group   Education Provided Role of Occupational Therapist;Pathology of bedrest;Upper Extremity Range of Motion   Role of Occupational Therapist Patient Response Patient;Acceptance;Explanation;Verbal Demonstration;Reinforcement Needed   Upper Ext ROM Patient Response Patient;Acceptance;Explanation;Verbal Demonstration;Reinforcement Needed   Pathology of Bedrest Patient Response Patient;Acceptance;Explanation;Verbal Demonstration;Reinforcement Needed   Problem List   Problem List Decreased Active Daily Living Skills;Decreased Homemaking Skills;Decreased Upper Extremity Strength Right;Decreased Upper Extremity Strength Left;Decreased Upper Extremity AROM Left;Decreased Functional Mobility;Decreased Activity Tolerance;Safety Awareness Deficits / Cognition;Impaired Coordination Right Upper Extremity;Impaired Posture / Trunk Alignment;Impaired " Cognitive Function;Impaired Postural Control / Balance

## 2022-06-27 NOTE — CARE PLAN
Problem: Knowledge Deficit - Standard  Goal: Patient and family/care givers will demonstrate understanding of plan of care, disease process/condition, diagnostic tests and medications  Outcome: Progressing  Plan of care discussed with patient and family, all questions answered.     Problem: Skin Integrity  Goal: Skin integrity is maintained or improved  Outcome: Progressing     Problem: Fall Risk  Goal: Patient will remain free from falls  Outcome: Progressing  Fall precautions in place.     The patient is Watcher - Medium risk of patient condition declining or worsening    Shift Goals  Clinical Goals: Pain control, HD  Patient Goals: Pain control, comfort  Family Goals: N/A    Progress made toward(s) clinical / shift goals:  HD    Patient is not progressing towards the following goals:

## 2022-06-27 NOTE — THERAPY
"Physical Therapy   Initial Evaluation     Patient Name: Jannet Bruno  Age:  81 y.o., Sex:  female  Medical Record #: [id number]  Today's Date: 6/27/2022     Precautions  Precautions: Fall Risk    Assessment  Patient is 81 y.o. female with ESRD on dialysis, HTN, HLD, and T2DM who presented with increasing peripheral edema and generalized weakness. She states that she missed her last 3 dialysis appointments secondary to weakness/fatigue. Since then she has noticed increasing peripheral edema in her legs and her left arm where her fistula is located has also become increasingly swollen. She normally gets dialysis through the fistula in her left arm, this fistula was created in 2019 by Dr. Nazario. She states that she recently missed a\"procedure\" to this fistula about 2 weeks ago due to not having a ride, and has noticed the swelling since..  Additional factors influencing patient status / progress : today, L arm pain and swelling is limiting, but pt able to come sit EOB with min A, needing mod assist for seated scoot and min A for sit to stand, able to take a few sidesteps before BTB. Pt is fragile, reports living with her ex- who can no longer assist her physically, but dghtr lives on same property and assists \"when needed\".Pt appears too fragile to dc home to intermittent assist, PT to follow.       Plan    Recommend Physical Therapy 3 times per week until therapy goals are met for the following treatments:  Bed Mobility, Gait Training, Neuro Re-Education / Balance, and Therapeutic Activities    DC Equipment Recommendations: Unable to determine at this time  Discharge Recommendations: Recommend post-acute placement for additional physical therapy services prior to discharge home          Objective       06/27/22 1034   Charge Group   PT Evaluation PT Evaluation Mod   PT Self Care / Home Evaluation  1   Total Time Spent   PT Total Time Yes   PT Evaluation Time Spent (Mins) 23   PT Self Care/Home Evaluation " "Time Spent (Mins) 8   PT Total Time Spent (Calculated) 31   Initial Contact Note    Initial Contact Note Order Received and Verified, Physical Therapy Evaluation in Progress with Full Report to Follow.   Precautions   Precautions Fall Risk   Vitals   Blood Pressure  (!) 95/55  (sitting EOB.)   O2 (LPM) 4   O2 Delivery Device Silicone Nasal Cannula   Pain 0 - 10 Group   Therapist Pain Assessment During Activity;Nurse Notified  (L arm painful edema)   Prior Living Situation   Housing / Facility 1 Story House   Steps Into Home 0  (ramp)   Steps In Home 0   Elevator No   Lives with - Patient's Self Care Capacity Other (Comments)   Comments lives with exhusband who previously able to assist, but now unable. Lives on same property as daughter who does cleaning, grocery shop, cooking, laundry, but not daily. Pt sees daughter \"when needed\". Pt reports usually driving self to dialysis.   Prior Level of Functional Mobility   Bed Mobility Independent   Transfer Status Independent   Ambulation Independent   Distance Ambulation (Feet)   (community)   Assistive Devices Used Front-Wheel Walker   Comments had been using SPC until recent fall, a few weeks ago, switched to FWW after fall.   Cognition    Cognition / Consciousness X   Level of Consciousness Alert   Safety Awareness Impaired   New Learning Impaired   Attention Impaired   Comments trouble sitting with head in neutral, chin to chest for most time OOB.   Active ROM Lower Body    Active ROM Lower Body  WDL   Strength Lower Body   Lower Body Strength  X   Comments lacks endurance, but functional for brief wt bearing.   Vision   Vision Comments c/o new deficits in vision, blurry.   Balance Assessment   Sitting Balance (Static) Fair   Sitting Balance (Dynamic) Fair -   Standing Balance (Static) Poor +   Standing Balance (Dynamic) Poor +   Weight Shift Sitting Fair   Weight Shift Standing Fair   Comments min A to maintain safe sitting at EOB, chin on chest, unsteady.   Gait " Analysis   Gait Level Of Assist Contact Guard Assist   Assistive Device Front Wheel Walker   Distance (Feet) 2  (sidesteps to L at EOB)   # of Times Distance was Traveled 1   Deviation Decreased Base Of Support   # of Stairs Climbed 0   Weight Bearing Status no restrictions   Bed Mobility    Supine to Sit Moderate Assist   Sit to Supine Moderate Assist   Scooting Minimal Assist   Functional Mobility   Sit to Stand Minimal Assist   How much difficulty does the patient currently have...   Turning over in bed (including adjusting bedclothes, sheets and blankets)? 3   Sitting down on and standing up from a chair with arms (e.g., wheelchair, bedside commode, etc.) 3   Moving from lying on back to sitting on the side of the bed? 3   How much help from another person does the patient currently need...   Moving to and from a bed to a chair (including a wheelchair)? 3   Need to walk in a hospital room? 3   Climbing 3-5 steps with a railing? 2   6 clicks Mobility Score 17   Activity Tolerance   Standing 3 min   Edema / Skin Assessment   Edema / Skin  X   Comments L UE edema, weaping, nsg aware,   Short Term Goals    Short Term Goal # 1 Pt will perform bed mobility with supervision in 6 visits with HOB flat, no rail.   Short Term Goal # 2 Pt will transfer with supervision in 6 visits to improve functional indep.   Short Term Goal # 3 Pt will ambulate x 125 feet using FWW with supervision in 6 visits to improve functional indep.   Education Group   Education Provided Role of Physical Therapist   Role of Physical Therapist Patient Response Patient;Acceptance;Explanation;Verbal Demonstration   Problem List    Problems Impaired Transfers;Impaired Bed Mobility;Impaired Ambulation;Decreased Activity Tolerance;Impaired Balance;Impaired Vision   Anticipated Discharge Equipment and Recommendations   DC Equipment Recommendations Unable to determine at this time   Discharge Recommendations Recommend post-acute placement for additional  physical therapy services prior to discharge home   Interdisciplinary Plan of Care Collaboration   IDT Collaboration with  Nursing   Patient Position at End of Therapy In Bed;Call Light within Reach;Tray Table within Reach;Phone within Reach;Bed Alarm On   Collaboration Comments nsg updated   Session Information   Date / Session Number  6/27-1 (1/3, 7/3)

## 2022-06-27 NOTE — CONSULTS
"    Cardiology Consultation Note    Date of note:    6/27/2022    Primary Care Provider: Henry Forrest D.O.  Referring Provider: Dr. Rahman, [facility]    Patient Name: Jannet Bruno   YOB: 1940  MRN:              [id number]      Chief Complaint   Patient presents with   • Peripheral Edema   • Weakness     BIBA from home for weakness, nausea, L arm pain  HX of dialysis with L arm fistula, pt states she has missed 3 dialysis days due to being so weak she cannot drive or walk now  3 weeks ago had a procedure done to her fistula which has lead to L arm swelling          Subjective:   Jannet Bruno is a 81 y.o. female who presents today for initial consultation, requested by Dr. Rahman for severe aortic stenosis and heart failure with reduced EF. Jannet's current medical problems include ESRD on hemodialysis, peripheral arterial disease with limb pain, chronic hypoxic respiratory failure on supplemental oxygen.  She presented to the hospital 6/24/2022 for weakness and left arm pain, swelling.  She had missed several dialysis appointments due to car trouble and feeling weak.  She admits that she has progressive weakness especially recently when she had to transition from a walker to a wheelchair.  She tells me she is now in a wheelchair because it was becoming too difficult for her to walk around her house.  She describes a heaviness both in her chest and in her legs with walking.  She does not have any chest pressure while laying in bed.  She does not have any difficulty breathing while laying in bed.  She does use oxygen at home, 4 L via nasal cannula.    She does not recall having any problems with her heart although she does see Dr. Gastelum at [facility] cardiology.    With regards to Jannet's quality of life, she lives in a house with her ex- who is also disabled. She enjoys \"running her life\" which means paying pills. She enjoys seeing and visiting family members. "     Past Medical History:   Diagnosis Date   • Anesthesia     oxygen level dropped   • Arthritis    • Breath shortness    • CATARACT     right IOL   • Diabetes    • Heart burn    • Hyperglycemia 10/23/2016   • Hypertension    • Indigestion    • Other specified disorder of intestines    • Sleep apnea, obstructive     no c-pap   • Snoring    • Unspecified hemorrhagic conditions     bruise easily   • Unspecified urinary incontinence    • Urinary bladder disorder     incontinence       Social History     Tobacco Use   Smoking Status Former Smoker   • Packs/day: 1.00   • Years: 20.00   • Pack years: 20.00   • Types: Cigarettes   • Quit date: 3/5/1979   • Years since quittin.3   Smokeless Tobacco Never Used          Social History     Substance and Sexual Activity   Alcohol Use No         Family History   Problem Relation Age of Onset   • Heart Disease Mother    • No Known Problems Father    • No Known Problems Daughter    • No Known Problems Son    Mother: unknown cardiac problems, started around age 70.       Allergies   Allergen Reactions   • Codeine Unspecified     Personality changes   • Oxycodone Unspecified     Personality changes  Tolerated Dilaudid 2022   • Tape Rash     Adhesive tape-rash, paper tape ok.         Current Facility-Administered Medications   Medication Dose Route Frequency Provider Last Rate Last Admin   • insulin GLARGINE (Lantus,Semglee) injection  10 Units Subcutaneous Q EVENING Tyree Prescott M.D.        And   • insulin lispro (AdmeLOG,HumaLOG) injection  1[facility]6 [facility]   2 Units at 22 1301    And   • dextrose 50% (D50W) injection 25 g  25 g Intravenous Q15 MIN PRN Tyree Prescott M.D.       • [START ON 2022] gabapentin (NEURONTIN) capsule 100 mg  100 mg Oral DAILY Suma Gonzalez, A.P.R.N.       • polyethylene glycol/lytes (MIRALAX) PACKET 1 Packet  1 Packet Oral DAILY Suma Gonzalez A.P.R.N.   1 Packet at 22 1304    And    • senna-docusate (PERICOLACE or SENOKOT S) 8.6-50 MG per tablet 2 Tablet  2 Tablet Oral BID Suma Gonzalez A.P.R.N.        And   • bisacodyl (DULCOLAX) suppository 10 mg  10 mg Rectal QDAY PRN Suma Gonzalez A.P.R.N.       • gabapentin (NEURONTIN) capsule 200 mg  200 mg Oral QHS Suma Gonzalez A.P.R.N.       • HYDROmorphone (Dilaudid) injection 0.25 mg  0.25 mg Intravenous Q2HRS PRN Topher Rahman M.D.   0.25 mg at 06/27/22 0219   • HYDROmorphone (Dilaudid) injection 0.5 mg  0.5 mg Intravenous Q2HRS PRN Topher Rahman M.D.       • sevelamer carbonate (RENVELA) tablet 1,600 mg  1,600 mg Oral TID WITH MEALS Janina Bender M.D.   1,600 mg at 06/27/22 1304   • lidocaine (LIDODERM) 5 % 2 Patch  2 Patch Transdermal Q24HR Topher Rahman M.D.   2 Patch at 06/27/22 0845   • acetaminophen (Tylenol) tablet 650 mg  650 mg Oral Q6HRS Topher Rahman M.D.   650 mg at 06/27/22 1304   • heparin injection 5,000 Units  5,000 Units Subcutaneous Q8HRS Rashid Wilcox M.D.   5,000 Units at 06/27/22 1304   • atorvastatin (LIPITOR) tablet 20 mg  20 mg Oral DAILY Topher Rahman M.D.   20 mg at 06/26/22 0624   • calcitRIOL (ROCALTROL) capsule 0.25 mcg  0.25 mcg Oral DAILY Topher Rahman M.D.   0.25 mcg at 06/26/22 0623   • ROPINIRole (REQUIP) tablet 0.25 mg  0.25 mg Oral DAILY Topher Rahman M.D.   0.25 mg at 06/26/22 0621   • ondansetron (ZOFRAN) syringe/vial injection 4 mg  4 mg Intravenous Q6HRS PRN Raya Gifford M.D.   4 mg at 06/24/22 2133         Review of Systems   Constitutional: Positive for malaise/fatigue. Negative for diaphoresis.   Respiratory: Negative for cough and shortness of breath.    Cardiovascular: Positive for palpitations and leg swelling. Negative for chest pain (at rest), orthopnea and PND.   Gastrointestinal: Negative for nausea.   Neurological: Negative for dizziness and loss of consciousness.   Psychiatric/Behavioral: Negative for substance abuse.          Objective:  "  BP (!) 95/55   Pulse 79   Temp 36.7 °C (98.1 °F) (Temporal)   Resp 20   Ht 1.626 m (5' 4\")   Wt 70.2 kg (154 lb 12.2 oz)   SpO2 92%   BMI 26.57 kg/m²        Physical Exam  Vitals reviewed.   Constitutional:       Appearance: Normal appearance.   HENT:      Head: Normocephalic and atraumatic.   Neck:      Comments: JVP ~8cm in supine position  Cardiovascular:      Rate and Rhythm: Normal rate and regular rhythm.      Heart sounds: Murmur (harsh ejection murmur) heard.   Pulmonary:      Effort: Pulmonary effort is normal.   Abdominal:      General: There is no distension.   Musculoskeletal:      Comments: Pitting edema in lower extremities, severe edema in left upper extremity from arm to hand   Skin:     General: Skin is warm.      Findings: Erythema present.   Neurological:      Mental Status: She is alert.   Psychiatric:         Judgment: Judgment normal.          Cardiac Imaging and Procedures Review:    EKG 6/24/22 : My personal interpretation is sinus rhythm, prolonged r wave in V6 meeting criteria for LVH, QT prolongation    Echo 6/26/22:   CONCLUSIONS  Severely reduced left ventricular systolic function. The left   ventricular ejection fraction is visually estimated to be 25% to 30%.  Mild concentric left ventricular hypertrophy.   Normal right ventricular size. Reduced right ventricular systolic   function.  Severe aortic valve stenosis. Mean gradient 43 mmHg, aortic valve area   calculated from the continuity equation is  0.4-0.5 cm2.   Mild mitral stenosis. Mean transvalvular gradient is 6.5  mmHg at a   heart rate of  77 BPM.   Moderate tricuspid regurgitation. Right ventricular systolic pressure   is estimated to be  55 mmHg.  No recent study for comparison. Ordering physician notified.     Medical Decision Making:  Today's Assessment / Plan:   Severe Aortic Stenosis  Cardiomyopathy, suspect mixed valvular and probably ischemic given her history of ESRD  HFrEF, new diagnosis, decompensated with " hypervolemia   Chronic hypoxic respiratory failure  Peripheral Arterial disease (lower extremities bilaterally)  AV Fistula  Prolonged QT    - She is volume overloaded on exam, she is scheduled for dialysis today and UF as tolerated, please see Nephrology's notes.   - In terms of her aortic stenosis, this is a very severe finding given her severely reduced EF. She seems to be symptomatic with severe exercise intolerance, chest pressure with exertion. She has given up her walker and now only able to move about in a wheel chair due to fatigue, chest pressure. I will discuss this case with our valve team although I am not hopeful she will get a meaningful improvement from a valve replacement.   - For her heart failure therapy:    -ACE-I/ARB/ARNI: held due to hypotension   -Evidence Based Beta-blocker: avoid negative inotrope    -Aldosterone Antagonist: hold due to renal failure, GFR<30   -Diuretic: N/A--> dialysis   -SGLT2i: defer to outpatient study   -Patient is not a candidate for ICD placement at this time due to reduced life expectancy, will readdress as outpatient  - Avoid QT prolonging medications      Thank you for this consultation. Please see Dr. Chapman's attestation for further details    Debora Gastelum PA-C  [facility]  I personally spent a total of 40 minutes which includes face-to-face time and non-face-to-face time spent on preparing to see the patient, reviewing hospital notes and tests, obtaining history from the patient, performing a medically appropriate exam, counseling and educating the patient, ordering medications/tests/procedures/referrals as clinically indicated, and documenting information in the electronic medical record.

## 2022-06-28 NOTE — PROGRESS NOTES
Monitor Summary:     Rhythm: Sinus Rhythm    Rate: 81-94    Measurement: .16/.09/.35    Ectopy: R PVCs    12 hr cc

## 2022-06-28 NOTE — PROGRESS NOTES
"         Cardiology Progress Note    Name:   Jannet Bruno   YOB: 1940  Age:   81 y.o.  female   MRN:   [id number]    Attending Cardiologist: Dr. Chapman  Outpatient Cardiologist: Dr. Gastelum, [facility]    Chief Complaint:   Peripheral Edema and Weakness (BIBA from home for weakness, nausea, L arm pain/HX of dialysis with L arm fistula, pt states she has missed 3 dialysis days due to being so weak she cannot drive or walk now/3 weeks ago had a procedure done to her fistula which has lead to L arm swelling)       History of Present Illness  Jannet Bruno is a 81 y.o. female with severe aortic stenosis and heart failure with reduced EF, ESRD on hemodialysis, peripheral arterial disease with limb pain, chronic hypoxic respiratory failure on supplemental oxygen.  She presented to the hospital 6/24/2022 for weakness and left arm pain, swelling.  She had missed several dialysis appointments due to car trouble and feeling weak.  She admits that she has progressive weakness especially recently when she had to transition from a walker to a wheelchair.      Interim Events   Had dialysis yesterday, said \"I felt really good afterwards\". She reports her leg pain is better today. She is tired and falls asleep during our discussion.     Review of Systems   Constitutional: Positive for malaise/fatigue.   Respiratory: Negative for shortness of breath.    Cardiovascular: Positive for leg swelling. Negative for chest pain, palpitations and orthopnea.   Musculoskeletal: Positive for myalgias (leg pain improved).   Neurological: Negative for dizziness.       Medical History  Past Surgical History:   Procedure Laterality Date   • AV FISTULA CREATION Left 2/15/2019    Procedure: AV FISTULA CREATION-  UPPER EXTREMITY BRACHIAL CEPHALIC BANDING;  Surgeon: Fernie Nazario M.D.;  Location: SURGERY [facility];  Service: General   • HIP CANNULATED SCREW Right 8/14/2017    Procedure: HIP " "CANNULATED SCREW;  Surgeon: Ar Huerta M.D.;  Location: SURGERY [facility];  Service:    • CATARACT PHACO WITH IOL  2014    Performed by Rudolph Barclay M.D. at SURGERY SAME DAY [facility] ORS   • CATARACT PHACO WITH IOL  2014    Performed by Rudolph Barclay M.D. at SURGERY SAME DAY [facility] ORS   • VENTRAL HERNIA REPAIR LAPAROSCOPIC  3/7/2012    Performed by HUNTER SERNA at SURGERY SAME DAY [facility] ORS   • APPENDECTOMY     • GYN SURGERY      hysterectomy   • OTHER      T&A   • OTHER ABDOMINAL SURGERY      abd tramua- horse stepped on abd   • OTHER ORTHOPEDIC SURGERY      R & l Shoulder repair       Family History   Problem Relation Age of Onset   • Heart Disease Mother    • No Known Problems Father    • No Known Problems Daughter    • No Known Problems Son        Social History     Tobacco Use   Smoking Status Former Smoker   • Packs/day: 1.00   • Years: 20.00   • Pack years: 20.00   • Types: Cigarettes   • Quit date: 3/5/1979   • Years since quittin.3   Smokeless Tobacco Never Used       Allergies   Allergen Reactions   • Codeine Unspecified     Personality changes   • Oxycodone Unspecified     Personality changes  Tolerated Dilaudid 2022   • Tape Rash     Adhesive tape-rash, paper tape ok.         Medications   Scheduled Medications   Medication Dose Frequency   • insulin GLARGINE  10 Units Q EVENING    And   • insulin lispro  1-6 Units 4X/DAY ACHS   • gabapentin  100 mg DAILY   • polyethylene glycol/lytes  1 Packet DAILY    And   • senna-docusate  2 Tablet BID   • gabapentin  200 mg QHS   • sevelamer carbonate  1,600 mg TID WITH MEALS   • lidocaine  2 Patch Q24HR   • acetaminophen  650 mg Q6HRS   • heparin  5,000 Units Q8HRS   • atorvastatin  20 mg DAILY   • calcitRIOL  0.25 mcg DAILY   • ROPINIRole  0.25 mg DAILY           Physical Exam  /66   Pulse 93   Temp 37.1 °C (98.8 °F) (Temporal)   Resp 20   Ht 1.626 m (5' 4\")   Wt 69.1 kg (152 lb 5.4 oz)   SpO2 94% "     Physical Exam  Vitals reviewed.   Constitutional:       General: She is not in acute distress.     Appearance: Normal appearance. She is ill-appearing.   HENT:      Head: Normocephalic and atraumatic.   Neck:      Comments: +JVD  Cardiovascular:      Rate and Rhythm: Normal rate and regular rhythm.      Heart sounds: Murmur heard.   Pulmonary:      Effort: Pulmonary effort is normal.   Musculoskeletal:      Right lower leg: Edema present.      Left lower leg: Edema present.      Comments: Lower extremities are wrapped with lidocaine patch and protective boot, has pitting edema in pretibia area bilaterally  Left upper extremities is wrapped, remains edematous   Skin:     General: Skin is warm.           Labs (personally reviewed):     Lab Results   Component Value Date/Time    SODIUM 134 (L) 06/28/2022 10:24 AM    POTASSIUM 4.9 06/28/2022 10:24 AM    CHLORIDE 92 (L) 06/28/2022 10:24 AM    CO2 28 06/28/2022 10:24 AM    GLUCOSE 124 (H) 06/28/2022 10:24 AM    BUN 33 (H) 06/28/2022 10:24 AM    CREATININE 5.45 (HH) 06/28/2022 10:24 AM    CREATININE 1.2 12/13/2007 11:40 AM     Lab Results   Component Value Date/Time    CHOLSTRLTOT 105 07/18/2017 08:02 AM    LDL 38 07/18/2017 08:02 AM    HDL 39 (A) 07/18/2017 08:02 AM    TRIGLYCERIDE 141 07/18/2017 08:02 AM     No results found for: BNPBTYPENAT      Cardiac Imaging and Procedures Review      EKG 6/24/22 : My personal interpretation is sinus rhythm, prolonged r wave in V6 meeting criteria for LVH, QT prolongation     Echo 6/26/22:   CONCLUSIONS  Severely reduced left ventricular systolic function. The left   ventricular ejection fraction is visually estimated to be 25% to 30%.  Mild concentric left ventricular hypertrophy.   Normal right ventricular size. Reduced right ventricular systolic   function.  Severe aortic valve stenosis. Mean gradient 43 mmHg, aortic valve area   calculated from the continuity equation is  0.4-0.5 cm2.   Mild mitral stenosis. Mean  transvalvular gradient is 6.5  mmHg at a   heart rate of  77 BPM.   Moderate tricuspid regurgitation. Right ventricular systolic pressure   is estimated to be  55 mmHg.  No recent study for comparison. Ordering physician notified.     Assessment and Medical Decision Making:  Severe Aortic Stenosis  Cardiomyopathy, suspect mixed valvular and probably ischemic given her history of ESRD  HFrEF, new diagnosis, decompensated with hypervolemia   Chronic hypoxic respiratory failure  Peripheral Arterial disease (lower extremities bilaterally)  AV Fistula  Prolonged QT     - She remains volume overloaded on exam, we have asked Nephrology to pull as much fluid out with dialysis as she tolerates. Apparently her outpatient Nephorologist recommended 4 times/wk but she has not followed through with this.   - Once she is euvolemic she can go home and follow up with her outpatient cardiology team at [facility] who will refer her to appropriate Structural Heart team for AS consult. She continues to confirm she wants to explore all possible options. A Structural Heart team will need to see her once she is euvolemic to make a decision if she would get meaningful benefit from the procedure.      - For her heart failure therapy:               -ACE-I/ARB/ARNI: held due to hypotension              -Evidence Based Beta-blocker: avoid negative inotrope               -Aldosterone Antagonist: hold due to renal failure, GFR<30              -Diuretic: N/A--> dialysis or diuretics per nephrology if she makes urine              -SGLT2i: defer to outpatient Cardiology team              -Patient is not a candidate for ICD placement at this time due to reduced life expectancy, will readdress as outpatient  - Avoid QT prolonging medications    - IF she wishes to pursue aggressive care she may need a venogram of the fistula to assess for blockages given her edematous left arm.    No further cardiology recommendations. Please see Dr. Chapman's  attestation for additions and further recommendations.    Debora Gastelum PA-C  [facility]    I personally spent a total of 14 minutes which includes face-to-face time and non-face-to-face time spent on preparing to see the patient, reviewing hospital notes and tests, obtaining history from the patient, performing a medically appropriate exam, counseling and educating the patient, ordering medications/tests/procedures/referrals as clinically indicated, and documenting information in the electronic medical record.

## 2022-06-28 NOTE — CARE PLAN
The patient is Watcher - Medium risk of patient condition declining or worsening    Shift Goals  Clinical Goals: Pain control, monitor O2/labs  Patient Goals: Pain control, comfort  Family Goals: n/a    Progress made toward(s) clinical / shift goals:    Problem: Knowledge Deficit - Standard  Goal: Patient and family/care givers will demonstrate understanding of plan of care, disease process/condition, diagnostic tests and medications  Outcome: Progressing     Problem: Pain - Standard  Goal: Alleviation of pain or a reduction in pain to the patient’s comfort goal  Outcome: Progressing     Problem: Skin Integrity  Goal: Skin integrity is maintained or improved  Outcome: Progressing     Problem: Fall Risk  Goal: Patient will remain free from falls  Outcome: Progressing       Patient has verbalized understanding of her plan of care, pt's pain and skin are being monitored closely, fall risk precautions are in place.

## 2022-06-28 NOTE — PROGRESS NOTES
[facility] -  PROGRESS NOTE               Author: THERESE Comer, CNN-NP Date & Time: 6/28/2022  9:20 AM     HPI:  81yoF with PMH significant for ESRD on HD MWF via left arm AVF, HTN, DM II, Anemia secondary to CKD, CKD Bone mineral disorder, admitted with increased edema and weakness and having missed her last last 2-3 outpt HD treatments. Pt is very lethargic at this time and unable to provide much history, majority of the history was obtained through review of the medical record and discussion with primary svc. Pt had reported increased LE edema and fatigue and weakness and worsening of her left arm edema so she came to the ED for evaluation. She apparently has missed her last 2-3 outpt dialysis treatments. Per regular outpt Nephrologist Dr. Gates, she has been non-compliant with her fluid restriction and was told that she needed 4x/week dialysis until her volume status could be optimized but she was non-compliant with that recommendation. She has edema of her left arm where her AVF is located and there is concern for a central stenosis that could be the etiology. She had an appointment at the outpt access center to evaluate for central stenosis and undergo angioplasty if needed on 6/9/22 but pt did not go to the appointment. She has not had any other procedures done to the arm in the past couple of months. She had a left arm us done today that showed no DVT and patent AVF and soft tissue edema. She apparently received pain medication fentanyl and dilaudid as well as benadryl earlier today and she is very drowsy.     DAILY NEPHROLOGY SUMMARY:  6/25: rapid response overnight for severe leg pain, pt very lethargic today, moans with palpation of legs, arouses but does not answer questions and falls back asleep, tolerated HD yest with 2.5L UF, BP stable overnight but low this am  6/26: No events, BP low overnight and this am, more alert, reports pain in legs for the past 3 weeks,  "denies any CP/SOB/Abd pain, reports left arm edema a little better but no pain in left arm   6/27: Pt resting in bed, subdued, Bps soft, on 4L supp O2 via NC, labs reviewed, due for HD  6/28: pt laying in bed, sleepy, c/o pain in legs, moaning, vascular assessed and does not recommend any intervention on legs or AVF, tolerated iHD yesterday with UF:2.8L    REVIEW OF SYSTEMS:    10 point ROS reviewed and is as per HPI/daily summary or otherwise negative    PMH/PSH/SH/FH:   Reviewed and unchanged since admission note    CURRENT MEDICATIONS:   Reviewed from admission to present day    VS:  /62   Pulse 97   Temp 37.4 °C (99.3 °F) (Temporal)   Resp (!) 24   Ht 1.626 m (5' 4\")   Wt 69.1 kg (152 lb 5.4 oz)   SpO2 94%   BMI 26.15 kg/m²     Physical Exam  Nursing note reviewed.   Constitutional:       Appearance: Normal appearance.   HENT:      Head: Normocephalic and atraumatic.   Eyes:      General: No scleral icterus.  Cardiovascular:      Rate and Rhythm: Normal rate and regular rhythm.   Pulmonary:      Effort: Pulmonary effort is normal. No respiratory distress.      Breath sounds: Examination of the right-lower field reveals decreased breath sounds. Examination of the left-lower field reveals decreased breath sounds. Decreased breath sounds present.   Abdominal:      General: Bowel sounds are normal. There is no distension.      Palpations: Abdomen is soft.   Musculoskeletal:         General: Swelling (left arm) present. No deformity.      Right lower leg: Edema (trace) present.      Left lower leg: Edema (trace) present.   Skin:     General: Skin is warm.      Findings: No rash.      Comments: +Chronic skin changes in bilaterally lower legs   Neurological:      General: No focal deficit present.      Mental Status: She is alert and oriented to person, place, and time.   Psychiatric:         Mood and Affect: Mood normal.         Behavior: Behavior normal.         Fluids:  In: 980 [P.O.:480; " Dialysis:500]  Out: 3300     LABS:  Recent Labs     06/26/22  1152 06/27/22  0215   SODIUM 135 135   POTASSIUM 4.3 4.9   CHLORIDE 92* 91*   CO2 27 27   GLUCOSE 180* 203*   BUN 40* 45*   CREATININE 6.43* 7.44*   CALCIUM 7.9* 7.7*       IMAGING:   All imaging reviewed from admission to present day    IMPRESSION:  # ESRD, dependent on HD    - HD via LUE AVF, missed last 2-3 outpt treatments   - iHD q MWF and prn   # Fluid Overload, improving    - Secondary to non-compliance with fluid restriction on HD and   non-compliance with treatments  # Left arm edema   - CTA upper ext 6/26 w/o e/o focal stenosis + extensive edema   - AVF patent on left arm us and no DVT   - Pt no showed to outpt appt to evaluate    - vascular doesn't recommend intervention   # Altered Mental Status, improved    - Monitor  # HTN, uncontrolled    - Goal BP < 140/90   - Not on BP meds   # Anemia of CKD, at goal    - Goal Hgb 10-11   - check iron stores   # CKD-MBD   - Ca 9.2   - PO4 6.4   - Managed at OP unit   - On calcitriol and sevelamer   # DM II--management per primary svc  # Leg Pain--chronic skin changes and subcutaneous tissue firm to touch   - Unclear etiology   - vascular does not recommend any intervention, consider palliative      PLAN:  - Continue qMWF iHD schedule, treatment due tomorrow (Wed)  - UF as tolerated  - No current need for ROGELIO  - Continue phos binders WM  - Continue off of all BP meds and diuretics for now  - PRN NS boluses for symptomatic hypotension  - Limit sedating meds if possible  - No dietary protein restrictions  - Dose all meds per ESRD  - Outpt access center appointment rescheduled to this week  - Recommend palliative consult  - Pt at high risk for further morbidity/mortality  - Follow labs    Thank you,

## 2022-06-28 NOTE — CARE PLAN
The patient is Watcher - Medium risk of patient condition declining or worsening    Shift Goals  Clinical Goals: Pain control, ACE wrap to left arm  Patient Goals: Pain control, comfort, rest  Family Goals: n/a    Progress made toward(s) clinical / shift goals:      Problem: Knowledge Deficit - Standard  Goal: Patient and family/care givers will demonstrate understanding of plan of care, disease process/condition, diagnostic tests and medications  Outcome: Progressing  Note: Patient and family updated on POC, all questions answered.      Problem: Skin Integrity  Goal: Skin integrity is maintained or improved  Outcome: Progressing  Note: Skin is dry and intact, repositioning Q2H and as needed.     Problem: Fall Risk  Goal: Patient will remain free from falls  Outcome: Progressing  Note: All fall precautions in place.       Patient is not progressing towards the following goals:

## 2022-06-28 NOTE — PROGRESS NOTES
Assumed care of patient. Report received from MAURO Mclean. Patient sleeping, resting comfortably, unlabored breathing. Call light within reach, bed locked and in lowest position.

## 2022-06-28 NOTE — PROGRESS NOTES
Report received from day RN, pt returned from dialysis with bed alarm on and oxygen plugged back into wall, pt care assumed, assessment completed. Pt is A&O 3, pain at her foot very high so pt to receive pain medications, dinner reheated and room temp increased for comfort. Updated on POC, questions answered. Bed in lowest, locked position, treaded socks on, call light and belongings within reach. Fall precautions in place.

## 2022-06-28 NOTE — PROGRESS NOTES
Patient asking for pain medication, cannot keep eyes open for a conversation. Medicated with scheduled tylenol for pain and repositioned for optimal comfort.

## 2022-06-28 NOTE — PROGRESS NOTES
[facility]GRESS NOTE     Attending: Kamille Minor M.d.    PATIENT: Jannet Gillespie; [id number]; 1940     ID: 81 y.o. female who was admitted on 6/24/2022 for left upper arm swelling and lower extremity edema.    SUBJECTIVE: Pt without pain during entire interview until asked about pain. NAOE. Pt hopeful about a SNF, interested in rehab.    OBJECTIVE:     Vitals:    06/28/22 0004 06/28/22 0421 06/28/22 0531 06/28/22 0733   BP: 117/63 127/52 122/62 115/62   Pulse: 90 91 94 97   Resp: 16 16 16 (!) 24   Temp:    37.4 °C (99.3 °F)   TempSrc: Temporal Temporal Temporal Temporal   SpO2: 94% 92% 94% 94%   Weight:       Height:           Intake/Output Summary (Last 24 hours) at 6/25/2022 1352  Last data filed at 6/24/2022 2037  Gross per 24 hour   Intake 500 ml   Output 3000 ml   Net -2500 ml       PE:   General: Patient in no distress, tired on exam.  Skin:  Pink, warm and dry.  No rashes  HEENT: NC/AT. PERRL. EOMI. MMM. No nasal discharge. Oropharynx nonerythematous without exudate/plaques  Neck:  Supple without lymphadenopathy or rigidity.   Lungs:  Symmetrical.  Crackles improved this AM Good air movement. 2 liters  Cardiovascular:  S1/S2 RRR with harsh systolic murmur   Abdomen:  Abdomen is soft, nontender, nondistended. +BS. No masses noted.  Extremities:  Full range of motion. No gross deformities noted. 2+ pulses in all extremities. Induration and tenderness in BLE below knee. The left upper extremity also edematous. AV fistula has palpable thrill.   Spine:  Straight without vertebral anomalies.  CNS:  Muscle tone is normal. No gross focal neurologic deficits      LABS x 2 DAYS:    Admission on 06/24/2022   Component Date Value Ref Range Status   • Report 06/24/2022    Final                    Value:[facility] Emergency Dept.    Test Date:  2022-06-24  Pt Name:    JANNET GILLESPIE               Department: ER  MRN:        [id number]                      Room:       RD 02  Gender:      Female                       Technician: [id number]  :        1940                   Requested By:ER TRIAGE PROTOCOL  Order #:    [id number]                    Reading MD: Rafael Florian II, MD    Measurements  Intervals                                Axis  Rate:       87                           P:          51  VA:         172                          QRS:        -25  QRSD:       100                          T:          112  QT:         428  QTc:        515    Interpretive Statements  SINUS RHYTHM  PROBABLE LEFT ATRIAL ABNORMALITY  LEFT VENTRICULAR HYPERTROPHY  PROLONGED QT INTERVAL  No ST elevation. Slight ST depression in lead II. More prominent twaves in  lateral leads.  Impression: NSR, prolonged QT, LVH, more prominent twaves than previous.  Compared to ECG 02/15/2019 10:33                          :42  Left ventricular hypertrophy now present  T-wave abnormality no longer present  Electronically Signed On 2022 1:12:45 PDT by Rafael Florian II, MD     • WBC 2022 10.5  4.8 - 10.8 K/uL Final   • RBC 2022 3.75 (A) 4.20 - 5.40 M/uL Final   • Hemoglobin 2022 12.8  12.0 - 16.0 g/dL Final   • Hematocrit 2022 38.6  37.0 - 47.0 % Final   • MCV 2022 102.9 (A) 81.4 - 97.8 fL Final   • MCH 2022 34.1 (A) 27.0 - 33.0 pg Final   • MCHC 2022 33.2 (A) 33.6 - 35.0 g/dL Final   • RDW 2022 62.9 (A) 35.9 - 50.0 fL Final   • Platelet Count 2022 215  164 - 446 K/uL Final   • MPV 2022 9.7  9.0 - 12.9 fL Final   • Neutrophils-Polys 2022 77.30 (A) 44.00 - 72.00 % Final   • Lymphocytes 2022 11.90 (A) 22.00 - 41.00 % Final   • Monocytes 2022 6.90  0.00 - 13.40 % Final   • Eosinophils 2022 2.50  0.00 - 6.90 % Final   • Basophils 2022 0.60  0.00 - 1.80 % Final   • Immature Granulocytes 2022 0.80  0.00 - 0.90 % Final   • Nucleated RBC 2022 0.00  /100 WBC Final   • Neutrophils (Absolute) 2022 8.12 (A) 2.00 - 7.15 K/uL  Final    Includes immature neutrophils, if present.   • Lymphs (Absolute) 06/24/2022 1.25  1.00 - 4.80 K/uL Final   • Monos (Absolute) 06/24/2022 0.72  0.00 - 0.85 K/uL Final   • Eos (Absolute) 06/24/2022 0.26  0.00 - 0.51 K/uL Final   • Baso (Absolute) 06/24/2022 0.06  0.00 - 0.12 K/uL Final   • Immature Granulocytes (abs) 06/24/2022 0.08  0.00 - 0.11 K/uL Final   • NRBC (Absolute) 06/24/2022 0.00  K/uL Final   • Sodium 06/24/2022 133 (A) 135 - 145 mmol/L Final   • Potassium 06/24/2022 5.2  3.6 - 5.5 mmol/L Final   • Chloride 06/24/2022 88 (A) 96 - 112 mmol/L Final   • Co2 06/24/2022 23  20 - 33 mmol/L Final   • Anion Gap 06/24/2022 22.0 (A) 7.0 - 16.0 Final   • Glucose 06/24/2022 423 (A) 65 - 99 mg/dL Final   • Bun 06/24/2022 52 (A) 8 - 22 mg/dL Final   • Creatinine 06/24/2022 6.39 (A) 0.50 - 1.40 mg/dL Final   • Calcium 06/24/2022 9.2  8.5 - 10.5 mg/dL Final   • AST(SGOT) 06/24/2022 16  12 - 45 U/L Final    Comment: The hemolysis index of the specimen exceeds the allowed tolerance for the  test.  Result may be affected.  Specimen recollection is recommended to  confirm the result.     • ALT(SGPT) 06/24/2022 11  2 - 50 U/L Final   • Alkaline Phosphatase 06/24/2022 139 (A) 30 - 99 U/L Final   • Total Bilirubin 06/24/2022 0.8  0.1 - 1.5 mg/dL Final   • Albumin 06/24/2022 3.5  3.2 - 4.9 g/dL Final   • Total Protein 06/24/2022 7.0  6.0 - 8.2 g/dL Final   • Globulin 06/24/2022 3.5  1.9 - 3.5 g/dL Final   • A-G Ratio 06/24/2022 1.0  g/dL Final   • NT-proBNP 06/24/2022 >05599 (A) 0 - 125 pg/mL Final   • GFR (CKD-EPI) 06/24/2022 6 (A) >60 mL/min/1.73 m 2 Final    Comment: Effective 3/15/2022, estimated Glomerular Filtration Rate  is calculated using race neutral CKD-EPI 2021 equation  per NKF-ASN recommendations.     • Lactic Acid 06/24/2022 3.3 (A) 0.5 - 2.0 mmol/L Final   • beta-Hydroxybutyric Acid 06/24/2022 0.99 (A) 0.02 - 0.27 mmol/L Final   • POC Glucose, Blood 06/24/2022 365 (A) 65 - 99 mg/dL Final   •  Glycohemoglobin 06/24/2022 11.7 (A) 4.0 - 5.6 % Final    Comment: Increased risk for diabetes:  5.7 -6.4%  Diabetes:  >6.4%  Glycemic control for adults with diabetes:  <7.0%    The above interpretations are per ADA guidelines.  Diagnosis  of diabetes mellitus on the basis of elevated Hemoglobin A1c  should be confirmed by repeating the Hb A1c test.     • Est Avg Glucose 06/24/2022 289  mg/dL Final    Comment: The eAG calculation is based on the A1c-Derived Daily Glucose  (ADAG) study.  See the ADA's website for additional information.     • Vitamin B12 -True Cobalamin 06/24/2022 1108 (A) 211 - 911 pg/mL Final   • Lactic Acid 06/24/2022 3.3 (A) 0.5 - 2.0 mmol/L Final   • Hepatitis B Surface Antigen 06/24/2022 Non-Reactive  Non-Reactive Final    Comment: It has been reported that certain assays will not detect all HBV mutants.  If acute or chronic HBV infection is suspected and the HBsAg result is  negative, it is recommended that other serological markers be tested to  confirm the HBsAg nonreactivity.  HBsAg test results should always be  assessed in conjunction with the patient's medical history, clinical  examination, and other findings.    Note: Assay performance characteristics have not been established when the  Elecsys HBsAg II assay is used in conjunction with other manufacturers'  assays for specific HBV serological markers. Assay performance  characteristics have not been established for testing of newborns.       • Hepatitis B Cors Ab,IgM 06/24/2022 Non-Reactive  Non-Reactive Final    Comment: IgM antibodies to HBc were not detected.  The Roche HBc IgM is a diagnostic test for the qualitative determination of  IgM antibodies to the hepatitis B core antigen in human serum and plasma.  The results should be used and interpreted only in the context of the  overall clinical picture. A negative test result does not exclude the  possibility of exposure to hepatitis B virus.  Note: Assay performance  characteristics have not been established in  patients under the age of 21, pregnant women, or in populations of  immunocompromised or immunosuppressed patients.     • Hepatitis A Virus Ab, IgM 06/24/2022 Non-Reactive  Non-Reactive Final    Comment: The Roche HAV IgM assay is a diagnostic immunoassay for the qualitative  determination of IgM response to the hepatitis A virus in human serum and  plasma. The results should be used and interpreted only in the context of  the overall clinical picture. A negative test result does not exclude the  possibility of exposure to hepatitis A virus.  Note: Assay performance characteristics have not been established for  immunocompromised or immunosuppressed patients.     • Hepatitis C Antibody 06/24/2022 Non-Reactive  Non-Reactive Final    Comment: Antibodies to HCV were not detected.  The Roche anti-HCV is a diagnostic test for the qualitative determination of  antibodies to the hepatitis C virus in human serum and plasma. The results  should be used and interpreted only in the context of the overall clinical  picture. A negative test result does not exclude the possibility of exposure  to hepatitis C virus.  Note: Assay performance characteristics have not been established in  populations of immunocompromised or immunosuppressed patients.     • POC Glucose, Blood 06/24/2022 319 (A) 65 - 99 mg/dL Final   • Hep B Surface Antibody Quant 06/24/2022 171.00 (A) 0.00 - 10.00 mIU/mL Final    Comment: Anti HBs concentration detected at > 10 mIU/mL. Individual is considered to  be immune to infection with HBV.  Reference Interval: anti-HBs  < 8.5 mIU/mL..........Negative.  8.5 - 11.4 mIU/mL..........Indeterminate.  = 11.5 mIU/mL..........Positive.  Assay performance characteristics have not been established when the Elecsys  Anti HBs assay is used in conjunction with other manufacturers' assays for  specific HBV serological markers.  For post-vaccination antibody testing guidelines for  the general public,  refer to MMWR December 23, 2005/Vol. 54 (No.16);1-23, and for healthcare  workers, refer to MMWR December 20, 2013/Vol.62(No. 10);1-19.     • POC Glucose, Blood 06/24/2022 284 (A) 65 - 99 mg/dL Final   • POC Glucose, Blood 06/24/2022 125 (A) 65 - 99 mg/dL Final   • WBC 06/25/2022 9.5  4.8 - 10.8 K/uL Final   • RBC 06/25/2022 3.80 (A) 4.20 - 5.40 M/uL Final   • Hemoglobin 06/25/2022 12.9  12.0 - 16.0 g/dL Final   • Hematocrit 06/25/2022 39.7  37.0 - 47.0 % Final   • MCV 06/25/2022 104.5 (A) 81.4 - 97.8 fL Final   • MCH 06/25/2022 33.9 (A) 27.0 - 33.0 pg Final   • MCHC 06/25/2022 32.5 (A) 33.6 - 35.0 g/dL Final   • RDW 06/25/2022 63.9 (A) 35.9 - 50.0 fL Final   • Platelet Count 06/25/2022 190  164 - 446 K/uL Final   • MPV 06/25/2022 8.8 (A) 9.0 - 12.9 fL Final   • Neutrophils-Polys 06/25/2022 74.40 (A) 44.00 - 72.00 % Final   • Lymphocytes 06/25/2022 14.20 (A) 22.00 - 41.00 % Final   • Monocytes 06/25/2022 5.90  0.00 - 13.40 % Final   • Eosinophils 06/25/2022 4.30  0.00 - 6.90 % Final   • Basophils 06/25/2022 0.50  0.00 - 1.80 % Final   • Immature Granulocytes 06/25/2022 0.70  0.00 - 0.90 % Final   • Nucleated RBC 06/25/2022 0.20  /100 WBC Final   • Neutrophils (Absolute) 06/25/2022 7.10  2.00 - 7.15 K/uL Final    Includes immature neutrophils, if present.   • Lymphs (Absolute) 06/25/2022 1.35  1.00 - 4.80 K/uL Final   • Monos (Absolute) 06/25/2022 0.56  0.00 - 0.85 K/uL Final   • Eos (Absolute) 06/25/2022 0.41  0.00 - 0.51 K/uL Final   • Baso (Absolute) 06/25/2022 0.05  0.00 - 0.12 K/uL Final   • Immature Granulocytes (abs) 06/25/2022 0.07  0.00 - 0.11 K/uL Final   • NRBC (Absolute) 06/25/2022 0.02  K/uL Final   • PT 06/25/2022 14.9 (A) 12.0 - 14.6 sec Final   • INR 06/25/2022 1.20 (A) 0.87 - 1.13 Final    Comment: INR - Non-therapeutic Reference Range: 0.87-1.13  INR - Therapeutic Reference Range: 2.0-4.0     • Sodium 06/25/2022 137  135 - 145 mmol/L Final   • Potassium 06/25/2022 4.1  3.6 - 5.5  mmol/L Final   • Chloride 06/25/2022 93 (A) 96 - 112 mmol/L Final   • Co2 06/25/2022 28  20 - 33 mmol/L Final   • Anion Gap 06/25/2022 16.0  7.0 - 16.0 Final   • Glucose 06/25/2022 87  65 - 99 mg/dL Final   • Bun 06/25/2022 34 (A) 8 - 22 mg/dL Final   • Creatinine 06/25/2022 5.22 (A) 0.50 - 1.40 mg/dL Final   • Calcium 06/25/2022 8.8  8.5 - 10.5 mg/dL Final   • AST(SGOT) 06/25/2022 12  12 - 45 U/L Final   • ALT(SGPT) 06/25/2022 11  2 - 50 U/L Final   • Alkaline Phosphatase 06/25/2022 114 (A) 30 - 99 U/L Final   • Total Bilirubin 06/25/2022 0.7  0.1 - 1.5 mg/dL Final   • Albumin 06/25/2022 3.3  3.2 - 4.9 g/dL Final   • Total Protein 06/25/2022 6.7  6.0 - 8.2 g/dL Final   • Globulin 06/25/2022 3.4  1.9 - 3.5 g/dL Final   • A-G Ratio 06/25/2022 1.0  g/dL Final   • Magnesium 06/25/2022 2.1  1.5 - 2.5 mg/dL Final   • Phosphorus 06/25/2022 6.4 (A) 2.5 - 4.5 mg/dL Final   • Lactic Acid 06/25/2022 1.7  0.5 - 2.0 mmol/L Final   • Lactic Acid 06/25/2022 1.5  0.5 - 2.0 mmol/L Final   • D-Dimer Screen 06/25/2022 1.86 (A) 0.00 - 0.50 ug/mL (FEU) Final    Comment: A negative D-Dimer result when combined with a clinical  assessment of low probability has been shown to have a high  negative predictive value for DVT or PE.    This assay should not be used independently to exclude or  diagnose DVT or Pulmonary Embolism.    This test methodology does not differentiate between DVT and  PE when an elevation in results is demonstrated.     • APTT 06/25/2022 30.4  24.7 - 36.0 sec Final   • Stat C-Reactive Protein 06/25/2022 7.07 (A) 0.00 - 0.75 mg/dL Final   • GFR (CKD-EPI) 06/25/2022 8 (A) >60 mL/min/1.73 m 2 Final    Comment: Effective 3/15/2022, estimated Glomerular Filtration Rate  is calculated using race neutral CKD-EPI 2021 equation  per NKF-ASN recommendations.     • POC Glucose, Blood 06/25/2022 61 (A) 65 - 99 mg/dL Final   • POC Glucose, Blood 06/25/2022 74  65 - 99 mg/dL Final          MICROBIOLOGY:  Results     ** No results  found for the last 168 hours. **              IMAGING:   CT-CTA UPPER EXT WITH & W/O-POST PROCESS LEFT   Final Result      1.  Status post brachiocephalic fistula in the left arm. No focal stenosis is identified.      2.  Extensive edema in the subcutaneous tissues of the visualized left lateral chest and arm.      EC-ECHOCARDIOGRAM COMPLETE W/O CONT   Final Result      US-ZAHRAA SINGLE LEVEL BILAT   Final Result      US-EXTREMITY ARTERY LOWER BILAT   Final Result      CT-EXTREMITY, UPPER W/O LEFT   Final Result      1.  There is diffuse subcutaneous edema of the imaged portions of the left upper extremity. There is also diffuse body wall edema in the visualized portions of the chest and abdomen.   2.  No focal fluid collection to suggest abscess.   3.  There is a small left pleural effusion and minimal fluid in the abdomen.   4.  There is postsurgical change of AV fistula. There is small vessel atherosclerosis.      US-EXTREMITY VENOUS UPPER UNILAT LEFT   Final Result      DX-CHEST-PORTABLE (1 VIEW)   Final Result         1.  Interstitial pulmonary parenchymal prominence suggest chronic underlying lung disease, component of interstitial edema and/or infiltrates not excluded.   2.  Small left pleural effusion   3.  Cardiomegaly   4.  Atherosclerosis          MEDS:  Current Facility-Administered Medications   Medication Last Admin   • insulin GLARGINE (Lantus,Semglee) injection 10 Units at 06/27/22 2126    And   • insulin lispro (AdmeLOG,HumaLOG) injection 1 Units at 06/27/22 2125    And   • dextrose 50% (D50W) injection 25 g     • gabapentin (NEURONTIN) capsule 100 mg 100 mg at 06/28/22 0525   • polyethylene glycol/lytes (MIRALAX) PACKET 1 Packet 1 Packet at 06/28/22 0524    And   • senna-docusate (PERICOLACE or SENOKOT S) 8.6-50 MG per tablet 2 Tablet 2 Tablet at 06/28/22 0526    And   • bisacodyl (DULCOLAX) suppository 10 mg     • gabapentin (NEURONTIN) capsule 200 mg 200 mg at 06/27/22 2101   • HYDROmorphone  (Dilaudid) injection 0.25 mg 0.25 mg at 06/28/22 0519   • HYDROmorphone (Dilaudid) injection 0.5 mg     • sevelamer carbonate (RENVELA) tablet 1,600 mg 1,600 mg at 06/28/22 0852   • lidocaine (LIDODERM) 5 % 2 Patch 2 Patch at 06/28/22 0852   • acetaminophen (Tylenol) tablet 650 mg 650 mg at 06/28/22 0613   • heparin injection 5,000 Units 5,000 Units at 06/28/22 0527   • atorvastatin (LIPITOR) tablet 20 mg 20 mg at 06/28/22 0525   • calcitRIOL (ROCALTROL) capsule 0.25 mcg 0.25 mcg at 06/28/22 0525   • ROPINIRole (REQUIP) tablet 0.25 mg 0.25 mg at 06/28/22 0525   • ondansetron (ZOFRAN) syringe/vial injection 4 mg 4 mg at 06/28/22 1018       PROBLEM LIST:  Problem Noted   Goals of Care, Counseling/Discussion 6/25/2022   Pain in Both Lower Extremities 6/25/2022   Esrd (End Stage Renal Disease) On Dialysis ([facility]) 6/24/2022   Generalized Weakness 6/24/2022   Edema of Left Upper Extremity 6/24/2022   Diabetes mellitus, type II ([facility]) 8/14/2017   Essential Hypertension 10/23/2016       ASSESSMENT/PLAN: Jannet Bruno is a 81 y.o. female here with:    * ESRD (end stage renal disease) on dialysis ([facility])- (present on admission)  Assessment & Plan  Patient has history of ESRD currently on dialysis.  Pt followed by [facility]. Had multiple missed appointments prior to admission. Edematous left arm known to nephrology. Planned to have pt go to access center to fix central stenosis this coming week.    As per daughter pt has had significant decrease in mobility and cognition in the past 3-6 months. Pt's story shifting.Chronic pain has been increasing.    CTA of LUE without evidence of central stenosis    Plan:  - Nephrology consulted. Appreciate the recs and help. Dialysis as per nephro  - Continue sevelamer and calcitriol  - Home lasix dced  - Renally dose medications      Pain in both lower extremities  Assessment & Plan  Chronic in nature with likely indolent worsening. As per patient and daughter >1 year. At home  "pt uses tylenol and showers to control the pain.    The pain appears to be multi-factorial. Some residual from old hip fractures but the significant pain seems to come from her bilateral lower legs. Chronic. Likely vascular in nature, however, not emergent. Pt not a candidate for surgery, unless she desires amputations. Please see vascular note for more details.    Plan:  - Continue goals of care conversation with patient and family.  - Palliative consulted. Appreciate the help  - Dilaudid .25 mg or .5 mg q2 hrs prn  - Tylenol 650 mg scheduled  - Gabapentin qHS 200 mg  - Vascular consult. Appreciate the recommendations and help.    Goals of care, counseling/discussion  Assessment & Plan  Pt with recent (6 months - 1 year) decline in mobility and cognition. Has been on dialysis for years. Echo during this hospitalization with severe aortic stenosis and EF of 25-30%. Pt intermittently in significant pain. There is no DPOA. Pt is currently full code and is adamant about this. She is also adamant that her family is NOT to make medical decisions for her. She is still \"thinking\" about who can make decisions for her if she is unable to make them.      Plan:  - Poor prognosis given multiple severe comorbidities. Pt is not a candidate for aggressive treatment/therapy for vascular and cardiac issues. Please see their notes for details.  - Palliative consulted. Appreciate the recs and the help  - Cardiology and vascular consulted in line with patient's goals of care. Appreciate the recs and the help  - Will continue discussions with patient and when appropriate family    Edema of left upper extremity- (present on admission)  Assessment & Plan  CTA of LUE without evidence of central stenosis. Vascular recommending wrapping arm with ace bandage      Plan:  - Wrap upper arm with ace bandage  - Dialysis as per nephro    Generalized weakness- (present on admission)  Assessment & Plan  Patient noted to have generalized weakness " which led her to miss her dialysis appointments.  Unsure etiology of this generalized weakness at this point. As per daughter patient has had general decline in health over the past 6 months.    Believe it unlikely to be acute intracranial process given the history provided by daughter and the indolent worsening or the patient's general health/wellness.    Plan:  - PT/OT consult  - Low threshold for head imaging    Diabetes mellitus, type II ([facility])- (present on admission)  Assessment & Plan  Patient noted to have hyperglycemia in the 400s in the ER.  A1C of 11.7     Plan:  - Hold home antihyperglycemic's  - Start insulin sliding scale with basal insulin at night. Currently at 10 units Lantus QHS  - Hypoglycemic protocol  - Continue to titrate insulin as needed      Essential hypertension- (present on admission)  Assessment & Plan  Pt with elevated pressures on admission. Restarted on home nifedipine. Now with depressed Bps. Believe that it is unlikely she was taking her medications as prescribed.    Plan:  - Nifedipine held. Will consider anti-HTN medications if pt becomes hypertensive again.        Core Measures:  Fluids: PO. NS boluses for hypotension  Lines: PIV  Abx: None  Diet: Renal  PPX: Heparin  DISPO: Inpatient      CODE STATUS: Full.    Topher Rahman MD  PGY1  [facility]

## 2022-06-28 NOTE — PROGRESS NOTES
[facility] Progress Note      HD today x 3 hours per Dr. Peres.   Initiated at 1710 and ended at 2010.   Patient assessed prior tx.  Alert and oriented x 4. Relaxed during tx. Denies discomfort.   Tolerated treatment. VSS.        UF Net: 2,800mL      Blood returned. Applied gauze and held KINGA AVF site for 10 minutes. Verified no bleeding post treatment. Bruit and thrill present post dialysis.   Instructions given to Primary RN that if bleeding occurs on the AVF site, change dressing and held the site with pressure.       Report given to Primary JUAN MANUEL Johnson RN.

## 2022-06-29 NOTE — PROGRESS NOTES
Bedside report received from day RN, pt care assumed, assessment completed. Pt is A&O 3, pain at her legs and is to receive scheduled Tylenol, SR on the monitor. Updated on POC, questions answered. Bed in lowest, locked position, treaded socks on, call light and belongings within reach. Fall precautions in place.

## 2022-06-29 NOTE — DISCHARGE PLANNING
[id number] - attempted to contact Beverly GALEAS with [employer] to assist in placement, no answer, left voice message.

## 2022-06-29 NOTE — PROGRESS NOTES
Vascular surgery    Patient awake somnolent  Left arm swelling improved after Ace wrap compression  Left upper extremity AV fistula patent    Continue to control left upper extremity swelling with Ace bandage    Call if needed    Fernie Nazario MD

## 2022-06-29 NOTE — PROGRESS NOTES
Monitor Summary:     Rhythm: Sinus Rhythm    Rate: 87-90    Measurement: .17/.09/.41    Ectopy: R PVCs    12 hr cc

## 2022-06-29 NOTE — PROGRESS NOTES
Monitor Summary      Rhythm: NSR     Rate:  91-94     Ectopy: rare PVC, rare PAC     .14/.07/.38

## 2022-06-29 NOTE — CARE PLAN
The patient is Watcher - Medium risk of patient condition declining or worsening    Shift Goals  Clinical Goals: Pain control  Patient Goals: Pain control  Family Goals: n/a    Progress made toward(s) clinical / shift goals:    Problem: Knowledge Deficit - Standard  Goal: Patient and family/care givers will demonstrate understanding of plan of care, disease process/condition, diagnostic tests and medications  Outcome: Progressing     Problem: Pain - Standard  Goal: Alleviation of pain or a reduction in pain to the patient’s comfort goal  Outcome: Progressing     Problem: Skin Integrity  Goal: Skin integrity is maintained or improved  Outcome: Progressing

## 2022-06-29 NOTE — PROGRESS NOTES
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 Pt is very lethargic at this time and unable to provide much history, majority of the history was obtained through review of the medical record and discussion with primary svc. Pt had reported increased LE edema and fatigue and weakness and worsening of her left arm edema so she came to the ED for evaluation. She apparently has missed her last 2-3 outpt dialysis treatments. Per regular outpt Nephrologist Dr. Gates, she has been non-compliant with her fluid restriction and was told that she needed 4x/week dialysis until her volume status could be optimized but she was non-compliant with that recommendation. She has edema of her left arm where her AVF is located and there is concern for a central stenosis that could be the etiology. She had an appointment at the outpt access center to evaluate for central stenosis and undergo angioplasty if needed on 6/9/22 but pt did not go to the appointment. She has not had any other procedures done to the arm in the past couple of months. She had a left arm us done today that showed no DVT and patent AVF and soft tissue edema. She apparently received pain medication fentanyl and dilaudid as well as benadryl earlier today and she is very drowsy.     DAILY NEPHROLOGY SUMMARY:  6/25: rapid response overnight for severe leg pain, pt very lethargic today, moans with palpation of legs, arouses but does not answer questions and falls back asleep, tolerated HD yest with 2.5L UF, BP stable overnight but low this am  6/26: No events, BP low overnight and this am, more alert, reports pain in legs for the  "past 3 weeks, denies any CP/SOB/Abd pain, reports left arm edema a little better but no pain in left arm   6/27: Pt resting in bed, subdued, Bps soft, on 4L supp O2 via NC, labs reviewed, due for HD  6/28: pt laying in bed, sleepy, c/o pain in legs, moaning, vascular assessed and does not recommend any intervention on legs or AVF, tolerated iHD yesterday with UF:2.8L  6/29: Pt in bed, fatigued, no complaints.  VSS 1L NC.      REVIEW OF SYSTEMS:    10 point ROS reviewed and is as per HPI/daily summary or otherwise negative    PMH/PSH/SH/FH:   Reviewed and unchanged since admission note    CURRENT MEDICATIONS:   Reviewed from admission to present day    VS:  /69   Pulse 89   Temp 37.3 °C (99.1 °F) (Temporal)   Resp 16   Ht 1.626 m (5' 4\")   Wt 65.8 kg (145 lb 1 oz)   SpO2 94%   BMI 24.90 kg/m²     Physical Exam  Nursing note reviewed.   Constitutional:       Appearance: Normal appearance.   HENT:      Head: Normocephalic and atraumatic.   Eyes:      General: No scleral icterus.  Cardiovascular:      Rate and Rhythm: Normal rate and regular rhythm.   Pulmonary:      Effort: Pulmonary effort is normal. No respiratory distress.      Breath sounds: Examination of the right-lower field reveals decreased breath sounds. Examination of the left-lower field reveals decreased breath sounds. Decreased breath sounds present.   Abdominal:      General: Bowel sounds are normal. There is no distension.      Palpations: Abdomen is soft.   Musculoskeletal:         General: Swelling (left arm) present. No deformity.      Right lower leg: Edema (trace) present.      Left lower leg: Edema (trace) present.   Skin:     General: Skin is warm.      Findings: No rash.      Comments: +Chronic skin changes in bilaterally lower legs   Neurological:      General: No focal deficit present.      Mental Status: She is alert and oriented to person, place, and time.   Psychiatric:         Mood and Affect: Mood normal.         Behavior: " Behavior normal.         Fluids:  In: 120 [P.O.:120]  Out: -     LABS:  Recent Labs     06/27/22  0215 06/28/22  1024 06/29/22  0210   SODIUM 135 134* 134*   POTASSIUM 4.9 4.9 5.2   CHLORIDE 91* 92* 93*   CO2 27 28 27   GLUCOSE 203* 124* 99   BUN 45* 33* 40*   CREATININE 7.44* 5.45* 5.94*   CALCIUM 7.7* 8.2* 8.4*       IMAGING:   All imaging reviewed from admission to present day    IMPRESSION:  # ESRD, dependent on HD    - HD via LUE AVF, missed last 2-3 outpt treatments   - iHD q MWF and prn   # Fluid Overload, improving    - Secondary to non-compliance with fluid restriction on HD and   non-compliance with treatments  # Left arm edema   - CTA upper ext 6/26 w/o e/o focal stenosis + extensive edema   - AVF patent on left arm us and no DVT   - Pt no showed to outpt appt to evaluate    - vascular doesn't recommend intervention   # Altered Mental Status, improved    - Monitor  # HTN, uncontrolled    - Goal BP < 140/90   - Not on BP meds   # Anemia of CKD, at goal    - Goal Hgb 10-11   - Iron 27   -TIBC 142   -%Sat 19   -Ferritin 1419  # CKD-MBD   - Ca 9.2   - PO4 6.4   - Managed at OP unit   - On calcitriol and sevelamer   # DM II--management per primary svc  # Leg Pain--chronic skin changes and subcutaneous tissue firm to touch   - Unclear etiology   - vascular does not recommend any intervention, consider palliative      PLAN:  - Continue qMWF iHD schedule, treatment due today  (Wed)  - UF as tolerated  - No current need for ROGELIO  - Continue phos binders WM  - Continue off of all BP meds and diuretics for now  - PRN NS boluses for symptomatic hypotension  - Limit sedating meds if possible  - No dietary protein restrictions  - Dose all meds per ESRD  - Outpt access center appointment rescheduled to this week  - Recommend palliative consult  - Pt at high risk for further morbidity/mortality  - Follow labs    Thank you,

## 2022-06-29 NOTE — PROGRESS NOTES
[facility] PROGRESS NOTE     Attending: Kamille Minor M.d.    PATIENT: Jannet Gillespie; [id number]; 1940     ID: 81 y.o. female who was admitted on 6/24/2022 for left upper arm swelling and lower extremity edema.    SUBJECTIVE: NAOE. Pt continues to be cleared for SNF. Scheduled for dialysis today.    OBJECTIVE:     Vitals:    06/28/22 1647 06/28/22 1912 06/28/22 2358 06/29/22 0517   BP: 117/63 125/63 114/61 122/69   Pulse: 90 89 89 85   Resp: 18 16 16 16   Temp: 37.4 °C (99.3 °F) 37.6 °C (99.7 °F) 36.3 °C (97.3 °F) 36.4 °C (97.5 °F)   TempSrc: Temporal Temporal Temporal Temporal   SpO2: 94% 94% 99% 90%   Weight:   65.8 kg (145 lb 1 oz)    Height:           Intake/Output Summary (Last 24 hours) at 6/25/2022 1352  Last data filed at 6/24/2022 2037  Gross per 24 hour   Intake 500 ml   Output 3000 ml   Net -2500 ml       PE:   General: Patient in no distress, tired on exam.  Skin:  Pink, warm and dry.  No rashes  HEENT: NC/AT. PERRL. EOMI. MMM. No nasal discharge. Oropharynx nonerythematous without exudate/plaques  Neck:  Supple without lymphadenopathy or rigidity.   Lungs:  Symmetrical.  Crackles improved this AM Good air movement. 2 liters  Cardiovascular:  S1/S2 RRR with harsh systolic murmur   Abdomen:  Abdomen is soft, nontender, nondistended. +BS. No masses noted.  Extremities:  Full range of motion. No gross deformities noted. 2+ pulses in all extremities. Induration and tenderness in BLE below knee. The left upper extremity also edematous. AV fistula has palpable thrill.   Spine:  Straight without vertebral anomalies.  CNS:  Muscle tone is normal. No gross focal neurologic deficits      LABS x 2 DAYS:    Admission on 06/24/2022   Component Date Value Ref Range Status   • Report 06/24/2022    Final                    Value:[facility] Emergency Dept.    Test Date:  2022-06-24  Pt Name:    JANNET GILLESPIE               Department: ER  MRN:        [id number]                      Room:        RD 02  Gender:     Female                       Technician: [id number]  :        1940                   Requested By:ER TRIAGE PROTOCOL  Order #:    [id number]                    Reading MD: Rafael Florian II, MD    Measurements  Intervals                                Axis  Rate:       87                           P:          51  TX:         172                          QRS:        -25  QRSD:       100                          T:          112  QT:         428  QTc:        515    Interpretive Statements  SINUS RHYTHM  PROBABLE LEFT ATRIAL ABNORMALITY  LEFT VENTRICULAR HYPERTROPHY  PROLONGED QT INTERVAL  No ST elevation. Slight ST depression in lead II. More prominent twaves in  lateral leads.  Impression: NSR, prolonged QT, LVH, more prominent twaves than previous.  Compared to ECG 02/15/2019 10:33                          :42  Left ventricular hypertrophy now present  T-wave abnormality no longer present  Electronically Signed On 2022 1:12:45 PDT by Rafael Florian II, MD     • WBC 2022 10.5  4.8 - 10.8 K/uL Final   • RBC 2022 3.75 (A) 4.20 - 5.40 M/uL Final   • Hemoglobin 2022 12.8  12.0 - 16.0 g/dL Final   • Hematocrit 2022 38.6  37.0 - 47.0 % Final   • MCV 2022 102.9 (A) 81.4 - 97.8 fL Final   • MCH 2022 34.1 (A) 27.0 - 33.0 pg Final   • MCHC 2022 33.2 (A) 33.6 - 35.0 g/dL Final   • RDW 2022 62.9 (A) 35.9 - 50.0 fL Final   • Platelet Count 2022 215  164 - 446 K/uL Final   • MPV 2022 9.7  9.0 - 12.9 fL Final   • Neutrophils-Polys 2022 77.30 (A) 44.00 - 72.00 % Final   • Lymphocytes 2022 11.90 (A) 22.00 - 41.00 % Final   • Monocytes 2022 6.90  0.00 - 13.40 % Final   • Eosinophils 2022 2.50  0.00 - 6.90 % Final   • Basophils 2022 0.60  0.00 - 1.80 % Final   • Immature Granulocytes 2022 0.80  0.00 - 0.90 % Final   • Nucleated RBC 2022 0.00  /100 WBC Final   • Neutrophils (Absolute) 2022 8.12  (A) 2.00 - 7.15 K/uL Final    Includes immature neutrophils, if present.   • Lymphs (Absolute) 06/24/2022 1.25  1.00 - 4.80 K/uL Final   • Monos (Absolute) 06/24/2022 0.72  0.00 - 0.85 K/uL Final   • Eos (Absolute) 06/24/2022 0.26  0.00 - 0.51 K/uL Final   • Baso (Absolute) 06/24/2022 0.06  0.00 - 0.12 K/uL Final   • Immature Granulocytes (abs) 06/24/2022 0.08  0.00 - 0.11 K/uL Final   • NRBC (Absolute) 06/24/2022 0.00  K/uL Final   • Sodium 06/24/2022 133 (A) 135 - 145 mmol/L Final   • Potassium 06/24/2022 5.2  3.6 - 5.5 mmol/L Final   • Chloride 06/24/2022 88 (A) 96 - 112 mmol/L Final   • Co2 06/24/2022 23  20 - 33 mmol/L Final   • Anion Gap 06/24/2022 22.0 (A) 7.0 - 16.0 Final   • Glucose 06/24/2022 423 (A) 65 - 99 mg/dL Final   • Bun 06/24/2022 52 (A) 8 - 22 mg/dL Final   • Creatinine 06/24/2022 6.39 (A) 0.50 - 1.40 mg/dL Final   • Calcium 06/24/2022 9.2  8.5 - 10.5 mg/dL Final   • AST(SGOT) 06/24/2022 16  12 - 45 U/L Final    Comment: The hemolysis index of the specimen exceeds the allowed tolerance for the  test.  Result may be affected.  Specimen recollection is recommended to  confirm the result.     • ALT(SGPT) 06/24/2022 11  2 - 50 U/L Final   • Alkaline Phosphatase 06/24/2022 139 (A) 30 - 99 U/L Final   • Total Bilirubin 06/24/2022 0.8  0.1 - 1.5 mg/dL Final   • Albumin 06/24/2022 3.5  3.2 - 4.9 g/dL Final   • Total Protein 06/24/2022 7.0  6.0 - 8.2 g/dL Final   • Globulin 06/24/2022 3.5  1.9 - 3.5 g/dL Final   • A-G Ratio 06/24/2022 1.0  g/dL Final   • NT-proBNP 06/24/2022 >37086 (A) 0 - 125 pg/mL Final   • GFR (CKD-EPI) 06/24/2022 6 (A) >60 mL/min/1.73 m 2 Final    Comment: Effective 3/15/2022, estimated Glomerular Filtration Rate  is calculated using race neutral CKD-EPI 2021 equation  per NKF-ASN recommendations.     • Lactic Acid 06/24/2022 3.3 (A) 0.5 - 2.0 mmol/L Final   • beta-Hydroxybutyric Acid 06/24/2022 0.99 (A) 0.02 - 0.27 mmol/L Final   • POC Glucose, Blood 06/24/2022 365 (A) 65 - 99 mg/dL  Final   • Glycohemoglobin 06/24/2022 11.7 (A) 4.0 - 5.6 % Final    Comment: Increased risk for diabetes:  5.7 -6.4%  Diabetes:  >6.4%  Glycemic control for adults with diabetes:  <7.0%    The above interpretations are per ADA guidelines.  Diagnosis  of diabetes mellitus on the basis of elevated Hemoglobin A1c  should be confirmed by repeating the Hb A1c test.     • Est Avg Glucose 06/24/2022 289  mg/dL Final    Comment: The eAG calculation is based on the A1c-Derived Daily Glucose  (ADAG) study.  See the ADA's website for additional information.     • Vitamin B12 -True Cobalamin 06/24/2022 1108 (A) 211 - 911 pg/mL Final   • Lactic Acid 06/24/2022 3.3 (A) 0.5 - 2.0 mmol/L Final   • Hepatitis B Surface Antigen 06/24/2022 Non-Reactive  Non-Reactive Final    Comment: It has been reported that certain assays will not detect all HBV mutants.  If acute or chronic HBV infection is suspected and the HBsAg result is  negative, it is recommended that other serological markers be tested to  confirm the HBsAg nonreactivity.  HBsAg test results should always be  assessed in conjunction with the patient's medical history, clinical  examination, and other findings.    Note: Assay performance characteristics have not been established when the  Elecsys HBsAg II assay is used in conjunction with other manufacturers'  assays for specific HBV serological markers. Assay performance  characteristics have not been established for testing of newborns.       • Hepatitis B Cors Ab,IgM 06/24/2022 Non-Reactive  Non-Reactive Final    Comment: IgM antibodies to HBc were not detected.  The Roche HBc IgM is a diagnostic test for the qualitative determination of  IgM antibodies to the hepatitis B core antigen in human serum and plasma.  The results should be used and interpreted only in the context of the  overall clinical picture. A negative test result does not exclude the  possibility of exposure to hepatitis B virus.  Note: Assay performance  characteristics have not been established in  patients under the age of 21, pregnant women, or in populations of  immunocompromised or immunosuppressed patients.     • Hepatitis A Virus Ab, IgM 06/24/2022 Non-Reactive  Non-Reactive Final    Comment: The Roche HAV IgM assay is a diagnostic immunoassay for the qualitative  determination of IgM response to the hepatitis A virus in human serum and  plasma. The results should be used and interpreted only in the context of  the overall clinical picture. A negative test result does not exclude the  possibility of exposure to hepatitis A virus.  Note: Assay performance characteristics have not been established for  immunocompromised or immunosuppressed patients.     • Hepatitis C Antibody 06/24/2022 Non-Reactive  Non-Reactive Final    Comment: Antibodies to HCV were not detected.  The Roche anti-HCV is a diagnostic test for the qualitative determination of  antibodies to the hepatitis C virus in human serum and plasma. The results  should be used and interpreted only in the context of the overall clinical  picture. A negative test result does not exclude the possibility of exposure  to hepatitis C virus.  Note: Assay performance characteristics have not been established in  populations of immunocompromised or immunosuppressed patients.     • POC Glucose, Blood 06/24/2022 319 (A) 65 - 99 mg/dL Final   • Hep B Surface Antibody Quant 06/24/2022 171.00 (A) 0.00 - 10.00 mIU/mL Final    Comment: Anti HBs concentration detected at > 10 mIU/mL. Individual is considered to  be immune to infection with HBV.  Reference Interval: anti-HBs  < 8.5 mIU/mL..........Negative.  8.5 - 11.4 mIU/mL..........Indeterminate.  = 11.5 mIU/mL..........Positive.  Assay performance characteristics have not been established when the Elecsys  Anti HBs assay is used in conjunction with other manufacturers' assays for  specific HBV serological markers.  For post-vaccination antibody testing guidelines for  the general public,  refer to MMWR December 23, 2005/Vol. 54 (No.16);1-23, and for healthcare  workers, refer to MMWR December 20, 2013/Vol.62(No. 10);1-19.     • POC Glucose, Blood 06/24/2022 284 (A) 65 - 99 mg/dL Final   • POC Glucose, Blood 06/24/2022 125 (A) 65 - 99 mg/dL Final   • WBC 06/25/2022 9.5  4.8 - 10.8 K/uL Final   • RBC 06/25/2022 3.80 (A) 4.20 - 5.40 M/uL Final   • Hemoglobin 06/25/2022 12.9  12.0 - 16.0 g/dL Final   • Hematocrit 06/25/2022 39.7  37.0 - 47.0 % Final   • MCV 06/25/2022 104.5 (A) 81.4 - 97.8 fL Final   • MCH 06/25/2022 33.9 (A) 27.0 - 33.0 pg Final   • MCHC 06/25/2022 32.5 (A) 33.6 - 35.0 g/dL Final   • RDW 06/25/2022 63.9 (A) 35.9 - 50.0 fL Final   • Platelet Count 06/25/2022 190  164 - 446 K/uL Final   • MPV 06/25/2022 8.8 (A) 9.0 - 12.9 fL Final   • Neutrophils-Polys 06/25/2022 74.40 (A) 44.00 - 72.00 % Final   • Lymphocytes 06/25/2022 14.20 (A) 22.00 - 41.00 % Final   • Monocytes 06/25/2022 5.90  0.00 - 13.40 % Final   • Eosinophils 06/25/2022 4.30  0.00 - 6.90 % Final   • Basophils 06/25/2022 0.50  0.00 - 1.80 % Final   • Immature Granulocytes 06/25/2022 0.70  0.00 - 0.90 % Final   • Nucleated RBC 06/25/2022 0.20  /100 WBC Final   • Neutrophils (Absolute) 06/25/2022 7.10  2.00 - 7.15 K/uL Final    Includes immature neutrophils, if present.   • Lymphs (Absolute) 06/25/2022 1.35  1.00 - 4.80 K/uL Final   • Monos (Absolute) 06/25/2022 0.56  0.00 - 0.85 K/uL Final   • Eos (Absolute) 06/25/2022 0.41  0.00 - 0.51 K/uL Final   • Baso (Absolute) 06/25/2022 0.05  0.00 - 0.12 K/uL Final   • Immature Granulocytes (abs) 06/25/2022 0.07  0.00 - 0.11 K/uL Final   • NRBC (Absolute) 06/25/2022 0.02  K/uL Final   • PT 06/25/2022 14.9 (A) 12.0 - 14.6 sec Final   • INR 06/25/2022 1.20 (A) 0.87 - 1.13 Final    Comment: INR - Non-therapeutic Reference Range: 0.87-1.13  INR - Therapeutic Reference Range: 2.0-4.0     • Sodium 06/25/2022 137  135 - 145 mmol/L Final   • Potassium 06/25/2022 4.1  3.6 - 5.5  mmol/L Final   • Chloride 06/25/2022 93 (A) 96 - 112 mmol/L Final   • Co2 06/25/2022 28  20 - 33 mmol/L Final   • Anion Gap 06/25/2022 16.0  7.0 - 16.0 Final   • Glucose 06/25/2022 87  65 - 99 mg/dL Final   • Bun 06/25/2022 34 (A) 8 - 22 mg/dL Final   • Creatinine 06/25/2022 5.22 (A) 0.50 - 1.40 mg/dL Final   • Calcium 06/25/2022 8.8  8.5 - 10.5 mg/dL Final   • AST(SGOT) 06/25/2022 12  12 - 45 U/L Final   • ALT(SGPT) 06/25/2022 11  2 - 50 U/L Final   • Alkaline Phosphatase 06/25/2022 114 (A) 30 - 99 U/L Final   • Total Bilirubin 06/25/2022 0.7  0.1 - 1.5 mg/dL Final   • Albumin 06/25/2022 3.3  3.2 - 4.9 g/dL Final   • Total Protein 06/25/2022 6.7  6.0 - 8.2 g/dL Final   • Globulin 06/25/2022 3.4  1.9 - 3.5 g/dL Final   • A-G Ratio 06/25/2022 1.0  g/dL Final   • Magnesium 06/25/2022 2.1  1.5 - 2.5 mg/dL Final   • Phosphorus 06/25/2022 6.4 (A) 2.5 - 4.5 mg/dL Final   • Lactic Acid 06/25/2022 1.7  0.5 - 2.0 mmol/L Final   • Lactic Acid 06/25/2022 1.5  0.5 - 2.0 mmol/L Final   • D-Dimer Screen 06/25/2022 1.86 (A) 0.00 - 0.50 ug/mL (FEU) Final    Comment: A negative D-Dimer result when combined with a clinical  assessment of low probability has been shown to have a high  negative predictive value for DVT or PE.    This assay should not be used independently to exclude or  diagnose DVT or Pulmonary Embolism.    This test methodology does not differentiate between DVT and  PE when an elevation in results is demonstrated.     • APTT 06/25/2022 30.4  24.7 - 36.0 sec Final   • Stat C-Reactive Protein 06/25/2022 7.07 (A) 0.00 - 0.75 mg/dL Final   • GFR (CKD-EPI) 06/25/2022 8 (A) >60 mL/min/1.73 m 2 Final    Comment: Effective 3/15/2022, estimated Glomerular Filtration Rate  is calculated using race neutral CKD-EPI 2021 equation  per NKF-ASN recommendations.     • POC Glucose, Blood 06/25/2022 61 (A) 65 - 99 mg/dL Final   • POC Glucose, Blood 06/25/2022 74  65 - 99 mg/dL Final          MICROBIOLOGY:  Results     ** No results  found for the last 168 hours. **              IMAGING:   CT-CTA UPPER EXT WITH & W/O-POST PROCESS LEFT   Final Result      1.  Status post brachiocephalic fistula in the left arm. No focal stenosis is identified.      2.  Extensive edema in the subcutaneous tissues of the visualized left lateral chest and arm.      EC-ECHOCARDIOGRAM COMPLETE W/O CONT   Final Result      US-ZAHRAA SINGLE LEVEL BILAT   Final Result      US-EXTREMITY ARTERY LOWER BILAT   Final Result      CT-EXTREMITY, UPPER W/O LEFT   Final Result      1.  There is diffuse subcutaneous edema of the imaged portions of the left upper extremity. There is also diffuse body wall edema in the visualized portions of the chest and abdomen.   2.  No focal fluid collection to suggest abscess.   3.  There is a small left pleural effusion and minimal fluid in the abdomen.   4.  There is postsurgical change of AV fistula. There is small vessel atherosclerosis.      US-EXTREMITY VENOUS UPPER UNILAT LEFT   Final Result      DX-CHEST-PORTABLE (1 VIEW)   Final Result         1.  Interstitial pulmonary parenchymal prominence suggest chronic underlying lung disease, component of interstitial edema and/or infiltrates not excluded.   2.  Small left pleural effusion   3.  Cardiomegaly   4.  Atherosclerosis          MEDS:  Current Facility-Administered Medications   Medication Last Admin   • insulin GLARGINE (Lantus,Semglee) injection 10 Units at 06/28/22 1806    And   • insulin lispro (AdmeLOG,HumaLOG) injection 2 Units at 06/28/22 2118    And   • dextrose 50% (D50W) injection 25 g     • gabapentin (NEURONTIN) capsule 100 mg 100 mg at 06/29/22 0512   • polyethylene glycol/lytes (MIRALAX) PACKET 1 Packet 1 Packet at 06/29/22 0507    And   • senna-docusate (PERICOLACE or SENOKOT S) 8.6-50 MG per tablet 2 Tablet 2 Tablet at 06/29/22 0512    And   • bisacodyl (DULCOLAX) suppository 10 mg     • gabapentin (NEURONTIN) capsule 200 mg 200 mg at 06/28/22 2107   • HYDROmorphone  (Dilaudid) injection 0.25 mg 0.25 mg at 06/28/22 0519   • HYDROmorphone (Dilaudid) injection 0.5 mg     • sevelamer carbonate (RENVELA) tablet 1,600 mg 1,600 mg at 06/28/22 1808   • lidocaine (LIDODERM) 5 % 2 Patch 2 Patch at 06/28/22 0852   • acetaminophen (Tylenol) tablet 650 mg 650 mg at 06/29/22 0510   • heparin injection 5,000 Units 5,000 Units at 06/29/22 0510   • atorvastatin (LIPITOR) tablet 20 mg 20 mg at 06/29/22 0512   • calcitRIOL (ROCALTROL) capsule 0.25 mcg 0.25 mcg at 06/29/22 0511   • ROPINIRole (REQUIP) tablet 0.25 mg 0.25 mg at 06/29/22 0509   • ondansetron (ZOFRAN) syringe/vial injection 4 mg 4 mg at 06/28/22 1018       PROBLEM LIST:  Problem Noted   Goals of Care, Counseling/Discussion 6/25/2022   Pain in Both Lower Extremities 6/25/2022   Esrd (End Stage Renal Disease) On Dialysis ([facility]) 6/24/2022   Generalized Weakness 6/24/2022   Edema of Left Upper Extremity 6/24/2022   Diabetes mellitus, type II ([facility]) 8/14/2017   Essential Hypertension 10/23/2016       ASSESSMENT/PLAN: Jannet Bruno is a 81 y.o. female here with:    * ESRD (end stage renal disease) on dialysis ([facility])- (present on admission)  Assessment & Plan  Patient has history of ESRD currently on dialysis.  Pt followed by [facility]. Had multiple missed appointments prior to admission. Edematous left arm known to nephrology. Planned to have pt go to access center to fix central stenosis this coming week.    As per daughter pt has had significant decrease in mobility and cognition in the past 3-6 months. Pt's story shifting.Chronic pain has been increasing.    CTA of LUE without evidence of central stenosis    Plan:  - Nephrology consulted. Appreciate the recs and help. Dialysis as per nephro  - Continue sevelamer and calcitriol  - Home lasix dced  - Renally dose medications      Pain in both lower extremities  Assessment & Plan  Chronic in nature with likely indolent worsening. As per patient and daughter >1 year. At home  "pt uses tylenol and showers to control the pain.    The pain appears to be multi-factorial. Some residual from old hip fractures but the significant pain seems to come from her bilateral lower legs. Chronic. Likely vascular in nature, however, not emergent. Pt not a candidate for surgery, unless she desires amputations. Please see vascular note for more details.    Plan:  - Continue goals of care conversation with patient and family.  - Palliative consulted. Appreciate the help  - Dilaudid .25 mg or .5 mg q2 hrs prn  - Tylenol 650 mg scheduled  - Gabapentin qHS 200 mg  - Vascular consult. Appreciate the recommendations and help.    Goals of care, counseling/discussion  Assessment & Plan  Pt with recent (6 months - 1 year) decline in mobility and cognition. Has been on dialysis for years. Echo during this hospitalization with severe aortic stenosis and EF of 25-30%. Pt intermittently in significant pain. There is no DPOA. Pt is currently full code and is adamant about this. She is also adamant that her family is NOT to make medical decisions for her. She is still \"thinking\" about who can make decisions for her if she is unable to make them.      Plan:  - Poor prognosis given multiple severe comorbidities. Pt is not a candidate for aggressive treatment/therapy for vascular and cardiac issues. Please see their notes for details.  - Palliative consulted. Appreciate the recs and the help  - Cardiology and vascular consulted in line with patient's goals of care. Given the patient's multiple severe co-morbidities further cardiac/vascular interventions are extremely limited. Please see their notes for details. Appreciate the recs and the help  - Will continue discussions with patient and when appropriate family    Edema of left upper extremity- (present on admission)  Assessment & Plan  CTA of LUE without evidence of central stenosis. Vascular recommending wrapping arm with ace bandage      Plan:  - Wrap upper arm with ace " bandage  - Dialysis as per nephro    Generalized weakness- (present on admission)  Assessment & Plan  Patient noted to have generalized weakness which led her to miss her dialysis appointments.  Unsure etiology of this generalized weakness at this point. As per daughter patient has had general decline in health over the past 6 months.    Believe it unlikely to be acute intracranial process given the history provided by daughter and the indolent worsening or the patient's general health/wellness.    Plan:  - PT/OT consult  - Low threshold for head imaging    Diabetes mellitus, type II (HCC)- (present on admission)  Assessment & Plan  Patient noted to have hyperglycemia in the 400s in the ER.  A1C of 11.7     Plan:  - Hold home antihyperglycemic's  - Start insulin sliding scale with basal insulin at night. Currently at 10 units Lantus QHS  - Hypoglycemic protocol  - Continue to titrate insulin as needed      Essential hypertension- (present on admission)  Assessment & Plan  Pt with elevated pressures on admission. Restarted on home nifedipine. Now with depressed Bps. Believe that it is unlikely she was taking her medications as prescribed.    Plan:  - Nifedipine held. Will consider anti-HTN medications if pt becomes hypertensive again.        Core Measures:  Fluids: PO. NS boluses for hypotension  Lines: PIV  Abx: None  Diet: Renal  PPX: Heparin  DISPO: Cleared medically for discharge. Pending placement.      CODE STATUS: Full.    Topher Rahman MD  PGY1  [facility]

## 2022-06-30 PROBLEM — I35.0 AORTIC STENOSIS, SEVERE: Status: ACTIVE | Noted: 2022-01-01

## 2022-06-30 NOTE — CARE PLAN
The patient is Watcher - Medium risk of patient condition declining or worsening    Shift Goals  Clinical Goals: Dialysis, Pain control  Patient Goals: Sleeo  Family Goals: N/A    Progress made toward(s) clinical / shift goals:  Progressing       Problem: Knowledge Deficit - Standard  Goal: Patient and family/care givers will demonstrate understanding of plan of care, disease process/condition, diagnostic tests and medications  Outcome: Progressing  Note: Pt updated on POC, no questions at this time      Problem: Fall Risk  Goal: Patient will remain free from falls  Outcome: Progressing  Note: All fall precautions in place

## 2022-06-30 NOTE — DOCUMENTATION QUERY
"                                                                         [facility]                                                                       Query Response Note      PATIENT:               DIPAK GILLESPIE  ACCT #:                  [id number]  MRN:                     [id number]  :                      1940  ADMIT DATE:       2022 12:36 AM  DISCH DATE:          RESPONDING  PROVIDER #:        [id number]           QUERY TEXT:    Altered Mental Status is documented in the Medical Record. Please specify etiology.  NOTE:  If an appropriate response is not listed below, please respond with a new note.    Contact me for questions.     Thank you,  NADINE Ramirez, CDI  [email or web address]      The patient's Clinical Indicators include:  82yo with dx of ESRD, fluid overload 2/2 to noncompliance with fluid restriction, HD dependent, non compliance, Altered Mental Status.       ED Note \"...missed 3 dialysis days due to being so weak...\"   Dhanireddy Consult \"Lethargic and confused\" \"Altered Mental Status - Pt very lethargic, received multiple pain meds and sedating med earlier\"   Napoleon PN \"ROS Unobtainable, pt lethargic and does not answer questions\"   Kenyon PN \"Unclear what the patients' goals are 2/2 to pain and AMS (2/2 to opioids)\"   Ja PN \"Altered Mental Status, improved\"      Risk factors: missed hemodialysis appointments, ESRD, anemia in CKD, advanced age  Treatments: HD treatments, monitoring, Avoiding systemic opioid build-up, labwork  Options provided:   -- Encephalopathy due to medications or drugs   -- Metabolic encephalopathy   -- Toxic encephalopathy   -- other explanation of AMS, please specify   -- Unable to determine      Query created by: Odalis Soto on 2022 10:29 AM    RESPONSE TEXT:    Metabolic encephalopathy          Electronically signed by:  SISSY JORDAN MD 2022 3:04 PM              "

## 2022-06-30 NOTE — PROGRESS NOTES
[facility] -  PROGRESS NOTE               Author: Janel Peres M.D. 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 Pt is very lethargic at this time and unable to provide much history, majority of the history was obtained through review of the medical record and discussion with primary svc. Pt had reported increased LE edema and fatigue and weakness and worsening of her left arm edema so she came to the ED for evaluation. She apparently has missed her last 2-3 outpt dialysis treatments. Per regular outpt Nephrologist Dr. Gates, she has been non-compliant with her fluid restriction and was told that she needed 4x/week dialysis until her volume status could be optimized but she was non-compliant with that recommendation. She has edema of her left arm where her AVF is located and there is concern for a central stenosis that could be the etiology. She had an appointment at the outpt access center to evaluate for central stenosis and undergo angioplasty if needed on 6/9/22 but pt did not go to the appointment. She has not had any other procedures done to the arm in the past couple of months. She had a left arm us done today that showed no DVT and patent AVF and soft tissue edema. She apparently received pain medication fentanyl and dilaudid as well as benadryl earlier today and she is very drowsy.      DAILY NEPHROLOGY SUMMARY:  6/25: rapid response overnight for severe leg pain, pt very lethargic today, moans with palpation of legs, arouses but does not answer questions and falls back asleep, tolerated HD yest with 2.5L UF, BP stable overnight but low this am  6/26: No events, BP low overnight and this am, more alert, reports pain in legs for the past 3 weeks, denies any  "CP/SOB/Abd pain, reports left arm edema a little better but no pain in left arm   6/27: Pt resting in bed, subdued, Bps soft, on 4L supp O2 via NC, labs reviewed, due for HD  6/28: pt laying in bed, sleepy, c/o pain in legs, moaning, vascular assessed and does not recommend any intervention on legs or AVF, tolerated iHD yesterday with UF:2.8L  6/29: Pt in bed, fatigued, no complaints.  VSS 1L NC.    6/30: no new complaints, no overnight events. Tolerated hd yesterday, UF 3.5L , she is in negative balance.     REVIEW OF SYSTEMS:    10 point ROS reviewed and is as per HPI/daily summary or otherwise negative    PMH/PSH/SH/FH:   Reviewed and unchanged since admission note    CURRENT MEDICATIONS:   Reviewed from admission to present day    VS:  /58   Pulse 98   Temp 37.1 °C (98.8 °F) (Temporal)   Resp 16   Ht 1.626 m (5' 4\")   Wt 65 kg (143 lb 4.8 oz)   SpO2 98%   BMI 24.60 kg/m²     Physical Exam  Nursing note reviewed.   Constitutional:       Appearance: Normal appearance.   HENT:      Head: Normocephalic and atraumatic.   Eyes:      General: No scleral icterus.  Cardiovascular:      Rate and Rhythm: Normal rate and regular rhythm.   Pulmonary:      Effort: Pulmonary effort is normal. No respiratory distress.      Breath sounds: Examination of the right-lower field reveals decreased breath sounds. Examination of the left-lower field reveals decreased breath sounds. Decreased breath sounds present.   Abdominal:      General: Bowel sounds are normal. There is no distension.      Palpations: Abdomen is soft.   Musculoskeletal:         General: no edema   Skin:     General: Skin is warm.      Findings: No rash.      Comments:   Neurological:      General: No focal deficit present.      Mental Status: She is alert and oriented to person, place, and time.   Psychiatric:         Mood and Affect: Mood normal.         Behavior: Behavior normal.     Fluids:  In: 980 [P.O.:480; Dialysis:500]  Out: 3500 "     LABS:  Recent Labs     06/28/22  1024 06/29/22  0210   SODIUM 134* 134*   POTASSIUM 4.9 5.2   CHLORIDE 92* 93*   CO2 28 27   GLUCOSE 124* 99   BUN 33* 40*   CREATININE 5.45* 5.94*   CALCIUM 8.2* 8.4*       IMAGING:   All imaging reviewed from admission to present day    IMPRESSION:  # ESRD, dependent on HD              - HD via LUE AVF, missed last 2-3 outpt treatments  # Fluid Overload, improving              - Secondary to non-compliance with fluid restriction on HD and                    non-compliance with treatments  # Left arm edema             - CTA upper ext 6/26 w/o e/o focal stenosis + extensive edema             - AVF patent on left arm us and no DVT             - Pt no showed to outpt appt to evaluate              - vascular doesn't recommend intervention   # Altered Mental Status, improved              - Monitor  # HTN, uncontrolled              - Goal BP < 140/90             - Not on BP meds   # Anemia of CKD, at goal              - Goal Hgb 10-11             - Iron 27             -TIBC 142             -%Sat 19             -Ferritin 1419  # CKD-MBD             - Ca 9.2             - PO4 6.4             - Managed at OP unit             - On calcitriol and sevelamer   # DM II--management per primary svc  # Leg Pain--chronic skin changes and subcutaneous tissue firm to touch             - Unclear etiology             - vascular does not recommend any intervention, consider palliative      PLAN:  - Continue qMWF iHD schedule  - UF as tolerated  - No current need for ROGELIO  - Continue phos binders WM  - Continue off of all BP meds and diuretics for now  - PRN NS boluses for symptomatic hypotension  - Limit sedating meds if possible  - No dietary protein restrictions  - Dose all meds per ESRD  - Outpt access center appointment rescheduled to this week  - Recommend palliative consult  - Pt at high risk for further morbidity/mortality  - Follow labs     Thank you,

## 2022-06-30 NOTE — PROGRESS NOTES
Patient back in room from dialysis. Patient A&O x4, 2L NC, and no complaints of pain at this time. Updated on POC, call light within reach and fall precautions in place. Bed locked and in lowest position. Patient instructed to call for assistance before getting out of bed. All questions answered, no other needs at this time.

## 2022-06-30 NOTE — DISCHARGE PLANNING
Agency/Facility Name: [facility]  Outcome: DPA left v-mail regarding referral status with request for callback.     0835-  Agency/Facility Name: [facility]  Spoke To: Milagros  Outcome: DPA requesting updated referral response, Milagros to update DPA on referral response after review.     1025-  Agency/Facility Name: [facility]   Spoke To: Elizabeth  Outcome: Pt declined due to dialysis, SNF is unable to accommodate at this time due to scheduling .     RN CM notified

## 2022-06-30 NOTE — DISCHARGE PLANNING
Case Management Discharge Planning    Admission Date: 6/24/2022  GMLOS: 3.3  ALOS: 6    6-Clicks ADL Score: 16  6-Clicks Mobility Score: 15  PT and/or OT Eval ordered: Yes  Post-acute Referrals Ordered: Yes  Post-acute Choice Obtained: Yes  Has referral(s) been sent to post-acute provider:  Yes      Anticipated Discharge Dispo: Discharge Disposition: D/T to SNF with Medicare cert in anticipation of skilled care (03)    DME Needed: No    Action(s) Taken: Received call from MIKE Lynn who states she will have her team assist in finding accepting SNF and assistance with pt once discharged to home.    Escalations Completed: Insurance     Medically Clear: Yes    Next Steps: f/u with Chrsitine with [facility] to assist in placement and auth    Barriers to Discharge: Pending Placement

## 2022-06-30 NOTE — ASSESSMENT & PLAN NOTE
Severe aortic stenosis on Echocardiogram, as well as EF of 25-30%.  Cardiology consulted and recommended supportive care. Patient a poor candidate for intervention.

## 2022-06-30 NOTE — PROGRESS NOTES
[facility]:    Hemodialysis treatment ordered today per LUZ Lott. x 3 hours. Treatment initiated at 1654, ended at 1954.   Pt was drowsy, arouses easily to voice and touch, not in distress, VSS.    Pt dozing on and off during Tx, moans occasionally but falls back to sleep right away, easy to wake up , denies discomfort during tx c/o thu. BLE pain post tx primary RN notified; see e-flow sheet for details.     Net UF 3,000 mL.     Needles removed from access site. Dressings applied and sites held x 10 minutes; verified no bleeding. Positive bruit/thrill post tx. Staff RN to monitor AVF for breakthrough bleeding. Should breakthrough bleeding occur, staff RN to apply pressure to access sites until bleeding resolved. Notify Dialysis and Nephrologist for follow-up.    Report given to Primary RN.

## 2022-06-30 NOTE — PROGRESS NOTES
Bedside report received from night RN, pt care assumed, assessment completed. Pt is A&O2, pain 0. Updated on POC, questions answered. Bed in lowest, locked position, treaded socks on, call light and belongings within reach.    Has left hip surgery on Wed. 8/16/17 with Dr. Goldman and is wondering if she can go into her dentist tomorrow 8/15/17 to get her partial adjusted? Please call to discuss

## 2022-06-30 NOTE — THERAPY
Speech Language Pathology   Clinical Swallow Evaluation     Patient Name: Jannet Bruno  AGE:  81 y.o., SEX:  female  Medical Record #: [id number]  Today's Date: 6/30/2022     Precautions  Precautions: Fall Risk  Comments: LUE edema    Assessment    H&P: 81 y.o. female who was admitted on 6/24/2022 for left upper arm swelling and lower extremity edema.     CXR 6/24:   1.  Interstitial pulmonary parenchymal prominence suggest chronic underlying lung disease, component of interstitial edema and/or infiltrates not excluded.  2.  Small left pleural effusion  3.  Cardiomegaly  4.  Atherosclerosis    PMHx: ESRD, HTN, HLD, T2DM    Level of Consciousness: Lethargic  Affect/Behavior: Calm, Cooperative  Follows Directives: Yes - simple commands only  Orientation: Oriented x 4  Hearing: Functional hearing  Vision: Functional vision    Prior Living Situation & Level of Function:  Pt needed max verbal and tactile cues to rouse, needing constant direction during assessment. Pt reports having a Hx of globus sensation with PO of all textures and consistencies.      Oral Mechanism Evaluation  Facial Symmetry: Equal  Facial Sensation: Equal  Labial Observations: WFL  Lingual Observations: Midline  Dentition: Fair, Some missing dentition  Comments:    Voice  Quality: WFL  Resonance: WFL  Intensity: Appropriate  Cough: WFL  Comments:    Current Method of Nutrition   Oral diet (regular solids and thin liquids)    Assessment  Positioning: Mohr's (60-90 degrees)  Bolus Administration: SLP and Patient  Oxygen Requirements: 2 L Nasal Cannula  Factor(s) Affecting Performance: Impaired endurance    Swallowing Trials  Ice: WFL  Thin Liquid (TN0): WFL  Liquidised (LQ3): Impaired  Pureed (PU4): Impaired  Soft & Bite Sized (SB6): Impaired    Comments:  Bolus formation and A-P lingual transit appeared adequate. Prolonged mastication with PO intake of soft/bite sized solids, likely d/t lack of adequate dentition. Mild lingual residue with PO  "intake of soft/bite sized solids, resolved with thin liquid rinse. Pharyngeal swallow response appeared timely. No overt s/sx of aspiration noted; however, pt reported \"feeling like food is stuck in my throat\" with PO trials of liquidized solids, pureed solids, and soft/bite sized solids.        Clinical Impressions  Clinical signs of esophageal dysphagia includes globus sensation with PO trials of solids, likely chronic d/t pt report of Hx of \"food getting stuck\" and having difficulty swallowing. An instrumental swallow study (MBSS) is indicated to objectively assess pt's swallow and determine etiology for globus sensation. A modified diet of minced and moist solids and thin liquids is recommended pending results.        Recommendations  1.  Diet of minced and moist solids and thin liquids  2.  Instrumentation: Instrumental swallow study pending clinical progress  3.  Swallowing Instructions & Precautions:   Supervision: 1:1 feeding with constant supervision, Encourage self-feeding  Positioning: Fully upright and midline during oral intake  Medication: Crush with applesauce or puree, as appropriate  Strategies: Small bites/sips, Alternate bites and sips, Slow rate of intake  Oral Care: After meals to mitigate risks of aspiration PNA        Plan    Recommend Speech Therapy 3 times per week until therapy goals are met for the following treatments:  Dysphagia Training and Patient / Family / Caregiver Education.       Objective       06/30/22 1130   Patient / Family Goals   Patient / Family Goal #1 \"Everything gets stuck\"   Short Term Goals   Short Term Goal # 1 Patient will consume a diet of minced and moist solids and thin liquids with no s/sx of aspiration with assistance     "

## 2022-06-30 NOTE — CARE PLAN
The patient is Stable - Low risk of patient condition declining or worsening    Shift Goals  Clinical Goals: Dialysis, Pain control  Patient Goals: Sleeo  Family Goals: N/A    Progress made toward(s) clinical / shift goals:      Problem: Knowledge Deficit - Standard  Goal: Patient and family/care givers will demonstrate understanding of plan of care, disease process/condition, diagnostic tests and medications  Outcome: Progressing  Note: Updated on plan of care and no questions at this time.     Problem: Skin Integrity  Goal: Skin integrity is maintained or improved  Outcome: Progressing  Note: Barrier paste, pillows for offloading, q2 turns.       Problem: Fall Risk  Goal: Patient will remain free from falls  Outcome: Progressing  Note: All fall precautions in place.     Patient is not progressing towards the following goals:

## 2022-06-30 NOTE — PROGRESS NOTES
Assumed care of patient, bedside report received from MAURO Block. Patient currently in dialysis. Will updated on POC when patient arrives back on unit.

## 2022-06-30 NOTE — PROGRESS NOTES
[facility] FAMILY MEDICINE PROGRESS NOTE     Attending:   Victorina Marin MD    Resident:   Raya Gifford MD    PATIENT:   Jannet Bruno; [id number]; 1940    ID:   81 y.o. female with PMHx ESRD on HD MWF, HTN, HLD, and T2DM who was admitted on 6/24/2022 for left upper arm swelling and lower extremity edema.     SUBJECTIVE:   Patient without complaints this morning. Slightly confused. RN concerned about patient reporting food getting stuck in back of throat, ordering speech consult.    OBJECTIVE:  Vitals:    06/29/22 2010 06/29/22 2302 06/30/22 0440 06/30/22 0840   BP: 139/71  113/67 107/58   Pulse: 93  92 98   Resp: 18  18 16   Temp: 36.2 °C (97.2 °F)  36.3 °C (97.3 °F) 37.1 °C (98.8 °F)   TempSrc: Temporal  Temporal Temporal   SpO2: 99%  98% 98%   Weight:  65 kg (143 lb 4.8 oz)     Height:           Intake/Output Summary (Last 24 hours) at 6/30/2022 1143  Last data filed at 6/30/2022 0200  Gross per 24 hour   Intake 980 ml   Output 3500 ml   Net -2520 ml       PHYSICAL EXAM:  General: Patient in no distress, tired on exam, elderly, frail  Skin:  Pink, warm and dry.  No rashes  HEENT: NC/AT. PERRL. EOMI. MMM. No nasal discharge. Oropharynx nonerythematous without exudate/plaques  Neck:  Supple without lymphadenopathy or rigidity.   Lungs:  Symmetrical. Good air movement. 2 liters  Cardiovascular:  S1/S2 RRR with harsh systolic murmur   Abdomen:  Abdomen is soft, nontender, nondistended. +BS. No masses noted.  Extremities:  Full range of motion. No gross deformities noted. 2+ pulses in all extremities. Induration and tenderness in BLE below knee. The left upper extremity also edematous. AV fistula on LUE has palpable thrill.   Spine:  Straight without vertebral anomalies.  CNS:  Muscle tone is normal. No gross focal neurologic deficits      LABS:  Recent Labs     06/28/22  1024   WBC 10.9*   RBC 3.47*   HEMOGLOBIN 11.7*   HEMATOCRIT 36.3*   .6*   MCH 33.7*   RDW 66.2*   PLATELETCT 143*   MPV 9.2      Recent Labs     06/28/22  1024 06/29/22  0210   SODIUM 134* 134*   POTASSIUM 4.9 5.2   CHLORIDE 92* 93*   CO2 28 27   BUN 33* 40*   CREATININE 5.45* 5.94*   CALCIUM 8.2* 8.4*   PHOSPHORUS 5.5*  --    ALBUMIN 3.0* 2.8*     Estimated GFR/CRCL = Estimated Creatinine Clearance: 6.4 mL/min (A) (by C-G formula based on SCr of 5.94 mg/dL (HH)).  Recent Labs     06/28/22  1024 06/29/22  0210   GLUCOSE 124* 99     Recent Labs     06/28/22  1024 06/29/22  0210   ASTSGOT 13 13   ALTSGPT 9 9   TBILIRUBIN 0.4 0.4   ALKPHOSPHAT 119* 117*   GLOBULIN 3.5 3.5             No results for input(s): INR, APTT, FIBRINOGEN in the last 72 hours.    Invalid input(s): DIMER    MICROBIOLOGY:   No results found for: BLOODCULTU, BLDCULT, BCHOLD     IMAGING:   CT-CTA UPPER EXT WITH & W/O-POST PROCESS LEFT   Final Result      1.  Status post brachiocephalic fistula in the left arm. No focal stenosis is identified.      2.  Extensive edema in the subcutaneous tissues of the visualized left lateral chest and arm.      EC-ECHOCARDIOGRAM COMPLETE W/O CONT   Final Result      US-ZAHRAA SINGLE LEVEL BILAT   Final Result      US-EXTREMITY ARTERY LOWER BILAT   Final Result      CT-EXTREMITY, UPPER W/O LEFT   Final Result      1.  There is diffuse subcutaneous edema of the imaged portions of the left upper extremity. There is also diffuse body wall edema in the visualized portions of the chest and abdomen.   2.  No focal fluid collection to suggest abscess.   3.  There is a small left pleural effusion and minimal fluid in the abdomen.   4.  There is postsurgical change of AV fistula. There is small vessel atherosclerosis.      US-EXTREMITY VENOUS UPPER UNILAT LEFT   Final Result      DX-CHEST-PORTABLE (1 VIEW)   Final Result         1.  Interstitial pulmonary parenchymal prominence suggest chronic underlying lung disease, component of interstitial edema and/or infiltrates not excluded.   2.  Small left pleural effusion   3.  Cardiomegaly   4.   Atherosclerosis          CULTURES:   Results     ** No results found for the last 168 hours. **          MEDS:  Current Facility-Administered Medications   Medication Last Admin   • insulin GLARGINE (Lantus,Semglee) injection 10 Units at 06/29/22 2041    And   • insulin lispro (AdmeLOG,HumaLOG) injection 2 Units at 06/28/22 2118    And   • dextrose 50% (D50W) injection 25 g     • gabapentin (NEURONTIN) capsule 100 mg 100 mg at 06/30/22 0503   • polyethylene glycol/lytes (MIRALAX) PACKET 1 Packet 1 Packet at 06/29/22 0507    And   • senna-docusate (PERICOLACE or SENOKOT S) 8.6-50 MG per tablet 2 Tablet 2 Tablet at 06/29/22 0512    And   • bisacodyl (DULCOLAX) suppository 10 mg     • gabapentin (NEURONTIN) capsule 200 mg 200 mg at 06/29/22 2040   • HYDROmorphone (Dilaudid) injection 0.25 mg 0.25 mg at 06/28/22 0519   • HYDROmorphone (Dilaudid) injection 0.5 mg     • sevelamer carbonate (RENVELA) tablet 1,600 mg 1,600 mg at 06/30/22 0726   • lidocaine (LIDODERM) 5 % 2 Patch 2 Patch at 06/30/22 0903   • acetaminophen (Tylenol) tablet 650 mg 650 mg at 06/30/22 0502   • heparin injection 5,000 Units 5,000 Units at 06/30/22 0502   • atorvastatin (LIPITOR) tablet 20 mg 20 mg at 06/30/22 0503   • calcitRIOL (ROCALTROL) capsule 0.25 mcg 0.25 mcg at 06/30/22 0502   • ROPINIRole (REQUIP) tablet 0.25 mg 0.25 mg at 06/30/22 0502   • ondansetron (ZOFRAN) syringe/vial injection 4 mg 4 mg at 06/28/22 1018       PROBLEM LIST:  Problem Noted   Aortic Stenosis, Severe 6/30/2022   Pain in Both Lower Extremities 6/25/2022   Esrd (End Stage Renal Disease) On Dialysis ([facility]) 6/24/2022       ASSESSMENT/PLAN: 81 y.o. female with:    * ESRD (end stage renal disease) on dialysis (HCC)- (present on admission)  Assessment & Plan  Patient has history of ESRD currently on dialysis.  Pt followed by [facility]. Had multiple missed appointments prior to admission. Edematous left arm known to nephrology. Planned to have pt go to access  "center to fix central stenosis this coming week.    As per daughter pt has had significant decrease in mobility and cognition in the past 3-6 months. Pt's story shifting. Chronic pain has been increasing.    CTA of LUE without evidence of central stenosis    Plan:  - Nephrology consulted. Appreciate the recs and help. Dialysis as per nephro (M, W, F)  - Continue sevelamer and calcitriol  - Home lasix dced  - Renally dose medications      Aortic stenosis, severe  Assessment & Plan  Severe aortic stenosis on Echocardiogram, as well as EF of 25-30%.  Cardiology consulted and recommended supportive care. Patient a poor candidate for intervention.    Pain in both lower extremities  Assessment & Plan  Chronic in nature with likely indolent worsening. As per patient and daughter >1 year. At home pt uses tylenol and showers to control the pain.    The pain appears to be multi-factorial. Some residual from old hip fractures but the significant pain seems to come from her bilateral lower legs. Chronic. Likely vascular in nature, however, not emergent. Pt not a candidate for surgery, unless she desires amputations. Please see vascular note for more details.    Plan:  - Continue goals of care conversation with patient and family.  - Palliative consulted. Appreciate the help  - DC narcotics  - Tylenol 650 mg scheduled  - Gabapentin qHS 200 mg  - Vascular consult. Appreciate the recommendations and help.    Goals of care, counseling/discussion  Assessment & Plan  Pt with recent (6 months - 1 year) decline in mobility and cognition. Has been on dialysis for years. Echo during this hospitalization with severe aortic stenosis and EF of 25-30%. Pt intermittently in significant pain. There is no DPOA. Pt is currently full code and is adamant about this. She is also adamant that her family is NOT to make medical decisions for her. She is still \"thinking\" about who can make decisions for her if she is unable to make them.      Plan:  - " Poor prognosis given multiple severe comorbidities. Pt is not a candidate for aggressive treatment/therapy for vascular and cardiac issues. Please see their notes for details.  - Palliative consulted. Appreciate the recs and the help  - Cardiology and vascular consulted in line with patient's goals of care. Given the patient's multiple severe co-morbidities further cardiac/vascular interventions are extremely limited. Please see their notes for details. Appreciate the recs and the help  - Will continue discussions with patient and when appropriate family    Edema of left upper extremity- (present on admission)  Assessment & Plan  CTA of LUE without evidence of central stenosis. Vascular recommending wrapping arm with ace bandage      Plan:  - Wrap upper arm with ace bandage  - Dialysis as per nephro    Generalized weakness- (present on admission)  Assessment & Plan  Patient noted to have generalized weakness which led her to miss her dialysis appointments.  Unsure etiology of this generalized weakness at this point. As per daughter patient has had general decline in health over the past 6 months.    Believe it unlikely to be acute intracranial process given the history provided by daughter and the indolent worsening or the patient's general health/wellness.    Plan:  - PT/OT consult  - Low threshold for head imaging    Diabetes mellitus, type II (HCC)- (present on admission)  Assessment & Plan  Patient noted to have hyperglycemia in the 400s in the ER.  A1C of 11.7     Plan:  - Hold home antihyperglycemic's  - Start insulin sliding scale with basal insulin at night. Currently at 10 units Lantus QHS  - Hypoglycemic protocol  - Continue to titrate insulin as needed      Essential hypertension- (present on admission)  Assessment & Plan  Pt with elevated pressures on admission. Restarted on home nifedipine. Now with depressed Bps. Believe that it is unlikely she was taking her medications as prescribed.    Plan:  -  Nifedipine held. Will consider anti-HTN medications if pt becomes hypertensive again.    Lines: PIV  Abx: None  DVT prophylaxis: Heparin  Code Status: Full    Disposition: Medically cleared for discharge. Pending placement to SNF - challenging placement as transport to dialysis is not always facilitated.     Raya Gifford MD   PGY-2 Family Medicine Resident   [facility]

## 2022-07-01 NOTE — PROGRESS NOTES
3hr HD started @ 1330 and completed @ 1631,net UF = 1500ml only limited by muscle cramps.LUAAVF + B/T,cannulation sites covered with DD,CDI.Report given to Odalis Rodriguez RN.,

## 2022-07-01 NOTE — THERAPY
"Speech Language Pathology  Daily Treatment     Patient Name: Jannet Bruno  Age:  81 y.o., Sex:  female  Medical Record #: [id number]  Today's Date: 7/1/2022     Precautions  Precautions: Fall Risk, Swallow Precautions ( See Comments)  Comments: LUE edema    Assessment    Patient was seen for dysphagia tx with PO of minced and moist solids and thin liquids. Pt appeared less lethargic today and was agreeable to participate in therapy. Pt was given two large pills by RN; pt did not complain of bolus sensation nor did she had any difficulty swallowing. Bolus containment, lingual transit and mastication appeared adequate. No lingual residue post swallow appreciated. Pharyngeal swallow response was timely with all PO intake. No overt s/sx of aspiration appreciated. Pt did not report globus sensation and stated her swallow was \"fine.\"     Recommendations  1.  Continue diet of minced and moist solids and thin liquids  2.  Swallowing Instructions & Precautions:   Supervision: 1:1 feeding/A, Encourage self-feeding  Positioning: Fully upright and midline during oral intake  Medication: whole with thin liquid (cut/crush larger pills)  Strategies: Small bites/sips, Alternate bites and sips, Slow rate of intake  Oral Care: After meals to mitigate risks of aspiration PNA    Plan    Continue current treatment plan.    Discharge Recommendations: Recommend post-acute placement for additional speech therapy services prior to discharge home     Objective     07/01/22 0845   Dysphagia    Positioning / Behavior Modification Self Monitoring;Modulate Rate or Bite Size   Other Treatments Tx with PO of mince/moist solids and thin liquids   Diet / Liquid Recommendation Thin (0);Minced & Moist (5) - (Dysphagia II)   Nutritional Liquid Intake Rating Scale Non thickened beverages   Nutritional Food Intake Rating Scale Total oral diet with multiple consistencies but requiring special preparation or compensations   Skilled Intervention " "Compensatory Strategies;Verbal Cueing   Recommended Route of Medication Administration   Medication Administration  Whole with Liquid Wash  (cut/crush larger pills)   Patient / Family Goals   Patient / Family Goal #1 \"Everything gets stuck\"   Goal #1 Outcome Progressing as expected   Short Term Goals   Short Term Goal # 1 Patient will consume a diet of minced and moist solids and thin liquids with no s/sx of aspiration with assistance   Goal Outcome # 1 Progressing as expected     "

## 2022-07-01 NOTE — PROGRESS NOTES
1128 pt reporting pain 10/10 in BLE. Pt repositioned and states the lidocaine patch is not providing any relief. Notified provider JUAN MANUEL Gifford, new orders to be placed.

## 2022-07-01 NOTE — PROGRESS NOTES
[facility] [place] FAMILY MEDICINE PROGRESS NOTE     Attending:     Victorina Marin MD    Resident:   Raya Gifford MD    PATIENT:   Jannet Bruno; [id number]; 1940    ID:   81 y.o. female with PMHx ESRD on HD MWF, HTN, HLD, and T2DM who was admitted on 6/24/2022 for left upper arm swelling and lower extremity edema.     SUBJECTIVE:   Patient complaining of 10/10 calf pain to RN. No concerns expressed to primary team.     OBJECTIVE:  Vitals:    06/30/22 1553 06/30/22 1925 07/01/22 0419 07/01/22 0722   BP: 134/71 114/60 113/50 110/45   Pulse: 91 84  88   Resp: 17 17 17 17   Temp: 36.5 °C (97.7 °F) 37.1 °C (98.8 °F) 37.6 °C (99.7 °F) 37.2 °C (99 °F)   TempSrc: Temporal Temporal Temporal Temporal   SpO2: 98% 95% 95% 96%   Weight:  65.3 kg (143 lb 15.4 oz)     Height:         No intake or output data in the 24 hours ending 07/01/22 1147    PHYSICAL EXAM:  General: Patient in no distress, tired on exam, elderly, frail  Skin:  Pink, warm and dry.  No rashes  HEENT: NC/AT. PERRL. EOMI. MMM. No nasal discharge. Oropharynx nonerythematous without exudate/plaques  Neck:  Supple without lymphadenopathy or rigidity.   Lungs:  Symmetrical. Good air movement. 2 liters  Cardiovascular:  S1/S2 RRR with harsh systolic murmur   Abdomen:  Abdomen is soft, nontender, nondistended. +BS. No masses noted.  Extremities:  Full range of motion. No gross deformities noted. 2+ pulses in all extremities. Induration and tenderness in BLE below knee. The left upper extremity also edematous. AV fistula on LUE has palpable thrill.   Spine:  Straight without vertebral anomalies.  CNS:  Muscle tone is normal. No gross focal neurologic deficits    LABS:  No results for input(s): WBC, RBC, HEMOGLOBIN, HEMATOCRIT, MCV, MCH, RDW, PLATELETCT, MPV, NEUTSPOLYS, LYMPHOCYTES, MONOCYTES, EOSINOPHILS, BASOPHILS, RBCMORPHOLO in the last 72 hours.  Recent Labs     06/29/22  0210   SODIUM 134*   POTASSIUM 5.2   CHLORIDE 93*   CO2 27   BUN 40*   CREATININE 5.94*    CALCIUM 8.4*   ALBUMIN 2.8*     Estimated GFR/CRCL = Estimated Creatinine Clearance: 6.4 mL/min (A) (by C-G formula based on SCr of 5.94 mg/dL (HH)).  Recent Labs     06/29/22 0210   GLUCOSE 99     Recent Labs     06/29/22 0210   ASTSGOT 13   ALTSGPT 9   TBILIRUBIN 0.4   ALKPHOSPHAT 117*   GLOBULIN 3.5             No results for input(s): INR, APTT, FIBRINOGEN in the last 72 hours.    Invalid input(s): DIMER    MICROBIOLOGY:   No results found for: BLOODCULTU, BLDCULT, BCHOLD     IMAGING:   CT-CTA UPPER EXT WITH & W/O-POST PROCESS LEFT   Final Result      1.  Status post brachiocephalic fistula in the left arm. No focal stenosis is identified.      2.  Extensive edema in the subcutaneous tissues of the visualized left lateral chest and arm.      EC-ECHOCARDIOGRAM COMPLETE W/O CONT   Final Result      US-ZAHRAA SINGLE LEVEL BILAT   Final Result      US-EXTREMITY ARTERY LOWER BILAT   Final Result      CT-EXTREMITY, UPPER W/O LEFT   Final Result      1.  There is diffuse subcutaneous edema of the imaged portions of the left upper extremity. There is also diffuse body wall edema in the visualized portions of the chest and abdomen.   2.  No focal fluid collection to suggest abscess.   3.  There is a small left pleural effusion and minimal fluid in the abdomen.   4.  There is postsurgical change of AV fistula. There is small vessel atherosclerosis.      US-EXTREMITY VENOUS UPPER UNILAT LEFT   Final Result      DX-CHEST-PORTABLE (1 VIEW)   Final Result         1.  Interstitial pulmonary parenchymal prominence suggest chronic underlying lung disease, component of interstitial edema and/or infiltrates not excluded.   2.  Small left pleural effusion   3.  Cardiomegaly   4.  Atherosclerosis          CULTURES:   Results     ** No results found for the last 168 hours. **          MEDS:  Current Facility-Administered Medications   Medication Last Admin   • HYDROcodone-acetaminophen (NORCO) 5-325 MG per tablet 1 Tablet     •  insulin GLARGINE (Lantus,Semglee) injection 10 Units at 06/30/22 1700    And   • insulin lispro (AdmeLOG,HumaLOG) injection 1 Units at 06/30/22 2320    And   • dextrose 50% (D50W) injection 25 g     • gabapentin (NEURONTIN) capsule 100 mg 100 mg at 07/01/22 0445   • polyethylene glycol/lytes (MIRALAX) PACKET 1 Packet 1 Packet at 06/29/22 0507    And   • senna-docusate (PERICOLACE or SENOKOT S) 8.6-50 MG per tablet 2 Tablet 2 Tablet at 07/01/22 0445    And   • bisacodyl (DULCOLAX) suppository 10 mg     • gabapentin (NEURONTIN) capsule 200 mg 200 mg at 06/30/22 2043   • sevelamer carbonate (RENVELA) tablet 1,600 mg 1,600 mg at 07/01/22 0832   • lidocaine (LIDODERM) 5 % 2 Patch 2 Patch at 07/01/22 0832   • acetaminophen (Tylenol) tablet 650 mg 650 mg at 07/01/22 0444   • heparin injection 5,000 Units 5,000 Units at 07/01/22 0445   • atorvastatin (LIPITOR) tablet 20 mg 20 mg at 07/01/22 0444   • calcitRIOL (ROCALTROL) capsule 0.25 mcg 0.25 mcg at 07/01/22 0445   • ROPINIRole (REQUIP) tablet 0.25 mg 0.25 mg at 07/01/22 0444   • ondansetron (ZOFRAN) syringe/vial injection 4 mg 4 mg at 06/28/22 1018       PROBLEM LIST:  Problem Noted   Aortic Stenosis, Severe 6/30/2022   Pain in Both Lower Extremities 6/25/2022   Esrd (End Stage Renal Disease) On Dialysis ([facility]) 6/24/2022       ASSESSMENT/PLAN: 81 y.o. female with:     * ESRD (end stage renal disease) on dialysis ([facility])- (present on admission)  Assessment & Plan  Patient has history of ESRD currently on dialysis.  Pt followed by [facility]. Had multiple missed appointments prior to admission. Edematous left arm known to nephrology.      As per daughter pt has had significant decrease in mobility and cognition in the past 3-6 months. Pt's story shifting. Chronic pain has been increasing.     CTA of LUE without evidence of central stenosis     Plan:  - Nephrology following. Dialysis as per nephro (M, W, F)   - Continue sevelamer and calcitriol  - Home lasix  "dced  - Renally dose medications     Aortic stenosis, severe  CHFrEF  Assessment & Plan  Severe aortic stenosis on Echocardiogram, as well as EF of 25-30%.  Cardiology consulted and recommended supportive care. Patient a poor candidate for intervention.     Pain in both lower extremities  Assessment & Plan  Chronic in nature with likely indolent worsening. As per patient and daughter >1 year. At home pt uses tylenol and showers to control the pain.     The pain appears to be multi-factorial (PVD, lymphedema, poor EF). Some residual from old hip fractures but the significant pain seems to come from her bilateral lower legs. Chronic. Pt not a candidate for surgery, unless she desires amputations. Please see vascular note for more details.     Plan:  - Continue goals of care conversation with patient and family.  - Palliative consulted. Appreciate the help  - DC IV narcotics, can do PO oxycodone if pain is severe  - Lidocaine patch PRN  - Tylenol 650 mg scheduled  - Gabapentin qHS 200 mg     Goals of care, counseling/discussion  Assessment & Plan  Pt with recent (6 months - 1 year) decline in mobility and cognition. Has been on dialysis for years. Echo during this hospitalization with severe aortic stenosis and EF of 25-30%. Pt intermittently in significant pain. There is no DPOA. Pt is currently full code and is adamant about this. She is also adamant that her family is NOT to make medical decisions for her. She is still \"thinking\" about who can make decisions for her if she is unable to make them.      Plan:  - Poor prognosis given multiple severe comorbidities. Pt is not a candidate for aggressive treatment/therapy for vascular and cardiac issues. Please see their notes for details.  - Palliative consulted. Appreciate the recs and the help  - Cardiology and vascular consulted in line with patient's goals of care. Given the patient's multiple severe co-morbidities further cardiac/vascular interventions are extremely " limited. Please see their notes for details. Appreciate the recs and the help  - Will continue discussions with patient and when appropriate family     Edema of left upper extremity- (present on admission)  Assessment & Plan  CTA of LUE without evidence of central stenosis. Vascular recommending wrapping arm with ace bandage     Plan:  - Wrap upper arm with ace bandage  - Dialysis as per nephro     Generalized weakness- (present on admission)  Assessment & Plan  Patient noted to have generalized weakness which led her to miss her dialysis appointments.  Unsure etiology of this generalized weakness at this point. As per daughter patient has had general decline in health over the past 6 months.     Believe it unlikely to be acute intracranial process given the history provided by daughter and the indolent worsening or the patient's general health/wellness.     Plan:  - PT/OT consult  - Low threshold for head imaging     Diabetes mellitus, type II (HCC)- (present on admission)  Assessment & Plan  Patient noted to have hyperglycemia in the 400s in the ER.  A1C of 11.7      Plan:  - Hold home antihyperglycemic's  - Start insulin sliding scale with basal insulin at night. Currently at 10 units Lantus QHS  - Hypoglycemic protocol  - Continue to titrate insulin as needed      Essential hypertension- (present on admission)  Assessment & Plan  Pt with elevated pressures on admission. Restarted on home nifedipine. Now with depressed Bps. Believe that it is unlikely she was taking her medications as prescribed.     Plan:  - Nifedipine held. Will consider anti-HTN medications if pt becomes hypertensive again.     Lines: PIV  Abx: None  DVT prophylaxis: Heparin  Code Status: Full      Disposition: Medically cleared for discharge. Pending placement to SNF - challenging placement as transport to dialysis is not always facilitated.     Raya Gifford MD   PGY-3 Family Medicine Resident   [facility]

## 2022-07-01 NOTE — PROGRESS NOTES
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 Pt is very lethargic at this time and unable to provide much history, majority of the history was obtained through review of the medical record and discussion with primary svc. Pt had reported increased LE edema and fatigue and weakness and worsening of her left arm edema so she came to the ED for evaluation. She apparently has missed her last 2-3 outpt dialysis treatments. Per regular outpt Nephrologist Dr. Gates, she has been non-compliant with her fluid restriction and was told that she needed 4x/week dialysis until her volume status could be optimized but she was non-compliant with that recommendation. She has edema of her left arm where her AVF is located and there is concern for a central stenosis that could be the etiology. She had an appointment at the outpt access center to evaluate for central stenosis and undergo angioplasty if needed on 6/9/22 but pt did not go to the appointment. She has not had any other procedures done to the arm in the past couple of months. She had a left arm us done today that showed no DVT and patent AVF and soft tissue edema. She apparently received pain medication fentanyl and dilaudid as well as benadryl earlier today and she is very drowsy.      DAILY NEPHROLOGY SUMMARY:  6/25: rapid response overnight for severe leg pain, pt very lethargic today, moans with palpation of legs, arouses but does not answer questions and falls back asleep, tolerated HD yest with 2.5L UF, BP stable overnight but low this am  6/26: No events, BP low overnight and this am, more alert, reports pain in legs for the past 3  "weeks, denies any CP/SOB/Abd pain, reports left arm edema a little better but no pain in left arm   6/27: Pt resting in bed, subdued, Bps soft, on 4L supp O2 via NC, labs reviewed, due for HD  6/28: pt laying in bed, sleepy, c/o pain in legs, moaning, vascular assessed and does not recommend any intervention on legs or AVF, tolerated iHD yesterday with UF:2.8L  6/29: Pt in bed, fatigued, no complaints.  VSS 1L NC.    6/30: no new complaints, no overnight events. Tolerated hd yesterday, UF 3.5L , she is in negative balance.   7/1: Resting in bed, no complaints.  VSS 2L NC. No new labs today.     REVIEW OF SYSTEMS:    10 point ROS reviewed and is as per HPI/daily summary or otherwise negative    PMH/PSH/SH/FH:   Reviewed and unchanged since admission note    CURRENT MEDICATIONS:   Reviewed from admission to present day    VS:  /45   Pulse 88   Temp 37.2 °C (99 °F) (Temporal)   Resp 17   Ht 1.626 m (5' 4\")   Wt 65.3 kg (143 lb 15.4 oz)   SpO2 96%   BMI 24.71 kg/m²     Physical Exam  Nursing note reviewed.   Constitutional:       Appearance: Normal appearance.   HENT:      Head: Normocephalic and atraumatic.   Eyes:      General: No scleral icterus.  Cardiovascular:      Rate and Rhythm: Normal rate and regular rhythm.   Pulmonary:      Effort: Pulmonary effort is normal. No respiratory distress.      Breath sounds: Examination of the right-lower field reveals decreased breath sounds. Examination of the left-lower field reveals decreased breath sounds. Decreased breath sounds present.   Abdominal:      General: Bowel sounds are normal. There is no distension.      Palpations: Abdomen is soft.   Musculoskeletal:         General: no edema   Skin:     General: Skin is warm.      Findings: No rash.      Comments: L arm wrapped  Neurological:      General: No focal deficit present.      Mental Status: She is alert and oriented to person, place, and time.   Psychiatric:         Mood and Affect: Mood normal.       "   Behavior: Behavior normal.     Fluids:  No intake/output data recorded.    LABS:  Recent Labs     06/29/22  0210   SODIUM 134*   POTASSIUM 5.2   CHLORIDE 93*   CO2 27   GLUCOSE 99   BUN 40*   CREATININE 5.94*   CALCIUM 8.4*       IMAGING:   All imaging reviewed from admission to present day    IMPRESSION:  # ESRD, dependent on HD              - HD via LUE AVF, missed last 2-3 outpt treatments  # Fluid Overload, improving              - Secondary to non-compliance with fluid restriction on HD and                    non-compliance with treatments  # Left arm edema             - CTA upper ext 6/26 w/o e/o focal stenosis + extensive edema             - AVF patent on left arm us and no DVT             - Pt no showed to outpt appt to evaluate              - vascular doesn't recommend intervention   # Altered Mental Status, improved              - Monitor  # HTN, uncontrolled              - Goal BP < 140/90             - Not on BP meds   # Anemia of CKD, at goal              - Goal Hgb 10-11             - Iron 27             -TIBC 142             -%Sat 19             -Ferritin 1419  # CKD-MBD             - Ca 9.2             - PO4 6.4             - Managed at OP unit             - On calcitriol and sevelamer   # DM II--management per primary svc  # Leg Pain--chronic skin changes and subcutaneous tissue firm to touch             - Unclear etiology             - vascular does not recommend any intervention, consider palliative      PLAN:  - Continue qMWF iHD schedule  - Next tx today FRI  - UF as tolerated  - No current need for ROGELIO  - Continue phos binders WM  - Continue off of all BP meds and diuretics for now  - PRN NS boluses for symptomatic hypotension  - Limit sedating meds if possible  - No dietary protein restrictions  - Dose all meds per ESRD  - Outpt access center appointment rescheduled to this week  - Recommend palliative consult  - Pt at high risk for further morbidity/mortality  - Follow labs MWF     Thank  you,

## 2022-07-02 NOTE — PROGRESS NOTES
[facility] [place] PROGRESS NOTE     Attending:   Victorina Marin MD    Resident:   Pablo Bosch DO    PATIENT: Jannet Bruno; [id number]; 1940    ID: 81 y.o. female with PMHx ESRD on HD MWF, HTN, HLD, and T2DM who was admitted on 6/24/2022 for left upper arm swelling and lower extremity edema.     SUBJECTIVE: No acute events overnight, this morning patient without new complaints    OBJECTIVE:     Vitals:    07/01/22 1800 07/01/22 2020 07/02/22 0000 07/02/22 0407   BP: 119/51 100/42  104/43   Pulse: 80 89  88   Resp: 15 16  16   Temp: 37.2 °C (99 °F) 36.1 °C (97 °F)  37 °C (98.6 °F)   TempSrc: Temporal Temporal  Temporal   SpO2: 92% 91%  94%   Weight:   64.6 kg (142 lb 6.7 oz)    Height:           Intake/Output Summary (Last 24 hours) at 7/2/2022 0556  Last data filed at 7/1/2022 1631  Gross per 24 hour   Intake 500 ml   Output 2000 ml   Net -1500 ml       PE:   General: patient resting in bed in no acute distress, conversational, not wanting to be bothered, frail appearing  Skin:  warm, dry, intact  HEENT: NC/AT. PERRL. EOMI.  Lungs:  normal respiratory effort, clear breath sounds  Cardiovascular:  S1/S2 RRR  Abdomen:  Abdomen is soft, nontender, nondistended  Extremities:  2+ distal pulses in all extremities, edematous left upper extremities  CNS:  non-focal    LABS: reviewed    MICROBIOLOGY: none    IMAGING: reviewed    MEDS:  Current Facility-Administered Medications   Medication Last Admin   • HYDROcodone-acetaminophen (NORCO) 5-325 MG per tablet 1 Tablet 1 Tablet at 07/01/22 1159   • insulin GLARGINE (Lantus,Semglee) injection 10 Units at 07/01/22 1840    And   • insulin lispro (AdmeLOG,HumaLOG) injection 1 Units at 07/01/22 2111    And   • dextrose 50% (D50W) injection 25 g     • gabapentin (NEURONTIN) capsule 100 mg 100 mg at 07/02/22 0440   • polyethylene glycol/lytes (MIRALAX) PACKET 1 Packet 1 Packet at 06/29/22 0507    And   • senna-docusate (PERICOLACE or SENOKOT S) 8.6-50 MG per tablet 2 Tablet  2 Tablet at 07/02/22 0441    And   • bisacodyl (DULCOLAX) suppository 10 mg     • gabapentin (NEURONTIN) capsule 200 mg 200 mg at 07/01/22 2109   • sevelamer carbonate (RENVELA) tablet 1,600 mg 1,600 mg at 07/01/22 1839   • lidocaine (LIDODERM) 5 % 2 Patch 2 Patch at 07/01/22 0832   • acetaminophen (Tylenol) tablet 650 mg 650 mg at 07/02/22 0441   • heparin injection 5,000 Units 5,000 Units at 07/02/22 0441   • atorvastatin (LIPITOR) tablet 20 mg 20 mg at 07/02/22 0441   • calcitRIOL (ROCALTROL) capsule 0.25 mcg 0.25 mcg at 07/02/22 0441   • ROPINIRole (REQUIP) tablet 0.25 mg 0.25 mg at 07/02/22 0441   • ondansetron (ZOFRAN) syringe/vial injection 4 mg 4 mg at 06/28/22 1018         ASSESSMENT/PLAN:   81 y.o. female with PMHx ESRD on HD MWF, HTN, HLD, and T2DM who was admitted on 6/24/2022 for left upper arm swelling and lower extremity edema.     # ESRD, on dialysis MWF  Nephrology following. Continue sevelamer and calcitriol. Renally dose medications.     # edema of left upper extremity  CTA completed without evidence of central stenosis. Vascular has seen and recommended wrapping arm with ace bandage.     # goals of care  Palliative care has seen patient and patient still wants to be a Full Code.     # HTN  Holding blood pressure medications and Lasix. Can give fluid boluses PRN for symptomatic hypotension.     # T2D  Continue on Lantus 10 units QHS and SSI.     # pain in lower extremities, chronic  The pain appears to be multi-factorial (PVD, lymphedema, poor EF). Some residual from old hip fractures but the significant pain seems to come from her bilateral lower legs. Pt not a candidate for surgery, unless she desires amputations. Please see vascular note for more details.    # aortic stenosis, severe  # HFrEF  Cardiology has seen and recommended supportive care.     Core Measures:  DVT PPX: heparin  ABX: none  Fluids: PO  PCP: Lon  CODE STATUS: FULL CODE    Dispo: medically cleared pending SNF placement

## 2022-07-02 NOTE — CARE PLAN
The patient is Stable - Low risk of patient condition declining or worsening    Shift Goals  Clinical Goals: Pain control  Patient Goals: eat dinner and sleep  Family Goals: na    Progress made toward(s) clinical / shift goals:  Pt a&o x4, drowsy at times. VSS, BLE tender to touch otherwise no pain. LUE swollen, fistula present. Incontinent of stool x1. Anuric. Pt resting comfortably in bed with alarm on and call light in reach.      Problem: Pain - Standard  Goal: Alleviation of pain or a reduction in pain to the patient’s comfort goal  Outcome: Progressing     Problem: Knowledge Deficit - Standard  Goal: Patient and family/care givers will demonstrate understanding of plan of care, disease process/condition, diagnostic tests and medications  Outcome: Progressing     Problem: Skin Integrity  Goal: Skin integrity is maintained or improved  Outcome: Progressing     Problem: Fall Risk  Goal: Patient will remain free from falls  Outcome: Progressing

## 2022-07-02 NOTE — PROGRESS NOTES
Bedside report received from night RN, pt care assumed, assessment completed. Pt is A&O3, pain 0. Updated on POC, questions answered. Bed in lowest, locked position, treaded socks on, call light and belongings within reach.

## 2022-07-02 NOTE — CARE PLAN
The patient is Watcher - Medium risk of patient condition declining or worsening    Shift Goals  Clinical Goals: Pain control  Patient Goals: eat dinner and sleep  Family Goals: na    Progress made toward(s) clinical / shift goals: Progressing       Problem: Knowledge Deficit - Standard  Goal: Patient and family/care givers will demonstrate understanding of plan of care, disease process/condition, diagnostic tests and medications  Outcome: Progressing  Note: Pt updated on POC, no questions at this time      Problem: Fall Risk  Goal: Patient will remain free from falls  Outcome: Progressing  Note: All fall precautions in place

## 2022-07-02 NOTE — PROGRESS NOTES
[facility] -  PROGRESS NOTE               Author: THERESE Chaney Date & Time: 7/2/2022  10:19 AM     HPI:  81yoF with PMH significant for ESRD on HD MWF via left arm AVF, HTN, DM II, Anemia secondary to CKD, CKD Bone mineral disorder, admitted with increased edema and weakness and having missed her last last 2-3 outpt HD treatments. Pt is very lethargic at this time and unable to provide much history, majority of the history was obtained through review of the medical record and discussion with primary svc. Pt had reported increased LE edema and fatigue and weakness and worsening of her left arm edema so she came to the ED for evaluation. She apparently has missed her last 2-3 outpt dialysis treatments. Per regular outpt Nephrologist Dr. Gates, she has been non-compliant with her fluid restriction and was told that she needed 4x/week dialysis until her volume status could be optimized but she was non-compliant with that recommendation. She has edema of her left arm where her AVF is located and there is concern for a central stenosis that could be the etiology. She had an appointment at the outpt access center to evaluate for central stenosis and undergo angioplasty if needed on 6/9/22 but pt did not go to the appointment. She has not had any other procedures done to the arm in the past couple of months. She had a left arm us done today that showed no DVT and patent AVF and soft tissue edema. She apparently received pain medication fentanyl and dilaudid as well as benadryl earlier today and she is very drowsy.      DAILY NEPHROLOGY SUMMARY:  6/25: rapid response overnight for severe leg pain, pt very lethargic today, moans with palpation of legs, arouses but does not answer questions and falls back asleep, tolerated HD yest with 2.5L UF, BP stable overnight but low this am  6/26: No events, BP low overnight and this am, more alert, reports pain in legs for the past 3 weeks,  "denies any CP/SOB/Abd pain, reports left arm edema a little better but no pain in left arm   6/27: Pt resting in bed, subdued, Bps soft, on 4L supp O2 via NC, labs reviewed, due for HD  6/28: pt laying in bed, sleepy, c/o pain in legs, moaning, vascular assessed and does not recommend any intervention on legs or AVF, tolerated iHD yesterday with UF:2.8L  6/29: Pt in bed, fatigued, no complaints.  VSS 1L NC.    6/30: no new complaints, no overnight events. Tolerated hd yesterday, UF 3.5L , she is in negative balance.   7/1: Resting in bed, no complaints.  VSS 2L NC. No new labs today.   7/2: Pt sitting up in bed, confused, CNA helping her with breakfast meal, iHD yesterday with 1.5L net UF limited by muscle cramping, labs reviewed, VSS on 1L NC O2    REVIEW OF SYSTEMS:    Limited by pt mentation    PMH/PSH/SH/FH:   Reviewed and unchanged since admission note    CURRENT MEDICATIONS:   Reviewed from admission to present day    VS:  /60   Pulse 91   Temp 36.7 °C (98.1 °F) (Temporal)   Resp 17   Ht 1.626 m (5' 4\")   Wt 64.6 kg (142 lb 6.7 oz)   SpO2 95%   BMI 24.45 kg/m²     Physical Exam  Nursing note reviewed.   Constitutional:       Appearance: Normal appearance.   HENT:      Head: Normocephalic and atraumatic.   Eyes:      General: No scleral icterus.  Cardiovascular:      Rate and Rhythm: Normal rate and regular rhythm.      LUE AVF +T/B  Pulmonary:      Effort: Pulmonary effort is normal. No respiratory distress.      Breath sounds: Examination of the right-lower field reveals decreased breath sounds. Examination of the left-lower field reveals decreased breath sounds. Decreased breath sounds present.   Abdominal:      General: Bowel sounds are normal. There is no distension.      Palpations: Abdomen is soft.   Musculoskeletal:         General: No deformities  Skin:     General: Skin is warm.      Findings: No rash.      Comments: LUE dependent edema  Neurological:      General: No focal deficit " present.      Mental Status: She is alert and oriented to person, place, and time.   Psychiatric:         Mood and Affect: Mood normal.         Behavior: Behavior normal.     Fluids:  In: 500 [Dialysis:500]  Out: 2000     LABS:        IMAGING:   All imaging reviewed from admission to present day    IMPRESSION:  # ESRD, dependent on HD              - HD via LUE AVF, missed last 2-3 outpt treatments  # Fluid Overload, improving              - Secondary to non-compliance with fluid restriction on HD and                    non-compliance with treatments  # Left arm edema, slow improvement              - CTA upper ext 6/26 w/o e/o focal stenosis + extensive edema             - AVF patent on left arm us and no DVT             - Pt no showed to outpt appt to evaluate              - Vascular does not recommend intervention   # Altered Mental Status, fluctuates             - Monitor  # HTN, variable control             - Goal BP < 140/90             - Not on BP meds   # Anemia of CKD, at goal              - Goal Hgb 10-11             - Iron 27             -TIBC 142             -%Sat 19             - Ferritin 1419  # CKD-MBD             - Ca 9.2             - PO4 6.4             - Managed at OP unit             - On calcitriol and sevelamer   # DM II--management per primary svc  # Leg Pain--chronic skin changes and subcutaneous tissue firm to touch             - Unclear etiology             - Vascular does not recommend any intervention     PLAN:  - Continue qMWF iHD schedule, next treatment due MON  - UF as tolerated  - No current need for ROGELIO  - Continue phos binders WM  - Continue off of all BP meds and diuretics for now  - PRN NS boluses for symptomatic hypotension  - Limit sedating meds if possible  - No dietary protein restrictions  - Dose all meds per ESRD  - Outpt access center appointment rescheduled  - Palliative care has consulted; pt is still a full code  - Pt at high risk for further morbidity/mortality  - DC  planning underway for SNF     Thank you,

## 2022-07-03 NOTE — PROGRESS NOTES
[facility] FAMILY MEDICINE PROGRESS NOTE     Attending:   Victorina Marin MD    Resident:   Raya Gifford MD    PATIENT:   Jannet Bruno; [id number]; 1940    ID:   81 y.o. female with PMHx ESRD on HD MWF, HTN, HLD, and T2DM who was admitted on 6/24/2022 for left upper arm swelling and lower extremity edema.     SUBJECTIVE:   Patient very somnolent this morning. Did get a morning dose of Norco prior to evaluation.     OBJECTIVE:  Vitals:    07/02/22 1700 07/02/22 2000 07/03/22 0600 07/03/22 1231   BP: 127/57 114/69 110/76 122/48   Pulse:  86  90   Resp:  16  14   Temp:    36.8 °C (98.2 °F)   TempSrc:  Temporal  Temporal   SpO2: 96% 93% 95% 94%   Weight:       Height:           Intake/Output Summary (Last 24 hours) at 7/3/2022 1404  Last data filed at 7/2/2022 1500  Gross per 24 hour   Intake 300 ml   Output 100 ml   Net 200 ml       PHYSICAL EXAM:  General: Very somnolent, difficult to arouse   HEENT: NC/AT. EOMI.   Cardiovascular: S1/S2 RRR with harsh systolic murmur   Respiratory: CTAB, no tachypnea or retractions, NC in place with 2L O2  Abdomen: normal bowel sounds, soft, nontender, nondistended, no masses, no organomegaly   EXT:  Full range of motion. No gross deformities noted. 2+ pulses in all extremities. Induration and tenderness in BLE below knee. The left upper extremity also edematous. AV fistula on LUE has palpable thrill.   Skin: No erythema/lesions   Neuro: Non-focal, somnolent as above      LABS:  No results for input(s): WBC, RBC, HEMOGLOBIN, HEMATOCRIT, MCV, MCH, RDW, PLATELETCT, MPV, NEUTSPOLYS, LYMPHOCYTES, MONOCYTES, EOSINOPHILS, BASOPHILS, RBCMORPHOLO in the last 72 hours.  No results for input(s): SODIUM, POTASSIUM, CHLORIDE, CO2, BUN, CREATININE, CALCIUM, MAGNESIUM, PHOSPHORUS, ALBUMIN in the last 72 hours.  Estimated GFR/CRCL = Estimated Creatinine Clearance: 6.4 mL/min (A) (by C-G formula based on SCr of 5.94 mg/dL (HH)).  No results for input(s): GLUCOSE, POCGLUCOSE in the last 72  hours.              No results for input(s): INR, APTT, FIBRINOGEN in the last 72 hours.    Invalid input(s): DIMER    MICROBIOLOGY:   No results found for: BLOODCULTU, BLDCULT, BCHOLD     IMAGING:   CT-CTA UPPER EXT WITH & W/O-POST PROCESS LEFT   Final Result      1.  Status post brachiocephalic fistula in the left arm. No focal stenosis is identified.      2.  Extensive edema in the subcutaneous tissues of the visualized left lateral chest and arm.      EC-ECHOCARDIOGRAM COMPLETE W/O CONT   Final Result      US-ZAHRAA SINGLE LEVEL BILAT   Final Result      US-EXTREMITY ARTERY LOWER BILAT   Final Result      CT-EXTREMITY, UPPER W/O LEFT   Final Result      1.  There is diffuse subcutaneous edema of the imaged portions of the left upper extremity. There is also diffuse body wall edema in the visualized portions of the chest and abdomen.   2.  No focal fluid collection to suggest abscess.   3.  There is a small left pleural effusion and minimal fluid in the abdomen.   4.  There is postsurgical change of AV fistula. There is small vessel atherosclerosis.      US-EXTREMITY VENOUS UPPER UNILAT LEFT   Final Result      DX-CHEST-PORTABLE (1 VIEW)   Final Result         1.  Interstitial pulmonary parenchymal prominence suggest chronic underlying lung disease, component of interstitial edema and/or infiltrates not excluded.   2.  Small left pleural effusion   3.  Cardiomegaly   4.  Atherosclerosis      DX-CHEST-PORTABLE (1 VIEW)    (Results Pending)       CULTURES:   Results     Procedure Component Value Units Date/Time    BLOOD CULTURE [[id number]]     Order Status: Sent Specimen: Blood from Peripheral     BLOOD CULTURE [[id number]]     Order Status: Sent Specimen: Blood from Peripheral     URINALYSIS [[id number]]     Order Status: No result Specimen: Urine, Cath           MEDS:  Current Facility-Administered Medications   Medication Last Admin   • insulin GLARGINE (Lantus,Semglee) injection 10 Units at 07/02/22 1702    And   •  insulin lispro (AdmeLOG,HumaLOG) injection 1 Units at 07/02/22 2054    And   • dextrose 50% (D50W) injection 25 g     • gabapentin (NEURONTIN) capsule 100 mg 100 mg at 07/03/22 0546   • polyethylene glycol/lytes (MIRALAX) PACKET 1 Packet 1 Packet at 06/29/22 0507    And   • senna-docusate (PERICOLACE or SENOKOT S) 8.6-50 MG per tablet 2 Tablet 2 Tablet at 07/03/22 0546    And   • bisacodyl (DULCOLAX) suppository 10 mg     • gabapentin (NEURONTIN) capsule 200 mg 200 mg at 07/02/22 2053   • sevelamer carbonate (RENVELA) tablet 1,600 mg 1,600 mg at 07/03/22 0841   • lidocaine (LIDODERM) 5 % 2 Patch 2 Patch at 07/03/22 0841   • acetaminophen (Tylenol) tablet 650 mg 650 mg at 07/03/22 0546   • heparin injection 5,000 Units 5,000 Units at 07/03/22 0545   • atorvastatin (LIPITOR) tablet 20 mg 20 mg at 07/03/22 0546   • calcitRIOL (ROCALTROL) capsule 0.25 mcg 0.25 mcg at 07/03/22 0546   • ROPINIRole (REQUIP) tablet 0.25 mg 0.25 mg at 07/03/22 0546   • ondansetron (ZOFRAN) syringe/vial injection 4 mg 4 mg at 06/28/22 1018       PROBLEM LIST:  Problem Noted   Aortic Stenosis, Severe 6/30/2022   Pain in Both Lower Extremities 6/25/2022   Esrd (End Stage Renal Disease) On Dialysis ([facility]) 6/24/2022       ASSESSMENT/PLAN: 81 y.o. female with PMHx ESRD on HD MWF, HTN, HLD, and T2DM who was admitted on 6/24/2022 for left upper arm swelling and lower extremity edema.      # ESRD, on dialysis MWF  Nephrology following. Continue sevelamer and calcitriol. Renally dose medications.     # Somnolence  # Delirium  - Fluctuating level of alertness throughout her stay, likely 2/2 hospital delirium.  - Avoid sedating drugs   - DC'd PRN Norco   - DC Gabapentin    - 7/3: CBC, Blood culture, UA, and CXR to ensure no underlying infection is responsible for somnolence     # edema of left upper extremity  Chronic in nature. Same site as her AV fistula, which is functioning well. Likely multifactorial: poor EF, lymphedema,outflow stenosis. CTA  completed without evidence of central stenosis.   - Vascular has consulted and recommended supportive care - wrapping arm with ace bandage.      # goals of care  Palliative care has seen patient and patient still wants to be a Full Code.   - Re-address this conversation when patient is more lucid now that every specialist has deemed her inappropriate for intervention  - Patient still has no DPOA, as she did not trust them initially to make decisions on her behalf when discussing with palliative RN. Next of kin would be her son or daughter.      # HTN  Holding blood pressure medications and Lasix. Can give fluid boluses PRN for symptomatic hypotension.      # T2D  Continue on Lantus 10 units QHS and SSI.      # pain in lower extremities, chronic  The pain appears to be multi-factorial (PVD, lymphedema, poor EF). Some residual from old hip fractures but the significant pain seems to come from her bilateral lower legs. Pt not a candidate for surgery, unless she desires amputations. Please see vascular note for more details.  - Current pain management with scheduled tylenol, lidocaine patches, hot compresses  - Avoiding narcotics as above     # aortic stenosis, severe  # HFrEF  Cardiology has seen and recommended supportive care. Patient not a good candidate for any kind of intervention      Core Measures:  DVT PPX: heparin  ABX: none  Fluids: PO  PCP: Lon  CODE STATUS: FULL CODE     Disposition: Medically cleared for discharge. Pending placement to SNF - challenging placement as transport to dialysis is not always facilitated.     Raya Gifford MD   PGY-3 Family Medicine Resident   [facility]

## 2022-07-03 NOTE — PROGRESS NOTES
[facility] -  PROGRESS NOTE               Author: THERESE Chaney Date & Time: 7/3/2022  9:56 AM     HPI:  81yoF with PMH significant for ESRD on HD MWF via left arm AVF, HTN, DM II, Anemia secondary to CKD, CKD Bone mineral disorder, admitted with increased edema and weakness and having missed her last last 2-3 outpt HD treatments. Pt is very lethargic at this time and unable to provide much history, majority of the history was obtained through review of the medical record and discussion with primary svc. Pt had reported increased LE edema and fatigue and weakness and worsening of her left arm edema so she came to the ED for evaluation. She apparently has missed her last 2-3 outpt dialysis treatments. Per regular outpt Nephrologist Dr. Gates, she has been non-compliant with her fluid restriction and was told that she needed 4x/week dialysis until her volume status could be optimized but she was non-compliant with that recommendation. She has edema of her left arm where her AVF is located and there is concern for a central stenosis that could be the etiology. She had an appointment at the outpt access center to evaluate for central stenosis and undergo angioplasty if needed on 6/9/22 but pt did not go to the appointment. She has not had any other procedures done to the arm in the past couple of months. She had a left arm us done today that showed no DVT and patent AVF and soft tissue edema. She apparently received pain medication fentanyl and dilaudid as well as benadryl earlier today and she is very drowsy.      DAILY NEPHROLOGY SUMMARY:  6/25: rapid response overnight for severe leg pain, pt very lethargic today, moans with palpation of legs, arouses but does not answer questions and falls back asleep, tolerated HD yest with 2.5L UF, BP stable overnight but low this am  6/26: No events, BP low overnight and this am, more alert, reports pain in legs for the past 3 weeks,  "denies any CP/SOB/Abd pain, reports left arm edema a little better but no pain in left arm   6/27: Pt resting in bed, subdued, Bps soft, on 4L supp O2 via NC, labs reviewed, due for HD  6/28: pt laying in bed, sleepy, c/o pain in legs, moaning, vascular assessed and does not recommend any intervention on legs or AVF, tolerated iHD yesterday with UF:2.8L  6/29: Pt in bed, fatigued, no complaints.  VSS 1L NC.    6/30: no new complaints, no overnight events. Tolerated hd yesterday, UF 3.5L , she is in negative balance.   7/1: Resting in bed, no complaints.  VSS 2L NC. No new labs today.   7/2: Pt sitting up in bed, confused, CNA helping her with breakfast meal, iHD yesterday with 1.5L net UF limited by muscle cramping, labs reviewed, VSS on 1L NC O2  7/3: Pt slumped in bed, somnolent, medical floor status, VSS on RA    REVIEW OF SYSTEMS:    Limited by pt mentation    PMH/PSH/SH/FH:   Reviewed and unchanged since admission note    CURRENT MEDICATIONS:   Reviewed from admission to present day    VS:  /76   Pulse 86   Temp 36.9 °C (98.4 °F) (Temporal)   Resp 16   Ht 1.626 m (5' 4\")   Wt 64.6 kg (142 lb 6.7 oz)   SpO2 95%   BMI 24.45 kg/m²     Physical Exam  Nursing note reviewed.   Constitutional:       Appearance: Normal appearance.   HENT:      Head: Normocephalic and atraumatic.   Eyes:      General: No scleral icterus.  Cardiovascular:      Rate and Rhythm: Normal rate and regular rhythm.      LUE AVF +T/B  Pulmonary:      Effort: Pulmonary effort is normal. No respiratory distress.      Breath sounds: Examination of the right-lower field reveals decreased breath sounds. Examination of the left-lower field reveals decreased breath sounds. Decreased breath sounds present.   Abdominal:      General: Bowel sounds are normal. There is no distension.      Palpations: Abdomen is soft.   Musculoskeletal:         General: No deformities  Skin:     General: Skin is warm.      Findings: No rash.      Comments: LUE " dependent edema  Neurological:      General: No focal deficit present.      Mental Status: She is alert and oriented to person, place, and time.   Psychiatric:         Mood and Affect: Mood normal.         Behavior: Behavior normal.     Fluids:  In: 560 [P.O.:560]  Out: 100     LABS:      Reviewed.    IMAGING:   All imaging reviewed from admission to present day    IMPRESSION:  # ESRD, dependent on HD              - HD via LUE AVF, missed last 2-3 outpt treatments  # Fluid Overload, improving              - Secondary to non-compliance with fluid restriction on HD and                    non-compliance with treatments  # Left arm edema, slow improvement              - CTA upper ext 6/26 w/o e/o focal stenosis + extensive edema             - AVF patent on left arm us and no DVT             - Pt no showed to outpt appt to evaluate              - Vascular does not recommend intervention   # Altered Mental Status, fluctuates             - Monitor  # HTN, variable control             - Goal BP < 140/90             - Not on BP meds   # Anemia of CKD, at goal              - Goal Hgb 10-11             - Iron 27             -TIBC 142             -%Sat 19             - Ferritin 1419  # CKD-MBD             - Ca 9.2             - PO4 6.4             - Managed at OP unit             - On calcitriol and sevelamer   # DM II--management per primary svc  # Leg Pain--chronic skin changes and subcutaneous tissue firm to touch             - Unclear etiology             - Vascular does not recommend any intervention     PLAN:  - Continue qMWF iHD schedule, next treatment due tomorrow (MON)  - UF as tolerated  - No current need for ROGELIO  - Continue phos binders WM  - Continue off of all BP meds and diuretics for now  - PRN NS boluses for symptomatic hypotension  - Limit sedating meds if possible  - No dietary protein restrictions  - Dose all meds per ESRD  - Outpt access center appointment rescheduled  - Palliative care has consulted; pt is  still a full code  - Pt at high risk for further morbidity/mortality  - DC planning underway for SNF     Thank you,

## 2022-07-04 NOTE — PROGRESS NOTES
Urgent HD treatment ordered by Dr Tovar started at 1828 and ended at 2058 with net UF of 1.5 Liter as bp tolerated. Cannulated left upper AVF x 2 using 15 gauge needles without problem. Pt's diastolic pressure has been running low pre, intra and post treatment. Both primary RN and Jose were notified and aware. Per primary RN's  as long as MAP is above 65 which has been entire treatment, pt is ok . Post treatment, held venous site for 15 minutes due to bleeding and arterial site for 10 minutes with no further bleeding noted. See flow sheet for details.

## 2022-07-04 NOTE — PROGRESS NOTES
Monitor Summary  Rhythm: SR  Rate: 93 - 97  Ectopy: PVC / PAC  .21 / .13 / .42

## 2022-07-04 NOTE — THERAPY
Missed Therapy     Patient Name: Jannet Bruno  Age:  81 y.o., Sex:  female  Medical Record #: [id number]  Today's Date: 7/4/2022    Discussed missed therapy with RN       07/04/22 1001   Interdisciplinary Plan of Care Collaboration   IDT Collaboration with  Nursing   Collaboration Comments hold, low blood sugar, IV issues, lethargic, not appropriate for therapy today.

## 2022-07-04 NOTE — PROGRESS NOTES
Hemodialysis ordered by Dr. Peres. Treatment started at 1330 and ended at 1630. At 1503 FSBS 147. Pt had 100 % of her lunch.  Pt stable, vss, no c/o post tx. Net UF 2.0 L. Report to EL Sr RN. Lt ua avf dsg cdi.

## 2022-07-04 NOTE — PROGRESS NOTES
[facility] -  PROGRESS NOTE               Author: Mauro Lacey M.D. Date & Time: 7/4/2022  10:37 AM     HPI:  81yoF with PMH significant for ESRD on HD MWF via left arm AVF, HTN, DM II, Anemia secondary to CKD, CKD Bone mineral disorder, admitted with increased edema and weakness and having missed her last last 2-3 outpt HD treatments. Pt is very lethargic at this time and unable to provide much history, majority of the history was obtained through review of the medical record and discussion with primary svc. Pt had reported increased LE edema and fatigue and weakness and worsening of her left arm edema so she came to the ED for evaluation. She apparently has missed her last 2-3 outpt dialysis treatments. Per regular outpt Nephrologist Dr. Gates, she has been non-compliant with her fluid restriction and was told that she needed 4x/week dialysis until her volume status could be optimized but she was non-compliant with that recommendation. She has edema of her left arm where her AVF is located and there is concern for a central stenosis that could be the etiology. She had an appointment at the outpt access center to evaluate for central stenosis and undergo angioplasty if needed on 6/9/22 but pt did not go to the appointment. She has not had any other procedures done to the arm in the past couple of months. She had a left arm us done today that showed no DVT and patent AVF and soft tissue edema. She apparently received pain medication fentanyl and dilaudid as well as benadryl earlier today and she is very drowsy.      DAILY NEPHROLOGY SUMMARY:  6/25: rapid response overnight for severe leg pain, pt very lethargic today, moans with palpation of legs, arouses but does not answer questions and falls back asleep, tolerated HD yest with 2.5L UF, BP stable overnight but low this am  6/26: No events, BP low overnight and this am, more alert, reports pain in legs for the past 3 weeks, denies any  "CP/SOB/Abd pain, reports left arm edema a little better but no pain in left arm   6/27: Pt resting in bed, subdued, Bps soft, on 4L supp O2 via NC, labs reviewed, due for HD  6/28: pt laying in bed, sleepy, c/o pain in legs, moaning, vascular assessed and does not recommend any intervention on legs or AVF, tolerated iHD yesterday with UF:2.8L  6/29: Pt in bed, fatigued, no complaints.  VSS 1L NC.    6/30: no new complaints, no overnight events. Tolerated hd yesterday, UF 3.5L , she is in negative balance.   7/1: Resting in bed, no complaints.  VSS 2L NC. No new labs today.   7/2: Pt sitting up in bed, confused, CNA helping her with breakfast meal, iHD yesterday with 1.5L net UF limited by muscle cramping, labs reviewed, VSS on 1L NC O2  7/3: Pt slumped in bed, somnolent, medical floor status, VSS on RA  7/4: No new overnight renal events. S/p HD yesterday and tolerated well. Net UF was 1.5 L.    REVIEW OF SYSTEMS:    Limited by pt mentation    PMH/PSH/SH/FH:   Reviewed and unchanged since admission note    CURRENT MEDICATIONS:   Reviewed from admission to present day    VS:  BP (!) 140/58   Pulse 85   Temp 36 °C (96.8 °F) (Temporal)   Resp 19   Ht 1.626 m (5' 4\")   Wt 66.1 kg (145 lb 11.6 oz)   SpO2 90%   BMI 25.01 kg/m²     Physical Exam  Vitals and nursing note reviewed.       Nursing note reviewed.   Constitutional:       Appearance: Normal appearance.   HENT:      Head: Normocephalic and atraumatic.   Eyes:      General: No scleral icterus.  Cardiovascular:      Rate and Rhythm: Normal rate and regular rhythm.      LUE AVF +T/B  Pulmonary:      Effort: Pulmonary effort is normal. No respiratory distress.      Breath sounds: Examination of the right-lower field reveals decreased breath sounds. Examination of the left-lower field reveals decreased breath sounds. Decreased breath sounds present.   Abdominal:      General: Bowel sounds are normal. There is no distension.      Palpations: Abdomen is soft. "   Musculoskeletal:         General: No deformities  Skin:     General: Skin is warm.      Findings: No rash.      Comments: LUE dependent edema  Neurological:      General: No focal deficit present.      Mental Status: She is alert and oriented to person, place, and time.   Psychiatric:         Mood and Affect: Mood normal.         Behavior: Behavior normal.     Fluids:  In: 725 [P.O.:225; Dialysis:500]  Out: 2000     LABS:  Recent Labs     07/03/22  1540 07/04/22  0639   SODIUM 131* 137   POTASSIUM 6.6* 5.4   CHLORIDE 92* 96   CO2 25 27   GLUCOSE 119* 42*   BUN 50* 31*   CREATININE 5.96* 4.56*   CALCIUM 8.7 9.3     Reviewed.    IMAGING:   All imaging reviewed from admission to present day    IMPRESSION:  # ESRD, dependent on HD              - HD via LUE AVF, missed last 2-3 outpt treatments  # Fluid Overload, improving              - Secondary to non-compliance with fluid restriction on HD and                    non-compliance with treatments  # Left arm edema, slow improvement              - CTA upper ext 6/26 w/o e/o focal stenosis + extensive edema             - AVF patent on left arm us and no DVT             - Pt no showed to outpt appt to evaluate              - Vascular does not recommend intervention   # Altered Mental Status, fluctuates             - Monitor  # HTN, variable control             - Goal BP < 140/90             - Not on BP meds   # Anemia of CKD, at goal              - Goal Hgb 10-11             - Iron 27             -TIBC 142             -%Sat 19             - Ferritin 1419  # CKD-MBD             - Ca 9.2             - PO4 6.4             - Managed at OP unit             - On calcitriol and sevelamer   # DM II--management per primary svc  # Leg Pain--chronic skin changes and subcutaneous tissue firm to touch             - Unclear etiology             - Vascular does not recommend any intervention     PLAN:  - Continue qMWF iHD schedule, HD today (MON)  - UF as tolerated  - No current need  for ROGELIO  - Continue phos binders WM  - Continue off of all BP meds and diuretics for now  - PRN NS boluses for symptomatic hypotension  - Limit sedating meds if possible  - No dietary protein restrictions  - Dose all meds per ESRD  - Outpt access center appointment rescheduled  - Palliative care has consulted; pt is still a full code  - Pt at high risk for further morbidity/mortality  - DC planning underway for SNF     Thank you,

## 2022-07-04 NOTE — CARE PLAN
Problem: Pain - Standard  Goal: Alleviation of pain or a reduction in pain to the patient’s comfort goal  Outcome: Progressing     Problem: Knowledge Deficit - Standard  Goal: Patient and family/care givers will demonstrate understanding of plan of care, disease process/condition, diagnostic tests and medications  Outcome: Progressing     Problem: Skin Integrity  Goal: Skin integrity is maintained or improved  Outcome: Progressing     Problem: Fall Risk  Goal: Patient will remain free from falls  Outcome: Progressing       The patient is Stable - Low risk of patient condition declining or worsening    Shift Goals  Clinical Goals: pain control / snf  Patient Goals: pain control / sleep comfortably  Family Goals: `    Progress made toward(s) clinical / shift goals:  yes    Patient is not progressing towards the following goals:

## 2022-07-04 NOTE — PROGRESS NOTES
[facility]GRESS [place]     Attending: Dr. Marin     Senior Resident: Carlos Chaudhry D.O.  Family Medicine Resident PGY-3      PATIENT: Jannet Bruno; [id number]; 1940 Hospital Day: 11    ID: 81 y.o. female with PMHx ESRD on HD MWF, HTN, HLD, and T2DM who was admitted on 6/24/2022 for left upper arm swelling and     SUBJECTIVE: Patient had hypoglycemia BG 42.  Patient was given D10 per protocol.  At bedside, patient appeared tired but responsive.    OBJECTIVE:     Vitals:    07/03/22 1713 07/03/22 2040 07/04/22 0000 07/04/22 0344   BP: (!) 99/61 112/58 107/63 125/55   Pulse:  95 86 78   Resp:  16 16 16   Temp:  36.9 °C (98.5 °F) 36.3 °C (97.3 °F) 36.6 °C (97.9 °F)   TempSrc:  Temporal Temporal Temporal   SpO2:  100% 95% 95%   Weight:   66.1 kg (145 lb 11.6 oz)    Height:           Intake/Output Summary (Last 24 hours) at 7/4/2022 0650  Last data filed at 7/4/2022 0300  Gross per 24 hour   Intake 725 ml   Output 2000 ml   Net -1275 ml       PE:  General: resting comfortably in bed, responds to command   HEENT: NC/AT. Eyes closed.   Cardiovascular: Systolic murmer   Respiratory: Symmetrical chest. CTAB with no W/R/R  Abdomen: soft, NT/ND, no masses  EXT:  No LE edema. Multiple scattered lower extremity wounds.  Neuro: Moving all 4 extremities     LABS:  Recent Labs     07/03/22  1540   WBC 11.6*   RBC 3.77*   HEMOGLOBIN 12.4   HEMATOCRIT 39.0   .4*   MCH 32.9   RDW 63.8*   PLATELETCT 262   MPV 9.1   NEUTSPOLYS 79.00*   LYMPHOCYTES 9.90*   MONOCYTES 8.00   EOSINOPHILS 1.50   BASOPHILS 0.70     Recent Labs     07/03/22  1540   SODIUM 131*   POTASSIUM 6.6*   CHLORIDE 92*   CO2 25   BUN 50*   CREATININE 5.96*   CALCIUM 8.7   ALBUMIN 2.8*     Estimated GFR/CRCL = Estimated Creatinine Clearance: 6.9 mL/min (A) (by C-G formula based on SCr of 5.96 mg/dL (HH)).  Recent Labs     07/03/22  1540   GLUCOSE 119*     Recent Labs     07/03/22  1540   ASTSGOT 13   ALTSGPT 9   TBILIRUBIN 0.6   ALKPHOSPHAT 149*    GLOBULIN 4.2*             No results for input(s): INR, APTT, FIBRINOGEN in the last 72 hours.    Invalid input(s): DIMER    MICROBIOLOGY: No results found for: BLOODCULTU, BLDCULT, BCHOLD     IMAGING:  DX-CHEST-PORTABLE (1 VIEW)   Final Result      1.  Probable 12 mm right mid/upper lung zone mass. Suggest CT chest without contrast for further assessment      2.  Interstitial pulmonary edema or fibrosis      3.  Enlarged cardiac silhouette      4.  Left pleural effusion      CT-CTA UPPER EXT WITH & W/O-POST PROCESS LEFT   Final Result      1.  Status post brachiocephalic fistula in the left arm. No focal stenosis is identified.      2.  Extensive edema in the subcutaneous tissues of the visualized left lateral chest and arm.      EC-ECHOCARDIOGRAM COMPLETE W/O CONT   Final Result      US-ZAHRAA SINGLE LEVEL BILAT   Final Result      US-EXTREMITY ARTERY LOWER BILAT   Final Result      CT-EXTREMITY, UPPER W/O LEFT   Final Result      1.  There is diffuse subcutaneous edema of the imaged portions of the left upper extremity. There is also diffuse body wall edema in the visualized portions of the chest and abdomen.   2.  No focal fluid collection to suggest abscess.   3.  There is a small left pleural effusion and minimal fluid in the abdomen.   4.  There is postsurgical change of AV fistula. There is small vessel atherosclerosis.      US-EXTREMITY VENOUS UPPER UNILAT LEFT   Final Result      DX-CHEST-PORTABLE (1 VIEW)   Final Result         1.  Interstitial pulmonary parenchymal prominence suggest chronic underlying lung disease, component of interstitial edema and/or infiltrates not excluded.   2.  Small left pleural effusion   3.  Cardiomegaly   4.  Atherosclerosis            MEDS:  Current Facility-Administered Medications   Medication Last Admin   • heparin injection 1,300 Units 1,300 Units at 07/03/22 1828   • MD ALERT...Covid-19 Vaccine Order Given at 07/03/22 1830   • acetaminophen (TYLENOL) tablet 1,000 mg  1,000 mg at 07/04/22 0335   • insulin GLARGINE (Lantus,Semglee) injection 10 Units at 07/03/22 2151    And   • insulin lispro (AdmeLOG,HumaLOG) injection 1 Units at 07/02/22 2054    And   • dextrose 50% (D50W) injection 25 g     • polyethylene glycol/lytes (MIRALAX) PACKET 1 Packet 1 Packet at 07/04/22 0624    And   • senna-docusate (PERICOLACE or SENOKOT S) 8.6-50 MG per tablet 2 Tablet 2 Tablet at 07/04/22 0624    And   • bisacodyl (DULCOLAX) suppository 10 mg     • sevelamer carbonate (RENVELA) tablet 1,600 mg 1,600 mg at 07/03/22 2154   • lidocaine (LIDODERM) 5 % 2 Patch 2 Patch at 07/03/22 0841   • heparin injection 5,000 Units 5,000 Units at 07/04/22 0624   • atorvastatin (LIPITOR) tablet 20 mg 20 mg at 07/04/22 0624   • calcitRIOL (ROCALTROL) capsule 0.25 mcg 0.25 mcg at 07/04/22 0624   • ROPINIRole (REQUIP) tablet 0.25 mg 0.25 mg at 07/04/22 0624   • ondansetron (ZOFRAN) syringe/vial injection 4 mg 4 mg at 06/28/22 1018       PROBLEM LIST:  No problems updated.    ASSESSMENT/PLAN: 81 y.o. female with PMHx ESRD on HD MWF, HTN, HLD, and T2DM who was admitted on 6/24/2022 for left upper arm swelling and lower extremity edema.      # ESRD, on dialysis MWF  Nephrology following. Continue sevelamer and calcitriol. Renally dose medications.   -Dialysis yesterday 7/3/22     # Somnolence  # Delirium  - Fluctuating level of alertness throughout her stay, likely 2/2 hospital delirium.  - Continue to avoid sedating drugs  - Patient did have pain overnight, and had renally dosed dilaudid for break through pain which may be contributory to fatigue this morning. Anticipate improvement this afternoon.   -Patient does have rising leukocytocis concerning for infection versus inflammatory. Check UA and continue to trend WBC. Low threshold to start antibiotics.      #Left upper extremity edema   Chronic, at same side of AV fistula, which is functioning well.   -Likely multifactorial: poor EF, lymphedema,outflow stenosis.   -CTA  completed without evidence of central stenosis.   -Vascular consulted and recommended supportive care - wrapping arm with ace bandage.      # goals of care  Palliative care has seen patient and patient still wants to be a Full Code.   - Plan to have further discussions when cognition is appropriate  - Patient still has no DPOA. Next of kin would be her son or daughter.      # HTN  -Blood pressures low   -Continue to hold BP medications and lasix   -Fluids boluses PRN       # T2DM  -Patient had hypoglycemia this morning   -Treat hypoglycemia with D10W  -Plan to decrease lantus from 10 units to 5 units QHS   -ISS   -Hypoglycemia protocol        #Chronic lower extremity pain  Likely multi-factorial (PVD, lymphedema, poor EF). Some residual from old hip fractures but the significant pain seems to come from her bilateral lower legs. Pt not a candidate for surgery, unless she desires amputations. Please see vascular note for more details.  - Current pain management with scheduled tylenol, lidocaine patches, hot compresses  - Avoiding narcotics as above     # aortic stenosis, severe  # HFrEF  Cardiology consulted, and not candidate for intervention.      Core Measures:  DVT PPX: heparin  ABX: none  Fluids: PO, D10W  PCP: Lon  CODE STATUS: FULL CODE     Disposition: Medically cleared for discharge. Pending placement to SNF - challenging placement as transport to dialysis is not always facilitated.

## 2022-07-04 NOTE — PROGRESS NOTES
Assumed care of patient at bedside report from April RN. Pt Aox4, 2lpm nc, lying calmly in bed, no signs of distress, and all patient needs addressed during bedside report. Updated on POC. Call light within reach. Whiteboard updated. Fall precautions in place. Bed locked and in lowest position. Patient instructed to press the red call light button for any reason.

## 2022-07-05 NOTE — CARE PLAN
Problem: Pain - Standard  Goal: Alleviation of pain or a reduction in pain to the patient’s comfort goal  Outcome: Progressing     Problem: Skin Integrity  Goal: Skin integrity is maintained or improved  Outcome: Progressing     Problem: Fall Risk  Goal: Patient will remain free from falls  Outcome: Progressing   The patient is Stable - Low risk of patient condition declining or worsening    Shift Goals  Clinical Goals: pain control  Patient Goals: pain control / sleep comfortably  Family Goals: `    Progress made toward(s) clinical / shift goals:  Progressing    Patient is not progressing towards the following goals:

## 2022-07-05 NOTE — PROGRESS NOTES
Monitor Summary:   Rhythm: SR BBB  Rate: 87-95  Measurement: .14/.11/.38  Ectopy: rare PVC's

## 2022-07-05 NOTE — PROGRESS NOTES
[facility] -  PROGRESS NOTE               Author: Mauro Lacey M.D. Date & Time: 7/5/2022  9:24 AM     HPI:  81yoF with PMH significant for ESRD on HD MWF via left arm AVF, HTN, DM II, Anemia secondary to CKD, CKD Bone mineral disorder, admitted with increased edema and weakness and having missed her last last 2-3 outpt HD treatments. Pt is very lethargic at this time and unable to provide much history, majority of the history was obtained through review of the medical record and discussion with primary svc. Pt had reported increased LE edema and fatigue and weakness and worsening of her left arm edema so she came to the ED for evaluation. She apparently has missed her last 2-3 outpt dialysis treatments. Per regular outpt Nephrologist Dr. Gates, she has been non-compliant with her fluid restriction and was told that she needed 4x/week dialysis until her volume status could be optimized but she was non-compliant with that recommendation. She has edema of her left arm where her AVF is located and there is concern for a central stenosis that could be the etiology. She had an appointment at the outpt access center to evaluate for central stenosis and undergo angioplasty if needed on 6/9/22 but pt did not go to the appointment. She has not had any other procedures done to the arm in the past couple of months. She had a left arm us done today that showed no DVT and patent AVF and soft tissue edema. She apparently received pain medication fentanyl and dilaudid as well as benadryl earlier today and she is very drowsy.      DAILY NEPHROLOGY SUMMARY:  6/25: rapid response overnight for severe leg pain, pt very lethargic today, moans with palpation of legs, arouses but does not answer questions and falls back asleep, tolerated HD yest with 2.5L UF, BP stable overnight but low this am  6/26: No events, BP low overnight and this am, more alert, reports pain in legs for the past 3 weeks, denies any  "CP/SOB/Abd pain, reports left arm edema a little better but no pain in left arm   6/27: Pt resting in bed, subdued, Bps soft, on 4L supp O2 via NC, labs reviewed, due for HD  6/28: pt laying in bed, sleepy, c/o pain in legs, moaning, vascular assessed and does not recommend any intervention on legs or AVF, tolerated iHD yesterday with UF:2.8L  6/29: Pt in bed, fatigued, no complaints.  VSS 1L NC.    6/30: no new complaints, no overnight events. Tolerated hd yesterday, UF 3.5L , she is in negative balance.   7/1: Resting in bed, no complaints.  VSS 2L NC. No new labs today.   7/2: Pt sitting up in bed, confused, CNA helping her with breakfast meal, iHD yesterday with 1.5L net UF limited by muscle cramping, labs reviewed, VSS on 1L NC O2  7/3: Pt slumped in bed, somnolent, medical floor status, VSS on RA  7/4: No new overnight renal events. S/p HD yesterday and tolerated well. Net UF was 1.5 L.  7/5: S/p HD yesterday with net UF of 2 L. No new overnight renal events.     REVIEW OF SYSTEMS:    Limited by pt mentation    PMH/PSH/SH/FH:   Reviewed and unchanged since admission note    CURRENT MEDICATIONS:   Reviewed from admission to present day    VS:  BP (P) 116/68   Pulse (P) 90   Temp (P) 36.6 °C (97.9 °F) (Temporal)   Resp (P) 18   Ht 1.626 m (5' 4\")   Wt 66.1 kg (145 lb 11.6 oz)   SpO2 (P) 96%   BMI 25.01 kg/m²     Physical Exam  Vitals and nursing note reviewed.       Nursing note reviewed.   Constitutional:       Appearance: Normal appearance.   HENT:      Head: Normocephalic and atraumatic.   Eyes:      General: No scleral icterus.  Cardiovascular:      Rate and Rhythm: Normal rate and regular rhythm.      LUE AVF +T/B  Pulmonary:      Effort: Pulmonary effort is normal. No respiratory distress.      Breath sounds: Examination of the right-lower field reveals decreased breath sounds. Examination of the left-lower field reveals decreased breath sounds. Decreased breath sounds present.   Abdominal:      " General: Bowel sounds are normal. There is no distension.      Palpations: Abdomen is soft.   Musculoskeletal:         General: No deformities  Skin:     General: Skin is warm.      Findings: No rash.      Comments: LUE dependent edema  Neurological:      General: No focal deficit present.      Mental Status: She is alert and oriented to person, place, and time.   Psychiatric:         Mood and Affect: Mood normal.         Behavior: Behavior normal.     Fluids:  In: 500 [Dialysis:500]  Out: 2500     LABS:  Recent Labs     07/03/22  1540 07/04/22  0639   SODIUM 131* 137   POTASSIUM 6.6* 5.4   CHLORIDE 92* 96   CO2 25 27   GLUCOSE 119* 42*   BUN 50* 31*   CREATININE 5.96* 4.56*   CALCIUM 8.7 9.3     Reviewed.    IMAGING:   All imaging reviewed from admission to present day    IMPRESSION:  # ESRD, dependent on HD              - HD via LUE AVF, missed last 2-3 outpt treatments  # Fluid Overload, improving              - Secondary to non-compliance with fluid restriction on HD and                    non-compliance with treatments  # Left arm edema, slow improvement              - CTA upper ext 6/26 w/o e/o focal stenosis + extensive edema             - AVF patent on left arm us and no DVT             - Pt no showed to outpt appt to evaluate              - Vascular does not recommend intervention   # Altered Mental Status, fluctuates             - Monitor  # HTN, variable control             - Goal BP < 140/90             - Not on BP meds   # Anemia of CKD, at goal              - Goal Hgb 10-11             - Iron 27             -TIBC 142             -%Sat 19             - Ferritin 1419  # CKD-MBD             - Ca 9.2             - PO4 6.4             - Managed at OP unit             - On calcitriol and sevelamer   # DM II--management per primary svc  # Leg Pain--chronic skin changes and subcutaneous tissue firm to touch             - Unclear etiology             - Vascular does not recommend any  intervention     PLAN:  - Continue qMWF iHD schedule, No plans for HD today (TUE)  - UF as tolerated  - No current need for ROGELIO  - Continue phos binders WM  - Continue off of all BP meds and diuretics for now  - PRN NS boluses for symptomatic hypotension  - Limit sedating meds if possible  - No dietary protein restrictions  - Dose all meds per ESRD  - Outpt access center appointment rescheduled  - Palliative care has consulted; pt is still a full code  - Pt at high risk for further morbidity/mortality  - DC planning underway for SNF     Thank you,

## 2022-07-05 NOTE — DISCHARGE PLANNING
Agency/Facility Name: [facility]  Spoke To: Milagros  Outcome: DPA requesting update on when dialysis bed will be available. Milagros to contact  and will update DPA on status of pending dialysis bed.     [id number]  DPA requesting Pt be added to [facility]'s list to be followed regarding placement.

## 2022-07-05 NOTE — PROGRESS NOTES
Bedside report received from day RN, pt care assumed, assessment completed. Pt is A&O4, pain 10/10, SR on the monitor. Updated on POC, questions answered. Bed in lowest, locked position, treaded socks on, call light and belongings within reach. Fall precautions in place.

## 2022-07-05 NOTE — PROGRESS NOTES
"[facility] PROGRESS NOTE        Attending: Dr. Sutton     Senior Resident: Carlos Chaudhry D.O.  Family Medicine Resident PGY-3    PATIENT: Jannet Bruno; [id number]; 1940 Hospital Day: 12    ID: 81 y.o. female with PMHx ESRD on HD MWF, HTN, HLD, and T2DM who was admitted on 6/24/2022 for left upper arm swelling and lower extremity edema.    SUBJECTIVE: Patient underwent hemodialysis yesterday.  VSS, afebrile.     Patient expressed concerns for infection from \"pealing\" skin. She says she was in [place]\" where a man was \"selling skin\". She reports experiencing pain in upper and lower extremities.     OBJECTIVE:     Vitals:    07/04/22 2100 07/05/22 0025 07/05/22 0405 07/05/22 0450   BP: 107/56 109/60 116/61 112/64   Pulse: 89 88  90   Resp: 18 18 18 18   Temp:    37.1 °C (98.8 °F)   TempSrc: Temporal Temporal Temporal Temporal   SpO2: 99% 95% 98% 98%   Weight:       Height:           Intake/Output Summary (Last 24 hours) at 7/5/2022 0610  Last data filed at 7/4/2022 1630  Gross per 24 hour   Intake 500 ml   Output 2500 ml   Net -2000 ml       PE:  General: resting comfortably in bed, responds to command   HEENT: NC/AT. EOMI. No conjunctival injection or scleral icterus.   Cardiovascular: Systolic murmer   Respiratory: Symmetrical chest. CTAB with no W/R/R  Abdomen: soft, NT/ND, no masses  EXT:  +1 bilateral LE edema. Left arm swelling. Multiple scattered lower extremity wounds.  MSK: Swelling and redness over left hand. TTP.   Neuro: Moving all 4 extremities      LABS:  Recent Labs     07/03/22  1540 07/04/22  0639   WBC 11.6* 15.4*   RBC 3.77* 3.90*   HEMOGLOBIN 12.4 13.0   HEMATOCRIT 39.0 41.4   .4* 106.2*   MCH 32.9 33.3*   RDW 63.8* 65.6*   PLATELETCT 262 339   MPV 9.1 8.9*   NEUTSPOLYS 79.00* 79.60*   LYMPHOCYTES 9.90* 9.00*   MONOCYTES 8.00 8.80   EOSINOPHILS 1.50 0.90   BASOPHILS 0.70 0.70   RBCMORPHOLO  --  Present     Recent Labs     07/03/22  1540 07/04/22  0639   SODIUM 131* 137 " "  POTASSIUM 6.6* 5.4   CHLORIDE 92* 96   CO2 25 27   BUN 50* 31*   CREATININE 5.96* 4.56*   CALCIUM 8.7 9.3   ALBUMIN 2.8* 2.8*     Estimated GFR/CRCL = Estimated Creatinine Clearance: 9.1 mL/min (A) (by C-G formula based on SCr of 4.56 mg/dL (HH)).  Recent Labs     07/03/22  1540 07/04/22 0639   GLUCOSE 119* 42*     Recent Labs     07/03/22  1540 07/04/22  0639   ASTSGOT 13 17   ALTSGPT 9 9   TBILIRUBIN 0.6 0.6   ALKPHOSPHAT 149* 149*   GLOBULIN 4.2* 4.5*             No results for input(s): INR, APTT, FIBRINOGEN in the last 72 hours.    Invalid input(s): DIMER    MICROBIOLOGY:   Results     Procedure Component Value Units Date/Time    URINALYSIS [[id number]]     Order Status: No result Specimen: Urine     BLOOD CULTURE [[id number]] Collected: 07/03/22 1540    Order Status: Completed Specimen: Blood from Peripheral Updated: 07/04/22 0731     Significant Indicator NEG     Source BLD     Site PERIPHERAL     Culture Result No Growth  Note: Blood cultures are incubated for 5 days and  are monitored continuously.Positive blood cultures  are called to the RN and reported as soon as  they are identified.      Narrative:      Per Hospital Policy: Only change Specimen Src: to \"Line\" if  specified by physician order.  Right Forearm/Arm    BLOOD CULTURE [[id number]] Collected: 07/03/22 1540    Order Status: Completed Specimen: Blood from Peripheral Updated: 07/04/22 0731     Significant Indicator NEG     Source BLD     Site PERIPHERAL     Culture Result No Growth  Note: Blood cultures are incubated for 5 days and  are monitored continuously.Positive blood cultures  are called to the RN and reported as soon as  they are identified.      Narrative:      Per Hospital Policy: Only change Specimen Src: to \"Line\" if  specified by physician order.  Right Wrist    URINALYSIS [[id number]]     Order Status: No result Specimen: Urine, Cath             IMAGING:   DX-CHEST-PORTABLE (1 VIEW)   Final Result      1.  Probable 12 mm right " mid/upper lung zone mass. Suggest CT chest without contrast for further assessment      2.  Interstitial pulmonary edema or fibrosis      3.  Enlarged cardiac silhouette      4.  Left pleural effusion      CT-CTA UPPER EXT WITH & W/O-POST PROCESS LEFT   Final Result      1.  Status post brachiocephalic fistula in the left arm. No focal stenosis is identified.      2.  Extensive edema in the subcutaneous tissues of the visualized left lateral chest and arm.      EC-ECHOCARDIOGRAM COMPLETE W/O CONT   Final Result      US-ZAHRAA SINGLE LEVEL BILAT   Final Result      US-EXTREMITY ARTERY LOWER BILAT   Final Result      CT-EXTREMITY, UPPER W/O LEFT   Final Result      1.  There is diffuse subcutaneous edema of the imaged portions of the left upper extremity. There is also diffuse body wall edema in the visualized portions of the chest and abdomen.   2.  No focal fluid collection to suggest abscess.   3.  There is a small left pleural effusion and minimal fluid in the abdomen.   4.  There is postsurgical change of AV fistula. There is small vessel atherosclerosis.      US-EXTREMITY VENOUS UPPER UNILAT LEFT   Final Result      DX-CHEST-PORTABLE (1 VIEW)   Final Result         1.  Interstitial pulmonary parenchymal prominence suggest chronic underlying lung disease, component of interstitial edema and/or infiltrates not excluded.   2.  Small left pleural effusion   3.  Cardiomegaly   4.  Atherosclerosis            MEDS:  Current Facility-Administered Medications   Medication Last Admin   • sodium chloride 154 mEq in dextrose 10% 1,000 mL infusion Stopped at 07/05/22 0309   • insulin GLARGINE (Lantus,Semglee) injection 5 Units at 07/04/22 1715    And   • insulin lispro (AdmeLOG,HumaLOG) injection 3 Units at 07/04/22 2151    And   • dextrose 50% (D50W) injection 25 g     • heparin injection 1,300 Units 1,300 Units at 07/03/22 1828   • MD ALERT...Covid-19 Vaccine Order Given at 07/03/22 1830   • acetaminophen (TYLENOL) tablet  1,000 mg 1,000 mg at 07/05/22 0402   • polyethylene glycol/lytes (MIRALAX) PACKET 1 Packet 1 Packet at 07/04/22 0624    And   • senna-docusate (PERICOLACE or SENOKOT S) 8.6-50 MG per tablet 2 Tablet 2 Tablet at 07/04/22 0624    And   • bisacodyl (DULCOLAX) suppository 10 mg     • sevelamer carbonate (RENVELA) tablet 1,600 mg 1,600 mg at 07/04/22 1711   • lidocaine (LIDODERM) 5 % 2 Patch 2 Patch at 07/04/22 0936   • heparin injection 5,000 Units 5,000 Units at 07/05/22 0441   • atorvastatin (LIPITOR) tablet 20 mg 20 mg at 07/05/22 0440   • calcitRIOL (ROCALTROL) capsule 0.25 mcg 0.25 mcg at 07/05/22 0441   • ROPINIRole (REQUIP) tablet 0.25 mg 0.25 mg at 07/05/22 0441   • ondansetron (ZOFRAN) syringe/vial injection 4 mg 4 mg at 06/28/22 1018       PROBLEM LIST:  No problems updated.    ASSESSMENT/PLAN: 81 y.o. female with PMHx ESRD on HD MWF, HTN, HLD, and T2DM who was admitted on 6/24/2022 for left upper arm swelling and lower extremity edema.      # ESRD, on dialysis MWF  Nephrology following. Continue sevelamer and calcitriol. Renally dose medications.   -Dialysis yesterday 7/3/22     # Delirium  - Fluctuating level of alertness throughout her stay, likely 2/2 hospital delirium.  - Continue to avoid sedating drugs  -Patient does have rising leukocytocis concerning for infection versus inflammatory.   - Continue to monitor labs     #Left hand pain   -Swelling and redness of left hand  -Xray hand ordered       #Left upper extremity edema   Chronic, at same side of AV fistula, which is functioning well.   -Likely multifactorial: poor EF, lymphedema,outflow stenosis.   -CTA completed without evidence of central stenosis.   -Vascular consulted and recommended supportive care - wrapping arm with ace bandage.      # goals of care  Palliative care has seen patient and patient still wants to be a Full Code.   - Plan to have further discussions when cognition is appropriate  - Patient still has no DPOA. Next of kin would be  her son or daughter.      # HTN  -Continue to hold BP medications and lasix   -Fluids boluses PRN for hypotension         # T2DM  -Hypoglycemia resolved   -Continue lantus  5 units QHS   -ISS   -Hypoglycemia protocol         #Chronic lower extremity pain  Likely multi-factorial (PVD, lymphedema, poor EF). Some residual from old hip fractures but the significant pain seems to come from her bilateral lower legs. Pt not a candidate for surgery, unless she desires amputations. Please see vascular note for more details.  - Current pain management with scheduled tylenol, lidocaine patches, hot compresses  - Avoiding narcotics as above     # aortic stenosis, severe  # HFrEF  Cardiology consulted, and not candidate for intervention.      Core Measures:  DVT PPX: heparin  ABX: none  Fluids: PO  PCP: Lon  CODE STATUS: FULL CODE     Disposition: Medically cleared for discharge. Pending placement to SNF - challenging placement as transport to dialysis is not always facilitated.

## 2022-07-05 NOTE — THERAPY
"Physical Therapy   Daily Treatment     Patient Name: Jannet Bruno  Age:  81 y.o., Sex:  female  Medical Record #: [id number]  Today's Date: 7/5/2022     Precautions  Precautions: Fall Risk;Swallow Precautions ( See Comments)  Comments: L UE very painful to touch, B LE burning with activity    Assessment    Pt now more alert and able to participate. Pt is assisted to EOB, large smear of BM on sheets, needs to finish BM as nsg in to assist. Commode was provided, mod assist to pivot to commode, sat and finished BM, then fatigued, needed max assist to get back to bed with c/o LEs burning and L arm painful. Nsg in with pain meds. PT to continue.    Plan    Continue current treatment plan.    DC Equipment Recommendations: Unable to determine at this time  Discharge Recommendations: Recommend post-acute placement for additional physical therapy services prior to discharge home           Objective       07/05/22 1423   Charge Group   Charges  Yes   PT Therapeutic Activities 2   PT Self Care / Home Evaluation  1   Total Time Spent   PT Total Time Yes   PT Therapeutic Activities Time Spent (Mins) 25   PT Self Care/Home Evaluation Time Spent (Mins) 10   PT Total Time Spent (Calculated) 35   Precautions   Precautions Fall Risk;Swallow Precautions ( See Comments)   Comments L UE very painful to touch, B LE burning with activity   Vitals   O2 (LPM) 2   O2 Delivery Device Silicone Nasal Cannula   Pain 0 - 10 Group   Therapist Pain Assessment During Activity;Nurse Notified  (L arm and B LE buring, not rated. nsg in with meds.)   Cognition    Level of Consciousness Alert   Safety Awareness Impaired   Comments remains with chin on chest in sitting. Pt is emotional, saying that :\"I can't convince them that I'm dying, what a terrible way to die\" and that \"My arm is dying\". Comfort given, nsg updated.   Active ROM Lower Body    Active ROM Lower Body  WDL   Strength Lower Body   Lower Body Strength  X   Gross Strength Generalized " Weakness, Equal Bilaterally   Comments tolerates standing and a few small pivot steps, then unable to complete sit to stand from commode without max assist. Very weak, poor endurance.   Balance   Sitting Balance (Static) Fair -   Sitting Balance (Dynamic) Fair -   Standing Balance (Static) Trace +   Standing Balance (Dynamic) Trace +   Weight Shift Sitting Poor   Weight Shift Standing Poor   Skilled Intervention Verbal Cuing;Sequencing   Comments standing with FWW   Gait Analysis   Gait Level Of Assist Moderate Assist   Assistive Device Front Wheel Walker   Distance (Feet) 2   # of Times Distance was Traveled 1   Deviation Decreased Base Of Support   # of Stairs Climbed 0   Weight Bearing Status no restrictions   Skilled Intervention Verbal Cuing   Bed Mobility    Supine to Sit Moderate Assist   Sit to Supine Moderate Assist   Scooting Maximal Assist   Rolling Moderate Assist to Rt.   Skilled Intervention Verbal Cuing;Sequencing   Comments with HOB up   Functional Mobility   Sit to Stand Moderate Assist  (min a with first, mod assist by end as more fatigued.)   Toilet Transfers Moderate Assist  (pt asks to use commode)   Skilled Intervention Verbal Cuing   Comments Pt had large smear in bed, needed to finish BM, commode provided and max assists to pivot to commode, max assist to pivot back to bed. Legs weak.   How much difficulty does the patient currently have...   Turning over in bed (including adjusting bedclothes, sheets and blankets)? 2   Sitting down on and standing up from a chair with arms (e.g., wheelchair, bedside commode, etc.) 2   Moving from lying on back to sitting on the side of the bed? 2   How much help from another person does the patient currently need...   Moving to and from a bed to a chair (including a wheelchair)? 2   Need to walk in a hospital room? 2   Climbing 3-5 steps with a railing? 1   6 clicks Mobility Score 11   Activity Tolerance   Sitting in Chair 15 on commode   Comments limited by  c/o legs burning   Short Term Goals    Short Term Goal # 1 Pt will perform bed mobility with supervision in 6 visits with HOB flat, no rail.   Goal Outcome # 1 goal not met   Short Term Goal # 2 Pt will transfer with supervision in 6 visits to improve functional indep.   Goal Outcome # 2 Goal not met   Short Term Goal # 3 Pt will ambulate x 125 feet using FWW with supervision in 6 visits to improve functional indep.   Goal Outcome # 3 Goal not met   Education Group   Role of Physical Therapist Patient Response Patient;Acceptance;Explanation;Verbal Demonstration   Anticipated Discharge Equipment and Recommendations   DC Equipment Recommendations Unable to determine at this time   Discharge Recommendations Recommend post-acute placement for additional physical therapy services prior to discharge home   Interdisciplinary Plan of Care Collaboration   IDT Collaboration with  Nursing   Patient Position at End of Therapy In Bed;Call Light within Reach;Tray Table within Reach;Phone within Reach   Collaboration Comments nsg in room and updated   Session Information   Date / Session Number  7/5-2 (1/3, 7/10)      Excision Method: Fusiform

## 2022-07-06 NOTE — CARE PLAN
The patient is Stable - Low risk of patient condition declining or worsening    Shift Goals  Clinical Goals: pain control, dialysis, monitor VS  Patient Goals: pain control  Family Goals: `    Progress made toward(s) clinical / shift goals:  Q2h turns in place. Pt encouraged to utilize elevation, scheduled tylenol, and ice packs to improve pain level. Pt calls for assistance appropriately   Problem: Pain - Standard  Goal: Alleviation of pain or a reduction in pain to the patient’s comfort goal  Outcome: Progressing     Problem: Knowledge Deficit - Standard  Goal: Patient and family/care givers will demonstrate understanding of plan of care, disease process/condition, diagnostic tests and medications  Outcome: Progressing     Problem: Skin Integrity  Goal: Skin integrity is maintained or improved  Outcome: Progressing     Problem: Fall Risk  Goal: Patient will remain free from falls  Outcome: Progressing       Patient is not progressing towards the following goals:

## 2022-07-06 NOTE — THERAPY
"Occupational Therapy  Daily Treatment     Patient Name: Jannet Bruno  Age:  81 y.o., Sex:  female  Medical Record #: [id number]  Today's Date: 7/6/2022     Precautions  Precautions: Fall Risk, Swallow Precautions ( See Comments)  Comments: L UE very painful to touch, B LE burning with activity    Assessment  Pt was seen for OT tx demonstrating on going generalized weakness, debility and pain. Pt needs increased encouragement to participate in OOB activity and has increasing c/o pain w/all activity. RN aware of pain issues. Pt did tolerate ~20 min sitting EOB for ADL's. OT will continue to follow. Recommend all staff encourage and facilitate OOB activity.     Plan  Continue current treatment plan.    DC Equipment Recommendations: Unable to determine at this time  Discharge Recommendations: Recommend post-acute placement for additional occupational therapy services prior to discharge home    Subjective  \"Someone needs to do something about this pain or else I'm going to go have to find another way\"      Objective     07/06/22 0941   Treatment Charges   Charges Yes   OT Self Care / ADL 2   Total Time Spent   OT Time Spent Yes   OT Self Care / ADL (Minutes) 26   OT Total Time Spent (Calculated) 26   Precautions   Precautions Fall Risk;Swallow Precautions ( See Comments)   Comments L UE very painful to touch, B LE burning with activity   Pain 0 - 10 Group   Location Leg   Location Orientation Right;Left   Pain Rating Scale (NPRS) 7   Therapist Pain Assessment During Activity;Nurse Notified;8   Cognition    Cognition / Consciousness X   Level of Consciousness Alert   Safety Awareness Impaired   New Learning Impaired   Attention Impaired   Comments Agreeable but w/hesitation, fluctating between calm and hysterical c/o pain as session progressed, but then when BTB quickly falls back to sleep   Active ROM Upper Body   Active ROM Upper Body  X   Comments impaired bilaterally by weakness, however LUE worse d/t edema "   Strength Upper Body   Upper Body Strength  X   Comments 3/5 RUE 2+/5 LUE   Other Treatments   Other Treatments Provided Focused on sitting posture EOB and light grooming as well as sit>stand 1x   Balance   Sitting Balance (Static) Fair   Sitting Balance (Dynamic) Fair -   Standing Balance (Static) Fair -   Standing Balance (Dynamic) Poor +   Weight Shift Sitting Poor   Weight Shift Standing Absent   Skilled Intervention Tactile Cuing;Inhibition;Sequencing;Postural Facilitation;Compensatory Strategies   Comments w/fww   Bed Mobility    Supine to Sit Moderate Assist   Sit to Supine Moderate Assist   Scooting Moderate Assist   Rolling Maximal Assist to Rt.;Maximum Assist to Lt.   Skilled Intervention Verbal Cuing;Tactile Cuing;Facilitation;Compensatory Strategies   Comments minimal tolerance for movement of BLE; HOB elevated   Activities of Daily Living   Grooming Minimal Assist;Seated   Upper Body Dressing Moderate Assist   Lower Body Dressing Maximal Assist   Toileting   (NT)   Skilled Intervention Verbal Cuing;Postural Facilitation;Facilitation;Compensatory Strategies   Comments oral, face, hair care + gown change   How much help from another person does the patient currently need...   6 Clicks Daily Activity Score 12   Functional Mobility   Sit to Stand Moderate Assist   Bed, Chair, Wheelchair Transfer Unable to Participate   Mobility EOB sit>Stand x2 BTB   Skilled Intervention Verbal Cuing;Tactile Cuing   Visual Perception   Visual Perception  Not Tested   Activity Tolerance   Comments c/o pain through out   Patient / Family Goals   Patient / Family Goal #1 to get better   Goal #1 Outcome Progressing slower than expected   Short Term Goals   Short Term Goal # 1 LB dressing with SPV   Goal Outcome # 1 Progressing slower than expected   Short Term Goal # 2 seated G/h with SPV for >20 min   Goal Outcome # 2 Progressing slower than expected   Short Term Goal # 3 BSC txf with min A   Goal Outcome # 3 Progressing  slower than expected   Education Group   Role of Occupational Therapist Patient Response Patient;Acceptance;Explanation;Verbal Demonstration;Reinforcement Needed   Pathology of Bedrest Patient Response Patient;Acceptance;Explanation;Verbal Demonstration;Reinforcement Needed

## 2022-07-06 NOTE — DISCHARGE PLANNING
Agency/Facility Name: [facility]  Spoke To: Milagros   Outcome: No dialysis beds are coming available at this time, Pt has been escalated to leadership for dialysis placement.

## 2022-07-06 NOTE — DISCHARGE PLANNING
Case Management Discharge Planning    Admission Date: 6/24/2022  GMLOS: 4.3  ALOS: 12    6-Clicks ADL Score: 16  6-Clicks Mobility Score: 11  PT and/or OT Eval ordered: Yes  Post-acute Referrals Ordered: Yes  Post-acute Choice Obtained: Yes  Has referral(s) been sent to post-acute provider:  Yes      Anticipated Discharge Dispo: Discharge Disposition: D/T to SNF with Medicare cert in anticipation of skilled care (03)    DME Needed: No    Action(s) Taken: OTHER    Escalations Completed: None    Medically Clear: No    Next Steps: Team met to review status. SNF placement is pending as patient needs an HD bed. Team continue to follow.     Barriers to Discharge: None    Is the patient up for discharge tomorrow: No

## 2022-07-06 NOTE — PROGRESS NOTES
Hemodialysis ordered by Dr. Lacey. Treatment started at 1251 and ended at 1551. Pt stable, vss, no c/o post tx. Net UF 2.2 L. Report to JUAN MANUEL Ordonez RN. Lt ua avf dsg cdi.

## 2022-07-06 NOTE — THERAPY
"Speech Language Pathology  Daily Treatment     Patient Name: Jannet Bruno  Age:  81 y.o., Sex:  female  Medical Record #: [id number]  Today's Date: 7/6/2022     Precautions  Precautions: Fall Risk, Swallow Precautions ( See Comments)  Comments: L UE very painful to touch, B LE burning with activity    Assessment    The patient was seen on this date for dysphagia therapy. Patient with poor appetite and limited participation only willing to consume trials of thin liquids. She consumed thin liquids with no overt s/sx of aspiration and denied globus sensation. The patient reports a preference for remaining on her current minced moist diet texture. Will continue to follow and attempt upgraded trials, per previous conversation with patients son, patient is typically on a regular texture diet with thin liquids.      Recommendations  1.  Continue diet of minced and moist solids and thin liquids  2.  Swallowing Instructions & Precautions:   Supervision: 1:1 feeding/A, Encourage self-feeding  Positioning: Fully upright and midline during oral intake  Medication: whole with thin liquid (cut/crush larger pills)  Strategies: Small bites/sips, Alternate bites and sips, Slow rate of intake  Oral Care: After meals to mitigate risks of aspiration PNA     Plan    Continue current treatment plan.    Discharge Recommendations: Recommend post-acute placement for additional speech therapy services prior to discharge home     Objective       07/06/22 1121   Vitals   O2 (LPM) 4   O2 Delivery Device Silicone Nasal Cannula   Pain 0 - 10 Group   Therapist Pain Assessment Post Activity Pain Same as Prior to Activity;Nurse Notified;0   Dysphagia    Positioning / Behavior Modification Self Monitoring;Modulate Rate or Bite Size   Other Treatments PO trials of thins   Recommended Route of Medication Administration   Medication Administration  Whole with Liquid Wash   Patient / Family Goals   Patient / Family Goal #1 \"Everything gets stuck\" "   Goal #1 Outcome Progressing as expected   Short Term Goals   Short Term Goal # 1 Patient will consume a diet of minced and moist solids and thin liquids with no s/sx of aspiration with assistance   Goal Outcome # 1 Progressing as expected

## 2022-07-06 NOTE — PROGRESS NOTES
[facility] -  PROGRESS NOTE               Author: Mauro Lacey M.D. Date & Time: 7/6/2022  10:20 AM     HPI:  81yoF with PMH significant for ESRD on HD MWF via left arm AVF, HTN, DM II, Anemia secondary to CKD, CKD Bone mineral disorder, admitted with increased edema and weakness and having missed her last last 2-3 outpt HD treatments. Pt is very lethargic at this time and unable to provide much history, majority of the history was obtained through review of the medical record and discussion with primary svc. Pt had reported increased LE edema and fatigue and weakness and worsening of her left arm edema so she came to the ED for evaluation. She apparently has missed her last 2-3 outpt dialysis treatments. Per regular outpt Nephrologist Dr. Gates, she has been non-compliant with her fluid restriction and was told that she needed 4x/week dialysis until her volume status could be optimized but she was non-compliant with that recommendation. She has edema of her left arm where her AVF is located and there is concern for a central stenosis that could be the etiology. She had an appointment at the outpt access center to evaluate for central stenosis and undergo angioplasty if needed on 6/9/22 but pt did not go to the appointment. She has not had any other procedures done to the arm in the past couple of months. She had a left arm us done today that showed no DVT and patent AVF and soft tissue edema. She apparently received pain medication fentanyl and dilaudid as well as benadryl earlier today and she is very drowsy.      DAILY NEPHROLOGY SUMMARY:  6/25: rapid response overnight for severe leg pain, pt very lethargic today, moans with palpation of legs, arouses but does not answer questions and falls back asleep, tolerated HD yest with 2.5L UF, BP stable overnight but low this am  6/26: No events, BP low overnight and this am, more alert, reports pain in legs for the past 3 weeks, denies any  "CP/SOB/Abd pain, reports left arm edema a little better but no pain in left arm   6/27: Pt resting in bed, subdued, Bps soft, on 4L supp O2 via NC, labs reviewed, due for HD  6/28: pt laying in bed, sleepy, c/o pain in legs, moaning, vascular assessed and does not recommend any intervention on legs or AVF, tolerated iHD yesterday with UF:2.8L  6/29: Pt in bed, fatigued, no complaints.  VSS 1L NC.    6/30: no new complaints, no overnight events. Tolerated hd yesterday, UF 3.5L , she is in negative balance.   7/1: Resting in bed, no complaints.  VSS 2L NC. No new labs today.   7/2: Pt sitting up in bed, confused, CNA helping her with breakfast meal, iHD yesterday with 1.5L net UF limited by muscle cramping, labs reviewed, VSS on 1L NC O2  7/3: Pt slumped in bed, somnolent, medical floor status, VSS on RA  7/4: No new overnight renal events. S/p HD yesterday and tolerated well. Net UF was 1.5 L.  7/5: S/p HD yesterday with net UF of 2 L. No new overnight renal events.   7/6: No new overnight renal events.     REVIEW OF SYSTEMS:    Limited by pt mentation    PMH/PSH/SH/FH:   Reviewed and unchanged since admission note    CURRENT MEDICATIONS:   Reviewed from admission to present day    VS:  /65   Pulse 87   Temp 36.6 °C (97.9 °F) (Temporal)   Resp 16   Ht 1.626 m (5' 4\")   Wt 66 kg (145 lb 8.1 oz)   SpO2 100%   BMI 24.98 kg/m²     Physical Exam  Vitals and nursing note reviewed.       Nursing note reviewed.   Constitutional:       Appearance: Normal appearance.   HENT:      Head: Normocephalic and atraumatic.   Eyes:      General: No scleral icterus.  Cardiovascular:      Rate and Rhythm: Normal rate and regular rhythm.      LUE AVF +T/B  Pulmonary:      Effort: Pulmonary effort is normal. No respiratory distress.      Breath sounds: Examination of the right-lower field reveals decreased breath sounds. Examination of the left-lower field reveals decreased breath sounds. Decreased breath sounds present. "   Abdominal:      General: Bowel sounds are normal. There is no distension.      Palpations: Abdomen is soft.   Musculoskeletal:         General: No deformities  Skin:     General: Skin is warm.      Findings: No rash.      Comments: LUE dependent edema  Neurological:      General: No focal deficit present.      Mental Status: She is alert and oriented to person, place, and time.   Psychiatric:         Mood and Affect: Mood normal.         Behavior: Behavior normal.     Fluids:  No intake/output data recorded.    LABS:  Recent Labs     07/03/22  1540 07/04/22  0639 07/05/22  1027   SODIUM 131* 137 132*   POTASSIUM 6.6* 5.4 4.9   CHLORIDE 92* 96 92*   CO2 25 27 27   GLUCOSE 119* 42* 182*   BUN 50* 31* 28*   CREATININE 5.96* 4.56* 3.74*   CALCIUM 8.7 9.3 8.7     Reviewed.    IMAGING:   All imaging reviewed from admission to present day    IMPRESSION:  # ESRD, dependent on HD              - HD via LUE AVF, missed last 2-3 outpt treatments  # Fluid Overload, improving              - Secondary to non-compliance with fluid restriction on HD and                    non-compliance with treatments  # Left arm edema, slow improvement              - CTA upper ext 6/26 w/o e/o focal stenosis + extensive edema             - AVF patent on left arm us and no DVT             - Pt no showed to outpt appt to evaluate              - Vascular does not recommend intervention   # Altered Mental Status, fluctuates             - Monitor  # HTN, variable control             - Goal BP < 140/90             - Not on BP meds   # Anemia of CKD, at goal              - Goal Hgb 10-11             - Iron 27             -TIBC 142             -%Sat 19             - Ferritin 1419  # CKD-MBD             - Ca 9.2             - PO4 6.4             - Managed at OP unit             - On calcitriol and sevelamer   # DM II--management per primary svc  # Leg Pain--chronic skin changes and subcutaneous tissue firm to touch             - Unclear etiology              - Vascular does not recommend any intervention     PLAN:  - Continue qMWF iHD schedule, HD today (WED)  - UF as tolerated  - No current need for ROGELIO  - Continue phos binders WM  - Continue off of all BP meds and diuretics for now  - PRN NS boluses for symptomatic hypotension  - Limit sedating meds if possible  - No dietary protein restrictions  - Dose all meds per ESRD  - Outpt access center appointment rescheduled  - Palliative care has consulted; pt is still a full code  - Pt at high risk for further morbidity/mortality  - DC planning underway for SNF     Thank you,

## 2022-07-06 NOTE — PROGRESS NOTES
[facility]GRESS NOTE     Attending: Dr. Sutton     Senior Resident: Carlos Chaudhry D.O.  Family Medicine Resident PGY-3     PATIENT: Jannet Bruno; [id number]; 1940 Hospital Day: 12     ID: 81 y.o. female with PMHx ESRD on HD MWF, HTN, HLD, and T2DM who was admitted on 6/24/2022 for left upper arm swelling and lower extremity edema.    SUBJECTIVE: No acute events overnights. Patient says she feels better today, and is more alert. She does feel pain has improved.     OBJECTIVE:     Vitals:    07/05/22 1945 07/06/22 0020 07/06/22 0100 07/06/22 0403   BP: 113/68 109/60  121/65   Pulse: 89 81  80   Resp: 18 16  16   Temp:       TempSrc: Temporal Temporal  Temporal   SpO2: 96% 100%     Weight:   66 kg (145 lb 8.1 oz)    Height:         No intake or output data in the 24 hours ending 07/06/22 0600    PE:  General: resting comfortably in bed, responds to command   HEENT: NC/AT. EOMI. No conjunctival injection or scleral icterus.   Cardiovascular: Systolic murmer   Respiratory: Symmetrical chest. CTAB with no W/R/R  EXT:  +1 bilateral LE edema. Left arm swelling. Multiple scattered lower extremity wounds.  MSK: Swelling and redness over left hand. TTP.   Neuro: Moving all 4 extremities     LABS:  Recent Labs     07/03/22  1540 07/04/22  0639 07/05/22  1027   WBC 11.6* 15.4* 13.4*   RBC 3.77* 3.90* 3.48*   HEMOGLOBIN 12.4 13.0 11.6*   HEMATOCRIT 39.0 41.4 36.9*   .4* 106.2* 106.0*   MCH 32.9 33.3* 33.3*   RDW 63.8* 65.6* 64.4*   PLATELETCT 262 339 273   MPV 9.1 8.9* 9.1   NEUTSPOLYS 79.00* 79.60* 79.50*   LYMPHOCYTES 9.90* 9.00* 10.00*   MONOCYTES 8.00 8.80 5.40   EOSINOPHILS 1.50 0.90 3.80   BASOPHILS 0.70 0.70 0.60   RBCMORPHOLO  --  Present  --      Recent Labs     07/03/22  1540 07/04/22  0639 07/05/22  1027   SODIUM 131* 137 132*   POTASSIUM 6.6* 5.4 4.9   CHLORIDE 92* 96 92*   CO2 25 27 27   BUN 50* 31* 28*   CREATININE 5.96* 4.56* 3.74*   CALCIUM 8.7 9.3 8.7   MAGNESIUM  --   --  2.1  "  ALBUMIN 2.8* 2.8* 2.7*     Estimated GFR/CRCL = Estimated Creatinine Clearance: 11 mL/min (A) (by C-G formula based on SCr of 3.74 mg/dL (H)).  Recent Labs     07/03/22  1540 07/04/22  0639 07/05/22  1027   GLUCOSE 119* 42* 182*     Recent Labs     07/03/22  1540 07/04/22  0639 07/05/22  1027   ASTSGOT 13 17 20   ALTSGPT 9 9 11   TBILIRUBIN 0.6 0.6 0.4   ALKPHOSPHAT 149* 149* 137*   GLOBULIN 4.2* 4.5* 3.7*             No results for input(s): INR, APTT, FIBRINOGEN in the last 72 hours.    Invalid input(s): DIMER    MICROBIOLOGY:   Results     Procedure Component Value Units Date/Time    URINALYSIS [[id number]]     Order Status: No result Specimen: Urine     BLOOD CULTURE [[id number]] Collected: 07/03/22 1540    Order Status: Completed Specimen: Blood from Peripheral Updated: 07/04/22 0731     Significant Indicator NEG     Source BLD     Site PERIPHERAL     Culture Result No Growth  Note: Blood cultures are incubated for 5 days and  are monitored continuously.Positive blood cultures  are called to the RN and reported as soon as  they are identified.      Narrative:      Per Hospital Policy: Only change Specimen Src: to \"Line\" if  specified by physician order.  Right Forearm/Arm    BLOOD CULTURE [[id number]] Collected: 07/03/22 1540    Order Status: Completed Specimen: Blood from Peripheral Updated: 07/04/22 0731     Significant Indicator NEG     Source BLD     Site PERIPHERAL     Culture Result No Growth  Note: Blood cultures are incubated for 5 days and  are monitored continuously.Positive blood cultures  are called to the RN and reported as soon as  they are identified.      Narrative:      Per Hospital Policy: Only change Specimen Src: to \"Line\" if  specified by physician order.  Right Wrist    URINALYSIS [[id number]]     Order Status: No result Specimen: Urine, Cath           IMAGING:   DX-HAND 3+ LEFT   Final Result      1.  No acute fracture or dislocation.   2.  Scattered mild degenerative changes, increased " from prior study. No definite erosive arthropathy.   3.  Osteopenia.   4.  Old ulnar styloid process fracture.      DX-CHEST-PORTABLE (1 VIEW)   Final Result      1.  Probable 12 mm right mid/upper lung zone mass. Suggest CT chest without contrast for further assessment      2.  Interstitial pulmonary edema or fibrosis      3.  Enlarged cardiac silhouette      4.  Left pleural effusion      CT-CTA UPPER EXT WITH & W/O-POST PROCESS LEFT   Final Result      1.  Status post brachiocephalic fistula in the left arm. No focal stenosis is identified.      2.  Extensive edema in the subcutaneous tissues of the visualized left lateral chest and arm.      EC-ECHOCARDIOGRAM COMPLETE W/O CONT   Final Result      US-ZAHRAA SINGLE LEVEL BILAT   Final Result      US-EXTREMITY ARTERY LOWER BILAT   Final Result      CT-EXTREMITY, UPPER W/O LEFT   Final Result      1.  There is diffuse subcutaneous edema of the imaged portions of the left upper extremity. There is also diffuse body wall edema in the visualized portions of the chest and abdomen.   2.  No focal fluid collection to suggest abscess.   3.  There is a small left pleural effusion and minimal fluid in the abdomen.   4.  There is postsurgical change of AV fistula. There is small vessel atherosclerosis.      US-EXTREMITY VENOUS UPPER UNILAT LEFT   Final Result      DX-CHEST-PORTABLE (1 VIEW)   Final Result         1.  Interstitial pulmonary parenchymal prominence suggest chronic underlying lung disease, component of interstitial edema and/or infiltrates not excluded.   2.  Small left pleural effusion   3.  Cardiomegaly   4.  Atherosclerosis            MEDS:  Current Facility-Administered Medications   Medication Last Admin   • [Held by provider] oxyCODONE immediate-release (ROXICODONE) tablet 2.5 mg 2.5 mg at 07/05/22 1421   • levothyroxine (SYNTHROID) tablet 25 mcg 25 mcg at 07/06/22 0500   • insulin GLARGINE (Lantus,Semglee) injection 5 Units at 07/05/22 1748    And   • insulin  lispro (AdmeLOG,HumaLOG) injection 1 Units at 07/05/22 2207    And   • dextrose 50% (D50W) injection 25 g     • heparin injection 1,300 Units 1,300 Units at 07/03/22 1828   • MD ALERT...Covid-19 Vaccine Order Given at 07/03/22 1830   • acetaminophen (TYLENOL) tablet 1,000 mg 1,000 mg at 07/06/22 0459   • polyethylene glycol/lytes (MIRALAX) PACKET 1 Packet 1 Packet at 07/04/22 0624    And   • senna-docusate (PERICOLACE or SENOKOT S) 8.6-50 MG per tablet 2 Tablet 2 Tablet at 07/05/22 1747    And   • bisacodyl (DULCOLAX) suppository 10 mg     • sevelamer carbonate (RENVELA) tablet 1,600 mg 1,600 mg at 07/05/22 1747   • lidocaine (LIDODERM) 5 % 2 Patch 2 Patch at 07/05/22 0836   • heparin injection 5,000 Units 5,000 Units at 07/06/22 0459   • atorvastatin (LIPITOR) tablet 20 mg 20 mg at 07/06/22 0500   • calcitRIOL (ROCALTROL) capsule 0.25 mcg 0.25 mcg at 07/06/22 0459   • ROPINIRole (REQUIP) tablet 0.25 mg 0.25 mg at 07/06/22 0459   • ondansetron (ZOFRAN) syringe/vial injection 4 mg 4 mg at 06/28/22 1018       PROBLEM LIST:  No problems updated.    ASSESSMENT/PLAN: 81 y.o. female with PMHx ESRD on HD MWF, HTN, HLD, and T2DM who was admitted on 6/24/2022 for left upper arm swelling and lower extremity edema.      # ESRD, on dialysis MWF  Nephrology following. Continue sevelamer and calcitriol. Renally dose medications.   -Dialysis yesterday 7/3/22     # Delirium  -Improved   - Fluctuating level of alertness throughout her stay, likely 2/2 hospital delirium.  - Continue to avoid sedating drugs  - Patient does have leukocytosis, likely reactive no signs of infection   - Continue to monitor labs     #Hypothyroid  -TSH high, T4 low   -Treat with 25mcg levothyroxine daily        #Left hand pain   -Swelling and redness of left hand  -Xray negative for acute fracture, but shows chronic changes      #Left upper extremity edema   Chronic, at same side of AV fistula, which is functioning well.   -Likely multifactorial: poor EF,  lymphedema,outflow stenosis.   -CTA completed without evidence of central stenosis.   -Vascular consulted and recommended supportive care - wrapping arm with ace bandage.      # goals of care  Palliative care has seen patient and patient still wants to be a Full Code.   - Plan to have further discussions when cognition is appropriate  - Patient still has no DPOA. Next of kin would be her son or daughter.      # HTN  -Continue to hold BP medications and lasix   -Fluids boluses PRN for hypotension         # T2DM  -Hypoglycemia resolved   -Continue lantus  5 units QHS   -ISS   -Hypoglycemia protocol         #Chronic lower extremity pain  Likely multi-factorial (PVD, lymphedema, poor EF). Some residual from old hip fractures but the significant pain seems to come from her bilateral lower legs. Pt not a candidate for surgery, unless she desires amputations. Please see vascular note for more details.  - Current pain management with scheduled tylenol, lidocaine patches, hot compresses  - Avoiding narcotics as above     # aortic stenosis, severe  # HFrEF  Cardiology consulted, and not candidate for intervention.      Core Measures:  DVT PPX: heparin  ABX: none  Fluids: PO  PCP: Lon  CODE STATUS: FULL CODE     Disposition: Medically cleared for discharge. Pending placement to SNF - challenging placement as transport to dialysis is not always facilitated.

## 2022-07-06 NOTE — PROGRESS NOTES
Bedside report received. Assumed care of patient this morning. Assessment completed      Patient is A&O x 4, pt calls for assistance appropriately  Reports 10 /10 burning pain to BLE new lidocaine patches applied, ice packs and elevation also used.   Pt is on 2L oxygen, baseline is 2L.   Mobility : 2x assist Bed alarm in use.  Voiding: anuric   Flatus +  PIV infiltrated, pt declining RN remove PIV and restart new PIV until after breakfast.     Plan of care reviewed with the patient. Bed is locked and in the lowest position. Call light is within reach. Patient encouraged to voice needs and concerns, all needs met at this time. Hourly rounding in place.

## 2022-07-07 NOTE — PROGRESS NOTES
Bedside report received from day RN, pt care assumed, assessment completed. Pt is A&O 4, denies current pain, SR on the monitor. Updated on POC, questions answered. Bed in lowest, locked position, treaded socks on, call light and belongings within reach. Fall precautions in place.

## 2022-07-07 NOTE — PROGRESS NOTES
Monitor summary:     SR 78-90  Rare PVC's  4 beats of V-tach at 15:08  .19/.10/.40

## 2022-07-07 NOTE — PROGRESS NOTES
Monitor Summary:     Rhythm: Sinus Rhythm    Rate: 87-93    Measurement: .17/.10/.38    Ectopy: R PVCs, PAC    12 hr cc

## 2022-07-07 NOTE — CARE PLAN
Problem: Pain - Standard  Goal: Alleviation of pain or a reduction in pain to the patient’s comfort goal  Outcome: Progressing     Problem: Knowledge Deficit - Standard  Goal: Patient and family/care givers will demonstrate understanding of plan of care, disease process/condition, diagnostic tests and medications  Outcome: Progressing     Problem: Skin Integrity  Goal: Skin integrity is maintained or improved  Outcome: Progressing     Problem: Fall Risk  Goal: Patient will remain free from falls  Outcome: Progressing   The patient is Watcher - Medium risk of patient condition declining or worsening    Shift Goals  Clinical Goals: pain mgmt, HD bed  Patient Goals: rest, food  Family Goals: parveen    Progress made toward(s) clinical / shift goals:      Patient is not progressing towards the following goals:

## 2022-07-07 NOTE — PROGRESS NOTES
[facility] – [place], [place] FAMILY MEDICINE PROGRESS NOTE     Attending: Dr. Damon     Senior Resident: Carlos Chaudhry D.O.  Family Medicine Resident PGY-3     PATIENT: Jannet Bruno; [id number]; 1940 Hospital Day: 12     ID: 81 y.o. female with PMHx ESRD on HD MWF, HTN, HLD, and T2DM who was admitted on 6/24/2022 for left upper arm swelling and lower extremity edema.     SUBJECTIVE: No acute events overnight. VSS. Patient states her pain has improved.     OBJECTIVE:     Vitals:    07/06/22 1251 07/06/22 1551 07/06/22 2046 07/07/22 0520   BP: 122/70 130/64 139/72 (!) 120/93   Pulse: 82 78 85 80   Resp: 18 18 18 18   Temp: 35.8 °C (96.5 °F) 35.9 °C (96.6 °F)     TempSrc: Temporal Temporal Temporal Temporal   SpO2:   97% 98%   Weight:   67.2 kg (148 lb 2.4 oz)    Height:           Intake/Output Summary (Last 24 hours) at 7/7/2022 0626  Last data filed at 7/7/2022 0500  Gross per 24 hour   Intake 740 ml   Output 2700 ml   Net -1960 ml       PE:  General: resting comfortably in bed, responds to command   HEENT: NC/AT. EOMI. No conjunctival injection or scleral icterus.   Cardiovascular: Systolic murmer   Respiratory: Symmetrical chest. CTAB with no W/R/R  Abdomen: Soft, mild tenderness to palpation with mild rebound, no guarding or rigidity   EXT:  +1 bilateral LE edema. Left arm swelling. Multiple scattered lower extremity wounds.  MSK: Swelling and redness over left hand. TTP.   Neuro: Moving all 4 extremities     LABS:  Recent Labs     07/04/22  0639 07/05/22  1027   WBC 15.4* 13.4*   RBC 3.90* 3.48*   HEMOGLOBIN 13.0 11.6*   HEMATOCRIT 41.4 36.9*   .2* 106.0*   MCH 33.3* 33.3*   RDW 65.6* 64.4*   PLATELETCT 339 273   MPV 8.9* 9.1   NEUTSPOLYS 79.60* 79.50*   LYMPHOCYTES 9.00* 10.00*   MONOCYTES 8.80 5.40   EOSINOPHILS 0.90 3.80   BASOPHILS 0.70 0.60   RBCMORPHOLO Present  --      Recent Labs     07/04/22  0639 07/05/22  1027   SODIUM 137 132*   POTASSIUM 5.4 4.9   CHLORIDE 96 92*   CO2 27 27   BUN 31* 28*   CREATININE  "4.56* 3.74*   CALCIUM 9.3 8.7   MAGNESIUM  --  2.1   ALBUMIN 2.8* 2.7*     Estimated GFR/CRCL = Estimated Creatinine Clearance: 11.1 mL/min (A) (by C-G formula based on SCr of 3.74 mg/dL (H)).  Recent Labs     07/04/22  0639 07/05/22  1027   GLUCOSE 42* 182*     Recent Labs     07/04/22  0639 07/05/22  1027   ASTSGOT 17 20   ALTSGPT 9 11   TBILIRUBIN 0.6 0.4   ALKPHOSPHAT 149* 137*   GLOBULIN 4.5* 3.7*             No results for input(s): INR, APTT, FIBRINOGEN in the last 72 hours.    Invalid input(s): DIMER    MICROBIOLOGY:   Results     Procedure Component Value Units Date/Time    URINALYSIS [[id number]]     Order Status: No result Specimen: Urine     BLOOD CULTURE [[id number]] Collected: 07/03/22 1540    Order Status: Completed Specimen: Blood from Peripheral Updated: 07/04/22 0731     Significant Indicator NEG     Source BLD     Site PERIPHERAL     Culture Result No Growth  Note: Blood cultures are incubated for 5 days and  are monitored continuously.Positive blood cultures  are called to the RN and reported as soon as  they are identified.      Narrative:      Per Hospital Policy: Only change Specimen Src: to \"Line\" if  specified by physician order.  Right Forearm/Arm    BLOOD CULTURE [[id number]] Collected: 07/03/22 1540    Order Status: Completed Specimen: Blood from Peripheral Updated: 07/04/22 0731     Significant Indicator NEG     Source BLD     Site PERIPHERAL     Culture Result No Growth  Note: Blood cultures are incubated for 5 days and  are monitored continuously.Positive blood cultures  are called to the RN and reported as soon as  they are identified.      Narrative:      Per Hospital Policy: Only change Specimen Src: to \"Line\" if  specified by physician order.  Right Wrist    URINALYSIS [[id number]]     Order Status: No result Specimen: Urine, Cath              IMAGING:   DX-HAND 3+ LEFT   Final Result      1.  No acute fracture or dislocation.   2.  Scattered mild degenerative changes, increased from " prior study. No definite erosive arthropathy.   3.  Osteopenia.   4.  Old ulnar styloid process fracture.      DX-CHEST-PORTABLE (1 VIEW)   Final Result      1.  Probable 12 mm right mid/upper lung zone mass. Suggest CT chest without contrast for further assessment      2.  Interstitial pulmonary edema or fibrosis      3.  Enlarged cardiac silhouette      4.  Left pleural effusion      CT-CTA UPPER EXT WITH & W/O-POST PROCESS LEFT   Final Result      1.  Status post brachiocephalic fistula in the left arm. No focal stenosis is identified.      2.  Extensive edema in the subcutaneous tissues of the visualized left lateral chest and arm.      EC-ECHOCARDIOGRAM COMPLETE W/O CONT   Final Result      US-ZAHRAA SINGLE LEVEL BILAT   Final Result      US-EXTREMITY ARTERY LOWER BILAT   Final Result      CT-EXTREMITY, UPPER W/O LEFT   Final Result      1.  There is diffuse subcutaneous edema of the imaged portions of the left upper extremity. There is also diffuse body wall edema in the visualized portions of the chest and abdomen.   2.  No focal fluid collection to suggest abscess.   3.  There is a small left pleural effusion and minimal fluid in the abdomen.   4.  There is postsurgical change of AV fistula. There is small vessel atherosclerosis.      US-EXTREMITY VENOUS UPPER UNILAT LEFT   Final Result      DX-CHEST-PORTABLE (1 VIEW)   Final Result         1.  Interstitial pulmonary parenchymal prominence suggest chronic underlying lung disease, component of interstitial edema and/or infiltrates not excluded.   2.  Small left pleural effusion   3.  Cardiomegaly   4.  Atherosclerosis            MEDS:  Current Facility-Administered Medications   Medication Last Admin   • [Held by provider] oxyCODONE immediate-release (ROXICODONE) tablet 2.5 mg 2.5 mg at 07/05/22 1421   • levothyroxine (SYNTHROID) tablet 25 mcg 25 mcg at 07/07/22 0522   • insulin GLARGINE (Lantus,Semglee) injection 5 Units at 07/05/22 1748    And   • insulin  lispro (AdmeLOG,HumaLOG) injection 2 Units at 07/06/22 2117    And   • dextrose 50% (D50W) injection 25 g     • heparin injection 1,300 Units 1,300 Units at 07/03/22 1828   • MD ALERT...Covid-19 Vaccine Order Given at 07/03/22 1830   • acetaminophen (TYLENOL) tablet 1,000 mg 1,000 mg at 07/07/22 0520   • polyethylene glycol/lytes (MIRALAX) PACKET 1 Packet 1 Packet at 07/04/22 0624    And   • senna-docusate (PERICOLACE or SENOKOT S) 8.6-50 MG per tablet 2 Tablet 2 Tablet at 07/05/22 1747    And   • bisacodyl (DULCOLAX) suppository 10 mg     • sevelamer carbonate (RENVELA) tablet 1,600 mg 1,600 mg at 07/06/22 1653   • lidocaine (LIDODERM) 5 % 2 Patch 2 Patch at 07/06/22 0756   • heparin injection 5,000 Units 5,000 Units at 07/07/22 0600   • atorvastatin (LIPITOR) tablet 20 mg 20 mg at 07/07/22 0522   • calcitRIOL (ROCALTROL) capsule 0.25 mcg 0.25 mcg at 07/07/22 0521   • ROPINIRole (REQUIP) tablet 0.25 mg 0.25 mg at 07/07/22 0521   • ondansetron (ZOFRAN) syringe/vial injection 4 mg 4 mg at 06/28/22 1018       PROBLEM LIST:  No problems updated.    ASSESSMENT/PLAN: 81 y.o. female with PMHx ESRD on HD MWF, HTN, HLD, and T2DM who was admitted on 6/24/2022 for left upper arm swelling and lower extremity edema.      # ESRD, on dialysis MWF  Nephrology following. Continue sevelamer and calcitriol. Renally dose medications.   -Dialysis yesterday 7/3/22     # Delirium  -Improved   - Likely 2/2 hospital delirium.  - Continue to avoid sedating drugs  - Continue to monitor labs      #Abdominal tenderness   -Patient reports abdominal tenderness on exam with rebound. No grimacing or gaurding. Patient in no acute disress. No other signs of acute abdomen.   -Continue to monitor with serial abdominal exams  -Likely positional pain, unlikely acute abdomen     #Hypothyroid  -TSH high, T4 low   -Continue with 25mcg levothyroxine daily       #Left hand pain   -Swelling and redness of left hand  -Xray negative for acute fracture, but  shows chronic changes      #Left upper extremity edema   Chronic, at same side of AV fistula, which is functioning well.   -Likely multifactorial: poor EF, lymphedema,outflow stenosis.   -CTA without evidence of central stenosis.   -Vascular consulted and recommended supportive care - wrapping arm with ace bandage.      # goals of care  Palliative care has seen patient and patient still wants to be a Full Code.   - Plan to have further discussions when cognition is appropriate  - Patient still has no DPOA. Next of kin would be her son or daughter.      # HTN  -Continue to hold BP medications and lasix   -Fluids boluses PRN for hypotension         # T2DM  -Continue lantus  5 units QHS   -ISS   -Hypoglycemia protocol         #Chronic lower extremity pain  Likely multi-factorial (PVD, lymphedema, poor EF). Some residual from old hip fractures but the significant pain seems to come from her bilateral lower legs. Pt not a candidate for surgery, unless she desires amputations. Please see vascular note for more details.  - Current pain management with scheduled tylenol, lidocaine patches, hot compresses  - Avoiding narcotics as above     # aortic stenosis, severe  # HFrEF  Cardiology consulted, and not candidate for intervention.      Core Measures:  DVT PPX: heparin  ABX: none  Fluids: PO  PCP: Lon  CODE STATUS: FULL CODE     Disposition: Medically cleared for discharge. Pending placement to SNF - challenging placement as transport to dialysis is not always facilitated.

## 2022-07-07 NOTE — PROGRESS NOTES
[facility] -  PROGRESS NOTE               Author: Mauro Lacey M.D. Date & Time: 7/7/2022  10:45 AM     HPI:  81yoF with PMH significant for ESRD on HD MWF via left arm AVF, HTN, DM II, Anemia secondary to CKD, CKD Bone mineral disorder, admitted with increased edema and weakness and having missed her last last 2-3 outpt HD treatments. Pt is very lethargic at this time and unable to provide much history, majority of the history was obtained through review of the medical record and discussion with primary svc. Pt had reported increased LE edema and fatigue and weakness and worsening of her left arm edema so she came to the ED for evaluation. She apparently has missed her last 2-3 outpt dialysis treatments. Per regular outpt Nephrologist Dr. Gates, she has been non-compliant with her fluid restriction and was told that she needed 4x/week dialysis until her volume status could be optimized but she was non-compliant with that recommendation. She has edema of her left arm where her AVF is located and there is concern for a central stenosis that could be the etiology. She had an appointment at the outpt access center to evaluate for central stenosis and undergo angioplasty if needed on 6/9/22 but pt did not go to the appointment. She has not had any other procedures done to the arm in the past couple of months. She had a left arm us done today that showed no DVT and patent AVF and soft tissue edema. She apparently received pain medication fentanyl and dilaudid as well as benadryl earlier today and she is very drowsy.      DAILY NEPHROLOGY SUMMARY:  6/25: rapid response overnight for severe leg pain, pt very lethargic today, moans with palpation of legs, arouses but does not answer questions and falls back asleep, tolerated HD yest with 2.5L UF, BP stable overnight but low this am  6/26: No events, BP low overnight and this am, more alert, reports pain in legs for the past 3 weeks, denies any  "CP/SOB/Abd pain, reports left arm edema a little better but no pain in left arm   6/27: Pt resting in bed, subdued, Bps soft, on 4L supp O2 via NC, labs reviewed, due for HD  6/28: pt laying in bed, sleepy, c/o pain in legs, moaning, vascular assessed and does not recommend any intervention on legs or AVF, tolerated iHD yesterday with UF:2.8L  6/29: Pt in bed, fatigued, no complaints.  VSS 1L NC.    6/30: no new complaints, no overnight events. Tolerated hd yesterday, UF 3.5L , she is in negative balance.   7/1: Resting in bed, no complaints.  VSS 2L NC. No new labs today.   7/2: Pt sitting up in bed, confused, CNA helping her with breakfast meal, iHD yesterday with 1.5L net UF limited by muscle cramping, labs reviewed, VSS on 1L NC O2  7/3: Pt slumped in bed, somnolent, medical floor status, VSS on RA  7/4: No new overnight renal events. S/p HD yesterday and tolerated well. Net UF was 1.5 L.  7/5: S/p HD yesterday with net UF of 2 L. No new overnight renal events.   7/6: No new overnight renal events.   7/7: S/p HD yesterday with net UF of 2.2L and tolerated well. No new overnight renal events.     REVIEW OF SYSTEMS:    Limited by pt mentation    PMH/PSH/SH/FH:   Reviewed and unchanged since admission note    CURRENT MEDICATIONS:   Reviewed from admission to present day    VS:  /77   Pulse 90   Temp 36.9 °C (98.4 °F) (Temporal)   Resp 17   Ht 1.626 m (5' 4\")   Wt 67.2 kg (148 lb 2.4 oz)   SpO2 98%   BMI 25.43 kg/m²     Physical Exam  Vitals and nursing note reviewed.       Nursing note reviewed.   Constitutional:       Appearance: Normal appearance.   HENT:      Head: Normocephalic and atraumatic.   Eyes:      General: No scleral icterus.  Cardiovascular:      Rate and Rhythm: Normal rate and regular rhythm.      LUE AVF +T/B  Pulmonary:      Effort: Pulmonary effort is normal. No respiratory distress.      Breath sounds: Examination of the right-lower field reveals decreased breath sounds. Examination " of the left-lower field reveals decreased breath sounds. Decreased breath sounds present.   Abdominal:      General: Bowel sounds are normal. There is no distension.      Palpations: Abdomen is soft.   Musculoskeletal:         General: No deformities  Skin:     General: Skin is warm.      Findings: No rash.      Comments: LUE dependent edema  Neurological:      General: No focal deficit present.      Mental Status: She is alert and oriented to person, place, and time.   Psychiatric:         Mood and Affect: Mood normal.         Behavior: Behavior normal.     Fluids:  In: 740 [P.O.:240; Dialysis:500]  Out: 2700     LABS:  Recent Labs     07/05/22  1027   SODIUM 132*   POTASSIUM 4.9   CHLORIDE 92*   CO2 27   GLUCOSE 182*   BUN 28*   CREATININE 3.74*   CALCIUM 8.7     Reviewed.    IMAGING:   All imaging reviewed from admission to present day    IMPRESSION:  # ESRD, dependent on HD              - HD via LUE AVF, missed last 2-3 outpt treatments  # Fluid Overload, improving              - Secondary to non-compliance with fluid restriction on HD and                    non-compliance with treatments  # Left arm edema, slow improvement              - CTA upper ext 6/26 w/o e/o focal stenosis + extensive edema             - AVF patent on left arm us and no DVT             - Pt no showed to outpt appt to evaluate              - Vascular does not recommend intervention   # Altered Mental Status, fluctuates             - Monitor  # HTN, variable control             - Goal BP < 140/90             - Not on BP meds   # Anemia of CKD, at goal              - Goal Hgb 10-11             - Iron 27             -TIBC 142             -%Sat 19             - Ferritin 1419  # CKD-MBD             - Ca 9.2             - PO4 6.4             - Managed at OP unit             - On calcitriol and sevelamer   # DM II--management per primary svc  # Leg Pain--chronic skin changes and subcutaneous tissue firm to touch             - Unclear etiology              - Vascular does not recommend any intervention     PLAN:  - Continue qMWF iHD schedule, No plans for HD today. Next treatment will be tomorrow.  - UF as tolerated  - No current need for ROGELIO  - Continue phos binders WM  - Continue off of all BP meds and diuretics for now  - PRN NS boluses for symptomatic hypotension  - Limit sedating meds if possible  - No dietary protein restrictions  - Dose all meds per ESRD  - Outpt access center appointment rescheduled  - Palliative care has consulted; pt is still a full code  - Pt at high risk for further morbidity/mortality  - DC planning underway for SNF     Thank you,

## 2022-07-07 NOTE — CARE PLAN
The patient is Watcher - Medium risk of patient condition declining or worsening    Shift Goals  Clinical Goals: Pain control, Monitor labs/vitals  Patient Goals: Rest, Pain control  Family Goals: Comfort    Progress made toward(s) clinical / shift goals:    Problem: Pain - Standard  Goal: Alleviation of pain or a reduction in pain to the patient’s comfort goal  Outcome: Progressing     Problem: Knowledge Deficit - Standard  Goal: Patient and family/care givers will demonstrate understanding of plan of care, disease process/condition, diagnostic tests and medications  Outcome: Progressing     Problem: Skin Integrity  Goal: Skin integrity is maintained or improved  Outcome: Progressing

## 2022-07-08 NOTE — CARE PLAN
The patient is Stable - Low risk of patient condition declining or worsening    Shift Goals  Clinical Goals: Pain management  Patient Goals: Sleep  Family Goals: N/A    Progress made toward(s) clinical / shift goals:  Patient has scheduled tylenol and lidocaine patches for pain. Offered heat pack or cold pack, patient declined. Messaged MD regarding uncontrolled pain. MD okay'd one time dose of oxycodone to assist w/ pain management.    Patient is not progressing towards the following goals:  Problem: Pain - Standard  Goal: Alleviation of pain or a reduction in pain to the patient’s comfort goal  Outcome: Not Progressing

## 2022-07-08 NOTE — PROGRESS NOTES
4 Eyes Skin Assessment Completed by MAURO Denson and MAURO Buchanan.    Head WDL  Ears Redness and Blanching  Nose Discoloration and Scab  Mouth WDL  Neck WDL  Breast/Chest WDL  Shoulder Blades Dry, scabbing  Spine Dry, scabbing  (R) Arm/Elbow/Hand Redness, Blanching and Bruising  (L) Arm/Elbow/Hand Redness, Blanching, Bruising and Edema (3+)  Abdomen WDL  Groin WDL  Scrotum/Coccyx/Buttocks Redness, slow to lashell  (R) Leg Redness, Scab and Edema  (L) Leg Redness, Scab and Edema  (R) Heel/Foot/Toe Discoloration - 3rd, 4th toe (non-blanching); right posterior medial foot calloused, discoloration  (L) Heel/Foot/Toe Discoloration - 2nd toe (non-blanching)          Devices In Places Nasal Cannula      Interventions In Place Gray Ear Foams, Heel Mepilex, Waffle Overlay, TAP System, Pillows, Q2 Turns, Barrier Cream, Heels Loaded W/Pillows and Pressure Redistribution Mattress    Possible Skin Injury Yes    Pictures Uploaded Into Epic Yes  Wound Consult Placed Yes  RN Wound Prevention Protocol Ordered No

## 2022-07-08 NOTE — PROGRESS NOTES
[facility] [place]GRESS [place]     Attending: Dr. Damon     Senior Resident: Carlos Chaudhry D.O.  Family Medicine Resident PGY-3     PATIENT: Jannet Bruno; [id number]; 1940 Hospital Day: 15     ID: 81 y.o. female with PMHx ESRD on HD MWF, HTN, HLD, and T2DM who was admitted on 6/24/2022 for left upper arm swelling and lower extremity edema.        SUBJECTIVE: No acute events overnight. Patient appears to be doing well after completing dialysis.     OBJECTIVE:     Vitals:    07/07/22 1551 07/07/22 2030 07/08/22 0000 07/08/22 0330   BP: 124/71 128/69 122/66 116/71   Pulse: 88 94 86 95   Resp: 16 16 18 16   Temp: 36.6 °C (97.9 °F)      TempSrc: Temporal Temporal Temporal Temporal   SpO2: 96% 97% 98% 96%   Weight:  64.9 kg (143 lb 1.3 oz)     Height:         No intake or output data in the 24 hours ending 07/08/22 0615    PE:  General: resting comfortably in bed, responds to command   HEENT: NC/AT. EOMI. No conjunctival injection or scleral icterus.   Cardiovascular: Systolic murmer   Respiratory: Non-labored   MSK: Swelling and redness over left hand. TTP.   Neuro: Moving all 4 extremities     LABS:  Recent Labs     07/05/22  1027   WBC 13.4*   RBC 3.48*   HEMOGLOBIN 11.6*   HEMATOCRIT 36.9*   .0*   MCH 33.3*   RDW 64.4*   PLATELETCT 273   MPV 9.1   NEUTSPOLYS 79.50*   LYMPHOCYTES 10.00*   MONOCYTES 5.40   EOSINOPHILS 3.80   BASOPHILS 0.60     Recent Labs     07/05/22  1027 07/07/22  1031   SODIUM 132* 133*   POTASSIUM 4.9 4.8   CHLORIDE 92* 94*   CO2 27 25   BUN 28* 30*   CREATININE 3.74* 3.56*   CALCIUM 8.7 9.0   MAGNESIUM 2.1 2.1   ALBUMIN 2.7* 2.6*     Estimated GFR/CRCL = Estimated Creatinine Clearance: 10.7 mL/min (A) (by C-G formula based on SCr of 3.56 mg/dL (H)).  Recent Labs     07/05/22  1027 07/07/22  1031   GLUCOSE 182* 195*     Recent Labs     07/05/22  1027 07/07/22  1031   ASTSGOT 20 26   ALTSGPT 11 13   TBILIRUBIN 0.4 0.4   ALKPHOSPHAT 137* 188*   GLOBULIN 3.7* 3.9*            "  No results for input(s): INR, APTT, FIBRINOGEN in the last 72 hours.    Invalid input(s): DIMER    MICROBIOLOGY:   Results     Procedure Component Value Units Date/Time    URINALYSIS [[id number]]     Order Status: No result Specimen: Urine     BLOOD CULTURE [[id number]] Collected: 07/03/22 1540    Order Status: Completed Specimen: Blood from Peripheral Updated: 07/04/22 0731     Significant Indicator NEG     Source BLD     Site PERIPHERAL     Culture Result No Growth  Note: Blood cultures are incubated for 5 days and  are monitored continuously.Positive blood cultures  are called to the RN and reported as soon as  they are identified.      Narrative:      Per Hospital Policy: Only change Specimen Src: to \"Line\" if  specified by physician order.  Right Forearm/Arm    BLOOD CULTURE [[id number]] Collected: 07/03/22 1540    Order Status: Completed Specimen: Blood from Peripheral Updated: 07/04/22 0731     Significant Indicator NEG     Source BLD     Site PERIPHERAL     Culture Result No Growth  Note: Blood cultures are incubated for 5 days and  are monitored continuously.Positive blood cultures  are called to the RN and reported as soon as  they are identified.      Narrative:      Per Hospital Policy: Only change Specimen Src: to \"Line\" if  specified by physician order.  Right Wrist    URINALYSIS [[id number]]     Order Status: No result Specimen: Urine, Cath             IMAGING:   DX-HAND 3+ LEFT   Final Result      1.  No acute fracture or dislocation.   2.  Scattered mild degenerative changes, increased from prior study. No definite erosive arthropathy.   3.  Osteopenia.   4.  Old ulnar styloid process fracture.      DX-CHEST-PORTABLE (1 VIEW)   Final Result      1.  Probable 12 mm right mid/upper lung zone mass. Suggest CT chest without contrast for further assessment      2.  Interstitial pulmonary edema or fibrosis      3.  Enlarged cardiac silhouette      4.  Left pleural effusion      CT-CTA UPPER EXT WITH & " W/O-POST PROCESS LEFT   Final Result      1.  Status post brachiocephalic fistula in the left arm. No focal stenosis is identified.      2.  Extensive edema in the subcutaneous tissues of the visualized left lateral chest and arm.      EC-ECHOCARDIOGRAM COMPLETE W/O CONT   Final Result      US-ZAHRAA SINGLE LEVEL BILAT   Final Result      US-EXTREMITY ARTERY LOWER BILAT   Final Result      CT-EXTREMITY, UPPER W/O LEFT   Final Result      1.  There is diffuse subcutaneous edema of the imaged portions of the left upper extremity. There is also diffuse body wall edema in the visualized portions of the chest and abdomen.   2.  No focal fluid collection to suggest abscess.   3.  There is a small left pleural effusion and minimal fluid in the abdomen.   4.  There is postsurgical change of AV fistula. There is small vessel atherosclerosis.      US-EXTREMITY VENOUS UPPER UNILAT LEFT   Final Result      DX-CHEST-PORTABLE (1 VIEW)   Final Result         1.  Interstitial pulmonary parenchymal prominence suggest chronic underlying lung disease, component of interstitial edema and/or infiltrates not excluded.   2.  Small left pleural effusion   3.  Cardiomegaly   4.  Atherosclerosis            MEDS:  Current Facility-Administered Medications   Medication Last Admin   • [Held by provider] oxyCODONE immediate-release (ROXICODONE) tablet 2.5 mg 2.5 mg at 07/05/22 1421   • levothyroxine (SYNTHROID) tablet 25 mcg 25 mcg at 07/08/22 0442   • insulin GLARGINE (Lantus,Semglee) injection 5 Units at 07/07/22 1808    And   • insulin lispro (AdmeLOG,HumaLOG) injection 1 Units at 07/07/22 2053    And   • dextrose 50% (D50W) injection 25 g     • heparin injection 1,300 Units 1,300 Units at 07/03/22 1828   • acetaminophen (TYLENOL) tablet 1,000 mg 1,000 mg at 07/08/22 0442   • polyethylene glycol/lytes (MIRALAX) PACKET 1 Packet 1 Packet at 07/04/22 0624    And   • senna-docusate (PERICOLACE or SENOKOT S) 8.6-50 MG per tablet 2 Tablet 2 Tablet at  07/08/22 0442    And   • bisacodyl (DULCOLAX) suppository 10 mg     • sevelamer carbonate (RENVELA) tablet 1,600 mg 1,600 mg at 07/07/22 1804   • lidocaine (LIDODERM) 5 % 2 Patch 2 Patch at 07/07/22 0926   • heparin injection 5,000 Units 5,000 Units at 07/08/22 0441   • atorvastatin (LIPITOR) tablet 20 mg 20 mg at 07/08/22 0442   • calcitRIOL (ROCALTROL) capsule 0.25 mcg 0.25 mcg at 07/08/22 0442   • ROPINIRole (REQUIP) tablet 0.25 mg 0.25 mg at 07/08/22 0442   • ondansetron (ZOFRAN) syringe/vial injection 4 mg 4 mg at 06/28/22 1018       PROBLEM LIST:  No problems updated.    ASSESSMENT/PLAN: 81 y.o. female with PMHx ESRD on HD MWF, HTN, HLD, and T2DM who was admitted on 6/24/2022 for left upper arm swelling and lower extremity edema.      # ESRD, on dialysis MWF  Nephrology following. Continue sevelamer and calcitriol. Renally dose medications.   -Continue dialysis   -CTM labs      # Delirium  -Improved   - Likely 2/2 hospital delirium.  - Continue to avoid sedating drugs  - Continue to monitor labs       #Hypothyroid  -TSH high, T4 low   -Continue with 25mcg levothyroxine daily       #Left hand pain   -Swelling and redness of left hand  -Xray negative for acute fracture, but shows chronic changes      #Left upper extremity edema   Chronic, at same side of AV fistula, which is functioning well.   -Likely multifactorial: poor EF, lymphedema,outflow stenosis.   -CTA without evidence of central stenosis.   -Vascular consulted and recommended supportive care - wrapping arm with ace bandage.      # goals of care  Palliative care has seen patient and patient still wants to be a Full Code.   - Plan to have further discussions when cognition is appropriate  - Patient still has no DPOA. Next of kin would be her son or daughter.      # HTN  -Continue to hold BP medications and lasix   -Fluids boluses PRN for hypotension         # T2DM  -Continue lantus  5 units QHS   -ISS   -Hypoglycemia protocol         #Chronic lower  extremity pain  Likely multi-factorial (PVD, lymphedema, poor EF). Some residual from old hip fractures but the significant pain seems to come from her bilateral lower legs. Pt not a candidate for surgery, unless she desires amputations. Please see vascular note for more details.  - Current pain management with scheduled tylenol, lidocaine patches, hot compresses  - Avoiding narcotics as above     # aortic stenosis, severe  # HFrEF  Cardiology consulted, and not candidate for intervention.      Core Measures:  DVT PPX: heparin  ABX: none  Fluids: PO  PCP: Lon  CODE STATUS: FULL CODE     Disposition: Medically cleared for discharge. Pending placement to SNF - challenging placement as transport to dialysis is not always facilitated.

## 2022-07-08 NOTE — PROGRESS NOTES
"1145: Patient arrived to floor from Dialysis. A&Ox3, when asked why she came to the hospital, patient states \"it was a mistake\". Patient would not elaborate further. Patient is tearful, saying she is missing her belongings. Called RN on telemetry to have belongings brought to patient's new room.     1205: Belongings now at bedside. Patient still tearful. Patient states \"my tylenol was due at 12, I am hurting\". Patient has already had the max of 4,000mg of Tylenol in a 24 hour period. Educated patient on needing to wait until 1345 for tylenol. Patient visibly upset, states \"stop with the excuses\". Per MD notes, avoiding narcotics at this time. Offered heat pack or ice pack, patient refused. Told patient this RN would be back when the tylenol is available to administer.     1330: Messaged MD to consult pain management. MD ordered oxycodone 2.5mg ONE time dose. Although on allergy list, patient has tolerated previous doses without incident.  "

## 2022-07-08 NOTE — CARE PLAN
The patient is Stable - Low risk of patient condition declining or worsening    Shift Goals  Clinical Goals: Pain control  Patient Goals: Sleep  Family Goals: N/A    Progress made toward(s) clinical / shift goals:      Problem: Knowledge Deficit - Standard  Goal: Patient and family/care givers will demonstrate understanding of plan of care, disease process/condition, diagnostic tests and medications  Outcome: Progressing  Note: Updated on plan of care and no questions at this time.       Problem: Skin Integrity  Goal: Skin integrity is maintained or improved  Outcome: Progressing  Note: Barrier paste, pillows for offloading, and helping patient turn Q2.       Problem: Fall Risk  Goal: Patient will remain free from falls  Outcome: Progressing  Note: All fall precautions in place.     Patient is not progressing towards the following goals:

## 2022-07-08 NOTE — PROGRESS NOTES
[facility] -  PROGRESS NOTE               Author: Mauro Lacey M.D. Date & Time: 7/8/2022  10:15 AM     HPI:  81yoF with PMH significant for ESRD on HD MWF via left arm AVF, HTN, DM II, Anemia secondary to CKD, CKD Bone mineral disorder, admitted with increased edema and weakness and having missed her last last 2-3 outpt HD treatments. Pt is very lethargic at this time and unable to provide much history, majority of the history was obtained through review of the medical record and discussion with primary svc. Pt had reported increased LE edema and fatigue and weakness and worsening of her left arm edema so she came to the ED for evaluation. She apparently has missed her last 2-3 outpt dialysis treatments. Per regular outpt Nephrologist Dr. Gates, she has been non-compliant with her fluid restriction and was told that she needed 4x/week dialysis until her volume status could be optimized but she was non-compliant with that recommendation. She has edema of her left arm where her AVF is located and there is concern for a central stenosis that could be the etiology. She had an appointment at the outpt access center to evaluate for central stenosis and undergo angioplasty if needed on 6/9/22 but pt did not go to the appointment. She has not had any other procedures done to the arm in the past couple of months. She had a left arm us done today that showed no DVT and patent AVF and soft tissue edema. She apparently received pain medication fentanyl and dilaudid as well as benadryl earlier today and she is very drowsy.      DAILY NEPHROLOGY SUMMARY:  6/25: rapid response overnight for severe leg pain, pt very lethargic today, moans with palpation of legs, arouses but does not answer questions and falls back asleep, tolerated HD yest with 2.5L UF, BP stable overnight but low this am  6/26: No events, BP low overnight and this am, more alert, reports pain in legs for the past 3 weeks, denies any  "CP/SOB/Abd pain, reports left arm edema a little better but no pain in left arm   6/27: Pt resting in bed, subdued, Bps soft, on 4L supp O2 via NC, labs reviewed, due for HD  6/28: pt laying in bed, sleepy, c/o pain in legs, moaning, vascular assessed and does not recommend any intervention on legs or AVF, tolerated iHD yesterday with UF:2.8L  6/29: Pt in bed, fatigued, no complaints.  VSS 1L NC.    6/30: no new complaints, no overnight events. Tolerated hd yesterday, UF 3.5L , she is in negative balance.   7/1: Resting in bed, no complaints.  VSS 2L NC. No new labs today.   7/2: Pt sitting up in bed, confused, CNA helping her with breakfast meal, iHD yesterday with 1.5L net UF limited by muscle cramping, labs reviewed, VSS on 1L NC O2  7/3: Pt slumped in bed, somnolent, medical floor status, VSS on RA  7/4: No new overnight renal events. S/p HD yesterday and tolerated well. Net UF was 1.5 L.  7/5: S/p HD yesterday with net UF of 2 L. No new overnight renal events.   7/6: No new overnight renal events.   7/7: S/p HD yesterday with net UF of 2.2L and tolerated well. No new overnight renal events.   7/8: No new overnight renal events.     REVIEW OF SYSTEMS:    Limited by pt mentation    PMH/PSH/SH/FH:   Reviewed and unchanged since admission note    CURRENT MEDICATIONS:   Reviewed from admission to present day    VS:  /58   Pulse 77   Temp 36.5 °C (97.7 °F) (Temporal)   Resp 16   Ht 1.626 m (5' 4\")   Wt 64.9 kg (143 lb 1.3 oz)   SpO2 95%   BMI 24.56 kg/m²     Physical Exam  Vitals and nursing note reviewed.       Nursing note reviewed.   Constitutional:       Appearance: Normal appearance.   HENT:      Head: Normocephalic and atraumatic.   Eyes:      General: No scleral icterus.  Cardiovascular:      Rate and Rhythm: Normal rate and regular rhythm.      LUE AVF +T/B  Pulmonary:      Effort: Pulmonary effort is normal. No respiratory distress.      Breath sounds: Examination of the right-lower field " reveals decreased breath sounds. Examination of the left-lower field reveals decreased breath sounds. Decreased breath sounds present.   Abdominal:      General: Bowel sounds are normal. There is no distension.      Palpations: Abdomen is soft.   Musculoskeletal:         General: No deformities  Skin:     General: Skin is warm.      Findings: No rash.      Comments: LUE dependent edema  Neurological:      General: No focal deficit present.      Mental Status: She is alert and oriented to person, place, and time.   Psychiatric:         Mood and Affect: Mood normal.         Behavior: Behavior normal.     Fluids:  No intake/output data recorded.    LABS:  Recent Labs     07/05/22  1027 07/07/22  1031   SODIUM 132* 133*   POTASSIUM 4.9 4.8   CHLORIDE 92* 94*   CO2 27 25   GLUCOSE 182* 195*   BUN 28* 30*   CREATININE 3.74* 3.56*   CALCIUM 8.7 9.0     Reviewed.    IMAGING:   All imaging reviewed from admission to present day    IMPRESSION:  # ESRD, dependent on HD              - HD via LUE AVF, missed last 2-3 outpt treatments  # Fluid Overload, improving              - Secondary to non-compliance with fluid restriction on HD and                    non-compliance with treatments  # Left arm edema, slow improvement              - CTA upper ext 6/26 w/o e/o focal stenosis + extensive edema             - AVF patent on left arm us and no DVT             - Pt no showed to outpt appt to evaluate              - Vascular does not recommend intervention   # Altered Mental Status, fluctuates             - Monitor  # HTN, variable control             - Goal BP < 140/90             - Not on BP meds   # Anemia of CKD, at goal              - Goal Hgb 10-11             - Iron 27             -TIBC 142             -%Sat 19             - Ferritin 1419  # CKD-MBD             - Ca 9.2             - PO4 6.4             - Managed at OP unit             - On calcitriol and sevelamer   # DM II--management per primary svc  # Leg Pain--chronic  skin changes and subcutaneous tissue firm to touch             - Unclear etiology             - Vascular does not recommend any intervention     PLAN:  - Continue qMWF iHD schedule,  HD today.   - UF as tolerated  - No current need for ROGELIO  - Continue phos binders WM  - Continue off of all BP meds and diuretics for now  - PRN NS boluses for symptomatic hypotension  - Limit sedating meds if possible  - No dietary protein restrictions  - Dose all meds per ESRD  - Outpt access center appointment rescheduled  - Palliative care has consulted; pt is still a full code  - Pt at high risk for further morbidity/mortality  - DC planning underway for SNF     Thank you,

## 2022-07-08 NOTE — PROGRESS NOTES
Hemodialysis ordered by Dr. Lacey. Treatment started at 0818 and ended at 1118. Pt stable, vss, no c/o post tx. Net UF 3.0 L. Report to DICK Hinojosa RN. Lt  avf dsg cdi.

## 2022-07-09 NOTE — CARE PLAN
The patient is Stable - Low risk of patient condition declining or worsening    Shift Goals  Clinical Goals: Pain management  Patient Goals: Sleep  Family Goals: N/A    Progress made toward(s) clinical / shift goals:  called MD for stronger pain med but no order.  Pt verbalized understanding.  Pt has scheduled Tylenol for left hand pain.      Problem: Pain - Standard  Goal: Alleviation of pain or a reduction in pain to the patient’s comfort goal  Outcome: Progressing       Patient is not progressing towards the following goals:

## 2022-07-09 NOTE — THERAPY
"Physical Therapy   Daily Treatment     Patient Name: Jannet Bruno  Age:  81 y.o., Sex:  female  Medical Record #: [id number]  Today's Date: 7/9/2022       Precautions: Fall Risk;Swallow Precautions ( See Comments)  Comments: LUE very painful to touch BLE burning    Assessment    Pt was pleasant and agreeable to therapy session. She continues to be limited due to pain in LUE and generalized weakness. She was able to reach EOB with modA and hold self up with no support. Performed STS with HHA, pt preferring not to use LUE due to pain. ModA for lift off and balance, progressing to side stepping only. Again requiring modA for weight shifting and balance. Pt requesting to use restroom but deferred bsc transfer at this time despite encouragement. After returning to supine pt reporting \" not feeling well\" despite no complaints throughout mobility. Was unable to state what she was feeling but RN notified and aware. Will continue to follow while in house.     Plan    Continue current treatment plan.    DC Equipment Recommendations: Unable to determine at this time  Discharge Recommendations: Recommend post-acute placement for additional physical therapy services prior to discharge home         07/09/22 1604   Balance   Sitting Balance (Static) Fair   Sitting Balance (Dynamic) Fair -   Standing Balance (Static) Fair -   Standing Balance (Dynamic) Poor +   Weight Shift Sitting Fair   Weight Shift Standing Poor   Comments utilized HHA, but pt limited use of LUE due to pain.   Gait Analysis   Gait Level Of Assist Moderate Assist   Assistive Device Hand Held Assist   Distance (Feet) 3  (side stepping only)   # of Times Distance was Traveled 1   Deviation Decreased Base Of Support   Skilled Intervention Verbal Cuing;Sequencing   Comments modA for balance and safety. limited due to fatigue   Bed Mobility    Supine to Sit Moderate Assist   Sit to Supine Minimal Assist   Scooting Moderate Assist   Rolling Minimal Assist to " Rt.;Minimum Assist to Lt.   Skilled Intervention Verbal Cuing;Sequencing   Comments modA for trunk to reach EOB, using RUE to pull on therapist for seated scooting. Christiano for BLE to return to bed. Log rolling for pericare and bed pan with rail.   Functional Mobility   Sit to Stand Minimal Assist   Bed, Chair, Wheelchair Transfer Refused   Toilet Transfers Refused   Skilled Intervention Verbal Cuing;Sequencing   Comments STS HHA with Christiano for lift off, deferred bsc transfer despite encouragment   Short Term Goals    Short Term Goal # 1 Pt will perform bed mobility with supervision in 6 visits with HOB flat, no rail.   Goal Outcome # 1 goal not met   Short Term Goal # 2 Pt will transfer with supervision in 6 visits to improve functional indep.   Goal Outcome # 2 Goal not met   Short Term Goal # 3 Pt will ambulate x 125 feet using FWW with supervision in 6 visits to improve functional indep.   Goal Outcome # 3 Goal not met

## 2022-07-09 NOTE — PROGRESS NOTES
[facility] [place] [facility] [place]GRESS [place]     Attending: Dr. Damon     Senior Resident: Carlos Chaudhry D.O.  Family Medicine Resident PGY-3     PATIENT: Jannet Bruno; [id number]; 1940 Hospital Day: 15     ID: 81 y.o. female with PMHx ESRD on HD MWF, HTN, HLD, and T2DM who was admitted on 6/24/2022 for left upper arm swelling and lower extremity edema.    SUBJECTIVE: No acute events overnight. VSS. Patient is tired this morning but appears to be doing well.     OBJECTIVE:     Vitals:    07/08/22 1501 07/08/22 1652 07/08/22 1956 07/09/22 0322   BP:  125/67 117/55 123/63   Pulse:  95 95 93   Resp: 16 19 16 18   Temp:  37.1 °C (98.7 °F) 36.6 °C (97.9 °F) 36.5 °C (97.7 °F)   TempSrc:  Temporal Temporal Temporal   SpO2:  99% 99% 97%   Weight:       Height:           Intake/Output Summary (Last 24 hours) at 7/9/2022 0621  Last data filed at 7/9/2022 0558  Gross per 24 hour   Intake 980 ml   Output 3500 ml   Net -2520 ml       PE:  General: resting comfortably in bed, responds to command   HEENT: NC/AT. EOMI. No conjunctival injection or scleral icterus.   Cardiovascular: Systolic murmer   Respiratory: Non-labored   MSK: Swelling and redness over left hand. TTP.   Neuro: Moving all 4 extremities        LABS:  No results for input(s): WBC, RBC, HEMOGLOBIN, HEMATOCRIT, MCV, MCH, RDW, PLATELETCT, MPV, NEUTSPOLYS, LYMPHOCYTES, MONOCYTES, EOSINOPHILS, BASOPHILS, RBCMORPHOLO in the last 72 hours.  Recent Labs     07/07/22  1031   SODIUM 133*   POTASSIUM 4.8   CHLORIDE 94*   CO2 25   BUN 30*   CREATININE 3.56*   CALCIUM 9.0   MAGNESIUM 2.1   ALBUMIN 2.6*     Estimated GFR/CRCL = Estimated Creatinine Clearance: 10.7 mL/min (A) (by C-G formula based on SCr of 3.56 mg/dL (H)).  Recent Labs     07/07/22  1031   GLUCOSE 195*     Recent Labs     07/07/22  1031   ASTSGOT 26   ALTSGPT 13   TBILIRUBIN 0.4   ALKPHOSPHAT 188*   GLOBULIN 3.9*             No results for input(s): INR, APTT, FIBRINOGEN in the last 72 hours.    Invalid  "input(s): DIMER    MICROBIOLOGY:   Results     Procedure Component Value Units Date/Time    BLOOD CULTURE [[id number]] Collected: 07/03/22 1540    Order Status: Completed Specimen: Blood from Peripheral Updated: 07/08/22 1700     Significant Indicator NEG     Source BLD     Site PERIPHERAL     Culture Result No growth after 5 days of incubation.    Narrative:      Per Hospital Policy: Only change Specimen Src: to \"Line\" if  specified by physician order.  Right Wrist    BLOOD CULTURE [[id number]] Collected: 07/03/22 1540    Order Status: Completed Specimen: Blood from Peripheral Updated: 07/08/22 1700     Significant Indicator NEG     Source BLD     Site PERIPHERAL     Culture Result No growth after 5 days of incubation.    Narrative:      Per Hospital Policy: Only change Specimen Src: to \"Line\" if  specified by physician order.  Right Forearm/Arm    URINALYSIS [[id number]]     Order Status: No result Specimen: Urine     URINALYSIS [[id number]]     Order Status: No result Specimen: Urine, Cath             IMAGING:   DX-HAND 3+ LEFT   Final Result      1.  No acute fracture or dislocation.   2.  Scattered mild degenerative changes, increased from prior study. No definite erosive arthropathy.   3.  Osteopenia.   4.  Old ulnar styloid process fracture.      DX-CHEST-PORTABLE (1 VIEW)   Final Result      1.  Probable 12 mm right mid/upper lung zone mass. Suggest CT chest without contrast for further assessment      2.  Interstitial pulmonary edema or fibrosis      3.  Enlarged cardiac silhouette      4.  Left pleural effusion      CT-CTA UPPER EXT WITH & W/O-POST PROCESS LEFT   Final Result      1.  Status post brachiocephalic fistula in the left arm. No focal stenosis is identified.      2.  Extensive edema in the subcutaneous tissues of the visualized left lateral chest and arm.      EC-ECHOCARDIOGRAM COMPLETE W/O CONT   Final Result      US-ZAHRAA SINGLE LEVEL BILAT   Final Result      US-EXTREMITY ARTERY LOWER BILAT   Final " Result      CT-EXTREMITY, UPPER W/O LEFT   Final Result      1.  There is diffuse subcutaneous edema of the imaged portions of the left upper extremity. There is also diffuse body wall edema in the visualized portions of the chest and abdomen.   2.  No focal fluid collection to suggest abscess.   3.  There is a small left pleural effusion and minimal fluid in the abdomen.   4.  There is postsurgical change of AV fistula. There is small vessel atherosclerosis.      US-EXTREMITY VENOUS UPPER UNILAT LEFT   Final Result      DX-CHEST-PORTABLE (1 VIEW)   Final Result         1.  Interstitial pulmonary parenchymal prominence suggest chronic underlying lung disease, component of interstitial edema and/or infiltrates not excluded.   2.  Small left pleural effusion   3.  Cardiomegaly   4.  Atherosclerosis            MEDS:  Current Facility-Administered Medications   Medication Last Admin   • [Held by provider] oxyCODONE immediate-release (ROXICODONE) tablet 2.5 mg 2.5 mg at 07/05/22 1421   • levothyroxine (SYNTHROID) tablet 25 mcg 25 mcg at 07/09/22 0527   • insulin GLARGINE (Lantus,Semglee) injection 5 Units at 07/08/22 1757    And   • insulin lispro (AdmeLOG,HumaLOG) injection 2 Units at 07/08/22 2102    And   • dextrose 50% (D50W) injection 25 g     • acetaminophen (TYLENOL) tablet 1,000 mg 1,000 mg at 07/09/22 0527   • polyethylene glycol/lytes (MIRALAX) PACKET 1 Packet 1 Packet at 07/04/22 0624    And   • senna-docusate (PERICOLACE or SENOKOT S) 8.6-50 MG per tablet 2 Tablet 2 Tablet at 07/08/22 1757    And   • bisacodyl (DULCOLAX) suppository 10 mg     • sevelamer carbonate (RENVELA) tablet 1,600 mg 1,600 mg at 07/08/22 1757   • lidocaine (LIDODERM) 5 % 2 Patch 2 Patch at 07/07/22 0926   • heparin injection 5,000 Units 5,000 Units at 07/09/22 0528   • atorvastatin (LIPITOR) tablet 20 mg 20 mg at 07/09/22 0527   • calcitRIOL (ROCALTROL) capsule 0.25 mcg 0.25 mcg at 07/09/22 0527   • ROPINIRole (REQUIP) tablet 0.25 mg  0.25 mg at 07/09/22 0527   • ondansetron (ZOFRAN) syringe/vial injection 4 mg 4 mg at 06/28/22 1018       PROBLEM LIST:  No problems updated.    ASSESSMENT/PLAN: 81 y.o. female with PMHx ESRD on HD MWF, HTN, HLD, and T2DM who was admitted on 6/24/2022 for left upper arm swelling and lower extremity edema.      # ESRD, on dialysis MWF  Nephrology following. Continue sevelamer and calcitriol. Renally dose medications.   -Continue dialysis   -CTM labs      # Delirium  -Improved   - Likely 2/2 hospital delirium.  - Continue to avoid sedating drugs      #Hypothyroid  -TSH high, T4 low   -Continue with 25mcg levothyroxine daily       #Left hand pain   -Swelling and redness of left hand  -Xray negative for acute fracture, but shows chronic changes      #Left upper extremity edema   Chronic, at same side of AV fistula, which is functioning well.   -Likely multifactorial: poor EF, lymphedema,outflow stenosis.   -CTA without evidence of central stenosis.   -Vascular consulted and recommended supportive care - wrapping arm with ace bandage.      # goals of care  Palliative care has seen patient and patient still wants to be a Full Code.   - Plan to have further discussions when cognition is appropriate  - Patient still has no DPOA. Next of kin would be her son or daughter.      # HTN  -Continue to hold BP medications and lasix   -Fluids boluses PRN for hypotension         # T2DM  -Continue lantus  5 units QHS   -ISS   -Hypoglycemia protocol         #Chronic lower extremity pain  Likely multi-factorial (PVD, lymphedema, poor EF). Some residual from old hip fractures but the significant pain seems to come from her bilateral lower legs. Pt not a candidate for surgery, unless she desires amputations. Please see vascular note for more details.  - Current pain management with scheduled tylenol, lidocaine patches, hot compresses  - Avoiding narcotics as above     # aortic stenosis, severe  # HFrEF  Cardiology consulted, and  not candidate for intervention.      Core Measures:  DVT PPX: heparin  ABX: none  Fluids: PO  PCP: Lon  CODE STATUS: FULL CODE     Disposition: Medically cleared for discharge. Pending placement to SNF - challenging placement as transport to dialysis is not always facilitated.

## 2022-07-09 NOTE — PROGRESS NOTES
Pt is c/o left hand pain at 9.5/10 at this time.  Pt is not moaning or crying at this time.  She was looking at her phone.  No prn pain med.  Pt has scheduled Tylenol which is not due until MN.  Notified. Dr. Rahman and informed MD about pt's left hand pain.  No new order of pain med at this time.  Explained to pt that MD doesn't want her to have opiate as much as possible.  Pt verbalized understanding.  Pt said that she will just go to sleep.

## 2022-07-09 NOTE — PROGRESS NOTES
[facility] -  PROGRESS NOTE               Author: Mauro Lacey M.D. Date & Time: 7/9/2022  10:58 AM     HPI:  81yoF with PMH significant for ESRD on HD MWF via left arm AVF, HTN, DM II, Anemia secondary to CKD, CKD Bone mineral disorder, admitted with increased edema and weakness and having missed her last last 2-3 outpt HD treatments. Pt is very lethargic at this time and unable to provide much history, majority of the history was obtained through review of the medical record and discussion with primary svc. Pt had reported increased LE edema and fatigue and weakness and worsening of her left arm edema so she came to the ED for evaluation. She apparently has missed her last 2-3 outpt dialysis treatments. Per regular outpt Nephrologist Dr. aGtes, she has been non-compliant with her fluid restriction and was told that she needed 4x/week dialysis until her volume status could be optimized but she was non-compliant with that recommendation. She has edema of her left arm where her AVF is located and there is concern for a central stenosis that could be the etiology. She had an appointment at the outpt access center to evaluate for central stenosis and undergo angioplasty if needed on 6/9/22 but pt did not go to the appointment. She has not had any other procedures done to the arm in the past couple of months. She had a left arm us done today that showed no DVT and patent AVF and soft tissue edema. She apparently received pain medication fentanyl and dilaudid as well as benadryl earlier today and she is very drowsy.      DAILY NEPHROLOGY SUMMARY:  6/25: rapid response overnight for severe leg pain, pt very lethargic today, moans with palpation of legs, arouses but does not answer questions and falls back asleep, tolerated HD yest with 2.5L UF, BP stable overnight but low this am  6/26: No events, BP low overnight and this am, more alert, reports pain in legs for the past 3 weeks, denies any  "CP/SOB/Abd pain, reports left arm edema a little better but no pain in left arm   6/27: Pt resting in bed, subdued, Bps soft, on 4L supp O2 via NC, labs reviewed, due for HD  6/28: pt laying in bed, sleepy, c/o pain in legs, moaning, vascular assessed and does not recommend any intervention on legs or AVF, tolerated iHD yesterday with UF:2.8L  6/29: Pt in bed, fatigued, no complaints.  VSS 1L NC.    6/30: no new complaints, no overnight events. Tolerated hd yesterday, UF 3.5L , she is in negative balance.   7/1: Resting in bed, no complaints.  VSS 2L NC. No new labs today.   7/2: Pt sitting up in bed, confused, CNA helping her with breakfast meal, iHD yesterday with 1.5L net UF limited by muscle cramping, labs reviewed, VSS on 1L NC O2  7/3: Pt slumped in bed, somnolent, medical floor status, VSS on RA  7/4: No new overnight renal events. S/p HD yesterday and tolerated well. Net UF was 1.5 L.  7/5: S/p HD yesterday with net UF of 2 L. No new overnight renal events.   7/6: No new overnight renal events.   7/7: S/p HD yesterday with net UF of 2.2L and tolerated well. No new overnight renal events.   7/8: No new overnight renal events.   7/9: S/p HD yesterday with net UF of 3L and tolerated well. No new overnight renal events.     REVIEW OF SYSTEMS:    Limited by pt mentation    PMH/PSH/SH/FH:   Reviewed and unchanged since admission note    CURRENT MEDICATIONS:   Reviewed from admission to present day    VS:  /65   Pulse 89   Temp 36.4 °C (97.6 °F) (Temporal)   Resp 20   Ht 1.626 m (5' 4\")   Wt 64.9 kg (143 lb 1.3 oz)   SpO2 99%   BMI 24.56 kg/m²     Physical Exam  Vitals and nursing note reviewed.       Nursing note reviewed.   Constitutional:       Appearance: Normal appearance.   HENT:      Head: Normocephalic and atraumatic.   Eyes:      General: No scleral icterus.  Cardiovascular:      Rate and Rhythm: Normal rate and regular rhythm.      LUE AVF +T/B  Pulmonary:      Effort: Pulmonary effort is " normal. No respiratory distress.      Breath sounds: Examination of the right-lower field reveals decreased breath sounds. Examination of the left-lower field reveals decreased breath sounds. Decreased breath sounds present.   Abdominal:      General: Bowel sounds are normal. There is no distension.      Palpations: Abdomen is soft.   Musculoskeletal:         General: No deformities  Skin:     General: Skin is warm.      Findings: No rash.      Comments: LUE dependent edema  Neurological:      General: No focal deficit present.      Mental Status: She is alert and oriented to person, place, and time.   Psychiatric:         Mood and Affect: Mood normal.         Behavior: Behavior normal.     Fluids:  In: 980 [P.O.:480; Dialysis:500]  Out: 3500     LABS:  Recent Labs     07/07/22  1031   SODIUM 133*   POTASSIUM 4.8   CHLORIDE 94*   CO2 25   GLUCOSE 195*   BUN 30*   CREATININE 3.56*   CALCIUM 9.0     Reviewed.    IMAGING:   All imaging reviewed from admission to present day    IMPRESSION:  # ESRD, dependent on HD              - HD via LUE AVF, missed last 2-3 outpt treatments  # Fluid Overload, improving              - Secondary to non-compliance with fluid restriction on HD and                    non-compliance with treatments  # Left arm edema, slow improvement              - CTA upper ext 6/26 w/o e/o focal stenosis + extensive edema             - AVF patent on left arm us and no DVT             - Pt no showed to outpt appt to evaluate              - Vascular does not recommend intervention   # Altered Mental Status, fluctuates             - Monitor  # HTN, variable control             - Goal BP < 140/90             - Not on BP meds   # Anemia of CKD, at goal              - Goal Hgb 10-11             - Iron 27             -TIBC 142             -%Sat 19             - Ferritin 1419  # CKD-MBD             - Ca 9.2             - PO4 6.4             - Managed at OP unit             - On calcitriol and sevelamer   # DM  II--management per primary svc  # Leg Pain--chronic skin changes and subcutaneous tissue firm to touch             - Unclear etiology             - Vascular does not recommend any intervention     PLAN:  - Continue qMWF iHD schedule, No plans for HD today.   - UF as tolerated  - No current need for ROGELIO  - Continue phos binders WM  - Continue off of all BP meds and diuretics for now  - PRN NS boluses for symptomatic hypotension  - Limit sedating meds if possible  - No dietary protein restrictions  - Dose all meds per ESRD  - Outpt access center appointment rescheduled  - Palliative care has consulted; pt is still a full code  - Pt at high risk for further morbidity/mortality  - DC planning underway for SNF     Thank you,

## 2022-07-10 NOTE — PROGRESS NOTES
[facility] -  PROGRESS NOTE               Author: Mauro Lacey M.D. Date & Time: 7/10/2022  9:42 AM     HPI:  81yoF with PMH significant for ESRD on HD MWF via left arm AVF, HTN, DM II, Anemia secondary to CKD, CKD Bone mineral disorder, admitted with increased edema and weakness and having missed her last last 2-3 outpt HD treatments. Pt is very lethargic at this time and unable to provide much history, majority of the history was obtained through review of the medical record and discussion with primary svc. Pt had reported increased LE edema and fatigue and weakness and worsening of her left arm edema so she came to the ED for evaluation. She apparently has missed her last 2-3 outpt dialysis treatments. Per regular outpt Nephrologist Dr. Gates, she has been non-compliant with her fluid restriction and was told that she needed 4x/week dialysis until her volume status could be optimized but she was non-compliant with that recommendation. She has edema of her left arm where her AVF is located and there is concern for a central stenosis that could be the etiology. She had an appointment at the outpt access center to evaluate for central stenosis and undergo angioplasty if needed on 6/9/22 but pt did not go to the appointment. She has not had any other procedures done to the arm in the past couple of months. She had a left arm us done today that showed no DVT and patent AVF and soft tissue edema. She apparently received pain medication fentanyl and dilaudid as well as benadryl earlier today and she is very drowsy.      DAILY NEPHROLOGY SUMMARY:  6/25: rapid response overnight for severe leg pain, pt very lethargic today, moans with palpation of legs, arouses but does not answer questions and falls back asleep, tolerated HD yest with 2.5L UF, BP stable overnight but low this am  6/26: No events, BP low overnight and this am, more alert, reports pain in legs for the past 3 weeks, denies any  "CP/SOB/Abd pain, reports left arm edema a little better but no pain in left arm   6/27: Pt resting in bed, subdued, Bps soft, on 4L supp O2 via NC, labs reviewed, due for HD  6/28: pt laying in bed, sleepy, c/o pain in legs, moaning, vascular assessed and does not recommend any intervention on legs or AVF, tolerated iHD yesterday with UF:2.8L  6/29: Pt in bed, fatigued, no complaints.  VSS 1L NC.    6/30: no new complaints, no overnight events. Tolerated hd yesterday, UF 3.5L , she is in negative balance.   7/1: Resting in bed, no complaints.  VSS 2L NC. No new labs today.   7/2: Pt sitting up in bed, confused, CNA helping her with breakfast meal, iHD yesterday with 1.5L net UF limited by muscle cramping, labs reviewed, VSS on 1L NC O2  7/3: Pt slumped in bed, somnolent, medical floor status, VSS on RA  7/4: No new overnight renal events. S/p HD yesterday and tolerated well. Net UF was 1.5 L.  7/5: S/p HD yesterday with net UF of 2 L. No new overnight renal events.   7/6: No new overnight renal events.   7/7: S/p HD yesterday with net UF of 2.2L and tolerated well. No new overnight renal events.   7/8: No new overnight renal events.   7/9: S/p HD yesterday with net UF of 3L and tolerated well. No new overnight renal events.   7/10: No new overnight renal events. K+ 6.9 this am.    REVIEW OF SYSTEMS:    Limited by pt mentation    PMH/PSH/SH/FH:   Reviewed and unchanged since admission note    CURRENT MEDICATIONS:   Reviewed from admission to present day    VS:  /68   Pulse 87   Temp 36.9 °C (98.5 °F) (Temporal)   Resp 18   Ht 1.626 m (5' 4\")   Wt 63.1 kg (139 lb 1.8 oz)   SpO2 99%   BMI 23.88 kg/m²     Physical Exam  Vitals and nursing note reviewed.       Nursing note reviewed.   Constitutional:       Appearance: Normal appearance.   HENT:      Head: Normocephalic and atraumatic.   Eyes:      General: No scleral icterus.  Cardiovascular:      Rate and Rhythm: Normal rate and regular rhythm.      REJI BALDERAS " +T/B  Pulmonary:      Effort: Pulmonary effort is normal. No respiratory distress.      Breath sounds: Examination of the right-lower field reveals decreased breath sounds. Examination of the left-lower field reveals decreased breath sounds. Decreased breath sounds present.   Abdominal:      General: Bowel sounds are normal. There is no distension.      Palpations: Abdomen is soft.   Musculoskeletal:         General: No deformities  Skin:     General: Skin is warm.      Findings: No rash.      Comments: LUE dependent edema  Neurological:      General: No focal deficit present.      Mental Status: She is alert and oriented to person, place, and time.   Psychiatric:         Mood and Affect: Mood normal.         Behavior: Behavior normal.     Fluids:  In: 440 [P.O.:440]  Out: -     LABS:  Recent Labs     07/07/22  1031 07/10/22  0734   SODIUM 133* 130*   POTASSIUM 4.8 6.9*   CHLORIDE 94* 93*   CO2 25 24   GLUCOSE 195* 89   BUN 30* 44*   CREATININE 3.56* 4.66*   CALCIUM 9.0 8.9     Reviewed.    IMAGING:   All imaging reviewed from admission to present day    IMPRESSION:  # ESRD, dependent on HD              - HD via LUE AVF, missed last 2-3 outpt treatments  # Hyperkalemia  # Fluid Overload, improving              - Secondary to non-compliance with fluid restriction on HD and                    non-compliance with treatments  # Left arm edema, slow improvement              - CTA upper ext 6/26 w/o e/o focal stenosis + extensive edema             - AVF patent on left arm us and no DVT             - Pt no showed to outpt appt to evaluate              - Vascular does not recommend intervention   # Altered Mental Status, fluctuates             - Monitor  # HTN, variable control             - Goal BP < 140/90             - Not on BP meds   # Anemia of CKD, at goal              - Goal Hgb 10-11             - Iron 27             -TIBC 142             -%Sat 19             - Ferritin 1419  # CKD-MBD             - Ca 9.2              - PO4 6.4             - Managed at OP unit             - On calcitriol and sevelamer   # DM II--management per primary svc  # Leg Pain--chronic skin changes and subcutaneous tissue firm to touch             - Unclear etiology             - Vascular does not recommend any intervention     PLAN:  - HD today given hyperkalemia.   - Continue qMWF iHD schedule  - UF as tolerated  - No current need for ROGELIO  - Renal diet  - Continue phos binders WM  - Continue off of all BP meds and diuretics for now  - PRN NS boluses for symptomatic hypotension  - Limit sedating meds if possible  - No dietary protein restrictions  - Dose all meds per ESRD  - Outpt access center appointment rescheduled  - Palliative care has consulted; pt is still a full code  - Pt at high risk for further morbidity/mortality  - DC planning underway for [facility]     Thank you,

## 2022-07-10 NOTE — CARE PLAN
The patient is Stable - Low risk of patient condition declining or worsening    Shift Goals  Clinical Goals: pain management  Patient Goals: sleep, pain control  Family Goals: N/A    Progress made toward(s) clinical / shift goals:  yes      Problem: Pain - Standard  Goal: Alleviation of pain or a reduction in pain to the patient’s comfort goal  Outcome: Progressing     Problem: Knowledge Deficit - Standard  Goal: Patient and family/care givers will demonstrate understanding of plan of care, disease process/condition, diagnostic tests and medications  Outcome: Progressing     Problem: Skin Integrity  Goal: Skin integrity is maintained or improved  Outcome: Progressing     Problem: Fall Risk  Goal: Patient will remain free from falls  Outcome: Progressing       Patient is not progressing towards the following goals:

## 2022-07-10 NOTE — CARE PLAN
Problem: Pain - Standard  Goal: Alleviation of pain or a reduction in pain to the patient’s comfort goal  Outcome: Progressing     Problem: Knowledge Deficit - Standard  Goal: Patient and family/care givers will demonstrate understanding of plan of care, disease process/condition, diagnostic tests and medications  Outcome: Progressing     The patient is Stable - Low risk of patient condition declining or worsening    Shift Goals  Clinical Goals: pain mgmt, wound consult  Patient Goals: pain mgmt, rest, comfort  Family Goals: N/A    Progress made toward(s) clinical / shift goals:  medicated per MAR; patient verbalizes understanding of plan of care     Patient is not progressing towards the following goals:

## 2022-07-10 NOTE — PROGRESS NOTES
[facility] Progress Note      HD ordered by Dr. Lacey for hyperkalemia. Treatment started at 0859 and ended at 1129.     Total Net UF 1000mL.    Pt tolerated treatment well with no issues. See flow sheet for details. Cannulation needles taken out, held pressure for 10 min and placed gauze dressing with no bleeding. Dressing CDI post dialysis, primary RN instructed to monitor for bleeding. LUE AVF + for bruit/thrill. Report given to DICK Petersen RN.

## 2022-07-10 NOTE — PROGRESS NOTES
Received bedside report from night shift RN.   Assumed care of patient at change of shift.   Assessment complete and POC discussed.   STATUS: Patient is A&Ox3, VSS, on 2L NC.   PAIN: Patient denies pain, no apparent signs of distress or discomfort.   Patient is sitting up in bed for breakfast.   Bed alarm set, call light in reach.  Bed is in lowest/locked position.   Call light and belongings are within reach.   No further needs at this time.  Will continue to monitor.

## 2022-07-10 NOTE — PROGRESS NOTES
[facility] PROGRESS NOTE     Attending: Stefano Damon MD  Senior Resident: Fernie Adair MD (PGY-4)  Colby Resident: Reese Britton MD (PGY-1)    PATIENT: Jannet Bruno; [id number]; 1940    Subjective: No acute overnight events. Jannet denies concerns this morning, though patient's roommate notes Jannet was tearful all night related to pain.    OBJECTIVE:  Temp:  [36.2 °C (97.2 °F)-36.9 °C (98.5 °F)] 36.9 °C (98.5 °F)  Pulse:  [80-88] 87  Resp:  [18-19] 18  BP: (118-120)/(55-68) 119/68  SpO2:  [97 %-100 %] 99 %    Intake/Output Summary (Last 24 hours) at 7/10/2022 1301  Last data filed at 7/10/2022 1029  Gross per 24 hour   Intake 820 ml   Output 1500 ml   Net -680 ml       PE:  General: Well appearing older woman in no acute distress, resting on arrival to room  HEENT: Normocephalic, atraumatic, EOMI  Cardiovascular: RRR, pansystolic murmur loudest at LUSB  Pulmonary: CTAB, symmetrical chest expansion, no rales, rhonchi, or wheezes  Abdominal: Non-tender to palpation, no guarding, rigidity, or distension  Extremities: Moves all spontaneously, bilateral calves non-tender to palpation, 1+ pedal edema  Neurological: Alert and oriented    LABS:  Recent Labs     07/10/22  0734   SODIUM 130*   POTASSIUM 6.9*   CHLORIDE 93*   CO2 24   BUN 44*   CREATININE 4.66*   CALCIUM 8.9   MAGNESIUM 2.2   PHOSPHORUS 4.6*   ALBUMIN 2.9*     Estimated GFR/CRCL = Estimated Creatinine Clearance: 8.2 mL/min (A) (by C-G formula based on SCr of 4.66 mg/dL (HH)).  Recent Labs     07/10/22  0734   GLUCOSE 89     Recent Labs     07/10/22  0734   ASTSGOT 23   ALTSGPT 11   TBILIRUBIN 0.4   ALKPHOSPHAT 206*   GLOBULIN 4.1*     IMAGING:  DX-HAND 3+ LEFT   Final Result      1.  No acute fracture or dislocation.   2.  Scattered mild degenerative changes, increased from prior study. No definite erosive arthropathy.   3.  Osteopenia.   4.  Old ulnar styloid process fracture.      DX-CHEST-PORTABLE (1 VIEW)   Final Result       1.  Probable 12 mm right mid/upper lung zone mass. Suggest CT chest without contrast for further assessment      2.  Interstitial pulmonary edema or fibrosis      3.  Enlarged cardiac silhouette      4.  Left pleural effusion      CT-CTA UPPER EXT WITH & W/O-POST PROCESS LEFT   Final Result      1.  Status post brachiocephalic fistula in the left arm. No focal stenosis is identified.      2.  Extensive edema in the subcutaneous tissues of the visualized left lateral chest and arm.      EC-ECHOCARDIOGRAM COMPLETE W/O CONT   Final Result      US-ZAHRAA SINGLE LEVEL BILAT   Final Result      US-EXTREMITY ARTERY LOWER BILAT   Final Result      CT-EXTREMITY, UPPER W/O LEFT   Final Result      1.  There is diffuse subcutaneous edema of the imaged portions of the left upper extremity. There is also diffuse body wall edema in the visualized portions of the chest and abdomen.   2.  No focal fluid collection to suggest abscess.   3.  There is a small left pleural effusion and minimal fluid in the abdomen.   4.  There is postsurgical change of AV fistula. There is small vessel atherosclerosis.      US-EXTREMITY VENOUS UPPER UNILAT LEFT   Final Result      DX-CHEST-PORTABLE (1 VIEW)   Final Result         1.  Interstitial pulmonary parenchymal prominence suggest chronic underlying lung disease, component of interstitial edema and/or infiltrates not excluded.   2.  Small left pleural effusion   3.  Cardiomegaly   4.  Atherosclerosis          MEDS:  Current Facility-Administered Medications   Medication Last Admin   • melatonin tablet 5 mg 5 mg at 07/09/22 2320   • oxyCODONE immediate-release (ROXICODONE) tablet 2.5 mg 2.5 mg at 07/10/22 1234   • levothyroxine (SYNTHROID) tablet 25 mcg 25 mcg at 07/10/22 0517   • insulin GLARGINE (Lantus,Semglee) injection 5 Units at 07/09/22 1753    And   • insulin lispro (AdmeLOG,HumaLOG) injection 1 Units at 07/09/22 2139    And   • dextrose 50% (D50W) injection 25 g     • acetaminophen  (TYLENOL) tablet 1,000 mg 1,000 mg at 07/10/22 1234   • polyethylene glycol/lytes (MIRALAX) PACKET 1 Packet 1 Packet at 07/10/22 0516    And   • senna-docusate (PERICOLACE or SENOKOT S) 8.6-50 MG per tablet 2 Tablet 2 Tablet at 07/10/22 0516    And   • bisacodyl (DULCOLAX) suppository 10 mg     • sevelamer carbonate (RENVELA) tablet 1,600 mg 1,600 mg at 07/10/22 1234   • lidocaine (LIDODERM) 5 % 2 Patch 2 Patch at 07/10/22 0834   • heparin injection 5,000 Units 5,000 Units at 07/10/22 0516   • atorvastatin (LIPITOR) tablet 20 mg 20 mg at 07/10/22 0516   • calcitRIOL (ROCALTROL) capsule 0.25 mcg 0.25 mcg at 07/10/22 0516   • ROPINIRole (REQUIP) tablet 0.25 mg 0.25 mg at 07/10/22 0527   • ondansetron (ZOFRAN) syringe/vial injection 4 mg 4 mg at 06/28/22 1018       ASSESSMENT/PLAN: Jannet Bruno is a 81 y.o. female with PMH of SRD on HD, HTN, DLD, and DM admitted on 6/24/22 for left upper arm swelling and lower extremity edema.      # ESRD, on dialysis MWF  # Hyperkalemia  #Hyperphosphatmeia  Nephrology following. Aware of potassium this morning and urgent HD ordered.  -HD today  -Continue sevelamer and calcitriol  -Renally dose medications.   -Continue dialysis   -Daily BMP and phos       #Hypothyroid  TSH high, T4 low.  -Continue with 25mcg levothyroxine daily   - Needs recheck labs 6 weeks after new dose started      #Left hand pain   Swelling and redness of left hand, Xray negative for acute fracture, but shows chronic degenerative changes.      #Chronic lower extremity pain  Likely multi-factorial (PVD, lymphedema, poor EF). Some residual from old hip fractures but the significant pain seems to come from her bilateral lower legs. Pt not a candidate for surgery, unless she desires amputations. Please see vascular note for more details.  - Current pain management with scheduled tylenol, lidocaine patches, hot compresses, oxycodone 2.5 mg PO Q4H PRN severe pain  - Avoiding further narcotics as above     #Left upper  extremity edema   Chronic, at same side of AV fistula, which is functioning well. Likely multifactorial: poor EF, lymphedema,outflow stenosis. CTA without evidence of central stenosis.   -Vascular consulted and recommended supportive care - wrapping arm with ace bandage     # goals of care  Palliative care has seen patient and patient still wants to be a Full Code.   - Plan to have further discussions when cognition is appropriate  - Patient has no DPOA, next of kin would be her son or daughter.      # HTN  -Continue to hold BP medications and lasix   -Fluids boluses PRN for hypotension      # T2DM  -Continue lantus  5 units QHS   -ISS   -Hypoglycemia protocol       # aortic stenosis, severe  # HFrEF  Cardiology consulted, and not candidate for intervention at this time.     # Delirium  Improved, likely 2/2 hospital delirium.  - Continue to avoid sedating drugs    #Diet / Fluids  - Renal diet    #Bowel Regimen  - Senna/docusate, polyethylene glycol, milk of magnesia, bisacodyl PRN     #DVT Prophylaxis  - Heparin Q8H    #Disposition  Medically cleared; awaiting placement by Case Management     Fernie Adair M.D.  Family Medicine Resident  PGY-4

## 2022-07-10 NOTE — PROGRESS NOTES
Evening assessment of patient complete. Patient is A&Ox4 on 2L NC. Patient denies pain or any needs at this time. PIV in place. Call light within reach.

## 2022-07-11 NOTE — WOUND TEAM
[facility]  Inpatient Services  Initial Wound and Skin Care Evaluation    Admission Date: 2022     Last order of IP CONSULT TO WOUND CARE was found on 2022 from Hospital Encounter on 2022     HPI, PMH, SH: Reviewed    Past Surgical History:   Procedure Laterality Date   • AV FISTULA CREATION Left 2/15/2019    Procedure: AV FISTULA CREATION-  UPPER EXTREMITY BRACHIAL CEPHALIC BANDING;  Surgeon: Fernie Nazario M.D.;  Location: SURGERY [facility];  Service: General   • HIP CANNULATED SCREW Right 2017    Procedure: HIP CANNULATED SCREW;  Surgeon: Ar Huerta M.D.;  Location: SURGERY [facility];  Service:    • CATARACT PHACO WITH IOL  2014    Performed by Rudolph Barclay M.D. at SURGERY SAME DAY [facility]   • CATARACT PHACO WITH IOL  2014    Performed by Rudolph Barclay M.D. at SURGERY SAME DAY [facility] OR[facility]   • VENTRAL HERNIA REPAIR LAPAROSCOPIC  3/7/2012    Performed by HUNTER SERNA at SURGERY SAME DAY [facility]   • APPENDECTOMY     • GYN SURGERY      hysterectomy   • OTHER      T&A   • OTHER ABDOMINAL SURGERY      abd tramua- horse stepped on abd   • OTHER ORTHOPEDIC SURGERY      R & l Shoulder repair     Social History     Tobacco Use   • Smoking status: Former Smoker     Packs/day: 1.00     Years: 20.00     Pack years: 20.00     Types: Cigarettes     Quit date: 3/5/1979     Years since quittin.3   • Smokeless tobacco: Never Used   Substance Use Topics   • Alcohol use: No     Chief Complaint   Patient presents with   • Peripheral Edema   • Weakness     BIBA from home for weakness, nausea, L arm pain  HX of dialysis with L arm fistula, pt states she has missed 3 dialysis days due to being so weak she cannot drive or walk now  3 weeks ago had a procedure done to her fistula which has lead to L arm swelling     Diagnosis: ESRD (end stage renal disease) on dialysis (HCC) [N18.6, Z99.2]    Unit where seen by Wound Team: S626/02     WOUND  "CONSULT/FOLLOW UP RELATED TO:  BLE, nares     WOUND HISTORY:  Jannet Gillespie is a 81 y.o. female with PMHx notable for ESRD on dialysis, HTN, HLD, and T2DM who presented with increasing peripheral edema and generalized weakness. Difficult to obtain history secondary to pain and dry heaving. She states that she missed her last 3 dialysis appointments secondary to weakness/fatigue. Since then she has noticed increasing peripheral edema in her legs and her left arm where her fistula is located has also become increasingly swollen. She normally gets dialysis through the fistula in her left arm, this fistula was created in 2019 by Dr. Nazario. She states that she recently had a \"procedure\" done to this fistula about 2 weeks ago and has noticed the swelling since. Could not locate this procedure in the records and patient could not remember exactly what the procedure was. Additional symptoms include SOB, anorexia, nausea, and vomiting. She denies any abdominal pain or diarrhea. She denies any fever/chills, headache, vision changes, chest pain, palpitations, or urinary symptoms. Did not clarify if the patient still produces urine.     WOUND ASSESSMENT/LDA      Nares are red but intact, BLE scabs but intact, L. Hand swollen, but intact.    Vascular:    ZAHRAA:   ZAHRAA Results, Last 30 Days US-ZAHRAA SINGLE LEVEL BILAT    Result Date: 2022  Narrative  Vascular Laboratory  Conclusions  1) Right TBI 0.17 with toe pressures compatible with severe disease.  2) Left TBI unobtainable  3) Flow not identified in right posterior tibial or dorsalis pedis branches  JANNET GILLESPIE  Age:    81    Gender:     F  MRN:    [id number]  :    1940      BSA:  Exam Date:     2022 04:07  Room #:     Inpatient  Priority:     Routine  Ht (in):             Wt (lb):  Ordering Physician:        NELLI GOLDSMITH  Referring Physician:       NELLI GOLDSMITH  Sonographer:     "           Kelli Cooneyell  Study Type:                Complete Bilateral  Technical Quality:         Adequate  Indications:     Pain in right leg, Pain in left leg  CPT Codes:       80511  ICD Codes:       M79.604  M79.605  History:         Patient has lower extremity pain. No priors.  Limitations:     No blood pressure taken on the left due to fistula. Patient                   movement due to pain. Patient would not tolerate an ankle                   pressure.                 RIGHT  Waveform            Systolic BPs (mmHg)                             94            Brachial  Triphasic                                Common Femoral  Monophasic                               Popliteal  Absent                                   Posterior Tibial  Absent                                   Dorsalis Pedis  Diminished                 16            Digit                                           ZAHRAA                             0.17          TBI                       LEFT  Waveform        Systolic BPs (mmHg)                                           Brachial  Triphasic                                Common Femoral  Monophasic                               Popliteal  Not attained                             Posterior Tibial  Monophasic                               Dorsalis Pedis  Normal                                   Digit                                           ZAHRAA                                           TBI  Findings  Right  Doppler waveform of the common femoral artery is audibly multiphasic.  Doppler waveform of the popliteal is monophasic.  Doppler waveforms of the posterior tibial and dorsalis pedis arteries were  not detected.  Digit PPG waveforms are overall severly dampened and of low amplitude.  Toe-brachial index is severly reduced.  Left  Doppler waveform of the common femoral artery is audibly multiphasic.  Doppler waveform of the popliteal is monophasic.  Doppler waveform of the posterior tibial artery was  not detected.  Doppler waveform of the dorsalis pedis artery is audibly monophasic.  Digit PPG waveforms are normal.  No toe-brachial index could be obtained due to linitations.  Bilateral arterial duplex scan was performed in accordance with lower  extremity arterial evaluation protocol - see separate report.  Petar Francis MD  (Electronically Signed)  Final Date:      2022                   04:51    ZAHRAA Results, Last 30 Days US-EXTREMITY ARTERY LOWER BILAT    Result Date: 2022  Narrative Lower Extremity  Arterial Duplex Report  Vascular Laboratory  CONCLUSIONS  1) Extensive plaque throughout the bilateral CFA, SFA, and popliteal  arteries without focal stenosis.  2) Heavily calcified proximal right tibial artery, otherwise not arteries  visualized below the knee.  3) Distal left tibial atery visualized with monophasic flow, otherwise no  arteries visualized below the kneww  DIPAK GILLESPIE  Exam Date:     2022 04:43  Room #:     Inpatient  Priority:     Routine  Ht (in):             Wt (lb):  Ordering Physician:        NELLI GOLDSMITH  Referring Physician:       [id number] Santillan  Sonographer:               Kelli Gregg RVT  Study Type:                Complete Bilateral  Technical Quality:         Poor  Age:    81    Gender:     F  MRN:    [id number]  :    1940      BSA:  Indications:     Pain in right leg, Pain in left leg  CPT Codes:       68811  ICD Codes:       M79.604  M79.605  History:         Patient has bilateral lower extrmity pain. No priors.  Limitations:     Patient is in alot of pain with consistent movement. Heavily                    calcified arteries. Bilateral calf stiffness/tenderness.                   Techically difficult study                RIGHT  Waveform        Peak Systolic Velocity (cm/s)                  Prox    Prox-Mid  Mid    Mid-Dist  Distal  Biphasic                          75                        CFA  Biphasic        84                                         PFA  Biphasic        70                92               84      SFA  Biphasic                          101              51      POP  Not             25                                         AT  attained  Not                                                        PT  attained  Not                                                        JAMES  attained                LEFT  Waveform        Peak Systolic Velocity (cm/s)                  Prox    Prox-Mid  Mid    Mid-Dist  Distal  Biphasic                          76                       CFA  Monophasic      85                                         PFA  Monophasic      91                69               103     SFA  Monophasic                        66               83      POP  Not                                                77      AT  attained  Not                                                        PT  attained  Not                                                        JAMES  attained  FINDINGS  Right  Heavily diffused plaque and biphasic flow throughout the common femoral,  superficial femoral, and popliteal arteries with no focal stenosis seen.  The proximal anterior tibial artery is heavily calcified with a faint  Doppler signal detected.  No other arteries below the knee are visualized.  Left  Heavily diffused plaque with a mixture of biphasic and monophasic flow seen  throughout the common femoral, superficial femoral, and popliteal arteries  with no focal stenosis seen.  The distal anterior tibial artery is monophasic.  No other arteries below the knee are visualized.  Petar Francis MD  (Electronically Signed)  Final Date:      25 June 2022                   06:00      Lab Values:    Lab Results   Component Value Date/Time    WBC 13.4 (H) 07/05/2022 10:27 AM    RBC 3.48 (L) 07/05/2022 10:27 AM    HEMOGLOBIN 11.6 (L) 07/05/2022 10:27 AM    HEMATOCRIT 36.9 (L) 07/05/2022  10:27 AM    CREACTPROT 7.07 (H) 06/25/2022 04:46 AM    HBA1C 11.7 (H) 06/24/2022 03:35 AM        Culture Results show:  No results found for this or any previous visit (from the past 720 hour(s)).    Pain Level/Medicated:  C/o pain in L. hand       INTERVENTIONS BY WOUND TEAM:  Chart and images reviewed. Discussed with bedside RN. All areas of concern (based on picture review, LDA review and discussion with bedside RN) have been thoroughly assessed. Documentation of areas based on significant findings. This RN in to assess patient. Performed standard wound care which includes appropriate positioning, dressing removal and non-selective debridement. Pictures and measurements obtained weekly if/when required.  Preparation for Dressing removal: Dressing soaked with n/a  Non-selectively Debrided with:  foam cleanser and gauze.  Sharp debridement: n/a  Bebe wound: Cleansed with foam cleanser, Prepped with n/a  Primary Dressing: applied viscopaste to assist with soothing the skin  Secondary (Outer) Dressing: JULES    Interdisciplinary consultation: Patient, Bedside RN (),     EVALUATION / RATIONALE FOR TREATMENT:  Most Recent Date:  7/10: 22 Nares are intact, BLE scabs are present, but no advance wound care needed at this time.  Please re-consult if needed.       Goals: Steady decrease in wound area and depth weekly.    WOUND TEAM PLAN OF CARE ([X] for frequency of wound follow up,):   Nursing to follow dressing orders written for wound care. Contact wound team if area fails to progress, deteriorates or with any questions/concerns if something comes up before next scheduled follow up (See below as to whether wound is following and frequency of wound follow up)

## 2022-07-11 NOTE — THERAPY
Speech Language Pathology  Daily Treatment     Patient Name: Jannet Bruno  Age:  81 y.o., Sex:  female  Medical Record #: [id number]  Today's Date: 7/11/2022     Precautions  Precautions: Fall Risk, Swallow Precautions (See Comments)  Comments: LUE very painful to touch BLE burning    Assessment  Patient seen this date for dysphagia tx session. Patient currently on MM5/TN0 diet and per RN, appears to be tolerating diet without difficulty. Patient oriented x3-4, but appeared to still have moments of confusion and forgetfulness. Patient overall appears much-improved from previous SLP notes and reports dislike of current diet and eagerness for upgrade. Patient reported she typically eats a regular diet at home. Patient consumed PO trials of soft solids, mixed consistencies, crackers, and thin liquids via cup sip and straw. Patient had late lunch tray delivered while this clinician was in room and consumed bites of mac and cheese and sips of thins from that as well. Sufficient oral containment and no overt s/sx of aspiration noted on any textures trialed.     Recommend patient upgrade to regular diet w/ thin liquids. Ok for straw sips. Ok for meds as tolerated. Patient may need assistance w/ feeding or chopping foods d/t LUE pain and wrap.     Plan  Continue current treatment plan.    Discharge Recommendations: Anticipate that the patient will have no further speech therapy needs after discharge from the hospital     Objective     07/11/22 1514   Precautions   Precautions Fall Risk;Swallow Precautions ( See Comments)   Vitals   O2 (LPM) 2   O2 Delivery Device Nasal Cannula   Pain 0 - 10 Group   Therapist Pain Assessment Post Activity Pain Same as Prior to Activity;Nurse Notified;0   Dysphagia    Positioning / Behavior Modification Self Monitoring;Modulate Rate or Bite Size   Other Treatments PO trials of soft solids, mixed consistencies, dry solids, and thins via cup sip and straw   Diet / Liquid Recommendation  "Regular (7);Thin (0)   Nutritional Liquid Intake Rating Scale Non thickened beverages   Nutritional Food Intake Rating Scale Total oral diet with no restrictions   Nursing Communication Swallow Precaution Sign Posted at Head of Bed   Skilled Intervention Verbal Cueing;Compensatory Strategies   Recommended Route of Medication Administration   Medication Administration  Whole with Liquid Wash   Patient / Family Goals   Patient / Family Goal #1 \"Everything gets stuck\"   Goal #1 Outcome Goal met  (Pt denied globus sensation and reported \"that rarely happens\")   Short Term Goals   Short Term Goal # 1 Patient will consume a diet of minced and moist solids and thin liquids with no s/sx of aspiration with assistance   Goal Outcome # 1 Goal met, new goal added   Short Term Goal # 1 B  NEW 7/11: Patient will consume regular diet w/ thin liquids with no overt s/sx of aspiration.   Education Group   Education Provided Dysphagia   Dysphagia Patient Response Patient;Acceptance;Explanation;Verbal Demonstration   Anticipated Discharge Needs   Discharge Recommendations Anticipate that the patient will have no further speech therapy needs after discharge from the hospital     "

## 2022-07-11 NOTE — DISCHARGE PLANNING
Case Management Discharge Planning    Admission Date: 6/24/2022  GMLOS: 3.8  ALOS: 17    6-Clicks ADL Score: 12  6-Clicks Mobility Score: 11  PT and/or OT Eval ordered: Yes  Post-acute Referrals Ordered: Yes  Post-acute Choice Obtained: Yes  Has referral(s) been sent to post-acute provider:  Yes      Anticipated Discharge Dispo: Discharge Disposition: D/T to SNF with Medicare cert in anticipation of skilled care (03)    DME Needed: No    Action(s) Taken: Updated Provider/Nurse on Discharge Plan    Escalations Completed: None    Medically Clear: Yes    Next Steps: Patient medically ready for DC pending SNF that is able to accommodate her dialysis schedule. No acceptance at this time. CM will continue to follow referrals.    Barriers to Discharge: Pending Placement    Is the patient up for discharge tomorrow: No

## 2022-07-11 NOTE — PROGRESS NOTES
Received bedside report from night shift RN.   Assumed care of patient at change of shift.   Assessment complete and POC discussed.   STATUS: Patient is A&Ox3, VSS, on 2L NC.   PAIN: No apparent signs of distress or discomfort.   Patient is sitting up in bed for breakfast.   Bed alarm set, call light in reach.  Bed is in lowest/locked position.   Call light and belongings are within reach.   No further needs at this time.  Will continue to monitor.

## 2022-07-11 NOTE — PROGRESS NOTES
[facility] Services Progress Note     Hemodialysis treatment ordered today per Dr. Bender  x 3 hours.   Received via bed awake and oriented x 3; on oxygen at 2lpm via NC; denies pain.    Treatment initiated at 0945 ended at 1245.      Patient tolerated tx; see electronic sheet for details.      Net UF 3000 mL.      Needles removed from access site. Dressings applied and sites held x 10 minutes; verified no bleeding. Positive bruit/thrill post tx.   Staff RN to monitor AVF/G for breakthrough bleeding. Should breakthrough bleeding occur, staff RN to apply pressure to access sites until bleeding resolved.   Notify Dialysis and Nephrologist for follow-up.     Report given to Primary RN DICK Petersen.

## 2022-07-11 NOTE — PROGRESS NOTES
[facility] [place] [facility] PROGRESS NOTE     Attending: Dr. Minor     Senior Resident: Carlos Chaudhry D.O.  Family Medicine Resident PGY-3    PATIENT: Jannet Bruno; [id number]; 1940 Hospital Day: 18    ID: 81 y.o. female with PMHx ESRD on HD MWF, HTN, HLD, and T2DM who was admitted on 6/24/2022 for left upper arm swelling and lower extremity edema.    SUBJECTIVE: No acute events overnight. Patient had dialysis today. Patient feels well other than discomfort in her hand.     OBJECTIVE:     Vitals:    07/10/22 1700 07/10/22 1929 07/11/22 0309 07/11/22 0720   BP: 125/60 127/51 125/73    Pulse: 93 83 89 87   Resp: 18 20 18 20   Temp: 36.6 °C (97.8 °F) 36.1 °C (96.9 °F) 36.1 °C (97 °F) 36.4 °C (97.5 °F)   TempSrc: Temporal Temporal Temporal Temporal   SpO2: 97% 100% 98% 98%   Weight:       Height:           Intake/Output Summary (Last 24 hours) at 7/11/2022 0823  Last data filed at 7/10/2022 1929  Gross per 24 hour   Intake 620 ml   Output 1500 ml   Net -880 ml       PE:  General: resting comfortably in bed, responds to command   HEENT: NC/AT. EOMI. No conjunctival injection or scleral icterus.   Cardiovascular: Systolic murmer   Respiratory: Non-labored   MSK: Swelling and redness over left hand. TTP.   Neuro: Moving all 4 extremities     LABS:  No results for input(s): WBC, RBC, HEMOGLOBIN, HEMATOCRIT, MCV, MCH, RDW, PLATELETCT, MPV, NEUTSPOLYS, LYMPHOCYTES, MONOCYTES, EOSINOPHILS, BASOPHILS, RBCMORPHOLO in the last 72 hours.  Recent Labs     07/10/22  0734   SODIUM 130*   POTASSIUM 6.9*   CHLORIDE 93*   CO2 24   BUN 44*   CREATININE 4.66*   CALCIUM 8.9   MAGNESIUM 2.2   PHOSPHORUS 4.6*   ALBUMIN 2.9*     Estimated GFR/CRCL = Estimated Creatinine Clearance: 8.2 mL/min (A) (by C-G formula based on SCr of 4.66 mg/dL (HH)).  Recent Labs     07/10/22  0734   GLUCOSE 89     Recent Labs     07/10/22  0734   ASTSGOT 23   ALTSGPT 11   TBILIRUBIN 0.4   ALKPHOSPHAT 206*   GLOBULIN 4.1*             No results for input(s): INR,  "APTT, FIBRINOGEN in the last 72 hours.    Invalid input(s): DIMER    MICROBIOLOGY:   Results     Procedure Component Value Units Date/Time    BLOOD CULTURE [[id number]] Collected: 07/03/22 1540    Order Status: Completed Specimen: Blood from Peripheral Updated: 07/08/22 1700     Significant Indicator NEG     Source BLD     Site PERIPHERAL     Culture Result No growth after 5 days of incubation.    Narrative:      Per Hospital Policy: Only change Specimen Src: to \"Line\" if  specified by physician order.  Right Wrist    BLOOD CULTURE [[id number]] Collected: 07/03/22 1540    Order Status: Completed Specimen: Blood from Peripheral Updated: 07/08/22 1700     Significant Indicator NEG     Source BLD     Site PERIPHERAL     Culture Result No growth after 5 days of incubation.    Narrative:      Per Hospital Policy: Only change Specimen Src: to \"Line\" if  specified by physician order.  Right Forearm/Arm            IMAGING:   DX-HAND 3+ LEFT   Final Result      1.  No acute fracture or dislocation.   2.  Scattered mild degenerative changes, increased from prior study. No definite erosive arthropathy.   3.  Osteopenia.   4.  Old ulnar styloid process fracture.      DX-CHEST-PORTABLE (1 VIEW)   Final Result      1.  Probable 12 mm right mid/upper lung zone mass. Suggest CT chest without contrast for further assessment      2.  Interstitial pulmonary edema or fibrosis      3.  Enlarged cardiac silhouette      4.  Left pleural effusion      CT-CTA UPPER EXT WITH & W/O-POST PROCESS LEFT   Final Result      1.  Status post brachiocephalic fistula in the left arm. No focal stenosis is identified.      2.  Extensive edema in the subcutaneous tissues of the visualized left lateral chest and arm.      EC-ECHOCARDIOGRAM COMPLETE W/O CONT   Final Result      US-ZAHRAA SINGLE LEVEL BILAT   Final Result      US-EXTREMITY ARTERY LOWER BILAT   Final Result      CT-EXTREMITY, UPPER W/O LEFT   Final Result      1.  There is diffuse subcutaneous " edema of the imaged portions of the left upper extremity. There is also diffuse body wall edema in the visualized portions of the chest and abdomen.   2.  No focal fluid collection to suggest abscess.   3.  There is a small left pleural effusion and minimal fluid in the abdomen.   4.  There is postsurgical change of AV fistula. There is small vessel atherosclerosis.      US-EXTREMITY VENOUS UPPER UNILAT LEFT   Final Result      DX-CHEST-PORTABLE (1 VIEW)   Final Result         1.  Interstitial pulmonary parenchymal prominence suggest chronic underlying lung disease, component of interstitial edema and/or infiltrates not excluded.   2.  Small left pleural effusion   3.  Cardiomegaly   4.  Atherosclerosis            MEDS:  Current Facility-Administered Medications   Medication Last Admin   • melatonin tablet 5 mg 5 mg at 07/09/22 2320   • oxyCODONE immediate-release (ROXICODONE) tablet 2.5 mg 2.5 mg at 07/11/22 0020   • levothyroxine (SYNTHROID) tablet 25 mcg 25 mcg at 07/11/22 0524   • insulin GLARGINE (Lantus,Semglee) injection 5 Units at 07/10/22 1747    And   • insulin lispro (AdmeLOG,HumaLOG) injection 3 Units at 07/10/22 2113    And   • dextrose 50% (D50W) injection 25 g     • acetaminophen (TYLENOL) tablet 1,000 mg 1,000 mg at 07/11/22 0524   • polyethylene glycol/lytes (MIRALAX) PACKET 1 Packet 1 Packet at 07/11/22 0525    And   • senna-docusate (PERICOLACE or SENOKOT S) 8.6-50 MG per tablet 2 Tablet 2 Tablet at 07/11/22 0524    And   • bisacodyl (DULCOLAX) suppository 10 mg     • sevelamer carbonate (RENVELA) tablet 1,600 mg 1,600 mg at 07/11/22 0818   • lidocaine (LIDODERM) 5 % 2 Patch 2 Patch at 07/10/22 0834   • heparin injection 5,000 Units 5,000 Units at 07/11/22 0524   • atorvastatin (LIPITOR) tablet 20 mg 20 mg at 07/11/22 0525   • calcitRIOL (ROCALTROL) capsule 0.25 mcg 0.25 mcg at 07/11/22 0524   • ROPINIRole (REQUIP) tablet 0.25 mg 0.25 mg at 07/11/22 0524   • ondansetron (ZOFRAN) syringe/vial  injection 4 mg 4 mg at 06/28/22 1018       PROBLEM LIST:  No problems updated.    ASSESSMENT/PLAN: 81 y.o. female with PMHx ESRD on HD MWF, HTN, HLD, and T2DM who was admitted on 6/24/2022 for left upper arm swelling and lower extremity edema.      # ESRD, on dialysis MWF  Nephrology following. Continue sevelamer and calcitriol. Renally dose medications.   -Continue dialysis   -CTM labs      # Delirium  -Improved   - Likely 2/2 hospital delirium.  - Continue to avoid sedating drugs      #Hypothyroid  -TSH high, T4 low   -Continue with 25mcg levothyroxine daily       #Left hand pain   -Swelling and redness of left hand  -Xray negative for acute fracture, but shows chronic changes      #Left upper extremity edema   Chronic, at same side of AV fistula, which is functioning well.   -Likely multifactorial: poor EF, lymphedema,outflow stenosis.   -CTA without evidence of central stenosis.   -Vascular consulted and recommended supportive care - wrapping arm with ace bandage.      # goals of care  Palliative care has seen patient and patient still wants to be a Full Code.   - Plan to have further discussions when cognition is appropriate  - Patient still has no DPOA. Next of kin would be her son or daughter.      # HTN  -Continue to hold BP medications and lasix   -Fluids boluses PRN for hypotension         # T2DM  -Continue lantus  5 units QHS   -ISS   -Hypoglycemia protocol         #Chronic lower extremity pain  Likely multi-factorial (PVD, lymphedema, poor EF). Some residual from old hip fractures but the significant pain seems to come from her bilateral lower legs. Pt not a candidate for surgery, unless she desires amputations. Please see vascular note for more details.  - Current pain management with scheduled tylenol, lidocaine patches, hot compresses  - Avoiding narcotics as above     # aortic stenosis, severe  # HFrEF  Cardiology consulted, and not candidate for intervention.      Core Measures:  DVT  PPX: heparin  ABX: none  Fluids: PO  PCP: Lon  CODE STATUS: FULL CODE     Disposition: Medically cleared for discharge. Pending placement to SNF - challenging placement as transport to dialysis is not always facilitated.

## 2022-07-11 NOTE — PROGRESS NOTES
[facility] -  PROGRESS NOTE               Author: Janina Bender M.D. Date & Time: 7/11/2022  11:58 AM     HPI:  [id number] with PMH significant for ESRD on HD MWF via left arm AVF, HTN, DM II, Anemia secondary to CKD, CKD Bone mineral disorder, admitted with increased edema and weakness and having missed her last last 2-3 outpt HD treatments. Pt is very lethargic at this time and unable to provide much history, majority of the history was obtained through review of the medical record and discussion with primary svc. Pt had reported increased LE edema and fatigue and weakness and worsening of her left arm edema so she came to the ED for evaluation. She apparently has missed her last 2-3 outpt dialysis treatments. Per regular outpt Nephrologist Dr. Gates, she has been non-compliant with her fluid restriction and was told that she needed 4x/week dialysis until her volume status could be optimized but she was non-compliant with that recommendation. She has edema of her left arm where her AVF is located and there is concern for a central stenosis that could be the etiology. She had an appointment at the outpt access center to evaluate for central stenosis and undergo angioplasty if needed on 6/9/22 but pt did not go to the appointment. She has not had any other procedures done to the arm in the past couple of months. She had a left arm us done today that showed no DVT and patent AVF and soft tissue edema. She apparently received pain medication fentanyl and dilaudid as well as benadryl earlier today and she is very drowsy.      DAILY NEPHROLOGY SUMMARY:  6/25: rapid response overnight for severe leg pain, pt very lethargic today, moans with palpation of legs, arouses but does not answer questions and falls back asleep, tolerated HD yest with 2.5L UF, BP stable overnight but low this am  6/26: No events, BP low overnight and this am, more alert, reports pain in legs for the past 3 weeks, denies  "any CP/SOB/Abd pain, reports left arm edema a little better but no pain in left arm   6/27: Pt resting in bed, subdued, Bps soft, on 4L supp O2 via NC, labs reviewed, due for HD  6/28: pt laying in bed, sleepy, c/o pain in legs, moaning, vascular assessed and does not recommend any intervention on legs or AVF, tolerated iHD yesterday with UF:2.8L  6/29: Pt in bed, fatigued, no complaints.  VSS 1L NC.    6/30: no new complaints, no overnight events. Tolerated hd yesterday, UF 3.5L , she is in negative balance.   7/1: Resting in bed, no complaints.  VSS 2L NC. No new labs today.   7/2: Pt sitting up in bed, confused, CNA helping her with breakfast meal, iHD yesterday with 1.5L net UF limited by muscle cramping, labs reviewed, VSS on 1L NC O2  7/3: Pt slumped in bed, somnolent, medical floor status, VSS on RA  7/4: No new overnight renal events. S/p HD yesterday and tolerated well. Net UF was 1.5 L.  7/5: S/p HD yesterday with net UF of 2 L. No new overnight renal events.   7/6: No new overnight renal events.   7/7: S/p HD yesterday with net UF of 2.2L and tolerated well. No new overnight renal events.   7/8: No new overnight renal events.   7/9: S/p HD yesterday with net UF of 3L and tolerated well. No new overnight renal events.   7/10: No new overnight renal events. K+ 6.9 this am.  7/11: No events, HD yest for hyperkalemia, K 5.9 this am, denies any CP/SOB but reports leg pain, BP stable, seen and examined on HD this am--VSS see dialysis flowsheet for full details    REVIEW OF SYSTEMS:    10 point ROS reviewed and is as per HPI/daily summary or otherwise negative    PMH/PSH/SH/FH:   Reviewed and unchanged since admission note    CURRENT MEDICATIONS:   Reviewed from admission to present day    VS:  /69   Pulse 89   Temp 35.8 °C (96.5 °F) (Temporal)   Resp 15   Ht 1.626 m (5' 4\")   Wt 63.1 kg (139 lb 1.8 oz)   SpO2 100%   BMI 23.88 kg/m²     Physical Exam  Vitals and nursing note reviewed. "   Constitutional:       Appearance: She is ill-appearing.   HENT:      Head: Normocephalic and atraumatic.   Eyes:      General: No scleral icterus.  Cardiovascular:      Rate and Rhythm: Normal rate and regular rhythm.      Comments: No edema  Pulmonary:      Effort: Pulmonary effort is normal. No respiratory distress.      Breath sounds: Examination of the right-lower field reveals decreased breath sounds. Examination of the left-lower field reveals decreased breath sounds. Decreased breath sounds present.   Abdominal:      General: Bowel sounds are normal. There is no distension.      Palpations: Abdomen is soft.   Musculoskeletal:         General: Swelling (left arm) present. No deformity.      Right lower leg: No edema.      Left lower leg: No edema.   Skin:     General: Skin is warm and dry.      Findings: No rash.   Neurological:      General: No focal deficit present.      Mental Status: She is alert and oriented to person, place, and time.   Psychiatric:         Mood and Affect: Mood normal.         Behavior: Behavior normal.           Fluids:  In: 620 [P.O.:120; Dialysis:500]  Out: 1500     LABS:  Recent Labs     07/10/22  0734 07/11/22  0812   SODIUM 130* 132*   POTASSIUM 6.9* 5.9*   CHLORIDE 93* 93*   CO2 24 26   GLUCOSE 89 132*   BUN 44* 37*   CREATININE 4.66* 4.07*   CALCIUM 8.9 8.7     Reviewed.    IMAGING:   All imaging reviewed from admission to present day    IMPRESSION:  # ESRD, dependent on HD              - HD via LUE AVF, missed last 2-3 outpt treatments  # Hyperkalemia  # Fluid Overload, improving              - Secondary to non-compliance with fluid restriction on HD and non-compliance with treatments  # Left arm edema, slow improvement              - CTA upper ext 6/26 w/o e/o focal stenosis + extensive edema             - AVF patent on left arm us and no DVT             - Pt no showed to outpt appt to evaluate              - Vascular does not recommend intervention   # Altered Mental  Status, fluctuates             - Monitor  # HTN, variable control             - Goal BP < 140/90             - Not on BP meds   # Anemia of CKD, at goal              - Goal Hgb 10-11             - Iron 27             -TIBC 142             -%Sat 19             - Ferritin 1419  # CKD-MBD             - Ca 9.2             - PO4 6.4             - Managed at OP unit             - On calcitriol and sevelamer   # DM II--management per primary svc  # Leg Pain--chronic skin changes and subcutaneous tissue firm to touch             - Unclear etiology             - Vascular does not recommend any intervention  # Leukocytosis--check cultures if any fevers     PLAN:  - HD today (MON).   - Continue qMWF iHD schedule  - UF as tolerated  - No current need for ROGELIO  - Renal diet  - Continue phos binders WM  - Continue off of all BP meds and diuretics for now  - PRN NS boluses for symptomatic hypotension  - Limit sedating meds if possible  - No dietary protein restrictions  - Dose all meds per ESRD  - Outpt access center appointment rescheduled  - Palliative care has consulted; pt is still a full code  - Pt at high risk for further morbidity/mortality  - DC planning underway for SNF

## 2022-07-12 NOTE — DISCHARGE PLANNING
Agency/Facility Name: [facility]  Spoke To: Milagros  Outcome: DPA sent an updated SNF referral to [facility] at 1614 per Milagros's request.

## 2022-07-12 NOTE — PROGRESS NOTES
Evening assessment of patient complete. Patient is A&Ox4 on 2L NC. Patient denies pain at this time. Call light within reach. Patient denies any needs at this time.

## 2022-07-12 NOTE — PROGRESS NOTES
[facility] -  PROGRESS NOTE               Author: Janina Bender M.D. Date & Time: 7/12/2022  12:02 PM     HPI:  [id number] with PMH significant for ESRD on HD MWF via left arm AVF, HTN, DM II, Anemia secondary to CKD, CKD Bone mineral disorder, admitted with increased edema and weakness and having missed her last last 2-3 outpt HD treatments. Pt is very lethargic at this time and unable to provide much history, majority of the history was obtained through review of the medical record and discussion with primary svc. Pt had reported increased LE edema and fatigue and weakness and worsening of her left arm edema so she came to the ED for evaluation. She apparently has missed her last 2-3 outpt dialysis treatments. Per regular outpt Nephrologist Dr. Gates, she has been non-compliant with her fluid restriction and was told that she needed 4x/week dialysis until her volume status could be optimized but she was non-compliant with that recommendation. She has edema of her left arm where her AVF is located and there is concern for a central stenosis that could be the etiology. She had an appointment at the outpt access center to evaluate for central stenosis and undergo angioplasty if needed on 6/9/22 but pt did not go to the appointment. She has not had any other procedures done to the arm in the past couple of months. She had a left arm us done today that showed no DVT and patent AVF and soft tissue edema. She apparently received pain medication fentanyl and dilaudid as well as benadryl earlier today and she is very drowsy.      DAILY NEPHROLOGY SUMMARY:  6/25: rapid response overnight for severe leg pain, pt very lethargic today, moans with palpation of legs, arouses but does not answer questions and falls back asleep, tolerated HD yest with 2.5L UF, BP stable overnight but low this am  6/26: No events, BP low overnight and this am, more alert, reports pain in legs for the past 3 weeks, denies  "any CP/SOB/Abd pain, reports left arm edema a little better but no pain in left arm   6/27: Pt resting in bed, subdued, Bps soft, on 4L supp O2 via NC, labs reviewed, due for HD  6/28: pt laying in bed, sleepy, c/o pain in legs, moaning, vascular assessed and does not recommend any intervention on legs or AVF, tolerated iHD yesterday with UF:2.8L  6/29: Pt in bed, fatigued, no complaints.  VSS 1L NC.    6/30: no new complaints, no overnight events. Tolerated hd yesterday, UF 3.5L , she is in negative balance.   7/1: Resting in bed, no complaints.  VSS 2L NC. No new labs today.   7/2: Pt sitting up in bed, confused, CNA helping her with breakfast meal, iHD yesterday with 1.5L net UF limited by muscle cramping, labs reviewed, VSS on 1L NC O2  7/3: Pt slumped in bed, somnolent, medical floor status, VSS on RA  7/4: No new overnight renal events. S/p HD yesterday and tolerated well. Net UF was 1.5 L.  7/5: S/p HD yesterday with net UF of 2 L. No new overnight renal events.   7/6: No new overnight renal events.   7/7: S/p HD yesterday with net UF of 2.2L and tolerated well. No new overnight renal events.   7/8: No new overnight renal events.   7/9: S/p HD yesterday with net UF of 3L and tolerated well. No new overnight renal events.   7/10: No new overnight renal events. K+ 6.9 this am.  7/11: No events, HD yest for hyperkalemia, K 5.9 this am, denies any CP/SOB but reports leg pain, BP stable, seen and examined on HD this am--VSS see dialysis flowsheet for full details  7/12: No events, awake and alert, c/o bilateral leg pain, denies any CP/SOB, BP stable    REVIEW OF SYSTEMS:    10 point ROS reviewed and is as per HPI/daily summary or otherwise negative    PMH/PSH/SH/FH:   Reviewed and unchanged since admission note    CURRENT MEDICATIONS:   Reviewed from admission to present day    VS:  /62   Pulse 87   Temp 35.9 °C (96.7 °F) (Temporal)   Resp 17   Ht 1.626 m (5' 4\")   Wt 63.1 kg (139 lb 1.8 oz)   SpO2 " 100%   BMI 23.88 kg/m²     Physical Exam  Vitals and nursing note reviewed.   Constitutional:       Appearance: She is ill-appearing.   HENT:      Head: Normocephalic and atraumatic.   Eyes:      General: No scleral icterus.  Cardiovascular:      Rate and Rhythm: Normal rate and regular rhythm.   Pulmonary:      Effort: Pulmonary effort is normal. No respiratory distress.      Breath sounds: Examination of the right-lower field reveals decreased breath sounds. Examination of the left-lower field reveals decreased breath sounds. Decreased breath sounds present.   Abdominal:      General: Bowel sounds are normal. There is no distension.      Palpations: Abdomen is soft.   Musculoskeletal:         General: Swelling (left arm) present. No deformity.      Right lower leg: Edema present.      Left lower leg: Edema present.   Skin:     General: Skin is warm and dry.      Findings: No rash.   Neurological:      General: No focal deficit present.      Mental Status: She is alert and oriented to person, place, and time.   Psychiatric:         Mood and Affect: Mood normal.         Behavior: Behavior normal.           Fluids:  In: 740 [P.O.:240; Dialysis:500]  Out: 3500     LABS:  Recent Labs     07/10/22  0734 07/11/22  0812 07/12/22  0956   SODIUM 130* 132* 133*   POTASSIUM 6.9* 5.9* 5.4   CHLORIDE 93* 93* 92*   CO2 24 26 27   GLUCOSE 89 132* 133*   BUN 44* 37* 32*   CREATININE 4.66* 4.07* 3.48*   CALCIUM 8.9 8.7 9.1     Reviewed.    IMAGING:   All imaging reviewed from admission to present day    IMPRESSION:  # ESRD, dependent on HD              - HD via LUE AVF, missed last 2-3 outpt treatments  # Hyperkalemia--mild  # Fluid Overload, improving              - Secondary to non-compliance with fluid restriction on HD and non-compliance with treatments  # Left arm edema, slow improvement              - CTA upper ext 6/26 w/o e/o focal stenosis + extensive edema             - AVF patent on left arm us and no DVT             -  Pt no showed to outpt appt to evaluate              - Vascular does not recommend intervention   # Altered Mental Status, fluctuates             - Monitor  # HTN, variable control, stable at this time             - Goal BP < 140/90             - Not on BP meds   # Anemia of CKD, at goal              - Goal Hgb 10-11             - Iron 27             -TIBC 142             -%Sat 19             - Ferritin 1419  # CKD-MBD             - Ca 9.2             - PO4 6.4             - Managed at OP unit             - On calcitriol and sevelamer   # DM II--management per primary svc  # Leg Pain--chronic skin changes and subcutaneous tissue firm to touch             - Unclear etiology             - Vascular does not recommend any intervention  # Leukocytosis--check cultures if any fevers     PLAN:  - No HD today (TUES).   - Continue qMWF iHD schedule  - UF as tolerated  - No current need for ROGELIO  - Renal diet  - Continue phos binders WM  - Continue off of all BP meds and diuretics for now  - PRN NS boluses for symptomatic hypotension  - Limit sedating meds if possible  - No dietary protein restrictions  - Dose all meds per ESRD  - Outpt access center appointment rescheduled  - Palliative care has consulted; pt is still a full code  - Pt at high risk for further morbidity/mortality  - DC planning underway for SNF  - Start patiromer daily to help with hyperkalemia

## 2022-07-12 NOTE — THERAPY
"Occupational Therapy  Daily Treatment     Patient Name: Jannet Bruno  Age:  81 y.o., Sex:  female  Medical Record #: [id number]  Today's Date: 7/12/2022     Precautions  Precautions: Fall Risk, Swallow Precautions ( See Comments)  Comments: LUE very painful    Assessment    Pt was seen for OT treatment. Pt tearful off and on  throughout session. Pt c/o LUE pain with any movement and or touch guarding hand stating \"I'm not going to move my fingers\",. I don't care what you say\". Pt was not asked to move her fingers. Pt Mod A for LB dressing seated base using RUE for all to don and doff socks,  don underwear and Total A for clothing management up over hips in standing. Mod A for sit to stand . Min A for UB dressing changes. Min A for H/G done seated up in chair post set up with extended time. Tolerated approx 9 mins of activity before c/o extreme fatigue and needing to rest. Pt stated, \"My  is at home and he can help me however no SO present for family training.Pt refused BSC transfers. RN updated on OT treatment     findings and recommendations . Pt left up in chair with chair alarm on. Will continue to follow.        Plan    Continue current treatment plan.    DC Equipment Recommendations: (P) Unable to determine at this time  Discharge Recommendations: (P) Recommend post-acute placement for additional occupational therapy services prior to discharge home    Subjective    \"My legs are burning and my Left arm hurts\". Pt tearful with all movement. RN informed.       Objective       07/12/22 1050   Cognition    Cognition / Consciousness X   Level of Consciousness Alert   Safety Awareness Impaired   New Learning Impaired   Attention Impaired   Comments Pt cooperative but very resistive/guarding of moving LUE with donning gown. Needed reminder cues to bring head up to midline and give eye contact.   Passive ROM Upper Body   Comments RUE WFL, LUE not fully assessed due to pain.   Active ROM Upper Body " "  Dominant Hand Right   Comments LUE limited due to pain and edema; RUE is weak but with extended time can do simple self care tasks.   Strength Upper Body   Gross Strength Generalized Weakness, Equal Bilaterally.    Comments LUE weaker than RUE.   Sensation Upper Body   Comments LUE tingling   Other Treatments   Other Treatments Provided Psychosocial intervention addressed. Neededreminder cues to correct sitting posture with all activity.  .   Balance   Sitting Balance (Static) Fair   Sitting Balance (Dynamic) Fair -   Standing Balance (Static) Fair -   Standing Balance (Dynamic) Poor +   Weight Shift Sitting Fair   Weight Shift Standing Poor   Comments HHA   Bed Mobility    Comments Not assessed. Pt up in chair prior to and post OT session.   Activities of Daily Living   Eating Supervision   Grooming Minimal Assist;Seated   Bathing   (declined to attempt.)   Upper Body Dressing Minimal Assist   Lower Body Dressing Moderate Assist   Toileting   (NT/ No need for BM at this time.)   Skilled Intervention Verbal Cuing;Tactile Cuing;Sequencing;Postural Facilitation;Compensatory Strategies   Comments Pt needed extended time to do most tasks. Pt will need assist for most self care tasks at this time. Pt stated her  can help her but no SO present for family training.   Functional Mobility   Sit to Stand Moderate Assist   Bed, Chair, Wheelchair Transfer Moderate Assist   Toilet Transfers Refused   Transfer Method Stand Step   Comments STS HHA with Min A for STS   Activity Tolerance   Comments limited due to pain and BLE weakness and pain \"burning\".   Patient / Family Goals   Patient / Family Goal #1 to get better   Goal #1 Outcome Progressing slower than expected   Short Term Goals   Short Term Goal # 1 LB dressing with SPV   Goal Outcome # 1 Progressing slower than expected   Short Term Goal # 2 seated G/h with SPV for >20 min   Goal Outcome # 2 Progressing slower than expected   Short Term Goal # 3 BSC txf with min " A   Goal Outcome # 3 Progressing slower than expected   Anticipated Discharge Equipment and Recommendations   DC Equipment Recommendations Unable to determine at this time   Discharge Recommendations Recommend post-acute placement for additional occupational therapy services prior to discharge home   Interdisciplinary Plan of Care Collaboration   IDT Collaboration with  Nursing;Certified Nursing Assistant   Collaboration Comments RN updated

## 2022-07-12 NOTE — PROGRESS NOTES
[facility] [place] FAMILY MEDICINE PROGRESS NOTE       Attending: Dr. Minor      Senior Resident: Carlos Chaudhry D.O.  Family Medicine Resident PGY-3    PATIENT: Jannet Bruno; [id number]; 1940 Hospital Day: 19    ID: 81 y.o. female with PMHx ESRD on HD MWF, HTN, HLD, and T2DM who was admitted on 6/24/2022 for left upper arm swelling and lower extremity edema.    SUBJECTIVE: No acute events overnight.  Patient reports experiencing pain in her left hand and bilateral lower extremities.  She also says she has depressed mood today.      OBJECTIVE:     Vitals:    07/11/22 0945 07/11/22 1534 07/11/22 2100 07/12/22 0417   BP: 134/69 127/52 115/45 119/64   Pulse: 89 89 81 79   Resp: 15 (!) 26 18 18   Temp: 35.8 °C (96.5 °F) 36.6 °C (97.9 °F) 36.8 °C (98.2 °F) 36.1 °C (97 °F)   TempSrc: Temporal Temporal Temporal Temporal   SpO2: 100% 95% 99% 99%   Weight:       Height:           Intake/Output Summary (Last 24 hours) at 7/12/2022 0550  Last data filed at 7/11/2022 1500  Gross per 24 hour   Intake 740 ml   Output 3500 ml   Net -2760 ml       PE:  General: resting comfortably in bed, responds to command   HEENT: NC/AT. EOMI. No conjunctival injection or scleral icterus.   Cardiovascular: Systolic murmer   Respiratory: Non-labored   MSK: Swelling and redness over left hand. TTP.   Neuro: Moving all 4 extremities     LABS:  No results for input(s): WBC, RBC, HEMOGLOBIN, HEMATOCRIT, MCV, MCH, RDW, PLATELETCT, MPV, NEUTSPOLYS, LYMPHOCYTES, MONOCYTES, EOSINOPHILS, BASOPHILS, RBCMORPHOLO in the last 72 hours.  Recent Labs     07/10/22  0734 07/11/22  0812   SODIUM 130* 132*   POTASSIUM 6.9* 5.9*   CHLORIDE 93* 93*   CO2 24 26   BUN 44* 37*   CREATININE 4.66* 4.07*   CALCIUM 8.9 8.7   MAGNESIUM 2.2  --    PHOSPHORUS 4.6* 4.4   ALBUMIN 2.9*  --      Estimated GFR/CRCL = Estimated Creatinine Clearance: 9.4 mL/min (A) (by C-G formula based on SCr of 4.07 mg/dL (H)).  Recent Labs     07/10/22  0734 07/11/22  0812   GLUCOSE 89  "132*     Recent Labs     07/10/22  0734   ASTSGOT 23   ALTSGPT 11   TBILIRUBIN 0.4   ALKPHOSPHAT 206*   GLOBULIN 4.1*             No results for input(s): INR, APTT, FIBRINOGEN in the last 72 hours.    Invalid input(s): DIMER    MICROBIOLOGY:   Results     Procedure Component Value Units Date/Time    BLOOD CULTURE [[id number]] Collected: 07/03/22 1540    Order Status: Completed Specimen: Blood from Peripheral Updated: 07/08/22 1700     Significant Indicator NEG     Source BLD     Site PERIPHERAL     Culture Result No growth after 5 days of incubation.    Narrative:      Per Hospital Policy: Only change Specimen Src: to \"Line\" if  specified by physician order.  Right Wrist    BLOOD CULTURE [[id number]] Collected: 07/03/22 1540    Order Status: Completed Specimen: Blood from Peripheral Updated: 07/08/22 1700     Significant Indicator NEG     Source BLD     Site PERIPHERAL     Culture Result No growth after 5 days of incubation.    Narrative:      Per Hospital Policy: Only change Specimen Src: to \"Line\" if  specified by physician order.  Right Forearm/Arm            IMAGING:   DX-HAND 3+ LEFT   Final Result      1.  No acute fracture or dislocation.   2.  Scattered mild degenerative changes, increased from prior study. No definite erosive arthropathy.   3.  Osteopenia.   4.  Old ulnar styloid process fracture.      DX-CHEST-PORTABLE (1 VIEW)   Final Result      1.  Probable 12 mm right mid/upper lung zone mass. Suggest CT chest without contrast for further assessment      2.  Interstitial pulmonary edema or fibrosis      3.  Enlarged cardiac silhouette      4.  Left pleural effusion      CT-CTA UPPER EXT WITH & W/O-POST PROCESS LEFT   Final Result      1.  Status post brachiocephalic fistula in the left arm. No focal stenosis is identified.      2.  Extensive edema in the subcutaneous tissues of the visualized left lateral chest and arm.      EC-ECHOCARDIOGRAM COMPLETE W/O CONT   Final Result      US-ZAHRAA SINGLE LEVEL " BILAT   Final Result      US-EXTREMITY ARTERY LOWER BILAT   Final Result      CT-EXTREMITY, UPPER W/O LEFT   Final Result      1.  There is diffuse subcutaneous edema of the imaged portions of the left upper extremity. There is also diffuse body wall edema in the visualized portions of the chest and abdomen.   2.  No focal fluid collection to suggest abscess.   3.  There is a small left pleural effusion and minimal fluid in the abdomen.   4.  There is postsurgical change of AV fistula. There is small vessel atherosclerosis.      US-EXTREMITY VENOUS UPPER UNILAT LEFT   Final Result      DX-CHEST-PORTABLE (1 VIEW)   Final Result         1.  Interstitial pulmonary parenchymal prominence suggest chronic underlying lung disease, component of interstitial edema and/or infiltrates not excluded.   2.  Small left pleural effusion   3.  Cardiomegaly   4.  Atherosclerosis            MEDS:  Current Facility-Administered Medications   Medication Last Admin   • melatonin tablet 5 mg 5 mg at 07/11/22 2319   • oxyCODONE immediate-release (ROXICODONE) tablet 2.5 mg 2.5 mg at 07/12/22 0328   • levothyroxine (SYNTHROID) tablet 25 mcg 25 mcg at 07/11/22 0524   • insulin GLARGINE (Lantus,Semglee) injection 5 Units at 07/11/22 1738    And   • insulin lispro (AdmeLOG,HumaLOG) injection 3 Units at 07/11/22 1737    And   • dextrose 50% (D50W) injection 25 g     • acetaminophen (TYLENOL) tablet 1,000 mg 1,000 mg at 07/12/22 0023   • polyethylene glycol/lytes (MIRALAX) PACKET 1 Packet 1 Packet at 07/11/22 0525    And   • senna-docusate (PERICOLACE or SENOKOT S) 8.6-50 MG per tablet 2 Tablet 2 Tablet at 07/11/22 1740    And   • bisacodyl (DULCOLAX) suppository 10 mg     • sevelamer carbonate (RENVELA) tablet 1,600 mg 1,600 mg at 07/11/22 1740   • lidocaine (LIDODERM) 5 % 2 Patch 2 Patch at 07/10/22 0834   • heparin injection 5,000 Units 5,000 Units at 07/11/22 2148   • atorvastatin (LIPITOR) tablet 20 mg 20 mg at 07/11/22 0525   • calcitRIOL  (ROCALTROL) capsule 0.25 mcg 0.25 mcg at 07/11/22 0524   • ROPINIRole (REQUIP) tablet 0.25 mg 0.25 mg at 07/11/22 0524   • ondansetron (ZOFRAN) syringe/vial injection 4 mg 4 mg at 06/28/22 1018       PROBLEM LIST:  No problems updated.    ASSESSMENT/PLAN:81 y.o. female with PMHx ESRD on HD MWF, HTN, HLD, and T2DM who was admitted on 6/24/2022 for left upper arm swelling and lower extremity edema.      # ESRD, on dialysis MWF  Nephrology following. Continue sevelamer and calcitriol. Renally dose medications.   -Continue dialysis   -CTM labs      # Delirium  -Improved   - Likely 2/2 hospital delirium.  - Continue to avoid sedating drugs      #Hypothyroid  -TSH high, T4 low   -Continue with 25mcg levothyroxine daily       #Left hand pain   -Swelling and redness of left hand  -Xray negative for acute fracture, but shows chronic changes      #Left upper extremity edema   Chronic, at same side of AV fistula, which is functioning well.   -Likely multifactorial: poor EF, lymphedema,outflow stenosis.   -CTA without evidence of central stenosis.   -Vascular consulted and recommended supportive care - wrapping arm with ace bandage.      # goals of care  Palliative care has seen patient and patient still wants to be a Full Code.   - Plan to have further discussions when cognition is appropriate  - Patient still has no DPOA. Next of kin would be her son or daughter.      # HTN  -Continue to hold BP medications and lasix   -Fluids boluses PRN for hypotension       # T2DM  -Continue lantus  5 units QHS   -ISS   -Hypoglycemia protocol         #Chronic lower extremity pain  Likely multi-factorial (PVD, lymphedema, poor EF). Some residual from old hip fractures but the significant pain seems to come from her bilateral lower legs. Pt not a candidate for surgery, unless she desires amputations. Please see vascular note for more details.  - Current pain management with scheduled tylenol, lidocaine patches, hot compresses  - Oxycodone  2.5mg for break through pain   -Patient has been having depressed mood today, if persist can consider starting SSRI.      # aortic stenosis, severe  # HFrEF  Cardiology consulted, and not candidate for intervention.      Core Measures:  DVT PPX: heparin  ABX: none  Fluids: PO  PCP: Lon  CODE STATUS: FULL CODE     Disposition: Medically cleared for discharge. Pending placement to SNF - challenging placement as transport to dialysis is not always facilitated.

## 2022-07-12 NOTE — THERAPY
"Physical Therapy   Daily Treatment     Patient Name: Jannet Bruno  Age:  81 y.o., Sex:  female  Medical Record #: [id number]  Today's Date: 7/12/2022     Precautions  Precautions: Fall Risk;Swallow Precautions ( See Comments)  Comments: LUE very painful    Assessment    Pt progressing with functional mobility, however, mainly limited 2/2 LUE/hand pain. Pt tearful at end of session with c/o LUE burning and asking for pain meds- RN aware. Pt's LUE not used during bed mobility, and minimally during transfer as pt utilizing RUE HHA with ModA. Elevated pt's arm on pillow with wrist higher than elbow, this therapist offering ice with pt refusing. Recommend placement. Will continue to follow.    Plan    Continue current treatment plan.    DC Equipment Recommendations: Unable to determine at this time  Discharge Recommendations: Recommend post-acute placement for additional physical therapy services prior to discharge home      Subjective    \"I don't want anything touching that hand!\"      Objective     07/12/22 0927   Vitals   O2 (LPM) 2   O2 Delivery Device Silicone Nasal Cannula   Pain 0 - 10 Group   Therapist Pain Assessment Nurse Notified;Post Activity  (pt tearful 2/2 LUE pain requesting pain meds- RN aware)   Cognition    Cognition / Consciousness X   Level of Consciousness Alert   Safety Awareness Impaired   New Learning Impaired   Attention Impaired   Comments Initially pleasant and cooperative. Grew frustrated 2/2 LUE pain with mobility   Balance   Sitting Balance (Static) Fair   Sitting Balance (Dynamic) Fair -   Standing Balance (Static) Fair -   Standing Balance (Dynamic) Poor +   Weight Shift Sitting Fair   Weight Shift Standing Poor   Skilled Intervention Verbal Cuing;Tactile Cuing;Sequencing;Compensatory Strategies;Facilitation   Comments via R HHA   Gait Analysis   Gait Level Of Assist Moderate Assist   Assistive Device Hand Held Assist   Distance (Feet) 4  (steps to chair)   # of Times Distance was " Traveled 1   Deviation Decreased Base Of Support;Shuffled Gait   # of Stairs Climbed 0   Weight Bearing Status no restrictions   Skilled Intervention Verbal Cuing;Tactile Cuing;Sequencing;Compensatory Strategies   Comments limited 2/2 c/o LUE pain   Bed Mobility    Supine to Sit Minimal Assist   Sit to Supine   (up in chair post)   Scooting Minimal Assist   Rolling Minimum Assist to Lt.   Skilled Intervention Verbal Cuing;Tactile Cuing;Sequencing   Functional Mobility   Sit to Stand Minimal Assist   Bed, Chair, Wheelchair Transfer Moderate Assist   Transfer Method Stand Step   Mobility via R HHA to chair   Skilled Intervention Verbal Cuing;Tactile Cuing;Sequencing;Compensatory Strategies;Facilitation   How much difficulty does the patient currently have...   Turning over in bed (including adjusting bedclothes, sheets and blankets)? 2   Sitting down on and standing up from a chair with arms (e.g., wheelchair, bedside commode, etc.) 1   Moving from lying on back to sitting on the side of the bed? 1   How much help from another person does the patient currently need...   Moving to and from a bed to a chair (including a wheelchair)? 2   Need to walk in a hospital room? 2   Climbing 3-5 steps with a railing? 2   6 clicks Mobility Score 10   Activity Tolerance   Comments limited 2/2 BLE weakness, LUE pain   Short Term Goals    Short Term Goal # 1 Pt will perform bed mobility with supervision in 6 visits with HOB flat, no rail.   Goal Outcome # 1 goal not met   Short Term Goal # 2 Pt will transfer with supervision in 6 visits to improve functional indep.   Goal Outcome # 2 Goal not met   Short Term Goal # 3 Pt will ambulate x 125 feet using FWW with supervision in 6 visits to improve functional indep.   Goal Outcome # 3 Goal not met   Education Group   Education Provided Role of Physical Therapist;Transfer Status   Role of Physical Therapist Patient Response Patient;Acceptance;Explanation;Verbal Demonstration   Transfer  Status Patient Response Patient;Acceptance;Explanation;Action Demonstration   Anticipated Discharge Equipment and Recommendations   DC Equipment Recommendations Unable to determine at this time   Discharge Recommendations Recommend post-acute placement for additional physical therapy services prior to discharge home   Interdisciplinary Plan of Care Collaboration   IDT Collaboration with  Nursing   Patient Position at End of Therapy Seated;Chair Alarm On;Call Light within Reach;Tray Table within Reach;Phone within Reach   Collaboration Comments RN updated   Session Information   Date / Session Number  7/12-4 (1/3, 7/17)

## 2022-07-12 NOTE — CARE PLAN
Problem: Pain - Standard  Goal: Alleviation of pain or a reduction in pain to the patient’s comfort goal  Outcome: Progressing     Problem: Fall Risk  Goal: Patient will remain free from falls  Outcome: Progressing   The patient is Stable - Low risk of patient condition declining or worsening    Shift Goals  Clinical Goals: pain management  Patient Goals: sleep  Family Goals: N/A    Progress made toward(s) clinical / shift goals:  scheduled medications given per MAR, patient actively engages with plan of care to reduce pain, refuses PRNs; patient calls appropriately, bed alarm on.     Patient is not progressing towards the following goals:

## 2022-07-13 NOTE — CARE PLAN
The patient is Stable - Low risk of patient condition declining or worsening    Shift Goals  Clinical Goals: pain management  Patient Goals: pain management  Family Goals: N/A    Progress made toward(s) clinical / shift goals:  yes      Problem: Pain - Standard  Goal: Alleviation of pain or a reduction in pain to the patient’s comfort goal  Outcome: Progressing     Problem: Knowledge Deficit - Standard  Goal: Patient and family/care givers will demonstrate understanding of plan of care, disease process/condition, diagnostic tests and medications  Outcome: Progressing     Problem: Skin Integrity  Goal: Skin integrity is maintained or improved  Outcome: Progressing     Problem: Fall Risk  Goal: Patient will remain free from falls  Outcome: Progressing       Patient is not progressing towards the following goals:

## 2022-07-13 NOTE — PROGRESS NOTES
[facility] FAMILY MEDICINE PROGRESS NOTE     Attending: Dr. Minor     Senior Resident: Carlos Chaudhry D.O.  Family Medicine Resident PGY-3    PATIENT: Jannet Bruno; [id number]; 1940 Hospital Day: 20    ID: 81 y.o. female with PMHx ESRD on HD MWF, HTN, HLD, and T2DM who was admitted on 6/24/2022 for left upper arm swelling and lower extremity edema.    SUBJECTIVE: No acute events overnight. Patient feels well this morning. She says her mood has improved since yesterday. She is not experiencing pain this morning.     OBJECTIVE:     Vitals:    07/12/22 2020 07/12/22 2021 07/12/22 2026 07/13/22 0325   BP:  113/49  117/59   Pulse: 71   83   Resp: 20  20 16   Temp: 36.6 °C (97.8 °F)   36.1 °C (96.9 °F)   TempSrc: Temporal   Temporal   SpO2: 93%   100%   Weight:       Height:           Intake/Output Summary (Last 24 hours) at 7/13/2022 0711  Last data filed at 7/12/2022 1400  Gross per 24 hour   Intake 360 ml   Output --   Net 360 ml       PE:  General: resting comfortably in bed, responds to command   HEENT: NC/AT. EOMI. No conjunctival injection or scleral icterus.   Cardiovascular: Systolic murmer   Respiratory: Non-labored   MSK: Swelling of left upper extremity. Left hand wrapped.   Extremities: Bilateral LE edema.   Neuro: Moving all 4 extremities        LABS:  No results for input(s): WBC, RBC, HEMOGLOBIN, HEMATOCRIT, MCV, MCH, RDW, PLATELETCT, MPV, NEUTSPOLYS, LYMPHOCYTES, MONOCYTES, EOSINOPHILS, BASOPHILS, RBCMORPHOLO in the last 72 hours.  Recent Labs     07/10/22  0734 07/11/22  0812 07/12/22  0956   SODIUM 130* 132* 133*   POTASSIUM 6.9* 5.9* 5.4   CHLORIDE 93* 93* 92*   CO2 24 26 27   BUN 44* 37* 32*   CREATININE 4.66* 4.07* 3.48*   CALCIUM 8.9 8.7 9.1   MAGNESIUM 2.2  --   --    PHOSPHORUS 4.6* 4.4 4.2   ALBUMIN 2.9*  --   --      Estimated GFR/CRCL = Estimated Creatinine Clearance: 10.9 mL/min (A) (by C-G formula based on SCr of 3.48 mg/dL (H)).  Recent Labs     07/10/22  0734 07/11/22  0812 07/12/22  0962  "  GLUCOSE 89 132* 133*     Recent Labs     07/10/22  0734   ASTSGOT 23   ALTSGPT 11   TBILIRUBIN 0.4   ALKPHOSPHAT 206*   GLOBULIN 4.1*             No results for input(s): INR, APTT, FIBRINOGEN in the last 72 hours.    Invalid input(s): DIMER    MICROBIOLOGY:   Results     Procedure Component Value Units Date/Time    BLOOD CULTURE [[id number]] Collected: 07/03/22 1540    Order Status: Completed Specimen: Blood from Peripheral Updated: 07/08/22 1700     Significant Indicator NEG     Source BLD     Site PERIPHERAL     Culture Result No growth after 5 days of incubation.    Narrative:      Per Hospital Policy: Only change Specimen Src: to \"Line\" if  specified by physician order.  Right Wrist    BLOOD CULTURE [[id number]] Collected: 07/03/22 1540    Order Status: Completed Specimen: Blood from Peripheral Updated: 07/08/22 1700     Significant Indicator NEG     Source BLD     Site PERIPHERAL     Culture Result No growth after 5 days of incubation.    Narrative:      Per Hospital Policy: Only change Specimen Src: to \"Line\" if  specified by physician order.  Right Forearm/Arm            IMAGING:   DX-HAND 3+ LEFT   Final Result      1.  No acute fracture or dislocation.   2.  Scattered mild degenerative changes, increased from prior study. No definite erosive arthropathy.   3.  Osteopenia.   4.  Old ulnar styloid process fracture.      DX-CHEST-PORTABLE (1 VIEW)   Final Result      1.  Probable 12 mm right mid/upper lung zone mass. Suggest CT chest without contrast for further assessment      2.  Interstitial pulmonary edema or fibrosis      3.  Enlarged cardiac silhouette      4.  Left pleural effusion      CT-CTA UPPER EXT WITH & W/O-POST PROCESS LEFT   Final Result      1.  Status post brachiocephalic fistula in the left arm. No focal stenosis is identified.      2.  Extensive edema in the subcutaneous tissues of the visualized left lateral chest and arm.      EC-ECHOCARDIOGRAM COMPLETE W/O CONT   Final Result    "   US-ZAHRAA SINGLE LEVEL BILAT   Final Result      US-EXTREMITY ARTERY LOWER BILAT   Final Result      CT-EXTREMITY, UPPER W/O LEFT   Final Result      1.  There is diffuse subcutaneous edema of the imaged portions of the left upper extremity. There is also diffuse body wall edema in the visualized portions of the chest and abdomen.   2.  No focal fluid collection to suggest abscess.   3.  There is a small left pleural effusion and minimal fluid in the abdomen.   4.  There is postsurgical change of AV fistula. There is small vessel atherosclerosis.      US-EXTREMITY VENOUS UPPER UNILAT LEFT   Final Result      DX-CHEST-PORTABLE (1 VIEW)   Final Result         1.  Interstitial pulmonary parenchymal prominence suggest chronic underlying lung disease, component of interstitial edema and/or infiltrates not excluded.   2.  Small left pleural effusion   3.  Cardiomegaly   4.  Atherosclerosis            MEDS:  Current Facility-Administered Medications   Medication Last Admin   • patiromer (VELTASSA) powder for oral suspension 8.4 g 8.4 g at 07/12/22 1316   • melatonin tablet 5 mg 5 mg at 07/11/22 2319   • oxyCODONE immediate-release (ROXICODONE) tablet 2.5 mg 2.5 mg at 07/13/22 0509   • levothyroxine (SYNTHROID) tablet 25 mcg 25 mcg at 07/13/22 0510   • insulin GLARGINE (Lantus,Semglee) injection 5 Units at 07/12/22 1856    And   • insulin lispro (AdmeLOG,HumaLOG) injection 1 Units at 07/12/22 2140    And   • dextrose 50% (D50W) injection 25 g     • acetaminophen (TYLENOL) tablet 1,000 mg 1,000 mg at 07/13/22 0018   • polyethylene glycol/lytes (MIRALAX) PACKET 1 Packet 1 Packet at 07/13/22 0511    And   • senna-docusate (PERICOLACE or SENOKOT S) 8.6-50 MG per tablet 2 Tablet 2 Tablet at 07/13/22 0510    And   • bisacodyl (DULCOLAX) suppository 10 mg     • sevelamer carbonate (RENVELA) tablet 1,600 mg 1,600 mg at 07/12/22 1821   • lidocaine (LIDODERM) 5 % 2 Patch 2 Patch at 07/12/22 0945   • heparin injection 5,000 Units 5,000  Units at 07/13/22 0511   • atorvastatin (LIPITOR) tablet 20 mg 20 mg at 07/13/22 0510   • calcitRIOL (ROCALTROL) capsule 0.25 mcg 0.25 mcg at 07/13/22 0511   • ROPINIRole (REQUIP) tablet 0.25 mg 0.25 mg at 07/13/22 0510   • ondansetron (ZOFRAN) syringe/vial injection 4 mg 4 mg at 06/28/22 1018       PROBLEM LIST:  No problems updated.    ASSESSMENT/PLAN: 81 y.o. female with PMHx ESRD on HD MWF, HTN, HLD, and T2DM who was admitted on 6/24/2022 for left upper arm swelling and lower extremity edema.      # ESRD, on dialysis MWF  Nephrology following. Continue sevelamer and calcitriol. Renally dose medications.   -Continue dialysis   -CTM labs      # Delirium  -Resolved   -Appears to be at baseline mentation   - Likely 2/2 hospital delirium.  - Continue to avoid sedating drugs      #Hypothyroid  -TSH high, T4 low   -Continue with 25mcg levothyroxine daily       #Left hand pain   -Swelling and redness of left hand  -Xray negative for acute fracture, but shows chronic changes      #Left upper extremity edema   Chronic, at same side of AV fistula, which is functioning well.   -Likely multifactorial: poor EF, lymphedema,outflow stenosis.   -CTA without evidence of central stenosis.   -Vascular consulted and recommended supportive care - wrapping arm with ace bandage.      # goals of care  Palliative care has seen patient and patient still wants to be a Full Code.   - Plan to have further discussions when appropriate     # HTN  -Continue to hold BP medications and lasix   -Fluids boluses PRN for hypotension       # T2DM  -Continue lantus  5 units QHS   -ISS   -Hypoglycemia protocol      #Chronic lower extremity pain  Likely multi-factorial (PVD, lymphedema, poor EF). Some residual from old hip fractures but the significant pain seems to come from her bilateral lower legs. Pt not a candidate for surgery, unless she desires amputations. Please see vascular note for more details.  - Oxycodone 2.5mg for break through pain      #  aortic stenosis, severe  # HFrEF  Cardiology consulted, and not candidate for intervention.      Core Measures:  DVT PPX: heparin  ABX: none  Fluids: PO  PCP: Lon  CODE STATUS: FULL CODE     Disposition: Medically cleared for discharge. Pending placement to SNF - challenging placement as transport to dialysis is not always facilitated.

## 2022-07-13 NOTE — CARE PLAN
The patient is Stable - Low risk of patient condition declining or worsening    Shift Goals  Clinical Goals: pain management  Patient Goals: pain management  Family Goals: N/A    Progress made toward(s) clinical / shift goals:  medicated for pain per MAR. Elevated left arm and hand with pillow.     Patient is not progressing towards the following goals:

## 2022-07-13 NOTE — PROGRESS NOTES
3 1/2hr HD started @ 0814 and completed @ 1148,tx well tolerated,soft bp during tx,asympyomatic.Net UF = 2000ml,RUAAVF + B/T,cannulation sites covered with DD,CDI,report given to Fidelia Sifuentes RN.

## 2022-07-13 NOTE — PROGRESS NOTES
[facility] -  PROGRESS NOTE               Author: Janina Bender M.D. Date & Time: 7/13/2022  9:32 AM     HPI:  81yoF with PMH significant for ESRD on HD MWF via left arm AVF, HTN, DM II, Anemia secondary to CKD, CKD Bone mineral disorder, admitted with increased edema and weakness and having missed her last last 2-3 outpt HD treatments. Pt is very lethargic at this time and unable to provide much history, majority of the history was obtained through review of the medical record and discussion with primary svc. Pt had reported increased LE edema and fatigue and weakness and worsening of her left arm edema so she came to the ED for evaluation. She apparently has missed her last 2-3 outpt dialysis treatments. Per regular outpt Nephrologist Dr. Gates, she has been non-compliant with her fluid restriction and was told that she needed 4x/week dialysis until her volume status could be optimized but she was non-compliant with that recommendation. She has edema of her left arm where her AVF is located and there is concern for a central stenosis that could be the etiology. She had an appointment at the outpt access center to evaluate for central stenosis and undergo angioplasty if needed on 6/9/22 but pt did not go to the appointment. She has not had any other procedures done to the arm in the past couple of months. She had a left arm us done today that showed no DVT and patent AVF and soft tissue edema. She apparently received pain medication fentanyl and dilaudid as well as benadryl earlier today and she is very drowsy.      DAILY NEPHROLOGY SUMMARY:  6/25: rapid response overnight for severe leg pain, pt very lethargic today, moans with palpation of legs, arouses but does not answer questions and falls back asleep, tolerated HD yest with 2.5L UF, BP stable overnight but low this am  6/26: No events, BP low overnight and this am, more alert, reports pain in legs for the past 3 weeks, denies  any CP/SOB/Abd pain, reports left arm edema a little better but no pain in left arm   6/27: Pt resting in bed, subdued, Bps soft, on 4L supp O2 via NC, labs reviewed, due for HD  6/28: pt laying in bed, sleepy, c/o pain in legs, moaning, vascular assessed and does not recommend any intervention on legs or AVF, tolerated iHD yesterday with UF:2.8L  6/29: Pt in bed, fatigued, no complaints.  VSS 1L NC.    6/30: no new complaints, no overnight events. Tolerated hd yesterday, UF 3.5L , she is in negative balance.   7/1: Resting in bed, no complaints.  VSS 2L NC. No new labs today.   7/2: Pt sitting up in bed, confused, CNA helping her with breakfast meal, iHD yesterday with 1.5L net UF limited by muscle cramping, labs reviewed, VSS on 1L NC O2  7/3: Pt slumped in bed, somnolent, medical floor status, VSS on RA  7/4: No new overnight renal events. S/p HD yesterday and tolerated well. Net UF was 1.5 L.  7/5: S/p HD yesterday with net UF of 2 L. No new overnight renal events.   7/6: No new overnight renal events.   7/7: S/p HD yesterday with net UF of 2.2L and tolerated well. No new overnight renal events.   7/8: No new overnight renal events.   7/9: S/p HD yesterday with net UF of 3L and tolerated well. No new overnight renal events.   7/10: No new overnight renal events. K+ 6.9 this am.  7/11: No events, HD yest for hyperkalemia, K 5.9 this am, denies any CP/SOB but reports leg pain, BP stable, seen and examined on HD this am--VSS see dialysis flowsheet for full details  7/12: No events, awake and alert, c/o bilateral leg pain, denies any CP/SOB, BP stable  7/13: No events, seen on dialysis this am, still c/o leg pain, denies any CP/SOB, BP stable    REVIEW OF SYSTEMS:    10 point ROS reviewed and is as per HPI/daily summary or otherwise negative    PMH/PSH/SH/FH:   Reviewed and unchanged since admission note    CURRENT MEDICATIONS:   Reviewed from admission to present day    VS:  /62   Pulse 80   Temp 36.6 °C  "(97.8 °F) (Temporal)   Resp 17   Ht 1.626 m (5' 4\")   Wt 63.1 kg (139 lb 1.8 oz)   SpO2 98%   BMI 23.88 kg/m²     Physical Exam  Vitals and nursing note reviewed.   Constitutional:       Appearance: She is ill-appearing.   HENT:      Head: Normocephalic and atraumatic.   Eyes:      General: No scleral icterus.  Cardiovascular:      Rate and Rhythm: Normal rate and regular rhythm.   Pulmonary:      Effort: Pulmonary effort is normal. No respiratory distress.      Breath sounds: Examination of the right-lower field reveals decreased breath sounds. Examination of the left-lower field reveals decreased breath sounds. Decreased breath sounds present.   Abdominal:      General: Bowel sounds are normal. There is no distension.      Palpations: Abdomen is soft.   Musculoskeletal:         General: Swelling (left arm) present. No deformity.      Right lower leg: Edema present.      Left lower leg: Edema present.   Skin:     General: Skin is warm and dry.      Findings: No rash.   Neurological:      General: No focal deficit present.      Mental Status: She is alert and oriented to person, place, and time.   Psychiatric:         Mood and Affect: Mood normal.         Behavior: Behavior normal.           Fluids:  In: 360 [P.O.:360]  Out: -     LABS:  Recent Labs     07/11/22  0812 07/12/22  0956   SODIUM 132* 133*   POTASSIUM 5.9* 5.4   CHLORIDE 93* 92*   CO2 26 27   GLUCOSE 132* 133*   BUN 37* 32*   CREATININE 4.07* 3.48*   CALCIUM 8.7 9.1     Reviewed.    IMAGING:   All imaging reviewed from admission to present day    IMPRESSION:  # ESRD, dependent on HD              - HD via LUE AVF, missed last 2-3 outpt treatments  # Hyperkalemia--mild  # Fluid Overload, improving              - Secondary to non-compliance with fluid restriction on HD and non-compliance with treatments  # Left arm edema, slow improvement              - CTA upper ext 6/26 w/o e/o focal stenosis + extensive edema             - AVF patent on left arm us " and no DVT             - Pt no showed to outpt appt to evaluate              - Vascular does not recommend intervention   # Altered Mental Status, fluctuates             - Monitor  # HTN, variable control, stable at this time             - Goal BP < 140/90             - Not on BP meds   # Anemia of CKD, at goal              - Goal Hgb 10-11             - Iron 27             -TIBC 142             -%Sat 19             - Ferritin 1419  # CKD-MBD             - Ca 9.2             - PO4 6.4             - Managed at OP unit             - On calcitriol and sevelamer   # DM II--management per primary svc  # Leg Pain--chronic skin changes and subcutaneous tissue firm to touch             - Unclear etiology             - Vascular does not recommend any intervention  # Leukocytosis--check cultures if any fevers     PLAN:  - HD today (WED)   - Continue qMWF iHD schedule  - UF as tolerated  - No current need for ROGELIO  - Renal diet  - Continue phos binders WM  - Continue off of all BP meds and diuretics for now  - PRN NS boluses for symptomatic hypotension  - Limit sedating meds if possible  - No dietary protein restrictions  - Dose all meds per ESRD  - Outpt access center appointment rescheduled  - Palliative care has consulted; pt is still a full code  - Pt at high risk for further morbidity/mortality  - DC planning underway for SNF  - Continue patiromer daily to help with hyperkalemia

## 2022-07-13 NOTE — DISCHARGE PLANNING
Agency/Facility Name: [facility]  Spoke To: Milagros  Outcome: Checking to see if they can accommodate the dialysis time.

## 2022-07-13 NOTE — PROGRESS NOTES
Gustavo Anderson from Lab called with critical result of 6.8 at 1318. Critical lab result read back to Carlie WADE.   Dr. Chaudhry notified of critical lab result at 1328.  Critical lab result read back by Dr. Chaudhry.

## 2022-07-14 NOTE — DISCHARGE PLANNING
Agency/Facility Name: [facility]  Outcome: DPA left a voicemail regarding accomodation for dialysis time for Pt. DPA requesting a call back.     RN CM notified.     1158:  Agency/Facility Name: [facility]  Spoke To: Milagros   Outcome: DPA was notified SNF is still checking on if they can accommodate the dialysis time, since they have a couple other Pt's with conflicting times. Milagros to check and call back.     1000:  Agency/Facility Name: [facility]   Spoke To: Milagros  Outcome: WAN was notified SNF will start auth for Pt since their payor source is [employer]. SNF will most likely be able to take Pt for HD bed and time but Milagros will call back regarding acceptance.

## 2022-07-14 NOTE — THERAPY
"Physical Therapy   Daily Treatment     Patient Name: Jannet Bruno  Age:  81 y.o., Sex:  female  Medical Record #: [id number]  Today's Date: 7/14/2022     Precautions  Precautions: Fall Risk;Swallow Precautions ( See Comments)  Comments: LUE swelling, pain    Assessment    PT session attempted. Pt initially verbally agreeable to sitting EOB. When time to initiate mobility, pt refusing stating 2/2 L hand pain. Edu on mobility without use of LUE and negative sequellae of prolonged bed rest. Pt continuing to refuse. RN present- noted pt more lethargic and confused with questioning repeating multiple times \"I don't know you\" Despite orienting pt to this therapist x3. Will re-attempt mobility as pt agreeable in future.     Plan    Continue current treatment plan.    DC Equipment Recommendations: Unable to determine at this time  Discharge Recommendations: Recommend post-acute placement for additional physical therapy services prior to discharge home      Subjective    \"I don't know you. I don't know you.\"      Objective     07/14/22 0908   Vitals   O2 (LPM) 3   O2 Delivery Device Nasal Cannula   Pain 0 - 10 Group   Therapist Pain Assessment Prior to Activity;Nurse Notified  (c/o LUE pain not rated, not agreeable to mobility)   Cognition    Cognition / Consciousness X   Orientation Level Oriented x 4   Level of Consciousness Alert   Attention Impaired   Comments Pt very lethargic, appeared confused. Repeating multiple times \"I don't know you\" despite orienting pt to this therapist x3. RN present and aware of change in cognition from previous session.   Gait Analysis   Gait Level Of Assist Refused   Bed Mobility    Supine to Sit Refused   Functional Mobility   Sit to Stand Refused   Bed, Chair, Wheelchair Transfer Refused   Short Term Goals    Short Term Goal # 1 Pt will perform bed mobility with supervision in 6 visits with HOB flat, no rail.   Goal Outcome # 1 goal not met   Short Term Goal # 2 Pt will transfer with " supervision in 6 visits to improve functional indep.   Goal Outcome # 2 Goal not met   Short Term Goal # 3 Pt will ambulate x 125 feet using FWW with supervision in 6 visits to improve functional indep.   Goal Outcome # 3 Goal not met   Education Group   Education Provided Role of Physical Therapist   Role of Physical Therapist Patient Response Patient;Acceptance;Explanation;No Learning Evidence   Additional Comments Pt refusing mobility despite education regarding negative sequellae of bed rest. Attempted edu on BLE therex, pt still refusing   Anticipated Discharge Equipment and Recommendations   DC Equipment Recommendations Unable to determine at this time   Discharge Recommendations Recommend post-acute placement for additional physical therapy services prior to discharge home   Interdisciplinary Plan of Care Collaboration   IDT Collaboration with  Nursing   Patient Position at End of Therapy In Bed;Bed Alarm On;Call Light within Reach;Tray Table within Reach;Phone within Reach  (waffle overlay re-inflated)   Collaboration Comments RN updated   Session Information   Date / Session Number  7/14- 5 (2/3, 7/17) Edu only 7/14

## 2022-07-14 NOTE — CARE PLAN
The patient is Watcher - Medium risk of patient condition declining or worsening    Shift Goals  Clinical Goals: comfort  Patient Goals: rest/comfort  Family Goals: N/A    Progress made toward(s) clinical / shift goals:  Pt has been resting. Lidocaine patches applied for pain.     Patient is not progressing towards the following goals:

## 2022-07-14 NOTE — CARE PLAN
The patient is Stable - Low risk of patient condition declining or worsening    Shift Goals  Clinical Goals: pain management  Patient Goals: pain management  Family Goals: N/A    Progress made toward(s) clinical / shift goals:  medicated for pain per MAR. Encouraged to reposition.     Patient is not progressing towards the following goals:

## 2022-07-14 NOTE — PROGRESS NOTES
[facility] -  PROGRESS NOTE               Author: Janina eBnder M.D. Date & Time: 7/14/2022  12:27 PM     HPI:  81yoF with PMH significant for ESRD on HD MWF via left arm AVF, HTN, DM II, Anemia secondary to CKD, CKD Bone mineral disorder, admitted with increased edema and weakness and having missed her last last 2-3 outpt HD treatments. Pt is very lethargic at this time and unable to provide much history, majority of the history was obtained through review of the medical record and discussion with primary svc. Pt had reported increased LE edema and fatigue and weakness and worsening of her left arm edema so she came to the ED for evaluation. She apparently has missed her last 2-3 outpt dialysis treatments. Per regular outpt Nephrologist Dr. Gates, she has been non-compliant with her fluid restriction and was told that she needed 4x/week dialysis until her volume status could be optimized but she was non-compliant with that recommendation. She has edema of her left arm where her AVF is located and there is concern for a central stenosis that could be the etiology. She had an appointment at the outpt access center to evaluate for central stenosis and undergo angioplasty if needed on 6/9/22 but pt did not go to the appointment. She has not had any other procedures done to the arm in the past couple of months. She had a left arm us done today that showed no DVT and patent AVF and soft tissue edema. She apparently received pain medication fentanyl and dilaudid as well as benadryl earlier today and she is very drowsy.      DAILY NEPHROLOGY SUMMARY:  6/25: rapid response overnight for severe leg pain, pt very lethargic today, moans with palpation of legs, arouses but does not answer questions and falls back asleep, tolerated HD yest with 2.5L UF, BP stable overnight but low this am  6/26: No events, BP low overnight and this am, more alert, reports pain in legs for the past 3 weeks, denies  any CP/SOB/Abd pain, reports left arm edema a little better but no pain in left arm   6/27: Pt resting in bed, subdued, Bps soft, on 4L supp O2 via NC, labs reviewed, due for HD  6/28: pt laying in bed, sleepy, c/o pain in legs, moaning, vascular assessed and does not recommend any intervention on legs or AVF, tolerated iHD yesterday with UF:2.8L  6/29: Pt in bed, fatigued, no complaints.  VSS 1L NC.    6/30: no new complaints, no overnight events. Tolerated hd yesterday, UF 3.5L , she is in negative balance.   7/1: Resting in bed, no complaints.  VSS 2L NC. No new labs today.   7/2: Pt sitting up in bed, confused, CNA helping her with breakfast meal, iHD yesterday with 1.5L net UF limited by muscle cramping, labs reviewed, VSS on 1L NC O2  7/3: Pt slumped in bed, somnolent, medical floor status, VSS on RA  7/4: No new overnight renal events. S/p HD yesterday and tolerated well. Net UF was 1.5 L.  7/5: S/p HD yesterday with net UF of 2 L. No new overnight renal events.   7/6: No new overnight renal events.   7/7: S/p HD yesterday with net UF of 2.2L and tolerated well. No new overnight renal events.   7/8: No new overnight renal events.   7/9: S/p HD yesterday with net UF of 3L and tolerated well. No new overnight renal events.   7/10: No new overnight renal events. K+ 6.9 this am.  7/11: No events, HD yest for hyperkalemia, K 5.9 this am, denies any CP/SOB but reports leg pain, BP stable, seen and examined on HD this am--VSS see dialysis flowsheet for full details  7/12: No events, awake and alert, c/o bilateral leg pain, denies any CP/SOB, BP stable  7/13: No events, seen on dialysis this am, still c/o leg pain, denies any CP/SOB, BP stable  7/14: No events, feels tired, c/o leg pain, denies any CP/SOB, tolerated HD yest with 2L UF    REVIEW OF SYSTEMS:    10 point ROS reviewed and is as per HPI/daily summary or otherwise negative    PMH/PSH/SH/FH:   Reviewed and unchanged since admission note    CURRENT  "MEDICATIONS:   Reviewed from admission to present day    VS:  /64   Pulse 80   Temp 36.3 °C (97.3 °F) (Temporal)   Resp 14   Ht 1.626 m (5' 4\")   Wt 63.1 kg (139 lb 1.8 oz)   SpO2 98%   BMI 23.88 kg/m²     Physical Exam  Vitals and nursing note reviewed.   Constitutional:       Appearance: She is ill-appearing.   HENT:      Head: Normocephalic and atraumatic.   Eyes:      General: No scleral icterus.  Cardiovascular:      Rate and Rhythm: Normal rate and regular rhythm.   Pulmonary:      Effort: Pulmonary effort is normal. No respiratory distress.      Breath sounds: Examination of the right-lower field reveals decreased breath sounds. Examination of the left-lower field reveals decreased breath sounds. Decreased breath sounds present.   Abdominal:      General: Bowel sounds are normal. There is no distension.      Palpations: Abdomen is soft.   Musculoskeletal:         General: Swelling (left arm) present. No deformity.      Right lower leg: Edema present.      Left lower leg: Edema present.   Skin:     General: Skin is warm and dry.      Findings: No rash.   Neurological:      General: No focal deficit present.      Mental Status: She is alert and oriented to person, place, and time.   Psychiatric:         Mood and Affect: Mood normal.         Behavior: Behavior normal.           Fluids:  In: 1220 [P.O.:720; Dialysis:500]  Out: 2500     LABS:  Recent Labs     07/12/22  0956 07/13/22  0815 07/14/22  1113   SODIUM 133* 130* 132*   POTASSIUM 5.4 6.8* 5.3   CHLORIDE 92* 91* 91*   CO2 27 24 29   GLUCOSE 133* 167* 160*   BUN 32* 49* 35*   CREATININE 3.48* 4.54* 3.62*   CALCIUM 9.1 8.9 9.1     Reviewed.    IMAGING:   All imaging reviewed from admission to present day    IMPRESSION:  # ESRD, dependent on HD              - HD via LUE AVF, missed last 2-3 outpt treatments  # Hyperkalemia--mild  # Fluid Overload, improving              - Secondary to non-compliance with fluid restriction on HD and non-compliance " with treatments  # Left arm edema, slow improvement              - CTA upper ext 6/26 w/o e/o focal stenosis + extensive edema             - AVF patent on left arm us and no DVT             - Pt no showed to outpt appt to evaluate              - Vascular does not recommend intervention   # Altered Mental Status, waxes/wanes             - Monitor  # HTN, variable control, stable at this time             - Goal BP < 140/90             - Not on BP meds   # Anemia of CKD, at goal              - Goal Hgb 10-11             - Iron 27             -TIBC 142             -%Sat 19             - Ferritin 1419  # CKD-MBD             - Ca 9.2             - PO4 6.4             - Managed at OP unit             - On calcitriol and sevelamer   # DM II--management per primary svc  # Leg Pain--chronic skin changes and subcutaneous tissue firm to touch             - Unclear etiology             - Vascular does not recommend any intervention  # Leukocytosis--check cultures if any fevers     PLAN:  - No HD today ([facility])   - Continue qMWF iHD schedule  - UF as tolerated  - No current need for ROGELIO  - Renal diet  - Continue phos binders WM  - Continue off of all BP meds and diuretics for now  - PRN NS boluses for symptomatic hypotension  - Limit sedating meds if possible  - No dietary protein restrictions  - Dose all meds per ESRD  - Outpt access center appointment rescheduled  - Palliative care has consulted; pt is still a full code  - Pt at high risk for further morbidity/mortality  - DC planning underway for SNF  - Continue patiromer daily to help with hyperkalemia

## 2022-07-14 NOTE — PROGRESS NOTES
[facility] family medicine transferred care to [facility] service starting 0700 today.

## 2022-07-14 NOTE — THERAPY
Speech Language Pathology  Daily Treatment     Patient Name: Jannet Bruno  Age:  81 y.o., Sex:  female  Medical Record #: [id number]  Today's Date: 7/14/2022    Precautions  Precautions: Fall Risk, Swallow Precautions (See Comments)  Comments: LUE swelling, pain    Assessment  Patient seen this date for dysphagia tx session. Patient currently on regular diet w/ thin liquids and per RN, appears to be tolerating diet without difficulty. Patient and RN both reported patient had globus sensation on large pill today, but this cleared w/ thin liquid wash and additional large pills were given whole w/ puree w/out difficulty. Patient reported like of current diet much more than previous and appeared appreciative of upgrade. Patient consumed PO trials of soft solids, dry solids, and thin liquids via straw. Patient fed herself independently and had no s/sx of aspiration or globus sensation. Mastication was adequate and oral containment was sufficient.     Recommend patient continue regular diet w/ thin liquids. Float meds whole in puree if needed. SLP will no longer follow. Please reconsult if needed.     Plan  Discharge secondary to goals met.    Discharge Recommendations: SLP treatment completed. The patient is not being actively followed for skilled therapy services at this time, however may be seen if requested by attending provider for 1 additional visit within 30 days to address any discharge needs, or if there is a change in status.     Objective     07/14/22 1620   Precautions   Precautions Fall Risk;Swallow Precautions ( See Comments)   Vitals   O2 (LPM) 3   O2 Delivery Device Nasal Cannula   Pain 0 - 10 Group   Therapist Pain Assessment Post Activity Pain Same as Prior to Activity;0;Nurse Notified   Dysphagia    Positioning / Behavior Modification Self Monitoring;Modulate Rate or Bite Size   Other Treatments PO trials of soft solids, dry solids, and thins via straw   Diet / Liquid Recommendation Regular  (7);Thin (0)   Nutritional Liquid Intake Rating Scale Non thickened beverages   Nutritional Food Intake Rating Scale Total oral diet with no restrictions   Nursing Communication Swallow Precaution Sign Posted at Head of Bed   Skilled Intervention Verbal Cueing;Compensatory Strategies   Recommended Route of Medication Administration   Medication Administration  Whole with Liquid Wash   Short Term Goals   Short Term Goal # 1 B  NEW 7/11: Patient will consume regular diet w/ thin liquids with no overt s/sx of aspiration.   Goal Outcome  # 1 B Goal met   Education Group   Education Provided Dysphagia   Dysphagia Patient Response Patient;Acceptance;Explanation;Verbal Demonstration   Anticipated Discharge Needs   Discharge Recommendations Anticipate that the patient will have no further speech therapy needs after discharge from the hospital

## 2022-07-14 NOTE — PROGRESS NOTES
Received report and assumed care at shift change. A&O x 4 , cooperative with cares. Medicated for pain per MAR. Fall precautions in place, bed locked and in lowest position. Needs attended to.

## 2022-07-15 PROBLEM — E03.9 HYPOTHYROIDISM: Status: ACTIVE | Noted: 2022-01-01

## 2022-07-15 PROBLEM — R41.0 DELIRIUM: Status: ACTIVE | Noted: 2022-01-01

## 2022-07-15 NOTE — THERAPY
"Occupational Therapy  Daily Treatment     Patient Name: Jannet Bruno  Age:  81 y.o., Sex:  female  Medical Record #: [id number]  Today's Date: 7/15/2022     Precautions  Precautions: Fall Risk, Swallow Precautions ( See Comments)  Comments: LUE swelling, pain    Assessment    Pt was seen for OT treatment. Pt agreeable but needed encouragement to fully participate. Pt focuses on LUE and guards it well. Pt fearful that someone will bump into arm.  Pt required Mod A for sit to stand from chair. With fatigue pt demo Mod A X2 for sit to stand from BSC. Pt had a BM in chair unaware. Pt looked down and saw BM in and on shoes. Pt demo Min A for changing gown . Pt tearful off and on re: her situation. Mod A for LB dressing using reacher and verbal cues for sequencing and problem solving. Pt unable to remove shoes requiring Max A.  Pt demo Mod A for BSC transfers and Total A for full  toilet hygiene. Pt up in chair at end of session demo Min A for H/G tasks with extended time . Pt had HD this AM and appeared more fatigued than usual. Pt left up in chair with alarm on. Pt up in chair to eat lunch. RN present and updated on OT treatment findings and recommendations . Pt's  not present for family training. Will continue per OT plan.         Plan    Continue current treatment plan.    DC Equipment Recommendations: (P) Unable to determine at this time  Discharge Recommendations: (P) Recommend post-acute placement for additional occupational therapy services prior to discharge home    Subjective    \"I guess I pooped all over and I made a mess\". BM all over shoes.      Objective       07/15/22 1405   Cognition    Cognition / Consciousness X   Orientation Level Oriented x 4  (forgetful at times)   Level of Consciousness Alert   Safety Awareness Impaired   New Learning Impaired   Attention Impaired   Comments Pt pleasant, cooperative and tearful at times.   Passive ROM Upper Body   Comments RUE WFL, LUE not fully assessed " due to pain   Active ROM Upper Body   Active ROM Upper Body  X   Dominant Hand Right   Comments LUE limited due to pain and edema; RUE is weak but with extended time can do simple self care tasks.   Strength Upper Body   Upper Body Strength  X   Gross Strength Generalized Weakness, Equal Bilaterally.    Comments LUE weaker than RUE   Sensation Upper Body   Upper Extremity Sensation  X   Comments LUE tingling and burning.   Other Treatments   Other Treatments Provided Psychosocial intervention addressed. Needed reminder cues to correct sitting posture with all activity.  .   Balance   Sitting Balance (Static) Fair   Sitting Balance (Dynamic) Fair -   Standing Balance (Static) Fair -   Standing Balance (Dynamic) Poor +   Weight Shift Sitting Fair   Weight Shift Standing Poor   Comments with FWW   Bed Mobility    Comments NT  Pt up in chair prior to OT and post OT session.   Activities of Daily Living   Eating Supervision   Grooming Minimal Assist;Seated   Bathing   (declined to attempt.)   Upper Body Dressing Minimal Assist   Lower Body Dressing Moderate Assist   Toileting Total Assist  (Pt was incontinent while up in chair unaware. Total A for toilet hygiene.)   Skilled Intervention Verbal Cuing;Tactile Cuing;Sequencing;Postural Facilitation;Compensatory Strategies   Comments Pt needed extended time to do most tasks. Pt will need assist for most self care tasks at this time. Pt stated her  can help her but no SO present for family training.   Functional Mobility   Sit to Stand Moderate Assist   Bed, Chair, Wheelchair Transfer Moderate Assist   Toilet Transfers Moderate Assist  (for BSC transfers)   Transfer Method Stand Step   Mobility with FWW   Comments with FWW   Patient / Family Goals   Patient / Family Goal #1 to get better   Goal #1 Outcome Progressing slower than expected   Short Term Goals   Short Term Goal # 1 LB dressing with SPV   Goal Outcome # 1 Progressing slower than expected   Short Term Goal #  2 seated G/h with SPV for >20 min   Goal Outcome # 2 Progressing slower than expected   Short Term Goal # 3 BSC txf with min A   Goal Outcome # 3 Progressing slower than expected   Anticipated Discharge Equipment and Recommendations   DC Equipment Recommendations Unable to determine at this time   Discharge Recommendations Recommend post-acute placement for additional occupational therapy services prior to discharge home   Interdisciplinary Plan of Care Collaboration   IDT Collaboration with  Nursing   Collaboration Comments RN updated

## 2022-07-15 NOTE — PROGRESS NOTES
[facility] -  PROGRESS NOTE               Author: Janina Bender M.D. Date & Time: 7/15/2022  7:10 AM     HPI:  81yoF with PMH significant for ESRD on HD MWF via left arm AVF, HTN, DM II, Anemia secondary to CKD, CKD Bone mineral disorder, admitted with increased edema and weakness and having missed her last last 2-3 outpt HD treatments. Pt is very lethargic at this time and unable to provide much history, majority of the history was obtained through review of the medical record and discussion with primary svc. Pt had reported increased LE edema and fatigue and weakness and worsening of her left arm edema so she came to the ED for evaluation. She apparently has missed her last 2-3 outpt dialysis treatments. Per regular outpt Nephrologist Dr. Gates, she has been non-compliant with her fluid restriction and was told that she needed 4x/week dialysis until her volume status could be optimized but she was non-compliant with that recommendation. She has edema of her left arm where her AVF is located and there is concern for a central stenosis that could be the etiology. She had an appointment at the outpt access center to evaluate for central stenosis and undergo angioplasty if needed on 6/9/22 but pt did not go to the appointment. She has not had any other procedures done to the arm in the past couple of months. She had a left arm us done today that showed no DVT and patent AVF and soft tissue edema. She apparently received pain medication fentanyl and dilaudid as well as benadryl earlier today and she is very drowsy.      DAILY NEPHROLOGY SUMMARY:  6/25: rapid response overnight for severe leg pain, pt very lethargic today, moans with palpation of legs, arouses but does not answer questions and falls back asleep, tolerated HD yest with 2.5L UF, BP stable overnight but low this am  6/26: No events, BP low overnight and this am, more alert, reports pain in legs for the past 3 weeks, denies  any CP/SOB/Abd pain, reports left arm edema a little better but no pain in left arm   6/27: Pt resting in bed, subdued, Bps soft, on 4L supp O2 via NC, labs reviewed, due for HD  6/28: pt laying in bed, sleepy, c/o pain in legs, moaning, vascular assessed and does not recommend any intervention on legs or AVF, tolerated iHD yesterday with UF:2.8L  6/29: Pt in bed, fatigued, no complaints.  VSS 1L NC.    6/30: no new complaints, no overnight events. Tolerated hd yesterday, UF 3.5L , she is in negative balance.   7/1: Resting in bed, no complaints.  VSS 2L NC. No new labs today.   7/2: Pt sitting up in bed, confused, CNA helping her with breakfast meal, iHD yesterday with 1.5L net UF limited by muscle cramping, labs reviewed, VSS on 1L NC O2  7/3: Pt slumped in bed, somnolent, medical floor status, VSS on RA  7/4: No new overnight renal events. S/p HD yesterday and tolerated well. Net UF was 1.5 L.  7/5: S/p HD yesterday with net UF of 2 L. No new overnight renal events.   7/6: No new overnight renal events.   7/7: S/p HD yesterday with net UF of 2.2L and tolerated well. No new overnight renal events.   7/8: No new overnight renal events.   7/9: S/p HD yesterday with net UF of 3L and tolerated well. No new overnight renal events.   7/10: No new overnight renal events. K+ 6.9 this am.  7/11: No events, HD yest for hyperkalemia, K 5.9 this am, denies any CP/SOB but reports leg pain, BP stable, seen and examined on HD this am--VSS see dialysis flowsheet for full details  7/12: No events, awake and alert, c/o bilateral leg pain, denies any CP/SOB, BP stable  7/13: No events, seen on dialysis this am, still c/o leg pain, denies any CP/SOB, BP stable  7/14: No events, feels tired, c/o leg pain, denies any CP/SOB, tolerated HD yest with 2L UF  7/15: No events, BP stable, seen on HD, still complaining of leg pain, no CP/SOB    REVIEW OF SYSTEMS:    10 point ROS reviewed and is as per HPI/daily summary or otherwise  "negative    PMH/PSH/SH/FH:   Reviewed and unchanged since admission note    CURRENT MEDICATIONS:   Reviewed from admission to present day    VS:  /51   Pulse 83   Temp 36.7 °C (98.1 °F) (Temporal)   Resp 14   Ht 1.626 m (5' 4\")   Wt 63.1 kg (139 lb 1.8 oz)   SpO2 99%   BMI 23.88 kg/m²     Physical Exam  Vitals and nursing note reviewed.   Constitutional:       Appearance: She is ill-appearing.   HENT:      Head: Normocephalic and atraumatic.   Eyes:      General: No scleral icterus.  Cardiovascular:      Rate and Rhythm: Normal rate and regular rhythm.   Pulmonary:      Effort: Pulmonary effort is normal. No respiratory distress.      Breath sounds: Examination of the right-lower field reveals decreased breath sounds. Examination of the left-lower field reveals decreased breath sounds. Decreased breath sounds present.   Abdominal:      General: Bowel sounds are normal. There is no distension.      Palpations: Abdomen is soft.   Musculoskeletal:         General: Swelling (left arm) present. No deformity.      Right lower leg: Edema present.      Left lower leg: Edema present.   Skin:     General: Skin is warm and dry.      Findings: No rash.   Neurological:      General: No focal deficit present.      Mental Status: She is alert and oriented to person, place, and time.   Psychiatric:         Mood and Affect: Mood normal.         Behavior: Behavior normal.           Fluids:  In: 720 [P.O.:720]  Out: -     LABS:  Recent Labs     07/12/22  0956 07/13/22  0815 07/14/22  1113   SODIUM 133* 130* 132*   POTASSIUM 5.4 6.8* 5.3   CHLORIDE 92* 91* 91*   CO2 27 24 29   GLUCOSE 133* 167* 160*   BUN 32* 49* 35*   CREATININE 3.48* 4.54* 3.62*   CALCIUM 9.1 8.9 9.1     Reviewed.    IMAGING:   All imaging reviewed from admission to present day    IMPRESSION:  # ESRD, dependent on HD              - HD via LUE AVF, missed last 2-3 outpt treatments  # Hyperkalemia--mild  # Fluid Overload, improving              - Secondary " to non-compliance with fluid restriction on HD and non-compliance with treatments  # Left arm edema, slow improvement              - CTA upper ext 6/26 w/o e/o focal stenosis + extensive edema             - AVF patent on left arm us and no DVT             - Pt no showed to outpt appt to evaluate              - Vascular does not recommend intervention   # Altered Mental Status, waxes/wanes             - Monitor  # HTN, variable control, stable at this time             - Goal BP < 140/90             - Not on BP meds   # Anemia of CKD, at goal              - Goal Hgb 10-11             - Iron 27             -TIBC 142             -%Sat 19             - Ferritin 1419  # CKD-MBD             - Ca 9.2             - PO4 4.4             - Managed at OP unit             - On calcitriol and sevelamer   # DM II--management per primary svc  # Leg Pain--chronic skin changes and subcutaneous tissue firm to touch             - Unclear etiology             - Vascular does not recommend any intervention  # Leukocytosis--check cultures if any fevers     PLAN:  - HD today ([facility])   - Continue qMWF iHD schedule  - UF as tolerated  - No current need for ROGELIO  - Renal diet  - Continue phos binders WM  - Continue off of all BP meds and diuretics for now  - PRN NS boluses for symptomatic hypotension  - Limit sedating meds if possible  - No dietary protein restrictions  - Dose all meds per ESRD  - Outpt access center appointment rescheduled  - Palliative care has consulted; pt is still a full code  - Pt at high risk for further morbidity/mortality  - DC planning underway for SNF  - Continue patiromer daily to help with control of hyperkalemia

## 2022-07-15 NOTE — ASSESSMENT & PLAN NOTE
HD stopped for comfort care measures.    Placement to group home pending.    Discussed with son at bedside

## 2022-07-15 NOTE — ASSESSMENT & PLAN NOTE
- SNF placement being pursued.  Placement challenging due to need for transportation back and forth to hemodialysis.  CM/SW on board.

## 2022-07-15 NOTE — PROGRESS NOTES
[facility]:    Hemodialysis treatment ordered today per Dr Bender x 3.5 hours. Treatment initiated at 0734, ended at 1104.   Pt A/Ox3, fatigued, arouses easily to voice and touch.    Patient tolerated HD tx, Pt had large soft BM post tx, cleaned up and linen change done, Pt stable. ; see e-flow sheet for details.     Net UF 3,000 mL.     Needles removed from access site. Dressings applied and sites held x 10 minutes; verified no bleeding. Positive bruit/thrill post tx. Staff RN to monitor AVF for breakthrough bleeding. Should breakthrough bleeding occur, staff RN to apply pressure to access sites until bleeding resolved. Notify Dialysis and Nephrologist for follow-up.    Report given to Primary RN.

## 2022-07-15 NOTE — CARE PLAN
The patient is Stable - Low risk of patient condition declining or worsening    Shift Goals  Clinical Goals: pain management  Patient Goals: rest/comfort  Family Goals: N/A    Progress made toward(s) clinical / shift goals:  medicated for pain per MAR. Patient able to reposition self.     Patient is not progressing towards the following goals:

## 2022-07-15 NOTE — ASSESSMENT & PLAN NOTE
Met with her and Ethics at bedside, agreeable to DNAR in setting of HFrEF and AS not a candidate for valve  POLST completed  Hospice consulted, Hospice Liaison to meet with family 8/28

## 2022-07-15 NOTE — ASSESSMENT & PLAN NOTE
- Chronic, at the same site of AV fistula which is functioning well.  -Multifactorial: Poor EF, lymphedema, outflow stenosis.  CTA showed no evidence of central stenosis.  Vascular surgery recommended supportive care with wrapping arm with Ace bandage.  -Continue to monitor.

## 2022-07-15 NOTE — CARE PLAN
The patient is Watcher - Medium risk of patient condition declining or worsening    Shift Goals  Clinical Goals: dialysis  Patient Goals: rest/comfort  Family Goals: N/A    Progress made toward(s) clinical / shift goals:  Pt had dialysis with 3L removed. Pt received Tylenol and lidocaine patches applied for pain management.     Patient is not progressing towards the following goals:

## 2022-07-15 NOTE — THERAPY
"Physical Therapy   Daily Treatment     Patient Name: Jannet Bruno  Age:  81 y.o., Sex:  female  Medical Record #: [id number]  Today's Date: 7/15/2022     Precautions  Precautions: Fall Risk;Swallow Precautions ( See Comments)  Comments: LUE swelling, pain    Assessment    Pt with improved cognition from previous session, pt recalls feeling out of sorts and groggy. Pt motivated to participate in PT session. Continues to be limited 2/2 LUE pain, requesting pain meds post transfer 2/2 the reported intense burning. Required Rakel for STS and ModA to take few steps to chair via R HHA with increased time and effort. Recommend placement. Will continue to follow to address weakness, impaired balance, decreased activity tolerance and functional mobility.     Plan    Continue current treatment plan.    DC Equipment Recommendations: Unable to determine at this time  Discharge Recommendations: Recommend post-acute placement for additional physical therapy services prior to discharge home      Subjective    \"I remember being really groggy in the morning when you came to see me.\"      Objective     07/15/22 1337   Vitals   O2 (LPM) 2   O2 Delivery Device Silicone Nasal Cannula   Pain 0 - 10 Group   Therapist Pain Assessment Post Activity;Nurse Notified  (c/o LUE pain requesting pain meds. Was agreeable to mobility)   Cognition    Cognition / Consciousness X   Level of Consciousness Alert   Safety Awareness Impaired   Comments Improved cognition compared to previous session. Pt pleasant and cooperative   Balance   Sitting Balance (Static) Fair   Sitting Balance (Dynamic) Fair -   Standing Balance (Static) Fair -   Standing Balance (Dynamic) Poor +   Weight Shift Sitting Fair   Weight Shift Standing Poor   Skilled Intervention Verbal Cuing;Tactile Cuing;Sequencing;Postural Facilitation;Compensatory Strategies   Comments via R HHA   Gait Analysis   Gait Level Of Assist Moderate Assist   Assistive Device Hand Held Assist "   Distance (Feet) 5  (steps to chair)   # of Times Distance was Traveled 1   Deviation Decreased Base Of Support;Shuffled Gait  (anterior trunk lean)   Weight Bearing Status no restrictions   Skilled Intervention Verbal Cuing;Tactile Cuing;Sequencing;Postural Facilitation;Facilitation;Compensatory Strategies   Comments limited 2/2 c/o LUE pain   Bed Mobility    Supine to Sit Supervised   Sit to Supine   (up in chair post)   Scooting Supervised   Rolling Supervised   Skilled Intervention Verbal Cuing   Functional Mobility   Sit to Stand Minimal Assist   Bed, Chair, Wheelchair Transfer Moderate Assist   Transfer Method Stand Step   Mobility via R HHA   Skilled Intervention Verbal Cuing;Tactile Cuing;Sequencing   How much difficulty does the patient currently have...   Turning over in bed (including adjusting bedclothes, sheets and blankets)? 3   Sitting down on and standing up from a chair with arms (e.g., wheelchair, bedside commode, etc.) 1   Moving from lying on back to sitting on the side of the bed? 3   How much help from another person does the patient currently need...   Moving to and from a bed to a chair (including a wheelchair)? 2   Need to walk in a hospital room? 2   Climbing 3-5 steps with a railing? 2   6 clicks Mobility Score 13   Activity Tolerance   Comments lmiited 2/2 LUE pain   Short Term Goals    Short Term Goal # 1 Pt will perform bed mobility with supervision in 6 visits with HOB flat, no rail.   Goal Outcome # 1 Goal met   Short Term Goal # 2 Pt will transfer with supervision in 6 visits to improve functional indep.   Goal Outcome # 2 Goal not met   Short Term Goal # 3 Pt will ambulate x 50 feet using FWW with supervision in 6 visits to improve functional indep.   Goal Outcome # 3 Goal not met   Education Group   Education Provided Role of Physical Therapist   Role of Physical Therapist Patient Response Patient;Acceptance;Explanation;Verbal Demonstration   Anticipated Discharge Equipment and  Recommendations   DC Equipment Recommendations Unable to determine at this time   Discharge Recommendations Recommend post-acute placement for additional physical therapy services prior to discharge home   Interdisciplinary Plan of Care Collaboration   IDT Collaboration with  Nursing;Occupational Therapist   Patient Position at End of Therapy Seated;Chair Alarm On;Call Light within Reach;Phone within Reach;Tray Table within Reach  (LUE elevated on pillow)   Collaboration Comments RN updated   Session Information   Date / Session Number  7/15- 6 (3/3, 7/17)

## 2022-07-15 NOTE — PROGRESS NOTES
Hospital Medicine Daily Progress Note    Date of Service  7/15/2022    Chief Complaint  Edema    Hospital Course  81 y.o. female with PMHx ESRD on HD MWF, HTN, HLD, and T2DM who was admitted on 6/24/2022 for left upper arm swelling and lower extremity edema after missing HD sessions.  X-rays of the left hand showed only chronic changes.  AV fistula was functioning well.  CTA showed no evidence of central stenosis.  Vascular surgery was consulted and only recommended supportive care such as wrapping the arm with Ace bandage.  She was also found to have severe aortic stenosis for which cardiology was consulted, and gave the opinion that she is not a candidate for intervention.  She was dialyzed while in the hospital with subsequent clinical improvement.  She did have hospital delirium while in the hospital, which has since resolved.  Palliative care was consulted, but patient continued to wish to be full code.  SNF placement was pursued, but proved to be challenging as transfer to dialysis is not always facilitated.    Interval Problem Update  7/15/2022 - I reviewed the patient's chart. There were no significant overnight events. Remains hemodynamically stable and afebrile. Stable on 3L O2 NC.  Labs have been stable.    > I have personally seen and examined the patient today.  She is getting dialyzed this morning.  No issues.  She does have some generalized pain for which she was asking for pain medications.  She denies any nausea, vomiting, chest pain, shortness of breath.  No abdominal complaints.      I have discussed this patient's plan of care and discharge plan at IDT rounds today with Case Management, Nursing, Nursing leadership, and other members of the IDT team.    Consultants/Specialty  cardiology, nephrology and vascular surgery    Code Status  Full Code    Disposition  Patient is medically cleared for discharge.   Anticipate discharge to to skilled nursing facility.  I have placed the appropriate orders  for post-discharge needs.    Review of Systems  ROS     Pertinent positives/negatives as mentioned above.     A complete review of systems was personally done by me. All other systems were negative.        Physical Exam  Temp:  [36.3 °C (97.3 °F)-36.7 °C (98.1 °F)] 36.7 °C (98.1 °F)  Pulse:  [75-89] 83  Resp:  [14-16] 14  BP: (120-128)/(51-58) 126/51  SpO2:  [97 %-100 %] 99 %    Physical Exam  Vitals reviewed.   Constitutional:       General: She is not in acute distress.     Appearance: Normal appearance. She is normal weight. She is not ill-appearing or diaphoretic.   HENT:      Head: Normocephalic and atraumatic.      Right Ear: External ear normal.      Left Ear: External ear normal.      Mouth/Throat:      Mouth: Mucous membranes are moist.      Pharynx: No oropharyngeal exudate or posterior oropharyngeal erythema.   Eyes:      General: No scleral icterus.     Extraocular Movements: Extraocular movements intact.      Conjunctiva/sclera: Conjunctivae normal.      Pupils: Pupils are equal, round, and reactive to light.   Cardiovascular:      Rate and Rhythm: Normal rate and regular rhythm.      Heart sounds: Murmur ( systolic aortic) heard.   Pulmonary:      Effort: Pulmonary effort is normal. No respiratory distress.      Breath sounds: Normal breath sounds. No stridor. No wheezing, rhonchi or rales.   Chest:      Chest wall: No tenderness.   Abdominal:      General: Bowel sounds are normal. There is no distension.      Palpations: Abdomen is soft. There is no mass.      Tenderness: There is no abdominal tenderness. There is no guarding or rebound.   Musculoskeletal:         General: Swelling ( LUE) present. Normal range of motion.      Cervical back: Normal range of motion and neck supple. No rigidity. No muscular tenderness.      Right lower leg: Edema present.      Left lower leg: Edema present.   Lymphadenopathy:      Cervical: No cervical adenopathy.   Skin:     General: Skin is warm and dry.       Coloration: Skin is not jaundiced.      Findings: No rash.   Neurological:      General: No focal deficit present.      Mental Status: She is alert and oriented to person, place, and time. Mental status is at baseline.      Cranial Nerves: No cranial nerve deficit.   Psychiatric:         Mood and Affect: Mood normal.         Behavior: Behavior normal.         Thought Content: Thought content normal.         Judgment: Judgment normal.         Fluids    Intake/Output Summary (Last 24 hours) at 7/15/2022 0804  Last data filed at 7/14/2022 1300  Gross per 24 hour   Intake 720 ml   Output --   Net 720 ml       Laboratory      Recent Labs     07/12/22  0956 07/13/22  0815 07/14/22  1113   SODIUM 133* 130* 132*   POTASSIUM 5.4 6.8* 5.3   CHLORIDE 92* 91* 91*   CO2 27 24 29   GLUCOSE 133* 167* 160*   BUN 32* 49* 35*   CREATININE 3.48* 4.54* 3.62*   CALCIUM 9.1 8.9 9.1                   Imaging  DX-HAND 3+ LEFT   Final Result      1.  No acute fracture or dislocation.   2.  Scattered mild degenerative changes, increased from prior study. No definite erosive arthropathy.   3.  Osteopenia.   4.  Old ulnar styloid process fracture.      DX-CHEST-PORTABLE (1 VIEW)   Final Result      1.  Probable 12 mm right mid/upper lung zone mass. Suggest CT chest without contrast for further assessment      2.  Interstitial pulmonary edema or fibrosis      3.  Enlarged cardiac silhouette      4.  Left pleural effusion      CT-CTA UPPER EXT WITH & W/O-POST PROCESS LEFT   Final Result      1.  Status post brachiocephalic fistula in the left arm. No focal stenosis is identified.      2.  Extensive edema in the subcutaneous tissues of the visualized left lateral chest and arm.      EC-ECHOCARDIOGRAM COMPLETE W/O CONT   Final Result      US-ZAHRAA SINGLE LEVEL BILAT   Final Result      US-EXTREMITY ARTERY LOWER BILAT   Final Result      CT-EXTREMITY, UPPER W/O LEFT   Final Result      1.  There is diffuse subcutaneous edema of the imaged portions of  the left upper extremity. There is also diffuse body wall edema in the visualized portions of the chest and abdomen.   2.  No focal fluid collection to suggest abscess.   3.  There is a small left pleural effusion and minimal fluid in the abdomen.   4.  There is postsurgical change of AV fistula. There is small vessel atherosclerosis.      US-EXTREMITY VENOUS UPPER UNILAT LEFT   Final Result      DX-CHEST-PORTABLE (1 VIEW)   Final Result         1.  Interstitial pulmonary parenchymal prominence suggest chronic underlying lung disease, component of interstitial edema and/or infiltrates not excluded.   2.  Small left pleural effusion   3.  Cardiomegaly   4.  Atherosclerosis           Assessment/Plan  * ESRD (end stage renal disease) on dialysis (HCC)- (present on admission)  Assessment & Plan  - Continue hemodialysis per nephrology.  -Continue sevelamer and calcitriol.    Edema of left upper extremity- (present on admission)  Assessment & Plan  - Chronic, at the same site of AV fistula which is functioning well.  -Multifactorial: Poor EF, lymphedema, outflow stenosis.  CTA showed no evidence of central stenosis.  Vascular surgery recommended supportive care with wrapping arm with Ace bandage.  -Continue to monitor.    Delirium  Assessment & Plan  - Resolved.    Aortic stenosis, severe- (present on admission)  Assessment & Plan  - Not a candidate for intervention per cardiology.  -Continue to monitor volume status.    Pain in both lower extremities- (present on admission)  Assessment & Plan  - Chronic.  Continue as needed pain medications.    Goals of care, counseling/discussion- (present on admission)  Assessment & Plan  -palliative care consulted, patient wished to remain full code.    Generalized weakness- (present on admission)  Assessment & Plan  - SNF placement being pursued.  Placement challenging due to need for transportation back and forth to hemodialysis.  CM/SW on board.    Hypothyroidism- (present on  admission)  Assessment & Plan  - Continue Synthroid.    Diabetes mellitus, type II (HCC)- (present on admission)  Assessment & Plan  - Last HbA1c was 11.7.  Continue Lantus 5 units at night, along with sliding scale insulin coverage.  Continue Accu-Cheks before meals and at bedtime. Goal to keep BG between 140-180 per 2019 ADA guidelines.        Essential hypertension- (present on admission)  Assessment & Plan  - Maintaining good blood pressure control, despite holding home antihypertensives.  Continue to monitor blood pressure trend closely.       VTE prophylaxis: heparin ppx    I have performed a physical exam and reviewed and updated ROS and Plan today (7/15/2022). In review of last note, there are no changes except as documented above.

## 2022-07-15 NOTE — ASSESSMENT & PLAN NOTE
- Maintaining good blood pressure control, despite holding home antihypertensives.  Continue to monitor blood pressure trend closely.

## 2022-07-16 NOTE — PROGRESS NOTES
[facility] -  PROGRESS NOTE               Author: Janina Bender M.D. Date & Time: 7/16/2022  7:47 AM     HPI:  81yoF with PMH significant for ESRD on HD MWF via left arm AVF, HTN, DM II, Anemia secondary to CKD, CKD Bone mineral disorder, admitted with increased edema and weakness and having missed her last last 2-3 outpt HD treatments. Pt is very lethargic at this time and unable to provide much history, majority of the history was obtained through review of the medical record and discussion with primary svc. Pt had reported increased LE edema and fatigue and weakness and worsening of her left arm edema so she came to the ED for evaluation. She apparently has missed her last 2-3 outpt dialysis treatments. Per regular outpt Nephrologist Dr. Gates, she has been non-compliant with her fluid restriction and was told that she needed 4x/week dialysis until her volume status could be optimized but she was non-compliant with that recommendation. She has edema of her left arm where her AVF is located and there is concern for a central stenosis that could be the etiology. She had an appointment at the outpt access center to evaluate for central stenosis and undergo angioplasty if needed on 6/9/22 but pt did not go to the appointment. She has not had any other procedures done to the arm in the past couple of months. She had a left arm us done today that showed no DVT and patent AVF and soft tissue edema. She apparently received pain medication fentanyl and dilaudid as well as benadryl earlier today and she is very drowsy.      DAILY NEPHROLOGY SUMMARY:  6/25: rapid response overnight for severe leg pain, pt very lethargic today, moans with palpation of legs, arouses but does not answer questions and falls back asleep, tolerated HD yest with 2.5L UF, BP stable overnight but low this am  6/26: No events, BP low overnight and this am, more alert, reports pain in legs for the past 3 weeks, denies  any CP/SOB/Abd pain, reports left arm edema a little better but no pain in left arm   6/27: Pt resting in bed, subdued, Bps soft, on 4L supp O2 via NC, labs reviewed, due for HD  6/28: pt laying in bed, sleepy, c/o pain in legs, moaning, vascular assessed and does not recommend any intervention on legs or AVF, tolerated iHD yesterday with UF:2.8L  6/29: Pt in bed, fatigued, no complaints.  VSS 1L NC.    6/30: no new complaints, no overnight events. Tolerated hd yesterday, UF 3.5L , she is in negative balance.   7/1: Resting in bed, no complaints.  VSS 2L NC. No new labs today.   7/2: Pt sitting up in bed, confused, CNA helping her with breakfast meal, iHD yesterday with 1.5L net UF limited by muscle cramping, labs reviewed, VSS on 1L NC O2  7/3: Pt slumped in bed, somnolent, medical floor status, VSS on RA  7/4: No new overnight renal events. S/p HD yesterday and tolerated well. Net UF was 1.5 L.  7/5: S/p HD yesterday with net UF of 2 L. No new overnight renal events.   7/6: No new overnight renal events.   7/7: S/p HD yesterday with net UF of 2.2L and tolerated well. No new overnight renal events.   7/8: No new overnight renal events.   7/9: S/p HD yesterday with net UF of 3L and tolerated well. No new overnight renal events.   7/10: No new overnight renal events. K+ 6.9 this am.  7/11: No events, HD yest for hyperkalemia, K 5.9 this am, denies any CP/SOB but reports leg pain, BP stable, seen and examined on HD this am--VSS see dialysis flowsheet for full details  7/12: No events, awake and alert, c/o bilateral leg pain, denies any CP/SOB, BP stable  7/13: No events, seen on dialysis this am, still c/o leg pain, denies any CP/SOB, BP stable  7/14: No events, feels tired, c/o leg pain, denies any CP/SOB, tolerated HD yest with 2L UF  7/15: No events, BP stable, seen on HD, still complaining of leg pain, no CP/SOB  7/16: No events, tolerated HD yest with 3L UF, BP stable, complaining of soft stools and left arm pain  "and leg pain, no CP/SOB/Abd pain    REVIEW OF SYSTEMS:    10 point ROS reviewed and is as per HPI/daily summary or otherwise negative    PMH/PSH/SH/FH:   Reviewed and unchanged since admission note    CURRENT MEDICATIONS:   Reviewed from admission to present day    VS:  /65   Pulse 87   Temp 36.3 °C (97.4 °F) (Temporal)   Resp 19   Ht 1.626 m (5' 4\")   Wt 63.1 kg (139 lb 1.8 oz)   SpO2 99%   BMI 23.88 kg/m²     Physical Exam  Vitals and nursing note reviewed.   Constitutional:       Appearance: She is ill-appearing.   HENT:      Head: Normocephalic and atraumatic.   Eyes:      General: No scleral icterus.  Cardiovascular:      Rate and Rhythm: Normal rate and regular rhythm.   Pulmonary:      Effort: Pulmonary effort is normal. No respiratory distress.      Breath sounds: Examination of the right-lower field reveals decreased breath sounds. Examination of the left-lower field reveals decreased breath sounds. Decreased breath sounds present.   Abdominal:      General: Bowel sounds are normal. There is no distension.      Palpations: Abdomen is soft.   Musculoskeletal:         General: Swelling (left arm) present. No deformity.      Right lower leg: Edema present.      Left lower leg: Edema present.   Skin:     General: Skin is warm and dry.      Findings: No rash.   Neurological:      General: No focal deficit present.      Mental Status: She is alert and oriented to person, place, and time.   Psychiatric:         Mood and Affect: Mood normal.         Behavior: Behavior normal.           Fluids:  In: 1500 [P.O.:1000; Dialysis:500]  Out: 3501     LABS:  Recent Labs     07/13/22  0815 07/14/22  1113 07/15/22  1426   SODIUM 130* 132* 136   POTASSIUM 6.8* 5.3 4.4   CHLORIDE 91* 91* 93*   CO2 24 29 29   GLUCOSE 167* 160* 174*   BUN 49* 35* 22   CREATININE 4.54* 3.62* 2.63*   CALCIUM 8.9 9.1 9.4     Reviewed.    IMAGING:   All imaging reviewed from admission to present day    IMPRESSION:  # ESRD, dependent on " HD              - HD via LUE AVF, missed last 2-3 outpt treatments  # Hyperkalemia--mild  # Fluid Overload, improving              - Secondary to non-compliance with fluid restriction on HD and non-compliance with treatments  # Left arm edema, slow improvement              - CTA upper ext 6/26 w/o e/o focal stenosis + extensive edema             - AVF patent on left arm us and no DVT             - Pt no showed to outpt appt to evaluate              - Vascular does not recommend intervention   # Altered Mental Status, waxes/wanes             - Monitor  # HTN, variable control, stable at this time             - Goal BP < 140/90             - Not on BP meds   # Anemia of CKD, at goal              - Goal Hgb 10-11             - Iron 27             -TIBC 142             -%Sat 19             - Ferritin 1419  # CKD-MBD             - Ca 9.2             - PO4 4.4             - Managed at OP unit             - On calcitriol and sevelamer   # DM II--management per primary svc  # Leg Pain--chronic skin changes and subcutaneous tissue firm to touch             - Unclear etiology             - Vascular does not recommend any intervention  # Leukocytosis--check cultures if any fevers     PLAN:  - No HD today (SAT)   - Continue qMWF iHD schedule  - UF as tolerated  - No current need for ROGELIO  - Renal diet  - Continue phos binders WM  - Continue off of all BP meds and diuretics for now  - PRN NS boluses for symptomatic hypotension  - Limit sedating meds if possible  - No dietary protein restrictions  - Dose all meds per ESRD  - Outpt access center appointment rescheduled  - Palliative care has consulted; pt is still a full code  - Pt at high risk for further morbidity/mortality  - DC planning underway for SNF  - Increase valtessa to 16.8grams daily to help with control of hyperkalemia

## 2022-07-16 NOTE — CARE PLAN
The patient is Stable - Low risk of patient condition declining or worsening    Shift Goals  Clinical Goals: manage pain, rest  Patient Goals: sleep  Family Goals: NA    Progress made toward(s) clinical / shift goals:    Problem: Knowledge Deficit - Standard  Goal: Patient and family/care givers will demonstrate understanding of plan of care, disease process/condition, diagnostic tests and medications  Outcome: Progressing     Problem: Skin Integrity  Goal: Skin integrity is maintained or improved  Outcome: Progressing     Pt updated on plan of care. Pt encouraged to ask questions and questions were answered. Call light within reach. Pt reminded to use call light whenever needing assistance.   Pt medicated for pain per MAR, will continue to reassess and medicate as needed.

## 2022-07-16 NOTE — PROGRESS NOTES
Assumed care of patient at bedside report from day RN. Updated on POC. Patient currently A & O x 4; on 2 L O2 via NC; up 1 assist with fww with complaints of acute pain. Assessment completed.  Call light within reach. Whiteboard updated. Fall precautions in place. Bed locked and in lowest position. All questions answered. No other needs indicated at this time.

## 2022-07-17 NOTE — PROGRESS NOTES
[facility] -  PROGRESS NOTE               Author: THERESE Jeffers Date & Time: 7/17/2022  12:04 PM     HPI:  [id number] with PMH significant for ESRD on HD MWF via left arm AVF, HTN, DM II, Anemia secondary to CKD, CKD Bone mineral disorder, admitted with increased edema and weakness and having missed her last last 2-3 outpt HD treatments. Pt is very lethargic at this time and unable to provide much history, majority of the history was obtained through review of the medical record and discussion with primary svc. Pt had reported increased LE edema and fatigue and weakness and worsening of her left arm edema so she came to the ED for evaluation. She apparently has missed her last 2-3 outpt dialysis treatments. Per regular outpt Nephrologist Dr. Gates, she has been non-compliant with her fluid restriction and was told that she needed 4x/week dialysis until her volume status could be optimized but she was non-compliant with that recommendation. She has edema of her left arm where her AVF is located and there is concern for a central stenosis that could be the etiology. She had an appointment at the outpt access center to evaluate for central stenosis and undergo angioplasty if needed on 6/9/22 but pt did not go to the appointment. She has not had any other procedures done to the arm in the past couple of months. She had a left arm us done today that showed no DVT and patent AVF and soft tissue edema. She apparently received pain medication fentanyl and dilaudid as well as benadryl earlier today and she is very drowsy.      DAILY NEPHROLOGY SUMMARY:  6/25: rapid response overnight for severe leg pain, pt very lethargic today, moans with palpation of legs, arouses but does not answer questions and falls back asleep, tolerated HD yest with 2.5L UF, BP stable overnight but low this am  6/26: No events, BP low overnight and this am, more alert, reports pain in legs for the past 3  weeks, denies any CP/SOB/Abd pain, reports left arm edema a little better but no pain in left arm   6/27: Pt resting in bed, subdued, Bps soft, on 4L supp O2 via NC, labs reviewed, due for HD  6/28: pt laying in bed, sleepy, c/o pain in legs, moaning, vascular assessed and does not recommend any intervention on legs or AVF, tolerated iHD yesterday with UF:2.8L  6/29: Pt in bed, fatigued, no complaints.  VSS 1L NC.    6/30: no new complaints, no overnight events. Tolerated hd yesterday, UF 3.5L , she is in negative balance.   7/1: Resting in bed, no complaints.  VSS 2L NC. No new labs today.   7/2: Pt sitting up in bed, confused, CNA helping her with breakfast meal, iHD yesterday with 1.5L net UF limited by muscle cramping, labs reviewed, VSS on 1L NC O2  7/3: Pt slumped in bed, somnolent, medical floor status, VSS on RA  7/4: No new overnight renal events. S/p HD yesterday and tolerated well. Net UF was 1.5 L.  7/5: S/p HD yesterday with net UF of 2 L. No new overnight renal events.   7/6: No new overnight renal events.   7/7: S/p HD yesterday with net UF of 2.2L and tolerated well. No new overnight renal events.   7/8: No new overnight renal events.   7/9: S/p HD yesterday with net UF of 3L and tolerated well. No new overnight renal events.   7/10: No new overnight renal events. K+ 6.9 this am.  7/11: No events, HD yest for hyperkalemia, K 5.9 this am, denies any CP/SOB but reports leg pain, BP stable, seen and examined on HD this am--VSS see dialysis flowsheet for full details  7/12: No events, awake and alert, c/o bilateral leg pain, denies any CP/SOB, BP stable  7/13: No events, seen on dialysis this am, still c/o leg pain, denies any CP/SOB, BP stable  7/14: No events, feels tired, c/o leg pain, denies any CP/SOB, tolerated HD yest with 2L UF  7/15: No events, BP stable, seen on HD, still complaining of leg pain, no CP/SOB  7/16: No events, tolerated HD yest with 3L UF, BP stable, complaining of soft stools and  "left arm pain and leg pain, no CP/SOB/Abd pain  7/17: No overnight events, VSS no CP SOB worsening edema. Na 131 K 5.2, 2L NC.  Nurse reports she was fatigued and disoriented this morning, but has improved.  Currently arouses easily, is oriented.     REVIEW OF SYSTEMS:    10 point ROS reviewed and is as per HPI/daily summary or otherwise negative    PMH/PSH/SH/FH:   Reviewed and unchanged since admission note    CURRENT MEDICATIONS:   Reviewed from admission to present day    VS:  /82   Pulse (!) 101   Temp 36.6 °C (97.8 °F)   Resp 19   Ht 1.626 m (5' 4\")   Wt 63.1 kg (139 lb 1.8 oz)   SpO2 98%   BMI 23.88 kg/m²     Physical Exam  Vitals and nursing note reviewed.   Constitutional:       Appearance: She is ill-appearing.   HENT:      Head: Normocephalic and atraumatic.   Eyes:      General: No scleral icterus.  Cardiovascular:      Rate and Rhythm: Normal rate and regular rhythm.   Pulmonary:      Effort: Pulmonary effort is normal. No respiratory distress.      Breath sounds: Examination of the right-lower field reveals decreased breath sounds. Examination of the left-lower field reveals decreased breath sounds. Decreased breath sounds present.   Abdominal:      General: Bowel sounds are normal. There is no distension.      Palpations: Abdomen is soft.   Musculoskeletal:         General: Swelling (left arm) present. No deformity.      Right lower leg: Edema present.      Left lower leg: Edema present.      Comments: Trace LE edema   Skin:     General: Skin is warm and dry.      Findings: No rash.   Neurological:      General: No focal deficit present.      Mental Status: She is alert and oriented to person, place, and time.   Psychiatric:         Mood and Affect: Mood normal.         Behavior: Behavior normal.           Fluids:  In: 1040 [P.O.:1040]  Out: -     LABS:  Recent Labs     07/15/22  1426 07/16/22  0851 07/17/22  0902   SODIUM 136 133* 131*   POTASSIUM 4.4 5.0 5.2   CHLORIDE 93* 94* 90*   CO2 " 29 23 26   GLUCOSE 174* 130* 124*   BUN 22 38* 51*   CREATININE 2.63* 3.74* 4.68*   CALCIUM 9.4 9.2 9.8     Reviewed.    IMAGING:   All imaging reviewed from admission to present day    IMPRESSION:  # ESRD, dependent on HD              - HD via LUE AVF, missed last 2-3 outpt treatments  # Hyperkalemia--mild  # Fluid Overload, improving              - Secondary to non-compliance with fluid restriction on HD and non-compliance with treatments  # Left arm edema, slow improvement              - CTA upper ext 6/26 w/o e/o focal stenosis + extensive edema             - AVF patent on left arm us and no DVT             - Pt no showed to outpt appt to evaluate              - Vascular does not recommend intervention   # Altered Mental Status, waxes/wanes             - Monitor  # HTN, variable control, stable at this time             - Goal BP < 140/90             - Not on BP meds   # Anemia of CKD, at goal              - Goal Hgb 10-11             - Iron 27             -TIBC 142             -%Sat 19             - Ferritin 1419  # CKD-MBD             - Ca 9.8             - PO4 4.9             - Managed at OP unit             - On calcitriol and sevelamer   # DM II--management per primary svc  # Leg Pain--chronic skin changes and subcutaneous tissue firm to touch             - Unclear etiology             - Vascular does not recommend any intervention  # Leukocytosis--check cultures if any fevers     PLAN:  - No HD today (SUN), next iHD MON   - Continue qMWF iHD schedule  - UF as tolerated  - No current need for ROGELIO  - Renal diet  - Continue phos binders WM  - Continue off of all BP meds and diuretics for now  - PRN NS boluses for symptomatic hypotension  - Limit sedating meds if possible  - No dietary protein restrictions  - Dose all meds per ESRD  - Outpt access center appointment rescheduled  - Palliative care has consulted; pt is still a full code  - Pt at high risk for further morbidity/mortality  - DC planning underway  for SNF  - On valtessa to 16.8grams daily to help with control of hyperkalemia

## 2022-07-17 NOTE — CARE PLAN
The patient is Stable - Low risk of patient condition declining or worsening    Shift Goals  Clinical Goals: pain control  Patient Goals: cluster care for rest  Family Goals: NA    Progress made toward(s) clinical / shift goals:  RN and CNA coordinated care to maximize patient rest. Pain controlled throughout shift with scheduled and PRN medications. Patient verbalized understanding of POC but does require intermittent reorientation. Unable to complete admission documentation since patient is a  poor historian.

## 2022-07-18 NOTE — PROGRESS NOTES
[facility] -  PROGRESS NOTE               Author: THERESE Chaney Date & Time: 7/18/2022  11:36 AM     HPI:  [id number] with PMH significant for ESRD on HD MWF via left arm AVF, HTN, DM II, Anemia secondary to CKD, CKD Bone mineral disorder, admitted with increased edema and weakness and having missed her last last 2-3 outpt HD treatments. Pt is very lethargic at this time and unable to provide much history, majority of the history was obtained through review of the medical record and discussion with primary svc. Pt had reported increased LE edema and fatigue and weakness and worsening of her left arm edema so she came to the ED for evaluation. She apparently has missed her last 2-3 outpt dialysis treatments. Per regular outpt Nephrologist Dr. Gates, she has been non-compliant with her fluid restriction and was told that she needed 4x/week dialysis until her volume status could be optimized but she was non-compliant with that recommendation. She has edema of her left arm where her AVF is located and there is concern for a central stenosis that could be the etiology. She had an appointment at the outpt access center to evaluate for central stenosis and undergo angioplasty if needed on 6/9/22 but pt did not go to the appointment. She has not had any other procedures done to the arm in the past couple of months. She had a left arm us done today that showed no DVT and patent AVF and soft tissue edema. She apparently received pain medication fentanyl and dilaudid as well as benadryl earlier today and she is very drowsy.      DAILY NEPHROLOGY SUMMARY:  6/25: rapid response overnight for severe leg pain, pt very lethargic today, moans with palpation of legs, arouses but does not answer questions and falls back asleep, tolerated HD yest with 2.5L UF, BP stable overnight but low this am  6/26: No events, BP low overnight and this am, more alert, reports pain in legs for the past 3 weeks,  denies any CP/SOB/Abd pain, reports left arm edema a little better but no pain in left arm   6/27: Pt resting in bed, subdued, Bps soft, on 4L supp O2 via NC, labs reviewed, due for HD  6/28: pt laying in bed, sleepy, c/o pain in legs, moaning, vascular assessed and does not recommend any intervention on legs or AVF, tolerated iHD yesterday with UF:2.8L  6/29: Pt in bed, fatigued, no complaints.  VSS 1L NC.    6/30: no new complaints, no overnight events. Tolerated hd yesterday, UF 3.5L , she is in negative balance.   7/1: Resting in bed, no complaints.  VSS 2L NC. No new labs today.   7/2: Pt sitting up in bed, confused, CNA helping her with breakfast meal, iHD yesterday with 1.5L net UF limited by muscle cramping, labs reviewed, VSS on 1L NC O2  7/3: Pt slumped in bed, somnolent, medical floor status, VSS on RA  7/4: No new overnight renal events. S/p HD yesterday and tolerated well. Net UF was 1.5 L.  7/5: S/p HD yesterday with net UF of 2 L. No new overnight renal events.   7/6: No new overnight renal events.   7/7: S/p HD yesterday with net UF of 2.2L and tolerated well. No new overnight renal events.   7/8: No new overnight renal events.   7/9: S/p HD yesterday with net UF of 3L and tolerated well. No new overnight renal events.   7/10: No new overnight renal events. K+ 6.9 this am.  7/11: No events, HD yest for hyperkalemia, K 5.9 this am, denies any CP/SOB but reports leg pain, BP stable, seen and examined on HD this am--VSS see dialysis flowsheet for full details  7/12: No events, awake and alert, c/o bilateral leg pain, denies any CP/SOB, BP stable  7/13: No events, seen on dialysis this am, still c/o leg pain, denies any CP/SOB, BP stable  7/14: No events, feels tired, c/o leg pain, denies any CP/SOB, tolerated HD yest with 2L UF  7/15: No events, BP stable, seen on HD, still complaining of leg pain, no CP/SOB  7/16: No events, tolerated HD yest with 3L UF, BP stable, complaining of soft stools and left  "arm pain and leg pain, no CP/SOB/Abd pain  7/17: No overnight events, VSS no CP SOB worsening edema. Na 131 K 5.2, 2L NC.  Nurse reports she was fatigued and disoriented this morning, but has improved.  Currently arouses easily, is oriented.   7/18: Pt seen on HD, says her back hurts, VSS on 2L NC O2, K+ 5.8 this AM    REVIEW OF SYSTEMS:    10 point ROS reviewed and is as per HPI/daily summary or otherwise negative    PMH/PSH/SH/FH:   Reviewed and unchanged since admission note    CURRENT MEDICATIONS:   Reviewed from admission to present day    VS:  /55   Pulse 83   Temp 36.4 °C (97.5 °F) (Temporal)   Resp 17   Ht 1.626 m (5' 4\")   Wt 63.1 kg (139 lb 1.8 oz)   SpO2 100%   BMI 23.88 kg/m²     Physical Exam  Vitals and nursing note reviewed.   Constitutional:       Appearance: She is ill-appearing.   HENT:      Head: Normocephalic and atraumatic.   Eyes:      General: No scleral icterus.  Cardiovascular:      Rate and Rhythm: Normal rate and regular rhythm.   Pulmonary:      Effort: Pulmonary effort is normal. No respiratory distress.      Breath sounds: Examination of the right-lower field reveals decreased breath sounds. Examination of the left-lower field reveals decreased breath sounds. Decreased breath sounds present.   Abdominal:      General: Bowel sounds are normal. There is no distension.      Palpations: Abdomen is soft.   Musculoskeletal:         General: Swelling (left arm) present. No deformity.      Right lower leg: Edema present.      Left lower leg: Edema present.      Comments: Trace LE edema   Skin:     General: Skin is warm and dry.      Findings: No rash.   Neurological:      General: No focal deficit present.      Mental Status: She is alert and oriented to person, place, and time.   Psychiatric:         Mood and Affect: Mood normal.         Behavior: Behavior normal.           Fluids:  In: 720 [P.O.:720]  Out: -     LABS:  Recent Labs     07/16/22  0851 07/17/22  0902 07/18/22  0559 "   SODIUM 133* 131* 132*   POTASSIUM 5.0 5.2 5.8*   CHLORIDE 94* 90* 90*   CO2 23 26 26   GLUCOSE 130* 124* 99   BUN 38* 51* 64*   CREATININE 3.74* 4.68* 5.61*   CALCIUM 9.2 9.8 9.7     Reviewed.    IMAGING:   All imaging reviewed from admission to present day    IMPRESSION:  # ESRD, dependent on HD              - HD via LUE AVF, missed 2-3 outpt treatments prior to admit   # Hyperkalemia   - On 16.8 g patiromer daily   # Fluid overload, improving              - Secondary to non-compliance with fluid restriction and HD treatments  # Left arm edema, slow improvement              - CTA upper ext 6/26 w/o e/o focal stenosis + extensive edema             - AVF patent on left arm us and no DVT             - Pt no showed to outpt appt to evaluate              - Vascular does not recommend intervention   # Altered Mental Status, waxes/wanes             - Monitor  # HTN, variable control, stable at this time             - Goal BP < 140/90             - Not on BP meds   # Anemia of CKD, at goal              - Goal Hgb 10-11             - Iron 27             -TIBC 142             -%Sat 19             - Ferritin 1419  # CKD-MBD             - Ca 9.7             - PO4 5.2             - Managed at OP unit             - On calcitriol and sevelamer   # DM II--management per primary svc  # Leg Pain--chronic skin changes and subcutaneous tissue firm to touch             - Unclear etiology             - Vascular does not recommend any intervention   - On ropinirole   # Leukocytosis, decreased    - Check cultures if any fevers     PLAN:  - Continue qMWF iHD schedule, treatment due today (MON)  - UF as tolerated  - No current need for ROGELIO  - Renal diet  - Continue phos binders WM  - Continue off of all BP meds and diuretics for now  - PRN NS boluses for symptomatic hypotension  - Limit sedating meds if possible  - No dietary protein restrictions  - Dose all meds per ESRD  - Outpt access center appointment rescheduled  - Palliative care  has consulted; pt is still a full code  - Pt at high risk for further morbidity/mortality  - DC planning underway for SNF  - On Veltassa to 16.8grams daily to help with control of hyperkalemia; can decrease/stop if serum K+ <4  - Follow labs    Thank you,

## 2022-07-18 NOTE — CARE PLAN
The patient is Stable - Low risk of patient condition declining or worsening    Shift Goals  Clinical Goals: remain free from falls or injury, skin integrity  Patient Goals: rest, pain control  Family Goals: parveen    Progress made toward(s) clinical / shift goals: Patient remained free from falls or injury throughout this shift. RN completed bed bath, changed linens, changed gown, applied mepilex to bilateral heels, applied mepilex to sacrum, ace wrap to left arm, and repositioned with pillow support for comfort. Pain controlled with scheduled and PRN medications. RN and CNA attempted to cluster care to maximize rest for patient.

## 2022-07-18 NOTE — THERAPY
"Physical Therapy   Daily Treatment     Patient Name: Jannet Bruno  Age:  81 y.o., Sex:  female  Medical Record #: [id number]  Today's Date: 7/18/2022     Precautions  Precautions: Fall Risk;Swallow Precautions ( See Comments)  Comments: LUE edema, pain    Assessment    Pt continues to present with LUE pain limiting functional mobility. Pt agreeable to STS x5 reps and seated EOB therex, however, refusing transfer or ambulation 2/2 LUE pain. Pain tolerable when pt not using LUE, however, attempting to use LUE despite cues for compensatory strategies to limit use with functional mobility. Pt very delayed, requiring increased processing time, and cues to attend to task. Pt falling asleep at times during session, RN stating pt has not been sleeping at night and pain meds contributing to drowsiness. Recommend placement, will continue to follow.     Plan    Continue current treatment plan.    DC Equipment Recommendations: Unable to determine at this time  Discharge Recommendations: Recommend post-acute placement for additional physical therapy services prior to discharge home      Subjective    \"I want to do it myself.\"      Objective     07/18/22 1512   Precautions   Precautions Fall Risk;Swallow Precautions ( See Comments)   Comments LUE edema, pain   Vitals   O2 (LPM) 2   O2 Delivery Device Nasal Cannula   Pain 0 - 10 Group   Therapist Pain Assessment Post Activity Pain Same as Prior to Activity;Nurse Notified  (c/o LUE pain not rated, agreeable to mobility)   Cognition    Cognition / Consciousness X   Level of Consciousness Alert   Safety Awareness Impaired   New Learning Impaired   Attention Impaired   Comments Pt pleasant and cooperative with encouragement. Falling asleep at times, cues to attend to task. Rn reports pt not sleeping at night and pain meds making pt drowsy   Sitting Lower Body Exercises   Sitting Lower Body Exercises Yes   Ankle Pumps 2 sets of 10;Bilateral   Long Arc Quad 2 sets of 10;Bilateral "   Standing Lower Body Exercises   Standing Lower Body Exercises Yes   Comments STS x5 reps   Balance   Sitting Balance (Static) Fair   Sitting Balance (Dynamic) Fair -   Standing Balance (Static) Fair -   Standing Balance (Dynamic) Poor +   Weight Shift Sitting Fair   Weight Shift Standing Poor   Skilled Intervention Verbal Cuing;Tactile Cuing;Sequencing;Postural Facilitation;Facilitation;Compensatory Strategies   Comments w/ HHA RUE   Gait Analysis   Gait Level Of Assist Refused   Weight Bearing Status no restrictions   Comments limited 2/2 c/o LUE pain   Bed Mobility    Supine to Sit Supervised   Sit to Supine Minimal Assist   Scooting Minimal Assist   Rolling Supervised   Skilled Intervention Verbal Cuing   Comments Pt very delayed, slow proccessing with bed mobility. Cues to attend to task   Functional Mobility   Sit to Stand Moderate Assist   Bed, Chair, Wheelchair Transfer Refused   Mobility STS x5 reps   Skilled Intervention Verbal Cuing;Tactile Cuing;Sequencing;Postural Facilitation;Facilitation;Compensatory Strategies   Activity Tolerance   Comments limited 2/2 LUE pain, fatigue   Short Term Goals    Short Term Goal # 1 Pt will perform bed mobility with supervision in 6 visits with HOB flat, no rail.   Goal Outcome # 1 Goal met  (previously met)   Short Term Goal # 2 Pt will transfer with supervision in 6 visits to improve functional indep.   Goal Outcome # 2 Goal not met   Short Term Goal # 3 Pt will ambulate x 50 feet using FWW with supervision in 6 visits to improve functional indep.   Goal Outcome # 3 Goal not met   Education Group   Education Provided Role of Physical Therapist;Exercises - Seated;Exercises - Standing   Exercises - Seated Patient Response Patient;Acceptance;Explanation;Demonstration;Verbal Demonstration;Action Demonstration   Exercise - Standing Patient Response Patient;Acceptance;Explanation;Demonstration;Verbal Demonstration;Action Demonstration   Anticipated Discharge Equipment and  Recommendations   DC Equipment Recommendations Unable to determine at this time   Discharge Recommendations Recommend post-acute placement for additional physical therapy services prior to discharge home   Interdisciplinary Plan of Care Collaboration   IDT Collaboration with  Nursing;Certified Nursing Assistant   Patient Position at End of Therapy In Bed;Bed Alarm On;Call Light within Reach;Tray Table within Reach;Phone within Reach   Collaboration Comments RN updated   Session Information   Date / Session Number  7/18- 7 (1/3, 7/24)

## 2022-07-18 NOTE — PROGRESS NOTES
Hospital Medicine Daily Progress Note    Date of Service  7/18/2022    Chief Complaint  Edema    Hospital Course  81 y.o. female with PMHx ESRD on HD MWF, HTN, HLD, and T2DM who was admitted on 6/24/2022 for left upper arm swelling and lower extremity edema after missing HD sessions.  X-rays of the left hand showed only chronic changes.  AV fistula was functioning well.  CTA showed no evidence of central stenosis.  Vascular surgery was consulted and only recommended supportive care such as wrapping the arm with Ace bandage.  She was also found to have severe aortic stenosis for which cardiology was consulted, and gave the opinion that she is not a candidate for intervention.  She was dialyzed while in the hospital with subsequent clinical improvement.  She did have hospital delirium while in the hospital, which has since resolved.  Palliative care was consulted, but patient continued to wish to be full code.  SNF placement was pursued, but proved to be challenging as transfer to dialysis is not always facilitated.  Case management is working on her discharge to SNF and arrangement of hemodialysis.    Interval Problem Update    I ordered and examined her at the bedside  She reported back pain and arm pain.  She denies any other acute complaints or  She asked me about aortic stenosis and her diabetes I discussed and updated her regarding these conditions  I discussed plan of care with her including her discharge plan.  I discussed plan of care with bedside RN and .  She is medically cleared to be discharged.    Consultants/Specialty  cardiology, nephrology and vascular surgery    Code Status  Full Code    Disposition  Patient is medically cleared for discharge.   Anticipate discharge to to skilled nursing facility.  I have placed the appropriate orders for post-discharge needs.    Review of Systems  Review of Systems   Constitutional: Negative for chills, fever and weight loss.   HENT: Negative for  hearing loss and tinnitus.    Eyes: Negative for blurred vision, double vision, photophobia and pain.   Respiratory: Negative for cough, sputum production and shortness of breath.    Cardiovascular: Negative for chest pain, palpitations, orthopnea and leg swelling.   Gastrointestinal: Negative for abdominal pain, constipation, diarrhea, nausea and vomiting.   Genitourinary: Negative for dysuria, frequency and urgency.   Musculoskeletal: Positive for back pain and myalgias. Negative for joint pain and neck pain.   Skin: Negative for rash.   Neurological: Negative for dizziness, tingling, tremors, sensory change, speech change, focal weakness and headaches.   Psychiatric/Behavioral: Negative for hallucinations and substance abuse.   All other systems reviewed and are negative.       Pertinent positives/negatives as mentioned above.     A complete review of systems was personally done by me. All other systems were negative.        Physical Exam  Temp:  [36.2 °C (97.2 °F)-36.8 °C (98.2 °F)] 36.4 °C (97.5 °F)  Pulse:  [83-92] 83  Resp:  [17-18] 17  BP: (119-134)/(55-75) 132/55  SpO2:  [96 %-100 %] 100 %    Physical Exam  Vitals reviewed.   Constitutional:       General: She is not in acute distress.     Appearance: Normal appearance. She is not ill-appearing.   HENT:      Head: Normocephalic and atraumatic.      Nose: No congestion.   Eyes:      General:         Right eye: No discharge.         Left eye: No discharge.      Pupils: Pupils are equal, round, and reactive to light.   Cardiovascular:      Rate and Rhythm: Normal rate and regular rhythm.      Pulses: Normal pulses.      Heart sounds: Murmur heard.   Pulmonary:      Effort: Pulmonary effort is normal. No respiratory distress.      Breath sounds: Normal breath sounds. No stridor.   Abdominal:      General: Bowel sounds are normal. There is no distension.      Palpations: Abdomen is soft.      Tenderness: There is no abdominal tenderness.   Musculoskeletal:          General: Swelling, tenderness and deformity present. Normal range of motion.      Cervical back: Normal range of motion. No rigidity.   Skin:     General: Skin is warm.      Capillary Refill: Capillary refill takes less than 2 seconds.      Coloration: Skin is not jaundiced or pale.      Findings: No bruising.   Neurological:      General: No focal deficit present.      Mental Status: She is alert and oriented to person, place, and time.      Cranial Nerves: No cranial nerve deficit.   Psychiatric:         Mood and Affect: Mood normal.         Behavior: Behavior normal.         Fluids    Intake/Output Summary (Last 24 hours) at 7/18/2022 1337  Last data filed at 7/18/2022 1153  Gross per 24 hour   Intake 1220 ml   Output 3500 ml   Net -2280 ml       Laboratory      Recent Labs     07/16/22  0851 07/17/22  0902 07/18/22  0559   SODIUM 133* 131* 132*   POTASSIUM 5.0 5.2 5.8*   CHLORIDE 94* 90* 90*   CO2 23 26 26   GLUCOSE 130* 124* 99   BUN 38* 51* 64*   CREATININE 3.74* 4.68* 5.61*   CALCIUM 9.2 9.8 9.7                   Imaging  DX-HAND 3+ LEFT   Final Result      1.  No acute fracture or dislocation.   2.  Scattered mild degenerative changes, increased from prior study. No definite erosive arthropathy.   3.  Osteopenia.   4.  Old ulnar styloid process fracture.      DX-CHEST-PORTABLE (1 VIEW)   Final Result      1.  Probable 12 mm right mid/upper lung zone mass. Suggest CT chest without contrast for further assessment      2.  Interstitial pulmonary edema or fibrosis      3.  Enlarged cardiac silhouette      4.  Left pleural effusion      CT-CTA UPPER EXT WITH & W/O-POST PROCESS LEFT   Final Result      1.  Status post brachiocephalic fistula in the left arm. No focal stenosis is identified.      2.  Extensive edema in the subcutaneous tissues of the visualized left lateral chest and arm.      EC-ECHOCARDIOGRAM COMPLETE W/O CONT   Final Result      US-ZAHRAA SINGLE LEVEL BILAT   Final Result      US-EXTREMITY ARTERY  LOWER BILAT   Final Result      CT-EXTREMITY, UPPER W/O LEFT   Final Result      1.  There is diffuse subcutaneous edema of the imaged portions of the left upper extremity. There is also diffuse body wall edema in the visualized portions of the chest and abdomen.   2.  No focal fluid collection to suggest abscess.   3.  There is a small left pleural effusion and minimal fluid in the abdomen.   4.  There is postsurgical change of AV fistula. There is small vessel atherosclerosis.      US-EXTREMITY VENOUS UPPER UNILAT LEFT   Final Result      DX-CHEST-PORTABLE (1 VIEW)   Final Result         1.  Interstitial pulmonary parenchymal prominence suggest chronic underlying lung disease, component of interstitial edema and/or infiltrates not excluded.   2.  Small left pleural effusion   3.  Cardiomegaly   4.  Atherosclerosis           Assessment/Plan  * ESRD (end stage renal disease) on dialysis (HCC)- (present on admission)  Assessment & Plan  - Continue hemodialysis per nephrology.  -Continue sevelamer and calcitriol.    Hypothyroidism- (present on admission)  Assessment & Plan  - Continue Synthroid.    Delirium  Assessment & Plan  - Resolved.    Aortic stenosis, severe- (present on admission)  Assessment & Plan  - Not a candidate for intervention per cardiology.  -Continue to monitor volume status.    Pain in both lower extremities- (present on admission)  Assessment & Plan  - Chronic.  Continue as needed pain medications.    Goals of care, counseling/discussion- (present on admission)  Assessment & Plan  -palliative care consulted, patient wished to remain full code.    Edema of left upper extremity- (present on admission)  Assessment & Plan  - Chronic, at the same site of AV fistula which is functioning well.  -Multifactorial: Poor EF, lymphedema, outflow stenosis.  CTA showed no evidence of central stenosis.  Vascular surgery recommended supportive care with wrapping arm with Ace bandage.  -Continue to  monitor.    Generalized weakness- (present on admission)  Assessment & Plan  - SNF placement being pursued.  Placement challenging due to need for transportation back and forth to hemodialysis.  CM/SW on board.    Diabetes mellitus, type II (HCC)- (present on admission)  Assessment & Plan  - Last HbA1c was 11.7.  Continue Lantus 5 units at night, along with sliding scale insulin coverage.  Continue Accu-Cheks before meals and at bedtime. Goal to keep BG between 140-180 per 2019 ADA guidelines.        Essential hypertension- (present on admission)  Assessment & Plan  - Maintaining good blood pressure control, despite holding home antihypertensives.  Continue to monitor blood pressure trend closely.     I reviewed above assessment and plan.  No acute changes.  Currently she is medically cleared to be discharged.  I discussed plan of care with her regarding her current medical condition and answered all her questions.  Discussed plan of care with bedside RN and .      VTE prophylaxis: heparin ppx    I have performed a physical exam and reviewed and updated ROS and Plan today (7/18/2022). In review of last note, there are no changes except as documented above.

## 2022-07-18 NOTE — PROGRESS NOTES
3hr HD started @ 0822 and completed @ 1153,tx well tolerated,VSS,net UF = 3000ml as ordered. LUAAVF + B/T,remains swollen with redness,seen by DARNELL Davis today,cannulation sites covered with DD,CDI.report given to Jenna Clifton RN.

## 2022-07-19 NOTE — DISCHARGE PLANNING
Agency/Facility Name: [facility]  Spoke To: Milagros  Outcome: Will check on insurance auth and call this DPA back.

## 2022-07-19 NOTE — PROGRESS NOTES
[facility] -  PROGRESS NOTE               Author: Michael Begum N.P. Date & Time: 7/19/2022  11:24 AM     HPI:  81yoF with PMH significant for ESRD on HD MWF via left arm AVF, HTN, DM II, Anemia secondary to CKD, CKD Bone mineral disorder, admitted with increased edema and weakness and having missed her last last 2-3 outpt HD treatments. Pt is very lethargic at this time and unable to provide much history, majority of the history was obtained through review of the medical record and discussion with primary svc. Pt had reported increased LE edema and fatigue and weakness and worsening of her left arm edema so she came to the ED for evaluation. She apparently has missed her last 2-3 outpt dialysis treatments. Per regular outpt Nephrologist Dr. Gates, she has been non-compliant with her fluid restriction and was told that she needed 4x/week dialysis until her volume status could be optimized but she was non-compliant with that recommendation. She has edema of her left arm where her AVF is located and there is concern for a central stenosis that could be the etiology. She had an appointment at the outpt access center to evaluate for central stenosis and undergo angioplasty if needed on 6/9/22 but pt did not go to the appointment. She has not had any other procedures done to the arm in the past couple of months. She had a left arm us done today that showed no DVT and patent AVF and soft tissue edema. She apparently received pain medication fentanyl and dilaudid as well as benadryl earlier today and she is very drowsy.      DAILY NEPHROLOGY SUMMARY:  6/25: rapid response overnight for severe leg pain, pt very lethargic today, moans with palpation of legs, arouses but does not answer questions and falls back asleep, tolerated HD yest with 2.5L UF, BP stable overnight but low this am  6/26: No events, BP low overnight and this am, more alert, reports pain in legs for the past 3 weeks,  denies any CP/SOB/Abd pain, reports left arm edema a little better but no pain in left arm   6/27: Pt resting in bed, subdued, Bps soft, on 4L supp O2 via NC, labs reviewed, due for HD  6/28: pt laying in bed, sleepy, c/o pain in legs, moaning, vascular assessed and does not recommend any intervention on legs or AVF, tolerated iHD yesterday with UF:2.8L  6/29: Pt in bed, fatigued, no complaints.  VSS 1L NC.    6/30: no new complaints, no overnight events. Tolerated hd yesterday, UF 3.5L , she is in negative balance.   7/1: Resting in bed, no complaints.  VSS 2L NC. No new labs today.   7/2: Pt sitting up in bed, confused, CNA helping her with breakfast meal, iHD yesterday with 1.5L net UF limited by muscle cramping, labs reviewed, VSS on 1L NC O2  7/3: Pt slumped in bed, somnolent, medical floor status, VSS on RA  7/4: No new overnight renal events. S/p HD yesterday and tolerated well. Net UF was 1.5 L.  7/5: S/p HD yesterday with net UF of 2 L. No new overnight renal events.   7/6: No new overnight renal events.   7/7: S/p HD yesterday with net UF of 2.2L and tolerated well. No new overnight renal events.   7/8: No new overnight renal events.   7/9: S/p HD yesterday with net UF of 3L and tolerated well. No new overnight renal events.   7/10: No new overnight renal events. K+ 6.9 this am.  7/11: No events, HD yest for hyperkalemia, K 5.9 this am, denies any CP/SOB but reports leg pain, BP stable, seen and examined on HD this am--VSS see dialysis flowsheet for full details  7/12: No events, awake and alert, c/o bilateral leg pain, denies any CP/SOB, BP stable  7/13: No events, seen on dialysis this am, still c/o leg pain, denies any CP/SOB, BP stable  7/14: No events, feels tired, c/o leg pain, denies any CP/SOB, tolerated HD yest with 2L UF  7/15: No events, BP stable, seen on HD, still complaining of leg pain, no CP/SOB  7/16: No events, tolerated HD yest with 3L UF, BP stable, complaining of soft stools and left  "arm pain and leg pain, no CP/SOB/Abd pain  7/17: No overnight events, VSS no CP SOB worsening edema. Na 131 K 5.2, 2L NC.  Nurse reports she was fatigued and disoriented this morning, but has improved.  Currently arouses easily, is oriented.   7/18: Pt seen on HD, says her back hurts, VSS on 2L NC O2, K+ 5.8 this AM  7/19: Pt sitting up in bed. No overnight events. Tolerated HD yesterday with 3L UF, BP stable. K this am better 4.2. No CP/N/V/D.     REVIEW OF SYSTEMS:    10 point ROS reviewed and is as per HPI/daily summary or otherwise negative    PMH/PSH/SH/FH:   Reviewed and unchanged since admission note    CURRENT MEDICATIONS:   Reviewed from admission to present day    VS:  /55   Pulse 85   Temp 36.6 °C (97.9 °F) (Temporal)   Resp 18   Ht 1.626 m (5' 4\")   Wt 63.1 kg (139 lb 1.8 oz)   SpO2 100%   BMI 23.88 kg/m²     Physical Exam  Vitals and nursing note reviewed.   Constitutional:       General: She is not in acute distress.     Appearance: She is ill-appearing.   HENT:      Head: Normocephalic and atraumatic.   Eyes:      General: No scleral icterus.  Cardiovascular:      Rate and Rhythm: Normal rate and regular rhythm.      Pulses: Normal pulses.   Pulmonary:      Effort: Pulmonary effort is normal. No respiratory distress.      Breath sounds: Examination of the right-lower field reveals decreased breath sounds. Examination of the left-lower field reveals decreased breath sounds. Decreased breath sounds present.   Abdominal:      General: Bowel sounds are normal. There is no distension.      Palpations: Abdomen is soft.   Musculoskeletal:         General: Swelling (left arm) present. No deformity.      Right lower leg: Edema present.      Left lower leg: Edema present.      Comments: Trace LE edema  LUE AVF + bruit and Thrill   Skin:     General: Skin is warm and dry.      Findings: No rash.   Neurological:      General: No focal deficit present.      Mental Status: She is alert and oriented to " person, place, and time.   Psychiatric:         Mood and Affect: Mood normal.         Behavior: Behavior normal.           Fluids:  In: 980 [P.O.:480; Dialysis:500]  Out: 3500     LABS:  Recent Labs     07/17/22  0902 07/18/22  0559 07/19/22  0605   SODIUM 131* 132* 135   POTASSIUM 5.2 5.8* 4.2   CHLORIDE 90* 90* 95*   CO2 26 26 26   GLUCOSE 124* 99 99   BUN 51* 64* 36*   CREATININE 4.68* 5.61* 3.79*   CALCIUM 9.8 9.7 9.1     Reviewed.    IMAGING:   All imaging reviewed from admission to present day    IMPRESSION:  # ESRD, dependent on HD              - HD via LUE AVF, missed 2-3 outpt treatments prior to admit   # Hyperkalemia   - On 16.8 g patiromer daily                - Resolved. K 4.2  # Fluid overload, improving              - Secondary to non-compliance with fluid restriction and HD treatments  # Left arm edema, slow improvement              - CTA upper ext 6/26 w/o e/o focal stenosis + extensive edema             - AVF patent on left arm us and no DVT             - Pt no showed to outpt appt to evaluate              - Vascular does not recommend intervention   # Altered Mental Status, waxes/wanes             - Monitor  # HTN, variable control, stable at this time             - Goal BP < 140/90             - Not on BP meds   # Anemia of CKD, at goal              - Goal Hgb 10-11             - Iron 27             -TIBC 142             -%Sat 19             - Ferritin 1419  # CKD-MBD             - Ca 9.7             - PO4 5.2             - Managed at OP unit             - On calcitriol and sevelamer   # DM II--management per primary svc  # Leg Pain--chronic skin changes and subcutaneous tissue firm to touch             - Unclear etiology             - Vascular does not recommend any intervention   - On ropinirole   # Leukocytosis, decreased    - Check cultures if any fevers     PLAN:  - Continue qMWF iHD schedule, no iHD today (Tues)  - UF as tolerated  - No current need for ROGELIO  - Renal diet  - Continue phos  binders WM  - Continue off of all BP meds and diuretics for now  - PRN NS boluses for symptomatic hypotension  - Limit sedating meds if possible  - No dietary protein restrictions  - Dose all meds per ESRD  - Outpt access center appointment rescheduled  - Palliative care has consulted; pt is still a full code  - Pt at high risk for further morbidity/mortality  - DC planning underway for SNF  - On Veltassa to 16.8grams daily to help with control of hyperkalemia; can decrease/stop if serum K+ <4  - Follow labs    Thank you,

## 2022-07-20 PROBLEM — S61.402A EXTENSOR TENDON LACERATION OF LEFT HAND WITH OPEN WOUND: Status: ACTIVE | Noted: 2022-01-01

## 2022-07-20 PROBLEM — S66.822A EXTENSOR TENDON LACERATION OF LEFT HAND WITH OPEN WOUND: Status: ACTIVE | Noted: 2022-01-01

## 2022-07-20 NOTE — DISCHARGE PLANNING
Case Management Discharge Planning    Admission Date: 6/24/2022  GMLOS: 4.3  ALOS: 26    6-Clicks ADL Score: 15  6-Clicks Mobility Score: 13  PT and/or OT Eval ordered: Yes  Post-acute Referrals Ordered: Yes  Post-acute Choice Obtained: Yes  Has referral(s) been sent to post-acute provider:  Yes      Anticipated Discharge Dispo: Discharge Disposition: D/T to SNF with Medicare cert in anticipation of skilled care (03)    DME Needed: No    Action(s) Taken: Updated Provider/Nurse on Discharge Plan    Escalations Completed: None    Medically Clear: Yes    Next Steps: Patient discussed with MD, is medically cleared for dc today. CM confirmed with DPA, [facility] is still pending insurance authorization for SNF. CM made Dr. Longoria aware.    Barriers to Discharge: Pending Placement    Is the patient up for discharge tomorrow: No

## 2022-07-20 NOTE — THERAPY
Occupational Therapy  Daily Treatment     Patient Name: Jannet Bruno  Age:  81 y.o., Sex:  female  Medical Record #: [id number]  Today's Date: 7/20/2022       Precautions: Fall Risk, Swallow Precautions ( See Comments)  Comments: LUE edema and pain    Assessment    Pt seen for OT tx. Continues to be limited by decreased activity tolerance, balance deficits, inattention and poor safety awareness impacting ability to complete ADLs and ADL transfers independently. Continues to c/o pain in LUE w/ edema present. Pt w/ extra time able to complete full ROM but limited d/t pain. Will continue to benefit from OT services while in house.     Plan    Continue current treatment plan.    DC Equipment Recommendations: Unable to determine at this time  Discharge Recommendations: Recommend post-acute placement for additional occupational therapy services prior to discharge home       07/19/22 1115   Cognition    Cognition / Consciousness X   Safety Awareness Impaired   New Learning Impaired   Active ROM Upper Body   Active ROM Upper Body  X   Comments LUE limited d/t pain and edema. Able to complete full ROM w/ extra time to assist w/ ADLs   Strength Upper Body   Upper Body Strength  X   Gross Strength Generalized Weakness, Equal Bilaterally.    Comments LUE weaker   Balance   Sitting Balance (Static) Fair   Sitting Balance (Dynamic) Fair -   Weight Shift Sitting Fair   Bed Mobility    Supine to Sit Supervised   Sit to Supine Minimal Assist   Activities of Daily Living   Grooming Minimal Assist;Seated   Upper Body Dressing Moderate Assist   Lower Body Dressing Maximal Assist   Toileting Maximal Assist   Functional Mobility   Comments did not assess this session   Short Term Goals   Short Term Goal # 1 LB dressing with SPV   Goal Outcome # 1 Progressing slower than expected   Short Term Goal # 2 seated G/h with SPV for >20 min   Goal Outcome # 2 Progressing slower than expected   Short Term Goal # 3 BSC txf with min A   Goal  Outcome # 3 Progressing slower than expected   Anticipated Discharge Equipment and Recommendations   DC Equipment Recommendations Unable to determine at this time   Discharge Recommendations Recommend post-acute placement for additional occupational therapy services prior to discharge home

## 2022-07-20 NOTE — THERAPY
Missed Therapy     Patient Name: Jannet Bruno  Age:  81 y.o., Sex:  female  Medical Record #: [id number]  Today's Date: 7/20/2022 07/20/22 1152   Interdisciplinary Plan of Care Collaboration   Collaboration Comments Attempted therapy follow up session. Pt currently off the floor at dialysis. Will follow up as able.

## 2022-07-20 NOTE — PROGRESS NOTES
[facility] -  PROGRESS NOTE               Author: Michael Begum N.P. 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 Pt is very lethargic at this time and unable to provide much history, majority of the history was obtained through review of the medical record and discussion with primary svc. Pt had reported increased LE edema and fatigue and weakness and worsening of her left arm edema so she came to the ED for evaluation. She apparently has missed her last 2-3 outpt dialysis treatments. Per regular outpt Nephrologist Dr. Gates, she has been non-compliant with her fluid restriction and was told that she needed 4x/week dialysis until her volume status could be optimized but she was non-compliant with that recommendation. She has edema of her left arm where her AVF is located and there is concern for a central stenosis that could be the etiology. She had an appointment at the outpt access center to evaluate for central stenosis and undergo angioplasty if needed on 6/9/22 but pt did not go to the appointment. She has not had any other procedures done to the arm in the past couple of months. She had a left arm us done today that showed no DVT and patent AVF and soft tissue edema. She apparently received pain medication fentanyl and dilaudid as well as benadryl earlier today and she is very drowsy.      DAILY NEPHROLOGY SUMMARY:  6/25: rapid response overnight for severe leg pain, pt very lethargic today, moans with palpation of legs, arouses but does not answer questions and falls back asleep, tolerated HD yest with 2.5L UF, BP stable overnight but low this am  6/26: No events, BP low overnight and this am, more alert, reports pain in legs for the past 3 weeks,  denies any CP/SOB/Abd pain, reports left arm edema a little better but no pain in left arm   6/27: Pt resting in bed, subdued, Bps soft, on 4L supp O2 via NC, labs reviewed, due for HD  6/28: pt laying in bed, sleepy, c/o pain in legs, moaning, vascular assessed and does not recommend any intervention on legs or AVF, tolerated iHD yesterday with UF:2.8L  6/29: Pt in bed, fatigued, no complaints.  VSS 1L NC.    6/30: no new complaints, no overnight events. Tolerated hd yesterday, UF 3.5L , she is in negative balance.   7/1: Resting in bed, no complaints.  VSS 2L NC. No new labs today.   7/2: Pt sitting up in bed, confused, CNA helping her with breakfast meal, iHD yesterday with 1.5L net UF limited by muscle cramping, labs reviewed, VSS on 1L NC O2  7/3: Pt slumped in bed, somnolent, medical floor status, VSS on RA  7/4: No new overnight renal events. S/p HD yesterday and tolerated well. Net UF was 1.5 L.  7/5: S/p HD yesterday with net UF of 2 L. No new overnight renal events.   7/6: No new overnight renal events.   7/7: S/p HD yesterday with net UF of 2.2L and tolerated well. No new overnight renal events.   7/8: No new overnight renal events.   7/9: S/p HD yesterday with net UF of 3L and tolerated well. No new overnight renal events.   7/10: No new overnight renal events. K+ 6.9 this am.  7/11: No events, HD yest for hyperkalemia, K 5.9 this am, denies any CP/SOB but reports leg pain, BP stable, seen and examined on HD this am--VSS see dialysis flowsheet for full details  7/12: No events, awake and alert, c/o bilateral leg pain, denies any CP/SOB, BP stable  7/13: No events, seen on dialysis this am, still c/o leg pain, denies any CP/SOB, BP stable  7/14: No events, feels tired, c/o leg pain, denies any CP/SOB, tolerated HD yest with 2L UF  7/15: No events, BP stable, seen on HD, still complaining of leg pain, no CP/SOB  7/16: No events, tolerated HD yest with 3L UF, BP stable, complaining of soft stools and left  "arm pain and leg pain, no CP/SOB/Abd pain  7/17: No overnight events, VSS no CP SOB worsening edema. Na 131 K 5.2, 2L NC.  Nurse reports she was fatigued and disoriented this morning, but has improved.  Currently arouses easily, is oriented.   7/18: Pt seen on HD, says her back hurts, VSS on 2L NC O2, K+ 5.8 this AM  7/19: Pt sitting up in bed. No overnight events. Tolerated HD yesterday with 3L UF, BP stable. K this am better 4.2. No CP/N/V/D.   07/20: Seen during HD, tolerated well. VSS on 2L NC. Denies SOB, N/V/D. Stated left lower hand pain, open blisters noted, says also her back hurts.     REVIEW OF SYSTEMS:    10 point ROS reviewed and is as per HPI/daily summary or otherwise negative    PMH/PSH/SH/FH:   Reviewed and unchanged since admission note    CURRENT MEDICATIONS:   Reviewed from admission to present day    VS:  BP (!) 140/74   Pulse 86   Temp 36.6 °C (97.8 °F)   Resp 17   Ht 1.626 m (5' 4\")   Wt 63.1 kg (139 lb 1.8 oz)   SpO2 96%   BMI 23.88 kg/m²     Physical Exam  Vitals and nursing note reviewed.   Constitutional:       General: She is not in acute distress.     Appearance: She is ill-appearing.   HENT:      Head: Normocephalic and atraumatic.      Mouth/Throat:      Mouth: Mucous membranes are dry.   Eyes:      General: No scleral icterus.  Cardiovascular:      Rate and Rhythm: Normal rate and regular rhythm.      Pulses: Normal pulses.      Heart sounds: No murmur heard.  Pulmonary:      Effort: Pulmonary effort is normal. No respiratory distress.   Abdominal:      General: Bowel sounds are normal. There is no distension.      Palpations: Abdomen is soft.   Musculoskeletal:         General: Swelling (left arm) present. No deformity.      Right lower leg: Edema present.      Left lower leg: Edema present.      Comments: Trace LE edema  LUE AVF + bruit and Thrill   Skin:     General: Skin is warm and dry.      Findings: No rash.      Comments: Left lower fingers w/ open blisters. Drainage " present.    Neurological:      General: No focal deficit present.      Mental Status: She is alert and oriented to person, place, and time.   Psychiatric:         Mood and Affect: Mood normal.         Behavior: Behavior normal.           Fluids:  In: 750 [P.O.:750]  Out: -     LABS:  Recent Labs     07/18/22  0559 07/19/22  0605 07/20/22  0252   SODIUM 132* 135 133*   POTASSIUM 5.8* 4.2 4.8   CHLORIDE 90* 95* 91*   CO2 26 26 27   GLUCOSE 99 99 116*   BUN 64* 36* 57*   CREATININE 5.61* 3.79* 4.56*   CALCIUM 9.7 9.1 9.6     Reviewed.    IMAGING:   All imaging reviewed from admission to present day    IMPRESSION:  # ESRD, dependent on HD              - HD via LUE AVF, missed 2-3 outpt treatments prior to admit   # Hyperkalemia   - On 16.8 g patiromer daily                - Resolved  # Fluid overload, improving              - Secondary to non-compliance with fluid restriction and HD treatments  # Left arm edema, slow improvement              - CTA upper ext 6/26 w/o e/o focal stenosis + extensive edema             - AVF patent on left arm us and no DVT             - Pt no showed to outpt appt to evaluate              - Vascular does not recommend intervention   # Altered Mental Status, waxes/wanes             - Monitor  # HTN, variable control, stable at this time             - Goal BP < 140/90             - Not on BP meds   # Anemia of CKD, at goal              - Goal Hgb 10-11             - Iron 27             -TIBC 142             -%Sat 19             - Ferritin 1419  # CKD-MBD             - Ca 9.7             - PO4 5.2             - Managed at OP unit             - On calcitriol and sevelamer   # DM II--management per primary svc  # Leg Pain--chronic skin changes and subcutaneous tissue firm to touch             - Unclear etiology             - Vascular does not recommend any intervention   - On ropinirole   # Leukocytosis, decreased    - Check cultures if any fevers     PLAN:  - Continue qMWF iHD schedule, iHD  today (Weds)  - UF as tolerated  - Recommend Wound Consult for left lower hand w/ opened blisters, drainage present. Will defer to Primary team.   - No current need for ROGELIO  - Renal diet  - Continue phos binders WM  - Continue off of all BP meds and diuretics for now. BP stable during HD.   - PRN NS boluses for symptomatic hypotension  - Limit sedating meds if possible  - No dietary protein restrictions  - Dose all meds per ESRD  - Outpt access center appointment rescheduled  - Palliative care has consulted; pt is still a full code  - Pt at high risk for further morbidity/mortality  - DC planning underway for SNF.  - Okay to discharge home from a Nephrology standpoint once medically cleared. Pt to f/u outpatient HD clinic.   - Continue w/  Veltassa to 16.8grams daily to help with control of hyperkalemia; can decrease/stop if serum K+ <4  - Follow labs    Thank you,

## 2022-07-20 NOTE — CARE PLAN
The patient is Stable - Low risk of patient condition declining or worsening    Problem: Pain - Standard  Goal: Alleviation of pain or a reduction in pain to the patient’s comfort goal  Outcome: Progressing     Problem: Fall Risk  Goal: Patient will remain free from falls  Outcome: Progressing     Problem: Respiratory  Goal: Patient will achieve/maintain optimum respiratory ventilation and gas exchange  Outcome: Progressing     Shift Goals  Clinical Goals: pain control, fall prevention, BS monitor and adequate oxygenation  Patient Goals: pain control, comfort and sleep  Family Goals: parveen    Progress made toward(s) clinical / shift goals:  Pt medicated per MAR and well tolerated. Pt remained free of fall and adequate oxygenation maintained. Pt stable.

## 2022-07-20 NOTE — DISCHARGE PLANNING
Agency/Facility Name: [facility]  Spoke To: Milagros  Outcome: Will check to see if insurance auth has been received.  Milagros will call this DPA back.    @[id number]  Agency/Facility Name: [facility]  Spoke To: Milagros  Outcome: Milagros called to check on the pt's dialysis days and time.  DPA stated per note:   Confirmed patient is active at: [facility]     Schedule: Monday, Wednesday, Friday Time: 6:15am   Milagros will call this DPA back.    @[phone]  Agency/Facility Name: [facility]  Spoke To: Milagros  Outcome: Insurance auth was submitted this morning.  Referral was held and insurance auth was not started due to dialysis chair time.   The facility had some discharges and they are able to accommodate the pt's dialysis chair time.  Auth requested this AM.  Milagros will follow up with the pt's RN MIKE.

## 2022-07-20 NOTE — PROGRESS NOTES
"[facility] Progress Note      HD today x 3.5 hours per Dr. Peres I.  Initiated at 0804 and ended at 1134.   Patient assessed prior tx.  Alert and oriented x 4. Verbalized L  hand pain, tolerable. Elevated Larm.   KINGA AVF with bruit and thrill pre HD.     Patient tolerated treatment. VSS. Patient in severe L hand pain. Wound present. On each fingers. KULWANT Peres APRN and JUAN MANUEL Begum APRN aware.   Checked  mg/dL post dialysis because patient said, \" I feel sick\"  Primary Joslyn Lai on patient's status during tx, patient asking for  pain medication once back to floors and wound consult.      UF Net: 3,000 mL      Blood returned. Applied gauze and held KINGA AVF site for 10 minutes. Verified no bleeding post treatment. Bruit and thrill present post dialysis.   Instructions given to Primary RN that if bleeding occurs on the AVF site, change dressing and held the site with pressure.       Report given to Primary LIZZIE Bray RN.  "

## 2022-07-20 NOTE — WOUND TEAM
[facility]  Inpatient Services  Initial Wound and Skin Care Evaluation    Admission Date: 2022     Last order of IP CONSULT TO WOUND CARE was found on 2022 from Hospital Encounter on 2022     HPI, PMH, SH: Reviewed    Past Surgical History:   Procedure Laterality Date   • AV FISTULA CREATION Left 2/15/2019    Procedure: AV FISTULA CREATION-  UPPER EXTREMITY BRACHIAL CEPHALIC BANDING;  Surgeon: Fernie Nazario M.D.;  Location: SURGERY [facility];  Service: General   • HIP CANNULATED SCREW Right 2017    Procedure: HIP CANNULATED SCREW;  Surgeon: Ar Huerta M.D.;  Location: SURGERY [facility];  Service:    • CATARACT PHACO WITH IOL  2014    Performed by Rudolph Barclay M.D. at SURGERY SAME DAY [facility]   • CATARACT PHACO WITH IOL  2014    Performed by Rudolph Barclay M.D. at SURGERY SAME DAY [facility]   • VENTRAL HERNIA REPAIR LAPAROSCOPIC  3/7/2012    Performed by HUNTER SERNA at SURGERY SAME DAY [facility]   • APPENDECTOMY     • GYN SURGERY      hysterectomy   • OTHER      T&A   • OTHER ABDOMINAL SURGERY      abd tramua- horse stepped on abd   • OTHER ORTHOPEDIC SURGERY      R & l Shoulder repair     Social History     Tobacco Use   • Smoking status: Former Smoker     Packs/day: 1.00     Years: 20.00     Pack years: 20.00     Types: Cigarettes     Quit date: 3/5/1979     Years since quittin.4   • Smokeless tobacco: Never Used   Substance Use Topics   • Alcohol use: No     Chief Complaint   Patient presents with   • Peripheral Edema   • Weakness     BIBA from home for weakness, nausea, L arm pain  HX of dialysis with L arm fistula, pt states she has missed 3 dialysis days due to being so weak she cannot drive or walk now  3 weeks ago had a procedure done to her fistula which has lead to L arm swelling     Diagnosis: ESRD (end stage renal disease) on dialysis (HCC) [N18.6, Z99.2]    Unit where seen by Wound Team: S626/02     WOUND  CONSULT/FOLLOW UP RELATED TO:  L hand     WOUND HISTORY:  Per HPI: 81 y.o. female with PMHx ESRD on HD MWF, HTN, HLD, and T2DM who was admitted on 6/24/2022 for left upper arm swelling and lower extremity edema after missing HD sessions.  X-rays of the left hand showed only chronic changes.  AV fistula was functioning well.  CTA showed no evidence of central stenosis.  Vascular surgery was consulted and only recommended supportive care such as wrapping the arm with Ace bandage.  She was also found to have severe aortic stenosis for which cardiology was consulted, and gave the opinion that she is not a candidate for intervention.  She was dialyzed while in the hospital with subsequent clinical improvement.  She did have hospital delirium while in the hospital, which has since resolved.  Palliative care was consulted, but patient continued to wish to be full code.  SNF placement was pursued, but proved to be challenging as transfer to dialysis is not always facilitated.  Case management is working on her discharge to SNF and arrangement of hemodialysis.    WOUND ASSESSMENT/LDA      Wound 07/20/22 Soft Tissue Necrosis Hand Left (Active)   Wound Image       Site Assessment Red;Tan;Light Purple    Periwound Assessment Painful;Edema    Margins Undefined edges    Closure None    Drainage Amount Moderate    Drainage Description Serosanguineous;Tan    Treatments Site care    Wound Cleansing Normal Saline Irrigation    Periwound Protectant Adaptic    Dressing Cleansing/Solutions Not Applicable    Dressing Options Adaptic;Dry Roll Gauze    Dressing Changed Changed    Dressing Status Intact    Dressing Change/Treatment Frequency Daily, and As Needed    NEXT Dressing Change/Treatment Date 07/21/22    NEXT Weekly Photo (Inpatient Only) 07/27/22    Wound Odor Mild    Exposed Structures CHIP    Number of days: 0        Vascular:    Lab Values:    Lab Results   Component Value Date/Time    WBC 10.7 07/20/2022 02:52 AM    RBC 3.24 (L)  07/20/2022 02:52 AM    HEMOGLOBIN 10.7 (L) 07/20/2022 02:52 AM    HEMATOCRIT 33.5 (L) 07/20/2022 02:52 AM    CREACTPROT 7.07 (H) 06/25/2022 04:46 AM    HBA1C 11.7 (H) 06/24/2022 03:35 AM        Culture Results show:  No results found for this or any previous visit (from the past 720 hour(s)).    Pain Level/Medicated:  Pain with site care; will need premedication for ongoing care; lidocaine jelly ordered.       INTERVENTIONS BY WOUND TEAM:  Chart and images reviewed. Discussed with bedside RN. All areas of concern (based on picture review, LDA review and discussion with bedside RN) have been thoroughly assessed. Documentation of areas based on significant findings. This RN in to assess patient. Performed standard wound care which includes appropriate positioning, dressing removal and non-selective debridement. Pictures and measurements obtained weekly if/when required.  Preparation for Dressing removal: none, JULES  Non-selectively Debrided with:  NS and gauze.  Sharp debridement: NA; not tolerable d/t pain  Bebe wound: Cleansed with NS  Primary Dressing: adaptic; will include silver powder application for next change  Secondary (Outer) Dressing: roll gauze    Interdisciplinary consultation: Patient, Bedside RN, Pearl wound RN    EVALUATION / RATIONALE FOR TREATMENT:  Most Recent Date:  7/20: wounds on L hand with fluctuance on dorsal aspect indicating necrosis. X ray results did not indicate bony involvement, thus recommend CT for possible abscess. At this time, wound unlikely to resolve with local wound care if necrosis advances, recommend surgical evaluation. Wound team will continue to follow.     Goals: Steady decrease in wound area and depth weekly.    WOUND TEAM PLAN OF CARE ([X] for frequency of wound follow up,):   Nursing to follow dressing orders written for wound care. Contact wound team if area fails to progress, deteriorates or with any questions/concerns if something comes up before next scheduled  follow up (See below as to whether wound is following and frequency of wound follow up)  Dressing changes by wound team:                   Follow up 3 times weekly:                NPWT change 3 times weekly:     Follow up 1-2 times weekly:  X nursing to perform dsg/skin care; Wound team following.    Follow up Bi-Monthly:                   Follow up as needed:     Other (explain):     NURSING PLAN OF CARE ORDERS (X):  Dressing changes: See Dressing Care orders: x  Skin care: See Skin Care orders: x  RN Prevention Protocol: x  Rectal tube care: See Rectal Tube Care orders:   Other orders:    RSKIN:   CURRENTLY IN PLACE (X), APPLIED THIS VISIT (A), ORDERED (O):   Q shift Joe:  X  Q shift pressure point assessments:  X    Surface/Positioning   Pressure redistribution mattress      x      Low Airloss        x  Bariatric foam      Bariatric NICOLETTE     Waffle cushion        Waffle Overlay        x  Reposition q 2 hours    O  TAPs Turning system     Z Wil Pillow     Offloading/Redistribution   Sacral Mepilex (Silicone dressing)   O  Heel Mepilex (Silicone dressing)   O      Heel float boots (Prevalon boot)             Float Heels off Bed with Pillows           Respiratory   Silicone O2 tubing         Gray Foam Ear protectors     Cannula fixation Device (Tender )          High flow offloading Clip    Elastic head band offloading device      Anchorfast                                                         Trach with Optifoam split foam             Containment/Moisture Prevention     Rectal tube or BMS    Purwick/Condom Cath        López Catheter    Barrier wipes           Barrier paste       Antifungal tx      Interdry        Mobilization       Up to chair        Ambulate      PT/OT  x    Nutrition       Dietician        Diabetes Education      PO  x   TF     TPN     NPO   # days     Other        Anticipated discharge plans: TBD  LTACH:        SNF/Rehab:                  Home Health Care:           Outpatient Wound  Center:            Self/Family Care:        Other:                  Vac Discharge Needs:   Not Applicable Pt not on a wound vac:     x  Regular Vac while inpatient, alternative dressing at DC:        Regular Vac in use and continued at DC:            Reg. Vac w/ Skin Sub/Biologic in use. Will need to be changed 2x wkly:      Veraflo Vac while inpatient, ok to transition to Regular Vac on Discharge:           Veraflo Vac while inpatient, will need to remain on Veraflo Vac upon discharge:

## 2022-07-20 NOTE — PROGRESS NOTES
Received report from day shift RN. Pt met lying in bed awake and calm on taking over the shift. A/O X3-4, breathing unlabored and pt in no acute respiratory distress. AV fistula KINGA intact. BLE and L arm edema noted. Call light within reach and safety precautions maintained. V/signs WNL.

## 2022-07-20 NOTE — PROGRESS NOTES
Pt sleeping comfortably but easily arousable. Due meds were given and well tolerated. Call light within reach and  safety precautions maintained. Pt for HD today. Report given to day shift RN.

## 2022-07-21 NOTE — ASSESSMENT & PLAN NOTE
She has been having worsening of her wound on her left hand. COmpleted course of ancef. Now, hand wound healing well, no further signs of infection.X-ray did not show any acute abnormalities.  CT scan of her left hand and it did not show any fluid collection.  Skin biopsy showed no evidence of calciphylaxis or malignancy, and only showed acute inflammation.  Wound care service following.  Continue wound care.

## 2022-07-21 NOTE — THERAPY
Occupational Therapy  Daily Treatment     Patient Name: Jannet Bruno  Age:  81 y.o., Sex:  female  Medical Record #: [id number]  Today's Date: 7/21/2022       Precautions: Fall Risk, Swallow Precautions ( See Comments)  Comments: LUE edema and pain    Assessment    Pt seen for OT tx. Continues to be limited by decreased activity tolerance, balance deficits and inattention impacting ability to complete ADLs and ADL transfers independently. Pt disoriented requiring cues for redirection during session. Encouraged pt to mobilize w/ nrsg staff as tolerated. Min A seated grooming. LUE limited by edema and pain. Pt w/ new bandage on L hand and perseverating on wanting her bandage removed. Will continue to benefit from OT services while in house.     Plan    Continue current treatment plan.    DC Equipment Recommendations: Unable to determine at this time  Discharge Recommendations: Recommend post-acute placement for additional occupational therapy services prior to discharge home       07/21/22 0931   Cognition    Cognition / Consciousness X   Safety Awareness Impaired   New Learning Impaired   Active ROM Upper Body   Active ROM Upper Body  X   Comments LUE limited d/t pain but able to move w/ extra time. able to complete full ROM and assist w/ light ADLs   Strength Upper Body   Upper Body Strength  X   Comments LUE weaker than RUE   Balance   Sitting Balance (Static) Fair   Sitting Balance (Dynamic) Fair -   Weight Shift Sitting Fair   Bed Mobility    Supine to Sit Standby Assist   Sit to Supine Standby Assist   Activities of Daily Living   Grooming Minimal Assist;Seated   Upper Body Dressing Moderate Assist   Lower Body Dressing Maximal Assist   Toileting Maximal Assist   Functional Mobility   Comments did not assess pt perseverating on wanting breakfast and wanted to return to supine   Short Term Goals   Short Term Goal # 1 LB dressing with SPV   Goal Outcome # 1 Progressing slower than expected   Short Term Goal  # 2 seated G/h with SPV for >20 min   Goal Outcome # 2 Progressing slower than expected   Short Term Goal # 3 BSC txf with min A   Goal Outcome # 3 Progressing slower than expected   Anticipated Discharge Equipment and Recommendations   DC Equipment Recommendations Unable to determine at this time   Discharge Recommendations Recommend post-acute placement for additional occupational therapy services prior to discharge home

## 2022-07-21 NOTE — CARE PLAN
The patient is Watcher - Medium risk of patient condition declining or worsening    Shift Goals  Clinical Goals: pain control  Patient Goals: pain control, rest and sleep  Family Goals: parveen    Progress made toward(s) clinical / shift goals:      Problem: Pain - Standard  Goal: Alleviation of pain or a reduction in pain to the patient’s comfort goal  Outcome: Progressing     Problem: Knowledge Deficit - Standard  Goal: Patient and family/care givers will demonstrate understanding of plan of care, disease process/condition, diagnostic tests and medications  Outcome: Progressing     Problem: Skin Integrity  Goal: Skin integrity is maintained or improved  Outcome: Progressing     Problem: Fall Risk  Goal: Patient will remain free from falls  Outcome: Progressing       Patient is not progressing towards the following goals:NA

## 2022-07-21 NOTE — PROGRESS NOTES
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 Pt is very lethargic at this time and unable to provide much history, majority of the history was obtained through review of the medical record and discussion with primary svc. Pt had reported increased LE edema and fatigue and weakness and worsening of her left arm edema so she came to the ED for evaluation. She apparently has missed her last 2-3 outpt dialysis treatments. Per regular outpt Nephrologist Dr. Gates, she has been non-compliant with her fluid restriction and was told that she needed 4x/week dialysis until her volume status could be optimized but she was non-compliant with that recommendation. She has edema of her left arm where her AVF is located and there is concern for a central stenosis that could be the etiology. She had an appointment at the outpt access center to evaluate for central stenosis and undergo angioplasty if needed on 6/9/22 but pt did not go to the appointment. She has not had any other procedures done to the arm in the past couple of months. She had a left arm us done today that showed no DVT and patent AVF and soft tissue edema. She apparently received pain medication fentanyl and dilaudid as well as benadryl earlier today and she is very drowsy.      DAILY NEPHROLOGY SUMMARY:  6/25: rapid response overnight for severe leg pain, pt very lethargic today, moans with palpation of legs, arouses but does not answer questions and falls back asleep, tolerated HD yest with 2.5L UF, BP stable overnight but low this am  6/26: No events, BP low overnight and this am, more alert, reports pain in legs for the past 3 weeks,  denies any CP/SOB/Abd pain, reports left arm edema a little better but no pain in left arm   6/27: Pt resting in bed, subdued, Bps soft, on 4L supp O2 via NC, labs reviewed, due for HD  6/28: pt laying in bed, sleepy, c/o pain in legs, moaning, vascular assessed and does not recommend any intervention on legs or AVF, tolerated iHD yesterday with UF:2.8L  6/29: Pt in bed, fatigued, no complaints.  VSS 1L NC.    6/30: no new complaints, no overnight events. Tolerated hd yesterday, UF 3.5L , she is in negative balance.   7/1: Resting in bed, no complaints.  VSS 2L NC. No new labs today.   7/2: Pt sitting up in bed, confused, CNA helping her with breakfast meal, iHD yesterday with 1.5L net UF limited by muscle cramping, labs reviewed, VSS on 1L NC O2  7/3: Pt slumped in bed, somnolent, medical floor status, VSS on RA  7/4: No new overnight renal events. S/p HD yesterday and tolerated well. Net UF was 1.5 L.  7/5: S/p HD yesterday with net UF of 2 L. No new overnight renal events.   7/6: No new overnight renal events.   7/7: S/p HD yesterday with net UF of 2.2L and tolerated well. No new overnight renal events.   7/8: No new overnight renal events.   7/9: S/p HD yesterday with net UF of 3L and tolerated well. No new overnight renal events.   7/10: No new overnight renal events. K+ 6.9 this am.  7/11: No events, HD yest for hyperkalemia, K 5.9 this am, denies any CP/SOB but reports leg pain, BP stable, seen and examined on HD this am--VSS see dialysis flowsheet for full details  7/12: No events, awake and alert, c/o bilateral leg pain, denies any CP/SOB, BP stable  7/13: No events, seen on dialysis this am, still c/o leg pain, denies any CP/SOB, BP stable  7/14: No events, feels tired, c/o leg pain, denies any CP/SOB, tolerated HD yest with 2L UF  7/15: No events, BP stable, seen on HD, still complaining of leg pain, no CP/SOB  7/16: No events, tolerated HD yest with 3L UF, BP stable, complaining of soft stools and left  "arm pain and leg pain, no CP/SOB/Abd pain  7/17: No overnight events, VSS no CP SOB worsening edema. Na 131 K 5.2, 2L NC.  Nurse reports she was fatigued and disoriented this morning, but has improved.  Currently arouses easily, is oriented.   7/18: Pt seen on HD, says her back hurts, VSS on 2L NC O2, K+ 5.8 this AM  7/19: Pt sitting up in bed. No overnight events. Tolerated HD yesterday with 3L UF, BP stable. K this am better 4.2. No CP/N/V/D.   7/20: Seen during HD, tolerated well. VSS on 2L NC. Denies SOB, N/V/D. Stated left lower hand pain, open blisters noted, says also her back hurts.  7/21: HD yest UF 3L. Dtr at bedside. Pt c/o significant pain/burning in left hand.    REVIEW OF SYSTEMS:    10 point ROS reviewed and is as per HPI/daily summary or otherwise negative    PMH/PSH/SH/FH:   Reviewed and unchanged since admission note    CURRENT MEDICATIONS:   Reviewed from admission to present day    VS:  /62   Pulse 90   Temp 36.8 °C (98.3 °F) (Temporal)   Resp 16   Ht 1.626 m (5' 4\")   Wt 63.1 kg (139 lb 1.8 oz)   SpO2 95%   BMI 23.88 kg/m²     Physical Exam  Vitals and nursing note reviewed.   Constitutional:       General: She is not in acute distress.     Appearance: She is ill-appearing.   HENT:      Head: Normocephalic and atraumatic.      Mouth/Throat:      Mouth: Mucous membranes are dry.   Eyes:      General: No scleral icterus.  Cardiovascular:      Rate and Rhythm: Normal rate and regular rhythm.      Pulses: Normal pulses.      Heart sounds: No murmur heard.  Pulmonary:      Effort: Pulmonary effort is normal. No respiratory distress.   Abdominal:      General: Bowel sounds are normal. There is no distension.      Palpations: Abdomen is soft.   Musculoskeletal:         General: Swelling (left arm) present. No deformity.      Right lower leg: No edema.      Left lower leg: No edema.      Comments: Trace LE edema  LUE AVF + bruit and Thrill   Skin:     General: Skin is warm and dry.      " Findings: No rash.      Comments: Left hand gauze wrapped    Neurological:      General: No focal deficit present.      Mental Status: She is alert and oriented to person, place, and time.   Psychiatric:         Mood and Affect: Mood normal.         Behavior: Behavior normal.           Fluids:  In: 1100 [P.O.:600; Dialysis:500]  Out: 3500     LABS:  Recent Labs     07/20/22  0252 07/20/22  1428 07/21/22  0617   SODIUM 133* 136 137   POTASSIUM 4.8 4.1 4.5   CHLORIDE 91* 96 96   CO2 27 26 26   GLUCOSE 116* 250* 138*   BUN 57* 23* 35*   CREATININE 4.56* 2.54* 3.73*   CALCIUM 9.6 9.1 9.3     Reviewed.    IMAGING:   All imaging reviewed from admission to present day    IMPRESSION:  # ESRD, dependent on HD              - HD via LUE AVF, missed 2-3 outpt treatments prior to admit   # Hyperkalemia   - On 16.8 g patiromer daily                - Resolved  # Fluid overload, improving              - Secondary to non-compliance with fluid restriction and HD treatments  # Left arm edema, slow improvement              - CTA upper ext 6/26 w/o e/o focal stenosis + extensive edema             - AVF patent on left arm us and no DVT             - Pt no showed to outpt appt to evaluate              - Vascular does not recommend intervention   # Altered Mental Status, waxes/wanes             - Monitor  # HTN, variable control, stable at this time             - Goal BP < 140/90             - Not on BP meds   # Anemia of CKD, at goal              - Goal Hgb 10-11             - Iron 27             -TIBC 142             -%Sat 19             - Ferritin 1419  # CKD-MBD             - Ca 9.7             - PO4 5.2             - Managed at OP unit             - On calcitriol and sevelamer   # DM II--management per primary svc  # Leg Pain--chronic skin changes and subcutaneous tissue firm to touch             - Unclear etiology             - Vascular does not recommend any intervention   - On ropinirole   # Leukocytosis, decreased    - Check  cultures if any fevers     PLAN:  - No need for additional iHD today  - Continue qMWF iHD schedule   - UF as tolerated  - No need for ROGELOI  - Renal diet  - Continue phos binders WM  - Continue off of all BP meds and diuretics for now. BP stable during HD.   - PRN NS boluses for symptomatic hypotension ok, avoid maint IVFs  - Limit sedating meds if possible  - Gabapentin 100mg TID (max dose is 300 mg a day for ESRD)  - No dietary protein restrictions  - Dose all meds per ESRD  - Outpt access center appointment rescheduled  - Palliative care has consulted; pt is still a full code  - Pt at high risk for further morbidity/mortality  - DC planning underway for SNF.  - Okay to discharge home from a Nephrology standpoint once medically cleared. Pt to f/u outpatient HD clinic.   - Continue w/  Veltassa to 16.8grams daily to help with control of hyperkalemia; can decrease/stop if serum K+ <4  - Labs with further recommendations to follow     Thank you,

## 2022-07-21 NOTE — PROGRESS NOTES
Received report from day shift RN. Pt was being picked up by transport for CT of the left arm on taking over the shift. A/O X3-4, breathing unlabored and pt in no acute respiratory distress. AV fistula KINGA intact. BLE and L arm edema noted. Call light within reach and safety precautions maintained. Pt stable .

## 2022-07-21 NOTE — CARE PLAN
The patient is Stable - Low risk of patient condition declining or worsening    Shift Goals  Clinical Goals: pain control, adequate oxygenation, BS monitor, fall prevention  Patient Goals: pain control, rest and sleep  Family Goals:       Problem: Pain - Standard  Goal: Alleviation of pain or a reduction in pain to the patient’s comfort goal  Outcome: Progressing     Problem: Knowledge Deficit - Standard  Goal: Patient and family/care givers will demonstrate understanding of plan of care, disease process/condition, diagnostic tests and medications  Outcome: Progressing     Problem: Fall Risk  Goal: Patient will remain free from falls  Outcome: Progressing     Problem: Respiratory  Goal: Patient will achieve/maintain optimum respiratory ventilation and gas exchange  Outcome: Progressing      Progress made toward(s) clinical / shift goals:  Pt medicated per MAR and well tolerated. Pt remained free of fall and adequate oxygenation maintained. Pt stable. POC update provided.

## 2022-07-21 NOTE — PROGRESS NOTES
Pt returned from CT in stable condition. Pt made comfortable. V/signs WNL. RFA IV access intact. Pt denies pain at this time, call light within reach and safety precautions maintained.

## 2022-07-21 NOTE — THERAPY
"Physical Therapy   Daily Treatment     Patient Name: Jannet Bruno  Age:  81 y.o., Sex:  female  Medical Record #: [id number]  Today's Date: 7/21/2022     Precautions  Precautions: Fall Risk;Swallow Precautions ( See Comments)  Comments: LUE edema and pain    Assessment    Pt refusing mobility 2/2 missing her morning nap. Discussed pt's short term and long term PT goals. Pt stating she wishes to shower, ambulate \"infinite\" distances with FWW if needed, be able walk to the bathroom and take care of her . Encouraged pt to use BSC with RN staff as she is able to transfer, pt stating that would relieve a lot of her anxiety. In regards to taking care of her , pt stating she song not know where he is at the moment and does not think he will be home when she returns. Edu pt on self management and compensatory strategies with mobility while in hospital. Discussed performing ankle pumps, heel slides, L wrist/fingers/elbow ROM to prevent further skin break down and contractures. Will continue to follow.     Plan    Continue current treatment plan.    DC Equipment Recommendations: Unable to determine at this time  Discharge Recommendations: Recommend post-acute placement for additional physical therapy services prior to discharge home      Subjective    \"Not until after I take a nap.\" Regarding deferring mobility      Objective     07/21/22 1458   Vitals   O2 (LPM) 1   O2 Delivery Device Silicone Nasal Cannula   Pain 0 - 10 Group   Therapist Pain Assessment Post Activity Pain Same as Prior to Activity;Nurse Notified  (pain L hand only with movement)   Cognition    Cognition / Consciousness X   Level of Consciousness Alert   Safety Awareness Impaired   New Learning Impaired   Attention Impaired   Comments Pleasant and receptive to edu, however, refusing mobility 2/2 nap   Other Treatments   Other Treatments Provided Pt refusing mobility 2/2 missing her morning nap. Discussed pt's short term and long term PT " "goals. Pt stating she wishes to shower, ambulate \"infinite\" distances with FWW if needed, be able walk to the bathroom and take care of her . Encouraged pt to use BSC with RN staff as she is able to transfer, pt stating that would relieve a lot of her anxiety. In regards to taking care of her , pt stating she song not know where he is at the moment and does not think he will be home when she returns. Edu pt on self management and compensatory strategies with mobility while in hospital.   Activity Tolerance   Comments limited 2/2 volition   Short Term Goals    Short Term Goal # 1 Pt will perform bed mobility with supervision in 6 visits with HOB flat, no rail.   Goal Outcome # 1   (previously met)   Short Term Goal # 2 Pt will transfer with supervision in 6 visits to improve functional indep.   Goal Outcome # 2 Goal not met   Short Term Goal # 3 Pt will ambulate x 50 feet using FWW with supervision in 6 visits to improve functional indep.   Goal Outcome # 3 Goal not met   Education Group   Education Provided Exercises - Supine   Exercises - Supine Patient Response Patient;Acceptance;Explanation;Demonstration;Verbal Demonstration  (ankle pumps, heel slides, L wrist/fingers/elbow ROM)   Anticipated Discharge Equipment and Recommendations   DC Equipment Recommendations Unable to determine at this time   Discharge Recommendations Recommend post-acute placement for additional physical therapy services prior to discharge home   Interdisciplinary Plan of Care Collaboration   IDT Collaboration with  Nursing   Patient Position at End of Therapy In Bed;Bed Alarm On;Call Light within Reach;Tray Table within Reach;Phone within Reach   Collaboration Comments RN updated   Session Information   Date / Session Number  7/21- 8 (2/3, 7/24) Attempt 7/20, edu only 7/21     "

## 2022-07-21 NOTE — PROGRESS NOTES
Pt sleeping comfortable but easily arousable. Due meds were given and well tolerated. Pt for possible I&D. NPO maintained. Report given to day shift RN.

## 2022-07-21 NOTE — PROGRESS NOTES
Hospital Medicine Daily Progress Note    Date of Service  7/20/2022    Chief Complaint  Edema    Hospital Course  81 y.o. female with PMHx ESRD on HD MWF, HTN, HLD, and T2DM who was admitted on 6/24/2022 for left upper arm swelling and lower extremity edema after missing HD sessions.  X-rays of the left hand showed only chronic changes.  AV fistula was functioning well.  CTA showed no evidence of central stenosis.  Vascular surgery was consulted and only recommended supportive care such as wrapping the arm with Ace bandage.  She was also found to have severe aortic stenosis for which cardiology was consulted, and gave the opinion that she is not a candidate for intervention.  She was dialyzed while in the hospital with subsequent clinical improvement.  She did have hospital delirium while in the hospital, which has since resolved.  Palliative care was consulted, but patient continued to wish to be full code.  SNF placement was pursued, but proved to be challenging as transfer to dialysis is not always facilitated.  Case management is working on her discharge to SNF and arrangement of hemodialysis.    Interval Problem Update    I evaluated and examined her at the bedside.  She found to have worsening of wound on her left hand.  I ordered x-ray of her left hand it did not show any acute abnormalities.  I discussed case with wound care and I ordered CT scan without contrast and she found to have fluid collection/abscess on CT scan.  She will need possible I&D.  I ordered wound culture and started her on antibiotic.  I discussed plan of care with bedside RN and with patient and answered all her questions.    Consultants/Specialty  cardiology, nephrology and vascular surgery    Code Status  Full Code    Disposition  Patient is medically cleared for discharge.   Anticipate discharge to to skilled nursing facility.  I have placed the appropriate orders for post-discharge needs.    Review of Systems  Review of Systems    Constitutional: Negative for chills, fever and weight loss.   HENT: Negative for hearing loss and tinnitus.    Eyes: Negative for blurred vision, double vision, photophobia and pain.   Respiratory: Negative for cough, sputum production and shortness of breath.    Cardiovascular: Negative for chest pain, palpitations, orthopnea and leg swelling.   Gastrointestinal: Negative for abdominal pain, constipation, diarrhea, nausea and vomiting.   Genitourinary: Negative for dysuria, frequency and urgency.   Musculoskeletal: Positive for back pain and myalgias. Negative for joint pain and neck pain.   Skin: Negative for rash.   Neurological: Negative for dizziness, tingling, tremors, sensory change, speech change, focal weakness and headaches.   Psychiatric/Behavioral: Negative for hallucinations and substance abuse.   All other systems reviewed and are negative.       Pertinent positives/negatives as mentioned above.     A complete review of systems was personally done by me. All other systems were negative.        Physical Exam  Temp:  [35.9 °C (96.7 °F)-36.9 °C (98.4 °F)] 36.9 °C (98.4 °F)  Pulse:  [82-98] 89  Resp:  [17-18] 18  BP: (121-140)/(59-74) 123/66  SpO2:  [96 %-100 %] 100 %    Physical Exam  Vitals reviewed.   Constitutional:       General: She is not in acute distress.     Appearance: Normal appearance. She is not ill-appearing.   HENT:      Head: Normocephalic and atraumatic.      Nose: No congestion.   Eyes:      General:         Right eye: No discharge.         Left eye: No discharge.      Pupils: Pupils are equal, round, and reactive to light.   Cardiovascular:      Rate and Rhythm: Normal rate and regular rhythm.      Pulses: Normal pulses.      Heart sounds: Murmur heard.   Pulmonary:      Effort: Pulmonary effort is normal. No respiratory distress.      Breath sounds: Normal breath sounds. No stridor.   Abdominal:      General: Bowel sounds are normal. There is no distension.      Palpations: Abdomen  is soft.      Tenderness: There is no abdominal tenderness.   Musculoskeletal:         General: Swelling, tenderness and deformity present. Normal range of motion.      Cervical back: Normal range of motion. No rigidity.   Skin:     General: Skin is warm.      Capillary Refill: Capillary refill takes less than 2 seconds.      Coloration: Skin is not jaundiced or pale.      Findings: Erythema and lesion present. No bruising.   Neurological:      General: No focal deficit present.      Mental Status: She is alert and oriented to person, place, and time.      Cranial Nerves: No cranial nerve deficit.   Psychiatric:         Mood and Affect: Mood normal.         Behavior: Behavior normal.         Fluids    Intake/Output Summary (Last 24 hours) at 7/20/2022 2001  Last data filed at 7/20/2022 1455  Gross per 24 hour   Intake 860 ml   Output 3500 ml   Net -2640 ml       Laboratory  Recent Labs     07/20/22  0252   WBC 10.7   RBC 3.24*   HEMOGLOBIN 10.7*   HEMATOCRIT 33.5*   .4*   MCH 33.0   MCHC 31.9*   RDW 67.0*   PLATELETCT 223   MPV 8.5*     Recent Labs     07/19/22  0605 07/20/22  0252 07/20/22  1428   SODIUM 135 133* 136   POTASSIUM 4.2 4.8 4.1   CHLORIDE 95* 91* 96   CO2 26 27 26   GLUCOSE 99 116* 250*   BUN 36* 57* 23*   CREATININE 3.79* 4.56* 2.54*   CALCIUM 9.1 9.6 9.1                   Imaging  CT-HAND W/O LEFT   Final Result      1.  No acute fracture or dislocation.      2.  Previous distal ulnar fracture identified.      3.  Osteoarthritis is most prominent at the first carpometacarpal joint.      4.  No bone erosions identified.      5.  Fluid collection or soft tissue abscess is identified.      6.  Induration in subcutaneous fat and intramuscular fat planes is noted which could be due to edema or inflammation such as cellulitis.      DX-HAND 3+ LEFT   Final Result      1. No acute fracture or subluxation.   2. Chronic posttraumatic changes in the distal left radius and ulna, stable when compared with  the prior study.   3. Stable osteoarthritis involving the left first carpometacarpal joint.      DX-HAND 3+ LEFT   Final Result      1.  No acute fracture or dislocation.   2.  Scattered mild degenerative changes, increased from prior study. No definite erosive arthropathy.   3.  Osteopenia.   4.  Old ulnar styloid process fracture.      DX-CHEST-PORTABLE (1 VIEW)   Final Result      1.  Probable 12 mm right mid/upper lung zone mass. Suggest CT chest without contrast for further assessment      2.  Interstitial pulmonary edema or fibrosis      3.  Enlarged cardiac silhouette      4.  Left pleural effusion      CT-CTA UPPER EXT WITH & W/O-POST PROCESS LEFT   Final Result      1.  Status post brachiocephalic fistula in the left arm. No focal stenosis is identified.      2.  Extensive edema in the subcutaneous tissues of the visualized left lateral chest and arm.      EC-ECHOCARDIOGRAM COMPLETE W/O CONT   Final Result      US-ZAHRAA SINGLE LEVEL BILAT   Final Result      US-EXTREMITY ARTERY LOWER BILAT   Final Result      CT-EXTREMITY, UPPER W/O LEFT   Final Result      1.  There is diffuse subcutaneous edema of the imaged portions of the left upper extremity. There is also diffuse body wall edema in the visualized portions of the chest and abdomen.   2.  No focal fluid collection to suggest abscess.   3.  There is a small left pleural effusion and minimal fluid in the abdomen.   4.  There is postsurgical change of AV fistula. There is small vessel atherosclerosis.      US-EXTREMITY VENOUS UPPER UNILAT LEFT   Final Result      DX-CHEST-PORTABLE (1 VIEW)   Final Result         1.  Interstitial pulmonary parenchymal prominence suggest chronic underlying lung disease, component of interstitial edema and/or infiltrates not excluded.   2.  Small left pleural effusion   3.  Cardiomegaly   4.  Atherosclerosis           Assessment/Plan  * ESRD (end stage renal disease) on dialysis (HCC)- (present on admission)  Assessment &  Plan  - Continue hemodialysis per nephrology.  -Continue sevelamer and calcitriol.    Extensor tendon laceration of left hand with open wound  Assessment & Plan  She has been having worsening of her wound on her left hand.  I ordered wound culture and started her on IV Ancef.  I ordered x-ray and it did not show any acute abnormalities.  I ordered CT scan of her left hand and it did not show any fluid collection.  I discussed plan of care with wound care.    Hypothyroidism- (present on admission)  Assessment & Plan  - Continue Synthroid.    Delirium  Assessment & Plan  - Resolved.    Aortic stenosis, severe- (present on admission)  Assessment & Plan  - Not a candidate for intervention per cardiology.  -Continue to monitor volume status.    Pain in both lower extremities- (present on admission)  Assessment & Plan  - Chronic.  Continue as needed pain medications.    Goals of care, counseling/discussion- (present on admission)  Assessment & Plan  -palliative care consulted, patient wished to remain full code.    Edema of left upper extremity- (present on admission)  Assessment & Plan  - Chronic, at the same site of AV fistula which is functioning well.  -Multifactorial: Poor EF, lymphedema, outflow stenosis.  CTA showed no evidence of central stenosis.  Vascular surgery recommended supportive care with wrapping arm with Ace bandage.  -Continue to monitor.    Generalized weakness- (present on admission)  Assessment & Plan  - SNF placement being pursued.  Placement challenging due to need for transportation back and forth to hemodialysis.  CM/SW on board.    Diabetes mellitus, type II (HCC)- (present on admission)  Assessment & Plan  - Last HbA1c was 11.7.  Continue Lantus 5 units at night, along with sliding scale insulin coverage.  Continue Accu-Cheks before meals and at bedtime. Goal to keep BG between 140-180 per 2019 ADA guidelines.        Essential hypertension- (present on admission)  Assessment & Plan  -  Maintaining good blood pressure control, despite holding home antihypertensives.  Continue to monitor blood pressure trend closely.     I discussed plan of care with wound care team.  I discussed plan of care with bedside RN.    Time spend: 36 minutes. > 50 % time spend providing counseling / co ordination of care.          VTE prophylaxis: heparin ppx    I have performed a physical exam and reviewed and updated ROS and Plan today (7/20/2022). In review of last note, there are no changes except as documented above.

## 2022-07-22 NOTE — CARE PLAN
The patient is Stable - Low risk of patient condition declining or worsening    Shift Goals  Clinical Goals: Free from falls  Patient Goals: Pain control, rest  Family Goals: parveen    Progress made toward(s) clinical / shift goals: Manage pain and comfort.    Patient is not progressing towards the following goals: Difficulty moving, c/o LUE pain.    Problem: Pain - Standard  Goal: Alleviation of pain or a reduction in pain to the patient’s comfort goal  Outcome: Progressing     Problem: Knowledge Deficit - Standard  Goal: Patient and family/care givers will demonstrate understanding of plan of care, disease process/condition, diagnostic tests and medications  Outcome: Progressing     Problem: Skin Integrity  Goal: Skin integrity is maintained or improved  Outcome: Progressing     Problem: Fall Risk  Goal: Patient will remain free from falls  Outcome: Progressing     Problem: Respiratory  Goal: Patient will achieve/maintain optimum respiratory ventilation and gas exchange  Outcome: Progressing

## 2022-07-22 NOTE — PROGRESS NOTES
[facility] -  PROGRESS NOTE               Author: THERESE Comer, CNN-NP Date & Time: 7/22/2022  1:39 PM     HPI:  81yoF with PMH significant for ESRD on HD MWF via left arm AVF, HTN, DM II, Anemia secondary to CKD, CKD Bone mineral disorder, admitted with increased edema and weakness and having missed her last last 2-3 outpt HD treatments. Pt is very lethargic at this time and unable to provide much history, majority of the history was obtained through review of the medical record and discussion with primary svc. Pt had reported increased LE edema and fatigue and weakness and worsening of her left arm edema so she came to the ED for evaluation. She apparently has missed her last 2-3 outpt dialysis treatments. Per regular outpt Nephrologist Dr. Gates, she has been non-compliant with her fluid restriction and was told that she needed 4x/week dialysis until her volume status could be optimized but she was non-compliant with that recommendation. She has edema of her left arm where her AVF is located and there is concern for a central stenosis that could be the etiology. She had an appointment at the outpt access center to evaluate for central stenosis and undergo angioplasty if needed on 6/9/22 but pt did not go to the appointment. She has not had any other procedures done to the arm in the past couple of months. She had a left arm us done today that showed no DVT and patent AVF and soft tissue edema. She apparently received pain medication fentanyl and dilaudid as well as benadryl earlier today and she is very drowsy.      DAILY NEPHROLOGY SUMMARY:  6/25: rapid response overnight for severe leg pain, pt very lethargic today, moans with palpation of legs, arouses but does not answer questions and falls back asleep, tolerated HD yest with 2.5L UF, BP stable overnight but low this am  6/26: No events, BP low overnight and this am, more alert, reports pain in legs for the past 3 weeks,  denies any CP/SOB/Abd pain, reports left arm edema a little better but no pain in left arm   6/27: Pt resting in bed, subdued, Bps soft, on 4L supp O2 via NC, labs reviewed, due for HD  6/28: pt laying in bed, sleepy, c/o pain in legs, moaning, vascular assessed and does not recommend any intervention on legs or AVF, tolerated iHD yesterday with UF:2.8L  6/29: Pt in bed, fatigued, no complaints.  VSS 1L NC.    6/30: no new complaints, no overnight events. Tolerated hd yesterday, UF 3.5L , she is in negative balance.   7/1: Resting in bed, no complaints.  VSS 2L NC. No new labs today.   7/2: Pt sitting up in bed, confused, CNA helping her with breakfast meal, iHD yesterday with 1.5L net UF limited by muscle cramping, labs reviewed, VSS on 1L NC O2  7/3: Pt slumped in bed, somnolent, medical floor status, VSS on RA  7/4: No new overnight renal events. S/p HD yesterday and tolerated well. Net UF was 1.5 L.  7/5: S/p HD yesterday with net UF of 2 L. No new overnight renal events.   7/6: No new overnight renal events.   7/7: S/p HD yesterday with net UF of 2.2L and tolerated well. No new overnight renal events.   7/8: No new overnight renal events.   7/9: S/p HD yesterday with net UF of 3L and tolerated well. No new overnight renal events.   7/10: No new overnight renal events. K+ 6.9 this am.  7/11: No events, HD yest for hyperkalemia, K 5.9 this am, denies any CP/SOB but reports leg pain, BP stable, seen and examined on HD this am--VSS see dialysis flowsheet for full details  7/12: No events, awake and alert, c/o bilateral leg pain, denies any CP/SOB, BP stable  7/13: No events, seen on dialysis this am, still c/o leg pain, denies any CP/SOB, BP stable  7/14: No events, feels tired, c/o leg pain, denies any CP/SOB, tolerated HD yest with 2L UF  7/15: No events, BP stable, seen on HD, still complaining of leg pain, no CP/SOB  7/16: No events, tolerated HD yest with 3L UF, BP stable, complaining of soft stools and left  "arm pain and leg pain, no CP/SOB/Abd pain  7/17: No overnight events, VSS no CP SOB worsening edema. Na 131 K 5.2, 2L NC.  Nurse reports she was fatigued and disoriented this morning, but has improved.  Currently arouses easily, is oriented.   7/18: Pt seen on HD, says her back hurts, VSS on 2L NC O2, K+ 5.8 this AM  7/19: Pt sitting up in bed. No overnight events. Tolerated HD yesterday with 3L UF, BP stable. K this am better 4.2. No CP/N/V/D.   7/20: Seen during HD, tolerated well. VSS on 2L NC. Denies SOB, N/V/D. Stated left lower hand pain, open blisters noted, says also her back hurts.  7/21: HD yest UF 3L. Dtr at bedside. Pt c/o significant pain/burning in left hand.  7/22: seen on iHd, continues with + edema in LUE, + edema BLE edema    REVIEW OF SYSTEMS:    10 point ROS reviewed and is as per HPI/daily summary or otherwise negative    PMH/PSH/SH/FH:   Reviewed and unchanged since admission note    CURRENT MEDICATIONS:   Reviewed from admission to present day    VS:  /49   Pulse 82   Temp 37.1 °C (98.8 °F) (Temporal)   Resp 18   Ht 1.626 m (5' 4\")   Wt 63.1 kg (139 lb 1.8 oz)   SpO2 97%   BMI 23.88 kg/m²     Physical Exam  Vitals and nursing note reviewed.   Constitutional:       General: She is not in acute distress.     Appearance: She is ill-appearing.   HENT:      Head: Normocephalic and atraumatic.      Mouth/Throat:      Mouth: Mucous membranes are dry.   Eyes:      General: No scleral icterus.  Cardiovascular:      Rate and Rhythm: Normal rate and regular rhythm.      Pulses: Normal pulses.      Heart sounds: No murmur heard.  Pulmonary:      Effort: Pulmonary effort is normal. No respiratory distress.   Abdominal:      General: Bowel sounds are normal. There is no distension.      Palpations: Abdomen is soft.   Musculoskeletal:         General: Swelling (left arm) present. No deformity.      Right lower leg: No edema.      Left lower leg: No edema.      Comments: Trace LE edema  LUE AVF + " bruit and Thrill   Skin:     General: Skin is warm and dry.      Findings: No rash.      Comments: Left hand gauze wrapped    Neurological:      General: No focal deficit present.      Mental Status: She is alert and oriented to person, place, and time.   Psychiatric:         Mood and Affect: Mood normal.         Behavior: Behavior normal.           Fluids:  In: 360 [P.O.:360]  Out: -     LABS:  Recent Labs     07/20/22  1428 07/21/22  0617 07/22/22  0313   SODIUM 136 137 135   POTASSIUM 4.1 4.5 4.8   CHLORIDE 96 96 94*   CO2 26 26 24   GLUCOSE 250* 138* 101*   BUN 23* 35* 49*   CREATININE 2.54* 3.73* 4.49*   CALCIUM 9.1 9.3 9.0     Reviewed.    IMAGING:   All imaging reviewed from admission to present day    IMPRESSION:  # ESRD, dependent on HD              - HD via LUE AVF, missed 2-3 outpt treatments prior to admit   # Hyperkalemia   - On 16.8 g patiromer daily                - Resolved  # Fluid overload, improving              - Secondary to non-compliance with fluid restriction and HD treatments  # Left arm edema, slow improvement              - CTA upper ext 6/26 w/o e/o focal stenosis + extensive edema             - AVF patent on left arm us and no DVT             - Pt no showed to outpt appt to evaluate              - Vascular does not recommend intervention   # Altered Mental Status, waxes/wanes             - Monitor  # HTN, variable control, stable at this time             - Goal BP < 140/90             - Not on BP meds   # Anemia of CKD, at goal              - Goal Hgb 10-11             - Iron 27             -TIBC 142             -%Sat 19             - Ferritin 1419  # CKD-MBD             - Ca 9.7             - PO4 5.2             - Managed at OP unit             - On calcitriol and sevelamer   # DM II--management per primary svc  # Leg Pain--chronic skin changes and subcutaneous tissue firm to touch             - Unclear etiology             - Vascular does not recommend any intervention   - On  ropinirole   # Leukocytosis, decreased    - Check cultures if any fevers     PLAN:  - iHD today (Fri)  - Continue qMWF iHD schedule   - UF as tolerated  - No need for ROGELIO  - Renal diet  - Continue phos binders WM  - Continue off of all BP meds and diuretics for now. BP stable during HD.   - PRN NS boluses for symptomatic hypotension ok, avoid maint IVFs  - Limit sedating meds if possible  - Gabapentin 100mg TID (max dose is 300 mg a day for ESRD)  - No dietary protein restrictions  - Dose all meds per ESRD  - Outpt access center appointment rescheduled  - Palliative care has consulted; pt is still a full code  - Pt at high risk for further morbidity/mortality  - DC planning underway for SNF.  - Okay to discharge home from a Nephrology standpoint once medically cleared. Pt to f/u outpatient HD clinic.   - Continue w/  Veltassa to 16.8grams daily to help with control of hyperkalemia; can decrease/stop if serum K+ <4  - Labs with further recommendations to follow     Thank you,

## 2022-07-22 NOTE — PROGRESS NOTES
Hemodialysis ordered by LUZ Yen. Treatment started at 1241 and ended at 1541. Pt stable, vss, no c/o post tx. Net UF 3.0 L. Report to KULWANT Cm RN. Lt ua avf dsg cdi.

## 2022-07-22 NOTE — CARE PLAN
The patient is Watcher - Medium risk of patient condition declining or worsening    Shift Goals  Clinical Goals: remain free from falls  Patient Goals: pain control, rest  Family Goals: parveen    Progress made toward(s) clinical / shift goals: Patient remained free from falls throughout this shift. Bed alarm on, bed in lowest position, and call bell within arms reach. Patient verbalized pain tolerable with scheduled and PRN medications. CNA and RN coordinated care to maximize patient rest.

## 2022-07-23 NOTE — PROGRESS NOTES
[facility] -  PROGRESS NOTE               Author: Wyatt Khanna M.D., CNN-NP Date & Time: 7/23/2022  10:46 AM     HPI:  81yoF with PMH significant for ESRD on HD MWF via left arm AVF, HTN, DM II, Anemia secondary to CKD, CKD Bone mineral disorder, admitted with increased edema and weakness and having missed her last last 2-3 outpt HD treatments. Pt is very lethargic at this time and unable to provide much history, majority of the history was obtained through review of the medical record and discussion with primary svc. Pt had reported increased LE edema and fatigue and weakness and worsening of her left arm edema so she came to the ED for evaluation. She apparently has missed her last 2-3 outpt dialysis treatments. Per regular outpt Nephrologist Dr. Gates, she has been non-compliant with her fluid restriction and was told that she needed 4x/week dialysis until her volume status could be optimized but she was non-compliant with that recommendation. She has edema of her left arm where her AVF is located and there is concern for a central stenosis that could be the etiology. She had an appointment at the outpt access center to evaluate for central stenosis and undergo angioplasty if needed on 6/9/22 but pt did not go to the appointment. She has not had any other procedures done to the arm in the past couple of months. She had a left arm us done today that showed no DVT and patent AVF and soft tissue edema. She apparently received pain medication fentanyl and dilaudid as well as benadryl earlier today and she is very drowsy.      DAILY NEPHROLOGY SUMMARY:  6/25: rapid response overnight for severe leg pain, pt very lethargic today, moans with palpation of legs, arouses but does not answer questions and falls back asleep, tolerated HD yest with 2.5L UF, BP stable overnight but low this am  6/26: No events, BP low overnight and this am, more alert, reports pain in legs for the past 3 weeks,  denies any CP/SOB/Abd pain, reports left arm edema a little better but no pain in left arm   6/27: Pt resting in bed, subdued, Bps soft, on 4L supp O2 via NC, labs reviewed, due for HD  6/28: pt laying in bed, sleepy, c/o pain in legs, moaning, vascular assessed and does not recommend any intervention on legs or AVF, tolerated iHD yesterday with UF:2.8L  6/29: Pt in bed, fatigued, no complaints.  VSS 1L NC.    6/30: no new complaints, no overnight events. Tolerated hd yesterday, UF 3.5L , she is in negative balance.   7/1: Resting in bed, no complaints.  VSS 2L NC. No new labs today.   7/2: Pt sitting up in bed, confused, CNA helping her with breakfast meal, iHD yesterday with 1.5L net UF limited by muscle cramping, labs reviewed, VSS on 1L NC O2  7/3: Pt slumped in bed, somnolent, medical floor status, VSS on RA  7/4: No new overnight renal events. S/p HD yesterday and tolerated well. Net UF was 1.5 L.  7/5: S/p HD yesterday with net UF of 2 L. No new overnight renal events.   7/6: No new overnight renal events.   7/7: S/p HD yesterday with net UF of 2.2L and tolerated well. No new overnight renal events.   7/8: No new overnight renal events.   7/9: S/p HD yesterday with net UF of 3L and tolerated well. No new overnight renal events.   7/10: No new overnight renal events. K+ 6.9 this am.  7/11: No events, HD yest for hyperkalemia, K 5.9 this am, denies any CP/SOB but reports leg pain, BP stable, seen and examined on HD this am--VSS see dialysis flowsheet for full details  7/12: No events, awake and alert, c/o bilateral leg pain, denies any CP/SOB, BP stable  7/13: No events, seen on dialysis this am, still c/o leg pain, denies any CP/SOB, BP stable  7/14: No events, feels tired, c/o leg pain, denies any CP/SOB, tolerated HD yest with 2L UF  7/15: No events, BP stable, seen on HD, still complaining of leg pain, no CP/SOB  7/16: No events, tolerated HD yest with 3L UF, BP stable, complaining of soft stools and left  "arm pain and leg pain, no CP/SOB/Abd pain  7/17: No overnight events, VSS no CP SOB worsening edema. Na 131 K 5.2, 2L NC.  Nurse reports she was fatigued and disoriented this morning, but has improved.  Currently arouses easily, is oriented.   7/18: Pt seen on HD, says her back hurts, VSS on 2L NC O2, K+ 5.8 this AM  7/19: Pt sitting up in bed. No overnight events. Tolerated HD yesterday with 3L UF, BP stable. K this am better 4.2. No CP/N/V/D.   7/20: Seen during HD, tolerated well. VSS on 2L NC. Denies SOB, N/V/D. Stated left lower hand pain, open blisters noted, says also her back hurts.  7/21: HD yest UF 3L. Dtr at bedside. Pt c/o significant pain/burning in left hand.  7/22: seen on iHd, continues with + edema in LUE, + edema BLE edema  7/23: HD yesterday 3 liter uf    REVIEW OF SYSTEMS:    10 point ROS reviewed and is as per HPI/daily summary or otherwise negative    PMH/PSH/SH/FH:   Reviewed and unchanged since admission note    CURRENT MEDICATIONS:   Reviewed from admission to present day    VS:  /41   Pulse 77   Temp 36.8 °C (98.3 °F) (Temporal)   Resp 17   Ht 1.626 m (5' 4\")   Wt 63.1 kg (139 lb 1.8 oz)   SpO2 97%   BMI 23.88 kg/m²     Physical Exam  Vitals and nursing note reviewed.   Constitutional:       General: She is not in acute distress.     Appearance: She is ill-appearing.   HENT:      Head: Normocephalic and atraumatic.      Mouth/Throat:      Mouth: Mucous membranes are dry.   Eyes:      General: No scleral icterus.  Cardiovascular:      Rate and Rhythm: Normal rate and regular rhythm.      Pulses: Normal pulses.      Heart sounds: No murmur heard.  Pulmonary:      Effort: Pulmonary effort is normal. No respiratory distress.      Breath sounds: Rales present.   Abdominal:      General: Bowel sounds are normal. There is no distension.      Palpations: Abdomen is soft.   Musculoskeletal:         General: Swelling (left arm) present. No deformity.      Right lower leg: No edema.      " Left lower leg: No edema.      Comments: Trace LE edema  LUE AVF + bruit and Thrill   Skin:     General: Skin is warm and dry.      Findings: No rash.      Comments: Left hand gauze wrapped    Neurological:      General: No focal deficit present.      Mental Status: She is alert and oriented to person, place, and time.   Psychiatric:         Mood and Affect: Mood normal.         Behavior: Behavior normal.           Fluids:  In: 1428 [P.O.:828; Dialysis:500]  Out: 3500     LABS:  Recent Labs     07/21/22  0617 07/22/22  0313 07/23/22  0346   SODIUM 137 135 135   POTASSIUM 4.5 4.8 4.5   CHLORIDE 96 94* 93*   CO2 26 24 28   GLUCOSE 138* 101* 121*   BUN 35* 49* 42*   CREATININE 3.73* 4.49* 4.25*   CALCIUM 9.3 9.0 9.1     Reviewed.    IMAGING:   All imaging reviewed from admission to present day    IMPRESSION:  # ESRD, dependent on HD              - HD via LUE AVF, missed 2-3 outpt treatments prior to admit   # Hyperkalemia   - On 16.8 g patiromer daily                - Resolved  # Fluid overload, improving              - Secondary to non-compliance with fluid restriction and HD treatments   - increase UF with next treatment  # Left arm edema, slow improvement              - CTA upper ext 6/26 w/o e/o focal stenosis + extensive edema             - AVF patent on left arm us and no DVT             - Pt no showed to outpt appt to evaluate              - Vascular does not recommend intervention   # Altered Mental Status, waxes/wanes             - Monitor  # HTN, variable control, stable at this time             - Goal BP < 140/90             - Not on BP meds   # Anemia of CKD, at goal              - Goal Hgb 10-11             - Iron 27             -TIBC 142             -%Sat 19             - Ferritin 1419  # CKD-MBD             - Ca 9.7             - PO4 5.2             - Managed at OP unit             - On calcitriol and sevelamer   # DM II--management per primary svc  # Leg Pain--chronic skin changes and subcutaneous  tissue firm to touch             - Unclear etiology             - Vascular does not recommend any intervention   - On ropinirole   # Leukocytosis, decreased    - Check cultures if any fevers     PLAN:  - Continue qMWF iHD schedule   - increase UF Monday   - DC patiormer  - Continue phos binders WM  - Continue off of all BP meds and diuretics for now. BP stable during HD.   - PRN NS boluses for symptomatic hypotension ok, avoid maint IVFs  - Limit sedating meds if possible  - No dietary protein restrictions  - Dose all meds per ESRD  - Outpt access center appointment rescheduled  - Palliative care has consulted; pt is still a full code  - Pt at high risk for further morbidity/mortality  - DC planning underway for SNF.  - Okay to discharge home from a Nephrology standpoint once medically cleared. Pt to f/u outpatient HD clinic.       Thank you,

## 2022-07-23 NOTE — PROGRESS NOTES
Hospital Medicine Daily Progress Note    Date of Service  7/23/2022    Chief Complaint  Edema    Hospital Course  81 y.o. female with PMHx ESRD on HD MWF, HTN, HLD, and T2DM who was admitted on 6/24/2022 for left upper arm swelling and lower extremity edema after missing HD sessions.  X-rays of the left hand showed only chronic changes.  AV fistula was functioning well.  CTA showed no evidence of central stenosis.  Vascular surgery was consulted and only recommended supportive care such as wrapping the arm with Ace bandage.  She was also found to have severe aortic stenosis for which cardiology was consulted, and gave the opinion that she is not a candidate for intervention.  She was dialyzed while in the hospital with subsequent clinical improvement.  She did have hospital delirium while in the hospital, which has since resolved.  Palliative care was consulted, but patient continued to wish to be full code.  SNF placement was pursued, but proved to be challenging as transfer to dialysis is not always facilitated.  Case management is working on her discharge to SNF and arrangement of hemodialysis.    Interval Problem Update  07/23/22    I evaluated and examined her at the bedside.  She reported that she is feeling better and pain in her upper extremity has improved.  She had breakfast this morning and she denies any acute complaints.  I discussed plan of care with bedside RN.      Consultants/Specialty  cardiology, nephrology and vascular surgery    Code Status  Full Code    Disposition  Patient is medically cleared for discharge.   Anticipate discharge to to skilled nursing facility.  I have placed the appropriate orders for post-discharge needs.    Review of Systems  Review of Systems   Constitutional: Negative for chills, fever and weight loss.   HENT: Negative for hearing loss and tinnitus.    Eyes: Negative for blurred vision, double vision, photophobia and pain.   Respiratory: Negative for cough, sputum  production and shortness of breath.    Cardiovascular: Negative for chest pain, palpitations, orthopnea and leg swelling.   Gastrointestinal: Negative for abdominal pain, constipation, diarrhea, nausea and vomiting.   Genitourinary: Negative for dysuria, frequency and urgency.   Musculoskeletal: Positive for back pain and myalgias. Negative for joint pain and neck pain.   Skin: Negative for rash.   Neurological: Negative for dizziness, tingling, tremors, sensory change, speech change, focal weakness and headaches.   Psychiatric/Behavioral: Negative for hallucinations and substance abuse.   All other systems reviewed and are negative.       Pertinent positives/negatives as mentioned above.     A complete review of systems was personally done by me. All other systems were negative.        Physical Exam  Temp:  [36.2 °C (97.2 °F)-36.8 °C (98.3 °F)] 36.8 °C (98.3 °F)  Pulse:  [77-87] 77  Resp:  [17-18] 17  BP: (114-126)/(41-63) 114/41  SpO2:  [97 %-100 %] 97 %    Physical Exam  Vitals reviewed.   Constitutional:       General: She is not in acute distress.     Appearance: Normal appearance. She is not ill-appearing.   HENT:      Head: Normocephalic and atraumatic.      Nose: No congestion.   Eyes:      General:         Right eye: No discharge.         Left eye: No discharge.      Pupils: Pupils are equal, round, and reactive to light.   Cardiovascular:      Rate and Rhythm: Normal rate and regular rhythm.      Pulses: Normal pulses.      Heart sounds: Murmur heard.   Pulmonary:      Effort: Pulmonary effort is normal. No respiratory distress.      Breath sounds: Normal breath sounds. No stridor.   Abdominal:      General: Bowel sounds are normal. There is no distension.      Palpations: Abdomen is soft.      Tenderness: There is no abdominal tenderness.   Musculoskeletal:         General: Swelling, tenderness and deformity present. Normal range of motion.      Cervical back: Normal range of motion. No rigidity.    Skin:     General: Skin is warm.      Capillary Refill: Capillary refill takes less than 2 seconds.      Coloration: Skin is not jaundiced or pale.      Findings: Erythema and lesion present. No bruising.      Comments: Dressing present on left hand CDI   Neurological:      General: No focal deficit present.      Mental Status: She is alert and oriented to person, place, and time.      Cranial Nerves: No cranial nerve deficit.   Psychiatric:         Mood and Affect: Mood normal.         Behavior: Behavior normal.         Fluids    Intake/Output Summary (Last 24 hours) at 7/23/2022 1354  Last data filed at 7/23/2022 0338  Gross per 24 hour   Intake 1070 ml   Output 3500 ml   Net -2430 ml       Laboratory  Recent Labs     07/21/22  0617 07/22/22  0313   WBC 9.5 8.6   RBC 3.36* 3.09*   HEMOGLOBIN 11.2* 10.4*   HEMATOCRIT 35.1* 32.4*   .5* 104.9*   MCH 33.3* 33.7*   MCHC 31.9* 32.1*   RDW 67.7* 69.1*   PLATELETCT 229 212   MPV 8.7* 8.9*     Recent Labs     07/21/22  0617 07/22/22  0313 07/23/22  0346   SODIUM 137 135 135   POTASSIUM 4.5 4.8 4.5   CHLORIDE 96 94* 93*   CO2 26 24 28   GLUCOSE 138* 101* 121*   BUN 35* 49* 42*   CREATININE 3.73* 4.49* 4.25*   CALCIUM 9.3 9.0 9.1                   Imaging  CT-HAND W/O LEFT   Final Result      1.  No acute fracture or dislocation.      2.  Previous distal ulnar fracture identified.      3.  Osteoarthritis is most prominent at the first carpometacarpal joint.      4.  No bone erosions identified.      5.  Fluid collection or soft tissue abscess is identified.      6.  Induration in subcutaneous fat and intramuscular fat planes is noted which could be due to edema or inflammation such as cellulitis.      DX-HAND 3+ LEFT   Final Result      1. No acute fracture or subluxation.   2. Chronic posttraumatic changes in the distal left radius and ulna, stable when compared with the prior study.   3. Stable osteoarthritis involving the left first carpometacarpal joint.       DX-HAND 3+ LEFT   Final Result      1.  No acute fracture or dislocation.   2.  Scattered mild degenerative changes, increased from prior study. No definite erosive arthropathy.   3.  Osteopenia.   4.  Old ulnar styloid process fracture.      DX-CHEST-PORTABLE (1 VIEW)   Final Result      1.  Probable 12 mm right mid/upper lung zone mass. Suggest CT chest without contrast for further assessment      2.  Interstitial pulmonary edema or fibrosis      3.  Enlarged cardiac silhouette      4.  Left pleural effusion      CT-CTA UPPER EXT WITH & W/O-POST PROCESS LEFT   Final Result      1.  Status post brachiocephalic fistula in the left arm. No focal stenosis is identified.      2.  Extensive edema in the subcutaneous tissues of the visualized left lateral chest and arm.      EC-ECHOCARDIOGRAM COMPLETE W/O CONT   Final Result      US-ZAHRAA SINGLE LEVEL BILAT   Final Result      US-EXTREMITY ARTERY LOWER BILAT   Final Result      CT-EXTREMITY, UPPER W/O LEFT   Final Result      1.  There is diffuse subcutaneous edema of the imaged portions of the left upper extremity. There is also diffuse body wall edema in the visualized portions of the chest and abdomen.   2.  No focal fluid collection to suggest abscess.   3.  There is a small left pleural effusion and minimal fluid in the abdomen.   4.  There is postsurgical change of AV fistula. There is small vessel atherosclerosis.      US-EXTREMITY VENOUS UPPER UNILAT LEFT   Final Result      DX-CHEST-PORTABLE (1 VIEW)   Final Result         1.  Interstitial pulmonary parenchymal prominence suggest chronic underlying lung disease, component of interstitial edema and/or infiltrates not excluded.   2.  Small left pleural effusion   3.  Cardiomegaly   4.  Atherosclerosis           Assessment/Plan  * ESRD (end stage renal disease) on dialysis (HCC)- (present on admission)  Assessment & Plan  - Continue hemodialysis per nephrology.  -Continue sevelamer and calcitriol.    Extensor  tendon laceration of left hand with open wound  Assessment & Plan  She has been having worsening of her wound on her left hand.  I ordered wound culture and started her on IV Ancef.  I ordered x-ray and it did not show any acute abnormalities.  I ordered CT scan of her left hand and it did not show any fluid collection.  I discussed plan of care with wound care.    Hypothyroidism- (present on admission)  Assessment & Plan  - Continue Synthroid.    Delirium  Assessment & Plan  - Resolved.    Aortic stenosis, severe- (present on admission)  Assessment & Plan  - Not a candidate for intervention per cardiology.  -Continue to monitor volume status.    Pain in both lower extremities- (present on admission)  Assessment & Plan  - Chronic.  Continue as needed pain medications.    Goals of care, counseling/discussion- (present on admission)  Assessment & Plan  -palliative care consulted, patient wished to remain full code.    Edema of left upper extremity- (present on admission)  Assessment & Plan  - Chronic, at the same site of AV fistula which is functioning well.  -Multifactorial: Poor EF, lymphedema, outflow stenosis.  CTA showed no evidence of central stenosis.  Vascular surgery recommended supportive care with wrapping arm with Ace bandage.  -Continue to monitor.    Generalized weakness- (present on admission)  Assessment & Plan  - SNF placement being pursued.  Placement challenging due to need for transportation back and forth to hemodialysis.  CM/SW on board.    Diabetes mellitus, type II (HCC)- (present on admission)  Assessment & Plan  - Last HbA1c was 11.7.  Continue Lantus 5 units at night, along with sliding scale insulin coverage.  Continue Accu-Cheks before meals and at bedtime. Goal to keep BG between 140-180 per 2019 ADA guidelines.        Essential hypertension- (present on admission)  Assessment & Plan  - Maintaining good blood pressure control, despite holding home antihypertensives.  Continue to monitor  blood pressure trend closely.       I discussed plan of care with bedside RN.  Today on July 23, 2022 no acute changes in her current assessment and plan.        VTE prophylaxis: heparin ppx    I have performed a physical exam and reviewed and updated ROS and Plan today (7/23/2022). In review of last note, there are no changes except as documented above.

## 2022-07-23 NOTE — WOUND TEAM
[facility]  Inpatient Services  Wound and Skin Care Evaluation    Admission Date: 2022     Last order of IP CONSULT TO WOUND CARE was found on 2022 from Hospital Encounter on 2022     HPI, PMH, SH: Reviewed    Past Surgical History:   Procedure Laterality Date   • AV FISTULA CREATION Left 2/15/2019    Procedure: AV FISTULA CREATION-  UPPER EXTREMITY BRACHIAL CEPHALIC BANDING;  Surgeon: Fernie Nazario M.D.;  Location: SURGERY [facility];  Service: General   • HIP CANNULATED SCREW Right 2017    Procedure: HIP CANNULATED SCREW;  Surgeon: Ar Huerta M.D.;  Location: SURGERY [facility];  Service:    • CATARACT PHACO WITH IOL  2014    Performed by Rudolph Barclay M.D. at SURGERY SAME DAY [facility] ORS   • CATARACT PHACO WITH IOL  2014    Performed by Rudolph Barclay M.D. at SURGERY SAME DAY [facility] ORS   • VENTRAL HERNIA REPAIR LAPAROSCOPIC  3/7/2012    Performed by HUNTER SERNA at SURGERY SAME DAY [facility] ORS   • APPENDECTOMY     • GYN SURGERY      hysterectomy   • OTHER      T&A   • OTHER ABDOMINAL SURGERY      abd tramua- horse stepped on abd   • OTHER ORTHOPEDIC SURGERY      R & l Shoulder repair     Social History     Tobacco Use   • Smoking status: Former Smoker     Packs/day: 1.00     Years: 20.00     Pack years: 20.00     Types: Cigarettes     Quit date: 3/5/1979     Years since quittin.4   • Smokeless tobacco: Never Used   Substance Use Topics   • Alcohol use: No     Chief Complaint   Patient presents with   • Peripheral Edema   • Weakness     BIBA from home for weakness, nausea, L arm pain  HX of dialysis with L arm fistula, pt states she has missed 3 dialysis days due to being so weak she cannot drive or walk now  3 weeks ago had a procedure done to her fistula which has lead to L arm swelling     Diagnosis: ESRD (end stage renal disease) on dialysis (HCC) [N18.6, Z99.2]    Unit where seen by Wound Team: S626/02     WOUND  CONSULT/FOLLOW UP RELATED TO:  L hand     WOUND HISTORY:  Per HPI: 81 y.o. female with PMHx ESRD on HD MWF, HTN, HLD, and T2DM who was admitted on 6/24/2022 for left upper arm swelling and lower extremity edema after missing HD sessions.  X-rays of the left hand showed only chronic changes.  AV fistula was functioning well.  CTA showed no evidence of central stenosis.  Vascular surgery was consulted and only recommended supportive care such as wrapping the arm with Ace bandage.  She was also found to have severe aortic stenosis for which cardiology was consulted, and gave the opinion that she is not a candidate for intervention.  She was dialyzed while in the hospital with subsequent clinical improvement.  She did have hospital delirium while in the hospital, which has since resolved.  Palliative care was consulted, but patient continued to wish to be full code.  SNF placement was pursued, but proved to be challenging as transfer to dialysis is not always facilitated.  Case management is working on her discharge to SNF and arrangement of hemodialysis.    WOUND ASSESSMENT/LDA      Wound 07/20/22 Soft Tissue Necrosis Hand Left x2 dorsal, x1 palmar (Active)   Wound Image      07/23/22 1045   Site Assessment Red;Purple;Yellow;Drainage;Fragile;Painful;Slough 07/23/22 1045   Periwound Assessment Red;Warm;Fragile;Painful;Edema 07/23/22 1045   Margins Undefined edges 07/23/22 1045   Closure Secondary intention 07/23/22 1045   Drainage Amount Moderate 07/23/22 1045   Drainage Description Yellow;Purulent 07/23/22 1045   Treatments Cleansed;Site care 07/23/22 1045   Wound Cleansing Normal Saline Irrigation 07/23/22 1045   Periwound Protectant Mepitel 07/23/22 1045   Dressing Cleansing/Solutions Not Applicable 07/23/22 1045   Dressing Options Mepitel One;Silver Powder;Dry Roll Gauze 07/23/22 1045   Dressing Changed New 07/23/22 1045   Dressing Status Clean;Dry;Intact 07/23/22 1045   Dressing Change/Treatment Frequency Daily,  and As Needed 07/23/22 1045   NEXT Dressing Change/Treatment Date 07/24/22 07/23/22 1045   NEXT Weekly Photo (Inpatient Only) 07/30/22 07/23/22 1045   Non-staged Wound Description Full thickness 07/23/22 1045   Shape irregular, x3 sites 07/23/22 1045   Wound Odor Mild 07/23/22 1045   Exposed Structures CHIP 07/23/22 1045   WOUND NURSE ONLY - Time Spent with Patient (mins) 30 07/23/22 1045          Vascular:    Lab Values:    Lab Results   Component Value Date/Time    WBC 8.6 07/22/2022 03:13 AM    RBC 3.09 (L) 07/22/2022 03:13 AM    HEMOGLOBIN 10.4 (L) 07/22/2022 03:13 AM    HEMATOCRIT 32.4 (L) 07/22/2022 03:13 AM    CREACTPROT 7.07 (H) 06/25/2022 04:46 AM    HBA1C 11.7 (H) 06/24/2022 03:35 AM        Culture Results show:  Recent Results (from the past 720 hour(s))   CULTURE WOUND W/ GRAM STAIN    Collection Time: 07/20/22  3:00 PM    Specimen: Left Hand; Wound   Result Value Ref Range    Significant Indicator POS (POS)     Source WND     Site LEFT HAND     Culture Result - (A)     Gram Stain Result Few WBCs.  Moderate Gram positive cocci.       Culture Result Staphylococcus aureus  Moderate growth   (A)        Susceptibility    Staphylococcus aureus - ADRIANE     Azithromycin <=2 Sensitive mcg/mL     Clindamycin <=0.25 Sensitive mcg/mL     Cefazolin <=8 Sensitive mcg/mL     Cefepime <=4 Sensitive mcg/mL     Ceftaroline <=0.5 Sensitive mcg/mL     Daptomycin <=0.5 Sensitive mcg/mL     Ampicillin/sulbactam <=8/4 Sensitive mcg/mL     Erythromycin <=0.25 Sensitive mcg/mL     Vancomycin 1 Sensitive mcg/mL     Oxacillin 0.5 Sensitive mcg/mL     Pip/Tazobactam <=8 Sensitive mcg/mL     Trimeth/Sulfa <=0.5/9.5 Sensitive mcg/mL     Tetracycline <=4 Sensitive mcg/mL       Pain Level/Medicated:  Lidocaine jelly     INTERVENTIONS BY WOUND TEAM:  Chart and images reviewed. Discussed with bedside RN. All areas of concern (based on picture review, LDA review and discussion with bedside RN) have been thoroughly assessed. Documentation of  areas based on significant findings. This RN in to assess patient. Performed standard wound care which includes appropriate positioning, dressing removal and non-selective debridement. Pictures and measurements obtained weekly if/when required.    Preparation for Dressing removal: Soaked with lidocaine jelly for pain management   Non-selectively Debrided with:  NS and gauze.  Sharp debridement: NA; not tolerable d/t pain  Bebe wound: Cleansed with NS  Primary Dressing: silver powder, mepitel one   Secondary (Outer) Dressing: roll gauze    Interdisciplinary consultation: Patient, Bedside RN    EVALUATION / RATIONALE FOR TREATMENT:  Most Recent Date:    7/23/22: No surgery indicated per Dr. Huerta. There are two wound beds on dorsal surface (1st finger and 4th/5th finger) and one wound over the palmar surface. All wounds with thick yellow drainage that is malodorous. Involved fingers remain erythematous and edematous. Patient reports extreme pain with site care and wound care is difficult despite utilization of lidocaine. Silver powder applied for its antibiotic properties and to address moisture to area due to moderate drainage. Wound team to continue to follow.     7/20: wounds on L hand with fluctuance on dorsal aspect indicating necrosis. X ray results did not indicate bony involvement, thus recommend CT for possible abscess. At this time, wound unlikely to resolve with local wound care if necrosis advances, recommend surgical evaluation. Wound team will continue to follow.     Goals: Steady decrease in wound area and depth weekly.    WOUND TEAM PLAN OF CARE ([X] for frequency of wound follow up,):   Nursing to follow dressing orders written for wound care. Contact wound team if area fails to progress, deteriorates or with any questions/concerns if something comes up before next scheduled follow up (See below as to whether wound is following and frequency of wound follow up)  Dressing changes by wound team:                    Follow up 3 times weekly:                NPWT change 3 times weekly:     Follow up 1-2 times weekly:  X nursing to perform dsg/skin care; Wound team following.    Follow up Bi-Monthly:                   Follow up as needed:     Other (explain):     NURSING PLAN OF CARE ORDERS (X):  Dressing changes: See Dressing Care orders: x  Skin care: See Skin Care orders: x  RN Prevention Protocol: x  Rectal tube care: See Rectal Tube Care orders:   Other orders:    RSKIN:   CURRENTLY IN PLACE (X), APPLIED THIS VISIT (A), ORDERED (O):   Q shift Joe:  X  Q shift pressure point assessments:  X    Surface/Positioning   Pressure redistribution mattress      x      Low Airloss        x  Bariatric foam      Bariatric NICOLETTE     Waffle cushion        Waffle Overlay        x  Reposition q 2 hours    O  TAPs Turning system     Z Wil Pillow     Offloading/Redistribution   Sacral Mepilex (Silicone dressing)   O  Heel Mepilex (Silicone dressing)   O      Heel float boots (Prevalon boot)             Float Heels off Bed with Pillows           Respiratory   Silicone O2 tubing         Gray Foam Ear protectors     Cannula fixation Device (Tender )          High flow offloading Clip    Elastic head band offloading device      Anchorfast                                                         Trach with Optifoam split foam             Containment/Moisture Prevention     Rectal tube or BMS    Purwick/Condom Cath        López Catheter    Barrier wipes           Barrier paste       Antifungal tx      Interdry        Mobilization       Up to chair        Ambulate      PT/OT  x    Nutrition       Dietician        Diabetes Education      PO  x   TF     TPN     NPO   # days     Other        Anticipated discharge plans: TBD  LTACH:        SNF/Rehab:                  Home Health Care:           Outpatient Wound Center:            Self/Family Care:        Other:                  Vac Discharge Needs:   Not Applicable Pt not on a wound vac:      x  Regular Vac while inpatient, alternative dressing at DC:        Regular Vac in use and continued at DC:            Reg. Vac w/ Skin Sub/Biologic in use. Will need to be changed 2x wkly:      Veraflo Vac while inpatient, ok to transition to Regular Vac on Discharge:           Veraflo Vac while inpatient, will need to remain on Veraflo Vac upon discharge:

## 2022-07-23 NOTE — CARE PLAN
The patient is Watcher - Medium risk of patient condition declining or worsening    Shift Goals  Clinical Goals: skin integrity  Patient Goals: pain control, rest  Family Goals: parveen    Progress made toward(s) clinical / shift goals: RN and CNA helped reposition patient for comfort with waffle cushions and pillows. Dressings changed per order. Pain brought to tolerable level with scheduled and PRN medications. RN and CNA attempted to cluster care to maximize patient rest.

## 2022-07-24 NOTE — CARE PLAN
The patient is Stable - Low risk of patient condition declining or worsening    Shift Goals  Clinical Goals: maintain skin integrty  Patient Goals: pain control  Family Goals: parveen    Progress made toward(s) clinical / shift goals:  yes      Problem: Skin Integrity  Goal: Skin integrity is maintained or improved  Outcome: Progressing     Problem: Fall Risk  Goal: Patient will remain free from falls  Outcome: Progressing     Problem: Pain - Standard  Goal: Alleviation of pain or a reduction in pain to the patient’s comfort goal  Outcome: Progressing       Patient is not progressing towards the following goals:       3

## 2022-07-24 NOTE — CARE PLAN
The patient is Watcher - Medium risk of patient condition declining or worsening    Shift Goals  Clinical Goals: skin integrity, fall precautions  Patient Goals: pain control  Family Goals: parveen    Progress made toward(s) clinical / shift goals: Patient remained free from falls throughout this shift. Bed alarm on, bed in lowest position, and call bell within arms reach. Patient's dressings changed as needed and per orders. Patient verbalized pain was tolerable post PRN medication.

## 2022-07-24 NOTE — PROGRESS NOTES
[facility] -  PROGRESS NOTE               Author: Wyatt Khanna M.D., CNN-NP Date & Time: 7/24/2022  12:25 PM     HPI:  81yoF with PMH significant for ESRD on HD MWF via left arm AVF, HTN, DM II, Anemia secondary to CKD, CKD Bone mineral disorder, admitted with increased edema and weakness and having missed her last last 2-3 outpt HD treatments. Pt is very lethargic at this time and unable to provide much history, majority of the history was obtained through review of the medical record and discussion with primary svc. Pt had reported increased LE edema and fatigue and weakness and worsening of her left arm edema so she came to the ED for evaluation. She apparently has missed her last 2-3 outpt dialysis treatments. Per regular outpt Nephrologist Dr. Gates, she has been non-compliant with her fluid restriction and was told that she needed 4x/week dialysis until her volume status could be optimized but she was non-compliant with that recommendation. She has edema of her left arm where her AVF is located and there is concern for a central stenosis that could be the etiology. She had an appointment at the outpt access center to evaluate for central stenosis and undergo angioplasty if needed on 6/9/22 but pt did not go to the appointment. She has not had any other procedures done to the arm in the past couple of months. She had a left arm us done today that showed no DVT and patent AVF and soft tissue edema. She apparently received pain medication fentanyl and dilaudid as well as benadryl earlier today and she is very drowsy.      DAILY NEPHROLOGY SUMMARY:  6/25: rapid response overnight for severe leg pain, pt very lethargic today, moans with palpation of legs, arouses but does not answer questions and falls back asleep, tolerated HD yest with 2.5L UF, BP stable overnight but low this am  6/26: No events, BP low overnight and this am, more alert, reports pain in legs for the past 3 weeks,  denies any CP/SOB/Abd pain, reports left arm edema a little better but no pain in left arm   6/27: Pt resting in bed, subdued, Bps soft, on 4L supp O2 via NC, labs reviewed, due for HD  6/28: pt laying in bed, sleepy, c/o pain in legs, moaning, vascular assessed and does not recommend any intervention on legs or AVF, tolerated iHD yesterday with UF:2.8L  6/29: Pt in bed, fatigued, no complaints.  VSS 1L NC.    6/30: no new complaints, no overnight events. Tolerated hd yesterday, UF 3.5L , she is in negative balance.   7/1: Resting in bed, no complaints.  VSS 2L NC. No new labs today.   7/2: Pt sitting up in bed, confused, CNA helping her with breakfast meal, iHD yesterday with 1.5L net UF limited by muscle cramping, labs reviewed, VSS on 1L NC O2  7/3: Pt slumped in bed, somnolent, medical floor status, VSS on RA  7/4: No new overnight renal events. S/p HD yesterday and tolerated well. Net UF was 1.5 L.  7/5: S/p HD yesterday with net UF of 2 L. No new overnight renal events.   7/6: No new overnight renal events.   7/7: S/p HD yesterday with net UF of 2.2L and tolerated well. No new overnight renal events.   7/8: No new overnight renal events.   7/9: S/p HD yesterday with net UF of 3L and tolerated well. No new overnight renal events.   7/10: No new overnight renal events. K+ 6.9 this am.  7/11: No events, HD yest for hyperkalemia, K 5.9 this am, denies any CP/SOB but reports leg pain, BP stable, seen and examined on HD this am--VSS see dialysis flowsheet for full details  7/12: No events, awake and alert, c/o bilateral leg pain, denies any CP/SOB, BP stable  7/13: No events, seen on dialysis this am, still c/o leg pain, denies any CP/SOB, BP stable  7/14: No events, feels tired, c/o leg pain, denies any CP/SOB, tolerated HD yest with 2L UF  7/15: No events, BP stable, seen on HD, still complaining of leg pain, no CP/SOB  7/16: No events, tolerated HD yest with 3L UF, BP stable, complaining of soft stools and left  "arm pain and leg pain, no CP/SOB/Abd pain  7/17: No overnight events, VSS no CP SOB worsening edema. Na 131 K 5.2, 2L NC.  Nurse reports she was fatigued and disoriented this morning, but has improved.  Currently arouses easily, is oriented.   7/18: Pt seen on HD, says her back hurts, VSS on 2L NC O2, K+ 5.8 this AM  7/19: Pt sitting up in bed. No overnight events. Tolerated HD yesterday with 3L UF, BP stable. K this am better 4.2. No CP/N/V/D.   7/20: Seen during HD, tolerated well. VSS on 2L NC. Denies SOB, N/V/D. Stated left lower hand pain, open blisters noted, says also her back hurts.  7/21: HD yest UF 3L. Dtr at bedside. Pt c/o significant pain/burning in left hand.  7/22: seen on iHd, continues with + edema in LUE, + edema BLE edema  7/23: HD yesterday 3 liter uf  7/24: Decreased vision in R eye, but chornic, worse edema in left arm    REVIEW OF SYSTEMS:    10 point ROS reviewed and is as per HPI/daily summary or otherwise negative    PMH/PSH/SH/FH:   Reviewed and unchanged since admission note    CURRENT MEDICATIONS:   Reviewed from admission to present day    VS:  /58   Pulse 77   Temp 36.1 °C (96.9 °F) (Temporal)   Resp 17   Ht 1.626 m (5' 4\")   Wt 60.2 kg (132 lb 11.5 oz)   SpO2 99%   BMI 22.78 kg/m²     Physical Exam  Vitals and nursing note reviewed.   Constitutional:       General: She is not in acute distress.     Appearance: She is ill-appearing.   HENT:      Head: Normocephalic and atraumatic.      Mouth/Throat:      Mouth: Mucous membranes are dry.   Eyes:      General: No scleral icterus.  Cardiovascular:      Rate and Rhythm: Normal rate and regular rhythm.      Pulses: Normal pulses.      Heart sounds: No murmur heard.  Pulmonary:      Effort: Pulmonary effort is normal. No respiratory distress.      Breath sounds: Rales present.   Abdominal:      General: Bowel sounds are normal. There is no distension.      Palpations: Abdomen is soft.   Musculoskeletal:         General: Swelling " (left arm) present. No deformity.      Right lower leg: No edema.      Left lower leg: No edema.      Comments: Trace LE edema  LUE AVF + bruit and Thrill   Skin:     General: Skin is warm and dry.      Findings: No rash.      Comments: Left hand gauze wrapped    Neurological:      General: No focal deficit present.      Mental Status: She is alert and oriented to person, place, and time.   Psychiatric:         Mood and Affect: Mood normal.         Behavior: Behavior normal.           Fluids:  In: 685 [P.O.:685]  Out: -     LABS:  Recent Labs     07/22/22  0313 07/23/22  0346   SODIUM 135 135   POTASSIUM 4.8 4.5   CHLORIDE 94* 93*   CO2 24 28   GLUCOSE 101* 121*   BUN 49* 42*   CREATININE 4.49* 4.25*   CALCIUM 9.0 9.1     Reviewed.    IMAGING:   All imaging reviewed from admission to present day    IMPRESSION:  # ESRD, dependent on HD              - HD via LUE AVF, missed 2-3 outpt treatments prior to admit     # Fluid overload, improving              - Secondary to non-compliance with fluid restriction and HD treatments   - increase UF with next treatment  # Left arm edema, slow improvement              - CTA upper ext 6/26 w/o e/o focal stenosis + extensive edema             - AVF patent on left arm us and no DVT             - Pt no showed to outpt appt to evaluate              - Vascular does not recommend intervention   # HTN, variable control, stable at this time             - Goal BP < 140/90             - Not on BP meds   # Anemia of CKD, at goal              - Goal Hgb 10-11             - Iron 27             -TIBC 142             -%Sat 19             - Ferritin 1419  # CKD-MBD             - Ca 9.7             - PO4 5.2             - Managed at OP unit             - On calcitriol and sevelamer   # DM II--management per primary svc  # Leg Pain--chronic skin changes and subcutaneous tissue firm to touch             - Unclear etiology             - Vascular does not recommend any intervention   - On ropinirole    # Leukocytosis, decreased    - Check cultures if any fevers     PLAN:  - Continue qMWF iHD schedule   - increase UF Monday goal 4 liters  - DC patiormer  - Continue phos binders WM  - Continue off of all BP meds and diuretics for now. BP stable during HD.   - PRN NS boluses for symptomatic hypotension ok, avoid maint IVFs  - Limit sedating meds if possible  - No dietary protein restrictions  - Dose all meds per ESRD  - Outpt access center appointment rescheduled  - Palliative care has consulted; pt is still a full code  - Pt at high risk for further morbidity/mortality  - DC planning underway for SNF.  - Okay to discharge home from a Nephrology standpoint once medically cleared. Pt to f/u outpatient HD clinic.       Thank you,

## 2022-07-25 NOTE — DISCHARGE PLANNING
Case Management Discharge Planning    Admission Date: 6/24/2022  GMLOS: 4.3  ALOS: 31    6-Clicks ADL Score: 15  6-Clicks Mobility Score: 13  PT and/or OT Eval ordered: Yes  Post-acute Referrals Ordered: Yes  Post-acute Choice Obtained: Yes  Has referral(s) been sent to post-acute provider:  Yes      Anticipated Discharge Dispo: Discharge Disposition: D/T to SNF with Medicare cert in anticipation of skilled care (03)    DME Needed: No    Action(s) Taken: Updated Provider/Nurse on Discharge Plan    Escalations Completed: None    Medically Clear: Yes    Next Steps: CM noted in chart that [facility] has now declined patient d/t needing long term care. This need has not been the understanding since her admission. CM understood dc plan to be home with family after rehab. CM left message for Milagros in admissions at [facility] to discuss this development. Cm attempted to call patient's son, however this number is not accepting calls at this time. This writer will discuss plan with patient.    Barriers to Discharge: Pending Placement    Is the patient up for discharge tomorrow: No     1320  CM spoke with Milagros from [facility] by phone. Patient is not accepted due to her potential of needing long term placement d/t family's inability to provide dialysis transportation and care at home. [place] referral faxed to [facility] to begin long term SNF placement referrals. CM attempted to call contact Peter Bruno again at [phone] but number is still not receiving calls.

## 2022-07-25 NOTE — THERAPY
"Physical Therapy   Daily Treatment     Patient Name: Jannet Bruno  Age:  81 y.o., Sex:  female  Medical Record #: [id number]  Today's Date: 7/25/2022     Precautions  Precautions: Fall Risk  Comments: LUE edema and pain    Assessment    Pt continues to required ModA for STS and transfer to chair. Pt with active BM during transfer. Performed STS x4 with standing 30 seconds each time to assist with darwin care; limited 2/2 LE weakness, LUE pain, and fatigue. Continue to recommend placement, will continue to follow.     Plan    Treatment plan modified to 2 times per week until therapy goals are met for the following treatments:  Bed Mobility, Equipment, Gait Training, Manual Therapy, Neuro Re-Education / Balance, Self Care/Home Evaluation, Stair Training, Therapeutic Activities, and Therapeutic Exercises.    DC Equipment Recommendations: Unable to determine at this time  Discharge Recommendations: Recommend post-acute placement for additional physical therapy services prior to discharge home      Subjective    \"I can't guarantee I'll make it far\" regarding transfer to chair      Objective     07/25/22 1517   Vitals   O2 (LPM) 2   O2 Delivery Device Nasal Cannula   Pain 0 - 10 Group   Therapist Pain Assessment Post Activity Pain Same as Prior to Activity;Nurse Notified  (c/o L hand pain not rated, agreeable to mobility)   Cognition    Cognition / Consciousness X   Level of Consciousness Alert   Safety Awareness Impaired   New Learning Impaired   Attention Impaired   Comments Pleasant and cooperative. Improved alertness   Other Treatments   Other Treatments Provided Pt with active BM during transfer. Performed STS x4 with standing 30 seconds each time to assist with darwin care. RN present and aware   Balance   Sitting Balance (Static) Fair   Sitting Balance (Dynamic) Fair -   Standing Balance (Static) Fair -   Standing Balance (Dynamic) Poor   Weight Shift Sitting Fair   Weight Shift Standing Poor   Skilled " Intervention Verbal Cuing;Tactile Cuing;Sequencing;Postural Facilitation;Facilitation;Compensatory Strategies   Comments w/ HHA RUE   Gait Analysis   Assistive Device Hand Held Assist   Distance (Feet) 3   # of Times Distance was Traveled 1   Deviation Decreased Base Of Support;Shuffled Gait  (very kyphotic)   # of Stairs Climbed 0   Weight Bearing Status no restrictions   Skilled Intervention Verbal Cuing;Tactile Cuing;Sequencing;Postural Facilitation;Facilitation;Compensatory Strategies   Comments limited 2/2 weakness   Bed Mobility    Supine to Sit Standby Assist   Sit to Supine   (up in chair post)   Scooting Minimal Assist   Rolling Supervised   Skilled Intervention Verbal Cuing;Tactile Cuing   Comments requires increased time/effort for bed mobility with HOB raised   Functional Mobility   Sit to Stand Moderate Assist   Bed, Chair, Wheelchair Transfer Moderate Assist   Transfer Method Stand Step   Mobility transfer, STS x4 with 30 seconds standing each rep for pericare   Skilled Intervention Verbal Cuing;Tactile Cuing;Sequencing;Postural Facilitation   Comments limited standing tolerance 2/2 LE weakness, fatigue   How much difficulty does the patient currently have...   Turning over in bed (including adjusting bedclothes, sheets and blankets)? 3   Sitting down on and standing up from a chair with arms (e.g., wheelchair, bedside commode, etc.) 1   Moving from lying on back to sitting on the side of the bed? 3   How much help from another person does the patient currently need...   Moving to and from a bed to a chair (including a wheelchair)? 2   Need to walk in a hospital room? 1   Climbing 3-5 steps with a railing? 1   6 clicks Mobility Score 11   Activity Tolerance   Comments limited 2/2 pain, weakness   Short Term Goals    Short Term Goal # 1 Pt will perform bed mobility with supervision in 6 visits with HOB flat, no rail.   Goal Outcome # 1   (previously met)   Short Term Goal # 2 Pt will transfer with  supervision in 6 visits to improve functional indep.   Goal Outcome # 2 Goal not met   Short Term Goal # 3 Pt will ambulate x 50 feet using FWW with supervision in 6 visits to improve functional indep.   Goal Outcome # 3 Goal not met   Anticipated Discharge Equipment and Recommendations   DC Equipment Recommendations Unable to determine at this time   Discharge Recommendations Recommend post-acute placement for additional physical therapy services prior to discharge home   Interdisciplinary Plan of Care Collaboration   IDT Collaboration with  Nursing   Patient Position at End of Therapy Seated;Chair Alarm On;Call Light within Reach;Tray Table within Reach;Phone within Reach  (RN in room)   Collaboration Comments RN updated   Session Information   Date / Session Number  7/25- 9 (1/2, 7/31)

## 2022-07-25 NOTE — PROGRESS NOTES
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 Pt is very lethargic at this time and unable to provide much history, majority of the history was obtained through review of the medical record and discussion with primary svc. Pt had reported increased LE edema and fatigue and weakness and worsening of her left arm edema so she came to the ED for evaluation. She apparently has missed her last 2-3 outpt dialysis treatments. Per regular outpt Nephrologist Dr. Gates, she has been non-compliant with her fluid restriction and was told that she needed 4x/week dialysis until her volume status could be optimized but she was non-compliant with that recommendation. She has edema of her left arm where her AVF is located and there is concern for a central stenosis that could be the etiology. She had an appointment at the outpt access center to evaluate for central stenosis and undergo angioplasty if needed on 6/9/22 but pt did not go to the appointment. She has not had any other procedures done to the arm in the past couple of months. She had a left arm us done today that showed no DVT and patent AVF and soft tissue edema. She apparently received pain medication fentanyl and dilaudid as well as benadryl earlier today and she is very drowsy.      DAILY NEPHROLOGY SUMMARY:  6/25: rapid response overnight for severe leg pain, pt very lethargic today, moans with palpation of legs, arouses but does not answer questions and falls back asleep, tolerated HD yest with 2.5L UF, BP stable overnight but low this am  6/26: No events, BP low overnight and this am, more alert, reports pain in legs for the past 3 weeks,  denies any CP/SOB/Abd pain, reports left arm edema a little better but no pain in left arm   6/27: Pt resting in bed, subdued, Bps soft, on 4L supp O2 via NC, labs reviewed, due for HD  6/28: pt laying in bed, sleepy, c/o pain in legs, moaning, vascular assessed and does not recommend any intervention on legs or AVF, tolerated iHD yesterday with UF:2.8L  6/29: Pt in bed, fatigued, no complaints.  VSS 1L NC.    6/30: no new complaints, no overnight events. Tolerated hd yesterday, UF 3.5L , she is in negative balance.   7/1: Resting in bed, no complaints.  VSS 2L NC. No new labs today.   7/2: Pt sitting up in bed, confused, CNA helping her with breakfast meal, iHD yesterday with 1.5L net UF limited by muscle cramping, labs reviewed, VSS on 1L NC O2  7/3: Pt slumped in bed, somnolent, medical floor status, VSS on RA  7/4: No new overnight renal events. S/p HD yesterday and tolerated well. Net UF was 1.5 L.  7/5: S/p HD yesterday with net UF of 2 L. No new overnight renal events.   7/6: No new overnight renal events.   7/7: S/p HD yesterday with net UF of 2.2L and tolerated well. No new overnight renal events.   7/8: No new overnight renal events.   7/9: S/p HD yesterday with net UF of 3L and tolerated well. No new overnight renal events.   7/10: No new overnight renal events. K+ 6.9 this am.  7/11: No events, HD yest for hyperkalemia, K 5.9 this am, denies any CP/SOB but reports leg pain, BP stable, seen and examined on HD this am--VSS see dialysis flowsheet for full details  7/12: No events, awake and alert, c/o bilateral leg pain, denies any CP/SOB, BP stable  7/13: No events, seen on dialysis this am, still c/o leg pain, denies any CP/SOB, BP stable  7/14: No events, feels tired, c/o leg pain, denies any CP/SOB, tolerated HD yest with 2L UF  7/15: No events, BP stable, seen on HD, still complaining of leg pain, no CP/SOB  7/16: No events, tolerated HD yest with 3L UF, BP stable, complaining of soft stools and left  "arm pain and leg pain, no CP/SOB/Abd pain  7/17: No overnight events, VSS no CP SOB worsening edema. Na 131 K 5.2, 2L NC.  Nurse reports she was fatigued and disoriented this morning, but has improved.  Currently arouses easily, is oriented.   7/18: Pt seen on HD, says her back hurts, VSS on 2L NC O2, K+ 5.8 this AM  7/19: Pt sitting up in bed. No overnight events. Tolerated HD yesterday with 3L UF, BP stable. K this am better 4.2. No CP/N/V/D.   7/20: Seen during HD, tolerated well. VSS on 2L NC. Denies SOB, N/V/D. Stated left lower hand pain, open blisters noted, says also her back hurts.  7/21: HD yest UF 3L. Dtr at bedside. Pt c/o significant pain/burning in left hand.  7/22: seen on iHd, continues with + edema in LUE, + edema BLE edema  7/23: HD yesterday 3 liter uf  7/24: Decreased vision in R eye, but chornic, worse edema in left arm  7/25: Sleeping on HD.  Comfortable.  Left arm edema    REVIEW OF SYSTEMS:    10 point ROS reviewed and is as per HPI/daily summary or otherwise negative    PMH/PSH/SH/FH:   Reviewed and unchanged since admission note    CURRENT MEDICATIONS:   Reviewed from admission to present day    VS:  /53 Comment: pre-dialysis  Pulse 79   Temp 36.1 °C (96.9 °F) (Temporal)   Resp 16   Ht 1.626 m (5' 4\")   Wt 60.2 kg (132 lb 11.5 oz)   SpO2 99%   BMI 22.78 kg/m²     Physical Exam  Vitals and nursing note reviewed.   Constitutional:       General: She is not in acute distress.     Appearance: She is ill-appearing.   HENT:      Head: Normocephalic and atraumatic.      Mouth/Throat:      Mouth: Mucous membranes are dry.   Eyes:      General: No scleral icterus.  Cardiovascular:      Rate and Rhythm: Normal rate and regular rhythm.      Pulses: Normal pulses.   Pulmonary:      Effort: Pulmonary effort is normal. No respiratory distress.   Abdominal:      General: There is no distension.      Palpations: Abdomen is soft.   Musculoskeletal:         General: Swelling (left arm) present. " No deformity.      Right lower leg: No edema.      Left lower leg: No edema.      Comments: Trace LE edema  LUE AVF + bruit and Thrill   Skin:     General: Skin is warm and dry.      Findings: No rash.      Comments: Left hand gauze wrapped    Neurological:      Mental Status: She is alert.           Fluids:  In: 1180 [P.O.:1180]  Out: -     LABS:  Recent Labs     07/23/22  0346   SODIUM 135   POTASSIUM 4.5   CHLORIDE 93*   CO2 28   GLUCOSE 121*   BUN 42*   CREATININE 4.25*   CALCIUM 9.1     Reviewed.    IMAGING:   All imaging reviewed from admission to present day    IMPRESSION:  # ESRD, dependent on HD              - HD via LUE AVF, missed 2-3 outpt treatments prior to admit     # Fluid overload, improving              - Secondary to non-compliance with fluid restriction and HD treatments    # Left arm edema, slow improvement              - CTA upper ext 6/26 w/o e/o focal stenosis + extensive edema             - AVF patent on left arm us and no DVT             - Pt no showed to outpt appt to evaluate              - Vascular does not recommend intervention    - Extensor tendon laceration left hand with wound  # HTN, variable control, stable at this time             - Goal BP < 140/90             - Not on BP meds   # Anemia of CKD, at goal              - Goal Hgb 10-11             - Iron 27             -TIBC 142             -%Sat 19             - Ferritin 1419  # CKD-MBD             - Ca 9.7             - PO4 5.2             - Managed at OP unit             - On calcitriol and sevelamer   # DM II--management per primary svc  # Leg Pain--chronic skin changes and subcutaneous tissue firm to touch             - Unclear etiology             - Vascular does not recommend any intervention   - On ropinirole   # Leukocytosis, decreased    - Check cultures if any fevers     PLAN:  - Continue qMWF iHD schedule   - UF as tolerated  - Continue phos binders WM  - Continue off of all BP meds and diuretics for now. BP stable  during HD.   - PRN NS boluses for symptomatic hypotension ok, avoid maint IVFs  - Limit sedating meds if possible  - No dietary protein restrictions  - Dose all meds per ESRD  - Outpt access center appointment rescheduled  - Palliative care has consulted; pt is still a full code  - Pt at high risk for further morbidity/mortality  - DC planning underway for SNF.  - Okay to discharge home from a Nephrology standpoint once medically cleared. Pt to f/u outpatient HD clinic.       Thank you,

## 2022-07-25 NOTE — PROGRESS NOTES
[facility] Progress Note     Hemodialysis treatment x 3 hours completed as ordered per Dr. Gregory  Treatment started at 0917 and ended at 1218       Net UF Removed:  1500 mL     Patient tolerated treatment well. UFG decreased d/t soft/low BPs, adjusted as BP tolerates, BPs improved during second hour of tx  LUE edema-baseline per pt/PCN, LUE AVF patent (+)bruit/thrill, patient lethargic pre-tx, more awake towards last hour of treatment.  Patient stable during and post treatment, no complaints.   See Acute HD flow sheets on clinical data notes under media for details.     Post tx access: 15 g cannulation needles removed from KINGA AVF access sites, hemostasis established on each insertion site within 5 mins each; verified no bleeding. (+) bruit/thrill post treatment. Covered with clean gauze dressings and secured with tape.     Please monitor for LUE AVFaccess bleeding. Should any breakthrough bleeding occur, please apply gauze and firm pressure on site until bleeding resolves, cover with gauze dressing and tape. Please observe UE precautions- NO taking of BPs or blood draws to LUE    Please notify Nephrologist/Dialysis for follow-up.    1235 Report given to primary care nurse KULWANT Sifuentes RN.      1236 Patient transported back to room via bed by transport with O2 @ 2L/NC. Patient awake, alert, stable.

## 2022-07-25 NOTE — PROGRESS NOTES
Alert and oriented x3, disoriented with event. Offered fluids and snacks. Safety precautions in placed. Bed in lowest position. Upper side rails up. Treaded socks on. Reinforced the use of call light when needing assistance. Bed alarm is on.

## 2022-07-25 NOTE — DISCHARGE PLANNING
Received Choice form at 0292  Agency/Facility Name: local [place] [facility]  Referral sent per Choice form @ [phone]

## 2022-07-25 NOTE — CARE PLAN
The patient is Stable - Low risk of patient condition declining or worsening    Shift Goals  Clinical Goals: comfort  Patient Goals: pt will be able to rest and sleep comfortably  Family Goals: parveen    Progress made toward(s) clinical / shift goals:      Patient is not progressing towards the following goals:

## 2022-07-26 NOTE — PROGRESS NOTES
Hospital Medicine TWICE WEEKLY Progress Note    Date of Service  7/26/2022    Chief Complaint  Edema    Hospital Course  81 y.o. female with PMHx ESRD on HD MWF, HTN, HLD, and T2DM who was admitted on 6/24/2022 for left upper arm swelling and lower extremity edema after missing HD sessions.  X-rays of the left hand showed only chronic changes.  AV fistula was functioning well.  CTA showed no evidence of central stenosis.  Vascular surgery was consulted and only recommended supportive care such as wrapping the arm with Ace bandage.  She was also found to have severe aortic stenosis for which cardiology was consulted, and gave the opinion that she is not a candidate for intervention.  She was dialyzed while in the hospital with subsequent clinical improvement.  She did have hospital delirium while in the hospital, which has since resolved.  She also had worsening wound on her left hand, but x-ray showed no acute abnormalities.  CT was done, but orthopedics gave the opinion that she does not have any fluid collection or abscess requiring surgical debridement.  Showed she is started on antibiotics.  Palliative care was consulted, but patient continued to wish to be full code.  SNF placement was pursued, but proved to be challenging as transfer to dialysis is not always facilitated.  Case management is working on her discharge to SNF and arrangement of hemodialysis.    Interval Problem Update  7/26/2022 - I reviewed the patient's chart. There were no significant overnight events. Remains hemodynamically stable and afebrile. Stable on 2L O2 NC.      > I have personally seen and examined the patient today.  Easily arousable, complaining of pain in the left hand.  Left hand wound appears healing, and no further signs of infection.  No other complaints.      I discussed plan of care with bedside RN.      Consultants/Specialty  cardiology, nephrology and vascular surgery    Code Status  Full Code    Disposition  Patient is  medically cleared for discharge.   Anticipate discharge to to skilled nursing facility.  I have placed the appropriate orders for post-discharge needs.    Review of Systems  Review of Systems        Pertinent positives/negatives as mentioned above.     A complete review of systems was personally done by me. All other systems were negative.        Physical Exam  Temp:  [36.1 °C (96.9 °F)-36.9 °C (98.4 °F)] 36.9 °C (98.4 °F)  Pulse:  [79-92] 87  Resp:  [16-18] 18  BP: (105-125)/(42-60) 105/42  SpO2:  [96 %-100 %] 96 %    Physical Exam  Vitals reviewed.   Constitutional:       General: She is not in acute distress.     Appearance: Normal appearance. She is not ill-appearing.   HENT:      Head: Normocephalic and atraumatic.      Nose: No congestion.   Eyes:      General:         Right eye: No discharge.         Left eye: No discharge.      Pupils: Pupils are equal, round, and reactive to light.   Cardiovascular:      Rate and Rhythm: Normal rate and regular rhythm.      Pulses: Normal pulses.      Heart sounds: Murmur heard.   Pulmonary:      Effort: Pulmonary effort is normal. No respiratory distress.      Breath sounds: Normal breath sounds. No stridor.   Abdominal:      General: Bowel sounds are normal. There is no distension.      Palpations: Abdomen is soft.      Tenderness: There is no abdominal tenderness.   Musculoskeletal:         General: Swelling, tenderness and deformity present. Normal range of motion.      Cervical back: Normal range of motion. No rigidity.   Skin:     General: Skin is warm.      Capillary Refill: Capillary refill takes less than 2 seconds.      Coloration: Skin is not jaundiced or pale.      Findings: Erythema and lesion present. No bruising.      Comments: Dressing present on left hand CDI   Neurological:      General: No focal deficit present.      Mental Status: She is alert and oriented to person, place, and time.      Cranial Nerves: No cranial nerve deficit.   Psychiatric:          Mood and Affect: Mood normal.         Behavior: Behavior normal.         Fluids    Intake/Output Summary (Last 24 hours) at 7/26/2022 0809  Last data filed at 7/25/2022 2100  Gross per 24 hour   Intake 1200 ml   Output 2000 ml   Net -800 ml       Laboratory                        Imaging  CT-HAND W/O LEFT   Final Result      1.  No acute fracture or dislocation.      2.  Previous distal ulnar fracture identified.      3.  Osteoarthritis is most prominent at the first carpometacarpal joint.      4.  No bone erosions identified.      5.  Fluid collection or soft tissue abscess is identified.      6.  Induration in subcutaneous fat and intramuscular fat planes is noted which could be due to edema or inflammation such as cellulitis.      DX-HAND 3+ LEFT   Final Result      1. No acute fracture or subluxation.   2. Chronic posttraumatic changes in the distal left radius and ulna, stable when compared with the prior study.   3. Stable osteoarthritis involving the left first carpometacarpal joint.      DX-HAND 3+ LEFT   Final Result      1.  No acute fracture or dislocation.   2.  Scattered mild degenerative changes, increased from prior study. No definite erosive arthropathy.   3.  Osteopenia.   4.  Old ulnar styloid process fracture.      DX-CHEST-PORTABLE (1 VIEW)   Final Result      1.  Probable 12 mm right mid/upper lung zone mass. Suggest CT chest without contrast for further assessment      2.  Interstitial pulmonary edema or fibrosis      3.  Enlarged cardiac silhouette      4.  Left pleural effusion      CT-CTA UPPER EXT WITH & W/O-POST PROCESS LEFT   Final Result      1.  Status post brachiocephalic fistula in the left arm. No focal stenosis is identified.      2.  Extensive edema in the subcutaneous tissues of the visualized left lateral chest and arm.      EC-ECHOCARDIOGRAM COMPLETE W/O CONT   Final Result      US-ZAHRAA SINGLE LEVEL BILAT   Final Result      US-EXTREMITY ARTERY LOWER BILAT   Final Result       CT-EXTREMITY, UPPER W/O LEFT   Final Result      1.  There is diffuse subcutaneous edema of the imaged portions of the left upper extremity. There is also diffuse body wall edema in the visualized portions of the chest and abdomen.   2.  No focal fluid collection to suggest abscess.   3.  There is a small left pleural effusion and minimal fluid in the abdomen.   4.  There is postsurgical change of AV fistula. There is small vessel atherosclerosis.      US-EXTREMITY VENOUS UPPER UNILAT LEFT   Final Result      DX-CHEST-PORTABLE (1 VIEW)   Final Result         1.  Interstitial pulmonary parenchymal prominence suggest chronic underlying lung disease, component of interstitial edema and/or infiltrates not excluded.   2.  Small left pleural effusion   3.  Cardiomegaly   4.  Atherosclerosis           Assessment/Plan  * ESRD (end stage renal disease) on dialysis (HCC)- (present on admission)  Assessment & Plan  - Continue hemodialysis per nephrology.  -Continue sevelamer and calcitriol.    Edema of left upper extremity- (present on admission)  Assessment & Plan  - Chronic, at the same site of AV fistula which is functioning well.  -Multifactorial: Poor EF, lymphedema, outflow stenosis.  CTA showed no evidence of central stenosis.  Vascular surgery recommended supportive care with wrapping arm with Ace bandage.  -Continue to monitor.    Delirium  Assessment & Plan  - Resolved.    Aortic stenosis, severe- (present on admission)  Assessment & Plan  - Not a candidate for intervention per cardiology.  -Continue to monitor volume status.    Pain in both lower extremities- (present on admission)  Assessment & Plan  - Chronic.  Continue as needed pain medications.    Goals of care, counseling/discussion- (present on admission)  Assessment & Plan  -palliative care consulted, patient wished to remain full code.    Generalized weakness- (present on admission)  Assessment & Plan  - SNF placement being pursued.  Placement challenging  due to need for transportation back and forth to hemodialysis.  CM/SW on board.    Extensor tendon laceration of left hand with open wound  Assessment & Plan  She has been having worsening of her wound on her left hand. COmpleted course of ancef. Now, hand wound healing well, no further signs of infection.X-ray did not show any acute abnormalities.  CT scan of her left hand and it did not show any fluid collection.    Hypothyroidism- (present on admission)  Assessment & Plan  - Continue Synthroid.    Diabetes mellitus, type II (HCC)- (present on admission)  Assessment & Plan  - Last HbA1c was 11.7.  Continue Lantus 5 units at night, along with sliding scale insulin coverage.  Continue Accu-Cheks before meals and at bedtime. Goal to keep BG between 140-180 per 2019 ADA guidelines.        Essential hypertension- (present on admission)  Assessment & Plan  - Maintaining good blood pressure control, despite holding home antihypertensives.  Continue to monitor blood pressure trend closely.             VTE prophylaxis: heparin ppx    I have performed a physical exam and reviewed and updated ROS and Plan today (7/26/2022). In review of last note, there are no changes except as documented above.

## 2022-07-26 NOTE — PROGRESS NOTES
Alert and oriented x4.  Safety precautions in placed. Bed in lowest position. Upper side rails up. Treaded socks on. Reinforced the use of call light when needing assistance. Bed alarm is on

## 2022-07-26 NOTE — PROGRESS NOTES
[facility] -  PROGRESS NOTE               Author: Bebeto Gregory M.D., CNN-NP Date & Time: 7/26/2022  12:54 PM     HPI:  81yoF with PMH significant for ESRD on HD MWF via left arm AVF, HTN, DM II, Anemia secondary to CKD, CKD Bone mineral disorder, admitted with increased edema and weakness and having missed her last last 2-3 outpt HD treatments. Pt is very lethargic at this time and unable to provide much history, majority of the history was obtained through review of the medical record and discussion with primary svc. Pt had reported increased LE edema and fatigue and weakness and worsening of her left arm edema so she came to the ED for evaluation. She apparently has missed her last 2-3 outpt dialysis treatments. Per regular outpt Nephrologist Dr. Gates, she has been non-compliant with her fluid restriction and was told that she needed 4x/week dialysis until her volume status could be optimized but she was non-compliant with that recommendation. She has edema of her left arm where her AVF is located and there is concern for a central stenosis that could be the etiology. She had an appointment at the outpt access center to evaluate for central stenosis and undergo angioplasty if needed on 6/9/22 but pt did not go to the appointment. She has not had any other procedures done to the arm in the past couple of months. She had a left arm us done today that showed no DVT and patent AVF and soft tissue edema. She apparently received pain medication fentanyl and dilaudid as well as benadryl earlier today and she is very drowsy.      DAILY NEPHROLOGY SUMMARY:  6/25: rapid response overnight for severe leg pain, pt very lethargic today, moans with palpation of legs, arouses but does not answer questions and falls back asleep, tolerated HD yest with 2.5L UF, BP stable overnight but low this am  6/26: No events, BP low overnight and this am, more alert, reports pain in legs for the past 3 weeks,  denies any CP/SOB/Abd pain, reports left arm edema a little better but no pain in left arm   6/27: Pt resting in bed, subdued, Bps soft, on 4L supp O2 via NC, labs reviewed, due for HD  6/28: pt laying in bed, sleepy, c/o pain in legs, moaning, vascular assessed and does not recommend any intervention on legs or AVF, tolerated iHD yesterday with UF:2.8L  6/29: Pt in bed, fatigued, no complaints.  VSS 1L NC.    6/30: no new complaints, no overnight events. Tolerated hd yesterday, UF 3.5L , she is in negative balance.   7/1: Resting in bed, no complaints.  VSS 2L NC. No new labs today.   7/2: Pt sitting up in bed, confused, CNA helping her with breakfast meal, iHD yesterday with 1.5L net UF limited by muscle cramping, labs reviewed, VSS on 1L NC O2  7/3: Pt slumped in bed, somnolent, medical floor status, VSS on RA  7/4: No new overnight renal events. S/p HD yesterday and tolerated well. Net UF was 1.5 L.  7/5: S/p HD yesterday with net UF of 2 L. No new overnight renal events.   7/6: No new overnight renal events.   7/7: S/p HD yesterday with net UF of 2.2L and tolerated well. No new overnight renal events.   7/8: No new overnight renal events.   7/9: S/p HD yesterday with net UF of 3L and tolerated well. No new overnight renal events.   7/10: No new overnight renal events. K+ 6.9 this am.  7/11: No events, HD yest for hyperkalemia, K 5.9 this am, denies any CP/SOB but reports leg pain, BP stable, seen and examined on HD this am--VSS see dialysis flowsheet for full details  7/12: No events, awake and alert, c/o bilateral leg pain, denies any CP/SOB, BP stable  7/13: No events, seen on dialysis this am, still c/o leg pain, denies any CP/SOB, BP stable  7/14: No events, feels tired, c/o leg pain, denies any CP/SOB, tolerated HD yest with 2L UF  7/15: No events, BP stable, seen on HD, still complaining of leg pain, no CP/SOB  7/16: No events, tolerated HD yest with 3L UF, BP stable, complaining of soft stools and left  "arm pain and leg pain, no CP/SOB/Abd pain  7/17: No overnight events, VSS no CP SOB worsening edema. Na 131 K 5.2, 2L NC.  Nurse reports she was fatigued and disoriented this morning, but has improved.  Currently arouses easily, is oriented.   7/18: Pt seen on HD, says her back hurts, VSS on 2L NC O2, K+ 5.8 this AM  7/19: Pt sitting up in bed. No overnight events. Tolerated HD yesterday with 3L UF, BP stable. K this am better 4.2. No CP/N/V/D.   7/20: Seen during HD, tolerated well. VSS on 2L NC. Denies SOB, N/V/D. Stated left lower hand pain, open blisters noted, says also her back hurts.  7/21: HD yest UF 3L. Dtr at bedside. Pt c/o significant pain/burning in left hand.  7/22: seen on iHd, continues with + edema in LUE, + edema BLE edema  7/23: HD yesterday 3 liter uf  7/24: Decreased vision in R eye, but chornic, worse edema in left arm  7/25: Sleeping on HD.  Comfortable.  Left arm edema  7/26: Denies pain or SOB.  Comfortable.      REVIEW OF SYSTEMS:    10 point ROS reviewed and is as per HPI/daily summary or otherwise negative    PMH/PSH/SH/FH:   Reviewed and unchanged since admission note    CURRENT MEDICATIONS:   Reviewed from admission to present day    VS:  /42   Pulse 87   Temp 36.9 °C (98.4 °F) (Temporal)   Resp 18   Ht 1.626 m (5' 4\")   Wt 60.2 kg (132 lb 11.5 oz)   SpO2 96%   BMI 22.78 kg/m²     Physical Exam  Vitals and nursing note reviewed.   Constitutional:       General: She is not in acute distress.     Appearance: She is ill-appearing.   HENT:      Head: Normocephalic and atraumatic.      Mouth/Throat:      Mouth: Mucous membranes are dry.   Eyes:      General: No scleral icterus.  Cardiovascular:      Rate and Rhythm: Normal rate and regular rhythm.      Pulses: Normal pulses.   Pulmonary:      Effort: Pulmonary effort is normal. No respiratory distress.   Abdominal:      General: There is no distension.      Palpations: Abdomen is soft.   Musculoskeletal:         General: " Swelling (left arm) present. No deformity.      Right lower leg: No edema.      Left lower leg: No edema.      Comments: Trace LE edema  LUE AVF + bruit and Thrill   Skin:     General: Skin is warm and dry.      Findings: No rash.      Comments: Left hand gauze wrapped    Neurological:      Mental Status: She is alert.           Fluids:  In: 1200 [P.O.:700; Dialysis:500]  Out: 2000     LABS:      Reviewed.    IMAGING:   All imaging reviewed from admission to present day    IMPRESSION:  # ESRD, dependent on HD              - HD via LUE AVF, missed 2-3 outpt treatments prior to admit     # Fluid overload, improving              - Secondary to non-compliance with fluid restriction and HD treatments    # Left arm edema, slow improvement              - CTA upper ext 6/26 w/o e/o focal stenosis + extensive edema             - AVF patent on left arm us and no DVT             - Pt no showed to outpt appt to evaluate              - Vascular does not recommend intervention    - Extensor tendon laceration left hand with wound  # HTN, variable control, stable at this time             - Goal BP < 140/90             - Not on BP meds   # Anemia of CKD, at goal              - Goal Hgb 10-11             - Iron 27             -TIBC 142             -%Sat 19             - Ferritin 1419  # CKD-MBD             - Ca 9.7             - PO4 5.2             - Managed at OP unit             - On calcitriol and sevelamer   # DM II--management per primary svc  # Leg Pain--chronic skin changes and subcutaneous tissue firm to touch             - Unclear etiology             - Vascular does not recommend any intervention   - On ropinirole   # Leukocytosis, decreased    - Check cultures if any fevers     PLAN:  - Continue qMWF iHD schedule   - UF as tolerated  - Continue phos binders WM  - Continue off of all BP meds and diuretics for now. BP stable during HD.   - PRN NS boluses for symptomatic hypotension ok, avoid maint IVFs  - Limit sedating meds  if possible  - No dietary protein restrictions  - Dose all meds per ESRD  - Outpt access center appointment rescheduled  - Palliative care has consulted; pt is still a full code  - Pt at high risk for further morbidity/mortality  - DC planning underway for SNF.  - Okay to discharge home from a Nephrology standpoint once medically cleared. Pt to f/u outpatient HD clinic.       Thank you,

## 2022-07-26 NOTE — PROGRESS NOTES
Received report from NOC RN, pt care assumed. VS stable on 2L NC. Call light within reach. Pt is aaox2 and denies pain at this time. Bed-alarm on.

## 2022-07-27 NOTE — PROGRESS NOTES
[facility] -  PROGRESS NOTE               Author: THERESE Comer, CNN-NP Date & Time: 7/27/2022  11:18 AM     HPI:  81yoF with PMH significant for ESRD on HD MWF via left arm AVF, HTN, DM II, Anemia secondary to CKD, CKD Bone mineral disorder, admitted with increased edema and weakness and having missed her last last 2-3 outpt HD treatments. Pt is very lethargic at this time and unable to provide much history, majority of the history was obtained through review of the medical record and discussion with primary svc. Pt had reported increased LE edema and fatigue and weakness and worsening of her left arm edema so she came to the ED for evaluation. She apparently has missed her last 2-3 outpt dialysis treatments. Per regular outpt Nephrologist Dr. Gates, she has been non-compliant with her fluid restriction and was told that she needed 4x/week dialysis until her volume status could be optimized but she was non-compliant with that recommendation. She has edema of her left arm where her AVF is located and there is concern for a central stenosis that could be the etiology. She had an appointment at the outpt access center to evaluate for central stenosis and undergo angioplasty if needed on 6/9/22 but pt did not go to the appointment. She has not had any other procedures done to the arm in the past couple of months. She had a left arm us done today that showed no DVT and patent AVF and soft tissue edema. She apparently received pain medication fentanyl and dilaudid as well as benadryl earlier today and she is very drowsy.      DAILY NEPHROLOGY SUMMARY:  6/25: rapid response overnight for severe leg pain, pt very lethargic today, moans with palpation of legs, arouses but does not answer questions and falls back asleep, tolerated HD yest with 2.5L UF, BP stable overnight but low this am  6/26: No events, BP low overnight and this am, more alert, reports pain in legs for the past 3  weeks, denies any CP/SOB/Abd pain, reports left arm edema a little better but no pain in left arm   6/27: Pt resting in bed, subdued, Bps soft, on 4L supp O2 via NC, labs reviewed, due for HD  6/28: pt laying in bed, sleepy, c/o pain in legs, moaning, vascular assessed and does not recommend any intervention on legs or AVF, tolerated iHD yesterday with UF:2.8L  6/29: Pt in bed, fatigued, no complaints.  VSS 1L NC.    6/30: no new complaints, no overnight events. Tolerated hd yesterday, UF 3.5L , she is in negative balance.   7/1: Resting in bed, no complaints.  VSS 2L NC. No new labs today.   7/2: Pt sitting up in bed, confused, CNA helping her with breakfast meal, iHD yesterday with 1.5L net UF limited by muscle cramping, labs reviewed, VSS on 1L NC O2  7/3: Pt slumped in bed, somnolent, medical floor status, VSS on RA  7/4: No new overnight renal events. S/p HD yesterday and tolerated well. Net UF was 1.5 L.  7/5: S/p HD yesterday with net UF of 2 L. No new overnight renal events.   7/6: No new overnight renal events.   7/7: S/p HD yesterday with net UF of 2.2L and tolerated well. No new overnight renal events.   7/8: No new overnight renal events.   7/9: S/p HD yesterday with net UF of 3L and tolerated well. No new overnight renal events.   7/10: No new overnight renal events. K+ 6.9 this am.  7/11: No events, HD yest for hyperkalemia, K 5.9 this am, denies any CP/SOB but reports leg pain, BP stable, seen and examined on HD this am--VSS see dialysis flowsheet for full details  7/12: No events, awake and alert, c/o bilateral leg pain, denies any CP/SOB, BP stable  7/13: No events, seen on dialysis this am, still c/o leg pain, denies any CP/SOB, BP stable  7/14: No events, feels tired, c/o leg pain, denies any CP/SOB, tolerated HD yest with 2L UF  7/15: No events, BP stable, seen on HD, still complaining of leg pain, no CP/SOB  7/16: No events, tolerated HD yest with 3L UF, BP stable, complaining of soft stools and  "left arm pain and leg pain, no CP/SOB/Abd pain  7/17: No overnight events, VSS no CP SOB worsening edema. Na 131 K 5.2, 2L NC.  Nurse reports she was fatigued and disoriented this morning, but has improved.  Currently arouses easily, is oriented.   7/18: Pt seen on HD, says her back hurts, VSS on 2L NC O2, K+ 5.8 this AM  7/19: Pt sitting up in bed. No overnight events. Tolerated HD yesterday with 3L UF, BP stable. K this am better 4.2. No CP/N/V/D.   7/20: Seen during HD, tolerated well. VSS on 2L NC. Denies SOB, N/V/D. Stated left lower hand pain, open blisters noted, says also her back hurts.  7/21: HD yest UF 3L. Dtr at bedside. Pt c/o significant pain/burning in left hand.  7/22: seen on iHd, continues with + edema in LUE, + edema BLE edema  7/23: HD yesterday 3 liter uf  7/24: Decreased vision in R eye, but chornic, worse edema in left arm  7/25: Sleeping on HD.  Comfortable.  Left arm edema  7/26: Denies pain or SOB.  Comfortable.    7/27: seen on iHD, pleasantly confused, LUE edema improved     REVIEW OF SYSTEMS:    10 point ROS reviewed and is as per HPI/daily summary or otherwise negative    PMH/PSH/SH/FH:   Reviewed and unchanged since admission note    CURRENT MEDICATIONS:   Reviewed from admission to present day    VS:  /50   Pulse 90   Temp 35.8 °C (96.5 °F) (Temporal)   Resp 17   Ht 1.626 m (5' 4\")   Wt 60.2 kg (132 lb 11.5 oz)   SpO2 96%   BMI 22.78 kg/m²     Physical Exam  Vitals and nursing note reviewed.   Constitutional:       General: She is not in acute distress.     Appearance: She is ill-appearing.   HENT:      Head: Normocephalic and atraumatic.      Mouth/Throat:      Mouth: Mucous membranes are dry.   Eyes:      General: No scleral icterus.  Cardiovascular:      Rate and Rhythm: Normal rate and regular rhythm.      Pulses: Normal pulses.   Pulmonary:      Effort: Pulmonary effort is normal. No respiratory distress.   Abdominal:      General: There is no distension.      " Palpations: Abdomen is soft.   Musculoskeletal:         General: Swelling (left arm) present. No deformity.      Right lower leg: No edema.      Left lower leg: No edema.      Comments: Trace LE edema  LUE AVF + bruit and Thrill   Skin:     General: Skin is warm and dry.      Findings: No rash.      Comments: Left hand gauze wrapped    Neurological:      Mental Status: She is alert.           Fluids:  In: 720 [P.O.:720]  Out: -     LABS:  Recent Labs     07/27/22  0950   SODIUM 132*   POTASSIUM 5.8*   CHLORIDE 93*   CO2 23   GLUCOSE 112*   BUN 45*   CREATININE 4.30*   CALCIUM 8.5     Reviewed.    IMAGING:   All imaging reviewed from admission to present day    IMPRESSION:  # ESRD, dependent on HD              - HD via LUE AVF, missed 2-3 outpt treatments prior to admit     # Fluid overload, improving              - Secondary to non-compliance with fluid restriction and HD treatments    # Left arm edema, slow improvement              - CTA upper ext 6/26 w/o e/o focal stenosis + extensive edema             - AVF patent on left arm us and no DVT             - Pt no showed to outpt appt to evaluate              - Vascular does not recommend intervention    - Extensor tendon laceration left hand with wound  # HTN, variable control, stable at this time             - Goal BP < 140/90             - Not on BP meds   # Anemia of CKD, at goal              - Goal Hgb 10-11             - Iron 27             -TIBC 142             -%Sat 19             - Ferritin 1419  # CKD-MBD             - Ca 9.7             - PO4 5.2             - Managed at OP unit             - On calcitriol and sevelamer   # DM II--management per primary svc  # Leg Pain--chronic skin changes and subcutaneous tissue firm to touch             - Unclear etiology             - Vascular does not recommend any intervention   - On ropinirole   # Leukocytosis, decreased    - Check cultures if any fevers     PLAN:  - Continue qMWF iHD schedule   - UF as tolerated  -  Continue phos binders WM  - Continue off of all BP meds and diuretics for now. BP stable during HD.   - PRN NS boluses for symptomatic hypotension ok, avoid maint IVFs  - Limit sedating meds if possible  - No dietary protein restrictions  - Dose all meds per ESRD  - Outpt access center appointment rescheduled  - Palliative care has consulted; pt is still a full code  - Pt at high risk for further morbidity/mortality  - DC planning underway for SNF.  - Okay to discharge home from a Nephrology standpoint once medically cleared. Pt to f/u outpatient HD clinic.       Thank you,

## 2022-07-27 NOTE — CARE PLAN
Problem: Pain - Standard  Goal: Alleviation of pain or a reduction in pain to the patient’s comfort goal  Outcome: Progressing     Problem: Fall Risk  Goal: Patient will remain free from falls  Outcome: Progressing   The patient is Stable - Low risk of patient condition declining or worsening    Shift Goals  Clinical Goals: safety  Patient Goals: sleep  Family Goals: parveen    Progress made toward(s) clinical / shift goals:  no falls    Patient is not progressing towards the following goals:

## 2022-07-27 NOTE — PROGRESS NOTES
Received report from NOC RN, pt care assumed. VS stable on 2L NC. Call light within reach. Pt is aaox2 and denies having pain. Pt is lethargic and resting comfortably in bed. Bed-alarm on.

## 2022-07-27 NOTE — THERAPY
Missed Therapy     Patient Name: Jannet Bruno  Age:  81 y.o., Sex:  female  Medical Record #: [id number]  Today's Date: 7/27/2022    Discussed missed therapy with RN     Attempted to see pt for OT tx. Pt currently in dialysis and out of room. Will try again later as able.

## 2022-07-28 NOTE — DISCHARGE PLANNING
Medical Social Work  SW attempted to introduce himself to patient and his role.  Patient just back from dialysis, very weak tired.   Thinks she is at [facility], not sure why she is here. Orientated to self, date at this time.    SW attempted to obtain family information, spouse, children.  SW told that she has 5 children, 3 boys and 2 girls all live in [place].  Patient then said that her oldest son is in [place].  But, will be back once his job is done to take care of her.  When asked to speak to her children, patient said to call her son Maxx.  But does not know his number.  SW asked about patient's spouse, Peter.  Patient stated that he is living in a nursing home as he can not take care of himself.  Patient does not know the name of facility or how long he has been there.   SW asked patient where she will be living once she is discharged.  Patient stated she will be living in a nursing home also.  SW worked asked if she wants to live in the same facility as her spouse.  Patient stated she doesn't know.    Chart review, [facility] Green Abler  Discharged to [facility] on 10/6/21    PC to [facility] [phone]; message left to call [employer]      Patient stated she makes about $1200 in Social Security and  The same amount from the [employer]    [employer] goggled [facility] Tax assessors web site, patient is renting.

## 2022-07-28 NOTE — CARE PLAN
The patient is Stable - Low risk of patient condition declining or worsening    Shift Goals  Clinical Goals: Safety  Patient Goals: Sleep  Family Goals: CHIP    Progress made toward(s) clinical / shift goals:      Problem: Pain - Standard  Goal: Alleviation of pain or a reduction in pain to the patient’s comfort goal  Outcome: Progressing     Problem: Knowledge Deficit - Standard  Goal: Patient and family/care givers will demonstrate understanding of plan of care, disease process/condition, diagnostic tests and medications  Outcome: Progressing     Problem: Fall Risk  Goal: Patient will remain free from falls  Outcome: Progressing       Patient is not progressing towards the following goals:NA

## 2022-07-28 NOTE — CARE PLAN
The patient is Watcher - Medium risk of patient condition declining or worsening    Shift Goals  Clinical Goals: Patient safety will be maintained  Patient Goals: Sleep  Family Goals: CHIP    Progress made toward(s) clinical / shift goals:    Problem: Fall Risk  Goal: Patient will remain free from falls  Outcome: Progressing     Problem: Respiratory  Goal: Patient will achieve/maintain optimum respiratory ventilation and gas exchange  Outcome: Progressing       Patient is not progressing towards the following goals:      Problem: Knowledge Deficit - Standard  Goal: Patient and family/care givers will demonstrate understanding of plan of care, disease process/condition, diagnostic tests and medications  Outcome: Not Progressing

## 2022-07-28 NOTE — PROGRESS NOTES
[facility] -  PROGRESS NOTE               Author: Bebeto Gregory M.D., CNN-NP Date & Time: 7/28/2022  10:09 AM     HPI:  81yoF with PMH significant for ESRD on HD MWF via left arm AVF, HTN, DM II, Anemia secondary to CKD, CKD Bone mineral disorder, admitted with increased edema and weakness and having missed her last last 2-3 outpt HD treatments. Pt is very lethargic at this time and unable to provide much history, majority of the history was obtained through review of the medical record and discussion with primary svc. Pt had reported increased LE edema and fatigue and weakness and worsening of her left arm edema so she came to the ED for evaluation. She apparently has missed her last 2-3 outpt dialysis treatments. Per regular outpt Nephrologist Dr. Gates, she has been non-compliant with her fluid restriction and was told that she needed 4x/week dialysis until her volume status could be optimized but she was non-compliant with that recommendation. She has edema of her left arm where her AVF is located and there is concern for a central stenosis that could be the etiology. She had an appointment at the outpt access center to evaluate for central stenosis and undergo angioplasty if needed on 6/9/22 but pt did not go to the appointment. She has not had any other procedures done to the arm in the past couple of months. She had a left arm us done today that showed no DVT and patent AVF and soft tissue edema. She apparently received pain medication fentanyl and dilaudid as well as benadryl earlier today and she is very drowsy.      DAILY NEPHROLOGY SUMMARY:  6/25: rapid response overnight for severe leg pain, pt very lethargic today, moans with palpation of legs, arouses but does not answer questions and falls back asleep, tolerated HD yest with 2.5L UF, BP stable overnight but low this am  6/26: No events, BP low overnight and this am, more alert, reports pain in legs for the past 3 weeks,  denies any CP/SOB/Abd pain, reports left arm edema a little better but no pain in left arm   6/27: Pt resting in bed, subdued, Bps soft, on 4L supp O2 via NC, labs reviewed, due for HD  6/28: pt laying in bed, sleepy, c/o pain in legs, moaning, vascular assessed and does not recommend any intervention on legs or AVF, tolerated iHD yesterday with UF:2.8L  6/29: Pt in bed, fatigued, no complaints.  VSS 1L NC.    6/30: no new complaints, no overnight events. Tolerated hd yesterday, UF 3.5L , she is in negative balance.   7/1: Resting in bed, no complaints.  VSS 2L NC. No new labs today.   7/2: Pt sitting up in bed, confused, CNA helping her with breakfast meal, iHD yesterday with 1.5L net UF limited by muscle cramping, labs reviewed, VSS on 1L NC O2  7/3: Pt slumped in bed, somnolent, medical floor status, VSS on RA  7/4: No new overnight renal events. S/p HD yesterday and tolerated well. Net UF was 1.5 L.  7/5: S/p HD yesterday with net UF of 2 L. No new overnight renal events.   7/6: No new overnight renal events.   7/7: S/p HD yesterday with net UF of 2.2L and tolerated well. No new overnight renal events.   7/8: No new overnight renal events.   7/9: S/p HD yesterday with net UF of 3L and tolerated well. No new overnight renal events.   7/10: No new overnight renal events. K+ 6.9 this am.  7/11: No events, HD yest for hyperkalemia, K 5.9 this am, denies any CP/SOB but reports leg pain, BP stable, seen and examined on HD this am--VSS see dialysis flowsheet for full details  7/12: No events, awake and alert, c/o bilateral leg pain, denies any CP/SOB, BP stable  7/13: No events, seen on dialysis this am, still c/o leg pain, denies any CP/SOB, BP stable  7/14: No events, feels tired, c/o leg pain, denies any CP/SOB, tolerated HD yest with 2L UF  7/15: No events, BP stable, seen on HD, still complaining of leg pain, no CP/SOB  7/16: No events, tolerated HD yest with 3L UF, BP stable, complaining of soft stools and left  "arm pain and leg pain, no CP/SOB/Abd pain  7/17: No overnight events, VSS no CP SOB worsening edema. Na 131 K 5.2, 2L NC.  Nurse reports she was fatigued and disoriented this morning, but has improved.  Currently arouses easily, is oriented.   7/18: Pt seen on HD, says her back hurts, VSS on 2L NC O2, K+ 5.8 this AM  7/19: Pt sitting up in bed. No overnight events. Tolerated HD yesterday with 3L UF, BP stable. K this am better 4.2. No CP/N/V/D.   7/20: Seen during HD, tolerated well. VSS on 2L NC. Denies SOB, N/V/D. Stated left lower hand pain, open blisters noted, says also her back hurts.  7/21: HD yest UF 3L. Dtr at bedside. Pt c/o significant pain/burning in left hand.  7/22: seen on iHd, continues with + edema in LUE, + edema BLE edema  7/23: HD yesterday 3 liter uf  7/24: Decreased vision in R eye, but chornic, worse edema in left arm  7/25: Sleeping on HD.  Comfortable.  Left arm edema  7/26: Denies pain or SOB.  Comfortable.    7/27: seen on iHD, pleasantly confused, LUE edema improved   7/28: Denies pain or SOB.  Slight bleeding around fistula but family had pulled off dressing    REVIEW OF SYSTEMS:    10 point ROS reviewed and is as per HPI/daily summary or otherwise negative    PMH/PSH/SH/FH:   Reviewed and unchanged since admission note    CURRENT MEDICATIONS:   Reviewed from admission to present day    VS:  /50   Pulse 83   Temp 36.6 °C (97.8 °F) (Temporal)   Resp 18   Ht 1.626 m (5' 4\")   Wt 60.2 kg (132 lb 11.5 oz)   SpO2 96%   BMI 22.78 kg/m²     Physical Exam  Vitals and nursing note reviewed.   Constitutional:       General: She is not in acute distress.     Appearance: She is ill-appearing.   HENT:      Head: Normocephalic and atraumatic.      Mouth/Throat:      Mouth: Mucous membranes are dry.   Eyes:      General: No scleral icterus.  Cardiovascular:      Rate and Rhythm: Normal rate and regular rhythm.      Pulses: Normal pulses.   Pulmonary:      Effort: Pulmonary effort is " normal. No respiratory distress.   Abdominal:      General: There is no distension.      Palpations: Abdomen is soft.   Musculoskeletal:         General: Swelling (left arm) present. No deformity.      Right lower leg: No edema.      Left lower leg: No edema.      Comments: Trace LE edema  LUE AVF + bruit and Thrill   Skin:     General: Skin is warm and dry.      Findings: No rash.      Comments: Left hand gauze wrapped    Neurological:      Mental Status: She is alert.           Fluids:  In: 500 [Dialysis:500]  Out: 3500     LABS:  Recent Labs     07/27/22  0950   SODIUM 132*   POTASSIUM 5.8*   CHLORIDE 93*   CO2 23   GLUCOSE 112*   BUN 45*   CREATININE 4.30*   CALCIUM 8.5     Reviewed.    IMAGING:   All imaging reviewed from admission to present day    IMPRESSION:  # ESRD, dependent on HD              - HD via LUE AVF    # Fluid overload, improved             - Secondary to non-compliance with fluid restriction and HD treatments    # Left arm edema, slow improvement              - CTA upper ext 6/26 w/o e/o focal stenosis + extensive edema             - AVF patent on left arm us and no DVT             - Pt no showed to outpt appt to evaluate              - Vascular does not recommend intervention    - Extensor tendon laceration left hand with wound  # HTN, variable control, stable at this time             - Goal BP < 140/90             - Not on BP meds   # Anemia of CKD, at goal              - Goal Hgb 10-11             - Iron 27             -TIBC 142             -%Sat 19             - Ferritin 1419  # CKD-MBD             - Ca 9.7             - PO4 5.2             - Managed at OP unit             - On calcitriol and sevelamer   # DM II--management per primary svc  # Leg Pain--chronic skin changes and subcutaneous tissue firm to touch             - Unclear etiology             - Vascular does not recommend any intervention   - On ropinirole   # Leukocytosis, decreased    - Check cultures if any fevers     PLAN:  -  Continue qMWF iHD schedule   - UF as tolerated  - Continue phos binders WM  - Continue off of all BP meds and diuretics for now. BP stable during HD.   - PRN NS boluses for symptomatic hypotension ok, avoid maint IVFs  - Limit sedating meds if possible  - No dietary protein restrictions  - Dose all meds per ESRD  - Outpt access center appointment rescheduled  - Palliative care has consulted; pt is still a full code  - Pt at high risk for further morbidity/mortality  - DC planning underway for SNF.  - Okay to discharge home from a Nephrology standpoint once medically cleared. Pt to f/u outpatient HD clinic.       Thank you,

## 2022-07-28 NOTE — PROGRESS NOTES
Received bedside report from RN Karely, pt care assumed. VSS, pt assessment complete. Pt A&Ox2, c/o no pain at this time. POC discussed with pt and verbalizes no questions. Pt denies any additional needs at this time. Bed locked and in lowest position, bed alarm on. Pt educated on fall risk and verbalized understanding, call light within reach, hourly rounding initiated.

## 2022-07-29 NOTE — CARE PLAN
The patient is Stable - Low risk of patient condition declining or worsening    Shift Goals  Clinical Goals: pain management  Patient Goals: Sleep  Family Goals: CHIP    Progress made toward(s) clinical / shift goals:  Prn oxy and scheduled Tylenol give for pain.      Problem: Pain - Standard  Goal: Alleviation of pain or a reduction in pain to the patient’s comfort goal  Outcome: Progressing       Patient is not progressing towards the following goals:

## 2022-07-29 NOTE — PROGRESS NOTES
Hospital Medicine TWICE WEEKLY Progress Note    Date of Service  7/29/2022    Chief Complaint  Edema    Hospital Course  81 y.o. female with PMHx ESRD on HD MWF, HTN, HLD, and T2DM who was admitted on 6/24/2022 for left upper arm swelling and lower extremity edema after missing HD sessions.  X-rays of the left hand showed only chronic changes.  AV fistula was functioning well.  CTA showed no evidence of central stenosis.  Vascular surgery was consulted and only recommended supportive care such as wrapping the arm with Ace bandage.  She was also found to have severe aortic stenosis for which cardiology was consulted, and gave the opinion that she is not a candidate for intervention.  She was dialyzed while in the hospital with subsequent clinical improvement.  She did have hospital delirium while in the hospital, which has since resolved.  She also had worsening wound on her left hand, but x-ray showed no acute abnormalities.  CT was done, but orthopedics gave the opinion that she does not have any fluid collection or abscess requiring surgical debridement.  Showed she is started on antibiotics.  Palliative care was consulted, but patient continued to wish to be full code.  SNF placement was pursued, but proved to be challenging as transfer to dialysis is not always facilitated.  Case management is working on her discharge to SNF and arrangement of hemodialysis.    Interval Problem Update  7/29/2022 - I reviewed the patient's chart today. Uneventful night. VSS. Afebrile. Saturating well on 2L O2 NC. BG trend acceptable.     > I have personally seen and examined the patient today.  She continues to complain of pain on the left hand.  Reviewed wound care's evaluation and recommendations from 7/26.  Continues to have wound dressings with Viscopaste.  No other complaints.  She is eagerly waiting for her breakfast.  Denies any nausea, vomiting, abdominal symptoms.  Cantankerous this morning.      I discussed plan of care  with bedside RN.      Consultants/Specialty  cardiology, nephrology and vascular surgery    Code Status  Full Code    Disposition  Patient is medically cleared for discharge.   Anticipate discharge to to skilled nursing facility.  I have placed the appropriate orders for post-discharge needs.    Review of Systems  ROS     Pertinent positives/negatives as mentioned above.     A complete review of systems was personally done by me. All other systems were negative.        Physical Exam  Temp:  [36.2 °C (97.2 °F)-36.4 °C (97.5 °F)] 36.4 °C (97.5 °F)  Pulse:  [77-82] 82  Resp:  [18] 18  BP: (100-120)/(45-64) 100/64  SpO2:  [96 %-98 %] 96 %    Physical Exam  Vitals reviewed.   Constitutional:       General: She is not in acute distress.     Appearance: Normal appearance. She is not ill-appearing.   HENT:      Head: Normocephalic and atraumatic.      Nose: No congestion.   Eyes:      General:         Right eye: No discharge.         Left eye: No discharge.      Pupils: Pupils are equal, round, and reactive to light.   Cardiovascular:      Rate and Rhythm: Normal rate and regular rhythm.      Pulses: Normal pulses.      Heart sounds: Murmur heard.   Pulmonary:      Effort: Pulmonary effort is normal. No respiratory distress.      Breath sounds: Normal breath sounds. No stridor.   Abdominal:      General: Bowel sounds are normal. There is no distension.      Palpations: Abdomen is soft.      Tenderness: There is no abdominal tenderness.   Musculoskeletal:         General: Swelling, tenderness and deformity present. Normal range of motion.      Cervical back: Normal range of motion. No rigidity.   Skin:     General: Skin is warm.      Capillary Refill: Capillary refill takes less than 2 seconds.      Coloration: Skin is not jaundiced or pale.      Findings: Erythema and lesion present. No bruising.      Comments: Dressing present on left hand CDI   Neurological:      General: No focal deficit present.      Mental Status: She  is alert and oriented to person, place, and time.      Cranial Nerves: No cranial nerve deficit.   Psychiatric:         Mood and Affect: Mood normal.         Behavior: Behavior normal.         Fluids    Intake/Output Summary (Last 24 hours) at 7/29/2022 0743  Last data filed at 7/29/2022 0600  Gross per 24 hour   Intake 990 ml   Output --   Net 990 ml       Laboratory  Recent Labs     07/27/22  0950   WBC 8.6   RBC 2.80*   HEMOGLOBIN 9.3*   HEMATOCRIT 28.6*   .1*   MCH 33.2*   MCHC 32.5*   RDW 65.8*   PLATELETCT 171   MPV 8.4*     Recent Labs     07/27/22  0950   SODIUM 132*   POTASSIUM 5.8*   CHLORIDE 93*   CO2 23   GLUCOSE 112*   BUN 45*   CREATININE 4.30*   CALCIUM 8.5                   Imaging  CT-HAND W/O LEFT   Final Result      1.  No acute fracture or dislocation.      2.  Previous distal ulnar fracture identified.      3.  Osteoarthritis is most prominent at the first carpometacarpal joint.      4.  No bone erosions identified.      5.  Fluid collection or soft tissue abscess is identified.      6.  Induration in subcutaneous fat and intramuscular fat planes is noted which could be due to edema or inflammation such as cellulitis.      DX-HAND 3+ LEFT   Final Result      1. No acute fracture or subluxation.   2. Chronic posttraumatic changes in the distal left radius and ulna, stable when compared with the prior study.   3. Stable osteoarthritis involving the left first carpometacarpal joint.      DX-HAND 3+ LEFT   Final Result      1.  No acute fracture or dislocation.   2.  Scattered mild degenerative changes, increased from prior study. No definite erosive arthropathy.   3.  Osteopenia.   4.  Old ulnar styloid process fracture.      DX-CHEST-PORTABLE (1 VIEW)   Final Result      1.  Probable 12 mm right mid/upper lung zone mass. Suggest CT chest without contrast for further assessment      2.  Interstitial pulmonary edema or fibrosis      3.  Enlarged cardiac silhouette      4.  Left pleural  effusion      CT-CTA UPPER EXT WITH & W/O-POST PROCESS LEFT   Final Result      1.  Status post brachiocephalic fistula in the left arm. No focal stenosis is identified.      2.  Extensive edema in the subcutaneous tissues of the visualized left lateral chest and arm.      EC-ECHOCARDIOGRAM COMPLETE W/O CONT   Final Result      US-ZAHRAA SINGLE LEVEL BILAT   Final Result      US-EXTREMITY ARTERY LOWER BILAT   Final Result      CT-EXTREMITY, UPPER W/O LEFT   Final Result      1.  There is diffuse subcutaneous edema of the imaged portions of the left upper extremity. There is also diffuse body wall edema in the visualized portions of the chest and abdomen.   2.  No focal fluid collection to suggest abscess.   3.  There is a small left pleural effusion and minimal fluid in the abdomen.   4.  There is postsurgical change of AV fistula. There is small vessel atherosclerosis.      US-EXTREMITY VENOUS UPPER UNILAT LEFT   Final Result      DX-CHEST-PORTABLE (1 VIEW)   Final Result         1.  Interstitial pulmonary parenchymal prominence suggest chronic underlying lung disease, component of interstitial edema and/or infiltrates not excluded.   2.  Small left pleural effusion   3.  Cardiomegaly   4.  Atherosclerosis           Assessment/Plan  * ESRD (end stage renal disease) on dialysis (HCC)- (present on admission)  Assessment & Plan  - Continue hemodialysis per nephrology.  -Continue sevelamer and calcitriol.    Edema of left upper extremity- (present on admission)  Assessment & Plan  - Chronic, at the same site of AV fistula which is functioning well.  -Multifactorial: Poor EF, lymphedema, outflow stenosis.  CTA showed no evidence of central stenosis.  Vascular surgery recommended supportive care with wrapping arm with Ace bandage.  -Continue to monitor.    Delirium  Assessment & Plan  - Resolved.    Aortic stenosis, severe- (present on admission)  Assessment & Plan  - Not a candidate for intervention per  cardiology.  -Continue to monitor volume status.    Pain in both lower extremities- (present on admission)  Assessment & Plan  - Chronic.  Continue as needed pain medications.    Goals of care, counseling/discussion- (present on admission)  Assessment & Plan  -palliative care consulted, patient wished to remain full code.    Generalized weakness- (present on admission)  Assessment & Plan  - SNF placement being pursued.  Placement challenging due to need for transportation back and forth to hemodialysis.  CM/SW on board.    Extensor tendon laceration of left hand with open wound  Assessment & Plan  She has been having worsening of her wound on her left hand. COmpleted course of ancef. Now, hand wound healing well, no further signs of infection.X-ray did not show any acute abnormalities.  CT scan of her left hand and it did not show any fluid collection.  Wound care service following.    Hypothyroidism- (present on admission)  Assessment & Plan  - Continue Synthroid.    Diabetes mellitus, type II (HCC)- (present on admission)  Assessment & Plan  - Last HbA1c was 11.7.  Continue Lantus 5 units at night, along with sliding scale insulin coverage.  Continue Accu-Cheks before meals and at bedtime. Goal to keep BG between 140-180 per 2019 ADA guidelines.        Essential hypertension- (present on admission)  Assessment & Plan  - Maintaining good blood pressure control, despite holding home antihypertensives.  Continue to monitor blood pressure trend closely.             VTE prophylaxis: heparin ppx    I have performed a physical exam and reviewed and updated ROS and Plan today (7/29/2022). In review of last note, there are no changes except as documented above.

## 2022-07-29 NOTE — PROGRESS NOTES
A&Ox2.  Disoriented to time and situation.  Pt started to cry when this RN removed old dressing from left hand.  Oxy given prior to remove dressing.  Pt calmed down once dressing change was complete.  Pt has Mepilex to coccyx.  Coccyx red and peeling.  Mepilex reapplied.

## 2022-07-30 NOTE — THERAPY
Physical Therapy   Initial Evaluation     Patient Name: Jannet Bruno  Age:  81 y.o., Sex:  female  Medical Record #: [id number]  Today's Date: 7/30/2022          Assessment  Pt with increased assist needs, tangential and confused at time; able to amb mod A with HHA x 4 ft; multiple transfers; cannot tolerate use of left for assistive device, pt requesting w/c for next session though does state she walks at home; will follow, needs placement.     Plan    Recommend Physical Therapy 3 times per week until therapy goals are met for the following treatments:  Bed Mobility, Equipment, Gait Training, Manual Therapy, Neuro Re-Education / Balance, Self Care/Home Evaluation, Sensory Integration Techniques, Stair Training, Therapeutic Activities, and Therapeutic Exercises    DC Equipment Recommendations: Unable to determine at this time  Discharge Recommendations: Recommend post-acute placement for additional physical therapy services prior to discharge home       Abridged Subjective/Objective     07/30/22 1125   Cognition    Cognition / Consciousness X   Level of Consciousness Alert   Safety Awareness Impaired   New Learning Impaired   Attention Impaired   Comments pleasant but tangential at times appropriate but at times contradicting herself, stating we are 'holding her back' after asking her to stand and attempting to walk with therapist   Passive ROM Lower Body   Passive ROM Lower Body X   Comments kyphotic, hip flexion bilatearlly;   Balance   Sitting Balance (Static) Fair   Sitting Balance (Dynamic) Fair   Standing Balance (Static) Poor +   Standing Balance (Dynamic) Poor   Weight Shift Sitting Fair   Weight Shift Standing Poor   Skilled Intervention Sequencing;Postural Facilitation;Tactile Cuing;Verbal Cuing   Comments B UE support in sitting/standing, cannot use left UE on FWW, able to take 4 shuffled steps forward x 2 attempts;   Gait Analysis   Gait Level Of Assist Moderate Assist   Assistive Device Hand Held  Assist   Distance (Feet) 3   # of Times Distance was Traveled 1   Weight Bearing Status no restrictions   Skilled Intervention Verbal Cuing;Tactile Cuing;Sequencing;Postural Facilitation   Comments distance limited by therapist due to increased knee/hip flexion   Bed Mobility    Supine to Sit   (NT, sitting in chair pre/post)   Functional Mobility   Sit to Stand Moderate Assist   Bed, Chair, Wheelchair Transfer Moderate Assist  (x 2 reps)   Short Term Goals    Short Term Goal # 1 Pt will perform bed mobility with supervision in 6 visits with HOB flat, no rail.   Goal Outcome # 1 goal not met   Short Term Goal # 2 Pt will transfer with supervision in 6 visits to improve functional indep.   Goal Outcome # 2 Goal not met   Short Term Goal # 3 Pt will ambulate x 50 feet using FWW with supervision in 6 visits to improve functional indep.   Goal Outcome # 3 Goal not met

## 2022-07-30 NOTE — CARE PLAN
The patient is Watcher - Medium risk of patient condition declining or worsening    Shift Goals  Clinical Goals: pain management and dressing change  Patient Goals: Sleep  Family Goals: CHIP    Progress made toward(s) clinical / shift goals:  Oxy given prior to dressing change but pt refused dressing change.      Problem: Pain - Standard  Goal: Alleviation of pain or a reduction in pain to the patient’s comfort goal  Outcome: Progressing       Patient is not progressing towards the following goals:      Problem: Skin Integrity  Goal: Skin integrity is maintained or improved  Outcome: Not Progressing

## 2022-07-30 NOTE — CARE PLAN
The patient is Stable - Low risk of patient condition declining or worsening    Shift Goals  Clinical Goals: pain management  Patient Goals: Sleep  Family Goals: CHIP    Progress made toward(s) clinical / shift goals: Patient denies need for pain med for her left hand wound. Patient remain safe and comfortable throughout the whole shift.    Patient is not progressing towards the following goals:      Problem: Knowledge Deficit - Standard  Goal: Patient and family/care givers will demonstrate understanding of plan of care, disease process/condition, diagnostic tests and medications  Outcome: Not Met

## 2022-07-30 NOTE — PROGRESS NOTES
Hemodialysis ordered by Dr. Gregory. Treatment started at 1346 and ended at 1646. Pt stable, vss, no c/o post tx. Net UF 2.0 L. Report to MARIANA Antony RN. Lt ua avf dsg cdi.

## 2022-07-30 NOTE — PROGRESS NOTES
[facility] -  PROGRESS NOTE               Author: Bebeto Gregory M.D., CNN-NP Date & Time: 7/30/2022  9:59 AM     HPI:  81yoF with PMH significant for ESRD on HD MWF via left arm AVF, HTN, DM II, Anemia secondary to CKD, CKD Bone mineral disorder, admitted with increased edema and weakness and having missed her last last 2-3 outpt HD treatments. Pt is very lethargic at this time and unable to provide much history, majority of the history was obtained through review of the medical record and discussion with primary svc. Pt had reported increased LE edema and fatigue and weakness and worsening of her left arm edema so she came to the ED for evaluation. She apparently has missed her last 2-3 outpt dialysis treatments. Per regular outpt Nephrologist Dr. Gates, she has been non-compliant with her fluid restriction and was told that she needed 4x/week dialysis until her volume status could be optimized but she was non-compliant with that recommendation. She has edema of her left arm where her AVF is located and there is concern for a central stenosis that could be the etiology. She had an appointment at the outpt access center to evaluate for central stenosis and undergo angioplasty if needed on 6/9/22 but pt did not go to the appointment. She has not had any other procedures done to the arm in the past couple of months. She had a left arm us done today that showed no DVT and patent AVF and soft tissue edema. She apparently received pain medication fentanyl and dilaudid as well as benadryl earlier today and she is very drowsy.      DAILY NEPHROLOGY SUMMARY:  6/25: rapid response overnight for severe leg pain, pt very lethargic today, moans with palpation of legs, arouses but does not answer questions and falls back asleep, tolerated HD yest with 2.5L UF, BP stable overnight but low this am  6/26: No events, BP low overnight and this am, more alert, reports pain in legs for the past 3 weeks,  denies any CP/SOB/Abd pain, reports left arm edema a little better but no pain in left arm   6/27: Pt resting in bed, subdued, Bps soft, on 4L supp O2 via NC, labs reviewed, due for HD  6/28: pt laying in bed, sleepy, c/o pain in legs, moaning, vascular assessed and does not recommend any intervention on legs or AVF, tolerated iHD yesterday with UF:2.8L  6/29: Pt in bed, fatigued, no complaints.  VSS 1L NC.    6/30: no new complaints, no overnight events. Tolerated hd yesterday, UF 3.5L , she is in negative balance.   7/1: Resting in bed, no complaints.  VSS 2L NC. No new labs today.   7/2: Pt sitting up in bed, confused, CNA helping her with breakfast meal, iHD yesterday with 1.5L net UF limited by muscle cramping, labs reviewed, VSS on 1L NC O2  7/3: Pt slumped in bed, somnolent, medical floor status, VSS on RA  7/4: No new overnight renal events. S/p HD yesterday and tolerated well. Net UF was 1.5 L.  7/5: S/p HD yesterday with net UF of 2 L. No new overnight renal events.   7/6: No new overnight renal events.   7/7: S/p HD yesterday with net UF of 2.2L and tolerated well. No new overnight renal events.   7/8: No new overnight renal events.   7/9: S/p HD yesterday with net UF of 3L and tolerated well. No new overnight renal events.   7/10: No new overnight renal events. K+ 6.9 this am.  7/11: No events, HD yest for hyperkalemia, K 5.9 this am, denies any CP/SOB but reports leg pain, BP stable, seen and examined on HD this am--VSS see dialysis flowsheet for full details  7/12: No events, awake and alert, c/o bilateral leg pain, denies any CP/SOB, BP stable  7/13: No events, seen on dialysis this am, still c/o leg pain, denies any CP/SOB, BP stable  7/14: No events, feels tired, c/o leg pain, denies any CP/SOB, tolerated HD yest with 2L UF  7/15: No events, BP stable, seen on HD, still complaining of leg pain, no CP/SOB  7/16: No events, tolerated HD yest with 3L UF, BP stable, complaining of soft stools and left  "arm pain and leg pain, no CP/SOB/Abd pain  7/17: No overnight events, VSS no CP SOB worsening edema. Na 131 K 5.2, 2L NC.  Nurse reports she was fatigued and disoriented this morning, but has improved.  Currently arouses easily, is oriented.   7/18: Pt seen on HD, says her back hurts, VSS on 2L NC O2, K+ 5.8 this AM  7/19: Pt sitting up in bed. No overnight events. Tolerated HD yesterday with 3L UF, BP stable. K this am better 4.2. No CP/N/V/D.   7/20: Seen during HD, tolerated well. VSS on 2L NC. Denies SOB, N/V/D. Stated left lower hand pain, open blisters noted, says also her back hurts.  7/21: HD yest UF 3L. Dtr at bedside. Pt c/o significant pain/burning in left hand.  7/22: seen on iHd, continues with + edema in LUE, + edema BLE edema  7/23: HD yesterday 3 liter uf  7/24: Decreased vision in R eye, but chornic, worse edema in left arm  7/25: Sleeping on HD.  Comfortable.  Left arm edema  7/26: Denies pain or SOB.  Comfortable.    7/27: seen on iHD, pleasantly confused, LUE edema improved   7/28: Denies pain or SOB.  Slight bleeding around fistula but family had pulled off dressing  7/29: Complaining of hand pain still.  No SOB.  States sat in chair yesterday.  7/30: Pain still around leg wound and hand, no improvement.      REVIEW OF SYSTEMS:    10 point ROS reviewed and is as per HPI/daily summary or otherwise negative    PMH/PSH/SH/FH:   Reviewed and unchanged since admission note    CURRENT MEDICATIONS:   Reviewed from admission to present day    VS:  /54   Pulse 83   Temp 36.3 °C (97.4 °F) (Temporal)   Resp 18   Ht 1.626 m (5' 4\")   Wt 65 kg (143 lb 4.8 oz)   SpO2 96%   BMI 24.60 kg/m²     Physical Exam  Vitals and nursing note reviewed.   Constitutional:       General: She is not in acute distress.     Appearance: She is ill-appearing.   HENT:      Head: Normocephalic and atraumatic.      Mouth/Throat:      Mouth: Mucous membranes are dry.   Eyes:      General: No scleral " icterus.  Cardiovascular:      Rate and Rhythm: Normal rate and regular rhythm.      Pulses: Normal pulses.   Pulmonary:      Effort: Pulmonary effort is normal. No respiratory distress.   Abdominal:      General: There is no distension.      Palpations: Abdomen is soft.   Musculoskeletal:         General: Swelling (left arm) present. No deformity.      Right lower leg: No edema.      Left lower leg: No edema.      Comments: Trace LE edema  LUE AVF + bruit and Thrill   Skin:     General: Skin is warm and dry.      Findings: No rash.      Comments: Left hand gauze wrapped    Neurological:      Mental Status: She is alert.           Fluids:  In: 1230 [P.O.:730; Dialysis:500]  Out: 2500     LABS:  Recent Labs     07/29/22  1135   SODIUM 130*   POTASSIUM 5.6*   CHLORIDE 89*   CO2 24   GLUCOSE 137*   BUN 54*   CREATININE 5.35*   CALCIUM 9.2     Reviewed.    IMAGING:   All imaging reviewed from admission to present day    IMPRESSION:  # ESRD, dependent on HD              - HD via LUE AVF  # Fluid overload, improved             - Secondary to non-compliance with fluid restriction and HD treatments  # Left arm edema, slow improvement              - CTA upper ext 6/26 w/o e/o focal stenosis + extensive edema             - AVF patent on left arm us and no DVT             - Pt no showed to outpt appt to evaluate              - Vascular does not recommend intervention              - Extensor tendon laceration left hand with wound  # HTN, variable control, stable at this time             - Goal BP < 140/90             - Not on BP meds   # Anemia of CKD, at goal              - Goal Hgb 10-11             - Iron 27             -TIBC 142             -%Sat 19             - Ferritin 1419  # CKD-MBD             - Ca 9.7             - PO4 5.2             - Managed at OP unit             - On calcitriol and sevelamer   # DM II--management per primary svc  # Leg Pain--chronic skin changes and subcutaneous tissue firm to touch              - Please discuss with gen surg for possible skin biopsy             - Vascular does not recommend any intervention   - On ropinirole   # Leukocytosis resolved     PLAN:  - Continue qMWF iHD schedule   - UF as tolerated  - Consult surgery for possible skin biopsy of wound, as concerning for calciphylaxis  - Continue phos binders WM  - Continue off of all BP meds and diuretics for now. BP stable during HD.   - Limit sedating meds if possible  - No dietary protein restrictions  - Dose all meds per ESRD  - Outpt access center appointment rescheduled  - DC planning underway for SNF.      Thank you,

## 2022-07-30 NOTE — PROGRESS NOTES
"A&Ox2.  Disoriented to time and situation.  More confused than last night.  Pt called and complained that there are other people walking around in her room.  When RN explained there is another pt in her room and she said she knows.  Also, when RN went back to do left hand dressing change after Oxy, she refused dressing change.  She said \"you can change it tomorrow\", \"I am going to sleep\".    "

## 2022-07-30 NOTE — THERAPY
"Occupational Therapy  Daily Treatment     Patient Name: Jannet Bruno  Age:  81 y.o., Sex:  female  Medical Record #: [id number]  Today's Date: 7/30/2022     Precautions  Precautions: (P) Fall Risk  Comments: LUE edema and pain    Assessment    Pt seen for follow up OT tx session, when attempting to go see patients roommate pt found to be slumped over chair, woke patient to check on her, requesting to walk around room, required use of BSC due to signs of BM. Pt required modA for transfers, maxA for lower body ADLs and toileting. Pt would benefit from continued skilled therapy while admitted as well as recommend post-acute placement.    Plan    Continue current treatment plan.    DC Equipment Recommendations: (P) Unable to determine at this time  Discharge Recommendations: (P) Recommend post-acute placement for additional occupational therapy services prior to discharge home    Subjective    \"I want to walk around the room and do a little shopping\"     Objective       07/30/22 1131   Precautions   Precautions Fall Risk   Balance   Sitting Balance (Static) Fair   Sitting Balance (Dynamic) Fair -   Standing Balance (Static) Fair -   Standing Balance (Dynamic) Poor   Weight Shift Sitting Fair   Weight Shift Standing Poor   Skilled Intervention Verbal Cuing   Comments w/ HHA x2   Bed Mobility    Scooting Supervised   Skilled Intervention Verbal Cuing   Comments up in chair before/after   Activities of Daily Living   Grooming Supervision;Seated   Upper Body Dressing Minimal Assist   Lower Body Dressing Maximal Assist   Toileting Maximal Assist   Skilled Intervention Verbal Cuing;Facilitation   How much help from another person does the patient currently need...   Putting on and taking off regular lower body clothing? 2   Bathing (including washing, rinsing, and drying)? 2   Toileting, which includes using a toilet, bedpan, or urinal? 2   Putting on and taking off regular upper body clothing? 3   Taking care of " personal grooming such as brushing teeth? 3   Eating meals? 3   6 Clicks Daily Activity Score 15   Functional Mobility   Sit to Stand Moderate Assist   Bed, Chair, Wheelchair Transfer Moderate Assist   Toilet Transfers Moderate Assist   Transfer Method Stand Step   Mobility STS from chair, transfer to BSC, transfer back to chair, steps away from chair back to chair   Skilled Intervention Verbal Cuing;Facilitation   Comments w/ HHA x2   Activity Tolerance   Sitting in Chair up in chair before/after session   Standing ~2 min   Short Term Goals   Short Term Goal # 1 LB dressing with SPV   Goal Outcome # 1 Progressing slower than expected   Short Term Goal # 2 seated G/H with SPV   Goal Outcome # 2 Progressing slower than expected   Short Term Goal # 3 BSC txf with min A   Goal Outcome # 3 Progressing slower than expected   Education Group   Education Provided Role of Occupational Therapist   Role of Occupational Therapist Patient Response Patient;Acceptance;Explanation   Anticipated Discharge Equipment and Recommendations   DC Equipment Recommendations Unable to determine at this time   Discharge Recommendations Recommend post-acute placement for additional occupational therapy services prior to discharge home   Interdisciplinary Plan of Care Collaboration   IDT Collaboration with  Nursing   Patient Position at End of Therapy Seated;Chair Alarm On;Call Light within Reach;Tray Table within Reach;Phone within Reach   Collaboration Comments RN updated

## 2022-07-31 NOTE — CARE PLAN
The patient is Stable - Low risk of patient condition declining or worsening    Shift Goals  Clinical Goals: pain management and dressing change  Patient Goals: Sleep  Family Goals: CHIP    Progress made toward(s) clinical / shift goals:  Patient's pain were addressed with scheduled pain med, denies need for stronger pain med. Patient remain safe and comfortable throughout the shift.     Patient is not progressing towards the following goals:      Problem: Knowledge Deficit - Standard  Goal: Patient and family/care givers will demonstrate understanding of plan of care, disease process/condition, diagnostic tests and medications  Outcome: Not Met

## 2022-07-31 NOTE — CARE PLAN
The patient is Stable - Low risk of patient condition declining or worsening    Shift Goals  Clinical Goals: Patient will not have any falls this shift  Patient Goals: Patient wants to rest in peace  Family Goals: CHIP    Progress made toward(s) clinical / shift goals:  Patient has all safety precautions in place this shift

## 2022-07-31 NOTE — CARE PLAN
Patient remain free from any falls at this time. Fall precaution in place. Patient denies any pain at this time. No acute distress noted. Patient call light within reach.

## 2022-07-31 NOTE — PROGRESS NOTES
[facility] -  PROGRESS NOTE               Author: THERESE Comer, CNN-NP Date & Time: 7/31/2022  12:10 PM     HPI:  81yoF with PMH significant for ESRD on HD MWF via left arm AVF, HTN, DM II, Anemia secondary to CKD, CKD Bone mineral disorder, admitted with increased edema and weakness and having missed her last last 2-3 outpt HD treatments. Pt is very lethargic at this time and unable to provide much history, majority of the history was obtained through review of the medical record and discussion with primary svc. Pt had reported increased LE edema and fatigue and weakness and worsening of her left arm edema so she came to the ED for evaluation. She apparently has missed her last 2-3 outpt dialysis treatments. Per regular outpt Nephrologist Dr. Gates, she has been non-compliant with her fluid restriction and was told that she needed 4x/week dialysis until her volume status could be optimized but she was non-compliant with that recommendation. She has edema of her left arm where her AVF is located and there is concern for a central stenosis that could be the etiology. She had an appointment at the outpt access center to evaluate for central stenosis and undergo angioplasty if needed on 6/9/22 but pt did not go to the appointment. She has not had any other procedures done to the arm in the past couple of months. She had a left arm us done today that showed no DVT and patent AVF and soft tissue edema. She apparently received pain medication fentanyl and dilaudid as well as benadryl earlier today and she is very drowsy.      DAILY NEPHROLOGY SUMMARY:  6/25: rapid response overnight for severe leg pain, pt very lethargic today, moans with palpation of legs, arouses but does not answer questions and falls back asleep, tolerated HD yest with 2.5L UF, BP stable overnight but low this am  6/26: No events, BP low overnight and this am, more alert, reports pain in legs for the past 3  weeks, denies any CP/SOB/Abd pain, reports left arm edema a little better but no pain in left arm   6/27: Pt resting in bed, subdued, Bps soft, on 4L supp O2 via NC, labs reviewed, due for HD  6/28: pt laying in bed, sleepy, c/o pain in legs, moaning, vascular assessed and does not recommend any intervention on legs or AVF, tolerated iHD yesterday with UF:2.8L  6/29: Pt in bed, fatigued, no complaints.  VSS 1L NC.    6/30: no new complaints, no overnight events. Tolerated hd yesterday, UF 3.5L , she is in negative balance.   7/1: Resting in bed, no complaints.  VSS 2L NC. No new labs today.   7/2: Pt sitting up in bed, confused, CNA helping her with breakfast meal, iHD yesterday with 1.5L net UF limited by muscle cramping, labs reviewed, VSS on 1L NC O2  7/3: Pt slumped in bed, somnolent, medical floor status, VSS on RA  7/4: No new overnight renal events. S/p HD yesterday and tolerated well. Net UF was 1.5 L.  7/5: S/p HD yesterday with net UF of 2 L. No new overnight renal events.   7/6: No new overnight renal events.   7/7: S/p HD yesterday with net UF of 2.2L and tolerated well. No new overnight renal events.   7/8: No new overnight renal events.   7/9: S/p HD yesterday with net UF of 3L and tolerated well. No new overnight renal events.   7/10: No new overnight renal events. K+ 6.9 this am.  7/11: No events, HD yest for hyperkalemia, K 5.9 this am, denies any CP/SOB but reports leg pain, BP stable, seen and examined on HD this am--VSS see dialysis flowsheet for full details  7/12: No events, awake and alert, c/o bilateral leg pain, denies any CP/SOB, BP stable  7/13: No events, seen on dialysis this am, still c/o leg pain, denies any CP/SOB, BP stable  7/14: No events, feels tired, c/o leg pain, denies any CP/SOB, tolerated HD yest with 2L UF  7/15: No events, BP stable, seen on HD, still complaining of leg pain, no CP/SOB  7/16: No events, tolerated HD yest with 3L UF, BP stable, complaining of soft stools and  "left arm pain and leg pain, no CP/SOB/Abd pain  7/17: No overnight events, VSS no CP SOB worsening edema. Na 131 K 5.2, 2L NC.  Nurse reports she was fatigued and disoriented this morning, but has improved.  Currently arouses easily, is oriented.   7/18: Pt seen on HD, says her back hurts, VSS on 2L NC O2, K+ 5.8 this AM  7/19: Pt sitting up in bed. No overnight events. Tolerated HD yesterday with 3L UF, BP stable. K this am better 4.2. No CP/N/V/D.   7/20: Seen during HD, tolerated well. VSS on 2L NC. Denies SOB, N/V/D. Stated left lower hand pain, open blisters noted, says also her back hurts.  7/21: HD yest UF 3L. Dtr at bedside. Pt c/o significant pain/burning in left hand.  7/22: seen on iHd, continues with + edema in LUE, + edema BLE edema  7/23: HD yesterday 3 liter uf  7/24: Decreased vision in R eye, but chornic, worse edema in left arm  7/25: Sleeping on HD.  Comfortable.  Left arm edema  7/26: Denies pain or SOB.  Comfortable.    7/27: seen on iHD, pleasantly confused, LUE edema improved   7/28: Denies pain or SOB.  Slight bleeding around fistula but family had pulled off dressing  7/29: Complaining of hand pain still.  No SOB.  States sat in chair yesterday.  7/30: Pain still around leg wound and hand, no improvement.    7/31: laying in bed, sleepy but arouses, encouraged to participate in rehab    REVIEW OF SYSTEMS:    10 point ROS reviewed and is as per HPI/daily summary or otherwise negative    PMH/PSH/SH/FH:   Reviewed and unchanged since admission note    CURRENT MEDICATIONS:   Reviewed from admission to present day    VS:  /69   Pulse 75   Temp 36.4 °C (97.5 °F) (Temporal)   Resp 16   Ht 1.626 m (5' 4\")   Wt 65 kg (143 lb 4.8 oz)   SpO2 98%   BMI 24.60 kg/m²     Physical Exam  Vitals and nursing note reviewed.   Constitutional:       General: She is not in acute distress.     Appearance: She is ill-appearing.   HENT:      Head: Normocephalic and atraumatic.      Mouth/Throat:      " Mouth: Mucous membranes are dry.   Eyes:      General: No scleral icterus.  Cardiovascular:      Rate and Rhythm: Normal rate and regular rhythm.      Pulses: Normal pulses.   Pulmonary:      Effort: Pulmonary effort is normal. No respiratory distress.   Abdominal:      General: There is no distension.      Palpations: Abdomen is soft.   Musculoskeletal:         General: Swelling (left arm) present. No deformity.      Right lower leg: No edema.      Left lower leg: No edema.      Comments: Trace LE edema  LUE AVF + bruit and Thrill   Skin:     General: Skin is warm and dry.      Findings: No rash.      Comments: Left hand gauze wrapped    Neurological:      Mental Status: She is alert.           Fluids:  In: 1340 [P.O.:1340]  Out: -     LABS:  Recent Labs     07/29/22  1135 07/30/22  1054   SODIUM 130* 131*   POTASSIUM 5.6* 5.1   CHLORIDE 89* 91*   CO2 24 26   GLUCOSE 137* 128*   BUN 54* 35*   CREATININE 5.35* 3.56*   CALCIUM 9.2 9.7     Reviewed.    IMAGING:   All imaging reviewed from admission to present day    IMPRESSION:  # ESRD, dependent on HD              - HD via LUE AVF  # Fluid overload, improved             - Secondary to non-compliance with fluid restriction and HD treatments  # Left arm edema, slow improvement              - CTA upper ext 6/26 w/o e/o focal stenosis + extensive edema             - AVF patent on left arm us and no DVT             - Pt no showed to outpt appt to evaluate              - Vascular does not recommend intervention              - Extensor tendon laceration left hand with wound  # HTN, variable control, stable at this time             - Goal BP < 140/90             - Not on BP meds   # Anemia of CKD, at goal              - Goal Hgb 10-11             - Iron 27             -TIBC 142             -%Sat 19             - Ferritin 1419  # CKD-MBD             - Ca 9.7             - PO4 5.2             - Managed at OP unit             - On calcitriol and sevelamer   # DM II--management  per primary svc  # Leg Pain--chronic skin changes and subcutaneous tissue firm to touch             - Please discuss with gen surg for possible skin biopsy             - Vascular does not recommend any intervention   - On ropinirole   # Leukocytosis resolved     PLAN:  - Continue qMWF iHD schedule   - UF as tolerated  - Consult surgery for possible skin biopsy of wound, as concerning for calciphylaxis  - Continue phos binders WM  - Continue off of all BP meds and diuretics for now. BP stable during HD.   - Limit sedating meds if possible  - No dietary protein restrictions  - Dose all meds per ESRD  - Outpt access center appointment rescheduled  - DC planning underway for SNF.      Thank you,

## 2022-07-31 NOTE — PROGRESS NOTES
Received report from MAURO Lopez. Assumed care at 0700. This pt is A&O x2 . Patient and RN discussed plan of care, all questions answered. Labs noted. Call light in place, personal belongings available bed in lowest position, 3 bedrails up, bed alarm on, patient educated on importance of calling for assistance, hourly rounding in place. No additional needs at this time. VSS

## 2022-08-01 NOTE — CARE PLAN
The patient is Stable - Low risk of patient condition declining or worsening    Shift Goals  Clinical Goals: Patient will not have any falls this shift  Patient Goals: Patient wants to sleep comfortably  Family Goals: CHIP    Progress made toward(s) clinical / shift goals:  Patient has all safety precautions in place.

## 2022-08-01 NOTE — CARE PLAN
Patient remain free from any falls at this time. Fall precaution in place. Patient denies any pain at this time. No acute distress noted. Patient call light within reach

## 2022-08-01 NOTE — PROGRESS NOTES
[facility] Progress Note     Hemodialysis treatment ordered today per Dr. King x 3 hours.   Pt received via bed; A & O x 3 ; c/o pain on Left arm; medicated by primary RN    Treatment initiated at 0837 ended at 1137.      Patient tolerated tx; see electronic flow sheet for details.      Net UF 2000 mL.      Needles removed from access site. Dressings applied and sites held x 10 minutes; verified no bleeding. Positive bruit/thrill post tx.   Staff RN to monitor AVF/G for breakthrough bleeding. Should breakthrough bleeding occur, staff RN to apply pressure to access sites until bleeding resolved.   Notify Dialysis and Nephrologist for follow-up.     Report given to Primary RN LIZZIE Blanca.

## 2022-08-01 NOTE — PROGRESS NOTES
Received report from MAURO Lopez. Assumed care at 0715. This pt is A&O x3 . Patient and RN discussed plan of care, all questions answered. Labs noted. Call light in place, personal belongings available bed in lowest position, 3 bedrails up, bed alarm on, patient educated on importance of calling for assistance, hourly rounding in place. No additional needs at this time. VSS

## 2022-08-02 NOTE — PROGRESS NOTES
[facility] -  PROGRESS NOTE               Author: THERESE Fragoso Date & Time: 8/2/2022  10:41 AM     HPI:  81yoF with PMH significant for ESRD on HD MWF via left arm AVF, HTN, DM II, Anemia secondary to CKD, CKD Bone mineral disorder, admitted with increased edema and weakness and having missed her last last 2-3 outpt HD treatments. Pt is very lethargic at this time and unable to provide much history, majority of the history was obtained through review of the medical record and discussion with primary svc. Pt had reported increased LE edema and fatigue and weakness and worsening of her left arm edema so she came to the ED for evaluation. She apparently has missed her last 2-3 outpt dialysis treatments. Per regular outpt Nephrologist Dr. Gates, she has been non-compliant with her fluid restriction and was told that she needed 4x/week dialysis until her volume status could be optimized but she was non-compliant with that recommendation. She has edema of her left arm where her AVF is located and there is concern for a central stenosis that could be the etiology. She had an appointment at the outpt access center to evaluate for central stenosis and undergo angioplasty if needed on 6/9/22 but pt did not go to the appointment. She has not had any other procedures done to the arm in the past couple of months. She had a left arm us done today that showed no DVT and patent AVF and soft tissue edema. She apparently received pain medication fentanyl and dilaudid as well as benadryl earlier today and she is very drowsy.      DAILY NEPHROLOGY SUMMARY:  6/25: rapid response overnight for severe leg pain, pt very lethargic today, moans with palpation of legs, arouses but does not answer questions and falls back asleep, tolerated HD yest with 2.5L UF, BP stable overnight but low this am  6/26: No events, BP low overnight and this am, more alert, reports pain in legs for the past 3 weeks, denies  any CP/SOB/Abd pain, reports left arm edema a little better but no pain in left arm   6/27: Pt resting in bed, subdued, Bps soft, on 4L supp O2 via NC, labs reviewed, due for HD  6/28: pt laying in bed, sleepy, c/o pain in legs, moaning, vascular assessed and does not recommend any intervention on legs or AVF, tolerated iHD yesterday with UF:2.8L  6/29: Pt in bed, fatigued, no complaints.  VSS 1L NC.    6/30: no new complaints, no overnight events. Tolerated hd yesterday, UF 3.5L , she is in negative balance.   7/1: Resting in bed, no complaints.  VSS 2L NC. No new labs today.   7/2: Pt sitting up in bed, confused, CNA helping her with breakfast meal, iHD yesterday with 1.5L net UF limited by muscle cramping, labs reviewed, VSS on 1L NC O2  7/3: Pt slumped in bed, somnolent, medical floor status, VSS on RA  7/4: No new overnight renal events. S/p HD yesterday and tolerated well. Net UF was 1.5 L.  7/5: S/p HD yesterday with net UF of 2 L. No new overnight renal events.   7/6: No new overnight renal events.   7/7: S/p HD yesterday with net UF of 2.2L and tolerated well. No new overnight renal events.   7/8: No new overnight renal events.   7/9: S/p HD yesterday with net UF of 3L and tolerated well. No new overnight renal events.   7/10: No new overnight renal events. K+ 6.9 this am.  7/11: No events, HD yest for hyperkalemia, K 5.9 this am, denies any CP/SOB but reports leg pain, BP stable, seen and examined on HD this am--VSS see dialysis flowsheet for full details  7/12: No events, awake and alert, c/o bilateral leg pain, denies any CP/SOB, BP stable  7/13: No events, seen on dialysis this am, still c/o leg pain, denies any CP/SOB, BP stable  7/14: No events, feels tired, c/o leg pain, denies any CP/SOB, tolerated HD yest with 2L UF  7/15: No events, BP stable, seen on HD, still complaining of leg pain, no CP/SOB  7/16: No events, tolerated HD yest with 3L UF, BP stable, complaining of soft stools and left arm pain  and leg pain, no CP/SOB/Abd pain  7/17: No overnight events, VSS no CP SOB worsening edema. Na 131 K 5.2, 2L NC.  Nurse reports she was fatigued and disoriented this morning, but has improved.  Currently arouses easily, is oriented.   7/18: Pt seen on HD, says her back hurts, VSS on 2L NC O2, K+ 5.8 this AM  7/19: Pt sitting up in bed. No overnight events. Tolerated HD yesterday with 3L UF, BP stable. K this am better 4.2. No CP/N/V/D.   7/20: Seen during HD, tolerated well. VSS on 2L NC. Denies SOB, N/V/D. Stated left lower hand pain, open blisters noted, says also her back hurts.  7/21: HD yest UF 3L. Dtr at bedside. Pt c/o significant pain/burning in left hand.  7/22: seen on iHd, continues with + edema in LUE, + edema BLE edema  7/23: HD yesterday 3 liter uf  7/24: Decreased vision in R eye, but chornic, worse edema in left arm  7/25: Sleeping on HD.  Comfortable.  Left arm edema  7/26: Denies pain or SOB.  Comfortable.    7/27: seen on iHD, pleasantly confused, LUE edema improved   7/28: Denies pain or SOB.  Slight bleeding around fistula but family had pulled off dressing  7/29: Complaining of hand pain still.  No SOB.  States sat in chair yesterday.  7/30: Pain still around leg wound and hand, no improvement.    7/31: laying in bed, sleepy but arouses, encouraged to participate in rehab  8/1: Laying in bed eating breakfast. Very tearful this AM, stated that she has to make a decision if she wants to continue with the biopsy on her hand. Pt is concerned that there may not be much we can do about the wound on her left hand. Dr Simon from wound care at bedside. Will return this afternoon for biopsy procedure per MD. HD today UF 2L, tolerated well. Denies SOB, CP,N/VD. K+ up this am 5.7.   8/2: Resting in bed. C/o fatigue. Reports improved SOB, remains on oxygen.     REVIEW OF SYSTEMS:    10 point ROS reviewed and is as per HPI/daily summary or otherwise negative    PMH/PSH/SH/FH:   Reviewed and unchanged since  "admission note    CURRENT MEDICATIONS:   Reviewed from admission to present day    VS:  /47   Pulse 84   Temp 36.7 °C (98 °F) (Temporal)   Resp 16   Ht 1.626 m (5' 4\")   Wt 65 kg (143 lb 4.8 oz)   SpO2 95%   BMI 24.60 kg/m²     Physical Exam  Vitals and nursing note reviewed.   Constitutional:       General: She is not in acute distress.     Appearance: She is ill-appearing.   HENT:      Head: Normocephalic and atraumatic.      Mouth/Throat:      Mouth: Mucous membranes are dry.   Eyes:      General: No scleral icterus.  Cardiovascular:      Rate and Rhythm: Normal rate and regular rhythm.      Pulses: Normal pulses.   Pulmonary:      Effort: Pulmonary effort is normal. No respiratory distress.      Breath sounds: No wheezing or rales.   Abdominal:      General: There is no distension.      Palpations: Abdomen is soft.   Musculoskeletal:         General: Swelling (left arm) present. No deformity.      Right lower leg: No edema.      Left lower leg: No edema.      Comments: LE edema/LUE edema  LUE AVF + bruit and Thrill     Skin:     General: Skin is warm and dry.      Findings: No rash.      Comments: Left hand gauze wrapped    Neurological:      Mental Status: She is alert.   Psychiatric:         Behavior: Behavior normal.           Fluids:  In: 1020 [P.O.:520; Dialysis:500]  Out: 2500     LABS:  Recent Labs     07/30/22  1054 08/01/22  0009 08/02/22  0444   SODIUM 131* 127* 133*   POTASSIUM 5.1 5.7* 5.4   CHLORIDE 91* 86* 92*   CO2 26 26 26   GLUCOSE 128* 146* 139*   BUN 35* 56* 33*   CREATININE 3.56* 5.25* 3.64*   CALCIUM 9.7 9.0 9.0     Reviewed.    IMAGING:   All imaging reviewed from admission to present day    IMPRESSION:  # ESRD, dependent on HD              - HD via LUE AVF             - HD qMWF  # Fluid overload, improved             - Secondary to non-compliance with fluid restriction and HD treatments  # Left arm edema, slow improvement              - CTA upper ext 6/26 w/o e/o focal " stenosis + extensive edema             - AVF patent on left arm us and no DVT             - Pt no showed to outpt appt to evaluate              - Vascular does not recommend intervention              - Extensor tendon laceration left hand with wound             - Wound care consult in place , s/p biopsy 8/1 concern for                          Calciphylaxis.   - Consider STS, pending bx results  # HTN, variable control, stable at this time             - Goal BP < 140/90             - Not on BP meds              - BP at goal  # Anemia of CKD, at goal              - Goal Hgb 10-11             - Iron 27             -TIBC 142             -%Sat 19             - Ferritin 1419  # CKD-MBD             - CCa 9.72             - PO4 5.2             - Managed at OP unit             - On calcitriol and sevelamer   # Hyperkalemia            - Correct w/ HD  # DM II--management per primary svc  # Leg Pain--chronic skin changes and subcutaneous tissue firm to touch             - Please discuss with gen surg for possible skin biopsy             - Vascular does not recommend any intervention   - On ropinirole   # Leukocytosis resolved     PLAN:  - Continue qMWF iHD schedule , No iHD today (Tuesday)  - UF as tolerated  - Follow up skin biopsy, concern for calciphylaxis  - Stop calcitriol, avoid Calcium supplements  - Continue phos binders WM, hold if patient is NPO. Avoid Calcium based binders   - Continue off of all BP meds and diuretics for now. BP stable during HD.   - Limit sedating meds if possible  - No dietary protein restrictions  - Low potassium diet  - Low K bath on HD   - Dose all meds per ESRD  - Outpt access center appointment rescheduled  - Follow labs  - DC planning underway for [facility]      Thank you,

## 2022-08-02 NOTE — PROCEDURES
Wound Care Provider Procedure Note    Skin Biopsy Procedure Note    Pre-op Diagnosis: Open wound left finger, concern for calciphylaxis  Post-op Diagnosis: Same  Procedure: Skin biopsy  Performing Physician: Dr. Praful Das    Procedure: 3mm punch biopsy of skin left dorsal 2nd finger    - Prior to procedure I was able to palpate left radial artery. I used a handheld doppler to ensure adequate blood flow through radial artery, ulnar artery, and palmar arch.  - Obtained informed consent. Discussed the reason for procedure to evaluate non-healing wound. Discussed options including continued wound care and observation. Counseled on risks to procedure including: pain, bleeding, infection, creating non-healing wound, damage to nerves or underlying vascular structures. Answered all appropriate questions and patient decided to proceed. Consent signed by patient and witness by myself and nurse.  - The area surrounding distal aspect of wound was prepped in normal sterile fashion with chlorhexidine  - I performed rest of the procedure using sterile gloves and in normal sterile fashion  - 3ml of 2% lidocaine was used to perform local anesthesia, injected subcutaneously and along the lateral / medial aspect of finger to provide regional pain block  - Once the skin was sufficently numb, 3mm punch biopsy was performed. Two punches were taken to ensure adequate sample was obtained.  - Specimens were placed in specimen container with formalin, patient sticker was applied and was given to nurse to transport to histology lab  - Hemostasis controlled with manual pressure  - Estimated blood loss: 1ml  - Patient tolerated procedure well with minimal discomfort  - Following biopsy, biopsy site and all other wounds were dressed with Hydrofiber silver, gauze, rolled gauze, and tape

## 2022-08-02 NOTE — CARE PLAN
The patient is Watcher - Medium risk of patient condition declining or worsening    Shift Goals  Clinical Goals: pain mgmt  Patient Goals: pain mgmt  Family Goals: CHIP    Progress made toward(s) clinical / shift goals:  yes    Patient is not progressing towards the following goals:    Problem: Pain - Standard  Goal: Alleviation of pain or a reduction in pain to the patient’s comfort goal  Outcome: Progressing     Problem: Knowledge Deficit - Standard  Goal: Patient and family/care givers will demonstrate understanding of plan of care, disease process/condition, diagnostic tests and medications  Outcome: Progressing     Problem: Skin Integrity  Goal: Skin integrity is maintained or improved  Outcome: Progressing     Problem: Fall Risk  Goal: Patient will remain free from falls  Outcome: Progressing     Problem: Respiratory  Goal: Patient will achieve/maintain optimum respiratory ventilation and gas exchange  Outcome: Progressing

## 2022-08-02 NOTE — DISCHARGE PLANNING
Medical Social Work    PC to Jose F Real  [phone], RTC Ride Line  [phone]; message left to call   [phone]; not a working number    PC to Addie Sam, [phone], message not left, number is for a Moisés Blanton    PC to Beena Real, [phone]; not a working number

## 2022-08-02 NOTE — PROGRESS NOTES
Hospital Medicine Daily Progress Note    Date of Service  8/2/2022    Chief Complaint  Edema    Hospital Course  81 y.o. female with PMHx ESRD on HD MWF, HTN, HLD, and T2DM who was admitted on 6/24/2022 for left upper arm swelling and lower extremity edema after missing HD sessions.  X-rays of the left hand showed only chronic changes.  AV fistula was functioning well.  CTA showed no evidence of central stenosis.  Vascular surgery was consulted and only recommended supportive care such as wrapping the arm with Ace bandage.  She was also found to have severe aortic stenosis for which cardiology was consulted, and gave the opinion that she is not a candidate for intervention.  She was dialyzed while in the hospital with subsequent clinical improvement.  She did have hospital delirium while in the hospital, which has since resolved.  Palliative care was consulted, but patient continued to wish to be full code.  SNF placement was pursued, but proved to be challenging as transfer to dialysis is not always facilitated.  Case management is working on her discharge to SNF and arrangement of hemodialysis.    Interval Problem Update    08/02/22      I evaluated and examined her at the bedside.  She reported that she is feeling better and her hand pain has improved.  I discussed with her regarding plan of care.  She get skin biopsy yesterday.  I had a lengthy discussion with her regarding her goals of care.  I explained her hospital and she expressed that she would like to talk to someone more about hospice care.  I requested hospice referral for patient education.        Consultants/Specialty  cardiology, nephrology and vascular surgery    Code Status  Full Code    Disposition  Patient is medically cleared for discharge.   Anticipate discharge to to skilled nursing facility.  I have placed the appropriate orders for post-discharge needs.    Review of Systems  Review of Systems   Constitutional: Negative for chills, fever and  weight loss.   HENT: Negative for hearing loss and tinnitus.    Eyes: Negative for blurred vision, double vision, photophobia and pain.   Respiratory: Negative for cough, sputum production and shortness of breath.    Cardiovascular: Negative for chest pain, palpitations, orthopnea and leg swelling.   Gastrointestinal: Negative for abdominal pain, constipation, diarrhea, nausea and vomiting.   Genitourinary: Negative for dysuria, frequency and urgency.   Musculoskeletal: Positive for back pain and myalgias. Negative for joint pain and neck pain.   Skin: Negative for rash.   Neurological: Negative for dizziness, tingling, tremors, sensory change, speech change, focal weakness and headaches.   Psychiatric/Behavioral: Negative for hallucinations and substance abuse.   All other systems reviewed and are negative.       Pertinent positives/negatives as mentioned above.     A complete review of systems was personally done by me. All other systems were negative.        Physical Exam  Temp:  [36.3 °C (97.4 °F)-36.7 °C (98 °F)] 36.7 °C (98 °F)  Pulse:  [84-94] 84  Resp:  [16-18] 16  BP: (101-112)/(47-51) 101/47  SpO2:  [94 %-98 %] 95 %    Physical Exam  Vitals reviewed.   Constitutional:       General: She is not in acute distress.     Appearance: Normal appearance. She is not ill-appearing.   HENT:      Head: Normocephalic and atraumatic.      Nose: No congestion.   Eyes:      General:         Right eye: No discharge.         Left eye: No discharge.      Pupils: Pupils are equal, round, and reactive to light.   Cardiovascular:      Rate and Rhythm: Normal rate and regular rhythm.      Pulses: Normal pulses.      Heart sounds: Murmur heard.   Pulmonary:      Effort: Pulmonary effort is normal. No respiratory distress.      Breath sounds: Normal breath sounds. No stridor.   Abdominal:      General: Bowel sounds are normal. There is no distension.      Palpations: Abdomen is soft.      Tenderness: There is no abdominal  tenderness.   Musculoskeletal:         General: Swelling, tenderness and deformity present. Normal range of motion.      Cervical back: Normal range of motion. No rigidity.   Skin:     General: Skin is warm.      Capillary Refill: Capillary refill takes less than 2 seconds.      Coloration: Skin is not jaundiced or pale.      Findings: Erythema and lesion present. No bruising.      Comments: Dressing present on left hand CDI   Neurological:      General: No focal deficit present.      Mental Status: She is alert and oriented to person, place, and time.      Cranial Nerves: No cranial nerve deficit.   Psychiatric:         Mood and Affect: Mood normal.         Behavior: Behavior normal.       I performed physical exam on August 2, 2002 it remains similar without any acute changes.  I reviewed patient's left hand and fingers picture in epic.  Fluids    Intake/Output Summary (Last 24 hours) at 8/2/2022 1600  Last data filed at 8/1/2022 2210  Gross per 24 hour   Intake 520 ml   Output --   Net 520 ml       Laboratory  Recent Labs     08/02/22  0444   WBC 8.3   RBC 3.10*   HEMOGLOBIN 10.4*   HEMATOCRIT 32.5*   .8*   MCH 33.5*   MCHC 32.0*   RDW 68.4*   PLATELETCT 220   MPV 8.5*     Recent Labs     08/01/22  0009 08/02/22  0444   SODIUM 127* 133*   POTASSIUM 5.7* 5.4   CHLORIDE 86* 92*   CO2 26 26   GLUCOSE 146* 139*   BUN 56* 33*   CREATININE 5.25* 3.64*   CALCIUM 9.0 9.0                   Imaging  CT-HAND W/O LEFT   Final Result      1.  No acute fracture or dislocation.      2.  Previous distal ulnar fracture identified.      3.  Osteoarthritis is most prominent at the first carpometacarpal joint.      4.  No bone erosions identified.      5.  Fluid collection or soft tissue abscess is identified.      6.  Induration in subcutaneous fat and intramuscular fat planes is noted which could be due to edema or inflammation such as cellulitis.      DX-HAND 3+ LEFT   Final Result      1. No acute fracture or subluxation.    2. Chronic posttraumatic changes in the distal left radius and ulna, stable when compared with the prior study.   3. Stable osteoarthritis involving the left first carpometacarpal joint.      DX-HAND 3+ LEFT   Final Result      1.  No acute fracture or dislocation.   2.  Scattered mild degenerative changes, increased from prior study. No definite erosive arthropathy.   3.  Osteopenia.   4.  Old ulnar styloid process fracture.      DX-CHEST-PORTABLE (1 VIEW)   Final Result      1.  Probable 12 mm right mid/upper lung zone mass. Suggest CT chest without contrast for further assessment      2.  Interstitial pulmonary edema or fibrosis      3.  Enlarged cardiac silhouette      4.  Left pleural effusion      CT-CTA UPPER EXT WITH & W/O-POST PROCESS LEFT   Final Result      1.  Status post brachiocephalic fistula in the left arm. No focal stenosis is identified.      2.  Extensive edema in the subcutaneous tissues of the visualized left lateral chest and arm.      EC-ECHOCARDIOGRAM COMPLETE W/O CONT   Final Result      US-ZAHRAA SINGLE LEVEL BILAT   Final Result      US-EXTREMITY ARTERY LOWER BILAT   Final Result      CT-EXTREMITY, UPPER W/O LEFT   Final Result      1.  There is diffuse subcutaneous edema of the imaged portions of the left upper extremity. There is also diffuse body wall edema in the visualized portions of the chest and abdomen.   2.  No focal fluid collection to suggest abscess.   3.  There is a small left pleural effusion and minimal fluid in the abdomen.   4.  There is postsurgical change of AV fistula. There is small vessel atherosclerosis.      US-EXTREMITY VENOUS UPPER UNILAT LEFT   Final Result      DX-CHEST-PORTABLE (1 VIEW)   Final Result         1.  Interstitial pulmonary parenchymal prominence suggest chronic underlying lung disease, component of interstitial edema and/or infiltrates not excluded.   2.  Small left pleural effusion   3.  Cardiomegaly   4.  Atherosclerosis            Assessment/Plan  * ESRD (end stage renal disease) on dialysis (HCC)- (present on admission)  Assessment & Plan  - Continue hemodialysis per nephrology.  -Continue sevelamer and calcitriol.    Extensor tendon laceration of left hand with open wound  Assessment & Plan  She has been having worsening of her wound on her left hand. COmpleted course of ancef. Now, hand wound healing well, no further signs of infection.X-ray did not show any acute abnormalities.  CT scan of her left hand and it did not show any fluid collection.  Wound care service following.  She underwent skin biopsy on August 1, 2022.  Continue wound care.      Hypothyroidism- (present on admission)  Assessment & Plan  - Continue Synthroid.    Delirium  Assessment & Plan  - Resolved.    Aortic stenosis, severe- (present on admission)  Assessment & Plan  - Not a candidate for intervention per cardiology.  -Continue to monitor volume status.    Pain in both lower extremities- (present on admission)  Assessment & Plan  - Chronic.  Continue as needed pain medications.    Advance care planning  Assessment & Plan  On August 2 2022 I discussed patient regarding plan of care and possible hospice care.  I explained her regarding hospice.  She expressed that she would like to talk to further regarding hospice care.  I requested hospice referral for patient education.    Edema of left upper extremity- (present on admission)  Assessment & Plan  - Chronic, at the same site of AV fistula which is functioning well.  -Multifactorial: Poor EF, lymphedema, outflow stenosis.  CTA showed no evidence of central stenosis.  Vascular surgery recommended supportive care with wrapping arm with Ace bandage.  -Continue to monitor.    Generalized weakness- (present on admission)  Assessment & Plan  - SNF placement being pursued.  Placement challenging due to need for transportation back and forth to hemodialysis.  CM/SW on board.    Diabetes mellitus, type II (HCC)- (present on  admission)  Assessment & Plan  - Last HbA1c was 11.7.  Continue Lantus 5 units at night, along with sliding scale insulin coverage.  Continue Accu-Cheks before meals and at bedtime. Goal to keep BG between 140-180 per 2019 ADA guidelines.        Essential hypertension- (present on admission)  Assessment & Plan  - Maintaining good blood pressure control, despite holding home antihypertensives.  Continue to monitor blood pressure trend closely.       I discussed plan of care with  regarding her current medical condition and discharge plan.      VTE prophylaxis: heparin ppx    I have performed a physical exam and reviewed and updated ROS and Plan today (8/2/2022). In review of last note, there are no changes except as documented above.

## 2022-08-02 NOTE — DISCHARGE PLANNING
[facility] Search results found by this SW:    Jose F Bruno [phone], [phone], [phone]  Nettie Craig phone number  Addie Sam   [phone]  Beena Bruno   [phone]

## 2022-08-03 NOTE — HOSPICE
Referral received for patient. Visit made to patient`s room to discuss hospice, but patient was not in room as she was receiving dialysis. She has been accepted for community hospice. We will continue to follow.

## 2022-08-03 NOTE — PROGRESS NOTES
Assumed care of patient @ 1900.   Assessment completed. POC discussed with pt.   A/Ox3, VSS, 0.5L NC.   Pt resting in bed with call light and belongings within reach. Bed locked and in lowest position.   Needs met at this time.

## 2022-08-03 NOTE — CARE PLAN
The patient is Stable - Low risk of patient condition declining or worsening    Shift Goals  Clinical Goals: pain management, wound care, dialysis  Patient Goals: rest, comfort  Family Goals: CHIP    Progress made toward(s) clinical / shift goals:    Problem: Pain - Standard  Goal: Alleviation of pain or a reduction in pain to the patient’s comfort goal  Outcome: Progressing     Problem: Knowledge Deficit - Standard  Goal: Patient and family/care givers will demonstrate understanding of plan of care, disease process/condition, diagnostic tests and medications  Outcome: Progressing     Problem: Skin Integrity  Goal: Skin integrity is maintained or improved  Outcome: Progressing     Problem: Fall Risk  Goal: Patient will remain free from falls  Outcome: Progressing     Problem: Respiratory  Goal: Patient will achieve/maintain optimum respiratory ventilation and gas exchange  Outcome: Progressing       Patient is not progressing towards the following goals:

## 2022-08-03 NOTE — WOUND TEAM
[facility]  Inpatient Services  Wound and Skin Care Evaluation    Admission Date: 2022     Last order of IP CONSULT TO WOUND CARE was found on 2022 from Hospital Encounter on 2022     HPI, PMH, SH: Reviewed    Past Surgical History:   Procedure Laterality Date   • AV FISTULA CREATION Left 2/15/2019    Procedure: AV FISTULA CREATION-  UPPER EXTREMITY BRACHIAL CEPHALIC BANDING;  Surgeon: Fernie Nazario M.D.;  Location: SURGERY [facility];  Service: General   • HIP CANNULATED SCREW Right 2017    Procedure: HIP CANNULATED SCREW;  Surgeon: Ar Huerta M.D.;  Location: SURGERY [facility];  Service:    • CATARACT PHACO WITH IOL  2014    Performed by Rudolph Barclay M.D. at SURGERY SAME DAY [facility] ORS   • CATARACT PHACO WITH IOL  2014    Performed by Rudolph Barclay M.D. at SURGERY SAME DAY [facility] ORS   • VENTRAL HERNIA REPAIR LAPAROSCOPIC  3/7/2012    Performed by HUNTER SERNA at SURGERY SAME DAY [facility] ORS   • APPENDECTOMY     • GYN SURGERY      hysterectomy   • OTHER      T&A   • OTHER ABDOMINAL SURGERY      abd tramua- horse stepped on abd   • OTHER ORTHOPEDIC SURGERY      R & l Shoulder repair     Social History     Tobacco Use   • Smoking status: Former Smoker     Packs/day: 1.00     Years: 20.00     Pack years: 20.00     Types: Cigarettes     Quit date: 3/5/1979     Years since quittin.4   • Smokeless tobacco: Never Used   Substance Use Topics   • Alcohol use: No     Chief Complaint   Patient presents with   • Peripheral Edema   • Weakness     BIBA from home for weakness, nausea, L arm pain  HX of dialysis with L arm fistula, pt states she has missed 3 dialysis days due to being so weak she cannot drive or walk now  3 weeks ago had a procedure done to her fistula which has lead to L arm swelling     Diagnosis: ESRD (end stage renal disease) on dialysis (HCC) [N18.6, Z99.2]    Unit where seen by Wound Team: S626/02     WOUND  CONSULT/FOLLOW UP RELATED TO:  L hand     WOUND HISTORY:  Per HPI: 81 y.o. female with PMHx ESRD on HD MWF, HTN, HLD, and T2DM who was admitted on 6/24/2022 for left upper arm swelling and lower extremity edema after missing HD sessions.  X-rays of the left hand showed only chronic changes.  AV fistula was functioning well.  CTA showed no evidence of central stenosis.  Vascular surgery was consulted and only recommended supportive care such as wrapping the arm with Ace bandage.  She was also found to have severe aortic stenosis for which cardiology was consulted, and gave the opinion that she is not a candidate for intervention.  She was dialyzed while in the hospital with subsequent clinical improvement.  She did have hospital delirium while in the hospital, which has since resolved.  Palliative care was consulted, but patient continued to wish to be full code.  SNF placement was pursued, but proved to be challenging as transfer to dialysis is not always facilitated.  Case management is working on her discharge to SNF and arrangement of hemodialysis.    8/1/22: punch biopsy of hand performed by MD Das.     WOUND ASSESSMENT/LDA       Wound 07/20/22 Soft Tissue Necrosis Hand Left x2 dorsal, x1 palmar (Active)   Wound Image    08/02/22 1500   Site Assessment Pink;Red;Yellow;Drainage;Non-healing;Painful 08/02/22 1500   Periwound Assessment Red;Edema 08/02/22 1500   Margins Undefined edges;Unattached edges 08/02/22 1500   Closure Secondary intention 08/02/22 1500   Drainage Amount Small 08/02/22 1500   Drainage Description Yellow;Serosanguineous 08/02/22 1500   Treatments Cleansed;Site care;Tape change 08/02/22 1500   Wound Cleansing Approved Wound Cleanser 08/02/22 1500   Periwound Protectant Skin Protectant Wipes to Periwound 08/02/22 1500   Dressing Cleansing/Solutions Not Applicable 08/02/22 1500   Dressing Options Adaptic;Hydrofiber Silver;Dry Gauze;Dry Roll Gauze 08/02/22 1500   Dressing Changed Changed  08/02/22 1500   Dressing Status Clean;Dry;Intact 08/02/22 1500   Dressing Change/Treatment Frequency Every 48 hrs, and As Needed 08/02/22 1500   NEXT Dressing Change/Treatment Date 08/04/22 08/02/22 1500   NEXT Weekly Photo (Inpatient Only) 08/09/22 08/02/22 1500   Non-staged Wound Description Full thickness 08/02/22 1500   Shape irregular x3 07/26/22 1400   Wound Odor Mild 07/26/22 1400   Exposed Structures CHIP 07/26/22 1400   WOUND NURSE ONLY - Time Spent with Patient (mins) 75 08/02/22 1500                  Vascular:    Lab Values:    Lab Results   Component Value Date/Time    WBC 8.3 08/02/2022 04:44 AM    RBC 3.10 (L) 08/02/2022 04:44 AM    HEMOGLOBIN 10.4 (L) 08/02/2022 04:44 AM    HEMATOCRIT 32.5 (L) 08/02/2022 04:44 AM    CREACTPROT 7.07 (H) 06/25/2022 04:46 AM    HBA1C 11.7 (H) 06/24/2022 03:35 AM        Culture Results show:  Recent Results (from the past 720 hour(s))   CULTURE WOUND W/ GRAM STAIN    Collection Time: 07/20/22  3:00 PM    Specimen: Left Hand; Wound   Result Value Ref Range    Significant Indicator POS (POS)     Source WND     Site LEFT HAND     Culture Result - (A)     Gram Stain Result Few WBCs.  Moderate Gram positive cocci.       Culture Result Staphylococcus aureus  Moderate growth   (A)        Susceptibility    Staphylococcus aureus - ADRIANE     Azithromycin <=2 Sensitive mcg/mL     Clindamycin <=0.25 Sensitive mcg/mL     Cefazolin <=8 Sensitive mcg/mL     Cefepime <=4 Sensitive mcg/mL     Ceftaroline <=0.5 Sensitive mcg/mL     Daptomycin <=0.5 Sensitive mcg/mL     Ampicillin/sulbactam <=8/4 Sensitive mcg/mL     Erythromycin <=0.25 Sensitive mcg/mL     Vancomycin 1 Sensitive mcg/mL     Oxacillin 0.5 Sensitive mcg/mL     Pip/Tazobactam <=8 Sensitive mcg/mL     Trimeth/Sulfa <=0.5/9.5 Sensitive mcg/mL     Tetracycline <=4 Sensitive mcg/mL       Pain Level/Medicated:  PO Oxy, requested IV for dressing changes     INTERVENTIONS BY WOUND TEAM:  Chart and images reviewed. Discussed with bedside  RN. All areas of concern (based on picture review, LDA review and discussion with bedside RN) have been thoroughly assessed. Documentation of areas based on significant findings. This RN in to assess patient. Performed standard wound care which includes appropriate positioning, dressing removal and non-selective debridement. Pictures and measurements obtained weekly if/when required.    Preparation for Dressing removal: soaked with NS and wound cleanser, removed.   Non-selectively Debrided with:  NS and gauze.  Sharp debridement: NA  Bebe wound: Cleansed with NS  Primary Dressing: Fingers; single layer of adaptic, aquacel ag over top  Palmar; cut strip of aquacel ag and tucked into wound.  Secondary (Outer) Dressing: dry gauze, dry roll gauze, tape to secure in place.     Sacrum: changed mepilex, was dated from 7/25/22.    Interdisciplinary consultation: Patient, Bedside RN Tre    EVALUATION / RATIONALE FOR TREATMENT:  Most Recent Date:  8/2/22: punch biopsy performed on 8/1/22, first dressing change today. Difficult to remove so applied adaptic non-adherent layer to base of finger wound beds and covered with piece of aquacel ag. Palmar hand with aquacel ag strip tucked into wound and depth. Covered with gauze and roll gauze. Wound team to continue to follow and monitor. Waiting results.     7/26/22: L hand does not appear to be healing, change dressing to viscopaste for the soothing properties to assist with pain relief.   And Viscopatch zinc impregnated gauze to encourage re-epithelialization of superficial 100% viable wound bed, and to provide a non-stick wound contact layer.  7/23/22: No surgery indicated per Dr. Huerta. There are two wound beds on dorsal surface (1st finger and 4th/5th finger) and one wound over the palmar surface. All wounds with thick yellow drainage that is malodorous. Involved fingers remain erythematous and edematous. Patient reports extreme pain with site care and wound care is  difficult despite utilization of lidocaine. Silver powder applied for its antibiotic properties and to address moisture to area due to moderate drainage. Wound team to continue to follow.     7/20: wounds on L hand with fluctuance on dorsal aspect indicating necrosis. X ray results did not indicate bony involvement, thus recommend CT for possible abscess. At this time, wound unlikely to resolve with local wound care if necrosis advances, recommend surgical evaluation. Wound team will continue to follow.     Goals: Steady decrease in wound area and depth weekly.    WOUND TEAM PLAN OF CARE ([X] for frequency of wound follow up,):   Nursing to follow dressing orders written for wound care. Contact wound team if area fails to progress, deteriorates or with any questions/concerns if something comes up before next scheduled follow up (See below as to whether wound is following and frequency of wound follow up)  Dressing changes by wound team:                   Follow up 3 times weekly:                NPWT change 3 times weekly:     Follow up 1-2 times weekly:  X nursing to perform dsg/skin care; Wound team following.    Follow up Bi-Monthly:                   Follow up as needed:     Other (explain):     NURSING PLAN OF CARE ORDERS (X):  Dressing changes: See Dressing Care orders: x  Skin care: See Skin Care orders: x  RN Prevention Protocol: x  Rectal tube care: See Rectal Tube Care orders:   Other orders:    RSKIN:   CURRENTLY IN PLACE (X), APPLIED THIS VISIT (A), ORDERED (O):   Q shift Joe:  X  Q shift pressure point assessments:  X    Surface/Positioning   Pressure redistribution mattress      x      Low Airloss        x  Bariatric foam      Bariatric NICOLETTE     Waffle cushion        Waffle Overlay        x  Reposition q 2 hours    O  TAPs Turning system     Z Wil Pillow     Offloading/Redistribution   Sacral Mepilex (Silicone dressing)   O  Heel Mepilex (Silicone dressing)   O      Heel float boots (Prevalon boot)              Float Heels off Bed with Pillows           Respiratory   Silicone O2 tubing         Gray Foam Ear protectors     Cannula fixation Device (Tender )          High flow offloading Clip    Elastic head band offloading device      Anchorfast                                                         Trach with Optifoam split foam             Containment/Moisture Prevention     Rectal tube or BMS    Purwick/Condom Cath        López Catheter    Barrier wipes           Barrier paste       Antifungal tx      Interdry        Mobilization       Up to chair        Ambulate      PT/OT  x    Nutrition       Dietician        Diabetes Education      PO  x   TF     TPN     NPO   # days     Other        Anticipated discharge plans: TBD  LTACH:        SNF/Rehab:                  Home Health Care:           Outpatient Wound Center:            Self/Family Care:        Other:                  Vac Discharge Needs:   Not Applicable Pt not on a wound vac:     x  Regular Vac while inpatient, alternative dressing at DC:        Regular Vac in use and continued at DC:            Reg. Vac w/ Skin Sub/Biologic in use. Will need to be changed 2x wkly:      Veraflo Vac while inpatient, ok to transition to Regular Vac on Discharge:           Veraflo Vac while inpatient, will need to remain on Veraflo Vac upon discharge:

## 2022-08-03 NOTE — PROGRESS NOTES
[facility] Services Progress Note      HD today x 3 hours per Dr. King.   Initiated at 1306 and ended at 1606.   Patient assessed prior tx. Oriented when asked, drowsy, follows commands. Denies pain. L hand with dressing. On 0.5L O2, satting at 100% . BLE edema +4.      UF Net: 2,800 mL    Patient able to finished treatment. Hypotensive on the last hour of dialysis, 89-90's systolic. Patient is asymptomatic.   This RN assisted patient while eating. Observed and as verbalized by the patient, has hard time swallowing even small , bite size bread. PCN aware on this observation.     Blood returned. Applied gauze and held KINGA AVF site for 10 minutes. Verified no bleeding post treatment. Bruit and thrill present post dialysis.   Instructions given to Primary RN that if bleeding occurs on the AVF site, change dressing and held the site with pressure.       Report given to Primary AMELIE Gastelum RN.

## 2022-08-03 NOTE — CARE PLAN
The patient is Watcher - Medium risk of patient condition declining or worsening    Shift Goals  Clinical Goals: pain mgmt  Patient Goals: rest, comfort  Family Goals: CHIP    Progress made toward(s) clinical / shift goals:  yes     Patient is not progressing towards the following goals:      Problem: Pain - Standard  Goal: Alleviation of pain or a reduction in pain to the patient’s comfort goal  Outcome: Progressing     Problem: Knowledge Deficit - Standard  Goal: Patient and family/care givers will demonstrate understanding of plan of care, disease process/condition, diagnostic tests and medications  Outcome: Progressing     Problem: Skin Integrity  Goal: Skin integrity is maintained or improved  Outcome: Progressing     Problem: Fall Risk  Goal: Patient will remain free from falls  Outcome: Progressing     Problem: Respiratory  Goal: Patient will achieve/maintain optimum respiratory ventilation and gas exchange  Outcome: Progressing

## 2022-08-03 NOTE — THERAPY
08/03/22 1311   Interdisciplinary Plan of Care Collaboration   Collaboration Comments Attempted to see pt for OT tx. Pt currently  at dialysis. Will follow up later as able.

## 2022-08-03 NOTE — PROGRESS NOTES
Pharmacy Pharmacotherapy Consult for LOS >30 days    Admit Date: 6/24/2022      Medications were reviewed for appropriateness and ongoing need.     Current Facility-Administered Medications   Medication Dose Route Frequency Provider Last Rate Last Admin   • insulin GLARGINE (Lantus,Semglee) injection  5 Units Subcutaneous Q EVENING Dhaval Murphy M.D.        And   • insulin lispro (AdmeLOG,HumaLOG) injection  1-6 Units Subcutaneous 4X/DAY ACHS Dhaval Murphy M.D.        And   • dextrose 10 % BOLUS 25 g  25 g Intravenous Q15 MIN PRN Dhaval Murphy M.D.       • ondansetron (ZOFRAN ODT) dispertab 4 mg  4 mg Oral Q4HRS PRN Dhaval Murphy M.D.       • oxyCODONE immediate-release (ROXICODONE) tablet 5 mg  5 mg Oral QDAY PRN Dhaval Murphy M.D.       • gabapentin (NEURONTIN) capsule 100 mg  100 mg Oral TID Dhaval Murphy M.D.   100 mg at 08/03/22 1215   • melatonin tablet 5 mg  5 mg Oral HS PRN Dhaval Murphy M.D.   5 mg at 07/24/22 0049   • oxyCODONE immediate-release (ROXICODONE) tablet 2.5 mg  2.5 mg Oral Q4HRS PRN Dhaval Murphy M.D.   2.5 mg at 08/03/22 1101   • levothyroxine (SYNTHROID) tablet 25 mcg  25 mcg Oral AM ES Dhaval Murphy M.D.   25 mcg at 08/03/22 0429   • acetaminophen (TYLENOL) tablet 1,000 mg  1,000 mg Oral Q6HRS Paul Franco M.D.   1,000 mg at 08/03/22 1215   • polyethylene glycol/lytes (MIRALAX) PACKET 1 Packet  1 Packet Oral DAILY Dhaval Murphy M.D.   1 Packet at 08/03/22 0429    And   • senna-docusate (PERICOLACE or SENOKOT S) 8.6-50 MG per tablet 2 Tablet  2 Tablet Oral BID Dhaval Murphy M.D.   2 Tablet at 08/03/22 0428    And   • bisacodyl (DULCOLAX) suppository 10 mg  10 mg Rectal QDAY PRN Dhaval Murphy M.D.       • sevelamer carbonate (RENVELA) tablet 1,600 mg  1,600 mg Oral TID WITH MEALS Dhaval Murphy M.D.   1,600 mg at 08/03/22 1057   • lidocaine (LIDODERM) 5 % 2 Patch  2 Patch Transdermal Q24HR Dhaval Murphy M.D.   2 Patch at 08/03/22 1058   • heparin injection  5,000 Units  5,000 Units Subcutaneous Q8HRS Dhaval Murphy M.D.   5,000 Units at 08/03/22 0428   • atorvastatin (LIPITOR) tablet 20 mg  20 mg Oral DAILY Dhaval Murphy M.D.   20 mg at 08/03/22 0428   • ROPINIRole (REQUIP) tablet 0.25 mg  0.25 mg Oral DAILY Dhaval Murphy M.D.   0.25 mg at 08/03/22 0428       Recommendations:  - D/C lidocaine 2% jelly. No administrations this hospital stay.  - Discussed recommendations with Dr. Murphy and he agreed with the recommendations.    Varun Teixeira, FelipeT

## 2022-08-03 NOTE — PROGRESS NOTES
[facility] -  PROGRESS NOTE               Author: Michael Begum N.P. Date & Time: 8/3/2022  12:16 PM     HPI:  81yoF with PMH significant for ESRD on HD MWF via left arm AVF, HTN, DM II, Anemia secondary to CKD, CKD Bone mineral disorder, admitted with increased edema and weakness and having missed her last last 2-3 outpt HD treatments. Pt is very lethargic at this time and unable to provide much history, majority of the history was obtained through review of the medical record and discussion with primary svc. Pt had reported increased LE edema and fatigue and weakness and worsening of her left arm edema so she came to the ED for evaluation. She apparently has missed her last 2-3 outpt dialysis treatments. Per regular outpt Nephrologist Dr. Gates, she has been non-compliant with her fluid restriction and was told that she needed 4x/week dialysis until her volume status could be optimized but she was non-compliant with that recommendation. She has edema of her left arm where her AVF is located and there is concern for a central stenosis that could be the etiology. She had an appointment at the outpt access center to evaluate for central stenosis and undergo angioplasty if needed on 6/9/22 but pt did not go to the appointment. She has not had any other procedures done to the arm in the past couple of months. She had a left arm us done today that showed no DVT and patent AVF and soft tissue edema. She apparently received pain medication fentanyl and dilaudid as well as benadryl earlier today and she is very drowsy.      DAILY NEPHROLOGY SUMMARY:  6/25: rapid response overnight for severe leg pain, pt very lethargic today, moans with palpation of legs, arouses but does not answer questions and falls back asleep, tolerated HD yest with 2.5L UF, BP stable overnight but low this am  6/26: No events, BP low overnight and this am, more alert, reports pain in legs for the past 3 weeks, denies  any CP/SOB/Abd pain, reports left arm edema a little better but no pain in left arm   6/27: Pt resting in bed, subdued, Bps soft, on 4L supp O2 via NC, labs reviewed, due for HD  6/28: pt laying in bed, sleepy, c/o pain in legs, moaning, vascular assessed and does not recommend any intervention on legs or AVF, tolerated iHD yesterday with UF:2.8L  6/29: Pt in bed, fatigued, no complaints.  VSS 1L NC.    6/30: no new complaints, no overnight events. Tolerated hd yesterday, UF 3.5L , she is in negative balance.   7/1: Resting in bed, no complaints.  VSS 2L NC. No new labs today.   7/2: Pt sitting up in bed, confused, CNA helping her with breakfast meal, iHD yesterday with 1.5L net UF limited by muscle cramping, labs reviewed, VSS on 1L NC O2  7/3: Pt slumped in bed, somnolent, medical floor status, VSS on RA  7/4: No new overnight renal events. S/p HD yesterday and tolerated well. Net UF was 1.5 L.  7/5: S/p HD yesterday with net UF of 2 L. No new overnight renal events.   7/6: No new overnight renal events.   7/7: S/p HD yesterday with net UF of 2.2L and tolerated well. No new overnight renal events.   7/8: No new overnight renal events.   7/9: S/p HD yesterday with net UF of 3L and tolerated well. No new overnight renal events.   7/10: No new overnight renal events. K+ 6.9 this am.  7/11: No events, HD yest for hyperkalemia, K 5.9 this am, denies any CP/SOB but reports leg pain, BP stable, seen and examined on HD this am--VSS see dialysis flowsheet for full details  7/12: No events, awake and alert, c/o bilateral leg pain, denies any CP/SOB, BP stable  7/13: No events, seen on dialysis this am, still c/o leg pain, denies any CP/SOB, BP stable  7/14: No events, feels tired, c/o leg pain, denies any CP/SOB, tolerated HD yest with 2L UF  7/15: No events, BP stable, seen on HD, still complaining of leg pain, no CP/SOB  7/16: No events, tolerated HD yest with 3L UF, BP stable, complaining of soft stools and left arm pain  and leg pain, no CP/SOB/Abd pain  7/17: No overnight events, VSS no CP SOB worsening edema. Na 131 K 5.2, 2L NC.  Nurse reports she was fatigued and disoriented this morning, but has improved.  Currently arouses easily, is oriented.   7/18: Pt seen on HD, says her back hurts, VSS on 2L NC O2, K+ 5.8 this AM  7/19: Pt sitting up in bed. No overnight events. Tolerated HD yesterday with 3L UF, BP stable. K this am better 4.2. No CP/N/V/D.   7/20: Seen during HD, tolerated well. VSS on 2L NC. Denies SOB, N/V/D. Stated left lower hand pain, open blisters noted, says also her back hurts.  7/21: HD yest UF 3L. Dtr at bedside. Pt c/o significant pain/burning in left hand.  7/22: seen on iHd, continues with + edema in LUE, + edema BLE edema  7/23: HD yesterday 3 liter uf  7/24: Decreased vision in R eye, but chornic, worse edema in left arm  7/25: Sleeping on HD.  Comfortable.  Left arm edema  7/26: Denies pain or SOB.  Comfortable.    7/27: seen on iHD, pleasantly confused, LUE edema improved   7/28: Denies pain or SOB.  Slight bleeding around fistula but family had pulled off dressing  7/29: Complaining of hand pain still.  No SOB.  States sat in chair yesterday.  7/30: Pain still around leg wound and hand, no improvement.    7/31: laying in bed, sleepy but arouses, encouraged to participate in rehab  8/1: Laying in bed eating breakfast. Very tearful this AM, stated that she has to make a decision if she wants to continue with the biopsy on her hand. Pt is concerned that there may not be much we can do about the wound on her left hand. Dr Simon from wound care at bedside. Will return this afternoon for biopsy procedure per MD. HD today UF 2L, tolerated well. Denies SOB, CP,N/VD. K+ up this am 5.7.   8/2: Resting in bed. C/o fatigue. Reports improved SOB, remains on oxygen.   08/3: Sitting up in chair, states she feels good today, SOB improved , remains on 2L nasal cannula. Denies CP, N/V/D. Discussed about fluid  "restriction. Pending left hand biopsy results. K+ improved 5.4. States fatigue but due to she recently ambulated with RN, otherwise no complaints. No overnight events, VSS.     REVIEW OF SYSTEMS:    10 point ROS reviewed and is as per HPI/daily summary or otherwise negative    PMH/PSH/SH/FH:   Reviewed and unchanged since admission note    CURRENT MEDICATIONS:   Reviewed from admission to present day    VS:  /63   Pulse 83   Temp 35.8 °C (96.5 °F) (Temporal)   Resp 16   Ht 1.626 m (5' 4\")   Wt 63.3 kg (139 lb 8.8 oz)   SpO2 97%   BMI 23.95 kg/m²     Physical Exam  Vitals and nursing note reviewed.   Constitutional:       General: She is not in acute distress.     Appearance: She is ill-appearing.   HENT:      Head: Normocephalic and atraumatic.      Mouth/Throat:      Mouth: Mucous membranes are dry.   Eyes:      General: No scleral icterus.  Cardiovascular:      Rate and Rhythm: Normal rate and regular rhythm.      Pulses: Normal pulses.      Heart sounds: No murmur heard.    No friction rub.   Pulmonary:      Effort: Pulmonary effort is normal. No respiratory distress.      Breath sounds: No wheezing or rales.      Comments: Decrease breathsounds Bilateral lower lobes  Abdominal:      General: Bowel sounds are normal. There is no distension.      Palpations: Abdomen is soft.   Musculoskeletal:         General: Swelling (left arm) present. No deformity.      Cervical back: Normal range of motion.      Right lower leg: No edema.      Left lower leg: No edema.      Comments: LE edema/LUE edema. Gauze dressing CDI  LUE AVF + bruit and Thrill     Skin:     General: Skin is warm and dry.      Findings: No rash.   Neurological:      Mental Status: She is alert.   Psychiatric:         Behavior: Behavior normal.           Fluids:  In: 600 [P.O.:600]  Out: -     LABS:  Recent Labs     08/01/22  0009 08/02/22  0444 08/03/22  0054   SODIUM 127* 133* 128*   POTASSIUM 5.7* 5.4 5.4   CHLORIDE 86* 92* 89*   CO2 26 26 " 25   GLUCOSE 146* 139* 198*   BUN 56* 33* 45*   CREATININE 5.25* 3.64* 4.29*   CALCIUM 9.0 9.0 8.9     Reviewed.    IMAGING:   All imaging reviewed from admission to present day    IMPRESSION:  # ESRD, dependent on HD              - HD via LUE AVF             - HD qMWF  # Fluid overload, improved             - Secondary to non-compliance with fluid restriction and HD treatments  # Left arm edema, slow improvement              - CTA upper ext 6/26 w/o e/o focal stenosis + extensive edema             - AVF patent on left arm us and no DVT             - Pt no showed to outpt appt to evaluate              - Vascular does not recommend intervention              - Extensor tendon laceration left hand with wound             - Wound care consult in place , s/p biopsy 8/1 concern for                          Calciphylaxis.   - Consider STS, pending bx results  # HTN, variable control, stable at this time             - Goal BP < 140/90             - Not on BP meds              - BP at goal  # Anemia of CKD, at goal              - Goal Hgb 10-11             - Iron 27             -TIBC 142             -%Sat 19             - Ferritin 1419  # CKD-MBD             - CCa 9.8             - PO4 4.6             - Vit D 35            - PTH 99.4             - Managed at OP unit             - On calcitriol and sevelamer   # Hyperkalemia            - Correct w/ HD  # DM II--management per primary svc  # Leg Pain--chronic skin changes and subcutaneous tissue firm to touch             - Please discuss with gen surg for possible skin biopsy             - Vascular does not recommend any intervention   - On ropinirole   # Leukocytosis resolved  # Hyponatremia  - Likely fluid related, manage w/ HD  - Fluid Restriction  # Hypoalbuminuria      PLAN:  - Continue qMWF iHD schedule , iHD today (Weds)  - UF as tolerated  - Follow up skin biopsy results, concern for calciphylaxis  - Stop calcitriol, avoid Calcium supplements  - Continue phos binders  WM, hold if patient is NPO. Avoid Calcium based binders   - Continue off of all BP meds and diuretics for now. BP stable during HD.   - Limit sedating meds if possible  - No dietary protein restrictions  - Fluid restriction 1L   - Low potassium diet  - Low K bath on HD   - Dose all meds per ESRD  - Outpt access center appointment rescheduled  - Follow labs in AM   - DC planning underway for SNF      Thank you,

## 2022-08-03 NOTE — THERAPY
Physical Therapy   Daily Treatment     Patient Name: Jannet Bruno  Age:  81 y.o., Sex:  female  Medical Record #: [id number]  Today's Date: 8/3/2022    Precautions: Fall Risk    Assessment  Pt seen for PT treatment session, is highly motivated to participate and reports enjoys working with therapy. Pt required mod-max A for STS practice and demos improved standing with FWW vs HHA. Attempted stepping over to chair, pt fatigued and with B knee buckling, ultimately sitting on therapist lap to pivot over to chair. PT will follow to progress mobility. Recommend placement.     Plan  Continue current treatment plan.  DC Equipment Recommendations: Unable to determine at this time  Discharge Recommendations: Recommend post-acute placement for additional physical therapy services prior to discharge home     08/03/22 1151   Cognition    Level of Consciousness Alert   Safety Awareness Impaired   Attention Impaired   Comments pleasant and motivated, receptive to edu, is easily distracted, states she really enjoys working with therapy   Balance   Sitting Balance (Static) Fair   Sitting Balance (Dynamic) Fair -   Standing Balance (Static) Poor +   Standing Balance (Dynamic) Poor   Weight Shift Sitting Fair   Weight Shift Standing Poor   Skilled Intervention Verbal Cuing;Postural Facilitation   Comments standing with FWW, LUE in platform position   Gait Analysis   Gait Level Of Assist Unable to Participate   Comments difficulty stepping and ultimately required max A to chair   Bed Mobility    Supine to Sit Minimal Assist   Scooting Minimal Assist   Skilled Intervention Verbal Cuing;Sequencing   Functional Mobility   Sit to Stand Maximal Assist   Bed, Chair, Wheelchair Transfer Maximal Assist   Transfer Method Stand Pivot   Mobility EOB > chair with FWW, attempted stepping over, pt with B knee buckling and was unable to extend back tp upright. pt ultimately sitting on therapist's lap for pivot over to sitting in chair.    Skilled Intervention Verbal Cuing;Sequencing;Postural Facilitation   Comments performed STS x3 to FWW, initially mod A, then required max A with fatigue. was able to static stand with good posture.   Short Term Goals    Short Term Goal # 1 Pt will perform bed mobility with supervision in 6 visits with HOB flat, no rail.   Goal Outcome # 1 goal not met   Short Term Goal # 2 Pt will transfer with supervision in 6 visits to improve functional indep.   Goal Outcome # 2 Goal not met   Short Term Goal # 3 Pt will ambulate x 50 feet using FWW with supervision in 6 visits to improve functional indep.   Goal Outcome # 3 Goal not met

## 2022-08-04 NOTE — THERAPY
Occupational Therapy  Daily Treatment     Patient Name: Jannet Bruno  Age:  81 y.o., Sex:  female  Medical Record #: [id number]  Today's Date: 8/4/2022       Precautions: Fall Risk  Comments: LUE edema and pain    Assessment    Pt seen for OT tx. Continues to be limited by decreased activity tolerance, balance deficits, inattention and poor safety awareness impacting ability to complete ADLs and ADL transfers independently. Pt emotional about pain and just having wound care. Pt verbalized wanting to maximize independence in ADLs and ADL transfers prior to d/c home. Will continue to benefit from OT services while in house.     Plan    Continue current treatment plan.    DC Equipment Recommendations: Unable to determine at this time  Discharge Recommendations: Recommend post-acute placement for additional occupational therapy services prior to discharge home       08/04/22 0901   Cognition    Cognition / Consciousness X   Comments pleasant and cooperative, emotional about pain   Active ROM Upper Body   Active ROM Upper Body  X   Comments LUE limited d/t pain and edema w/ repeated use able to assist w/ ADLs   Strength Upper Body   Upper Body Strength  X   Gross Strength Generalized Weakness, Equal Bilaterally.    Activities of Daily Living   Grooming Supervision;Seated   Upper Body Dressing Minimal Assist   Lower Body Dressing Maximal Assist   Toileting Maximal Assist   Functional Mobility   Comments did not assess   Short Term Goals   Short Term Goal # 1 LB dressing with SPV   Goal Outcome # 1 Progressing slower than expected   Short Term Goal # 2 seated G/H with SPV   Goal Outcome # 2 Progressing slower than expected   Short Term Goal # 3 BSC txf with min A   Goal Outcome # 3 Progressing slower than expected   Anticipated Discharge Equipment and Recommendations   DC Equipment Recommendations Unable to determine at this time   Discharge Recommendations Recommend post-acute placement for additional occupational  therapy services prior to discharge home

## 2022-08-04 NOTE — PROGRESS NOTES
[facility] -  PROGRESS NOTE               Author: Michael Begum N.P. 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 Pt is very lethargic at this time and unable to provide much history, majority of the history was obtained through review of the medical record and discussion with primary svc. Pt had reported increased LE edema and fatigue and weakness and worsening of her left arm edema so she came to the ED for evaluation. She apparently has missed her last 2-3 outpt dialysis treatments. Per regular outpt Nephrologist Dr. Gates, she has been non-compliant with her fluid restriction and was told that she needed 4x/week dialysis until her volume status could be optimized but she was non-compliant with that recommendation. She has edema of her left arm where her AVF is located and there is concern for a central stenosis that could be the etiology. She had an appointment at the outpt access center to evaluate for central stenosis and undergo angioplasty if needed on 6/9/22 but pt did not go to the appointment. She has not had any other procedures done to the arm in the past couple of months. She had a left arm us done today that showed no DVT and patent AVF and soft tissue edema. She apparently received pain medication fentanyl and dilaudid as well as benadryl earlier today and she is very drowsy.      DAILY NEPHROLOGY SUMMARY:  6/25: rapid response overnight for severe leg pain, pt very lethargic today, moans with palpation of legs, arouses but does not answer questions and falls back asleep, tolerated HD yest with 2.5L UF, BP stable overnight but low this am  6/26: No events, BP low overnight and this am, more alert, reports pain in legs for the past 3 weeks, denies  any CP/SOB/Abd pain, reports left arm edema a little better but no pain in left arm   6/27: Pt resting in bed, subdued, Bps soft, on 4L supp O2 via NC, labs reviewed, due for HD  6/28: pt laying in bed, sleepy, c/o pain in legs, moaning, vascular assessed and does not recommend any intervention on legs or AVF, tolerated iHD yesterday with UF:2.8L  6/29: Pt in bed, fatigued, no complaints.  VSS 1L NC.    6/30: no new complaints, no overnight events. Tolerated hd yesterday, UF 3.5L , she is in negative balance.   7/1: Resting in bed, no complaints.  VSS 2L NC. No new labs today.   7/2: Pt sitting up in bed, confused, CNA helping her with breakfast meal, iHD yesterday with 1.5L net UF limited by muscle cramping, labs reviewed, VSS on 1L NC O2  7/3: Pt slumped in bed, somnolent, medical floor status, VSS on RA  7/4: No new overnight renal events. S/p HD yesterday and tolerated well. Net UF was 1.5 L.  7/5: S/p HD yesterday with net UF of 2 L. No new overnight renal events.   7/6: No new overnight renal events.   7/7: S/p HD yesterday with net UF of 2.2L and tolerated well. No new overnight renal events.   7/8: No new overnight renal events.   7/9: S/p HD yesterday with net UF of 3L and tolerated well. No new overnight renal events.   7/10: No new overnight renal events. K+ 6.9 this am.  7/11: No events, HD yest for hyperkalemia, K 5.9 this am, denies any CP/SOB but reports leg pain, BP stable, seen and examined on HD this am--VSS see dialysis flowsheet for full details  7/12: No events, awake and alert, c/o bilateral leg pain, denies any CP/SOB, BP stable  7/13: No events, seen on dialysis this am, still c/o leg pain, denies any CP/SOB, BP stable  7/14: No events, feels tired, c/o leg pain, denies any CP/SOB, tolerated HD yest with 2L UF  7/15: No events, BP stable, seen on HD, still complaining of leg pain, no CP/SOB  7/16: No events, tolerated HD yest with 3L UF, BP stable, complaining of soft stools and left arm pain  and leg pain, no CP/SOB/Abd pain  7/17: No overnight events, VSS no CP SOB worsening edema. Na 131 K 5.2, 2L NC.  Nurse reports she was fatigued and disoriented this morning, but has improved.  Currently arouses easily, is oriented.   7/18: Pt seen on HD, says her back hurts, VSS on 2L NC O2, K+ 5.8 this AM  7/19: Pt sitting up in bed. No overnight events. Tolerated HD yesterday with 3L UF, BP stable. K this am better 4.2. No CP/N/V/D.   7/20: Seen during HD, tolerated well. VSS on 2L NC. Denies SOB, N/V/D. Stated left lower hand pain, open blisters noted, says also her back hurts.  7/21: HD yest UF 3L. Dtr at bedside. Pt c/o significant pain/burning in left hand.  7/22: seen on iHd, continues with + edema in LUE, + edema BLE edema  7/23: HD yesterday 3 liter uf  7/24: Decreased vision in R eye, but chornic, worse edema in left arm  7/25: Sleeping on HD.  Comfortable.  Left arm edema  7/26: Denies pain or SOB.  Comfortable.    7/27: seen on iHD, pleasantly confused, LUE edema improved   7/28: Denies pain or SOB.  Slight bleeding around fistula but family had pulled off dressing  7/29: Complaining of hand pain still.  No SOB.  States sat in chair yesterday.  7/30: Pain still around leg wound and hand, no improvement.    7/31: laying in bed, sleepy but arouses, encouraged to participate in rehab  8/1: Laying in bed eating breakfast. Very tearful this AM, stated that she has to make a decision if she wants to continue with the biopsy on her hand. Pt is concerned that there may not be much we can do about the wound on her left hand. Dr Simon from wound care at bedside. Will return this afternoon for biopsy procedure per MD. HD today UF 2L, tolerated well. Denies SOB, CP,N/VD. K+ up this am 5.7.   8/2: Resting in bed. C/o fatigue. Reports improved SOB, remains on oxygen.   08/3: Sitting up in chair, states she feels good today, SOB improved , remains on 2L nasal cannula. Denies CP, N/V/D. Discussed about fluid  "restriction. Pending left hand biopsy results. K+ improved 5.4. States fatigue but due to she recently ambulated with RN, otherwise no complaints. No overnight events, VSS.   08/4: Pt laying in bed. AOX4, HD yesterday tolerated will UF 2800mL. Hypotensive on the last hr of HD reported SBP 89-90's but asymptomatic. BP this am stable. Denies any CP/SOB/N/V/D. Patient on RA. C/o constipation. No overnight events. States she has declined hospice but would like to go to a home care facility.     REVIEW OF SYSTEMS:    10 point ROS reviewed and is as per HPI/daily summary or otherwise negative    PMH/PSH/SH/FH:   Reviewed and unchanged since admission note    CURRENT MEDICATIONS:   Reviewed from admission to present day    VS:  /53   Pulse 83   Temp 36.3 °C (97.3 °F) (Temporal)   Resp 16   Ht 1.626 m (5' 4\")   Wt 63.3 kg (139 lb 8.8 oz)   SpO2 94%   BMI 23.95 kg/m²     Physical Exam  Vitals and nursing note reviewed.   Constitutional:       General: She is not in acute distress.     Appearance: She is ill-appearing.   HENT:      Head: Normocephalic and atraumatic.      Mouth/Throat:      Mouth: Mucous membranes are dry.   Eyes:      General: No scleral icterus.  Cardiovascular:      Rate and Rhythm: Normal rate and regular rhythm.      Pulses: Normal pulses.      Heart sounds: Normal heart sounds. No murmur heard.    No friction rub. No gallop.   Pulmonary:      Effort: Pulmonary effort is normal. No respiratory distress.      Breath sounds: No wheezing or rales.      Comments: Decrease breathsounds Bilateral lower lobes  Abdominal:      General: Bowel sounds are normal. There is no distension.      Palpations: Abdomen is soft.      Tenderness: There is no abdominal tenderness. There is no guarding.   Musculoskeletal:         General: Swelling (left arm) present. No deformity.      Cervical back: Normal range of motion.      Right lower leg: No edema.      Left lower leg: No edema.      Comments: LE edema/LUE " edema. Gauze dressing CDI  LUE AVF + bruit and Thrill     Skin:     General: Skin is warm and dry.      Coloration: Skin is not pale.      Findings: No rash.   Neurological:      Mental Status: She is alert and oriented to person, place, and time.   Psychiatric:         Mood and Affect: Mood normal.         Behavior: Behavior normal.           Fluids:  In: 1490 [P.O.:990; Dialysis:500]  Out: 3300     LABS:  Recent Labs     08/02/22  0444 08/03/22  0054 08/04/22  0332   SODIUM 133* 128* 136   POTASSIUM 5.4 5.4 4.7   CHLORIDE 92* 89* 95*   CO2 26 25 25   GLUCOSE 139* 198* 198*   BUN 33* 45* 30*   CREATININE 3.64* 4.29* 3.42*   CALCIUM 9.0 8.9 8.8     Reviewed.    IMAGING:   All imaging reviewed from admission to present day    IMPRESSION:  # ESRD, dependent on HD              - HD via LUE AVF             - HD qMWF  # Fluid overload, improved             - Secondary to non-compliance with fluid restriction and HD treatments  # Left arm edema, slow improvement              - CTA upper ext 6/26 w/o e/o focal stenosis + extensive edema             - AVF patent on left arm US negative for DVT             - Pt no showed to outpt appt to evaluate              - Vascular does not recommend intervention              - Extensor tendon laceration left hand with wound             - Wound care in place, s/p biopsy 8/1             - Left hand skin biopsy negative for calciphylaxis  # HTN, variable control, stable at this time             - Goal BP < 140/90             - Not on BP meds              - BP at goal  # Anemia of CKD, at goal              - Goal Hgb 10-11             - Iron 27             -TIBC 142             -%Sat 19             - Ferritin 1419  # CKD-MBD             - CCa 9.8             - PO4 4.6             - Vit D 35            - PTH 99.4             - Managed at OP unit             - On sevelamer   # Hyperkalemia            - Correct w/ HD  # DM II--management per primary svc  # Leg Pain--chronic skin changes and  subcutaneous tissue firm to touch             - Please discuss with gen surg for possible skin biopsy             - Vascular does not recommend any intervention   - On ropinirole   # Leukocytosis resolved  # Hyponatremia  - Likely fluid related, manage w/ HD  - Fluid Restriction  - Resolved  # Hypoalbuminuria        PLAN:  - Continue qMWF iHD schedule , No iHD today (Thurs)  - UF as tolerated  - Continue phos binders WM, hold if patient is NPO.  - Continue off of all BP meds and diuretics for now. BP stable during HD.   - Limit sedating meds if possible  - No dietary protein restrictions  - Fluid restriction 1.2 L   - Low potassium diet  - Low K bath on HD   - Dose all meds per ESRD  - Outpt access center appointment rescheduled  - Follow labs in AM   - DC planning underway for SNF       Thank you,

## 2022-08-04 NOTE — CARE PLAN
The patient is Stable - Low risk of patient condition declining or worsening    Shift Goals  Clinical Goals: pain management      Progress made toward(s) clinical / shift goals:  Pt denies pain on Left hand, repositioned and elevated with pillows. Dressing CDI. On routine analgesic.    Problem: Pain - Standard  Goal: Alleviation of pain or a reduction in pain to the patient’s comfort goal  Outcome: Progressing     Problem: Fall Risk  Goal: Patient will remain free from falls  Outcome: Progressing

## 2022-08-04 NOTE — DISCHARGE PLANNING
Agency/Facility Name: [facility]  Spoke To: Larry  Outcome: DPA requesting updated referral response, Larry to review referral and update DPA on response.

## 2022-08-04 NOTE — CARE PLAN
The patient is Stable - Low risk of patient condition declining or worsening    Shift Goals  Clinical Goals: Pain management  Patient Goals: rest, comfort  Family Goals: CHIP    Progress made toward(s) clinical / shift goals:  Pain medication prn, repositioning, wound care, ice/heat to affected area, monitor pain    Patient is not progressing towards the following goals:      Problem: Pain - Standard  Goal: Alleviation of pain or a reduction in pain to the patient’s comfort goal  Outcome: Progressing     Problem: Knowledge Deficit - Standard  Goal: Patient and family/care givers will demonstrate understanding of plan of care, disease process/condition, diagnostic tests and medications  Outcome: Progressing     Problem: Skin Integrity  Goal: Skin integrity is maintained or improved  Outcome: Progressing     Problem: Fall Risk  Goal: Patient will remain free from falls  Outcome: Progressing     Problem: Respiratory  Goal: Patient will achieve/maintain optimum respiratory ventilation and gas exchange  Outcome: Progressing

## 2022-08-04 NOTE — WOUND TEAM
[facility]  Inpatient Services  Wound and Skin Care Evaluation    Admission Date: 2022     Last order of IP CONSULT TO WOUND CARE was found on 2022 from Hospital Encounter on 2022     HPI, PMH, SH: Reviewed    Past Surgical History:   Procedure Laterality Date   • AV FISTULA CREATION Left 2/15/2019    Procedure: AV FISTULA CREATION-  UPPER EXTREMITY BRACHIAL CEPHALIC BANDING;  Surgeon: Fernie Nazario M.D.;  Location: SURGERY [facility];  Service: General   • HIP CANNULATED SCREW Right 2017    Procedure: HIP CANNULATED SCREW;  Surgeon: Ar Huerta M.D.;  Location: SURGERY [facility];  Service:    • CATARACT PHACO WITH IOL  2014    Performed by Rudolph Barclay M.D. at SURGERY SAME DAY [facility] ORS   • CATARACT PHACO WITH IOL  2014    Performed by Rudolph Barclay M.D. at SURGERY SAME DAY [facility] ORS   • VENTRAL HERNIA REPAIR LAPAROSCOPIC  3/7/2012    Performed by HUNTER SERNA at SURGERY SAME DAY [facility] ORS   • APPENDECTOMY     • GYN SURGERY      hysterectomy   • OTHER      T&A   • OTHER ABDOMINAL SURGERY      abd tramua- horse stepped on abd   • OTHER ORTHOPEDIC SURGERY      R & l Shoulder repair     Social History     Tobacco Use   • Smoking status: Former Smoker     Packs/day: 1.00     Years: 20.00     Pack years: 20.00     Types: Cigarettes     Quit date: 3/5/1979     Years since quittin.4   • Smokeless tobacco: Never Used   Substance Use Topics   • Alcohol use: No     Chief Complaint   Patient presents with   • Peripheral Edema   • Weakness     BIBA from home for weakness, nausea, L arm pain  HX of dialysis with L arm fistula, pt states she has missed 3 dialysis days due to being so weak she cannot drive or walk now  3 weeks ago had a procedure done to her fistula which has lead to L arm swelling     Diagnosis: ESRD (end stage renal disease) on dialysis (HCC) [N18.6, Z99.2]    Unit where seen by Wound Team: S626/02     WOUND  CONSULT/FOLLOW UP RELATED TO:  L hand     WOUND HISTORY:  Per HPI: 81 y.o. female with PMHx ESRD on HD MWF, HTN, HLD, and T2DM who was admitted on 6/24/2022 for left upper arm swelling and lower extremity edema after missing HD sessions.  X-rays of the left hand showed only chronic changes.  AV fistula was functioning well.  CTA showed no evidence of central stenosis.  Vascular surgery was consulted and only recommended supportive care such as wrapping the arm with Ace bandage.  She was also found to have severe aortic stenosis for which cardiology was consulted, and gave the opinion that she is not a candidate for intervention.  She was dialyzed while in the hospital with subsequent clinical improvement.  She did have hospital delirium while in the hospital, which has since resolved.  Palliative care was consulted, but patient continued to wish to be full code.  SNF placement was pursued, but proved to be challenging as transfer to dialysis is not always facilitated.  Case management is working on her discharge to SNF and arrangement of hemodialysis.    8/1/22: punch biopsy of hand performed by MD Das.     WOUND ASSESSMENT/LDA          Wound 07/20/22 Soft Tissue Necrosis Hand Left x2 dorsal, x1 palmar (Active)   Wound Image    08/02/22 1500   Site Assessment Red;Tan;Brown    Periwound Assessment Red;Painful;Edema    Margins Unattached edges    Closure None    Drainage Amount Moderate    Drainage Description Purulent    Treatments Site care    Wound Cleansing Approved Wound Cleanser    Periwound Protectant Not Applicable    Dressing Cleansing/Solutions Not Applicable    Dressing Options Adaptic;Hydrofiber Silver;Dry Roll Gauze    Dressing Changed New    Dressing Status Intact    Dressing Change/Treatment Frequency Every 48 hrs, and As Needed    NEXT Dressing Change/Treatment Date 08/06/22    NEXT Weekly Photo (Inpatient Only) 08/10/22    Non-staged Wound Description Full thickness    Shape irregular x3    Wound  Odor Mild    Exposed Structures CHIP           Vascular:    Lab Values:    Lab Results   Component Value Date/Time    WBC 7.5 08/04/2022 03:32 AM    RBC 2.81 (L) 08/04/2022 03:32 AM    HEMOGLOBIN 9.5 (L) 08/04/2022 03:32 AM    HEMATOCRIT 29.8 (L) 08/04/2022 03:32 AM    CREACTPROT 7.07 (H) 06/25/2022 04:46 AM    HBA1C 11.7 (H) 06/24/2022 03:35 AM        Culture Results show:  Recent Results (from the past 720 hour(s))   CULTURE WOUND W/ GRAM STAIN    Collection Time: 07/20/22  3:00 PM    Specimen: Left Hand; Wound   Result Value Ref Range    Significant Indicator POS (POS)     Source WND     Site LEFT HAND     Culture Result - (A)     Gram Stain Result Few WBCs.  Moderate Gram positive cocci.       Culture Result Staphylococcus aureus  Moderate growth   (A)        Susceptibility    Staphylococcus aureus - ADRIANE     Azithromycin <=2 Sensitive mcg/mL     Clindamycin <=0.25 Sensitive mcg/mL     Cefazolin <=8 Sensitive mcg/mL     Cefepime <=4 Sensitive mcg/mL     Ceftaroline <=0.5 Sensitive mcg/mL     Daptomycin <=0.5 Sensitive mcg/mL     Ampicillin/sulbactam <=8/4 Sensitive mcg/mL     Erythromycin <=0.25 Sensitive mcg/mL     Vancomycin 1 Sensitive mcg/mL     Oxacillin 0.5 Sensitive mcg/mL     Pip/Tazobactam <=8 Sensitive mcg/mL     Trimeth/Sulfa <=0.5/9.5 Sensitive mcg/mL     Tetracycline <=4 Sensitive mcg/mL       Pain Level/Medicated:  PO Oxy, Requested IV pain meds, Topical lidocaine not tubed up    INTERVENTIONS BY WOUND TEAM:  Chart and images reviewed. Discussed with bedside RN. All areas of concern (based on picture review, LDA review and discussion with bedside RN) have been thoroughly assessed. Documentation of areas based on significant findings. This RN in to assess patient. Performed standard wound care which includes appropriate positioning, dressing removal and non-selective debridement. Pictures and measurements obtained weekly if/when required.    Preparation for Dressing removal: soaked  "withcleanserremoved.   Non-selectively Debrided with: cleanser and gauze.  Sharp debridement: NA  Bebe wound: Cleansed withcleanser  Primary Dressing: Adaptic layer over wounds then hydrofiber  Secondary (Outer) Dressin\" kerlix and tape         Interdisciplinary consultation: Patient, Bedside RN     EVALUATION / RATIONALE FOR TREATMENT:  Most Recent Date:  : Palmar wound with purulent drainage. Hydrofiber silver strip disintigrating, thus removed and wound cavity cleansed. Dorsal fingers wounds with eschar layer. Pt not tolerating dressing changes, so IV medication requested. Nursing to continue changes per order.      22: punch biopsy performed on 22, first dressing change today. Difficult to remove so applied adaptic non-adherent layer to base of finger wound beds and covered with piece of aquacel ag. Palmar hand with aquacel ag strip tucked into wound and depth. Covered with gauze and roll gauze. Wound team to continue to follow and monitor. Waiting results.     22: L hand does not appear to be healing, change dressing to viscopaste for the soothing properties to assist with pain relief.   And Viscopatch zinc impregnated gauze to encourage re-epithelialization of superficial 100% viable wound bed, and to provide a non-stick wound contact layer.  22: No surgery indicated per Dr. Huerta. There are two wound beds on dorsal surface (1st finger and 4th/5th finger) and one wound over the palmar surface. All wounds with thick yellow drainage that is malodorous. Involved fingers remain erythematous and edematous. Patient reports extreme pain with site care and wound care is difficult despite utilization of lidocaine. Silver powder applied for its antibiotic properties and to address moisture to area due to moderate drainage. Wound team to continue to follow.     : wounds on L hand with fluctuance on dorsal aspect indicating necrosis. X ray results did not indicate bony involvement, thus " recommend CT for possible abscess. At this time, wound unlikely to resolve with local wound care if necrosis advances, recommend surgical evaluation. Wound team will continue to follow.     Goals: Steady decrease in wound area and depth weekly.    WOUND TEAM PLAN OF CARE ([X] for frequency of wound follow up,):   Nursing to follow dressing orders written for wound care. Contact wound team if area fails to progress, deteriorates or with any questions/concerns if something comes up before next scheduled follow up (See below as to whether wound is following and frequency of wound follow up)  Dressing changes by wound team:                   Follow up 3 times weekly:                NPWT change 3 times weekly:     Follow up 1-2 times weekly:  X nursing to perform dsg/skin care; Wound team following.    Follow up Bi-Monthly:                   Follow up as needed:     Other (explain):     NURSING PLAN OF CARE ORDERS (X):  Dressing changes: See Dressing Care orders: x  Skin care: See Skin Care orders: x  RN Prevention Protocol: x  Rectal tube care: See Rectal Tube Care orders:   Other orders:      Anticipated discharge plans: TBD  LTACH:        SNF/Rehab:                  Home Health Care:           Outpatient Wound Center:            Self/Family Care:        Other:                  Vac Discharge Needs:   Not Applicable Pt not on a wound vac:     x  Regular Vac while inpatient, alternative dressing at DC:        Regular Vac in use and continued at DC:            Reg. Vac w/ Skin Sub/Biologic in use. Will need to be changed 2x wkly:      Veraflo Vac while inpatient, ok to transition to Regular Vac on Discharge:           Veraflo Vac while inpatient, will need to remain on Veraflo Vac upon discharge:

## 2022-08-04 NOTE — HOSPICE
Pt declining hospice services at this time.  She does want nursing home placement since she knows she needs assistance with ADL's.

## 2022-08-04 NOTE — PROGRESS NOTES
Pt is alert and oriented x2, on room air. Declined pain on L hand. On routine analgesic. Bilateral legs swelling with scabs noted, elevated with pillows. Fall precautions in place.

## 2022-08-05 NOTE — DISCHARGE PLANNING
Medical Social Work  Voice message from Eleonora Greer, spouse is on Hospice with [facility]    Also daughter's email is [email or web address]    SW emailed Addie requesting a call to discuss patient's discharge.

## 2022-08-05 NOTE — CARE PLAN
Problem: Pain - Standard  Goal: Alleviation of pain or a reduction in pain to the patient’s comfort goal  Outcome: Progressing  Note: Prn medications given. Resting comfortably       Problem: Skin Integrity  Goal: Skin integrity is maintained or improved  Outcome: Progressing  Note: No new skin issues noted     Problem: Respiratory  Goal: Patient will achieve/maintain optimum respiratory ventilation and gas exchange  Outcome: Progressing   The patient is Stable - Low risk of patient condition declining or worsening    Shift Goals  Clinical Goals: pain mgmt  Patient Goals: rest, comfort  Family Goals: CHIP    Progress made toward(s) clinical / shift goals:  yes    Patient is not progressing towards the following goals:

## 2022-08-05 NOTE — DISCHARGE PLANNING
Medical Social Work  [employer]  and  out to see patient.     Sw told that spouse is at [facility], at this time rehab but this may change to Hospice they believe. Their agency has tried to get family screened for Medicaid. But when asked about income the numbers change all the time.  Also patient's daughter lives on the property, provides care for her parents.  OSCAR provided an updated phone number for her [phone]    PC to [phone], the number you are dialing is not taking phone calls at this time.

## 2022-08-05 NOTE — PROGRESS NOTES
Hemodialysis ordered by Dr. King. Treatment started at 0826 and ended at 1126. Pt stable, vss, no c/o post tx. Net UF 1.0 L d/t low B/P during tx. Dr. King notified and aware. Report to KRUPA Ashton RN. [facility] avf dsg cdi.

## 2022-08-05 NOTE — PROGRESS NOTES
[facility] -  PROGRESS NOTE               Author: THERESE Jeffers Date & Time: 8/5/2022  3:19 PM     HPI:  81yoF with PMH significant for ESRD on HD MWF via left arm AVF, HTN, DM II, Anemia secondary to CKD, CKD Bone mineral disorder, admitted with increased edema and weakness and having missed her last last 2-3 outpt HD treatments. Pt is very lethargic at this time and unable to provide much history, majority of the history was obtained through review of the medical record and discussion with primary svc. Pt had reported increased LE edema and fatigue and weakness and worsening of her left arm edema so she came to the ED for evaluation. She apparently has missed her last 2-3 outpt dialysis treatments. Per regular outpt Nephrologist Dr. Gates, she has been non-compliant with her fluid restriction and was told that she needed 4x/week dialysis until her volume status could be optimized but she was non-compliant with that recommendation. She has edema of her left arm where her AVF is located and there is concern for a central stenosis that could be the etiology. She had an appointment at the outpt access center to evaluate for central stenosis and undergo angioplasty if needed on 6/9/22 but pt did not go to the appointment. She has not had any other procedures done to the arm in the past couple of months. She had a left arm us done today that showed no DVT and patent AVF and soft tissue edema. She apparently received pain medication fentanyl and dilaudid as well as benadryl earlier today and she is very drowsy.      DAILY NEPHROLOGY SUMMARY:  6/25: rapid response overnight for severe leg pain, pt very lethargic today, moans with palpation of legs, arouses but does not answer questions and falls back asleep, tolerated HD yest with 2.5L UF, BP stable overnight but low this am  6/26: No events, BP low overnight and this am, more alert, reports pain in legs for the past 3  weeks, denies any CP/SOB/Abd pain, reports left arm edema a little better but no pain in left arm   6/27: Pt resting in bed, subdued, Bps soft, on 4L supp O2 via NC, labs reviewed, due for HD  6/28: pt laying in bed, sleepy, c/o pain in legs, moaning, vascular assessed and does not recommend any intervention on legs or AVF, tolerated iHD yesterday with UF:2.8L  6/29: Pt in bed, fatigued, no complaints.  VSS 1L NC.    6/30: no new complaints, no overnight events. Tolerated hd yesterday, UF 3.5L , she is in negative balance.   7/1: Resting in bed, no complaints.  VSS 2L NC. No new labs today.   7/2: Pt sitting up in bed, confused, CNA helping her with breakfast meal, iHD yesterday with 1.5L net UF limited by muscle cramping, labs reviewed, VSS on 1L NC O2  7/3: Pt slumped in bed, somnolent, medical floor status, VSS on RA  7/4: No new overnight renal events. S/p HD yesterday and tolerated well. Net UF was 1.5 L.  7/5: S/p HD yesterday with net UF of 2 L. No new overnight renal events.   7/6: No new overnight renal events.   7/7: S/p HD yesterday with net UF of 2.2L and tolerated well. No new overnight renal events.   7/8: No new overnight renal events.   7/9: S/p HD yesterday with net UF of 3L and tolerated well. No new overnight renal events.   7/10: No new overnight renal events. K+ 6.9 this am.  7/11: No events, HD yest for hyperkalemia, K 5.9 this am, denies any CP/SOB but reports leg pain, BP stable, seen and examined on HD this am--VSS see dialysis flowsheet for full details  7/12: No events, awake and alert, c/o bilateral leg pain, denies any CP/SOB, BP stable  7/13: No events, seen on dialysis this am, still c/o leg pain, denies any CP/SOB, BP stable  7/14: No events, feels tired, c/o leg pain, denies any CP/SOB, tolerated HD yest with 2L UF  7/15: No events, BP stable, seen on HD, still complaining of leg pain, no CP/SOB  7/16: No events, tolerated HD yest with 3L UF, BP stable, complaining of soft stools and  left arm pain and leg pain, no CP/SOB/Abd pain  7/17: No overnight events, VSS no CP SOB worsening edema. Na 131 K 5.2, 2L NC.  Nurse reports she was fatigued and disoriented this morning, but has improved.  Currently arouses easily, is oriented.   7/18: Pt seen on HD, says her back hurts, VSS on 2L NC O2, K+ 5.8 this AM  7/19: Pt sitting up in bed. No overnight events. Tolerated HD yesterday with 3L UF, BP stable. K this am better 4.2. No CP/N/V/D.   7/20: Seen during HD, tolerated well. VSS on 2L NC. Denies SOB, N/V/D. Stated left lower hand pain, open blisters noted, says also her back hurts.  7/21: HD yest UF 3L. Dtr at bedside. Pt c/o significant pain/burning in left hand.  7/22: seen on iHd, continues with + edema in LUE, + edema BLE edema  7/23: HD yesterday 3 liter uf  7/24: Decreased vision in R eye, but chornic, worse edema in left arm  7/25: Sleeping on HD.  Comfortable.  Left arm edema  7/26: Denies pain or SOB.  Comfortable.    7/27: seen on iHD, pleasantly confused, LUE edema improved   7/28: Denies pain or SOB.  Slight bleeding around fistula but family had pulled off dressing  7/29: Complaining of hand pain still.  No SOB.  States sat in chair yesterday.  7/30: Pain still around leg wound and hand, no improvement.    7/31: laying in bed, sleepy but arouses, encouraged to participate in rehab  8/1: Laying in bed eating breakfast. Very tearful this AM, stated that she has to make a decision if she wants to continue with the biopsy on her hand. Pt is concerned that there may not be much we can do about the wound on her left hand. Dr Simon from wound care at bedside. Will return this afternoon for biopsy procedure per MD. HD today UF 2L, tolerated well. Denies SOB, CP,N/VD. K+ up this am 5.7.   8/2: Resting in bed. C/o fatigue. Reports improved SOB, remains on oxygen.   08/3: Sitting up in chair, states she feels good today, SOB improved , remains on 2L nasal cannula. Denies CP, N/V/D. Discussed about  "fluid restriction. Pending left hand biopsy results. K+ improved 5.4. States fatigue but due to she recently ambulated with RN, otherwise no complaints. No overnight events, VSS.   08/4: Pt laying in bed. AOX4, HD yesterday tolerated will UF 2800mL. Hypotensive on the last hr of HD reported SBP 89-90's but asymptomatic. BP this am stable. Denies any CP/SOB/N/V/D. Patient on RA. C/o constipation. No overnight events. States she has declined hospice but would like to go to a home care facility.   8/5: iHD today, net UF 1L.  No resting comfortably no complaints. VSS    REVIEW OF SYSTEMS:    10 point ROS reviewed and is as per HPI/daily summary or otherwise negative    PMH/PSH/SH/FH:   Reviewed and unchanged since admission note    CURRENT MEDICATIONS:   Reviewed from admission to present day    VS:  BP (!) 142/64   Pulse 91   Temp (!) 35.6 °C (96 °F) (Temporal)   Resp 18   Ht 1.626 m (5' 4\")   Wt 63.3 kg (139 lb 8.8 oz)   SpO2 94%   BMI 23.95 kg/m²     Physical Exam  Vitals and nursing note reviewed.   Constitutional:       General: She is not in acute distress.     Appearance: She is ill-appearing.   HENT:      Head: Normocephalic and atraumatic.      Mouth/Throat:      Mouth: Mucous membranes are dry.   Eyes:      General: No scleral icterus.  Cardiovascular:      Rate and Rhythm: Normal rate and regular rhythm.      Pulses: Normal pulses.      Heart sounds: Normal heart sounds. No murmur heard.    No friction rub. No gallop.   Pulmonary:      Effort: Pulmonary effort is normal. No respiratory distress.      Breath sounds: No wheezing or rales.      Comments: Decrease breathsounds Bilateral lower lobes  Abdominal:      General: Bowel sounds are normal. There is no distension.      Palpations: Abdomen is soft.      Tenderness: There is no abdominal tenderness. There is no guarding.   Musculoskeletal:         General: No swelling (left arm) or deformity.      Cervical back: Normal range of motion.      Right " lower leg: No edema.      Left lower leg: No edema.      Comments:   LUE AVF + bruit and Thrill     Skin:     General: Skin is warm and dry.      Coloration: Skin is not pale.      Findings: No rash.   Neurological:      Mental Status: She is alert and oriented to person, place, and time.   Psychiatric:         Mood and Affect: Mood normal.         Behavior: Behavior normal.           Fluids:  In: 165 [P.O.:165]  Out: -     LABS:  Recent Labs     08/03/22  0054 08/04/22  0332 08/05/22  0240   SODIUM 128* 136 134*   POTASSIUM 5.4 4.7 5.6*   CHLORIDE 89* 95* 94*   CO2 25 25 24   GLUCOSE 198* 198* 139*   BUN 45* 30* 46*   CREATININE 4.29* 3.42* 4.26*   CALCIUM 8.9 8.8 9.3     Reviewed.    IMAGING:   All imaging reviewed from admission to present day    IMPRESSION:  # ESRD, dependent on HD              - HD via LUE AVF             - HD qMWF  # Fluid overload, improved             - Secondary to non-compliance with fluid restriction and HD treatments  # Left arm edema, slow improvement              - CTA upper ext 6/26 w/o e/o focal stenosis + extensive edema             - AVF patent on left arm US negative for DVT             - Pt no showed to outpt appt to evaluate              - Vascular does not recommend intervention              - Extensor tendon laceration left hand with wound             - Wound care in place, s/p biopsy 8/1             - Left hand skin biopsy negative for calciphylaxis  # HTN, variable control, stable at this time             - Goal BP < 140/90             - Not on BP meds              - BP at goal  # Anemia of CKD, at goal              - Goal Hgb 10-11             - Iron 27             -TIBC 142             -%Sat 19             - Ferritin 1419  # CKD-MBD             - CCa 9.8             - PO4 4.7             - Vit D 35            - PTH 99.4             - Managed at OP unit             - On sevelamer   # Hyperkalemia            - Correct w/ HD  # DM II--management per primary svc  # Leg  Pain--chronic skin changes and subcutaneous tissue firm to touch             - Please discuss with gen surg for possible skin biopsy             - Vascular does not recommend any intervention   - On ropinirole   # Leukocytosis resolved  # Hyponatremia  - Likely fluid related, manage w/ HD  - Fluid Restriction  - Resolved  # Hypoalbuminuria        PLAN:  - Continue qMWF iHD schedule , Next iHD MON   - UF as tolerated  - Continue phos binders WM, hold if patient is NPO.  - Continue off of all BP meds and diuretics for now. BP stable during HD.   - Limit sedating meds if possible  - No dietary protein restrictions  - Fluid restriction 1.2 L   - Low potassium diet  - Low K bath on HD   - Dose all meds per ESRD  - Outpt access center appointment rescheduled  - Follow labs in AM   - DC planning underway for SNF       Thank you,

## 2022-08-05 NOTE — CARE PLAN
The patient is Stable - Low risk of patient condition declining or worsening    Shift Goals  Clinical Goals: Pain management  Patient Goals: rest, comfort  Family Goals: CHIP    Progress made toward(s) clinical / shift goals:  Pain medication prn, repositioning, lidocaine patches, monitor pain    Patient is not progressing towards the following goals:      Problem: Pain - Standard  Goal: Alleviation of pain or a reduction in pain to the patient’s comfort goal  Outcome: Progressing     Problem: Knowledge Deficit - Standard  Goal: Patient and family/care givers will demonstrate understanding of plan of care, disease process/condition, diagnostic tests and medications  Outcome: Progressing     Problem: Skin Integrity  Goal: Skin integrity is maintained or improved  Outcome: Progressing     Problem: Fall Risk  Goal: Patient will remain free from falls  Outcome: Progressing     Problem: Respiratory  Goal: Patient will achieve/maintain optimum respiratory ventilation and gas exchange  Outcome: Progressing

## 2022-08-05 NOTE — PROGRESS NOTES
Hospital Medicine TWICE WEEKLY Progress Note    Date of Service  8/5/2022    Chief Complaint  Edema    Hospital Course  81 y.o. female with PMHx ESRD on HD MWF, HTN, HLD, and T2DM who was admitted on 6/24/2022 for left upper arm swelling and lower extremity edema after missing HD sessions.  X-rays of the left hand showed only chronic changes.  AV fistula was functioning well.  CTA showed no evidence of central stenosis.  Vascular surgery was consulted and only recommended supportive care such as wrapping the arm with Ace bandage.  She was also found to have severe aortic stenosis for which cardiology was consulted, and gave the opinion that she is not a candidate for intervention.  She was dialyzed while in the hospital with subsequent clinical improvement.  She did have hospital delirium while in the hospital, which has since resolved.  She also had worsening wound on her left hand, but x-ray showed no acute abnormalities.  CT was done, but orthopedics gave the opinion that she does not have any fluid collection or abscess requiring surgical debridement.  Showed she is started on antibiotics.  Palliative care was consulted, but patient continued to wish to be full code.  SNF placement was pursued, but proved to be challenging as transfer to dialysis is not always facilitated.  Case management is working on her discharge to SNF and arrangement of hemodialysis.    Interval Problem Update  8/5/2022 - I reviewed the patient's chart. There were no significant overnight events. Remains hemodynamically stable and afebrile. Stable on RA. K 5.6. Na 134. Crea 4.26.  Pathology of the skin biopsy from the left hand did not show evidence of calciphylaxis or malignancy, and only showed acute inflammation and necrosis with scattered multinucleated giant cells.  Goals of care was discussed by my colleague with her, and she expressed that she would like to talk to someone about hospice care, and thus hospice referral for further  discussion was placed, however she declined hospice services after hospice spoke with her.    > I have personally seen and examined the patient today.  She is comfortably lying in bed, easily arousable, conversant, and coherent.  Answers questions appropriately.  She states she feels good today.  Her hand actually feels good and not painful.  I again discussed with her about difficulty finding placement due to her dialysis, and discussed her goals of care.  She states she likes dialysis as it makes her feel good, and would want to continue it.  I again suggested the idea of transitioning her to comfort care, but states she wants to continue what she is receiving right now with regards to medical management.      I discussed plan of care with bedside RN.      Consultants/Specialty  cardiology, nephrology and vascular surgery    Code Status  Full Code    Disposition  Patient is medically cleared for discharge.   Anticipate discharge to to skilled nursing facility.  I have placed the appropriate orders for post-discharge needs.    Review of Systems  ROS     Pertinent positives/negatives as mentioned above.     A complete review of systems was personally done by me. All other systems were negative.        Physical Exam  Temp:  [36.2 °C (97.1 °F)-36.8 °C (98.2 °F)] 36.2 °C (97.1 °F)  Pulse:  [77-87] 77  Resp:  [16-18] 16  BP: (109-143)/(45-78) 143/78  SpO2:  [93 %-94 %] 93 %    Physical Exam  Vitals reviewed.   Constitutional:       General: She is not in acute distress.     Appearance: Normal appearance. She is not ill-appearing.   HENT:      Head: Normocephalic and atraumatic.      Nose: No congestion.   Eyes:      General:         Right eye: No discharge.         Left eye: No discharge.      Pupils: Pupils are equal, round, and reactive to light.   Cardiovascular:      Rate and Rhythm: Normal rate and regular rhythm.      Pulses: Normal pulses.      Heart sounds: Murmur heard.   Pulmonary:      Effort: Pulmonary  effort is normal. No respiratory distress.      Breath sounds: Normal breath sounds. No stridor.   Abdominal:      General: Bowel sounds are normal. There is no distension.      Palpations: Abdomen is soft.      Tenderness: There is no abdominal tenderness.   Musculoskeletal:         General: Swelling, tenderness and deformity present. Normal range of motion.      Cervical back: Normal range of motion. No rigidity.   Skin:     General: Skin is warm.      Capillary Refill: Capillary refill takes less than 2 seconds.      Coloration: Skin is not jaundiced or pale.      Findings: Erythema and lesion present. No bruising.      Comments: Dressing present on left hand CDI   Neurological:      General: No focal deficit present.      Mental Status: She is alert and oriented to person, place, and time.      Cranial Nerves: No cranial nerve deficit.   Psychiatric:         Mood and Affect: Mood normal.         Behavior: Behavior normal.         I have performed the physical examination today 8/5/2022.  In review of last note, there are no new changes except as documented above.      Fluids    Intake/Output Summary (Last 24 hours) at 8/5/2022 0733  Last data filed at 8/5/2022 0400  Gross per 24 hour   Intake 165 ml   Output --   Net 165 ml       Laboratory  Recent Labs     08/04/22  0332   WBC 7.5   RBC 2.81*   HEMOGLOBIN 9.5*   HEMATOCRIT 29.8*   .0*   MCH 33.8*   MCHC 31.9*   RDW 71.5*   PLATELETCT 217   MPV 8.7*     Recent Labs     08/03/22  0054 08/04/22  0332 08/05/22  0240   SODIUM 128* 136 134*   POTASSIUM 5.4 4.7 5.6*   CHLORIDE 89* 95* 94*   CO2 25 25 24   GLUCOSE 198* 198* 139*   BUN 45* 30* 46*   CREATININE 4.29* 3.42* 4.26*   CALCIUM 8.9 8.8 9.3                   Imaging  CT-HAND W/O LEFT   Final Result      1.  No acute fracture or dislocation.      2.  Previous distal ulnar fracture identified.      3.  Osteoarthritis is most prominent at the first carpometacarpal joint.      4.  No bone erosions  identified.      5.  Fluid collection or soft tissue abscess is identified.      6.  Induration in subcutaneous fat and intramuscular fat planes is noted which could be due to edema or inflammation such as cellulitis.      DX-HAND 3+ LEFT   Final Result      1. No acute fracture or subluxation.   2. Chronic posttraumatic changes in the distal left radius and ulna, stable when compared with the prior study.   3. Stable osteoarthritis involving the left first carpometacarpal joint.      DX-HAND 3+ LEFT   Final Result      1.  No acute fracture or dislocation.   2.  Scattered mild degenerative changes, increased from prior study. No definite erosive arthropathy.   3.  Osteopenia.   4.  Old ulnar styloid process fracture.      DX-CHEST-PORTABLE (1 VIEW)   Final Result      1.  Probable 12 mm right mid/upper lung zone mass. Suggest CT chest without contrast for further assessment      2.  Interstitial pulmonary edema or fibrosis      3.  Enlarged cardiac silhouette      4.  Left pleural effusion      CT-CTA UPPER EXT WITH & W/O-POST PROCESS LEFT   Final Result      1.  Status post brachiocephalic fistula in the left arm. No focal stenosis is identified.      2.  Extensive edema in the subcutaneous tissues of the visualized left lateral chest and arm.      EC-ECHOCARDIOGRAM COMPLETE W/O CONT   Final Result      US-ZAHRAA SINGLE LEVEL BILAT   Final Result      US-EXTREMITY ARTERY LOWER BILAT   Final Result      CT-EXTREMITY, UPPER W/O LEFT   Final Result      1.  There is diffuse subcutaneous edema of the imaged portions of the left upper extremity. There is also diffuse body wall edema in the visualized portions of the chest and abdomen.   2.  No focal fluid collection to suggest abscess.   3.  There is a small left pleural effusion and minimal fluid in the abdomen.   4.  There is postsurgical change of AV fistula. There is small vessel atherosclerosis.      US-EXTREMITY VENOUS UPPER UNILAT LEFT   Final Result       DX-CHEST-PORTABLE (1 VIEW)   Final Result         1.  Interstitial pulmonary parenchymal prominence suggest chronic underlying lung disease, component of interstitial edema and/or infiltrates not excluded.   2.  Small left pleural effusion   3.  Cardiomegaly   4.  Atherosclerosis           Assessment/Plan  * ESRD (end stage renal disease) on dialysis (HCC)- (present on admission)  Assessment & Plan  - Continue hemodialysis per nephrology.  -Continue sevelamer and calcitriol.    Edema of left upper extremity- (present on admission)  Assessment & Plan  - Chronic, at the same site of AV fistula which is functioning well.  -Multifactorial: Poor EF, lymphedema, outflow stenosis.  CTA showed no evidence of central stenosis.  Vascular surgery recommended supportive care with wrapping arm with Ace bandage.  -Continue to monitor.    Delirium  Assessment & Plan  - Resolved.    Aortic stenosis, severe- (present on admission)  Assessment & Plan  - Not a candidate for intervention per cardiology.  -Continue to monitor volume status.    Pain in both lower extremities- (present on admission)  Assessment & Plan  - Chronic.  Continue as needed pain medications.    Advance care planning  Assessment & Plan  She is declining hospice services.  I again discussed with her about goals of care, and she states she wants to continue current management that she is receiving, and wants to continue with hemodialysis.    Generalized weakness- (present on admission)  Assessment & Plan  - SNF placement being pursued.  Placement challenging due to need for transportation back and forth to hemodialysis.  CM/SW on board.    Extensor tendon laceration of left hand with open wound  Assessment & Plan  She has been having worsening of her wound on her left hand. COmpleted course of ancef. Now, hand wound healing well, no further signs of infection.X-ray did not show any acute abnormalities.  CT scan of her left hand and it did not show any fluid  collection.  Skin biopsy showed no evidence of calciphylaxis or malignancy, and only showed acute inflammation.  Wound care service following.  Continue wound care.      Hypothyroidism- (present on admission)  Assessment & Plan  - Continue Synthroid.    Diabetes mellitus, type II (HCC)- (present on admission)  Assessment & Plan  - Last HbA1c was 11.7.  Continue Lantus 5 units at night, along with sliding scale insulin coverage.  Continue Accu-Cheks before meals and at bedtime. Goal to keep BG between 140-180 per 2019 ADA guidelines.        Essential hypertension- (present on admission)  Assessment & Plan  - Maintaining good blood pressure control, despite holding home antihypertensives.  Continue to monitor blood pressure trend closely.             VTE prophylaxis: heparin ppx    I have performed a physical exam and reviewed and updated ROS and Plan today (8/5/2022). In review of last note, there are no changes except as documented above.

## 2022-08-06 NOTE — PROGRESS NOTES
Report received and assumed care of patient. Patient sitting up in bed with no distress noted. Call light in reach and safety measures in place.

## 2022-08-06 NOTE — CARE PLAN
The patient is Stable - Low risk of patient condition declining or worsening    Shift Goals  Clinical Goals: painmanagement  Patient Goals: rest, comfort  Family Goals: CHIP    Progress made toward(s) clinical / shift goals:  y  Problem: Pain - Standard  Goal: Alleviation of pain or a reduction in pain to the patient’s comfort goal  Outcome: Progressing  Note: Pain well controlled with PRN medications       Problem: Skin Integrity  Goal: Skin integrity is maintained or improved  Outcome: Progressing  Note: Wound care to be completed 8/6. Waffle mattress overlay in place     Problem: Respiratory  Goal: Patient will achieve/maintain optimum respiratory ventilation and gas exchange  Outcome: Progressing       Patient is not progressing towards the following goals:

## 2022-08-06 NOTE — CARE PLAN
Problem: Pain - Standard  Goal: Alleviation of pain or a reduction in pain to the patient’s comfort goal  Outcome: Progressing     Problem: Knowledge Deficit - Standard  Goal: Patient and family/care givers will demonstrate understanding of plan of care, disease process/condition, diagnostic tests and medications  Outcome: Progressing     Problem: Skin Integrity  Goal: Skin integrity is maintained or improved  Outcome: Progressing     Problem: Fall Risk  Goal: Patient will remain free from falls  Outcome: Progressing     Problem: Respiratory  Goal: Patient will achieve/maintain optimum respiratory ventilation and gas exchange  Outcome: Progressing   The patient is Stable - Low risk of patient condition declining or worsening    Shift Goals  Clinical Goals: painmanagement  Patient Goals: rest, comfort  Family Goals: CHIP    Progress made toward(s) clinical / shift goals:  yes    Patient is not progressing towards the following goals:

## 2022-08-06 NOTE — PROGRESS NOTES
[facility] -  PROGRESS NOTE               Author: THERESE Mukherjee Date & Time: 8/6/2022  1:03 PM     HPI:  [id number] with PMH significant for ESRD on HD MWF via left arm AVF, HTN, DM II, Anemia secondary to CKD, CKD Bone mineral disorder, admitted with increased edema and weakness and having missed her last last 2-3 outpt HD treatments. Pt is very lethargic at this time and unable to provide much history, majority of the history was obtained through review of the medical record and discussion with primary svc. Pt had reported increased LE edema and fatigue and weakness and worsening of her left arm edema so she came to the ED for evaluation. She apparently has missed her last 2-3 outpt dialysis treatments. Per regular outpt Nephrologist Dr. Gates, she has been non-compliant with her fluid restriction and was told that she needed 4x/week dialysis until her volume status could be optimized but she was non-compliant with that recommendation. She has edema of her left arm where her AVF is located and there is concern for a central stenosis that could be the etiology. She had an appointment at the outpt access center to evaluate for central stenosis and undergo angioplasty if needed on 6/9/22 but pt did not go to the appointment. She has not had any other procedures done to the arm in the past couple of months. She had a left arm us done today that showed no DVT and patent AVF and soft tissue edema. She apparently received pain medication fentanyl and dilaudid as well as benadryl earlier today and she is very drowsy.     DAILY NEPHROLOGY SUMMARY:  **See previous note from 7/31/22 to review prior daily nephrology summary entries.  8/1: Laying in bed eating breakfast. Very tearful this AM, stated that she has to make a decision if she wants to continue with the biopsy on her hand. Pt is concerned that there may not be much we can do about the wound on her left hand. Dr Simon from wound care  "at bedside. Will return this afternoon for biopsy procedure per MD. HD today UF 2L, tolerated well. Denies SOB, CP,N/VD. K+ up this am 5.7.   8/2: Resting in bed. C/o fatigue. Reports improved SOB, remains on oxygen.   08/3: Sitting up in chair, states she feels good today, SOB improved , remains on 2L nasal cannula. Denies CP, N/V/D. Discussed about fluid restriction. Pending left hand biopsy results. K+ improved 5.4. States fatigue but due to she recently ambulated with RN, otherwise no complaints. No overnight events, VSS.   08/4: Pt laying in bed. AOX4, HD yesterday tolerated will UF 2800mL. Hypotensive on the last hr of HD reported SBP 89-90's but asymptomatic. BP this am stable. Denies any CP/SOB/N/V/D. Patient on RA. C/o constipation. No overnight events. States she has declined hospice but would like to go to a home care facility.   8/5: iHD today, net UF 1L.  No resting comfortably no complaints. VSS  8/6: Had iHD yesterday with 1 L UF.  Currently sleeping, but awakens easily and has no new complaints. VSS.     REVIEW OF SYSTEMS:    10 point ROS reviewed and is as per HPI/daily summary or otherwise negative    PMH/PSH/SH/FH: Reviewed and unchanged since admission note  CURRENT MEDICATIONS: Reviewed from admission to present day    VS:  /59   Pulse 90   Temp 36.7 °C (98.1 °F) (Temporal)   Resp 16   Ht 1.626 m (5' 4\")   Wt 63.3 kg (139 lb 8.8 oz)   SpO2 93%   BMI 23.95 kg/m²   Physical Exam  Vitals and nursing note reviewed.   Constitutional:       General: She is not in acute distress.     Appearance: She is ill-appearing.   HENT:      Head: Normocephalic and atraumatic.      Mouth/Throat:      Mouth: Mucous membranes are dry.   Eyes:      General: No scleral icterus.  Cardiovascular:      Rate and Rhythm: Normal rate and regular rhythm.      Pulses: Normal pulses.      Heart sounds: Normal heart sounds. No murmur heard.    No friction rub. No gallop.   Pulmonary:      Effort: Pulmonary effort " is normal. No respiratory distress.      Breath sounds: No wheezing or rales.      Comments: Decrease breathsounds Bilateral lower lobes  Abdominal:      General: Bowel sounds are normal. There is no distension.      Palpations: Abdomen is soft.      Tenderness: There is no abdominal tenderness. There is no guarding.   Musculoskeletal:         General: No swelling (left arm) or deformity.      Cervical back: Normal range of motion.      Right lower leg: Trace edema.      Left lower leg: Trace edema.      Comments:   LUE AVF + bruit and Thrill   Skin:     General: Skin is warm and dry.      Coloration: Skin is not pale.      Findings: No rash.   Neurological:      Mental Status: She is alert and oriented to person, place, and time.   Psychiatric:         Mood and Affect: Mood normal.         Behavior: Behavior normal      Fluids:  In: 500 [Dialysis:500]  Out: 1500     LABS:  Recent Labs     08/04/22  0332 08/05/22  0240 08/06/22  0627   SODIUM 136 134* 135   POTASSIUM 4.7 5.6* 4.9   CHLORIDE 95* 94* 96   CO2 25 24 26   GLUCOSE 198* 139* 126*   BUN 30* 46* 37*   CREATININE 3.42* 4.26* 3.86*   CALCIUM 8.8 9.3 9.0       IMPRESSION:  # ESRD, dependent on HD              - HD via LUE AVF             - HD qMWF  # Fluid overload, improved             - Secondary to non-compliance with fluid restriction and HD treatments  # Left arm edema, slow improvement              - CTA upper ext 6/26 w/o e/o focal stenosis + extensive edema             - AVF patent on left arm US negative for DVT             - Pt no showed to outpt appt to evaluate              - Vascular does not recommend intervention              - Extensor tendon laceration left hand with wound             - Wound care in place, s/p biopsy 8/1             - Left hand skin biopsy negative for calciphylaxis  # HTN, variable control, stable at this time             - Goal BP < 140/90             - Not on BP meds              - BP at goal  # Anemia of CKD, at goal               - Goal Hgb 10-11             - Iron 27             -TIBC 142             -%Sat 19             - Ferritin 1419  # CKD-MBD             - CCa 9.8             - PO4 4.7             - Vit D 35            - PTH 99.4             - Managed at OP unit             - On sevelamer with meals  # Hyperkalemia            - Correct w/ HD  # DM II--management per primary svc  # Leg Pain--chronic skin changes and subcutaneous tissue firm to touch             - Please discuss with gen surg for possible skin biopsy             - Vascular does not recommend any intervention             - On ropinirole   # Leukocytosis resolved  # Hyponatremia  - Likely fluid related, manage w/ HD  - Fluid Restriction  - Resolved  # Hypoalbuminuria       PLAN:  - Continue qMWF iHD schedule , Next iHD due MON   - UF as tolerated  - Continue phos binders WM, hold if patient is NPO.  - Continue off of all BP meds and diuretics for now. BP stable during HD.   - Limit sedating meds if possible  - No dietary protein restrictions  - Fluid restriction 1.2 L   - Low potassium diet  - Low K bath on HD   - Dose all meds per ESRD  - Outpt access center appointment rescheduled  - Follow labs  - DC planning underway for SNF         Thank you,

## 2022-08-07 NOTE — CARE PLAN
The patient is Stable - Low risk of patient condition declining or worsening    Shift Goals  Clinical Goals: painmanagement  Patient Goals: rest, comfort  Family Goals: CHIP    Progress made toward(s) clinical / shift goals:    Problem: Pain - Standard  Goal: Alleviation of pain or a reduction in pain to the patient’s comfort goal  Outcome: Progressing     Problem: Skin Integrity  Goal: Skin integrity is maintained or improved  Outcome: Progressing     Problem: Knowledge Deficit - Standard  Goal: Patient and family/care givers will demonstrate understanding of plan of care, disease process/condition, diagnostic tests and medications  Outcome: Progressing     Problem: Fall Risk  Goal: Patient will remain free from falls  Outcome: Progressing       Patient is not progressing towards the following goals:

## 2022-08-07 NOTE — PROGRESS NOTES
[facility] -  PROGRESS NOTE               Author: THERESE Mukherjee Date & Time: 8/7/2022  2:01 PM     HPI:  [id number] with PMH significant for ESRD on HD MWF via left arm AVF, HTN, DM II, Anemia secondary to CKD, CKD Bone mineral disorder, admitted with increased edema and weakness and having missed her last last 2-3 outpt HD treatments. Pt is very lethargic at this time and unable to provide much history, majority of the history was obtained through review of the medical record and discussion with primary svc. Pt had reported increased LE edema and fatigue and weakness and worsening of her left arm edema so she came to the ED for evaluation. She apparently has missed her last 2-3 outpt dialysis treatments. Per regular outpt Nephrologist Dr. Gates, she has been non-compliant with her fluid restriction and was told that she needed 4x/week dialysis until her volume status could be optimized but she was non-compliant with that recommendation. She has edema of her left arm where her AVF is located and there is concern for a central stenosis that could be the etiology. She had an appointment at the outpt access center to evaluate for central stenosis and undergo angioplasty if needed on 6/9/22 but pt did not go to the appointment. She has not had any other procedures done to the arm in the past couple of months. She had a left arm us done today that showed no DVT and patent AVF and soft tissue edema. She apparently received pain medication fentanyl and dilaudid as well as benadryl earlier today and she is very drowsy.     DAILY NEPHROLOGY SUMMARY:  **See previous note from 7/31/22 to review prior daily nephrology summary entries.  8/1: Laying in bed eating breakfast. Very tearful this AM, stated that she has to make a decision if she wants to continue with the biopsy on her hand. Pt is concerned that there may not be much we can do about the wound on her left hand. Dr Simon from wound care  "at bedside. Will return this afternoon for biopsy procedure per MD. HD today UF 2L, tolerated well. Denies SOB, CP,N/VD. K+ up this am 5.7.   8/2: Resting in bed. C/o fatigue. Reports improved SOB, remains on oxygen.   08/3: Sitting up in chair, states she feels good today, SOB improved , remains on 2L nasal cannula. Denies CP, N/V/D. Discussed about fluid restriction. Pending left hand biopsy results. K+ improved 5.4. States fatigue but due to she recently ambulated with RN, otherwise no complaints. No overnight events, VSS.   08/4: Pt laying in bed. AOX4, HD yesterday tolerated will UF 2800mL. Hypotensive on the last hr of HD reported SBP 89-90's but asymptomatic. BP this am stable. Denies any CP/SOB/N/V/D. Patient on RA. C/o constipation. No overnight events. States she has declined hospice but would like to go to a home care facility.   8/5: iHD today, net UF 1L.  No resting comfortably no complaints. VSS  8/6: Had iHD yesterday with 1 L UF.  Currently sleeping, but awakens easily and has no new complaints. VSS.   8/7: Lying in bed sleeping, awakens and has no new complaints, just fatigue. VSS. Due for iHD tomorrow.    REVIEW OF SYSTEMS:    10 point ROS reviewed and is as per HPI/daily summary or otherwise negative    PMH/PSH/SH/FH: Reviewed and unchanged since admission note  CURRENT MEDICATIONS: Reviewed from admission to present day    VS:  /51   Pulse 80   Temp 36.6 °C (97.8 °F) (Temporal)   Resp 18   Ht 1.626 m (5' 4\")   Wt 63.3 kg (139 lb 8.8 oz)   SpO2 98%   BMI 23.95 kg/m²   Physical Exam  Vitals and nursing note reviewed.   Constitutional:       General: She is not in acute distress.     Appearance: She is ill-appearing.   HENT:      Head: Normocephalic and atraumatic.      Mouth/Throat:      Mouth: Mucous membranes are dry.   Eyes:      General: No scleral icterus.  Cardiovascular:      Rate and Rhythm: Normal rate and regular rhythm.      Pulses: Normal pulses.      Heart sounds: Normal " heart sounds. No murmur heard.    No friction rub. No gallop.   Pulmonary:      Effort: Pulmonary effort is normal. No respiratory distress.      Breath sounds: No wheezing or rales.      Comments: Decrease breathsounds Bilateral lower lobes  Abdominal:      General: Bowel sounds are normal. There is no distension.      Palpations: Abdomen is soft.      Tenderness: There is no abdominal tenderness. There is no guarding.   Musculoskeletal:         General: No swelling (left arm) or deformity.      Cervical back: Normal range of motion.      Right lower leg: Trace edema.      Left lower leg: Trace edema.      Comments:   LUE AVF + bruit and Thrill   Skin:     General: Skin is warm and dry.      Coloration: Skin is not pale.      Findings: No rash.   Neurological:      Mental Status: She is alert and oriented to person, place, and time.   Psychiatric:         Mood and Affect: Mood normal.         Behavior: Behavior normal    Fluids:  In: 840 [P.O.:840]  Out: -     LABS:  Recent Labs     08/05/22  0240 08/06/22  0627 08/07/22  0107   SODIUM 134* 135 136   POTASSIUM 5.6* 4.9 5.3   CHLORIDE 94* 96 95*   CO2 24 26 26   GLUCOSE 139* 126* 172*   BUN 46* 37* 48*   CREATININE 4.26* 3.86* 4.79*   CALCIUM 9.3 9.0 9.0       IMPRESSION:  # ESRD, dependent on HD              - HD via LUE AVF             - HD qMWF  # Fluid overload, improved             - Secondary to non-compliance with fluid restriction and HD treatments  # Left arm edema, slow improvement              - CTA upper ext 6/26 w/o e/o focal stenosis + extensive edema             - AVF patent on left arm US negative for DVT             - Pt no showed to outpt appt to evaluate              - Vascular does not recommend intervention              - Extensor tendon laceration left hand with wound             - Wound care in place, s/p biopsy 8/1             - Left hand skin biopsy negative for calciphylaxis  # HTN, variable control, stable at this time             - Goal  BP < 140/90             - Not on BP meds              - BP at goal  # Anemia of CKD, at goal              - Goal Hgb 10-11             - Iron 27             -TIBC 142             -%Sat 19             - Ferritin 1419  # CKD-MBD             - CCa 9.8             - PO4 4.7             - Vit D 35            - PTH 99.4             - Managed at OP unit             - On sevelamer with meals  # Hyperkalemia            - Correct w/ HD  # DM II--management per primary svc  # Leg Pain--chronic skin changes and subcutaneous tissue firm to touch             - Please discuss with gen surg for possible skin biopsy             - Vascular does not recommend any intervention             - On ropinirole   # Leukocytosis resolved  # Hyponatremia  - Likely fluid related, manage w/ HD  - Fluid Restriction  - Resolved  # Hypoalbuminemia  - No dietary protein restrictions    PLAN:  - Continue qMWF iHD schedule, Next iHD due tomorrow (MON)   - UF as tolerated  - Continue phos binders WM, hold if patient is NPO.  - Continue off of all BP meds and diuretics for now. BP stable during HD.   - Limit sedating meds if possible  - No dietary protein restrictions  - Fluid restriction 1.2 L   - Low potassium diet  - Dose all meds per ESRD  - Outpt access center appointment rescheduled  - Follow labs  - DC planning underway for [facility]         Thank you,

## 2022-08-07 NOTE — WOUND TEAM
[facility]  Inpatient Services  Wound and Skin Care Progress Note    Admission Date: 2022     Last order of IP CONSULT TO WOUND CARE was found on 2022 from Hospital Encounter on 2022     HPI, PMH, SH: Reviewed    Past Surgical History:   Procedure Laterality Date   • AV FISTULA CREATION Left 2/15/2019    Procedure: AV FISTULA CREATION-  UPPER EXTREMITY BRACHIAL CEPHALIC BANDING;  Surgeon: Fernie Nazario M.D.;  Location: SURGERY [facility];  Service: General   • HIP CANNULATED SCREW Right 2017    Procedure: HIP CANNULATED SCREW;  Surgeon: Ar Huerta M.D.;  Location: SURGERY [facility];  Service:    • CATARACT PHACO WITH IOL  2014    Performed by Rudolph Barclay M.D. at SURGERY SAME DAY [facility] ORS   • CATARACT PHACO WITH IOL  2014    Performed by Rudolph Barclay M.D. at SURGERY SAME DAY [facility] ORS   • VENTRAL HERNIA REPAIR LAPAROSCOPIC  3/7/2012    Performed by HUNTER SERNA at SURGERY SAME DAY [facility] ORS   • APPENDECTOMY     • GYN SURGERY      hysterectomy   • OTHER      T&A   • OTHER ABDOMINAL SURGERY      abd tramua- horse stepped on abd   • OTHER ORTHOPEDIC SURGERY      R & l Shoulder repair     Social History     Tobacco Use   • Smoking status: Former Smoker     Packs/day: 1.00     Years: 20.00     Pack years: 20.00     Types: Cigarettes     Quit date: 3/5/1979     Years since quittin.4   • Smokeless tobacco: Never Used   Substance Use Topics   • Alcohol use: No     Chief Complaint   Patient presents with   • Peripheral Edema   • Weakness     BIBA from home for weakness, nausea, L arm pain  HX of dialysis with L arm fistula, pt states she has missed 3 dialysis days due to being so weak she cannot drive or walk now  3 weeks ago had a procedure done to her fistula which has lead to L arm swelling     Diagnosis: ESRD (end stage renal disease) on dialysis (HCC) [N18.6, Z99.2]    Unit where seen by Wound Team: S626/02     WOUND  CONSULT/FOLLOW UP RELATED TO:  L hand     WOUND HISTORY:  Per HPI: 81 y.o. female with PMHx ESRD on HD MWF, HTN, HLD, and T2DM who was admitted on 6/24/2022 for left upper arm swelling and lower extremity edema after missing HD sessions.  X-rays of the left hand showed only chronic changes.  AV fistula was functioning well.  CTA showed no evidence of central stenosis.  Vascular surgery was consulted and only recommended supportive care such as wrapping the arm with Ace bandage.  She was also found to have severe aortic stenosis for which cardiology was consulted, and gave the opinion that she is not a candidate for intervention.  She was dialyzed while in the hospital with subsequent clinical improvement.  She did have hospital delirium while in the hospital, which has since resolved.  Palliative care was consulted, but patient continued to wish to be full code.  SNF placement was pursued, but proved to be challenging as transfer to dialysis is not always facilitated.  Case management is working on her discharge to SNF and arrangement of hemodialysis.    8/1/22: punch biopsy of hand performed by MD Das.     WOUND ASSESSMENT/LDA          Wound 07/20/22 Soft Tissue Necrosis Hand Left x2 dorsal, x1 palmar (Active)   Wound Image    08/02/22 1500   Site Assessment Pink;Purple;Yellow;Black 08/07/22 1200   Periwound Assessment Red;Induration;Clean;Dry 08/07/22 1200   Margins Unattached edges;Undefined edges 08/07/22 1200   Closure Secondary intention 08/07/22 1200   Drainage Amount Small 08/07/22 1200   Drainage Description Purulent;Sanguineous 08/07/22 1200   Treatments Cleansed;Irrigation;Site care 08/07/22 1200   Wound Cleansing Approved Wound Cleanser 08/07/22 1200   Periwound Protectant Not Applicable 08/07/22 1200   Dressing Cleansing/Solutions Not Applicable 08/07/22 1200   Dressing Options Hydrofiber Silver;Adaptic;Dry Gauze;Dry Roll Gauze 08/07/22 1200   Dressing Changed Changed 08/07/22 1200   Dressing  Status Clean;Dry;Intact 08/07/22 1200   Dressing Change/Treatment Frequency Every 48 hrs, and As Needed 08/07/22 1200   NEXT Dressing Change/Treatment Date 08/09/22 08/07/22 1200   NEXT Weekly Photo (Inpatient Only) 08/10/22 08/06/22 0500   Non-staged Wound Description Full thickness 08/03/22 0700   Shape irregular 08/07/22 1200   Wound Odor Mild 08/07/22 1200   Exposed Structures Tendon 08/07/22 1200   WOUND NURSE ONLY - Time Spent with Patient (mins) 30 08/07/22 1200          Vascular:    Lab Values:    Lab Results   Component Value Date/Time    WBC 7.5 08/04/2022 03:32 AM    RBC 2.81 (L) 08/04/2022 03:32 AM    HEMOGLOBIN 9.5 (L) 08/04/2022 03:32 AM    HEMATOCRIT 29.8 (L) 08/04/2022 03:32 AM    CREACTPROT 7.07 (H) 06/25/2022 04:46 AM    HBA1C 11.7 (H) 06/24/2022 03:35 AM        Culture Results show:  Recent Results (from the past 720 hour(s))   CULTURE WOUND W/ GRAM STAIN    Collection Time: 07/20/22  3:00 PM    Specimen: Left Hand; Wound   Result Value Ref Range    Significant Indicator POS (POS)     Source WND     Site LEFT HAND     Culture Result - (A)     Gram Stain Result Few WBCs.  Moderate Gram positive cocci.       Culture Result Staphylococcus aureus  Moderate growth   (A)        Susceptibility    Staphylococcus aureus - ADRIANE     Azithromycin <=2 Sensitive mcg/mL     Clindamycin <=0.25 Sensitive mcg/mL     Cefazolin <=8 Sensitive mcg/mL     Cefepime <=4 Sensitive mcg/mL     Ceftaroline <=0.5 Sensitive mcg/mL     Daptomycin <=0.5 Sensitive mcg/mL     Ampicillin/sulbactam <=8/4 Sensitive mcg/mL     Erythromycin <=0.25 Sensitive mcg/mL     Vancomycin 1 Sensitive mcg/mL     Oxacillin 0.5 Sensitive mcg/mL     Pip/Tazobactam <=8 Sensitive mcg/mL     Trimeth/Sulfa <=0.5/9.5 Sensitive mcg/mL     Tetracycline <=4 Sensitive mcg/mL       Pain Level/Medicated:  PO pain meds    INTERVENTIONS BY WOUND TEAM:  Chart and images reviewed. Discussed with bedside RN. All areas of concern (based on picture review, LDA review  "and discussion with bedside RN) have been thoroughly assessed. Documentation of areas based on significant findings. This RN in to assess patient. Performed standard wound care which includes appropriate positioning, dressing removal and non-selective debridement. Pictures and measurements obtained weekly if/when required.    Preparation for Dressing removal: soaked with NS.   Non-selectively Debrided with: wound cleanser and gauze.  Sharp debridement: NA  Bebe wound: Cleansed with wound cleanser and gauze  Primary Dressing: Adaptic layer over wounds then hydrofiber tor dorsal wounds, palmar wound has induration and erythema to the surrounding tissue, tucked into the wound bed of the palmar hand.   Secondary (Outer) Dressin\" kerlix and tape         Interdisciplinary consultation: Patient, Bedside RN     EVALUATION / RATIONALE FOR TREATMENT:  Most Recent Date:  22: exposed tendon to the left pointer finger dorsal, eschar covering majority of the wound but at the proximal portion some tendon exposure.  Continuing with Aquacel Ag, awaiting punch biopsy results.   : Palmar wound with purulent drainage. Hydrofiber silver strip disintigrating, thus removed and wound cavity cleansed. Dorsal fingers wounds with eschar layer. Pt not tolerating dressing changes, so IV medication requested. Nursing to continue changes per order.  22: punch biopsy performed on 22, first dressing change today. Difficult to remove so applied adaptic non-adherent layer to base of finger wound beds and covered with piece of aquacel ag. Palmar hand with aquacel ag strip tucked into wound and depth. Covered with gauze and roll gauze. Wound team to continue to follow and monitor. Waiting results.   22: L hand does not appear to be healing, change dressing to viscopaste for the soothing properties to assist with pain relief.   And Viscopatch zinc impregnated gauze to encourage re-epithelialization of superficial 100% viable " wound bed, and to provide a non-stick wound contact layer.  7/23/22: No surgery indicated per Dr. Huerta. There are two wound beds on dorsal surface (1st finger and 4th/5th finger) and one wound over the palmar surface. All wounds with thick yellow drainage that is malodorous. Involved fingers remain erythematous and edematous. Patient reports extreme pain with site care and wound care is difficult despite utilization of lidocaine. Silver powder applied for its antibiotic properties and to address moisture to area due to moderate drainage. Wound team to continue to follow.   7/20: wounds on L hand with fluctuance on dorsal aspect indicating necrosis. X ray results did not indicate bony involvement, thus recommend CT for possible abscess. At this time, wound unlikely to resolve with local wound care if necrosis advances, recommend surgical evaluation. Wound team will continue to follow.     Goals: Steady decrease in wound area and depth weekly.    WOUND TEAM PLAN OF CARE ([X] for frequency of wound follow up,):   Nursing to follow dressing orders written for wound care. Contact wound team if area fails to progress, deteriorates or with any questions/concerns if something comes up before next scheduled follow up (See below as to whether wound is following and frequency of wound follow up)  Dressing changes by wound team:                   Follow up 3 times weekly:                NPWT change 3 times weekly:     Follow up 1-2 times weekly:  X nursing to perform dsg/skin care; Wound team following.    Follow up Bi-Monthly:                   Follow up as needed:     Other (explain):     NURSING PLAN OF CARE ORDERS (X):  Dressing changes: See Dressing Care orders: x  Skin care: See Skin Care orders: x  RN Prevention Protocol: x  Rectal tube care: See Rectal Tube Care orders:   Other orders:      Anticipated discharge plans: TBD  LTACH:        SNF/Rehab:                  Home Health Care:           Outpatient Wound  Center:            Self/Family Care:        Other:                  Vac Discharge Needs:   Not Applicable Pt not on a wound vac:     x  Regular Vac while inpatient, alternative dressing at DC:        Regular Vac in use and continued at DC:            Reg. Vac w/ Skin Sub/Biologic in use. Will need to be changed 2x wkly:      Veraflo Vac while inpatient, ok to transition to Regular Vac on Discharge:           Veraflo Vac while inpatient, will need to remain on Veraflo Vac upon discharge:

## 2022-08-07 NOTE — CARE PLAN
Problem: Pain - Standard  Goal: Alleviation of pain or a reduction in pain to the patient’s comfort goal  Outcome: Met     Problem: Knowledge Deficit - Standard  Goal: Patient and family/care givers will demonstrate understanding of plan of care, disease process/condition, diagnostic tests and medications  Outcome: Met     Problem: Skin Integrity  Goal: Skin integrity is maintained or improved  Outcome: Met     Problem: Fall Risk  Goal: Patient will remain free from falls  Outcome: Met     Problem: Respiratory  Goal: Patient will achieve/maintain optimum respiratory ventilation and gas exchange  Outcome: Met   The patient is Stable - Low risk of patient condition declining or worsening    Shift Goals  Clinical Goals: pain control  Patient Goals: rest  Family Goals: CHIP    Progress made toward(s) clinical / shift goals: Today this pt has had some stated pain in which she was medicated and has had stable VS. She stated that wound care came and changed her dressing this afternoon. She otherwise has been awaiting for the next plan.

## 2022-08-08 NOTE — CARE PLAN
The patient is Stable - Low risk of patient condition declining or worsening    Shift Goals  Clinical Goals: Pain management  Patient Goals: rest  Family Goals: CHIP    Progress made toward(s) clinical / shift goals:  Pain medication prn, repositioning, ice/heat packs, monitor pain    Patient is not progressing towards the following goals:      Problem: Pain - Standard  Goal: Alleviation of pain or a reduction in pain to the patient’s comfort goal  Outcome: Progressing

## 2022-08-08 NOTE — THERAPY
Physical Therapy Contact Note    Patient Name: Jannet Bruno  Age:  81 y.o., Sex:  female  Medical Record #: [id number]  Today's Date: 8/8/2022 08/08/22 0821   Interdisciplinary Plan of Care Collaboration   Collaboration Comments PT tx attempted, pt in dialysis.  Will re-attempt as able/appropriate.     Raya Gonzalez, PT, DPT

## 2022-08-08 NOTE — DISCHARGE PLANNING
Case Management Discharge Planning    Admission Date: 6/24/2022  GMLOS: 4.3  ALOS: 45    6-Clicks ADL Score: 15  6-Clicks Mobility Score: 8  PT and/or OT Eval ordered: Yes  Post-acute Referrals Ordered: Yes  Post-acute Choice Obtained: Yes  Has referral(s) been sent to post-acute provider:  Yes      Anticipated Discharge Dispo: Discharge Disposition: D/T to SNF with Medicare cert in anticipation of skilled care (03)    DME Needed: No    Action(s) Taken: OTHER     SNF follow up:    1) Call to [facility]  [phone]. Spoke with Larry in intake.  He request updated referral sent and he will call once he has reviewed. Referral resent via epic.        2) Call to  [facility] - [phone].  Spoke with Eleonora in intake.  Advised I have sent updated clinicals and she reports she will review and advise.         Escalations Completed: None    Medically Clear: No    Next Steps: Follow up for SNF acceptance.     Barriers to Discharge: Medical clearance and Pending Placement    Is the patient up for discharge tomorrow: No

## 2022-08-08 NOTE — PROGRESS NOTES
[facility] -  PROGRESS NOTE               Author: THERESE Fragoso Date & Time: 8/8/2022  10:33 AM     HPI:  81yoF with PMH significant for ESRD on HD MWF via left arm AVF, HTN, DM II, Anemia secondary to CKD, CKD Bone mineral disorder, admitted with increased edema and weakness and having missed her last last 2-3 outpt HD treatments. Pt is very lethargic at this time and unable to provide much history, majority of the history was obtained through review of the medical record and discussion with primary svc. Pt had reported increased LE edema and fatigue and weakness and worsening of her left arm edema so she came to the ED for evaluation. She apparently has missed her last 2-3 outpt dialysis treatments. Per regular outpt Nephrologist Dr. Gates, she has been non-compliant with her fluid restriction and was told that she needed 4x/week dialysis until her volume status could be optimized but she was non-compliant with that recommendation. She has edema of her left arm where her AVF is located and there is concern for a central stenosis that could be the etiology. She had an appointment at the outpt access center to evaluate for central stenosis and undergo angioplasty if needed on 6/9/22 but pt did not go to the appointment. She has not had any other procedures done to the arm in the past couple of months. She had a left arm us done today that showed no DVT and patent AVF and soft tissue edema. She apparently received pain medication fentanyl and dilaudid as well as benadryl earlier today and she is very drowsy.     DAILY NEPHROLOGY SUMMARY:  **See previous note from 7/31/22 to review prior daily nephrology summary entries.  8/1: Laying in bed eating breakfast. Very tearful this AM, stated that she has to make a decision if she wants to continue with the biopsy on her hand. Pt is concerned that there may not be much we can do about the wound on her left hand. Dr Simon from wound  "care at bedside. Will return this afternoon for biopsy procedure per MD. HD today UF 2L, tolerated well. Denies SOB, CP,N/VD. K+ up this am 5.7.   8/2: Resting in bed. C/o fatigue. Reports improved SOB, remains on oxygen.   08/3: Sitting up in chair, states she feels good today, SOB improved , remains on 2L nasal cannula. Denies CP, N/V/D. Discussed about fluid restriction. Pending left hand biopsy results. K+ improved 5.4. States fatigue but due to she recently ambulated with RN, otherwise no complaints. No overnight events, VSS.   08/4: Pt laying in bed. AOX4, HD yesterday tolerated will UF 2800mL. Hypotensive on the last hr of HD reported SBP 89-90's but asymptomatic. BP this am stable. Denies any CP/SOB/N/V/D. Patient on RA. C/o constipation. No overnight events. States she has declined hospice but would like to go to a home care facility.   8/5: iHD today, net UF 1L.  No resting comfortably no complaints. VSS  8/6: Had iHD yesterday with 1 L UF.  Currently sleeping, but awakens easily and has no new complaints. VSS.   8/7: Lying in bed sleeping, awakens and has no new complaints, just fatigue. VSS. Due for iHD tomorrow.  8/8: Seen on HD this am. No acute distress. VSS.     REVIEW OF SYSTEMS:    10 point ROS reviewed and is as per HPI/daily summary or otherwise negative    PMH/PSH/SH/FH: Reviewed and unchanged since admission note  CURRENT MEDICATIONS: Reviewed from admission to present day    VS:  /60   Pulse 78   Temp 36.4 °C (97.6 °F) (Temporal)   Resp 18   Ht 1.626 m (5' 4\")   Wt 63.3 kg (139 lb 8.8 oz)   SpO2 93%   BMI 23.95 kg/m²   Physical Exam  Vitals and nursing note reviewed.   Constitutional:       General: She is not in acute distress.     Appearance: She is ill-appearing.   HENT:      Head: Normocephalic and atraumatic.      Mouth/Throat:      Mouth: Mucous membranes are dry.   Eyes:      General: No scleral icterus.  Cardiovascular:      Rate and Rhythm: Normal rate and regular rhythm. "      Pulses: Normal pulses.      Heart sounds: Normal heart sounds. No murmur heard.    No friction rub. No gallop.   Pulmonary:      Effort: Pulmonary effort is normal. No respiratory distress.      Breath sounds: No wheezing or rales.      Comments: Decrease breathsounds Bilateral lower lobes  Abdominal:      General: Bowel sounds are normal. There is no distension.      Palpations: Abdomen is soft.      Tenderness: There is no abdominal tenderness. There is no guarding.   Musculoskeletal:         General: No swelling (left arm) or deformity.      Cervical back: Normal range of motion.      Right lower leg: Trace edema.      Left lower leg: Trace edema.      Comments:   LUE AVF + bruit and Thrill   Skin:     General: Skin is warm and dry.      Coloration: Skin is not pale.      Findings: No rash.   Neurological:      Mental Status: She is alert and oriented to person, place, and time.   Psychiatric:         Mood and Affect: Mood normal.         Behavior: Behavior normal    Fluids:  In: 1110 [P.O.:1110]  Out: -     LABS:  Recent Labs     08/06/22  0627 08/07/22  0107 08/08/22  0104   SODIUM 135 136 133*   POTASSIUM 4.9 5.3 5.4   CHLORIDE 96 95* 90*   CO2 26 26 26   GLUCOSE 126* 172* 172*   BUN 37* 48* 61*   CREATININE 3.86* 4.79* 6.05*   CALCIUM 9.0 9.0 9.1       IMPRESSION:  # ESRD, dependent on HD              - HD via LUE AVF             - HD qMWF  # Fluid overload, improved             - Secondary to non-compliance with fluid restriction and HD treatments  # Left arm edema, slow improvement              - CTA upper ext 6/26 w/o e/o focal stenosis + extensive edema             - AVF patent on left arm US negative for DVT             - Pt no showed to outpt appt to evaluate              - Vascular does not recommend intervention              - Extensor tendon laceration left hand with wound             - Wound care in place, s/p biopsy 8/1             - Left hand skin biopsy negative for calciphylaxis  # HTN,  variable control, stable at this time             - Goal BP < 140/90             - Not on BP meds              - BP at goal  # Anemia of CKD, at goal              - Goal Hgb 10-11             - Iron 27             -TIBC 142             -%Sat 19             - Ferritin 1419  # CKD-MBD             - CCa 9.8             - PO4 4.7             - Vit D 35            - PTH 99.4             - Managed at OP unit             - On sevelamer with meals  # Hyperkalemia            - Correct w/ HD  # DM II--management per primary svc  # Leg Pain--chronic skin changes and subcutaneous tissue firm to touch             - Please discuss with gen surg for possible skin biopsy             - Vascular does not recommend any intervention             - On ropinirole   # Leukocytosis resolved  # Hyponatremia  - Likely fluid related, manage w/ HD  - Fluid Restriction  - Resolved  # Hypoalbuminemia  - No dietary protein restrictions    PLAN:  - Continue qMWF iHD schedule, Next iHD due today (MON)   - UF as tolerated  - Continue phos binders WM, hold if patient is NPO.  - Continue off of all BP meds and diuretics for now. BP stable during HD.   - Limit sedating meds if possible  - No dietary protein restrictions  - Fluid restriction 1.2 L   - Low potassium diet  - Dose all meds per ESRD  - Outpt access center appointment rescheduled  - Follow labs  - DC planning underway for [facility]         Thank you,

## 2022-08-08 NOTE — CARE PLAN
The patient is Stable - Low risk of patient condition declining or worsening    Shift Goals  Clinical Goals: comfort  Patient Goals: pt will be able to rest and sleep comfortably  Family Goals: CHIP    Progress made toward(s) clinical / shift goals:      Patient is not progressing towards the following goals:

## 2022-08-08 NOTE — PROGRESS NOTES
Alert and oriented x4. Offered dinner tray, consumed 75 to 100% of the meal. Safety precautions in placed. Bed in lowest position. Upper side rails up. Treaded socks on. Reinforced the use of call light when needing assistance. Bed alarm is on.

## 2022-08-08 NOTE — PROGRESS NOTES
[facility] Services Progress Note      HD today x 3 hours per Dr. SIMON Peres.   Initiated at 0830 and ended at 1130.   Patient assessed prior tx.  Alert and oriented x 3-4. Sleepy but able to answer questions and follows commands. On room air. 1:1 supervision and assistance while eating. Generalized edema    UF Net: 2,500 mL    Patient tolerated treatment. Had low BP , soft 's systolic on the last hour of treatment, patient denies lightheadedness. VSS post dialysis    Blood returned. Applied gauze and held KINGA AVF site for 10 minutes. Verified no bleeding post treatment. Bruit and thrill present post dialysis.   Instructions given to Primary RN that if bleeding occurs on the AVF site, change dressing and held the site with pressure.       Report given to Primary KRUPA Ashton RN.

## 2022-08-08 NOTE — THERAPY
Attempted to see pt for OT tx. Per chart, pt currently in dialysis. Will try again later as able.

## 2022-08-09 NOTE — CARE PLAN
The patient is Stable - Low risk of patient condition declining or worsening    Shift Goals  Clinical Goals: Pain managemet  Patient Goals: Rest and comfort  Family Goals: CHIP    Progress made toward(s) clinical / shift goals:  Pt repositioned for comfort. Pt was informed of prn pain meds, declined.     Problem: Pain - Standard  Goal: Alleviation of pain or a reduction in pain to the patient’s comfort goal  Outcome: Progressing

## 2022-08-09 NOTE — PROGRESS NOTES
Pt A&O x4. Pt requested ice chips and received cup of ice, educated on fluid restriction. Safety precautions in place. Bed in lowest position, upper side rails up. Treaded socks on. Reinforced use of call light when needing assistance. Pt resting with call light within reach.

## 2022-08-09 NOTE — PROGRESS NOTES
Hospital Medicine TWICE WEEKLY Progress Note    Date of Service  8/9/2022    Chief Complaint  Edema    Hospital Course  81 y.o. female with PMHx ESRD on HD MWF, HTN, HLD, and T2DM who was admitted on 6/24/2022 for left upper arm swelling and lower extremity edema after missing HD sessions.  X-rays of the left hand showed only chronic changes.  AV fistula was functioning well.  CTA showed no evidence of central stenosis.  Vascular surgery was consulted and only recommended supportive care such as wrapping the arm with Ace bandage.  She was also found to have severe aortic stenosis for which cardiology was consulted, and gave the opinion that she is not a candidate for intervention.  She was dialyzed while in the hospital with subsequent clinical improvement.  She did have hospital delirium while in the hospital, which has since resolved.  She also had worsening wound on her left hand, but x-ray showed no acute abnormalities.  CT was done, but orthopedics gave the opinion that she does not have any fluid collection or abscess requiring surgical debridement. Pathology of the skin biopsy from the left hand did not show evidence of calciphylaxis or malignancy, and only showed acute inflammation and necrosis with scattered multinucleated giant cells.   She is started on antibiotics.  Palliative care was consulted, but patient continued to wish to be full code.  SNF placement was pursued, but proved to be challenging as transfer to dialysis is not always facilitated.  Case management is working on her discharge to SNF and arrangement of hemodialysis.    Interval Problem Update  8/9/2022 - I reviewed the patient's chart. There were no significant overnight events. Remains hemodynamically stable and afebrile. Stable on RA.  BMP from yesterday consistent with her ESRD.    > I have personally seen and examined the patient today.  Asleep, easily arousable.  Comfortable.  Not in distress.  Denies any pain.  No new  complaints.      I discussed plan of care with bedside RN.      Consultants/Specialty  cardiology, nephrology and vascular surgery    Code Status  Full Code    Disposition  Patient is medically cleared for discharge.   Anticipate discharge to to skilled nursing facility.  I have placed the appropriate orders for post-discharge needs.    Review of Systems  ROS     Pertinent positives/negatives as mentioned above.     A complete review of systems was personally done by me. All other systems were negative.        Physical Exam  Temp:  [35.9 °C (96.7 °F)-36.6 °C (97.8 °F)] 36.6 °C (97.8 °F)  Pulse:  [74-78] 75  Resp:  [17-18] 17  BP: (112-135)/(43-60) 135/52  SpO2:  [93 %-98 %] 98 %    Physical Exam  Vitals reviewed.   Constitutional:       General: She is not in acute distress.     Appearance: Normal appearance. She is not ill-appearing.   HENT:      Head: Normocephalic and atraumatic.      Nose: No congestion.   Eyes:      General:         Right eye: No discharge.         Left eye: No discharge.      Pupils: Pupils are equal, round, and reactive to light.   Cardiovascular:      Rate and Rhythm: Normal rate and regular rhythm.      Pulses: Normal pulses.      Heart sounds: Murmur heard.   Pulmonary:      Effort: Pulmonary effort is normal. No respiratory distress.      Breath sounds: Normal breath sounds. No stridor.   Abdominal:      General: Bowel sounds are normal. There is no distension.      Palpations: Abdomen is soft.      Tenderness: There is no abdominal tenderness.   Musculoskeletal:         General: Swelling, tenderness and deformity present. Normal range of motion.      Cervical back: Normal range of motion. No rigidity.   Skin:     General: Skin is warm.      Capillary Refill: Capillary refill takes less than 2 seconds.      Coloration: Skin is not jaundiced or pale.      Findings: Erythema and lesion present. No bruising.      Comments: Dressing present on left hand CDI   Neurological:      General: No  focal deficit present.      Mental Status: She is alert and oriented to person, place, and time.      Cranial Nerves: No cranial nerve deficit.   Psychiatric:         Mood and Affect: Mood normal.         Behavior: Behavior normal.         I have performed the physical examination today 8/9/2022.  In review of last note, there are no new changes except as documented above.      Fluids    Intake/Output Summary (Last 24 hours) at 8/9/2022 0706  Last data filed at 8/8/2022 2131  Gross per 24 hour   Intake 1005 ml   Output 3000 ml   Net -1995 ml       Laboratory  Recent Labs     08/08/22  0104   WBC 8.3   RBC 2.86*   HEMOGLOBIN 9.7*   HEMATOCRIT 30.4*   .3*   MCH 33.9*   MCHC 31.9*   RDW 72.9*   PLATELETCT 246   MPV 8.7*     Recent Labs     08/07/22  0107 08/08/22  0104   SODIUM 136 133*   POTASSIUM 5.3 5.4   CHLORIDE 95* 90*   CO2 26 26   GLUCOSE 172* 172*   BUN 48* 61*   CREATININE 4.79* 6.05*   CALCIUM 9.0 9.1                   Imaging  CT-HAND W/O LEFT   Final Result      1.  No acute fracture or dislocation.      2.  Previous distal ulnar fracture identified.      3.  Osteoarthritis is most prominent at the first carpometacarpal joint.      4.  No bone erosions identified.      5.  Fluid collection or soft tissue abscess is identified.      6.  Induration in subcutaneous fat and intramuscular fat planes is noted which could be due to edema or inflammation such as cellulitis.      DX-HAND 3+ LEFT   Final Result      1. No acute fracture or subluxation.   2. Chronic posttraumatic changes in the distal left radius and ulna, stable when compared with the prior study.   3. Stable osteoarthritis involving the left first carpometacarpal joint.      DX-HAND 3+ LEFT   Final Result      1.  No acute fracture or dislocation.   2.  Scattered mild degenerative changes, increased from prior study. No definite erosive arthropathy.   3.  Osteopenia.   4.  Old ulnar styloid process fracture.      DX-CHEST-PORTABLE (1 VIEW)    Final Result      1.  Probable 12 mm right mid/upper lung zone mass. Suggest CT chest without contrast for further assessment      2.  Interstitial pulmonary edema or fibrosis      3.  Enlarged cardiac silhouette      4.  Left pleural effusion      CT-CTA UPPER EXT WITH & W/O-POST PROCESS LEFT   Final Result      1.  Status post brachiocephalic fistula in the left arm. No focal stenosis is identified.      2.  Extensive edema in the subcutaneous tissues of the visualized left lateral chest and arm.      EC-ECHOCARDIOGRAM COMPLETE W/O CONT   Final Result      US-ZAHRAA SINGLE LEVEL BILAT   Final Result      US-EXTREMITY ARTERY LOWER BILAT   Final Result      CT-EXTREMITY, UPPER W/O LEFT   Final Result      1.  There is diffuse subcutaneous edema of the imaged portions of the left upper extremity. There is also diffuse body wall edema in the visualized portions of the chest and abdomen.   2.  No focal fluid collection to suggest abscess.   3.  There is a small left pleural effusion and minimal fluid in the abdomen.   4.  There is postsurgical change of AV fistula. There is small vessel atherosclerosis.      US-EXTREMITY VENOUS UPPER UNILAT LEFT   Final Result      DX-CHEST-PORTABLE (1 VIEW)   Final Result         1.  Interstitial pulmonary parenchymal prominence suggest chronic underlying lung disease, component of interstitial edema and/or infiltrates not excluded.   2.  Small left pleural effusion   3.  Cardiomegaly   4.  Atherosclerosis           Assessment/Plan  * ESRD (end stage renal disease) on dialysis (HCC)- (present on admission)  Assessment & Plan  - Continue hemodialysis per nephrology.  -Continue sevelamer and calcitriol.    Edema of left upper extremity- (present on admission)  Assessment & Plan  - Chronic, at the same site of AV fistula which is functioning well.  -Multifactorial: Poor EF, lymphedema, outflow stenosis.  CTA showed no evidence of central stenosis.  Vascular surgery recommended supportive  care with wrapping arm with Ace bandage.  -Continue to monitor.    Delirium  Assessment & Plan  - Resolved.    Aortic stenosis, severe- (present on admission)  Assessment & Plan  - Not a candidate for intervention per cardiology.  -Continue to monitor volume status.    Pain in both lower extremities- (present on admission)  Assessment & Plan  - Chronic.  Continue as needed pain medications.    Advance care planning  Assessment & Plan  She is declining hospice services.  I again discussed with her about goals of care, and she states she wants to continue current management that she is receiving, and wants to continue with hemodialysis.    Generalized weakness- (present on admission)  Assessment & Plan  - SNF placement being pursued.  Placement challenging due to need for transportation back and forth to hemodialysis.  CM/SW on board.    Extensor tendon laceration of left hand with open wound  Assessment & Plan  She has been having worsening of her wound on her left hand. COmpleted course of ancef. Now, hand wound healing well, no further signs of infection.X-ray did not show any acute abnormalities.  CT scan of her left hand and it did not show any fluid collection.  Skin biopsy showed no evidence of calciphylaxis or malignancy, and only showed acute inflammation.  Wound care service following.  Continue wound care.      Hypothyroidism- (present on admission)  Assessment & Plan  - Continue Synthroid.    Diabetes mellitus, type II (HCC)- (present on admission)  Assessment & Plan  - Last HbA1c was 11.7.  Continue Lantus 5 units at night, along with sliding scale insulin coverage.  Continue Accu-Cheks before meals and at bedtime. Goal to keep BG between 140-180 per 2019 ADA guidelines.        Essential hypertension- (present on admission)  Assessment & Plan  - Maintaining good blood pressure control, despite holding home antihypertensives.  Continue to monitor blood pressure trend closely.             VTE prophylaxis:  heparin ppx    I have performed a physical exam and reviewed and updated ROS and Plan today (8/9/2022). In review of last note, there are no changes except as documented above.

## 2022-08-09 NOTE — PROGRESS NOTES
[facility] -  PROGRESS NOTE               Author: THERESE Fragoso Date & Time: 8/9/2022  11:37 AM     HPI:  81yoF with PMH significant for ESRD on HD MWF via left arm AVF, HTN, DM II, Anemia secondary to CKD, CKD Bone mineral disorder, admitted with increased edema and weakness and having missed her last last 2-3 outpt HD treatments. Pt is very lethargic at this time and unable to provide much history, majority of the history was obtained through review of the medical record and discussion with primary svc. Pt had reported increased LE edema and fatigue and weakness and worsening of her left arm edema so she came to the ED for evaluation. She apparently has missed her last 2-3 outpt dialysis treatments. Per regular outpt Nephrologist Dr. Gates, she has been non-compliant with her fluid restriction and was told that she needed 4x/week dialysis until her volume status could be optimized but she was non-compliant with that recommendation. She has edema of her left arm where her AVF is located and there is concern for a central stenosis that could be the etiology. She had an appointment at the outpt access center to evaluate for central stenosis and undergo angioplasty if needed on 6/9/22 but pt did not go to the appointment. She has not had any other procedures done to the arm in the past couple of months. She had a left arm us done today that showed no DVT and patent AVF and soft tissue edema. She apparently received pain medication fentanyl and dilaudid as well as benadryl earlier today and she is very drowsy.     DAILY NEPHROLOGY SUMMARY:  **See previous note from 7/31/22 to review prior daily nephrology summary entries.  8/1: Laying in bed eating breakfast. Very tearful this AM, stated that she has to make a decision if she wants to continue with the biopsy on her hand. Pt is concerned that there may not be much we can do about the wound on her left hand. Dr Simon from wound  "care at bedside. Will return this afternoon for biopsy procedure per MD. HD today UF 2L, tolerated well. Denies SOB, CP,N/VD. K+ up this am 5.7.   8/2: Resting in bed. C/o fatigue. Reports improved SOB, remains on oxygen.   08/3: Sitting up in chair, states she feels good today, SOB improved , remains on 2L nasal cannula. Denies CP, N/V/D. Discussed about fluid restriction. Pending left hand biopsy results. K+ improved 5.4. States fatigue but due to she recently ambulated with RN, otherwise no complaints. No overnight events, VSS.   08/4: Pt laying in bed. AOX4, HD yesterday tolerated will UF 2800mL. Hypotensive on the last hr of HD reported SBP 89-90's but asymptomatic. BP this am stable. Denies any CP/SOB/N/V/D. Patient on RA. C/o constipation. No overnight events. States she has declined hospice but would like to go to a home care facility.   8/5: iHD today, net UF 1L.  No resting comfortably no complaints. VSS  8/6: Had iHD yesterday with 1 L UF.  Currently sleeping, but awakens easily and has no new complaints. VSS.   8/7: Lying in bed sleeping, awakens and has no new complaints, just fatigue. VSS. Due for iHD tomorrow.  8/8: Seen on HD this am. No acute distress. VSS.   8/9: 2.5 net UF. VSS. Resting in bed. C/o fatigue. Denies CP/SOB.     REVIEW OF SYSTEMS:    10 point ROS reviewed and is as per HPI/daily summary or otherwise negative    PMH/PSH/SH/FH: Reviewed and unchanged since admission note  CURRENT MEDICATIONS: Reviewed from admission to present day    VS:  /44   Pulse 74   Temp 36.4 °C (97.5 °F) (Temporal)   Resp 16   Ht 1.626 m (5' 4\")   Wt 63.3 kg (139 lb 8.8 oz)   SpO2 95%   BMI 23.95 kg/m²   Physical Exam  Vitals and nursing note reviewed.   Constitutional:       General: She is not in acute distress.     Appearance: She is ill-appearing.   HENT:      Head: Normocephalic and atraumatic.      Mouth/Throat:      Mouth: Mucous membranes are dry.   Eyes:      General: No scleral " icterus.  Cardiovascular:      Rate and Rhythm: Normal rate and regular rhythm.      Pulses: Normal pulses.      Heart sounds: Normal heart sounds. Murmur heard.    No friction rub. No gallop.   Pulmonary:      Effort: Pulmonary effort is normal. No respiratory distress.      Breath sounds: No wheezing or rales.      Comments: Decrease breathsounds Bilateral lower lobes  Abdominal:      General: Bowel sounds are normal. There is no distension.      Palpations: Abdomen is soft.      Tenderness: There is no abdominal tenderness. There is no guarding.   Musculoskeletal:         General: Swelling (left arm) or deformity.      Cervical back: Normal range of motion.      Right lower leg: Trace edema.      Left lower leg: Trace edema.      Comments:   LUE AVF + bruit and Thrill   Skin:     General: Skin is warm and dry.      Coloration: Skin is not pale.      Findings: No rash.   Neurological:      Mental Status: She is alert and oriented to person, place, and time.   Psychiatric:         Mood and Affect: Mood normal.         Behavior: Behavior normal    Fluids:  In: 1005 [P.O.:505; Dialysis:500]  Out: 3000     LABS:  Recent Labs     08/07/22  0107 08/08/22  0104 08/09/22  0846   SODIUM 136 133* 133*   POTASSIUM 5.3 5.4 5.2   CHLORIDE 95* 90* 93*   CO2 26 26 27   GLUCOSE 172* 172* 86   BUN 48* 61* 39*   CREATININE 4.79* 6.05* 4.01*   CALCIUM 9.0 9.1 9.2       IMPRESSION:  # ESRD, dependent on HD              - HD via LUE AVF             - HD qMWF  # Fluid overload, improved             - Secondary to non-compliance with fluid restriction and HD treatments  # Left arm edema, slow improvement              - CTA upper ext 6/26 w/o e/o focal stenosis + extensive edema             - AVF patent on left arm US negative for DVT             - Pt no showed to outpt appt to evaluate              - Vascular does not recommend intervention              - Extensor tendon laceration left hand with wound             - Wound care in place,  s/p biopsy 8/1             - Left hand skin biopsy negative for calciphylaxis   - Outpatient [facility] appt rescheduled for next month  # HTN, variable control, stable at this time             - Goal BP < 140/90             - Not on BP meds              - BP at goal  # Anemia of CKD, at goal              - Goal Hgb 10-11             - Iron 27             -TIBC 142             -%Sat 19             - Ferritin 1419  # CKD-MBD             - CCa 9.8             - PO4 4.7             - Vit D 35            - PTH 99.4             - Managed at OP unit             - On sevelamer with meals  # Hyperkalemia            - Correct w/ HD  # DM II--management per primary svc  # Leg Pain--chronic skin changes and subcutaneous tissue firm to touch             - Please discuss with gen surg for possible skin biopsy             - Vascular does not recommend any intervention             - On ropinirole   # Leukocytosis resolved  # Hyponatremia  - Likely fluid related, manage w/ HD  - Fluid Restriction  - Resolved  # Hypoalbuminemia  - No dietary protein restrictions    PLAN:  - Continue qMWF iHD schedule, Next iHD due tomorrow (WED)   - UF as tolerated  - Continue phos binders WM, hold if patient is NPO.  - Continue off of all BP meds and diuretics for now. BP stable during HD.   - Limit sedating meds if possible  - No dietary protein restrictions  - Fluid restriction 1.2 L   - Low potassium diet  - Dose all meds per ESRD  - Outpt access center appointment rescheduled  - Follow labs  - DC planning underway for SNF         Thank you,

## 2022-08-09 NOTE — CARE PLAN
The patient is Stable - Low risk of patient condition declining or worsening    Shift Goals  Clinical Goals: Pain management  Patient Goals: Rest and comfort  Family Goals: CHIP    Progress made toward(s) clinical / shift goals:  Pain medication prn, repositioning, heat/cold packs offered, monitor pain    Patient is not progressing towards the following goals:      Problem: Pain - Standard  Goal: Alleviation of pain or a reduction in pain to the patient’s comfort goal  Outcome: Progressing

## 2022-08-09 NOTE — THERAPY
Physical Therapy   Daily Treatment     Patient Name: Jannet Bruno  Age:  81 y.o., Sex:  female  Medical Record #: [id number]  Today's Date: 8/9/2022     Precautions  Precautions: Fall Risk  Comments: LUE edema, pain, L hand biopsy wound    Assessment    Patient agreeable to PT tx session.  She required mod A for STS and stand pivot to BSC with HHA.  Attempted to turn & transfer BSC > chair with FWW however after ~2-3 small steps patient with B knee buckling, ultimately sitting on therapist's leg with max A to turn & sit on EOB.  Patient emotional regarding knee buckling, provided therapeutic support/listening.  Encouraged patient to continue transferring to BSC and bedside chair with nursing staff, patient & RN agreeable.  PT to continue to follow.    Plan    Continue current treatment plan.    DC Equipment Recommendations: Unable to determine at this time  Discharge Recommendations: Recommend post-acute placement for additional physical therapy services prior to discharge home     Objective     08/09/22 1145   Precautions   Precautions Fall Risk   Comments LUE edema, pain, L hand biopsy wound   Cognition    Cognition / Consciousness X   Level of Consciousness Alert   Safety Awareness Impaired   New Learning Impaired   Comments Pleasant & cooperative, emotional about performance during session   Balance   Sitting Balance (Static) Fair   Sitting Balance (Dynamic) Fair -   Standing Balance (Static) Poor +   Standing Balance (Dynamic) Poor   Weight Shift Sitting Fair   Weight Shift Standing Poor   Skilled Intervention Verbal Cuing;Postural Facilitation   Comments standing with FWW   Gait Analysis   Gait Level Of Assist Unable to Participate   Comments difficulty stepping to turn, ultimately required max A for transfer BSC > bed   Bed Mobility    Supine to Sit Minimal Assist   Sit to Supine Minimal Assist   Scooting Minimal Assist   Skilled Intervention Verbal Cuing;Facilitation   Functional Mobility   Sit to  Stand Moderate Assist   Bed, Chair, Wheelchair Transfer (mod A EOB > BSC, max A BSC > EOB)   Transfer Method Stand Pivot   Mobility supine > BSC > bed with FWW.  Attempted stepping to turn to chair, pt with B knee buckling, unable to extend & ultimately had to sit on therapist's lap to pivot back to bed.   Skilled Intervention Verbal Cuing;Postural Facilitation   Activity Tolerance   Sitting in Chair 8 min on BSC   Sitting Edge of Bed 3  min   Standing 4 min total   Comments fatigues quickly   Short Term Goals    Short Term Goal # 1 Pt will perform bed mobility with supervision in 6 visits with HOB flat, no rail.   Goal Outcome # 1 Progressing as expected   Short Term Goal # 2 Pt will transfer with supervision in 6 visits to improve functional indep.   Goal Outcome # 2 Goal not met   Short Term Goal # 3 Pt will ambulate x 50 feet using FWW with supervision in 6 visits to improve functional indep.   Goal Outcome # 3 Goal not met   Anticipated Discharge Equipment and Recommendations   DC Equipment Recommendations Unable to determine at this time   Discharge Recommendations Recommend post-acute placement for additional physical therapy services prior to discharge home

## 2022-08-10 NOTE — CARE PLAN
The patient is Stable - Low risk of patient condition declining or worsening    Shift Goals  Clinical Goals: Pain management  Patient Goals: Rest and comfort  Family Goals: CHIP    Progress made toward(s) clinical / shift goals: Patient pain under control, HD completed.    Patient is not progressing towards the following goals: Patient disoriented at times.    Problem: Pain - Standard  Goal: Alleviation of pain or a reduction in pain to the patient’s comfort goal  Outcome: Progressing     Problem: Knowledge Deficit - Standard  Goal: Patient and family/care givers will demonstrate understanding of plan of care, disease process/condition, diagnostic tests and medications  Outcome: Progressing

## 2022-08-10 NOTE — DISCHARGE PLANNING
Agency/Facility Name: [facility]  Spoke To: Adelia  Outcome: Referral was not received.  DPA re faxed at [phone].

## 2022-08-10 NOTE — CARE PLAN
The patient is Stable - Low risk of patient condition declining or worsening    Shift Goals  Clinical Goals: skin integrity  Patient Goals: pt will be able to tolerate wound care   Family Goals: CHIP    Progress made toward(s) clinical / shift goals:      Patient is not progressing towards the following goals:

## 2022-08-10 NOTE — DISCHARGE PLANNING
0912  Agency/Facility Name: [facility]  Outcome: WAN received a voice message from Kamille requesting an updated SNF referral.  A dialysis spot opened up and the pt's spouse passed away over the weekend and the family would like for the pt to go to [facility].    [phone]  WAN faxed an updated SNF referral to [facility].

## 2022-08-10 NOTE — PROGRESS NOTES
Alert and oriented x4. Offered fluids and snacks. Safety precautions in placed. Bed in lowest position. Upper side rails up. Treaded socks on. Reinforced the use of call light when needing assistance. Bed alarm is on

## 2022-08-10 NOTE — PROGRESS NOTES
[facility] Progress Note      HD ordered by Dr. Peres. Treatment started at 0738 and ended at 1040.     Total Net UF 1000mL.    Pt tolerated treatment, B/P soft during HD. UFG lowered with positive effect. See flow sheet for details. Cannulation needles taken out, held pressure for 10 min and placed gauze dressing with no bleeding. Dressing CDI post dialysis, primary RN instructed to monitor for bleeding. LUE AVF + for bruit/thrill. Report given to KULWANT Cm RN.

## 2022-08-11 NOTE — WOUND TEAM
[facility]  Inpatient Services  Wound and Skin Care Progress Note    Admission Date: 2022     Last order of IP CONSULT TO WOUND CARE was found on 2022 from Hospital Encounter on 2022     HPI, PMH, SH: Reviewed    Past Surgical History:   Procedure Laterality Date    AV FISTULA CREATION Left 2/15/2019    Procedure: AV FISTULA CREATION-  UPPER EXTREMITY BRACHIAL CEPHALIC BANDING;  Surgeon: Fernie Nazario M.D.;  Location: SURGERY [facility];  Service: General    HIP CANNULATED SCREW Right 2017    Procedure: HIP CANNULATED SCREW;  Surgeon: Ar Huerta M.D.;  Location: SURGERY [facility];  Service:     CATARACT PHACO WITH IOL  2014    Performed by Rudolph Barclay M.D. at SURGERY SAME DAY [facility]    CATARACT PHACO WITH IOL  2014    Performed by Rudolph Barclay M.D. at SURGERY SAME DAY [facility]    VENTRAL HERNIA REPAIR LAPAROSCOPIC  3/7/2012    Performed by HUNTER SERNA at SURGERY SAME DAY [facility]    APPENDECTOMY      GYN SURGERY      hysterectomy    OTHER      T&A    OTHER ABDOMINAL SURGERY      abd tramua- horse stepped on abd    OTHER ORTHOPEDIC SURGERY      R & l Shoulder repair     Social History     Tobacco Use    Smoking status: Former     Packs/day: 1.00     Years: 20.00     Pack years: 20.00     Types: Cigarettes     Quit date: 3/5/1979     Years since quittin.4    Smokeless tobacco: Never   Substance Use Topics    Alcohol use: No     Chief Complaint   Patient presents with    Peripheral Edema    Weakness     BIBA from home for weakness, nausea, L arm pain  HX of dialysis with L arm fistula, pt states she has missed 3 dialysis days due to being so weak she cannot drive or walk now  3 weeks ago had a procedure done to her fistula which has lead to L arm swelling     Diagnosis: ESRD (end stage renal disease) on dialysis (HCC) [N18.6, Z99.2]    Unit where seen by Wound Team: S626/02     WOUND CONSULT/FOLLOW UP RELATED TO:  L  hand     WOUND HISTORY:  Per HPI: 81 y.o. female with PMHx ESRD on HD MWF, HTN, HLD, and T2DM who was admitted on 6/24/2022 for left upper arm swelling and lower extremity edema after missing HD sessions.  X-rays of the left hand showed only chronic changes.  AV fistula was functioning well.  CTA showed no evidence of central stenosis.  Vascular surgery was consulted and only recommended supportive care such as wrapping the arm with Ace bandage.  She was also found to have severe aortic stenosis for which cardiology was consulted, and gave the opinion that she is not a candidate for intervention.  She was dialyzed while in the hospital with subsequent clinical improvement.  She did have hospital delirium while in the hospital, which has since resolved.  Palliative care was consulted, but patient continued to wish to be full code.  SNF placement was pursued, but proved to be challenging as transfer to dialysis is not always facilitated.  Case management is working on her discharge to SNF and arrangement of hemodialysis.    8/1/22: punch biopsy of hand performed by MD Das.     WOUND ASSESSMENT/LDA      Skin Risk/Joe   Sensory Perception: Slightly Limited  Moisture: Occasionally Moist  Activity: Walks Occasionally  Mobility: Very Limited  Nutrition: Adequate  Friction and Shear: Potential Problem  Total Score: 16  Skin Breakdown Risk: 13-17 Moderate Risk for Skin Breakdown    Sensory Interventions  Bed Types: Pressure Redistribution Mattress (Atmosair)  Skin Preventative Measures: Pillows in Use for Support / Positioning  Skin Preventative Dressing: Mepilex  Moisturizers/Barriers: Moisturizer   PT / OT Involved in Care: Physical Therapy Involved, Occupational Therapy Involved  Activity : Bed  Patient Turns / Repositioning: Right Side  Patient is Receiving Nutrition: Oral Intake Adequate  Nutrition Consult Ordered: No, Consult has Already been Placed  Vitamin Therapy in Use: No  Friction Interventions: Draw  Sheet / Pad Used for Repositioning                              Wound 07/20/22 Soft Tissue Necrosis Hand Left x2 dorsal, x1 palmar (Active)   Wound Image      08/11/22 1130   Site Assessment Pink;Yellow;Eschar 08/11/22 1130   Periwound Assessment Red;Edema;Painful 08/11/22 1130   Margins Unattached edges;Undefined edges 08/11/22 1130   Closure Secondary intention 08/11/22 1130   Drainage Amount Small 08/11/22 1130   Drainage Description Purulent 08/11/22 1130   Treatments Cleansed;Irrigation;Site care 08/11/22 1130   Wound Cleansing Approved Wound Cleanser 08/11/22 1130   Periwound Protectant Not Applicable 08/11/22 1130   Dressing Cleansing/Solutions Not Applicable 08/11/22 1130   Dressing Options Adaptic;Hydrofiber Silver;Dry Roll Gauze 08/11/22 1130   Dressing Changed Changed 08/11/22 1130   Dressing Status Clean;Dry;Intact 08/11/22 1130   Dressing Change/Treatment Frequency Every 48 hrs, and As Needed 08/11/22 1130   NEXT Dressing Change/Treatment Date 08/13/22 08/11/22 1130   NEXT Weekly Photo (Inpatient Only) 08/18/22 08/11/22 1130   Non-staged Wound Description Full thickness 08/03/22 0700   Shape Irregular 08/11/22 1130   Wound Odor Mild 08/11/22 1130   Exposed Structures CHIP 08/11/22 1130   WOUND NURSE ONLY - Time Spent with Patient (mins) 30 08/11/22 1130                Vascular:    Lab Values:    Lab Results   Component Value Date/Time    WBC 8.3 08/08/2022 01:04 AM    RBC 2.86 (L) 08/08/2022 01:04 AM    HEMOGLOBIN 9.7 (L) 08/08/2022 01:04 AM    HEMATOCRIT 30.4 (L) 08/08/2022 01:04 AM    CREACTPROT 7.07 (H) 06/25/2022 04:46 AM    HBA1C 11.7 (H) 06/24/2022 03:35 AM        Culture Results show:  Recent Results (from the past 720 hour(s))   CULTURE WOUND W/ GRAM STAIN    Collection Time: 07/20/22  3:00 PM    Specimen: Left Hand; Wound   Result Value Ref Range    Significant Indicator POS (POS)     Source WND     Site LEFT HAND     Culture Result - (A)     Gram Stain Result Few WBCs.  Moderate Gram positive  "cocci.       Culture Result Staphylococcus aureus  Moderate growth   (A)        Susceptibility    Staphylococcus aureus - ADRIANE     Azithromycin <=2 Sensitive mcg/mL     Clindamycin <=0.25 Sensitive mcg/mL     Cefazolin <=8 Sensitive mcg/mL     Cefepime <=4 Sensitive mcg/mL     Ceftaroline <=0.5 Sensitive mcg/mL     Daptomycin <=0.5 Sensitive mcg/mL     Ampicillin/sulbactam <=8/4 Sensitive mcg/mL     Erythromycin <=0.25 Sensitive mcg/mL     Vancomycin 1 Sensitive mcg/mL     Oxacillin 0.5 Sensitive mcg/mL     Pip/Tazobactam <=8 Sensitive mcg/mL     Trimeth/Sulfa <=0.5/9.5 Sensitive mcg/mL     Tetracycline <=4 Sensitive mcg/mL       Pain Level/Medicated:  PO pain meds    INTERVENTIONS BY WOUND TEAM:  Chart and images reviewed. Discussed with bedside RN. All areas of concern (based on picture review, LDA review and discussion with bedside RN) have been thoroughly assessed. Documentation of areas based on significant findings. This RN in to assess patient. Performed standard wound care which includes appropriate positioning, dressing removal and non-selective debridement. Pictures and measurements obtained weekly if/when required.    Preparation for Dressing removal: soaked with NS.   Non-selectively Debrided with: wound cleanser and gauze.  Sharp debridement: NA  Bebe wound: Cleansed with wound cleanser and gauze  Primary Dressing: Adaptic layer over wounds then hydrofiber to dorsal wounds, hydrofiber tucked into the wound bed of the palmar hand.   Secondary (Outer) Dressin\" kerlix and tape         Interdisciplinary consultation: Patient, Bedside RN     EVALUATION / RATIONALE FOR TREATMENT:  Most Recent Date:  22: Per Dr. Murphy \"Pathology of the skin biopsy from the left hand did not show evidence of calciphylaxis or malignancy, and only showed acute inflammation and necrosis with scattered multinucleated giant cells.\" Left 2nd finger wound mostly covered by eschar. Patient unable to tolerate debridement at this " time due to pain. Continued POC for now. Consider alternate dressing  to dorsal hand on next change to autolytically debride.  8/7/22: exposed tendon to the left pointer finger dorsal, eschar covering majority of the wound but at the proximal portion some tendon exposure.  Continuing with Aquacel Ag, awaiting punch biopsy results.   8/4: Palmar wound with purulent drainage. Hydrofiber silver strip disintigrating, thus removed and wound cavity cleansed. Dorsal fingers wounds with eschar layer. Pt not tolerating dressing changes, so IV medication requested. Nursing to continue changes per order.  8/2/22: punch biopsy performed on 8/1/22, first dressing change today. Difficult to remove so applied adaptic non-adherent layer to base of finger wound beds and covered with piece of aquacel ag. Palmar hand with aquacel ag strip tucked into wound and depth. Covered with gauze and roll gauze. Wound team to continue to follow and monitor. Waiting results.   7/26/22: L hand does not appear to be healing, change dressing to viscopaste for the soothing properties to assist with pain relief.   And Viscopatch zinc impregnated gauze to encourage re-epithelialization of superficial 100% viable wound bed, and to provide a non-stick wound contact layer.  7/23/22: No surgery indicated per Dr. Huerta. There are two wound beds on dorsal surface (1st finger and 4th/5th finger) and one wound over the palmar surface. All wounds with thick yellow drainage that is malodorous. Involved fingers remain erythematous and edematous. Patient reports extreme pain with site care and wound care is difficult despite utilization of lidocaine. Silver powder applied for its antibiotic properties and to address moisture to area due to moderate drainage. Wound team to continue to follow.   7/20: wounds on L hand with fluctuance on dorsal aspect indicating necrosis. X ray results did not indicate bony involvement, thus recommend CT for possible abscess. At  this time, wound unlikely to resolve with local wound care if necrosis advances, recommend surgical evaluation. Wound team will continue to follow.     Goals: Steady decrease in wound area and depth weekly.    WOUND TEAM PLAN OF CARE ([X] for frequency of wound follow up,):   Nursing to follow dressing orders written for wound care. Contact wound team if area fails to progress, deteriorates or with any questions/concerns if something comes up before next scheduled follow up (See below as to whether wound is following and frequency of wound follow up)  Dressing changes by wound team:                   Follow up 3 times weekly:                NPWT change 3 times weekly:     Follow up 1-2 times weekly:  X nursing to perform dsg/skin care; Wound team following.    Follow up Bi-Monthly:                   Follow up as needed:     Other (explain):     NURSING PLAN OF CARE ORDERS (X):  Dressing changes: See Dressing Care orders: x  Skin care: See Skin Care orders: x  RN Prevention Protocol: x  Rectal tube care: See Rectal Tube Care orders:   Other orders:      Anticipated discharge plans: TBD  LTACH:        SNF/Rehab:                  Home Health Care:           Outpatient Wound Center:            Self/Family Care:        Other:                  Vac Discharge Needs:   Not Applicable Pt not on a wound vac:     x  Regular Vac while inpatient, alternative dressing at DC:        Regular Vac in use and continued at DC:            Reg. Vac w/ Skin Sub/Biologic in use. Will need to be changed 2x wkly:      Veraflo Vac while inpatient, ok to transition to Regular Vac on Discharge:           Veraflo Vac while inpatient, will need to remain on Veraflo Vac upon discharge:

## 2022-08-11 NOTE — PROGRESS NOTES
Notified MD Murphy of patient's mental status, since HD patient has been altered. Disoriented to place and situation, previous assessments have noted that patient is Aox3-4. MD Murphy came to floor to check on patient, claims that patient has episodes like this on certain days. No further orders at this time. Will continue to monitor.

## 2022-08-11 NOTE — CARE PLAN
The patient is Stable - Low risk of patient condition declining or worsening    Shift Goals  Clinical Goals: Pain management  Patient Goals: pt will be able to rest and sleep comfortably  Family Goals: CHIP    Progress made toward(s) clinical / shift goals:      Patient is not progressing towards the following goals:

## 2022-08-11 NOTE — CARE PLAN
The patient is Stable - Low risk of patient condition declining or worsening    Shift Goals  Clinical Goals: Pain management  Patient Goals: Rest and comfort  Family Goals: CHIP    Progress made toward(s) clinical / shift goals:      Problem: Pain - Standard  Goal: Alleviation of pain or a reduction in pain to the patient’s comfort goal  Outcome: Progressing     Problem: Knowledge Deficit - Standard  Goal: Patient and family/care givers will demonstrate understanding of plan of care, disease process/condition, diagnostic tests and medications  Outcome: Progressing       Patient is not progressing towards the following goals:NA

## 2022-08-11 NOTE — DISCHARGE PLANNING
Case Management Discharge Planning    Admission Date: 6/24/2022  GMLOS: 4.3  ALOS: 48    6-Clicks ADL Score: 15  6-Clicks Mobility Score: 9  PT and/or OT Eval ordered: Yes  Post-acute Referrals Ordered: Yes  Post-acute Choice Obtained: Yes  Has referral(s) been sent to post-acute provider:  Yes      Anticipated Discharge Dispo: Discharge Disposition: D/T to SNF with Medicare cert in anticipation of skilled care (03)    DME Needed: No    Action(s) Taken: Updated Provider/Nurse on Discharge Plan    Escalations Completed: None    Medically Clear: Yes    Next Steps: Patient discussed with MD, is medically clear for DC. MIKE spoke with patricia Simental at [facility] by phone. She is collaborating with her leadership to determine if they can accept patient. CM will follow up in the am.    Barriers to Discharge: Pending Placement

## 2022-08-11 NOTE — PROGRESS NOTES
[facility] -  PROGRESS NOTE               Author: THERESE Jeffers Date & Time: 8/11/2022  12:20 PM     HPI:  81yoF with PMH significant for ESRD on HD MWF via left arm AVF, HTN, DM II, Anemia secondary to CKD, CKD Bone mineral disorder, admitted with increased edema and weakness and having missed her last last 2-3 outpt HD treatments. Pt is very lethargic at this time and unable to provide much history, majority of the history was obtained through review of the medical record and discussion with primary svc. Pt had reported increased LE edema and fatigue and weakness and worsening of her left arm edema so she came to the ED for evaluation. She apparently has missed her last 2-3 outpt dialysis treatments. Per regular outpt Nephrologist Dr. Gates, she has been non-compliant with her fluid restriction and was told that she needed 4x/week dialysis until her volume status could be optimized but she was non-compliant with that recommendation. She has edema of her left arm where her AVF is located and there is concern for a central stenosis that could be the etiology. She had an appointment at the outpt access center to evaluate for central stenosis and undergo angioplasty if needed on 6/9/22 but pt did not go to the appointment. She has not had any other procedures done to the arm in the past couple of months. She had a left arm us done today that showed no DVT and patent AVF and soft tissue edema. She apparently received pain medication fentanyl and dilaudid as well as benadryl earlier today and she is very drowsy.     DAILY NEPHROLOGY SUMMARY:  **See previous note from 7/31/22 to review prior daily nephrology summary entries.  8/1: Laying in bed eating breakfast. Very tearful this AM, stated that she has to make a decision if she wants to continue with the biopsy on her hand. Pt is concerned that there may not be much we can do about the wound on her left hand. Dr Simon  "from wound care at bedside. Will return this afternoon for biopsy procedure per MD. HD today UF 2L, tolerated well. Denies SOB, CP,N/VD. K+ up this am 5.7.   8/2: Resting in bed. C/o fatigue. Reports improved SOB, remains on oxygen.   08/3: Sitting up in chair, states she feels good today, SOB improved , remains on 2L nasal cannula. Denies CP, N/V/D. Discussed about fluid restriction. Pending left hand biopsy results. K+ improved 5.4. States fatigue but due to she recently ambulated with RN, otherwise no complaints. No overnight events, VSS.   08/4: Pt laying in bed. AOX4, HD yesterday tolerated will UF 2800mL. Hypotensive on the last hr of HD reported SBP 89-90's but asymptomatic. BP this am stable. Denies any CP/SOB/N/V/D. Patient on RA. C/o constipation. No overnight events. States she has declined hospice but would like to go to a home care facility.   8/5: iHD today, net UF 1L.  No resting comfortably no complaints. VSS  8/6: Had iHD yesterday with 1 L UF.  Currently sleeping, but awakens easily and has no new complaints. VSS.   8/7: Lying in bed sleeping, awakens and has no new complaints, just fatigue. VSS. Due for iHD tomorrow.  8/8: Seen on HD this am. No acute distress. VSS.   8/9: 2.5 net UF. VSS. Resting in bed. C/o fatigue. Denies CP/SOB.   8/10: 1L net UF. No complaints. VSS.   8/11: Resting comfortably.  Family at bedside.  VSS.  Family reports she had been having pain, feels gabapentin may need to be increased.  No other complaints.     REVIEW OF SYSTEMS:    10 point ROS reviewed and is as per HPI/daily summary or otherwise negative    PMH/PSH/SH/FH: Reviewed and unchanged since admission note  CURRENT MEDICATIONS: Reviewed from admission to present day    VS:  BP (P) 116/66   Pulse (P) 80   Temp 35.9 °C (96.6 °F) (Temporal)   Resp (P) 20   Ht 1.626 m (5' 4\")   Wt 63.3 kg (139 lb 8.8 oz)   SpO2 (P) 93%   BMI 23.95 kg/m²   Physical Exam  Vitals and nursing note reviewed.   Constitutional:       " General: She is not in acute distress.     Appearance: She is ill-appearing.   HENT:      Head: Normocephalic and atraumatic.      Mouth/Throat:      Mouth: Mucous membranes are dry.   Eyes:      General: No scleral icterus.  Cardiovascular:      Rate and Rhythm: Normal rate and regular rhythm.      Pulses: Normal pulses.      Heart sounds: Normal heart sounds. Murmur heard.    No friction rub. No gallop.   Pulmonary:      Effort: Pulmonary effort is normal. No respiratory distress.      Breath sounds: No wheezing or rales.      Comments: Decrease breathsounds Bilateral lower lobes  Abdominal:      General: Bowel sounds are normal. There is no distension.      Palpations: Abdomen is soft.      Tenderness: There is no abdominal tenderness. There is no guarding.   Musculoskeletal:         General: Swelling (left arm) or deformity.      Cervical back: Normal range of motion.      Right lower leg: Trace edema.      Left lower leg: Trace edema.      Comments:   LUE AVF + bruit and Thrill   Skin:     General: Skin is warm and dry.      Coloration: Skin is not pale.      Findings: No rash.   Left hand wrapped in gauze   Neurological:      Mental Status: She is alert and oriented to person, place, and time.   Psychiatric:         Mood and Affect: Mood normal.         Behavior: Behavior normal    Fluids:  In: 800 [P.O.:300; Dialysis:500]  Out: 1500     LABS:  Recent Labs     08/09/22  0846 08/10/22  1444   SODIUM 133* 135   POTASSIUM 5.2 4.3   CHLORIDE 93* 95*   CO2 27 28   GLUCOSE 86 119*   BUN 39* 22   CREATININE 4.01* 2.83*   CALCIUM 9.2 8.9         IMPRESSION:  # ESRD, dependent on HD              - HD via LUE AVF             - HD qMWF  # Fluid overload, improved             - Secondary to non-compliance with fluid restriction and HD treatments  # Left arm edema, slow improvement              - CTA upper ext 6/26 w/o e/o focal stenosis + extensive edema             - AVF patent on left arm US negative for DVT              - Pt no showed to outpt appt to evaluate              - Vascular does not recommend intervention              - Extensor tendon laceration left hand with wound             - Wound care in place, s/p biopsy 8/1             - Left hand skin biopsy negative for calciphylaxis   - Outpatient [facility] appt rescheduled for next month  # HTN, variable control, stable at this time             - Goal BP < 140/90             - Not on BP meds              - BP at goal  # Anemia of CKD, at goal              - Goal Hgb 10-11             - Iron 27             -TIBC 142             -%Sat 19             - Ferritin 1419  # CKD-MBD             - CCa 9.8             - PO4 4.7             - Vit D 35            - PTH 99.4             - Managed at OP unit             - On sevelamer with meals  # Hyperkalemia            - Correct w/ HD  # DM II--management per primary svc  # Leg Pain--chronic skin changes and subcutaneous tissue firm to touch             - Please discuss with gen surg for possible skin biopsy             - Vascular does not recommend any intervention             - On ropinirole   # Leukocytosis resolved  # Hyponatremia  - Likely fluid related, manage w/ HD  - Fluid Restriction  - Resolved  # Hypoalbuminemia  - No dietary protein restrictions    PLAN:  - Continue qMWF iHD schedule, Next iHD due (FRI)   - UF as tolerated  - Continue phos binders WM, hold if patient is NPO.  - Continue off of all BP meds and diuretics for now. BP stable during HD.   - Limit sedating meds if possible  - No dietary protein restrictions  - Fluid restriction 1.2 L   - Low potassium diet  - Dose all meds per ESRD  - Max dose gabapentin in ESRD pt 300mg/day  - Outpt access center appointment rescheduled  - Follow labs  - DC planning underway for [facility]         Thank you,

## 2022-08-12 NOTE — PROGRESS NOTES
[facility] -  PROGRESS NOTE               Author: THERESE Fragoso Date & Time: 8/12/2022  10:49 AM     HPI:  81yoF with PMH significant for ESRD on HD MWF via left arm AVF, HTN, DM II, Anemia secondary to CKD, CKD Bone mineral disorder, admitted with increased edema and weakness and having missed her last last 2-3 outpt HD treatments. Pt is very lethargic at this time and unable to provide much history, majority of the history was obtained through review of the medical record and discussion with primary svc. Pt had reported increased LE edema and fatigue and weakness and worsening of her left arm edema so she came to the ED for evaluation. She apparently has missed her last 2-3 outpt dialysis treatments. Per regular outpt Nephrologist Dr. Gates, she has been non-compliant with her fluid restriction and was told that she needed 4x/week dialysis until her volume status could be optimized but she was non-compliant with that recommendation. She has edema of her left arm where her AVF is located and there is concern for a central stenosis that could be the etiology. She had an appointment at the outpt access center to evaluate for central stenosis and undergo angioplasty if needed on 6/9/22 but pt did not go to the appointment. She has not had any other procedures done to the arm in the past couple of months. She had a left arm us done today that showed no DVT and patent AVF and soft tissue edema. She apparently received pain medication fentanyl and dilaudid as well as benadryl earlier today and she is very drowsy.     DAILY NEPHROLOGY SUMMARY:  **See previous note from 7/31/22 to review prior daily nephrology summary entries.  8/1: Laying in bed eating breakfast. Very tearful this AM, stated that she has to make a decision if she wants to continue with the biopsy on her hand. Pt is concerned that there may not be much we can do about the wound on her left hand. Dr Simon from wound  "care at bedside. Will return this afternoon for biopsy procedure per MD. HD today UF 2L, tolerated well. Denies SOB, CP,N/VD. K+ up this am 5.7.   8/2: Resting in bed. C/o fatigue. Reports improved SOB, remains on oxygen.   08/3: Sitting up in chair, states she feels good today, SOB improved , remains on 2L nasal cannula. Denies CP, N/V/D. Discussed about fluid restriction. Pending left hand biopsy results. K+ improved 5.4. States fatigue but due to she recently ambulated with RN, otherwise no complaints. No overnight events, VSS.   08/4: Pt laying in bed. AOX4, HD yesterday tolerated will UF 2800mL. Hypotensive on the last hr of HD reported SBP 89-90's but asymptomatic. BP this am stable. Denies any CP/SOB/N/V/D. Patient on RA. C/o constipation. No overnight events. States she has declined hospice but would like to go to a home care facility.   8/5: iHD today, net UF 1L.  No resting comfortably no complaints. VSS  8/6: Had iHD yesterday with 1 L UF.  Currently sleeping, but awakens easily and has no new complaints. VSS.   8/7: Lying in bed sleeping, awakens and has no new complaints, just fatigue. VSS. Due for iHD tomorrow.  8/8: Seen on HD this am. No acute distress. VSS.   8/9: 2.5 net UF. VSS. Resting in bed. C/o fatigue. Denies CP/SOB.   8/10: 1L net UF. No complaints. VSS.   8/11: Resting comfortably.  Family at bedside.  VSS.  Family reports she had been having pain, feels gabapentin may need to be increased.  No other complaints.   8/12: Seen on HD this am. No complaints.     REVIEW OF SYSTEMS:    10 point ROS reviewed and is as per HPI/daily summary or otherwise negative    PMH/PSH/SH/FH: Reviewed and unchanged since admission note  CURRENT MEDICATIONS: Reviewed from admission to present day    VS:  BP (!) 117/34   Pulse 79   Temp 36.4 °C (97.5 °F) (Temporal)   Resp 18   Ht 1.626 m (5' 4\")   Wt 63.3 kg (139 lb 8.8 oz)   SpO2 96%   BMI 23.95 kg/m²   Physical Exam  Vitals and nursing note reviewed. "   Constitutional:       General: She is not in acute distress.     Appearance: She is ill-appearing.   HENT:      Head: Normocephalic and atraumatic.      Mouth/Throat:      Mouth: Mucous membranes are dry.   Eyes:      General: No scleral icterus.  Cardiovascular:      Rate and Rhythm: Normal rate and regular rhythm.      Pulses: Normal pulses.      Heart sounds: Normal heart sounds. Murmur heard.    No friction rub. No gallop.   Pulmonary:      Effort: Pulmonary effort is normal. No respiratory distress.      Breath sounds: No wheezing or rales.      Comments: Decrease breathsounds Bilateral lower lobes  Abdominal:      General: Bowel sounds are normal. There is no distension.      Palpations: Abdomen is soft.      Tenderness: There is no abdominal tenderness. There is no guarding.   Musculoskeletal:         General: Swelling (left arm) or deformity.      Cervical back: Normal range of motion.      Right lower leg: Trace edema.      Left lower leg: Trace edema.      Comments:   LUE AVF + bruit and Thrill   Skin:     General: Skin is warm and dry.      Coloration: Skin is not pale.      Findings: No rash.   Left hand wrapped in gauze   Neurological:      Mental Status: She is alert and oriented to person, place, and time.   Psychiatric:         Mood and Affect: Mood normal.         Behavior: Behavior normal    Fluids:  In: 200 [P.O.:200]  Out: -     LABS:  Recent Labs     08/10/22  1444 08/11/22  1214 08/12/22  0722   SODIUM 135 136 137   POTASSIUM 4.3 5.3 5.5   CHLORIDE 95* 92* 95*   CO2 28 28 28   GLUCOSE 119* 153* 139*   BUN 22 36* 49*   CREATININE 2.83* 4.16* 4.61*   CALCIUM 8.9 9.3 9.0         IMPRESSION:  # ESRD, dependent on HD              - HD via LUE AVF             - HD qMWF  # Fluid overload, improved             - Secondary to non-compliance with fluid restriction and HD treatments  # Left arm edema, slow improvement              - CTA upper ext 6/26 w/o e/o focal stenosis + extensive edema              - AVF patent on left arm US negative for DVT             - Pt no showed to outpt appt to evaluate              - Vascular does not recommend intervention              - Extensor tendon laceration left hand with wound             - Wound care in place, s/p biopsy 8/1             - Left hand skin biopsy negative for calciphylaxis   - Outpatient [facility] appt rescheduled for next month  # HTN, variable control, stable at this time             - Goal BP < 140/90             - Not on BP meds              - BP at goal  # Anemia of CKD, at goal              - Goal Hgb 10-11             - Iron 27             -TIBC 142             -%Sat 19             - Ferritin 1419  # CKD-MBD             - CCa 9.8             - PO4 4.7             - Vit D 35            - PTH 99.4             - Managed at OP unit             - On sevelamer with meals  # Hyperkalemia            - Correct w/ HD  # DM II--management per primary svc  # Leg Pain--chronic skin changes and subcutaneous tissue firm to touch             - Please discuss with gen surg for possible skin biopsy             - Vascular does not recommend any intervention             - On ropinirole   # Leukocytosis resolved  # Hyponatremia  - Likely fluid related, manage w/ HD  - Fluid Restriction  - Resolved  # Hypoalbuminemia  - No dietary protein restrictions    PLAN:  - Continue qMWF iHD schedule, Next iHD due (FRI)   - UF as tolerated  - Continue phos binders WM, hold if patient is NPO.  - Continue off of all BP meds and diuretics for now. BP stable during HD.   - Limit sedating meds if possible  - No dietary protein restrictions  - Fluid restriction 1.2 L   - Low potassium diet  - Dose all meds per ESRD  - Max dose gabapentin in ESRD pt 300mg/day  - Outpt access center appointment rescheduled  - Follow labs  - DC planning underway for [facility]         Thank you,

## 2022-08-12 NOTE — CARE PLAN
The patient is Stable - Low risk of patient condition declining or worsening    Shift Goals  Clinical Goals: nutrition  Patient Goals: pt will be able to consume dinner  Family Goals: CHIP    Progress made toward(s) clinical / shift goals:      Patient is not progressing towards the following goals:

## 2022-08-12 NOTE — PROGRESS NOTES
Hospital Medicine TWICE WEEKLY Progress Note    Date of Service  8/12/2022    Chief Complaint  Edema    Hospital Course  81 y.o. female with PMHx ESRD on HD MWF, HTN, HLD, and T2DM who was admitted on 6/24/2022 for left upper arm swelling and lower extremity edema after missing HD sessions.  X-rays of the left hand showed only chronic changes.  AV fistula was functioning well.  CTA showed no evidence of central stenosis.  Vascular surgery was consulted and only recommended supportive care such as wrapping the arm with Ace bandage.  She was also found to have severe aortic stenosis for which cardiology was consulted, and gave the opinion that she is not a candidate for intervention.  She was dialyzed while in the hospital with subsequent clinical improvement.  She did have hospital delirium while in the hospital, which has since resolved.  She also had worsening wound on her left hand, but x-ray showed no acute abnormalities.  CT was done, but orthopedics gave the opinion that she does not have any fluid collection or abscess requiring surgical debridement. Pathology of the skin biopsy from the left hand did not show evidence of calciphylaxis or malignancy, and only showed acute inflammation and necrosis with scattered multinucleated giant cells.   She is started on antibiotics.  Palliative care was consulted, but patient continued to wish to be full code.  SNF placement was pursued, but proved to be challenging as transfer to dialysis is not always facilitated.  Case management is working on her discharge to SNF and arrangement of hemodialysis.    Interval Problem Update  8/12/2022 - I reviewed the patient's chart today. Uneventful night. VSS. Afebrile. Saturating well on 0.5L O2 NC.  BG well controlled.  Reportedly, patient's  passed away last week at the SNF.  Complained of precordial chest pain yesterday, but I evaluated her and she exhibits reproducible chest tenderness on exam, and EKG (personally  reviewed) showed no dynamic ischemic changes.    > I have personally seen and examined the patient today.  Asleep, comfortable.  Arousable.  No complaints but not very forthcoming.    I discussed plan of care with bedside RN.      Consultants/Specialty  cardiology, nephrology and vascular surgery    Code Status  Full Code    Disposition  Patient is medically cleared for discharge.   Anticipate discharge to to skilled nursing facility.  I have placed the appropriate orders for post-discharge needs.    Review of Systems  ROS     Pertinent positives/negatives as mentioned above.     A complete review of systems was personally done by me. All other systems were negative.        Physical Exam  Temp:  [35.9 °C (96.6 °F)-37.5 °C (99.5 °F)] 37.5 °C (99.5 °F)  Pulse:  [74-80] 80  Resp:  [16-20] 19  BP: (115-121)/(44-57) 115/57  SpO2:  [93 %-95 %] 94 %    Physical Exam  Vitals reviewed.   Constitutional:       General: She is not in acute distress.     Appearance: Normal appearance. She is not ill-appearing.   HENT:      Head: Normocephalic and atraumatic.      Nose: No congestion.   Eyes:      General:         Right eye: No discharge.         Left eye: No discharge.      Pupils: Pupils are equal, round, and reactive to light.   Cardiovascular:      Rate and Rhythm: Normal rate and regular rhythm.      Pulses: Normal pulses.      Heart sounds: Murmur heard.   Pulmonary:      Effort: Pulmonary effort is normal. No respiratory distress.      Breath sounds: Normal breath sounds. No stridor.   Abdominal:      General: Bowel sounds are normal. There is no distension.      Palpations: Abdomen is soft.      Tenderness: There is no abdominal tenderness.   Musculoskeletal:         General: Swelling, tenderness and deformity present. Normal range of motion.      Cervical back: Normal range of motion. No rigidity.   Skin:     General: Skin is warm.      Capillary Refill: Capillary refill takes less than 2 seconds.      Coloration: Skin  is not jaundiced or pale.      Findings: Erythema and lesion present. No bruising.      Comments: Dressing present on left hand CDI   Neurological:      General: No focal deficit present.      Mental Status: She is alert and oriented to person, place, and time.      Cranial Nerves: No cranial nerve deficit.   Psychiatric:         Mood and Affect: Mood normal.         Behavior: Behavior normal.       I have performed the physical examination today 8/12/2022.  In review of last note, there are no new changes except as documented above.      Fluids    Intake/Output Summary (Last 24 hours) at 8/12/2022 0700  Last data filed at 8/11/2022 2000  Gross per 24 hour   Intake 200 ml   Output --   Net 200 ml         Laboratory        Recent Labs     08/09/22  0846 08/10/22  1444 08/11/22  1214   SODIUM 133* 135 136   POTASSIUM 5.2 4.3 5.3   CHLORIDE 93* 95* 92*   CO2 27 28 28   GLUCOSE 86 119* 153*   BUN 39* 22 36*   CREATININE 4.01* 2.83* 4.16*   CALCIUM 9.2 8.9 9.3                     Imaging  CT-HAND W/O LEFT   Final Result      1.  No acute fracture or dislocation.      2.  Previous distal ulnar fracture identified.      3.  Osteoarthritis is most prominent at the first carpometacarpal joint.      4.  No bone erosions identified.      5.  Fluid collection or soft tissue abscess is identified.      6.  Induration in subcutaneous fat and intramuscular fat planes is noted which could be due to edema or inflammation such as cellulitis.      DX-HAND 3+ LEFT   Final Result      1. No acute fracture or subluxation.   2. Chronic posttraumatic changes in the distal left radius and ulna, stable when compared with the prior study.   3. Stable osteoarthritis involving the left first carpometacarpal joint.      DX-HAND 3+ LEFT   Final Result      1.  No acute fracture or dislocation.   2.  Scattered mild degenerative changes, increased from prior study. No definite erosive arthropathy.   3.  Osteopenia.   4.  Old ulnar styloid process  fracture.      DX-CHEST-PORTABLE (1 VIEW)   Final Result      1.  Probable 12 mm right mid/upper lung zone mass. Suggest CT chest without contrast for further assessment      2.  Interstitial pulmonary edema or fibrosis      3.  Enlarged cardiac silhouette      4.  Left pleural effusion      CT-CTA UPPER EXT WITH & W/O-POST PROCESS LEFT   Final Result      1.  Status post brachiocephalic fistula in the left arm. No focal stenosis is identified.      2.  Extensive edema in the subcutaneous tissues of the visualized left lateral chest and arm.      EC-ECHOCARDIOGRAM COMPLETE W/O CONT   Final Result      US-ZAHRAA SINGLE LEVEL BILAT   Final Result      US-EXTREMITY ARTERY LOWER BILAT   Final Result      CT-EXTREMITY, UPPER W/O LEFT   Final Result      1.  There is diffuse subcutaneous edema of the imaged portions of the left upper extremity. There is also diffuse body wall edema in the visualized portions of the chest and abdomen.   2.  No focal fluid collection to suggest abscess.   3.  There is a small left pleural effusion and minimal fluid in the abdomen.   4.  There is postsurgical change of AV fistula. There is small vessel atherosclerosis.      US-EXTREMITY VENOUS UPPER UNILAT LEFT   Final Result      DX-CHEST-PORTABLE (1 VIEW)   Final Result         1.  Interstitial pulmonary parenchymal prominence suggest chronic underlying lung disease, component of interstitial edema and/or infiltrates not excluded.   2.  Small left pleural effusion   3.  Cardiomegaly   4.  Atherosclerosis           Assessment/Plan  * ESRD (end stage renal disease) on dialysis (HCC)- (present on admission)  Assessment & Plan  - Continue hemodialysis per nephrology.  -Continue sevelamer and calcitriol.    Edema of left upper extremity- (present on admission)  Assessment & Plan  - Chronic, at the same site of AV fistula which is functioning well.  -Multifactorial: Poor EF, lymphedema, outflow stenosis.  CTA showed no evidence of central stenosis.   Vascular surgery recommended supportive care with wrapping arm with Ace bandage.  -Continue to monitor.    Delirium  Assessment & Plan  - Resolved.    Aortic stenosis, severe- (present on admission)  Assessment & Plan  - Not a candidate for intervention per cardiology.  -Continue to monitor volume status.    Pain in both lower extremities- (present on admission)  Assessment & Plan  - Chronic.  Continue as needed pain medications.    Advance care planning  Assessment & Plan  She is declining hospice services.  I again discussed with her about goals of care, and she states she wants to continue current management that she is receiving, and wants to continue with hemodialysis.    Generalized weakness- (present on admission)  Assessment & Plan  - SNF placement being pursued.  Placement challenging due to need for transportation back and forth to hemodialysis.  CM/SW on board.    Extensor tendon laceration of left hand with open wound  Assessment & Plan  She has been having worsening of her wound on her left hand. COmpleted course of ancef. Now, hand wound healing well, no further signs of infection.X-ray did not show any acute abnormalities.  CT scan of her left hand and it did not show any fluid collection.  Skin biopsy showed no evidence of calciphylaxis or malignancy, and only showed acute inflammation.  Wound care service following.  Continue wound care.      Hypothyroidism- (present on admission)  Assessment & Plan  - Continue Synthroid.    Diabetes mellitus, type II (HCC)- (present on admission)  Assessment & Plan  - Last HbA1c was 11.7.  Continue Lantus 5 units at night, along with sliding scale insulin coverage.  Continue Accu-Cheks before meals and at bedtime. Goal to keep BG between 140-180 per 2019 ADA guidelines.        Essential hypertension- (present on admission)  Assessment & Plan  - Maintaining good blood pressure control, despite holding home antihypertensives.  Continue to monitor blood pressure  trend closely.               VTE prophylaxis: heparin ppx    I have performed a physical exam and reviewed and updated ROS and Plan today (8/12/2022). In review of last note, there are no changes except as documented above.

## 2022-08-12 NOTE — DISCHARGE PLANNING
Agency/Facility Name: [facility]   Outcome: DPA left Vmail regarding referral with request of call back.

## 2022-08-12 NOTE — PROGRESS NOTES
Hemodialysis ordered by Dr. Peres. Treatment started at 0925 and ended at 1225. Pt stable, vss, no c/o post tx. Net UF 2.5 L. Report to JUAN MANUEL Rodriguez RN. Lt ua avf dsg cdi.

## 2022-08-12 NOTE — PROGRESS NOTES
Alert and oriented x3, disoriented with event. Offered fluids and snacks. Safety precautions in placed. Bed in lowest position. Upper side rails up. Treaded socks on. Reinforced the use of call light when needing assistance.

## 2022-08-13 NOTE — WOUND TEAM
Wound team consulted for coccyx.  Noted small area of peeling skin related to moisture.  Placed a mepilex to protect fragile skin.  Pt on a waffle overlay and positioned with pillows.  RN orders written      Reviewed pictures of L hand.        7/10/22          8/11/22          Did not perform any treatment to the hand today.  Hand is wrapped in rolled gauze.  Pt states that hand is very painful and does not tolerate any pressure on area.  Updated Dr Murphy and he will order vascular study.  Pt may benefit from vascular doctor review in light of change in presentation.

## 2022-08-13 NOTE — PROGRESS NOTES
Received report from previous shift RN, assumed care of patient. Patient is A&Ox4, vital signs are stable, on room air. Patient complains of 8/10 left hand pain at this time, medicated per MAR. Sitting up in bed for breakfast. Call light and belongings within reach. Bed in lowest/locked position. Hourly rounding in place.

## 2022-08-13 NOTE — CARE PLAN
The patient is Stable - Low risk of patient condition declining or worsening    Shift Goals  Clinical Goals: Maintain skin integrity, fall prevention  Patient Goals: Rest and comfort  Family Goals: CHIP    Progress made toward(s) clinical / shift goals: Patient remains free from falls this hospitalization. Appropriate interventions in place to reduce risk for skin breakdown.

## 2022-08-13 NOTE — PROGRESS NOTES
4 Eyes Skin Assessment Completed by MAURO Haynes and MAURO Alatorre. Skin generally fragile.    Head WDL  Ears Redness and Blanching to right ear under O2 tubing  Nose WDL  Mouth WDL  Neck WDL  Breast/Chest WDL  Shoulder Blades WDL  Spine WDL  (R) Arm/Elbow/Hand Bruising  (L) Arm/Elbow/Hand Redness and Edema. Dressing to hand, CD&I.  Abdomen WDL  Groin WDL  Scrotum/Coccyx/Buttocks Slow blanching redness to coccyx, small area of non-intact skin.   (R) Leg Flaky, scabbing, swelling  (L) Leg Flaky, scabbing, swelling  (R) Heel/Foot/Toe Boggy heel  (L) Heel/Foot/Toe Boggy heel          Devices In Places Nasal Cannula      Interventions In Place Waffle Overlay and Pillows. Q2h turns. Will place heel mepilex dressings    Possible Skin Injury Yes, coccyx    Pictures Uploaded Into Epic Yes  Wound Consult Placed Yes  RN Wound Prevention Protocol Ordered Yes

## 2022-08-13 NOTE — PROGRESS NOTES
[facility] -  PROGRESS NOTE               Author: STACY Jeffers. Date & Time: 8/13/2022  11:41 AM     HPI:  81yoF with PMH significant for ESRD on HD MWF via left arm AVF, HTN, DM II, Anemia secondary to CKD, CKD Bone mineral disorder, admitted with increased edema and weakness and having missed her last last 2-3 outpt HD treatments. Pt is very lethargic at this time and unable to provide much history, majority of the history was obtained through review of the medical record and discussion with primary svc. Pt had reported increased LE edema and fatigue and weakness and worsening of her left arm edema so she came to the ED for evaluation. She apparently has missed her last 2-3 outpt dialysis treatments. Per regular outpt Nephrologist Dr. Gates, she has been non-compliant with her fluid restriction and was told that she needed 4x/week dialysis until her volume status could be optimized but she was non-compliant with that recommendation. She has edema of her left arm where her AVF is located and there is concern for a central stenosis that could be the etiology. She had an appointment at the outpt access center to evaluate for central stenosis and undergo angioplasty if needed on 6/9/22 but pt did not go to the appointment. She has not had any other procedures done to the arm in the past couple of months. She had a left arm us done today that showed no DVT and patent AVF and soft tissue edema. She apparently received pain medication fentanyl and dilaudid as well as benadryl earlier today and she is very drowsy.     DAILY NEPHROLOGY SUMMARY:  **See previous note from 7/31/22 to review prior daily nephrology summary entries.  8/1: Laying in bed eating breakfast. Very tearful this AM, stated that she has to make a decision if she wants to continue with the biopsy on her hand. Pt is concerned that there may not be much we can do about the wound on her left hand. Dr Simon  "from wound care at bedside. Will return this afternoon for biopsy procedure per MD. HD today UF 2L, tolerated well. Denies SOB, CP,N/VD. K+ up this am 5.7.   8/2: Resting in bed. C/o fatigue. Reports improved SOB, remains on oxygen.   08/3: Sitting up in chair, states she feels good today, SOB improved , remains on 2L nasal cannula. Denies CP, N/V/D. Discussed about fluid restriction. Pending left hand biopsy results. K+ improved 5.4. States fatigue but due to she recently ambulated with RN, otherwise no complaints. No overnight events, VSS.   08/4: Pt laying in bed. AOX4, HD yesterday tolerated will UF 2800mL. Hypotensive on the last hr of HD reported SBP 89-90's but asymptomatic. BP this am stable. Denies any CP/SOB/N/V/D. Patient on RA. C/o constipation. No overnight events. States she has declined hospice but would like to go to a home care facility.   8/5: iHD today, net UF 1L.  No resting comfortably no complaints. VSS  8/6: Had iHD yesterday with 1 L UF.  Currently sleeping, but awakens easily and has no new complaints. VSS.   8/7: Lying in bed sleeping, awakens and has no new complaints, just fatigue. VSS. Due for iHD tomorrow.  8/8: Seen on HD this am. No acute distress. VSS.   8/9: 2.5 net UF. VSS. Resting in bed. C/o fatigue. Denies CP/SOB.   8/10: 1L net UF. No complaints. VSS.   8/11: Resting comfortably.  Family at bedside.  VSS.  Family reports she had been having pain, feels gabapentin may need to be increased.  No other complaints.   8/12: Seen on HD this am. No complaints.   8/13: iHD yesterday net UF 2.5L.  VSS 3L NC resting comfortably today.  No complaints.     REVIEW OF SYSTEMS:    10 point ROS reviewed and is as per HPI/daily summary or otherwise negative    PMH/PSH/SH/FH: Reviewed and unchanged since admission note  CURRENT MEDICATIONS: Reviewed from admission to present day    VS:  /41   Pulse 78   Temp 36.4 °C (97.5 °F) (Temporal)   Resp 16   Ht 1.626 m (5' 4\")   Wt 63.3 kg (139 " lb 8.8 oz)   SpO2 100%   BMI 23.95 kg/m²   Physical Exam  Vitals and nursing note reviewed.   Constitutional:       General: She is not in acute distress.     Appearance: She is ill-appearing.   HENT:      Head: Normocephalic and atraumatic.      Mouth/Throat:      Mouth: Mucous membranes are dry.   Eyes:      General: No scleral icterus.  Cardiovascular:      Rate and Rhythm: Normal rate and regular rhythm.      Pulses: Normal pulses.      Heart sounds: Normal heart sounds. Murmur heard.    No friction rub. No gallop.   Pulmonary:      Effort: Pulmonary effort is normal. No respiratory distress.      Breath sounds: No wheezing or rales.      Comments: Decrease breathsounds Bilateral lower lobes  Abdominal:      General: Bowel sounds are normal. There is no distension.      Palpations: Abdomen is soft.      Tenderness: There is no abdominal tenderness. There is no guarding.   Musculoskeletal:         General: Swelling (left arm) or deformity.      Cervical back: Normal range of motion.      Right lower leg: Trace edema.      Left lower leg: Trace edema.      Comments:   LUE AVF + bruit and Thrill   Skin:     General: Skin is warm and dry.      Coloration: Skin is not pale.      Findings: No rash.   Left hand wrapped in gauze   Neurological:      Mental Status: She is alert and oriented to person, place, and time.   Psychiatric:         Mood and Affect: Mood normal.         Behavior: Behavior normal    Fluids:  In: 940 [P.O.:440; Dialysis:500]  Out: 3000     LABS:  Recent Labs     08/11/22  1214 08/12/22  0722 08/13/22  1035   SODIUM 136 137 136   POTASSIUM 5.3 5.5 4.6   CHLORIDE 92* 95* 95*   CO2 28 28 29   GLUCOSE 153* 139* 112*   BUN 36* 49* 37*   CREATININE 4.16* 4.61* 4.14*   CALCIUM 9.3 9.0 9.1         IMPRESSION:  # ESRD, dependent on HD              - HD via LUE AVF             - HD qMWF  # Fluid overload, improved             - Secondary to non-compliance with fluid restriction and HD treatments  # Left  arm edema, slow improvement              - CTA upper ext 6/26 w/o e/o focal stenosis + extensive edema             - AVF patent on left arm US negative for DVT             - Pt no showed to outpt appt to evaluate              - Vascular does not recommend intervention              - Extensor tendon laceration left hand with wound             - Wound care in place, s/p biopsy 8/1             - Left hand skin biopsy negative for calciphylaxis   - Outpatient [facility] appt rescheduled for next month  # HTN, variable control, stable at this time             - Goal BP < 140/90             - Not on BP meds              - BP at goal  # Anemia of CKD, at goal              - Goal Hgb 10-11             - Iron 27             -TIBC 142             -%Sat 19             - Ferritin 1419  # CKD-MBD             - CCa 9.7             - PO4 4.7             - Vit D 35            - PTH 99.4             - Managed at OP unit             - On sevelamer with meals  # Hyperkalemia            - Correct w/ HD  # DM II--management per primary svc  # Leg Pain--chronic skin changes and subcutaneous tissue firm to touch             - Please discuss with gen surg for possible skin biopsy             - Vascular does not recommend any intervention             - On ropinirole   # Leukocytosis resolved  # Hyponatremia  - Likely fluid related, manage w/ HD  - Fluid Restriction  - Resolved  # Hypoalbuminemia  - No dietary protein restrictions    PLAN:  - Continue qMWF iHD schedule, Next iHD due MON  - UF as tolerated  - Continue phos binders WM, hold if patient is NPO.  - Continue off of all BP meds and diuretics for now. BP stable during HD.   - Limit sedating meds if possible  - No dietary protein restrictions  - Fluid restriction 1.2 L   - Low potassium diet  - Dose all meds per ESRD  - Max dose gabapentin in ESRD pt 300mg/day  - Outpt access center appointment rescheduled  - Follow labs  - DC planning underway for [facility]         Thank you,

## 2022-08-14 NOTE — PROGRESS NOTES
Received report from previous shift RN, assumed care of patient. Patient is A&Ox4, vital signs are stable, on room air 2.5L NC. Patient denies pain at this time, no signs of distress or discomfort. Sitting up in bed for breakfast. Call light and belongings within reach. Bed in lowest/locked position. Bed alarm on. Hourly rounding in place.

## 2022-08-14 NOTE — PROGRESS NOTES
[facility] -  PROGRESS NOTE               Author: THERESE Fragoso Date & Time: 8/14/2022  12:57 PM     HPI:  81yoF with PMH significant for ESRD on HD MWF via left arm AVF, HTN, DM II, Anemia secondary to CKD, CKD Bone mineral disorder, admitted with increased edema and weakness and having missed her last last 2-3 outpt HD treatments. Pt is very lethargic at this time and unable to provide much history, majority of the history was obtained through review of the medical record and discussion with primary svc. Pt had reported increased LE edema and fatigue and weakness and worsening of her left arm edema so she came to the ED for evaluation. She apparently has missed her last 2-3 outpt dialysis treatments. Per regular outpt Nephrologist Dr. Gates, she has been non-compliant with her fluid restriction and was told that she needed 4x/week dialysis until her volume status could be optimized but she was non-compliant with that recommendation. She has edema of her left arm where her AVF is located and there is concern for a central stenosis that could be the etiology. She had an appointment at the outpt access center to evaluate for central stenosis and undergo angioplasty if needed on 6/9/22 but pt did not go to the appointment. She has not had any other procedures done to the arm in the past couple of months. She had a left arm us done today that showed no DVT and patent AVF and soft tissue edema. She apparently received pain medication fentanyl and dilaudid as well as benadryl earlier today and she is very drowsy.     DAILY NEPHROLOGY SUMMARY:  **See previous note from 7/31/22 to review prior daily nephrology summary entries.  8/1: Laying in bed eating breakfast. Very tearful this AM, stated that she has to make a decision if she wants to continue with the biopsy on her hand. Pt is concerned that there may not be much we can do about the wound on her left hand. Dr Simon from wound  care at bedside. Will return this afternoon for biopsy procedure per MD. HD today UF 2L, tolerated well. Denies SOB, CP,N/VD. K+ up this am 5.7.   8/2: Resting in bed. C/o fatigue. Reports improved SOB, remains on oxygen.   08/3: Sitting up in chair, states she feels good today, SOB improved , remains on 2L nasal cannula. Denies CP, N/V/D. Discussed about fluid restriction. Pending left hand biopsy results. K+ improved 5.4. States fatigue but due to she recently ambulated with RN, otherwise no complaints. No overnight events, VSS.   08/4: Pt laying in bed. AOX4, HD yesterday tolerated will UF 2800mL. Hypotensive on the last hr of HD reported SBP 89-90's but asymptomatic. BP this am stable. Denies any CP/SOB/N/V/D. Patient on RA. C/o constipation. No overnight events. States she has declined hospice but would like to go to a home care facility.   8/5: iHD today, net UF 1L.  No resting comfortably no complaints. VSS  8/6: Had iHD yesterday with 1 L UF.  Currently sleeping, but awakens easily and has no new complaints. VSS.   8/7: Lying in bed sleeping, awakens and has no new complaints, just fatigue. VSS. Due for iHD tomorrow.  8/8: Seen on HD this am. No acute distress. VSS.   8/9: 2.5 net UF. VSS. Resting in bed. C/o fatigue. Denies CP/SOB.   8/10: 1L net UF. No complaints. VSS.   8/11: Resting comfortably.  Family at bedside.  VSS.  Family reports she had been having pain, feels gabapentin may need to be increased.  No other complaints.   8/12: Seen on HD this am. No complaints.   8/13: iHD yesterday net UF 2.5L.  VSS 3L NC resting comfortably today.  No complaints.   8/14: Sleeping in bed, arouses. VSS. No overnight events.     REVIEW OF SYSTEMS:    10 point ROS reviewed and is as per HPI/daily summary or otherwise negative    PMH/PSH/SH/FH: Reviewed and unchanged since admission note  CURRENT MEDICATIONS: Reviewed from admission to present day    VS:  /58   Pulse 81   Temp 36.4 °C (97.5 °F) (Temporal)    "Resp 17   Ht 1.626 m (5' 4\")   Wt 63.3 kg (139 lb 8.8 oz)   SpO2 98%   BMI 23.95 kg/m²   Physical Exam  Vitals and nursing note reviewed.   Constitutional:       General: She is not in acute distress.     Appearance: She is ill-appearing.   HENT:      Head: Normocephalic and atraumatic.      Mouth/Throat:      Mouth: Mucous membranes are dry.   Eyes:      General: No scleral icterus.  Cardiovascular:      Rate and Rhythm: Normal rate and regular rhythm.      Pulses: Normal pulses.      Heart sounds: Normal heart sounds. Murmur heard.    No friction rub. No gallop.   Pulmonary:      Effort: Pulmonary effort is normal. No respiratory distress.      Breath sounds: No wheezing or rales.      Comments: Decrease breathsounds Bilateral lower lobes  Abdominal:      General: Bowel sounds are normal. There is no distension.      Palpations: Abdomen is soft.      Tenderness: There is no abdominal tenderness. There is no guarding.   Musculoskeletal:         General: Swelling (left arm) or deformity, wrapped. Erythema 1st/4th digits.      Cervical back: Normal range of motion.      Right lower leg: Trace edema.      Left lower leg: Trace edema.      Comments:   LUE AVF + bruit and Thrill   Skin:     General: Skin is warm and dry.      Coloration: Skin is not pale.      Findings: No rash.   Left hand wrapped in gauze   Neurological:      Mental Status: She is alert and oriented to person, place, and time.   Psychiatric:         Mood and Affect: Mood normal.         Behavior: Behavior normal    Fluids:  In: 360 [P.O.:360]  Out: -     LABS:  Recent Labs     08/12/22  0722 08/13/22  1035   SODIUM 137 136   POTASSIUM 5.5 4.6   CHLORIDE 95* 95*   CO2 28 29   GLUCOSE 139* 112*   BUN 49* 37*   CREATININE 4.61* 4.14*   CALCIUM 9.0 9.1         IMPRESSION:  # ESRD, dependent on HD              - HD via LUE AVF             - HD qMWF  # Fluid overload, improved             - Secondary to non-compliance with fluid restriction and HD " treatments  # Left arm edema, slow improvement              - CTA upper ext 6/26 w/o e/o focal stenosis + extensive edema             - AVF patent on left arm US negative for DVT             - Pt no showed to outpt appt to evaluate              - Vascular does not recommend intervention              - Extensor tendon laceration left hand with wound             - Wound care in place, s/p biopsy 8/1             - Left hand skin biopsy negative for calciphylaxis   - Outpatient [facility] appt rescheduled for next month  # HTN, variable control, stable at this time             - Goal BP < 140/90             - Not on BP meds              - BP at goal  # Anemia of CKD, at goal              - Goal Hgb 10-11             - Iron 27             -TIBC 142             -%Sat 19             - Ferritin 1419  # CKD-MBD             - CCa 9.7             - PO4 4.7             - Vit D 35            - PTH 99.4             - Managed at OP unit             - On sevelamer with meals  # Hyperkalemia            - Correct w/ HD  # DM II--management per primary svc  # Leg Pain--chronic skin changes and subcutaneous tissue firm to touch             - Please discuss with gen surg for possible skin biopsy             - Vascular does not recommend any intervention             - On ropinirole   # Leukocytosis resolved  # Hyponatremia  - Likely fluid related, manage w/ HD  - Fluid Restriction  - Resolved  # Hypoalbuminemia  - No dietary protein restrictions    PLAN:  - Continue qMWF iHD schedule, Next iHD due MON  - UF as tolerated  - Continue phos binders WM, hold if patient is NPO.  - Continue off of all BP meds and diuretics for now. BP stable during HD.   - Limit sedating meds if possible  - No dietary protein restrictions  - Fluid restriction 1.2 L   - Low potassium diet  - Dose all meds per ESRD  - Max dose gabapentin in ESRD pt 300mg/day  - Outpt access center appointment rescheduled  - Follow labs  - DC planning underway for SNF         Thank  you,

## 2022-08-14 NOTE — CARE PLAN
Problem: Pain - Standard  Goal: Alleviation of pain or a reduction in pain to the patient’s comfort goal  Outcome: Progressing     Problem: Fall Risk  Goal: Patient will remain free from falls  Outcome: Progressing   The patient is Stable - Low risk of patient condition declining or worsening    Shift Goals  Clinical Goals: safety pain management  Patient Goals: sleep  Family Goals: CHIP    Progress made toward(s) clinical / shift goals:  no falls, medicated for pain per MAR    Patient is not progressing towards the following goals:

## 2022-08-14 NOTE — CARE PLAN
The patient is Stable - Low risk of patient condition declining or worsening    Shift Goals  Clinical Goals: Maintain skin integrity  Patient Goals: Rest and comfort  Family Goals: CHIP    Progress made toward(s) clinical / shift goals:  Q2 turns initiated. Mepilex in place on bilateral heels and sacrum. Pain medication administered per MAR.     Problem: Knowledge Deficit - Standard  Goal: Patient and family/care givers will demonstrate understanding of plan of care, disease process/condition, diagnostic tests and medications  Outcome: Progressing    Problem: Pain - Standard  Goal: Alleviation of pain or a reduction in pain to the patient’s comfort goal  Outcome: Progressing       Patient is not progressing towards the following goals:

## 2022-08-14 NOTE — THERAPY
Physical Therapy   Daily Treatment     Patient Name: Jannet Bruno  Age:  81 y.o., Sex:  female  Medical Record #: [id number]  Today's Date: 2022     Precautions  Precautions: Fall Risk  Comments: Lt UE edema/pain    Assessment    PT tx order received, chart reviewed, RN cleared pt for PT tx. Pt was ID via name and  on wristband. Asleep upon arrival required sternal rub to awaken. Awakens to sternal rub and max verbal cues. Pt had a difficult time staying awake during session. See grid below for details. Tolerated EOB with min A/mod A for trunk. Kept leaning towards Lt, unable to utilize Lt UE to hold herself up due to pain and very kyphotic posture. Had a difficult time keeping her head upright. Upon completion pt was left BTB, personal needs met, quqarter turned towards Rt, B heels offloaded ,bed alarm ON and activated, personal needs met, ANTOLIN, handoff to RN completed.     Plan    Continue current treatment plan.    DC Equipment Recommendations: Unable to determine at this time  Discharge Recommendations: Recommend post-acute placement for additional physical therapy services prior to discharge home       Objective       22 1323   Charge Group   Charges  Yes   PT Therapeutic Activities 1   Total Time Spent   PT Total Time Yes   PT Therapeutic Activities Time Spent (Mins) 17   PT Total Time Spent (Calculated) 17   Precautions   Precautions Fall Risk   Comments Lt UE edema/pain   Vitals   Pulse Oximetry 98 %   O2 (LPM) 2   O2 Delivery Device Silicone Nasal Cannula   Vitals Comments see nursing notes for details on vitals   Pain   Pain Scales 0 to 10 Scale    Intervention Declines;Repositioned   Pain 0 - 10 Group   Pain Rating Scale (NPRS) 0   Therapist Pain Assessment Prior to Activity;0  (with movement pt c/o inc pain Lt UE unable to state numerical value)   Cognition    Cognition / Consciousness X   Orientation Level Oriented x 4   Level of Consciousness   (very lethargic, able to open eyes  with max verbal and tactile stimulation)   Ability To Follow Commands 1 Step   Safety Awareness Impaired   New Learning Impaired   Attention Impaired   Initiation Impaired   Comments very lethargic, max cues to stay awake to participate   Balance   Sitting Balance (Static) Trace   Sitting Balance (Dynamic) Trace   Weight Shift Sitting Poor   Skilled Intervention Verbal Cuing;Tactile Cuing;Postural Facilitation;Compensatory Strategies   Comments unsafe to stand this date, pt barely able to keep her eyes open   Gait Analysis   Gait Level Of Assist Unable to Participate   Comments deferred standing at this date due to safety concern   Bed Mobility    Supine to Sit Moderate Assist   Sit to Supine Moderate Assist   Scooting Maximal Assist   Skilled Intervention Verbal Cuing;Tactile Cuing;Postural Facilitation;Compensatory Strategies   Comments mod A for supine to sit for trunk upright with bedrail. sit->supine mod A for B LE's over EOB. max A for repositioning in bed   Functional Mobility   Sit to Stand Unable to Participate   Comments not safe for OOB this date, pt very lethargic unable to stay awake   How much difficulty does the patient currently have...   Turning over in bed (including adjusting bedclothes, sheets and blankets)? 2   Sitting down on and standing up from a chair with arms (e.g., wheelchair, bedside commode, etc.) 1   Moving from lying on back to sitting on the side of the bed? 1   How much help from another person does the patient currently need...   Moving to and from a bed to a chair (including a wheelchair)? 1   Need to walk in a hospital room? 1   Climbing 3-5 steps with a railing? 1   6 clicks Mobility Score 7   Activity Tolerance   Sitting Edge of Bed 10 minutes approx   Comments very lethargic   Short Term Goals    Short Term Goal # 1 Pt will perform bed mobility with supervision in 6 visits with HOB flat, no rail.   Goal Outcome # 1 goal not met   Short Term Goal # 2 Pt will transfer with  supervision in 6 visits to improve functional indep.   Goal Outcome # 2 Goal not met   Short Term Goal # 3 Pt will ambulate x 50 feet using FWW with supervision in 6 visits to improve functional indep.   Goal Outcome # 3 Goal not met   Education Group   Role of Physical Therapist Patient Response Patient;Acceptance;Explanation;Verbal Demonstration   Anticipated Discharge Equipment and Recommendations   DC Equipment Recommendations Unable to determine at this time   Discharge Recommendations Recommend post-acute placement for additional physical therapy services prior to discharge home   Interdisciplinary Plan of Care Collaboration   IDT Collaboration with  Nursing   Patient Position at End of Therapy Bed Alarm On;Call Light within Reach;Tray Table within Reach;Phone within Reach;Other (Comments)  (B heels offloaded, quarter turned towards Rt)   Collaboration Comments handoff to RN completed   Session Information   Date / Session Number  8/14-13(1/3,8/19)

## 2022-08-15 PROBLEM — R07.9 CHEST PAIN: Status: ACTIVE | Noted: 2022-01-01

## 2022-08-15 NOTE — PROGRESS NOTES
Hospital Medicine Daily Progress Note    Date of Service  8/15/2022    Chief Complaint  Edema    Hospital Course  81 y.o. female with PMHx ESRD on HD MWF, HTN, HLD, and T2DM who was admitted on 6/24/2022 for left upper arm swelling and lower extremity edema after missing HD sessions.  X-rays of the left hand showed only chronic changes.  AV fistula was functioning well.  CTA showed no evidence of central stenosis.  Vascular surgery was consulted and only recommended supportive care such as wrapping the arm with Ace bandage.  She was also found to have severe aortic stenosis for which cardiology was consulted, and gave the opinion that she is not a candidate for intervention.  She was dialyzed while in the hospital with subsequent clinical improvement.  She did have hospital delirium while in the hospital, which has since resolved.  Palliative care was consulted, but patient continued to wish to be full code.  SNF placement was pursued, but proved to be challenging as transfer to dialysis is not always facilitated.  Case management is working on her discharge to SNF and arrangement of hemodialysis.    Interval Problem Update    08/15/22      I evaluated and examined her at the bedside.  Overnight she developed chest pain and she was started on heparin gtt. and transferred to telemetry floor.  She found to have elevated troponin and it remained stable.  When I evaluated her at the bedside she expressed her pain has completely subsided and she reported shoulder pain and lidocaine past and heating pad help in her pain.  I discussed with her that if her next opening remained stable plan is to discontinue heparin and transfer her to medical floor.  She agreed with the plan to check troponin 1 more time and avoid any procedure if is not required.      Consultants/Specialty  cardiology, nephrology and vascular surgery    Code Status  Full Code    Disposition  Patient is medically cleared for discharge.   Anticipate  discharge to to skilled nursing facility.  I have placed the appropriate orders for post-discharge needs.    Review of Systems  Review of Systems   Constitutional:  Negative for chills, fever and weight loss.   HENT:  Negative for hearing loss and tinnitus.    Eyes:  Negative for blurred vision, double vision, photophobia and pain.   Respiratory:  Negative for cough, sputum production and shortness of breath.    Cardiovascular:  Negative for chest pain (Now resolved), palpitations, orthopnea and leg swelling.   Gastrointestinal:  Negative for abdominal pain, constipation, diarrhea, nausea and vomiting.   Genitourinary:  Negative for dysuria, frequency and urgency.   Musculoskeletal:  Positive for back pain and myalgias. Negative for joint pain and neck pain.   Skin:  Negative for rash.   Neurological:  Negative for dizziness, tingling, tremors, sensory change, speech change, focal weakness and headaches.   Psychiatric/Behavioral:  Negative for hallucinations and substance abuse.    All other systems reviewed and are negative.     Pertinent positives/negatives as mentioned above.     A complete review of systems was personally done by me. All other systems were negative.        Physical Exam  Temp:  [36 °C (96.8 °F)-36.8 °C (98.2 °F)] 36.8 °C (98.2 °F)  Pulse:  [65-83] 72  Resp:  [14-18] 18  BP: (101-122)/(40-79) 118/45  SpO2:  [93 %-99 %] 97 %    Physical Exam  Vitals reviewed.   Constitutional:       General: She is not in acute distress.     Appearance: Normal appearance. She is not ill-appearing.   HENT:      Head: Normocephalic and atraumatic.      Nose: No congestion.   Eyes:      General:         Right eye: No discharge.         Left eye: No discharge.      Pupils: Pupils are equal, round, and reactive to light.   Cardiovascular:      Rate and Rhythm: Normal rate and regular rhythm.      Pulses: Normal pulses.      Heart sounds: Murmur heard.   Pulmonary:      Effort: Pulmonary effort is normal. No respiratory  distress.      Breath sounds: Normal breath sounds. No stridor.   Abdominal:      General: Bowel sounds are normal. There is no distension.      Palpations: Abdomen is soft.      Tenderness: There is no abdominal tenderness.   Musculoskeletal:         General: Swelling, tenderness and deformity present. Normal range of motion.      Cervical back: Normal range of motion. No rigidity.   Skin:     General: Skin is warm.      Capillary Refill: Capillary refill takes less than 2 seconds.      Coloration: Skin is not jaundiced or pale.      Findings: Erythema and lesion present. No bruising.      Comments: Dressing present on left hand CDI   Neurological:      General: No focal deficit present.      Mental Status: She is alert and oriented to person, place, and time.      Cranial Nerves: No cranial nerve deficit.   Psychiatric:         Mood and Affect: Mood normal.         Behavior: Behavior normal.     I performed physical exam on August 15, 2002 it remains similar without any acute changes.  I reviewed patient's left hand and fingers picture in epic.  Fluids    Intake/Output Summary (Last 24 hours) at 8/15/2022 1643  Last data filed at 8/15/2022 1026  Gross per 24 hour   Intake 920 ml   Output 3500 ml   Net -2580 ml         Laboratory  Recent Labs     08/14/22  2148   WBC 7.3   RBC 2.50*   HEMOGLOBIN 8.7*   HEMATOCRIT 27.3*   .2*   MCH 34.8*   MCHC 31.9*   RDW 74.4*   PLATELETCT 212   MPV 8.4*       Recent Labs     08/13/22  1035 08/14/22  2148   SODIUM 136 135   POTASSIUM 4.6 6.1*   CHLORIDE 95* 94*   CO2 29 25   GLUCOSE 112* 125*   BUN 37* 59*   CREATININE 4.14* 5.51*   CALCIUM 9.1 8.9       Recent Labs     08/14/22  2148   APTT 36.9*   INR 1.25*               Imaging  US-EXTREMITY ARTERY UPPER UNILAT LEFT   Final Result      DX-CHEST-PORTABLE (1 VIEW)   Final Result         1.  Left pulmonary infiltrates or atelectasis, similar to prior study.   2.  Small to moderate left pleural effusion   3.  Atherosclerosis       CT-HAND W/O LEFT   Final Result      1.  No acute fracture or dislocation.      2.  Previous distal ulnar fracture identified.      3.  Osteoarthritis is most prominent at the first carpometacarpal joint.      4.  No bone erosions identified.      5.  Fluid collection or soft tissue abscess is identified.      6.  Induration in subcutaneous fat and intramuscular fat planes is noted which could be due to edema or inflammation such as cellulitis.      DX-HAND 3+ LEFT   Final Result      1. No acute fracture or subluxation.   2. Chronic posttraumatic changes in the distal left radius and ulna, stable when compared with the prior study.   3. Stable osteoarthritis involving the left first carpometacarpal joint.      DX-HAND 3+ LEFT   Final Result      1.  No acute fracture or dislocation.   2.  Scattered mild degenerative changes, increased from prior study. No definite erosive arthropathy.   3.  Osteopenia.   4.  Old ulnar styloid process fracture.      DX-CHEST-PORTABLE (1 VIEW)   Final Result      1.  Probable 12 mm right mid/upper lung zone mass. Suggest CT chest without contrast for further assessment      2.  Interstitial pulmonary edema or fibrosis      3.  Enlarged cardiac silhouette      4.  Left pleural effusion      CT-CTA UPPER EXT WITH & W/O-POST PROCESS LEFT   Final Result      1.  Status post brachiocephalic fistula in the left arm. No focal stenosis is identified.      2.  Extensive edema in the subcutaneous tissues of the visualized left lateral chest and arm.      EC-ECHOCARDIOGRAM COMPLETE W/O CONT   Final Result      US-ZAHRAA SINGLE LEVEL BILAT   Final Result      US-EXTREMITY ARTERY LOWER BILAT   Final Result      CT-EXTREMITY, UPPER W/O LEFT   Final Result      1.  There is diffuse subcutaneous edema of the imaged portions of the left upper extremity. There is also diffuse body wall edema in the visualized portions of the chest and abdomen.   2.  No focal fluid collection to suggest abscess.   3.   There is a small left pleural effusion and minimal fluid in the abdomen.   4.  There is postsurgical change of AV fistula. There is small vessel atherosclerosis.      US-EXTREMITY VENOUS UPPER UNILAT LEFT   Final Result      DX-CHEST-PORTABLE (1 VIEW)   Final Result         1.  Interstitial pulmonary parenchymal prominence suggest chronic underlying lung disease, component of interstitial edema and/or infiltrates not excluded.   2.  Small left pleural effusion   3.  Cardiomegaly   4.  Atherosclerosis           Assessment/Plan  * ESRD (end stage renal disease) on dialysis (HCC)- (present on admission)  Assessment & Plan  - Continue hemodialysis per nephrology.  -Continue sevelamer and calcitriol.    Chest pain  Assessment & Plan  She developed chest pain on August 14, 2022 and she was transferred to telemetry floor with heparin.  She found to have elevated troponin in the setting of end-stage renal disease.  Repeat troponin remain in the similar range.   I evaluated her at the bedside and she reported that her chest pain has completely subsided and currently she has shoulder pain and lidocaine patch and heating pads are helpful.  I discussed with her that I am willing to order troponin 1 more time and if it is remained in the similar range plan is to avoid doing further testing and avoid any unnecessary procedure.  I reviewed EKGs and he did not show any acute abnormalities.  I also reviewed cardiology note from June 29, 2022 it recommended goals of care discussion and palliative care due to severe aortic stenosis and low ejection fraction of 30%.    Extensor tendon laceration of left hand with open wound  Assessment & Plan  She has been having worsening of her wound on her left hand. COmpleted course of ancef. Now, hand wound healing well, no further signs of infection.X-ray did not show any acute abnormalities.  CT scan of her left hand and it did not show any fluid collection.  Skin biopsy showed no evidence of  calciphylaxis or malignancy, and only showed acute inflammation.  Wound care service following.  Continue wound care.      Hypothyroidism- (present on admission)  Assessment & Plan  - Continue Synthroid.    Delirium  Assessment & Plan  - Resolved.    Aortic stenosis, severe- (present on admission)  Assessment & Plan  - Not a candidate for intervention per cardiology.  -Continue to monitor volume status.    Pain in both lower extremities- (present on admission)  Assessment & Plan  - Chronic.  Continue as needed pain medications.    Advance care planning  Assessment & Plan  She is declining hospice services.  I again discussed with her about goals of care, and she states she wants to continue current management that she is receiving, and wants to continue with hemodialysis.    Edema of left upper extremity- (present on admission)  Assessment & Plan  - Chronic, at the same site of AV fistula which is functioning well.  -Multifactorial: Poor EF, lymphedema, outflow stenosis.  CTA showed no evidence of central stenosis.  Vascular surgery recommended supportive care with wrapping arm with Ace bandage.  -Continue to monitor.    Generalized weakness- (present on admission)  Assessment & Plan  - SNF placement being pursued.  Placement challenging due to need for transportation back and forth to hemodialysis.  CM/SW on board.    Diabetes mellitus, type II (HCC)- (present on admission)  Assessment & Plan  - Last HbA1c was 11.7.  Continue Lantus 5 units at night, along with sliding scale insulin coverage.  Continue Accu-Cheks before meals and at bedtime. Goal to keep BG between 140-180 per 2019 ADA guidelines.        Essential hypertension- (present on admission)  Assessment & Plan  - Maintaining good blood pressure control, despite holding home antihypertensives.  Continue to monitor blood pressure trend closely.       I discussed plan of care with  regarding her current medical condition and discharge plan.  I  discussed plan of care with bedside RN on telemetry floor.      VTE prophylaxis: heparin ppx    I have performed a physical exam and reviewed and updated ROS and Plan today (8/15/2022). In review of last note, there are no changes except as documented above.

## 2022-08-15 NOTE — PROGRESS NOTES
Pt transported from  via bed. She is awake alert and oriented x 4 with no c/o chest pain at the moment. Heparin gtt infusing at 12 u/kg/hr. VSS. Telemetry monitor placed and tele room notified. Discussed POC. All needs met at this time.

## 2022-08-15 NOTE — PROGRESS NOTES
"2224: Trop elevated 221. Transferred to tele with continuous tele monitoring. Q6hour serial troponin x two. EKG PRN for chest pain. Heparin gtt. Further labs pending. Consider formal Cardiology consult in AM. Defer diuresis to day team.     DDimer elevated. Given poor renal function, stable oxygen demand, and start of heparin gtt I will defer CTA chest to day team.           NOC Cross Cover Progress Note    Jannet Bruno 81 year old female with PMHx of ESRD on HD MWF, HTN, HLD, and T2DM who was admitted on 6/24/2022 for left upper arm swelling and lower extremity edema after missing HD sessions. Plan has been to DC to SNF.     I was called to a rapid this evening for c/o chest pain. Per bedside RN, patient has been more lethargic. Stable O2 requirement; BP stable, afebrile. She reports \"very slight\" substernal chest pain that is worse with deep breathing; pain is non-radiating. She cannot describe the sensation or severity. She denies productive cough, shortness of breath, LE pain or swelling; abdominal pain, nausea, vomiting, dysuria, fever or chills.     On exam, she is rousable to voice and is oriented x2. She is non-toxic appearing and in no acute distress. Respirations even/unlabored. Coarse bilateral lung bases. Cardiac rhythm and rate regular, murmur appreciated. Strong, regular peripheral pulses, no LE edema. Edematous RUE, baseline. Abdomen soft, non tender, active bowel sounds.     CXR, EKG, troponin, CBC, BMP ordered    - EKG NSR with probable LVH. No evidence of acute changes suggestive of ACS    - CXR with left pulmonary infiltrates or atelectasis, similar to prior study; small to moderate left pleural effusion    Linsey Wegener, APRN  N[facility] Hospitalist          "

## 2022-08-15 NOTE — PROGRESS NOTES
[facility] -  PROGRESS NOTE               Author: Bebeto Gregory M.D. Date & Time: 8/15/2022  11:02 AM     HPI:  81yoF with PMH significant for ESRD on HD MWF via left arm AVF, HTN, DM II, Anemia secondary to CKD, CKD Bone mineral disorder, admitted with increased edema and weakness and having missed her last last 2-3 outpt HD treatments. Pt is very lethargic at this time and unable to provide much history, majority of the history was obtained through review of the medical record and discussion with primary svc. Pt had reported increased LE edema and fatigue and weakness and worsening of her left arm edema so she came to the ED for evaluation. She apparently has missed her last 2-3 outpt dialysis treatments. Per regular outpt Nephrologist Dr. Gates, she has been non-compliant with her fluid restriction and was told that she needed 4x/week dialysis until her volume status could be optimized but she was non-compliant with that recommendation. She has edema of her left arm where her AVF is located and there is concern for a central stenosis that could be the etiology. She had an appointment at the outpt access center to evaluate for central stenosis and undergo angioplasty if needed on 6/9/22 but pt did not go to the appointment. She has not had any other procedures done to the arm in the past couple of months. She had a left arm us done today that showed no DVT and patent AVF and soft tissue edema. She apparently received pain medication fentanyl and dilaudid as well as benadryl earlier today and she is very drowsy.     DAILY NEPHROLOGY SUMMARY:  **See previous note from 7/31/22 to review prior daily nephrology summary entries.  8/1: Laying in bed eating breakfast. Very tearful this AM, stated that she has to make a decision if she wants to continue with the biopsy on her hand. Pt is concerned that there may not be much we can do about the wound on her left hand. Dr Simon from wound care at  bedside. Will return this afternoon for biopsy procedure per MD. HD today UF 2L, tolerated well. Denies SOB, CP,N/VD. K+ up this am 5.7.   8/2: Resting in bed. C/o fatigue. Reports improved SOB, remains on oxygen.   08/3: Sitting up in chair, states she feels good today, SOB improved , remains on 2L nasal cannula. Denies CP, N/V/D. Discussed about fluid restriction. Pending left hand biopsy results. K+ improved 5.4. States fatigue but due to she recently ambulated with RN, otherwise no complaints. No overnight events, VSS.   08/4: Pt laying in bed. AOX4, HD yesterday tolerated will UF 2800mL. Hypotensive on the last hr of HD reported SBP 89-90's but asymptomatic. BP this am stable. Denies any CP/SOB/N/V/D. Patient on RA. C/o constipation. No overnight events. States she has declined hospice but would like to go to a home care facility.   8/5: iHD today, net UF 1L.  No resting comfortably no complaints. VSS  8/6: Had iHD yesterday with 1 L UF.  Currently sleeping, but awakens easily and has no new complaints. VSS.   8/7: Lying in bed sleeping, awakens and has no new complaints, just fatigue. VSS. Due for iHD tomorrow.  8/8: Seen on HD this am. No acute distress. VSS.   8/9: 2.5 net UF. VSS. Resting in bed. C/o fatigue. Denies CP/SOB.   8/10: 1L net UF. No complaints. VSS.   8/11: Resting comfortably.  Family at bedside.  VSS.  Family reports she had been having pain, feels gabapentin may need to be increased.  No other complaints.   8/12: Seen on HD this am. No complaints.   8/13: iHD yesterday net UF 2.5L.  VSS 3L NC resting comfortably today.  No complaints.   8/14: Sleeping in bed, arouses. VSS. No overnight events.   8/15: Seen on dialysis at bedside.  Denies SOB.  Having back pain.  Chest pain last night, not complaining of at this time    REVIEW OF SYSTEMS:    10 point ROS reviewed and is as per HPI/daily summary or otherwise negative    PMH/PSH/SH/FH: Reviewed and unchanged since admission note  CURRENT  "MEDICATIONS: Reviewed from admission to present day    VS:  /58   Pulse 65   Temp 36 °C (96.8 °F) (Temporal)   Resp 16   Ht 1.626 m (5' 4\")   Wt 63 kg (138 lb 14.2 oz)   SpO2 93%   BMI 23.84 kg/m²   Physical Exam  Vitals and nursing note reviewed.   Constitutional:       General: She is not in acute distress.     Appearance: She is ill-appearing.   HENT:      Head: Normocephalic and atraumatic.      Mouth/Throat:      Mouth: Mucous membranes are dry.   Eyes:      General: No scleral icterus.  Cardiovascular:      Rate and Rhythm: Normal rate and regular rhythm.      Pulses: Normal pulses.      Heart sounds: Normal heart sounds. Murmur heard.    No friction rub. No gallop.   Pulmonary:      Effort: Pulmonary effort is normal. No respiratory distress.      Breath sounds: No wheezing or rales.      Comments: Decrease breathsounds Bilateral lower lobes  Abdominal:      General: Bowel sounds are normal. There is no distension.      Palpations: Abdomen is soft.      Tenderness: There is no abdominal tenderness. There is no guarding.   Musculoskeletal:         General: Swelling (left arm) or deformity, wrapped. Erythema 1st/4th digits.      Cervical back: Normal range of motion.      Right lower leg: Trace edema.      Left lower leg: Trace edema.      Comments:   LUE AVF + bruit and Thrill   Skin:     General: Skin is warm and dry.      Coloration: Skin is not pale.      Findings: No rash.   Left hand wrapped in gauze   Neurological:      Mental Status: She is alert and oriented to person, place, and time.   Psychiatric:         Mood and Affect: Mood normal.         Behavior: Behavior normal    Fluids:  In: 780 [P.O.:780]  Out: -     LABS:  Recent Labs     08/13/22  1035 08/14/22  2148   SODIUM 136 135   POTASSIUM 4.6 6.1*   CHLORIDE 95* 94*   CO2 29 25   GLUCOSE 112* 125*   BUN 37* 59*   CREATININE 4.14* 5.51*   CALCIUM 9.1 8.9         IMPRESSION:  # ESRD, dependent on HD              - HD via LUE AVF         "     - HD qMWF  # Fluid overload, improved             - Secondary to non-compliance with fluid restriction and HD treatments  # Left arm edema, slow improvement              - CTA upper ext 6/26 w/o e/o focal stenosis + extensive edema             - AVF patent on left arm US negative for DVT             - Pt no showed to outpt appt to evaluate              - Vascular does not recommend intervention              - Extensor tendon laceration left hand with wound             - Wound care in place, s/p biopsy 8/1             - Left hand skin biopsy negative for calciphylaxis   - Outpatient [facility] appt rescheduled for next month  # HTN, variable control, stable at this time             - Goal BP < 140/90             - Not on BP meds              - BP at goal  # Anemia of CKD, at goal              - Goal Hgb 10-11             - Iron 27             -TIBC 142             -%Sat 19             - Ferritin 1419  # CKD-MBD             - Vit D 35            - PTH 99.4             - Managed at OP unit             - On sevelamer with meals  # Hyperkalemia            - Correct w/ HD  # DM II--management per primary svc  # Leg Pain--chronic skin changes and subcutaneous tissue firm to touch             - Vascular does not recommend any intervention             - On ropinirole   # Leukocytosis resolved  # Hyponatremia  - Likely fluid related, manage w/ HD  - Fluid Restriction  - Resolved  # Hypoalbuminemia  - No dietary protein restrictions    PLAN:  - Continue qMWF iHD schedule, Next iHD due MON  - UF as tolerated  - Continue phos binders WM, hold if patient is NPO.  - Continue off of all BP meds and diuretics for now. BP stable during HD.   - Limit sedating meds if possible  - No dietary protein restrictions  - Fluid restriction 1.2 L   - Low potassium diet  - Dose all meds per ESRD  - Max dose gabapentin in ESRD pt 300mg/day  - Outpt access center appointment rescheduled  - Follow labs  - DC planning underway for SNF         Thank  you,

## 2022-08-15 NOTE — ASSESSMENT & PLAN NOTE
Recurrent  She developed chest pain on August 14, 2022 and she was transferred to telemetry floor with heparin.  She found to have elevated troponin in the setting of end-stage renal disease.  Repeat troponin remain in the similar range.   I evaluated her at the bedside and she reported that her chest pain has completely subsided and currently she has shoulder pain and lidocaine patch and heating pads are helpful.  I discussed with her that I am willing to order troponin 1 more time and if it is remained in the similar range plan is to avoid doing further testing and avoid any unnecessary procedure.  I reviewed EKGs and he did not show any acute abnormalities.  I also reviewed cardiology note from June 29, 2022 it recommended goals of care discussion and palliative care due to severe aortic stenosis and low ejection fraction of 30%.    8/19 patient had another episode of chest pain during dialysis.  EKG abnormal, discussed with cardiology who did not recommend any further interventions.  Troponin 212 which is stable from troponins drawn earlier this week.  Chest pain resolved while at bedside and dialysis continued.

## 2022-08-15 NOTE — PROGRESS NOTES
[facility] Progress Note      HD ordered by Dr. Gregory. Treatment started at 0725 and ended at 1026.     Total Net UF 3000mL.    Pt tolerated treatment well with no issues. See flow sheet for details. Cannulation needles taken out, held pressure for 10 min and placed gauze dressing with no bleeding. Dressing CDI post dialysis, primary RN instructed to monitor for bleeding. LUE AVF + for bruit/thrill. Report given to KULWANT Phillip RN.

## 2022-08-15 NOTE — CARE PLAN
The patient is Stable - Low risk of patient condition declining or worsening    Shift Goals: HD and rest  Clinical Goals: chest pain treatment  Patient Goals: pain control  Family Goals: CHIP    Progress made toward(s) clinical / shift goals:  Pt had 3L removed in HD. Pain control on pt's right upper back.     Patient is not progressing towards the following goals:

## 2022-08-15 NOTE — PROGRESS NOTES
1930 - Rn introduced self to patient, patient alert and oriented, awake and talkative.      2015 - Patient sitting up in bed eating dinner.    2115 - Patient began to complain of sharp chest pain worsening with deep breaths.  Patient lethargic with worsening confusion.  Vitals stable.  Rapid response called, hospitalist notified.    Rapid Response - chest xray ordered, labs ordered per [facility], EKG completed, rapid team at bedside.    2224 - Critical Lab notification to Nery Mueller [facility] - troponin 221.  Orders received.    [id number] - CICU RN at bedside, heparin gtt started, report given to Viraj WADE, patient transferred to tele.

## 2022-08-15 NOTE — DISCHARGE PLANNING
Agency/Facility Name: [facility]  Spoke To: Kamille  Outcome: Kamille will review the chart.  WAN notified Kamille the pt was transferred to T8.

## 2022-08-15 NOTE — DISCHARGE PLANNING
Agency/Facility Name: [facility]  Outcome: DPA left v-mail regarding referral status with request for callback.     1430-  Agency/Facility Name: [facility]  Outcome: DPA left v-mail regarding referral status with request for callback.

## 2022-08-15 NOTE — PROGRESS NOTES
Rapid Response Summary     Rapid response called at 2115 for: Chest Pain     VS: WDL (See Vitals Flowsheet)  Additional info: increased lethargy, confusion  MD Paged: Wegener, Linsey  Interventions:    Imaging/Tests: Chest X-ray and EKG    Labs: Troponin   Medications:  N/A   Other: N/A  Disposition: Pending lab results. Primary RN updated on plan of care. Rapid team will continue to follow the patient.

## 2022-08-16 NOTE — PROGRESS NOTES
Assumed care of patient at bedside report from NOC RN. Updated on POC. Patient currently A & O x 2 (disoriented to time and situation); on room air; up max assist to commode; without complaints of acute pain.  Call light within reach. Whiteboard updated. Fall precautions in place. Bed locked and in lowest position. All questions answered. No other needs indicated at this time.

## 2022-08-16 NOTE — PROGRESS NOTES
Report received by day shift. RN. Pt sleeping on left side comfortably with unlabored breathing. Orders, labs and POC reviewed. Fall precautions in place.

## 2022-08-16 NOTE — CARE PLAN
The patient is Stable - Low risk of patient condition declining or worsening    Shift Goals  Clinical Goals: HD, wound changes, pain control, placement   Patient Goals: pain control  Family Goals: CHIP      Problem: Pain - Standard  Goal: Alleviation of pain or a reduction in pain to the patient’s comfort goal  Outcome: Progressing    Pt assessed for pain regularly and medicated PRN per MAR.       Problem: Knowledge Deficit - Standard  Goal: Patient and family/care givers will demonstrate understanding of plan of care, disease process/condition, diagnostic tests and medications  Outcome: Progressing     Problem: Fall Risk  Goal: Patient will remain free from falls  Outcome: Progressing

## 2022-08-16 NOTE — WOUND TEAM
[facility]  Inpatient Services  Wound and Skin Care Progress Note    Admission Date: 2022     Last order of IP CONSULT TO WOUND CARE was found on 2022 from Hospital Encounter on 2022     HPI, PMH, SH: Reviewed    Past Surgical History:   Procedure Laterality Date    AV FISTULA CREATION Left 2/15/2019    Procedure: AV FISTULA CREATION-  UPPER EXTREMITY BRACHIAL CEPHALIC BANDING;  Surgeon: Fernie Nazario M.D.;  Location: SURGERY [facility];  Service: General    HIP CANNULATED SCREW Right 2017    Procedure: HIP CANNULATED SCREW;  Surgeon: Ar Huerta M.D.;  Location: SURGERY [facility];  Service:     CATARACT PHACO WITH IOL  2014    Performed by Rudolph Barclay M.D. at SURGERY SAME DAY [facility]    CATARACT PHACO WITH IOL  2014    Performed by Rudolph Barclay M.D. at SURGERY SAME DAY [facility]    VENTRAL HERNIA REPAIR LAPAROSCOPIC  3/7/2012    Performed by HUNTER SERNA at SURGERY SAME DAY [facility]    APPENDECTOMY      GYN SURGERY      hysterectomy    OTHER      T&A    OTHER ABDOMINAL SURGERY      abd tramua- horse stepped on abd    OTHER ORTHOPEDIC SURGERY      R & l Shoulder repair     Social History     Tobacco Use    Smoking status: Former     Packs/day: 1.00     Years: 20.00     Pack years: 20.00     Types: Cigarettes     Quit date: 3/5/1979     Years since quittin.4    Smokeless tobacco: Never   Substance Use Topics    Alcohol use: No     Chief Complaint   Patient presents with    Peripheral Edema    Weakness     BIBA from home for weakness, nausea, L arm pain  HX of dialysis with L arm fistula, pt states she has missed 3 dialysis days due to being so weak she cannot drive or walk now  3 weeks ago had a procedure done to her fistula which has lead to L arm swelling     Diagnosis: ESRD (end stage renal disease) on dialysis (HCC) [N18.6, Z99.2]    Unit where seen by Wound Team: S626/02     WOUND CONSULT/FOLLOW UP RELATED TO:  L  hand     WOUND HISTORY:  Per HPI: 81 y.o. female with PMHx ESRD on HD MWF, HTN, HLD, and T2DM who was admitted on 6/24/2022 for left upper arm swelling and lower extremity edema after missing HD sessions.  X-rays of the left hand showed only chronic changes.  AV fistula was functioning well.  CTA showed no evidence of central stenosis.  Vascular surgery was consulted and only recommended supportive care such as wrapping the arm with Ace bandage.  She was also found to have severe aortic stenosis for which cardiology was consulted, and gave the opinion that she is not a candidate for intervention.  She was dialyzed while in the hospital with subsequent clinical improvement.  She did have hospital delirium while in the hospital, which has since resolved.  Palliative care was consulted, but patient continued to wish to be full code.  SNF placement was pursued, but proved to be challenging as transfer to dialysis is not always facilitated.  Case management is working on her discharge to SNF and arrangement of hemodialysis.    8/1/22: punch biopsy of hand performed by MD Das.     WOUND ASSESSMENT/LDA            Wound 07/20/22 Soft Tissue Necrosis Hand Left x2 dorsal, x1 palmar (Active)   Wound Image    08/16/22 0900   Site Assessment Red;Brown;Black;Yellow;Pink 08/16/22 0900   Periwound Assessment Red 08/16/22 0900   Margins Unattached edges;Defined edges 08/16/22 0900   Closure Secondary intention 08/16/22 0900   Drainage Amount Scant 08/16/22 0900   Drainage Description Serosanguineous 08/16/22 0900   Treatments Cleansed;Site care;Tape change 08/16/22 0900   Wound Cleansing Approved Wound Cleanser 08/16/22 0900   Periwound Protectant Skin Protectant Wipes to Periwound 08/16/22 0900   Dressing Cleansing/Solutions Not Applicable 08/16/22 0900   Dressing Options Adaptic;Hydrofiber Silver;Dry Roll Gauze;Dry Gauze 08/16/22 0900   Dressing Changed Changed 08/16/22 0900   Dressing Status Clean;Dry;Intact 08/16/22 0900    Dressing Change/Treatment Frequency Every 48 hrs, and As Needed 08/16/22 0900   NEXT Dressing Change/Treatment Date 08/18/22 08/16/22 0900   NEXT Weekly Photo (Inpatient Only) 08/23/22 08/16/22 0900   Non-staged Wound Description Full thickness 08/16/22 0900   Exposed Structures CHIP 08/16/22 0900   WOUND NURSE ONLY - Time Spent with Patient (mins) 60 08/16/22 0900                   Vascular:    Lab Values:    Lab Results   Component Value Date/Time    WBC 7.3 08/14/2022 09:48 PM    RBC 2.50 (L) 08/14/2022 09:48 PM    HEMOGLOBIN 8.7 (L) 08/14/2022 09:48 PM    HEMATOCRIT 27.3 (L) 08/14/2022 09:48 PM    CREACTPROT 7.07 (H) 06/25/2022 04:46 AM    HBA1C 11.7 (H) 06/24/2022 03:35 AM        Culture Results show:  Recent Results (from the past 720 hour(s))   CULTURE WOUND W/ GRAM STAIN    Collection Time: 07/20/22  3:00 PM    Specimen: Left Hand; Wound   Result Value Ref Range    Significant Indicator POS (POS)     Source WND     Site LEFT HAND     Culture Result - (A)     Gram Stain Result Few WBCs.  Moderate Gram positive cocci.       Culture Result Staphylococcus aureus  Moderate growth   (A)        Susceptibility    Staphylococcus aureus - ADRIANE     Azithromycin <=2 Sensitive mcg/mL     Clindamycin <=0.25 Sensitive mcg/mL     Cefazolin <=8 Sensitive mcg/mL     Cefepime <=4 Sensitive mcg/mL     Ceftaroline <=0.5 Sensitive mcg/mL     Daptomycin <=0.5 Sensitive mcg/mL     Ampicillin/sulbactam <=8/4 Sensitive mcg/mL     Erythromycin <=0.25 Sensitive mcg/mL     Vancomycin 1 Sensitive mcg/mL     Oxacillin 0.5 Sensitive mcg/mL     Pip/Tazobactam <=8 Sensitive mcg/mL     Trimeth/Sulfa <=0.5/9.5 Sensitive mcg/mL     Tetracycline <=4 Sensitive mcg/mL       Pain Level/Medicated:  PO pain meds    INTERVENTIONS BY WOUND TEAM:  Chart and images reviewed. Discussed with bedside RN. All areas of concern (based on picture review, LDA review and discussion with bedside RN) have been thoroughly assessed. Documentation of areas based on  "significant findings. This RN in to assess patient. Performed standard wound care which includes appropriate positioning, dressing removal and non-selective debridement. Pictures and measurements obtained weekly if/when required.    Preparation for Dressing removal: soaked with NS.   Non-selectively Debrided with: wound cleanser and gauze.  Sharp debridement: NA  Bebe wound: Cleansed with wound cleanser and gauze  Primary Dressing: Adaptic layer over wounds then hydrofiber to dorsal wounds, hydrofiber tucked into the wound bed of the palmar hand.   Secondary (Outer) Dressin\" kerlix and tape,loosely wrapped ACE wrap        Interdisciplinary consultation: Patient, Bedside RN Maddy    EVALUATION / RATIONALE FOR TREATMENT:  Most Recent Date:  22: Continue same plan of care, minimal to no changes. Palmar hand decrease in size and drainage amount. Continue to follow up.     22: Per Dr. Murphy \"Pathology of the skin biopsy from the left hand did not show evidence of calciphylaxis or malignancy, and only showed acute inflammation and necrosis with scattered multinucleated giant cells.\" Left 2nd finger wound mostly covered by eschar. Patient unable to tolerate debridement at this time due to pain. Continued POC for now. Consider alternate dressing  to dorsal hand on next change to autolytically debride.  22: exposed tendon to the left pointer finger dorsal, eschar covering majority of the wound but at the proximal portion some tendon exposure.  Continuing with Aquacel Ag, awaiting punch biopsy results.   : Palmar wound with purulent drainage. Hydrofiber silver strip disintigrating, thus removed and wound cavity cleansed. Dorsal fingers wounds with eschar layer. Pt not tolerating dressing changes, so IV medication requested. Nursing to continue changes per order.  22: punch biopsy performed on 22, first dressing change today. Difficult to remove so applied adaptic non-adherent layer to base of " finger wound beds and covered with piece of aquacel ag. Palmar hand with aquacel ag strip tucked into wound and depth. Covered with gauze and roll gauze. Wound team to continue to follow and monitor. Waiting results.   7/26/22: L hand does not appear to be healing, change dressing to viscopaste for the soothing properties to assist with pain relief.   And Viscopatch zinc impregnated gauze to encourage re-epithelialization of superficial 100% viable wound bed, and to provide a non-stick wound contact layer.  7/23/22: No surgery indicated per Dr. Huerta. There are two wound beds on dorsal surface (1st finger and 4th/5th finger) and one wound over the palmar surface. All wounds with thick yellow drainage that is malodorous. Involved fingers remain erythematous and edematous. Patient reports extreme pain with site care and wound care is difficult despite utilization of lidocaine. Silver powder applied for its antibiotic properties and to address moisture to area due to moderate drainage. Wound team to continue to follow.   7/20: wounds on L hand with fluctuance on dorsal aspect indicating necrosis. X ray results did not indicate bony involvement, thus recommend CT for possible abscess. At this time, wound unlikely to resolve with local wound care if necrosis advances, recommend surgical evaluation. Wound team will continue to follow.     Goals: Steady decrease in wound area and depth weekly.    WOUND TEAM PLAN OF CARE ([X] for frequency of wound follow up,):   Nursing to follow dressing orders written for wound care. Contact wound team if area fails to progress, deteriorates or with any questions/concerns if something comes up before next scheduled follow up (See below as to whether wound is following and frequency of wound follow up)  Dressing changes by wound team:                   Follow up 3 times weekly:                NPWT change 3 times weekly:     Follow up 1-2 times weekly:  X nursing to perform dsg/skin  care; Wound team following.    Follow up Bi-Monthly:                   Follow up as needed:     Other (explain):     NURSING PLAN OF CARE ORDERS (X):  Dressing changes: See Dressing Care orders: x  Skin care: See Skin Care orders: x  RN Prevention Protocol: x  Rectal tube care: See Rectal Tube Care orders:   Other orders:      Anticipated discharge plans: TBD  LTACH:        SNF/Rehab:                  Home Health Care:           Outpatient Wound Center:            Self/Family Care:        Other:                  Vac Discharge Needs:   Not Applicable Pt not on a wound vac:     x  Regular Vac while inpatient, alternative dressing at DC:        Regular Vac in use and continued at DC:            Reg. Vac w/ Skin Sub/Biologic in use. Will need to be changed 2x wkly:      Veraflo Vac while inpatient, ok to transition to Regular Vac on Discharge:           Veraflo Vac while inpatient, will need to remain on Veraflo Vac upon discharge:

## 2022-08-16 NOTE — DISCHARGE PLANNING
Agency/Facility Name: [facility]  Spoke To: Kamille  Outcome: SNF is requesting additional information regarding Pt financials as they will not qualify for Medicaid at this time. Kamille to verify emergency contacts and contact this DPA with updates.    RN CM notified

## 2022-08-16 NOTE — THERAPY
"Physical Therapy   Daily Treatment     Patient Name: Jannet Bruno  Age:  81 y.o., Sex:  female  Medical Record #: [id number]  Today's Date: 8/16/2022     Precautions  Precautions: Fall Risk  Comments: LUE edema    Assessment    Pt received in bed and motivated to participate in PT session. Pt eager to brush teeth sitting at EOB and maintained balance throughout, including during bimanual tasks without support. Pt required mod A for transfers this session without knee buckling that occurred in prior sessions. Pt will continue to benefit from acute PT to progress and further rehab upon discharge.    Plan    Continue current treatment plan.    DC Equipment Recommendations: Unable to determine at this time  Discharge Recommendations: Recommend post-acute placement for additional physical therapy services prior to discharge home      Subjective    \"I need to use the bathroom\"     Objective       08/16/22 1045   Precautions   Precautions Fall Risk   Comments LUE edema   Pain 0 - 10 Group   Therapist Pain Assessment Post Activity Pain Same as Prior to Activity;Nurse Notified  (no c/o pain during session)   Cognition    Level of Consciousness Alert   Comments Very pleasant and agreeable to PT session. Alert throughout session.   Balance   Sitting Balance (Static) Fair   Sitting Balance (Dynamic) Fair   Standing Balance (Static) Poor +   Standing Balance (Dynamic) Poor -   Weight Shift Sitting Fair   Weight Shift Standing Poor   Skilled Intervention Tactile Cuing;Verbal Cuing;Facilitation   Comments Pt able to maintain seated position both at EOB and on the commode without close supervision. Pt using UE for brushing teeth without LOB. Pt required facilitation and assist in standing, no knee buckling this session.   Gait Analysis   Comments Pt able to stand and take side steps with mod A. No knee buckling this session, although pt very fearful of that happening.   Bed Mobility    Supine to Sit Moderate Assist   Sit to " Supine Moderate Assist   Scooting Moderate Assist   Skilled Intervention Sequencing;Verbal Cuing   Comments Mod A for sup<>sit and seated scooting to reposition at edge of bed. Cues for sequencing to improve success.   Functional Mobility   Sit to Stand Moderate Assist   Toilet Transfers Moderate Assist   Transfer Method Stand Step   Mobility bed<>commode   Skilled Intervention Verbal Cuing;Sequencing;Facilitation   Comments Sit>stand x2 from commode and transfer bed<>commode with mod A. No knee buckling this session.   How much difficulty does the patient currently have...   Turning over in bed (including adjusting bedclothes, sheets and blankets)? 2   Sitting down on and standing up from a chair with arms (e.g., wheelchair, bedside commode, etc.) 1   Moving from lying on back to sitting on the side of the bed? 1   How much help from another person does the patient currently need...   Moving to and from a bed to a chair (including a wheelchair)? 2   Need to walk in a hospital room? 2   Climbing 3-5 steps with a railing? 1   6 clicks Mobility Score 9   Short Term Goals    Short Term Goal # 1 Pt will perform bed mobility with supervision in 6 visits with HOB flat, no rail.   Goal Outcome # 1 goal not met   Short Term Goal # 2 Pt will transfer with supervision in 6 visits to improve functional indep.   Goal Outcome # 2 Goal not met   Short Term Goal # 3 Pt will ambulate x 50 feet using FWW with supervision in 6 visits to improve functional indep.   Goal Outcome # 3 Goal not met   Anticipated Discharge Equipment and Recommendations   DC Equipment Recommendations Unable to determine at this time   Discharge Recommendations Recommend post-acute placement for additional physical therapy services prior to discharge home   Interdisciplinary Plan of Care Collaboration   IDT Collaboration with  Nursing   Patient Position at End of Therapy In Bed;Bed Alarm On;Call Light within Reach;Tray Table within Reach;Phone within Reach    Collaboration Comments RN assisted with session   Session Information   Date / Session Number  8/16-14(2/3, 8/19)

## 2022-08-17 NOTE — THERAPY
08/17/22 0750   Interdisciplinary Plan of Care Collaboration   Collaboration Comments OT treat held as patient leaving for HD at this time. Will continue to follow.

## 2022-08-17 NOTE — PROGRESS NOTES
Hemodialysis ordered by Dr. Gregory. Treatment started at 0759 and ended at 1059. Pt stable, vss, no c/o post tx. Net UF 1.5 L d/t hypotensive during tx. Decreased UFG. Dr. Gregory notified and aware. Report to NICK Arita RN. Lt ua avf dsg cdi.

## 2022-08-17 NOTE — PROGRESS NOTES
Report received by day shift RN. Pt eating dinner at the EOB awake alert and orineted x 4 with no c/o pain. Discussed POC. All needs met at this time.

## 2022-08-17 NOTE — DISCHARGE PLANNING
Case Management Discharge Planning    Admission Date: 6/24/2022  GMLOS: 4.3  ALOS: 54    6-Clicks ADL Score: 15  6-Clicks Mobility Score: 9  PT and/or OT Eval ordered: Yes  Post-acute Referrals Ordered: Yes  Post-acute Choice Obtained: Yes  Has referral(s) been sent to post-acute provider:  Yes      Anticipated Discharge Dispo: Discharge Disposition: D/T to SNF with Medicare cert in anticipation of skilled care (03)    DME Needed: No    Action(s) Taken: pt pending long term placement, referrals pending    Escalations Completed: Long Length of Stay Committee     Medically Clear: Yes    Next Steps: f/u with DPA, referrals for acceptance.    Barriers to Discharge: Pending Placement

## 2022-08-17 NOTE — DISCHARGE PLANNING
Agency/Facility Name: [facility]  Outcome: DPA left v-mail regarding referral status with request for callback.    1020-  [facility]  Spoke To: Kamille  Outcome: DPA was given phone number of [place] Pt's husbands sister [phone] to review Pt financials. Kamille will contact this DPA with updates.

## 2022-08-17 NOTE — PROGRESS NOTES
Palliative Care Advance Care Planning Progress Note    HPI: Jannet Bruno is a 81 y.o. female with a past medical history of chronic kidney disease, cardiomyopathy, and heart failure, who was admitted on 2022 for left upper arm swelling and lower extremity edema.  Non-invasive arterial studies of her lower extremities showed severe peripheral arterial disease.  Surgery Vascular consulted , and determined patient not a candidate for any type of intervention (with the exception of amputation) with respect to improving circulation to her extremities.  She was also found to have severe aortic stenosis for which cardiology was consulted, and gave the opinion that she is not a candidate for intervention, due to her advanced age and physical debility.  Nephrology consulted for patient's CKD and patient started on inpatient dialysis M//, which has been ongoing throughout patient's extended hospital stay.  Patient underwent left hand skin biopsy 22, which was negative for calciphylaxis.  Patient's long-term dialysis has been a barrier to discharge.  Plan for an appointment at the outpatient access center to evaluate for central stenosis and undergo angioplasty, if needed.     This APRN initially met with patient 22 to discuss advance care planning (ACP) and goals of care (GOC).  At that time, patient refusing hospice care, as she felt it was premature. She also verbalized her desire to continue dialysis long-term, with the goal to prolong her life.  She opted to remain Full Code at that time (see ACP section of my 22 note @ 09:30 for details).    Living situation: Patient is , retired, and prior to admission, was living with her ex-, Peter Bruno, in his home as a caregiver.  Unfortunately, Peter  approximately 1 week ago.  Jannet and Peter have one daughter together, Beena.  Patient has 2 sons (Gonzales Sam & Maxx Sam) and 4 daughters total (Beena, 2 from a previous marriage,  "and 1 adopted [Addie Sam]).  Her adopted daughter Addie Sam and son Gonzales Sam live in adjoining residences on the patient's property.    Discussion: Met with patient at bedside.  Introduced myself and explained my role in PC.  Advised patient we met and had a conversation in .    Discussed patient's social situation.  She became tearful and shared that her ex- Peter \" 7 days ago.  If I could go back 7 days and tell him I love him, I would do that.  I wanted to go see him, but they forced me to stay here, and he was too sick to come see me.  My life changed after I lost him.  All I know is he's not with me.  I hate that he isn't here to go through this with me.\"  I acknowledged these circumstances must've made it more difficult for her to have closure with his death, which she affirmed.  She verbalized her desire to know where Peter's sister is going to place his ashes.  I shared that during our discussion in , she had said her goal was to get her property in order.  Jannet said, \"It's not gonna happen.  I've turned the property over to [employer] and they're going to put it up for sale.\"  She expressed sorrow at losing the property, stating, \"I feel like I'm losing part of myself.  I lived there for 45 years.\"    Discussed goals of care.  Patient still very firm in her desire to continue dialysis stating, \"I'm still not giving up.  Dialysis extends my life, so I'm good with that.  So many people are wanting me to pass away because they want the property.\"  She reports she still has \"burning\" pain in her BLE.  \"I hate pain and dialysis doesn't offer too much relief there, but I don't want to have to beg for pain medicine.  That's what I feel I have to do here.\"  We discussed that skilled nursing facilities have denied admission because she is on dialysis.  I asked, \"So you want to continue dialysis, even it means you have to stay in the hospital?\"  Jannet answered, \"Yes.\"  I shared that the hospital is " "meant for short-term stays of patients with acute medical issues, not meant to be a place where patients live long-term.  She verbalized understanding but said, \"I have no place to go.  I can't live with my children, not if I don't want to be brutalized.\"  She also verbalized a goal of improving her mobility stating, \"I'd like to be able to get back on my feet.\"   I compassionately shared that this may be her new baseline, and her mobility may not improve, due to her poor circulation, and not being a candidate for vascular interventions.      Discussed hospice.  When asked if her thoughts about hospice had changed since our last conversation, she shook her head no and said, \"I don't want to go on hospice.\"  I gently explored patient's reasoning behind not wanting to go on hospice.  She said, \"I'm not in favor of hospice because it says you're dying and I don't like that part.  I want to go places and see things.\"  I shared that in her current condition, even not being on hospice, she is not able to go places and see things.  She responded with, \"I just need a wheelchair.\"  Patient firm in her belief that in her current condition, she could still wheel herself out to her car, transfer herself into car, and drive.  \"I haven't lost the ability to go someplace.  I've lost the ability for them to let me go someplace.  I don't want them to offer me a way to die.  I want them to offer me a way to live.\"  I offered that hospice would likely provide her better pain and symptom control, and increased quality of life.  She expressed concern that \"they're going to force me to go on hospice.\"  I educated patient that as long as she has capacity, no one can force her to go on hospice, indicating that it is similar to signing a consent form prior to surgery.  I advised a patient has to consent for surgery.  In the same way, a patient has to consent to go on hospice.  Nobody can force her to do it if she doesn't want to.  Patient " "verbalized understanding.      Discussed code status.  Patient verbalized she wishes to remain Full Code at this time.  I expressed my concern that due to her recent debility and frailness, that even if she was successfully resuscitated, she would likely come out the other side with greatly decreased quality of life, and would not change the fact that she would still have CKD requiring dialysis.  \"I don't want them not to try.  If they can't resuscitate me, then so be it, but I want them to try.  And if they don't try, I want them to take me to where Peter is\" (i.e. where Peter's ashes are).    Patient experiencing multiple recent losses, including ex- (with whom she was very close) dying 1 week ago, loss of mobility, independence, etc.).  Patient amenable to visit from hospital psychologist, Dr. Oksana Roth, as she feels she needs extra support right now to deal with her acute grief and loss.    I expressed my gratitude to patient for being willing to speak with me today.  She was grateful for PC visit today stating, \"Come back anytime!\"  All questions answered.    Provided therapeutic communication including open-ended questions, therapeutic silence, reflective listening, empathic support, validation, and therapeutic touch throughout encounter. Provided business card with palliative care contact information and encouraged her to call with any questions or needs.    Outcome: Patient again refusing to transition to hospice care, wishes to continue long-term dialysis.  Patient affirmed she wishes to remain Full Code.  This APRN placed psychology consult to help patient cope with recent losses, including the death of her ex-, with whom she was close.    Plan: Palliative care to continue to follow, provide support, and help facilitate decision making as clinical picture evolves.    Updated: UMANG Joshi via [employer]    Thank you for allowing Palliative Care to participate in [facility] care. Please call " our team with questions and/or additional needs.    As appropriate, Palliative Care encourages medical team to engage in further conversation, education for patient/family at bedside regarding code status, GOC/POC.    Total visit time was 60 minutes discussing and coordinating advance care planning.    Suma Gonzalez, DNP, APRN, [facility]  Palliative Care Nurse Practitioner  [phone]

## 2022-08-17 NOTE — PROGRESS NOTES
4 Eyes Skin Assessment Completed by MAURO Cali and MAURO Blas.    Head WDL  Ears WDL  Nose WDL  Mouth WDL  Neck WDL  Breast/Chest WDL  Shoulder Blades WDL  Spine WDL  (R) Arm/Elbow/Hand WDL  (L) Arm/Elbow/Hand WDL Hand has wounds to 1st and 5th digit. Followed by wound.  Abdomen WDL  Groin WDL  Scrotum/Coccyx/Buttocks WDL  (R) Leg Scab, Swelling, Shiny, and Edema  (L) Leg Redness, Shiny, and Weeping  (R) Heel/Foot/Toe Redness, Bruising, Swelling, and Edema  (L) Heel/Foot/Toe Boggy          Devices In Places Tele Box and Pulse Ox      Interventions In Place Waffle Overlay, Pillows, Q2 Turns, Barrier Cream, and Pressure Redistribution Mattress    Possible Skin Injury Yes    Pictures Uploaded Into Epic Yes  Wound Consult Placed Yes  RN Wound Prevention Protocol Ordered Yes

## 2022-08-17 NOTE — PROGRESS NOTES
[facility] -  PROGRESS NOTE               Author: Bebeto Gregory M.D. Date & Time: 8/17/2022  11:15 AM     HPI:  81yoF with PMH significant for ESRD on HD MWF via left arm AVF, HTN, DM II, Anemia secondary to CKD, CKD Bone mineral disorder, admitted with increased edema and weakness and having missed her last last 2-3 outpt HD treatments. Pt is very lethargic at this time and unable to provide much history, majority of the history was obtained through review of the medical record and discussion with primary svc. Pt had reported increased LE edema and fatigue and weakness and worsening of her left arm edema so she came to the ED for evaluation. She apparently has missed her last 2-3 outpt dialysis treatments. Per regular outpt Nephrologist Dr. Gates, she has been non-compliant with her fluid restriction and was told that she needed 4x/week dialysis until her volume status could be optimized but she was non-compliant with that recommendation. She has edema of her left arm where her AVF is located and there is concern for a central stenosis that could be the etiology. She had an appointment at the outpt access center to evaluate for central stenosis and undergo angioplasty if needed on 6/9/22 but pt did not go to the appointment. She has not had any other procedures done to the arm in the past couple of months. She had a left arm us done today that showed no DVT and patent AVF and soft tissue edema. She apparently received pain medication fentanyl and dilaudid as well as benadryl earlier today and she is very drowsy.     DAILY NEPHROLOGY SUMMARY:  **See previous note from 7/31/22 to review prior daily nephrology summary entries.  8/1: Laying in bed eating breakfast. Very tearful this AM, stated that she has to make a decision if she wants to continue with the biopsy on her hand. Pt is concerned that there may not be much we can do about the wound on her left hand. Dr Simon from wound care at  bedside. Will return this afternoon for biopsy procedure per MD. HD today UF 2L, tolerated well. Denies SOB, CP,N/VD. K+ up this am 5.7.   8/2: Resting in bed. C/o fatigue. Reports improved SOB, remains on oxygen.   08/3: Sitting up in chair, states she feels good today, SOB improved , remains on 2L nasal cannula. Denies CP, N/V/D. Discussed about fluid restriction. Pending left hand biopsy results. K+ improved 5.4. States fatigue but due to she recently ambulated with RN, otherwise no complaints. No overnight events, VSS.   08/4: Pt laying in bed. AOX4, HD yesterday tolerated will UF 2800mL. Hypotensive on the last hr of HD reported SBP 89-90's but asymptomatic. BP this am stable. Denies any CP/SOB/N/V/D. Patient on RA. C/o constipation. No overnight events. States she has declined hospice but would like to go to a home care facility.   8/5: iHD today, net UF 1L.  No resting comfortably no complaints. VSS  8/6: Had iHD yesterday with 1 L UF.  Currently sleeping, but awakens easily and has no new complaints. VSS.   8/7: Lying in bed sleeping, awakens and has no new complaints, just fatigue. VSS. Due for iHD tomorrow.  8/8: Seen on HD this am. No acute distress. VSS.   8/9: 2.5 net UF. VSS. Resting in bed. C/o fatigue. Denies CP/SOB.   8/10: 1L net UF. No complaints. VSS.   8/11: Resting comfortably.  Family at bedside.  VSS.  Family reports she had been having pain, feels gabapentin may need to be increased.  No other complaints.   8/12: Seen on HD this am. No complaints.   8/13: iHD yesterday net UF 2.5L.  VSS 3L NC resting comfortably today.  No complaints.   8/14: Sleeping in bed, arouses. VSS. No overnight events.   8/15: Seen on dialysis at bedside.  Denies SOB.  Having back pain.  Chest pain last night, not complaining of at this time  8/16 - Complains of weakness, denies pain or SOB.  Had BM.  HD done yesterday  8/17 - Having some back pain.  No cramping.  Seen on dialysis    REVIEW OF SYSTEMS:    10 point  "ROS reviewed and is as per HPI/daily summary or otherwise negative    PMH/PSH/SH/FH: Reviewed and unchanged since admission note  CURRENT MEDICATIONS: Reviewed from admission to present day    VS:  /48   Pulse 83   Temp 36.6 °C (97.9 °F) (Temporal)   Resp 14   Ht 1.626 m (5' 4\")   Wt 61.3 kg (135 lb 2.3 oz)   SpO2 94%   BMI 23.20 kg/m²   Physical Exam  Vitals and nursing note reviewed.   Constitutional:       General: She is not in acute distress.     Appearance: She is ill-appearing.   HENT:      Head: Normocephalic and atraumatic.      Mouth/Throat:      Mouth: Mucous membranes are dry.   Eyes:      General: No scleral icterus.  Cardiovascular:      Rate and Rhythm: Normal rate and regular rhythm.      Pulses: Normal pulses.      Heart sounds: Normal heart sounds. Murmur heard.    No friction rub. No gallop.   Pulmonary:      Effort: Pulmonary effort is normal. No respiratory distress.      Breath sounds: No wheezing or rales.      Comments: Decrease breathsounds Bilateral lower lobes  Abdominal:      General: Bowel sounds are normal. There is no distension.      Palpations: Abdomen is soft.      Tenderness: There is no abdominal tenderness. There is no guarding.   Musculoskeletal:         General: Swelling (left arm) or deformity, wrapped. Erythema 1st/4th digits.      Cervical back: Normal range of motion.      Right lower leg: Trace edema.      Left lower leg: Trace edema.      Comments:   LUE AVF + bruit and Thrill   Skin:     General: Skin is warm and dry.      Coloration: Skin is not pale.      Findings: No rash.   Left hand wrapped in gauze   Neurological:      Mental Status: She is alert and oriented to person, place, and time.   Psychiatric:         Mood and Affect: Mood normal.         Behavior: Behavior normal    Fluids:  In: 720 [P.O.:720]  Out: -     LABS:  Recent Labs     08/14/22  2148 08/17/22  0820   SODIUM 135 129*   POTASSIUM 6.1* 6.2*   CHLORIDE 94* 88*   CO2 25 25   GLUCOSE 125* " 105*   BUN 59* 63*   CREATININE 5.51* 4.79*   CALCIUM 8.9 8.8         IMPRESSION:  # ESRD, dependent on HD              - HD via LUE AVF             - HD qMWF  # Fluid overload, improved             - Secondary to non-compliance with fluid restriction and HD treatments  # Left arm edema, slow improvement              - CTA upper ext 6/26 w/o e/o focal stenosis + extensive edema             - AVF patent on left arm US negative for DVT             - Pt no showed to outpt appt to evaluate              - Vascular does not recommend intervention              - Extensor tendon laceration left hand with wound             - Wound care in place, s/p biopsy 8/1             - Left hand skin biopsy negative for calciphylaxis   - Outpatient [facility] appt rescheduled for next month  # HTN, variable control, stable at this time             - Goal BP < 140/90  # Anemia of CKD, at goal              - Goal Hgb 10-11             - Iron 27             -TIBC 142             -%Sat 19             - Ferritin 1419  # CKD-MBD             - Vit D 35            - PTH 99.4             - Managed at OP unit             - On sevelamer with meals  # Hyperkalemia            - Correct w/ HD  # DM II--management per primary svc  # Leg Pain--chronic skin changes and subcutaneous tissue firm to touch             - Vascular does not recommend any intervention  # Leukocytosis resolved  # Hyponatremia  - Likely fluid related, manage w/ HD  # Hypoalbuminemia  - No dietary protein restrictions    PLAN:  - Continue qMWF iHD schedule  - UF as tolerated  - Continue phos binders WM, hold if patient is NPO.  - Continue off of all BP meds and diuretics for now. BP stable during HD.   - Limit sedating meds if possible  - No dietary protein restrictions  - Fluid restriction 1.2 L   - Low potassium diet  - Dose all meds per ESRD  - Max dose gabapentin in ESRD pt 300mg/day  - Outpt access center appointment rescheduled  - Follow labs  - DC planning underway for SNF          Thank you,

## 2022-08-17 NOTE — PROGRESS NOTES
Monitor Summary  Rhythm: SR  Rate: 73-84  Ectopy: PVC  0.18 / 0.09 / 0.51    Monitor strip reviewed.

## 2022-08-18 NOTE — PROGRESS NOTES
4 Eyes Skin Assessment Completed by MAURO Wiggins and MAURO Gonzalez.    Head WDL  Ears WDL  Nose WDL  Mouth WDL  Neck WDL  Breast/Chest WDL  Shoulder Blades WDL  Spine WDL  (R) Arm/Elbow/Hand WDL  (L) Arm/Elbow/Hand Scab, Swelling, and Discoloration, wound to left hand  Abdomen WDL  Groin WDL  Scrotum/Coccyx/Buttocks Redness and Blanching, wound  (R) Leg Scab  (L) Leg Scab  (R) Heel/Foot/Toe Redness and Blanching  (L) Heel/Foot/Toe Redness and Blanching          Devices In Places Pulse Ox      Interventions In Place Heel Mepilex, Sacral Mepilex, Waffle Overlay, Pillows, Q2 Turns, Heels Loaded W/Pillows, and Pressure Redistribution Mattress    Possible Skin Injury Yes    Pictures Uploaded Into Epic Yes  Wound Consult Placed N/A  RN Wound Prevention Protocol Ordered Yes

## 2022-08-18 NOTE — PROGRESS NOTES
Assumed care of patient at bedside report from NOC RN. Updated on POC. Patient currently A & O x 4; on 1 L O2 NC; up with 1-2; with complaints of acute pain, see MAR. Call light within reach. Whiteboard updated. Fall precautions in place. Bed locked and in lowest position. All questions answered. No other needs indicated at this time.

## 2022-08-18 NOTE — PROGRESS NOTES
[facility] -  PROGRESS NOTE               Author: STACY Comer. Date & Time: 8/18/2022  11:24 AM     HPI:  81yoF with PMH significant for ESRD on HD MWF via left arm AVF, HTN, DM II, Anemia secondary to CKD, CKD Bone mineral disorder, admitted with increased edema and weakness and having missed her last last 2-3 outpt HD treatments. Pt is very lethargic at this time and unable to provide much history, majority of the history was obtained through review of the medical record and discussion with primary svc. Pt had reported increased LE edema and fatigue and weakness and worsening of her left arm edema so she came to the ED for evaluation. She apparently has missed her last 2-3 outpt dialysis treatments. Per regular outpt Nephrologist Dr. Gates, she has been non-compliant with her fluid restriction and was told that she needed 4x/week dialysis until her volume status could be optimized but she was non-compliant with that recommendation. She has edema of her left arm where her AVF is located and there is concern for a central stenosis that could be the etiology. She had an appointment at the outpt access center to evaluate for central stenosis and undergo angioplasty if needed on 6/9/22 but pt did not go to the appointment. She has not had any other procedures done to the arm in the past couple of months. She had a left arm us done today that showed no DVT and patent AVF and soft tissue edema. She apparently received pain medication fentanyl and dilaudid as well as benadryl earlier today and she is very drowsy.     DAILY NEPHROLOGY SUMMARY:  **See previous note from 7/31/22 to review prior daily nephrology summary entries.  8/1: Laying in bed eating breakfast. Very tearful this AM, stated that she has to make a decision if she wants to continue with the biopsy on her hand. Pt is concerned that there may not be much we can do about the wound on her left hand. Dr Simon from wound  care at bedside. Will return this afternoon for biopsy procedure per MD. HD today UF 2L, tolerated well. Denies SOB, CP,N/VD. K+ up this am 5.7.   8/2: Resting in bed. C/o fatigue. Reports improved SOB, remains on oxygen.   08/3: Sitting up in chair, states she feels good today, SOB improved , remains on 2L nasal cannula. Denies CP, N/V/D. Discussed about fluid restriction. Pending left hand biopsy results. K+ improved 5.4. States fatigue but due to she recently ambulated with RN, otherwise no complaints. No overnight events, VSS.   08/4: Pt laying in bed. AOX4, HD yesterday tolerated will UF 2800mL. Hypotensive on the last hr of HD reported SBP 89-90's but asymptomatic. BP this am stable. Denies any CP/SOB/N/V/D. Patient on RA. C/o constipation. No overnight events. States she has declined hospice but would like to go to a home care facility.   8/5: iHD today, net UF 1L.  No resting comfortably no complaints. VSS  8/6: Had iHD yesterday with 1 L UF.  Currently sleeping, but awakens easily and has no new complaints. VSS.   8/7: Lying in bed sleeping, awakens and has no new complaints, just fatigue. VSS. Due for iHD tomorrow.  8/8: Seen on HD this am. No acute distress. VSS.   8/9: 2.5 net UF. VSS. Resting in bed. C/o fatigue. Denies CP/SOB.   8/10: 1L net UF. No complaints. VSS.   8/11: Resting comfortably.  Family at bedside.  VSS.  Family reports she had been having pain, feels gabapentin may need to be increased.  No other complaints.   8/12: Seen on HD this am. No complaints.   8/13: iHD yesterday net UF 2.5L.  VSS 3L NC resting comfortably today.  No complaints.   8/14: Sleeping in bed, arouses. VSS. No overnight events.   8/15: Seen on dialysis at bedside.  Denies SOB.  Having back pain.  Chest pain last night, not complaining of at this time  8/16 - Complains of weakness, denies pain or SOB.  Had BM.  HD done yesterday  8/17 - Having some back pain.  No cramping.  Seen on dialysis  8/18: sitting up in bed,  "wanted to leave AMA but was talked out of it, again declines palliative, for iHD tomorrow     REVIEW OF SYSTEMS:    10 point ROS reviewed and is as per HPI/daily summary or otherwise negative    PMH/PSH/SH/FH: Reviewed and unchanged since admission note  CURRENT MEDICATIONS: Reviewed from admission to present day    VS:  /46   Pulse 71   Temp 36.8 °C (98.2 °F) (Temporal)   Resp 16   Ht 1.626 m (5' 4\")   Wt 59.4 kg (130 lb 15.3 oz)   SpO2 99%   BMI 22.48 kg/m²   Physical Exam  Vitals and nursing note reviewed.   Constitutional:       General: She is not in acute distress.     Appearance: She is ill-appearing.   HENT:      Head: Normocephalic and atraumatic.      Mouth/Throat:      Mouth: Mucous membranes are dry.   Eyes:      General: No scleral icterus.  Cardiovascular:      Rate and Rhythm: Normal rate and regular rhythm.      Pulses: Normal pulses.      Heart sounds: Normal heart sounds. Murmur heard.    No friction rub. No gallop.   Pulmonary:      Effort: Pulmonary effort is normal. No respiratory distress.      Breath sounds: No wheezing or rales.      Comments: Decrease breathsounds Bilateral lower lobes  Abdominal:      General: Bowel sounds are normal. There is no distension.      Palpations: Abdomen is soft.      Tenderness: There is no abdominal tenderness. There is no guarding.   Musculoskeletal:         General: Swelling (left arm) or deformity, wrapped. Erythema 1st/4th digits.      Cervical back: Normal range of motion.      Right lower leg: Trace edema.      Left lower leg: Trace edema.      Comments:   LUE AVF + bruit and Thrill   Skin:     General: Skin is warm and dry.      Coloration: Skin is not pale.      Findings: No rash.   Left hand wrapped in gauze   Neurological:      Mental Status: She is alert and oriented to person, place, and time.   Psychiatric:         Mood and Affect: Mood normal.         Behavior: Behavior normal    Fluids:  In: 500 [Dialysis:500]  Out: 2000 "     LABS:  Recent Labs     08/17/22  0820   SODIUM 129*   POTASSIUM 6.2*   CHLORIDE 88*   CO2 25   GLUCOSE 105*   BUN 63*   CREATININE 4.79*   CALCIUM 8.8         IMPRESSION:  # ESRD, dependent on HD              - HD via LUE AVF             - HD qMWF  # Fluid overload, improved             - Secondary to non-compliance with fluid restriction and HD treatments  # Left arm edema, slow improvement              - CTA upper ext 6/26 w/o e/o focal stenosis + extensive edema             - AVF patent on left arm US negative for DVT             - Pt no showed to outpt appt to evaluate              - Vascular does not recommend intervention              - Extensor tendon laceration left hand with wound             - Wound care in place, s/p biopsy 8/1             - Left hand skin biopsy negative for calciphylaxis   - Outpatient [facility] appt rescheduled for next month  # HTN, variable control, stable at this time             - Goal BP < 140/90  # Anemia of CKD, at goal              - Goal Hgb 10-11             - Iron 27             -TIBC 142             -%Sat 19             - Ferritin 1419  # CKD-MBD             - Vit D 35            - PTH 99.4             - Managed at OP unit             - On sevelamer with meals  # Hyperkalemia            - Correct w/ HD  # DM II--management per primary svc  # Leg Pain--chronic skin changes and subcutaneous tissue firm to touch             - Vascular does not recommend any intervention  # Leukocytosis resolved  # Hyponatremia  - Likely fluid related, manage w/ HD  # Hypoalbuminemia  - No dietary protein restrictions    PLAN:  - Continue qMWF iHD schedule  - UF as tolerated  - Continue phos binders WM, hold if patient is NPO.  - Continue off of all BP meds and diuretics for now. BP stable during HD.   - Limit sedating meds if possible  - No dietary protein restrictions  - Fluid restriction 1.2 L   - Low potassium diet  - Dose all meds per ESRD  - Max dose gabapentin in ESRD pt 300mg/day  -  Outpt access center appointment rescheduled  - Follow labs  - DC planning underway for SNF         Thank you,

## 2022-08-18 NOTE — HOSPITAL COURSE
An 81-year-old woman with h/o ESRD on HD MWF, DM, HTN, HLD presented with left upper arm swelling and lower extremity edema after missing HD sessions.  X-rays of the left hand showed only chronic changes.  AV fistula was functioning well. CTA showed no evidence of central stenosis. Vascular surgery was consulted and only recommended supportive care such as wrapping the arm with Ace bandage. She was also found to have severe aortic stenosis for which cardiology was consulted, and gave the opinion that she is not a candidate for intervention. She was dialyzed while in the hospital with subsequent clinical improvement. She did have hospital delirium while in the hospital, which has since resolved. Palliative care was consulted, but patient continued to wish to be full code. SNF placement was pursued, but proved to be challenging as transfer to dialysis is not always facilitated. Case management is working on her discharge to SNF and arrangement of hemodialysis.  On 8/15 overnight she developed chest pain and she was started on heparin gtt. and transferred to telemetry floor. She found to have elevated troponin and it remained stable.

## 2022-08-18 NOTE — PROGRESS NOTES
Pt. wanted to leave AMA. Educated pt. on the risks of leaving. Also paged resident who came and spoke with pt. Pt. Still adamantly wanting to leave. After removing IVs and dressing pt in own clothes, pt. decides to stay. IV replaced and pt. put back to bed. Resting quietly at this time. Will continue to monitor.

## 2022-08-18 NOTE — CONSULTS
"[facility]    INPATIENT ASSESSMENT    Name: Jannet Bruno  MRN: [id number]  : 1940  Age: 81 y.o.  Date of assessment: 2022  PCP: Henry Forrest D.O.  Persons in attendance: Patient    Length of Intervention: 60 minutes    CHIEF COMPLAINT/PRESENTING ISSUE (as stated by Patient): Jannet Bruno is a 81 year old female who was referred to Behavioral Health for psychotherapy. Patient was seen sleeping on hospital bed, however was able to arouse and engage in the interview process. Patient was pleasant and easy to engage. Patient is oriented to self and location. Patient's affect was flat with no range of emotion noted. However, patient was tearful occasionally throughout the interview process.    Patient endorses depression without suicidal ideations and grief as a result of her ex 's passing. Patient reports she did not get an opportunity to say good bye prior to his death.  Patient states she and her ex- lived together for almost 50 years. Patient reports they  early in their marriage due to her husbands infidelity. Interestingly, they continued to live together. Patient acknowledges the passing of her ex- has been very difficult. \"I have no one to share things with anymore. Sometimes I turn to say something and realize he is no longer there\".    Patient has 6 children. Four children with her first , one child with her ex- who recently passed, and one adopted daughter who is actually a granddaughter.   Patient reports that her adult children are, \"doing their own thing\" and are not available to patient for regular visits and companionship. Patient states, \"they are just waiting for me to die so they can get their inheritance, they think there is a boat load of money waiting for them but there is not\". Patient reports she owns three properties. She has signed the properties over to two of her children in case she \"doesn't roll out of " "here\". Patient reports, if she returns home, the property will continue to remain in her name.    Patient reports concern that she is not recovering from her current illnesses and is no longer able to walk. Patient demonstrates limited insight regarding her noncompliance with treatment recommendations. However, patient recognizes she is unable to care for herself independent stating, \"if I go home, I would need someone to live with me because I can no longer take care of myself\". Discussed with patient her level of motivation to take better care of her health, patient states, \"oh I'm not ready to die yet, I still have things I want to do\". Patient reports, \"I just need help\". Patient states, she cannot depend upon any of her children to provide care. Patient states, \"I thought one of the younger daughters would help me, but it turns out she is not going to help\". Patient reports minimal contact with the rest of her adult children.    Intervention  Clinician discussed with patient the stages of grief. Patient was able to reflect upon happy memories with her former  and embrace the time they had together. Patient is open to continuing psychotherapy sessions. Patient appears to enjoy talking about her life and marriage. Patient would benefit from Case management assistance regarding long term care planning, particularly since she believes her children are not available to provide assistance in her care.    Will continue to follow and work with patient of processing grief and loss.    Chief Complaint   Patient presents with    Peripheral Edema    Weakness     BIBA from home for weakness, nausea, L arm pain  HX of dialysis with L arm fistula, pt states she has missed 3 dialysis days due to being so weak she cannot drive or walk now  3 weeks ago had a procedure done to her fistula which has lead to L arm swelling        CURRENT LIVING SITUATION/SOCIAL SUPPORT: Resides alone, no social supports due to limited " contact from her children. Patient receives Meals on Wheels for food.    BEHAVIORAL HEALTH TREATMENT HISTORY  Does patient/parent report a history of prior behavioral health treatment for patient?   No:    SAFETY ASSESSMENT - SELF  Does patient acknowledge current or past symptoms of dangerousness to self? no  Does parent/significant other report patient has current or past symptoms of dangerousness to self? N\A  Does presenting problem suggest symptoms of dangerousness to self? No    SAFETY ASSESSMENT - OTHERS  Does patient acknowledge current or past symptoms of aggressive behavior or risk to others? no  Does parent/significant other report patient has current or past symptoms of aggressive behavior or risk to others?  no  Does presenting problem suggest symptoms of dangerousness to others? No    Crisis Safety Plan completed and copy given to patient? N\A    ABUSE/NEGLECT SCREENING  Does patient report feeling “unsafe” in his/her home, or afraid of anyone?  no  Does patient report any history of physical, sexual, or emotional abuse?  no  Does parent or significant other report any of the above? N\A  Is there evidence of neglect by self?  yes-per medical record is not consistently compliant with treatment recommendations  Is there evidence of neglect by a caregiver? no  Does the patient/parent report any history of CPS/APS/police involvement related to suspected abuse/neglect or domestic violence? no  Based on the information provided during the current assessment, is a mandated report of suspected abuse/neglect being made?  No    SUBSTANCE USE SCREENING  Not applicable, patient 10 years of age or younger.      MENTAL STATUS              Participation: Active verbal participation  Grooming: Disheveled  Orientation: Drowsy/Somnolent  Behavior: Calm  Eye contact: Limited  Mood: Depressed  Affect: Flat  Thought process: Circumstantial  Thought content: Within normal limits  Speech: Rate within normal  limits  Perception: Within normal limits  Memory:  Recent:  Adequate  Insight: Adequate  Judgment:  Adequate  Other:    Collateral information:   Source:   Significant other present in person:    Significant other by telephone   [facility]    [facility]   [facility] Record-reviewed   Other:      Unable to complete full assessment due to:   Acute intoxication   Patient declined to participate/engage   Patient verbally unresponsive   Significant cognitive deficits   Significant perceptual distortions or behavioral disorganization   Other:             CLINICAL IMPRESSIONS:  Primary:  Bereavement  Secondary:  deferred                                       IDENTIFIED NEEDS/PLAN:  [Trigger DISPOSITION list for any items marked]      Imminent safety risk - self  Imminent safety risk - others     Acute substance withdrawal   Psychosis/Impaired reality testing    x Mood/anxiety   Substance use/Addictive behavior   x  Maladaptive behavior   Parent/child conflict     Family/Couples conflict   Biomedical     Housing   Financial      Legal  Occupational/Educational     Domestic violence   Other:     Recommendations   Case Management to provide support for discharge planning and safety.      Legal Hold: N/A        Oksana Roth, Ph.D.. [facility]  8/18/2022

## 2022-08-18 NOTE — PROGRESS NOTES
At 1220, Attempted to ambulate patient from commode back to bed with two RN's, pt had an assisted fall to a seated position on the ground. PT assisted back to bed, vital signs stable, no injury. MD notified

## 2022-08-18 NOTE — PROGRESS NOTES
Monitor Summary      Rhythm: NSR     Rate:  75-83     Ectopy: rare PVC, rare PAC     .18/.09/.51

## 2022-08-19 PROBLEM — K92.1 MELENA: Status: ACTIVE | Noted: 2022-01-01

## 2022-08-19 PROBLEM — K92.1 HEMATOCHEZIA: Status: ACTIVE | Noted: 2022-01-01

## 2022-08-19 PROBLEM — K92.1 MELENA: Status: RESOLVED | Noted: 2022-01-01 | Resolved: 2022-01-01

## 2022-08-19 NOTE — PROGRESS NOTES
Assumed care of patient, bedside report received from Rosalie WADE. Updated on POC, call light within reach and fall precautions in place. Bed locked and in lowest position. Patient instructed to call for assistance before getting out of bed. All questions answered, no other needs at this time.

## 2022-08-19 NOTE — CARE PLAN
The patient is Stable - Low risk of patient condition declining or worsening    Shift Goals  Clinical Goals: Pain management  Patient Goals: Pain management  Family Goals: N/A    Progress made toward(s) clinical / shift goals:      Problem: Pain - Standard  Goal: Alleviation of pain or a reduction in pain to the patient’s comfort goal  Outcome: Progressing  Note: Tylenol and oxy given for pain control. Patient not in acute distress with pain.      Problem: Knowledge Deficit - Standard  Goal: Patient and family/care givers will demonstrate understanding of plan of care, disease process/condition, diagnostic tests and medications  Outcome: Progressing     Problem: Fall Risk  Goal: Patient will remain free from falls  Outcome: Progressing  Note: Fall precautions in place. Bed alarm on, patient instructed to call before getting out of bed. RN discussed using the bedpan if bowel or bladder elimination is urgent.        Patient is not progressing towards the following goals:

## 2022-08-19 NOTE — THERAPY
Missed Therapy     Patient Name: Jannet Bruno  Age:  81 y.o., Sex:  female  Medical Record #: [id number]  Today's Date: 8/19/2022 08/19/22 1229   Treatment Variance   Reason For Missed Therapy Medical - Other (Please Comment)   Interdisciplinary Plan of Care Collaboration   Collaboration Comments OT tx attempted pt out for HD (MWF) will follow up as appropriate   Session Information   Date / Session Number  8/19 attempted  (last seen 8/10 #9 (1/3, 8/14))   Priority 4  (needs tx)

## 2022-08-19 NOTE — THERAPY
Physical Therapy Contact Note    Pt chest pain/rapid out of room upon attempt;     Libia CARRASQUILLO, PT,  [phone]

## 2022-08-19 NOTE — ASSESSMENT & PLAN NOTE
Resolved  RN reported marroon-colored stool  Continue PPI BID  Hgb stable, possible slight ABLA superimposed on AOCKD  GI consulted, not a candidate for endoscopy due to hemodynamic risk and limited life expectancy

## 2022-08-19 NOTE — PROGRESS NOTES
[facility] -  PROGRESS NOTE               Author: Bebeto Gregory M.D. Date & Time: 8/19/2022  10:57 AM     HPI:  81yoF with PMH significant for ESRD on HD MWF via left arm AVF, HTN, DM II, Anemia secondary to CKD, CKD Bone mineral disorder, admitted with increased edema and weakness and having missed her last last 2-3 outpt HD treatments. Pt is very lethargic at this time and unable to provide much history, majority of the history was obtained through review of the medical record and discussion with primary svc. Pt had reported increased LE edema and fatigue and weakness and worsening of her left arm edema so she came to the ED for evaluation. She apparently has missed her last 2-3 outpt dialysis treatments. Per regular outpt Nephrologist Dr. Gates, she has been non-compliant with her fluid restriction and was told that she needed 4x/week dialysis until her volume status could be optimized but she was non-compliant with that recommendation. She has edema of her left arm where her AVF is located and there is concern for a central stenosis that could be the etiology. She had an appointment at the outpt access center to evaluate for central stenosis and undergo angioplasty if needed on 6/9/22 but pt did not go to the appointment. She has not had any other procedures done to the arm in the past couple of months. She had a left arm us done today that showed no DVT and patent AVF and soft tissue edema. She apparently received pain medication fentanyl and dilaudid as well as benadryl earlier today and she is very drowsy.     DAILY NEPHROLOGY SUMMARY:  **See previous note from 7/31/22 to review prior daily nephrology summary entries.  8/1: Laying in bed eating breakfast. Very tearful this AM, stated that she has to make a decision if she wants to continue with the biopsy on her hand. Pt is concerned that there may not be much we can do about the wound on her left hand. Dr Simon from wound care at  bedside. Will return this afternoon for biopsy procedure per MD. HD today UF 2L, tolerated well. Denies SOB, CP,N/VD. K+ up this am 5.7.   8/2: Resting in bed. C/o fatigue. Reports improved SOB, remains on oxygen.   08/3: Sitting up in chair, states she feels good today, SOB improved , remains on 2L nasal cannula. Denies CP, N/V/D. Discussed about fluid restriction. Pending left hand biopsy results. K+ improved 5.4. States fatigue but due to she recently ambulated with RN, otherwise no complaints. No overnight events, VSS.   08/4: Pt laying in bed. AOX4, HD yesterday tolerated will UF 2800mL. Hypotensive on the last hr of HD reported SBP 89-90's but asymptomatic. BP this am stable. Denies any CP/SOB/N/V/D. Patient on RA. C/o constipation. No overnight events. States she has declined hospice but would like to go to a home care facility.   8/5: iHD today, net UF 1L.  No resting comfortably no complaints. VSS  8/6: Had iHD yesterday with 1 L UF.  Currently sleeping, but awakens easily and has no new complaints. VSS.   8/7: Lying in bed sleeping, awakens and has no new complaints, just fatigue. VSS. Due for iHD tomorrow.  8/8: Seen on HD this am. No acute distress. VSS.   8/9: 2.5 net UF. VSS. Resting in bed. C/o fatigue. Denies CP/SOB.   8/10: 1L net UF. No complaints. VSS.   8/11: Resting comfortably.  Family at bedside.  VSS.  Family reports she had been having pain, feels gabapentin may need to be increased.  No other complaints.   8/12: Seen on HD this am. No complaints.   8/13: iHD yesterday net UF 2.5L.  VSS 3L NC resting comfortably today.  No complaints.   8/14: Sleeping in bed, arouses. VSS. No overnight events.   8/15: Seen on dialysis at bedside.  Denies SOB.  Having back pain.  Chest pain last night, not complaining of at this time  8/16 - Complains of weakness, denies pain or SOB.  Had BM.  HD done yesterday  8/17 - Having some back pain.  No cramping.  Seen on dialysis  8/18: sitting up in bed, wanted to  "leave AMA but was talked out of it, again declines palliative, for iHD tomorrow   8/19: Complaining of chest pain this morning.  RRT called.  UF on dialysis on hold.  Hospitalist notified    REVIEW OF SYSTEMS:    10 point ROS reviewed and is as per HPI/daily summary or otherwise negative    PMH/PSH/SH/FH: Reviewed and unchanged since admission note  CURRENT MEDICATIONS: Reviewed from admission to present day    VS:  /54   Pulse 96   Temp 36.7 °C (98.1 °F) (Temporal)   Resp 18   Ht 1.626 m (5' 4\")   Wt 43.1 kg (95 lb 0.3 oz)   SpO2 92%   BMI 16.31 kg/m²   Physical Exam  Vitals and nursing note reviewed.   Constitutional:       General: She is not in acute distress.     Appearance: She is ill-appearing.   HENT:      Head: Normocephalic and atraumatic.      Mouth/Throat:      Mouth: Mucous membranes are dry.   Eyes:      General: No scleral icterus.  Cardiovascular:      Rate and Rhythm: Normal rate and regular rhythm.      Pulses: Normal pulses.      Heart sounds: Normal heart sounds. Murmur heard.    No friction rub. No gallop.   Pulmonary:      Effort: Pulmonary effort is normal. No respiratory distress.      Breath sounds: No wheezing or rales.      Comments: Decrease breathsounds Bilateral lower lobes  Abdominal:      General: Bowel sounds are normal. There is no distension.      Palpations: Abdomen is soft.      Tenderness: There is no abdominal tenderness. There is no guarding.   Musculoskeletal:         General: Swelling (left arm) or deformity, wrapped. Erythema 1st/4th digits.      Cervical back: Normal range of motion.      Right lower leg: Trace edema.      Left lower leg: Trace edema.      Comments:   LUE AVF + bruit and Thrill   Skin:     General: Skin is warm and dry.      Coloration: Skin is not pale.      Findings: No rash.   Left hand wrapped in gauze   Neurological:      Mental Status: She is alert and oriented to person, place, and time.   Psychiatric:         Mood and Affect: Mood " normal.         Behavior: Behavior normal    Fluids:  In: 580 [P.O.:580]  Out: -     LABS:  Recent Labs     08/17/22  0820 08/19/22  0208   SODIUM 129* 135   POTASSIUM 6.2* 5.2   CHLORIDE 88* 94*   CO2 25 25   GLUCOSE 105* 158*   BUN 63* 52*   CREATININE 4.79* 4.07*   CALCIUM 8.8 9.0         IMPRESSION:  # ESRD, dependent on HD              - HD via LUE AVF             - HD qMWF  # Fluid overload, improved             - Secondary to non-compliance with fluid restriction and HD treatments  # Left arm edema, slow improvement              - CTA upper ext 6/26 w/o e/o focal stenosis + extensive edema             - AVF patent on left arm US negative for DVT             - Pt no showed to outpt appt to evaluate              - Vascular does not recommend intervention              - Extensor tendon laceration left hand with wound             - Wound care in place, s/p biopsy 8/1             - Left hand skin biopsy negative for calciphylaxis   - Outpatient [facility] appt rescheduled for next month  # HTN, variable control, stable at this time             - Goal BP < 140/90  # Anemia of CKD, at goal              - Goal Hgb 10-11             - Iron 27             -TIBC 142             -%Sat 19             - Ferritin 1419  # CKD-MBD             - Vit D 35            - PTH 99.4             - Managed at OP unit             - On sevelamer with meals  # Hyperkalemia            - Correct w/ HD  # DM II--management per primary svc  # Leg Pain--chronic skin changes and subcutaneous tissue firm to touch             - Vascular does not recommend any intervention  # Leukocytosis resolved  # Hyponatremia  - Likely fluid related, manage w/ HD  # Hypoalbuminemia  - No dietary protein restrictions  # Chest pain    - Management per primary team    PLAN:  - Continue qMWF iHD schedule  - UF as tolerated  - Continue off of all BP meds and diuretics for now.  - Limit sedating meds if possible  - No dietary protein restrictions  - Fluid restriction  1.2 L   - Low potassium diet  - Dose all meds per ESRD  - Max dose gabapentin in ESRD pt 300mg/day  - Outpt access center appointment rescheduled  - Follow labs  - DC planning underway for SNF         Thank you,

## 2022-08-19 NOTE — PROGRESS NOTES
4 Eyes Skin Assessment Completed by MAURO Jacobo and MAURO Block.     Head WDL  Ears WDL  Nose WDL  Mouth WDL  Neck WDL  Breast/Chest WDL  Shoulder Blades WDL  Spine WDL  (R) Arm/Elbow/Hand Bruising  (L) Arm/Elbow/Hand Scab, Swelling, and Discoloration, left hand wound with dressing in place  Abdomen WDL  Groin WDL  Scrotum/Coccyx/Buttocks Redness and Blanching, wound  (R) Leg Scab  (L) Leg Scab  (R) Heel/Foot/Toe Redness and Blanching  (L) Heel/Foot/Toe Redness and Blanching              Devices In Places Pulse Ox        Interventions In Place Heel Mepilex, Sacral Mepilex, Waffle Overlay, Pillows, Q2 Turns, Heels Loaded W/Pillows, and Pressure Redistribution Mattress     Possible Skin Injury Yes     Pictures Uploaded Into Epic Yes  Wound Consult Placed N/A, wound already following  RN Wound Prevention Protocol Ordered Yes

## 2022-08-19 NOTE — ASSESSMENT & PLAN NOTE
-nursing reported marroon colored stool  -occult stool came back positive  -check Q8 H&H- stable for now  -continue to monitor

## 2022-08-19 NOTE — PROGRESS NOTES
Rapid Response Summary     Rapid response called at 1100 for: Chest Pain     VS: WDL (See Vitals Flowsheet)  Additional info: SABIHA 7/10  MD Paged: UMANG Joshi  Interventions:    Imaging/Tests: EKG    Labs: Troponin   Medications:  none   Other: N/A  Disposition: Improved with rapid response team interventions. Primary RN updated on plan of care. Transfer not indicated at this time.  and Rapid team will continue to follow the patient.

## 2022-08-19 NOTE — CODE DOCUMENTATION
2nd EKG completed. Results changed, looks similar to EKG on 6/24.     3rd EKG completed, also different.

## 2022-08-19 NOTE — DISCHARGE PLANNING
Case Management Discharge Planning    Admission Date: 6/24/2022  GMLOS: 4.3  ALOS: 56    6-Clicks ADL Score: 15  6-Clicks Mobility Score: 9  PT and/or OT Eval ordered: Yes  Post-acute Referrals Ordered: Yes  Post-acute Choice Obtained: Yes  Has referral(s) been sent to post-acute provider:  Yes      Anticipated Discharge Dispo: Discharge Disposition: D/T to SNF with Medicare cert in anticipation of skilled care (03)    DME Needed: No    Action(s) Taken: Referral(s) sent    Escalations Completed: None    Medically Clear: Yes    Next Steps: SNF referrals sent to local and outlying facilities, pending accepting facility.    Barriers to Discharge: Pending Placement

## 2022-08-19 NOTE — PROGRESS NOTES
Assumed care of patient at bedside report from NOC RN. Updated on POC. Patient currently A & O x 4; on room air, up with complaints of acute pain in cervical area. Assessment completed. Call light within reach. Whiteboard updated. Fall precautions in place. Bed locked and in lowest position. All questions answered. No other needs indicated at this time.

## 2022-08-19 NOTE — CARE PLAN
The patient is Stable - Low risk of patient condition declining or worsening    Shift Goals  Clinical Goals: (P) Pain management  Patient Goals: (P) Pain management  Family Goals: N/A    Progress made toward(s) clinical / shift goals:    Problem: Pain - Standard  Goal: Alleviation of pain or a reduction in pain to the patient’s comfort goal  Outcome: Progressing     Problem: Fall Risk  Goal: Patient will remain free from falls  Outcome: Progressing

## 2022-08-19 NOTE — CODE DOCUMENTATION
CP 7/10. SOB when patient takes deep breath. Severe aortic stenosis and low EF.   UMANG Morley at bedside.

## 2022-08-19 NOTE — PROGRESS NOTES
Hospital Medicine Twice Weekly Progress Note    Date of Service  8/19/2022    Chief Complaint  Edema    Hospital Course  81 y.o. female with PMHx ESRD on HD MWF, HTN, HLD, and T2DM who was admitted on 6/24/2022 for left upper arm swelling and lower extremity edema after missing HD sessions.  X-rays of the left hand showed only chronic changes.  AV fistula was functioning well.  CTA showed no evidence of central stenosis.  Vascular surgery was consulted and only recommended supportive care such as wrapping the arm with Ace bandage.  She was also found to have severe aortic stenosis for which cardiology was consulted, and gave the opinion that she is not a candidate for intervention.  She was dialyzed while in the hospital with subsequent clinical improvement.  She did have hospital delirium while in the hospital, which has since resolved.  Palliative care was consulted, but patient continued to wish to be full code.  SNF placement was pursued, but proved to be challenging as transfer to dialysis is not always facilitated.  Case management is working on her discharge to SNF and arrangement of hemodialysis.    Interval Problem Update    08/15/22  Overnight she developed chest pain and she was started on heparin gtt. and transferred to telemetry floor.  She found to have elevated troponin and it remained stable.  When I evaluated her at the bedside she expressed her pain has completely subsided and she reported shoulder pain and lidocaine past and heating pad help in her pain.  I discussed with her that if her next opening remained stable plan is to discontinue heparin and transfer her to medical floor.  She agreed with the plan to check troponin 1 more time and avoid any procedure if is not required.    8/19/22 patient developed chest pain during dialysis, EKG x3 ordered which showed probable left atrial abnormality incomplete left bundle branch block.  Discussed the case with cardiology APRN who did not feel that it  warranted further investigation.  Troponin was also ordered and resulted at 212 which is baseline for patient compared to troponin that has been checked over the past week.  Her chest pain resolved and dialysis continued.      Consultants/Specialty  cardiology, nephrology and vascular surgery    Code Status  Full Code    Disposition  Patient is medically cleared for discharge.   Anticipate discharge to to skilled nursing facility.  I have placed the appropriate orders for post-discharge needs.    Review of Systems  Review of Systems   Constitutional:  Negative for chills, fever and weight loss.   HENT:  Negative for hearing loss and tinnitus.    Eyes:  Negative for blurred vision, double vision, photophobia and pain.   Respiratory:  Negative for cough, sputum production and shortness of breath.    Cardiovascular:  Positive for chest pain (Now resolved). Negative for palpitations, orthopnea and leg swelling.   Gastrointestinal:  Negative for abdominal pain, constipation, diarrhea, nausea and vomiting.   Genitourinary:  Negative for dysuria, frequency and urgency.   Musculoskeletal:  Positive for back pain and myalgias. Negative for joint pain and neck pain.   Skin:  Negative for rash.   Neurological:  Negative for dizziness, tingling, tremors, sensory change, speech change, focal weakness and headaches.   Psychiatric/Behavioral:  Negative for hallucinations and substance abuse.    All other systems reviewed and are negative.     Pertinent positives/negatives as mentioned above.     A complete review of systems was personally done by me. All other systems were negative.        Physical Exam  Temp:  [36.5 °C (97.7 °F)-37.2 °C (99 °F)] 36.7 °C (98.1 °F)  Pulse:  [79-97] 86  Resp:  [12-18] 18  BP: (105-131)/(45-66) 117/53  SpO2:  [90 %-100 %] 100 %    Physical Exam  Vitals reviewed.   Constitutional:       General: She is not in acute distress.     Appearance: Normal appearance. She is not ill-appearing.   HENT:       Head: Normocephalic and atraumatic.      Nose: No congestion.   Eyes:      General:         Right eye: No discharge.         Left eye: No discharge.      Pupils: Pupils are equal, round, and reactive to light.   Cardiovascular:      Rate and Rhythm: Normal rate and regular rhythm.      Pulses: Normal pulses.      Heart sounds: Murmur heard.   Pulmonary:      Effort: Pulmonary effort is normal. No respiratory distress.      Breath sounds: Normal breath sounds. No stridor.   Abdominal:      General: Bowel sounds are normal. There is no distension.      Palpations: Abdomen is soft.      Tenderness: There is no abdominal tenderness.   Musculoskeletal:         General: Swelling, tenderness and deformity present. Normal range of motion.      Cervical back: Normal range of motion. No rigidity.   Skin:     General: Skin is warm.      Capillary Refill: Capillary refill takes less than 2 seconds.      Coloration: Skin is not jaundiced or pale.      Findings: Erythema and lesion present. No bruising.      Comments: Dressing present on left hand CDI   Neurological:      General: No focal deficit present.      Mental Status: She is alert and oriented to person, place, and time.      Cranial Nerves: No cranial nerve deficit.   Psychiatric:         Mood and Affect: Mood normal.         Behavior: Behavior normal.     I performed physical exam on August 15, 2002 it remains similar without any acute changes.  I reviewed patient's left hand and fingers picture in epic.  Fluids    Intake/Output Summary (Last 24 hours) at 8/19/2022 1149  Last data filed at 8/18/2022 2300  Gross per 24 hour   Intake 340 ml   Output --   Net 340 ml       Laboratory  Recent Labs     08/18/22  1700 08/19/22  0208 08/19/22  0951   HEMOGLOBIN 8.4* 8.7* 8.2*   HEMATOCRIT 25.8* 27.2* 25.5*     Recent Labs     08/17/22  0820 08/19/22  0208   SODIUM 129* 135   POTASSIUM 6.2* 5.2   CHLORIDE 88* 94*   CO2 25 25   GLUCOSE 105* 158*   BUN 63* 52*   CREATININE 4.79*  4.07*   CALCIUM 8.8 9.0                     Imaging  US-EXTREMITY ARTERY UPPER UNILAT LEFT   Final Result      DX-CHEST-PORTABLE (1 VIEW)   Final Result         1.  Left pulmonary infiltrates or atelectasis, similar to prior study.   2.  Small to moderate left pleural effusion   3.  Atherosclerosis      CT-HAND W/O LEFT   Final Result      1.  No acute fracture or dislocation.      2.  Previous distal ulnar fracture identified.      3.  Osteoarthritis is most prominent at the first carpometacarpal joint.      4.  No bone erosions identified.      5.  Fluid collection or soft tissue abscess is identified.      6.  Induration in subcutaneous fat and intramuscular fat planes is noted which could be due to edema or inflammation such as cellulitis.      DX-HAND 3+ LEFT   Final Result      1. No acute fracture or subluxation.   2. Chronic posttraumatic changes in the distal left radius and ulna, stable when compared with the prior study.   3. Stable osteoarthritis involving the left first carpometacarpal joint.      DX-HAND 3+ LEFT   Final Result      1.  No acute fracture or dislocation.   2.  Scattered mild degenerative changes, increased from prior study. No definite erosive arthropathy.   3.  Osteopenia.   4.  Old ulnar styloid process fracture.      DX-CHEST-PORTABLE (1 VIEW)   Final Result      1.  Probable 12 mm right mid/upper lung zone mass. Suggest CT chest without contrast for further assessment      2.  Interstitial pulmonary edema or fibrosis      3.  Enlarged cardiac silhouette      4.  Left pleural effusion      CT-CTA UPPER EXT WITH & W/O-POST PROCESS LEFT   Final Result      1.  Status post brachiocephalic fistula in the left arm. No focal stenosis is identified.      2.  Extensive edema in the subcutaneous tissues of the visualized left lateral chest and arm.      EC-ECHOCARDIOGRAM COMPLETE W/O CONT   Final Result      US-ZAHRAA SINGLE LEVEL BILAT   Final Result      US-EXTREMITY ARTERY LOWER BILAT   Final  Result      CT-EXTREMITY, UPPER W/O LEFT   Final Result      1.  There is diffuse subcutaneous edema of the imaged portions of the left upper extremity. There is also diffuse body wall edema in the visualized portions of the chest and abdomen.   2.  No focal fluid collection to suggest abscess.   3.  There is a small left pleural effusion and minimal fluid in the abdomen.   4.  There is postsurgical change of AV fistula. There is small vessel atherosclerosis.      US-EXTREMITY VENOUS UPPER UNILAT LEFT   Final Result      DX-CHEST-PORTABLE (1 VIEW)   Final Result         1.  Interstitial pulmonary parenchymal prominence suggest chronic underlying lung disease, component of interstitial edema and/or infiltrates not excluded.   2.  Small left pleural effusion   3.  Cardiomegaly   4.  Atherosclerosis           Assessment/Plan  * ESRD (end stage renal disease) on dialysis ([facility])- (present on admission)  Assessment & Plan  - Continue hemodialysis per nephrology.  -Continue sevelamer and calcitriol.    Chest pain  Assessment & Plan  She developed chest pain on August 14, 2022 and she was transferred to telemetry floor with heparin.  She found to have elevated troponin in the setting of end-stage renal disease.  Repeat troponin remain in the similar range.   I evaluated her at the bedside and she reported that her chest pain has completely subsided and currently she has shoulder pain and lidocaine patch and heating pads are helpful.  I discussed with her that I am willing to order troponin 1 more time and if it is remained in the similar range plan is to avoid doing further testing and avoid any unnecessary procedure.  I reviewed EKGs and he did not show any acute abnormalities.  I also reviewed cardiology note from June 29, 2022 it recommended goals of care discussion and palliative care due to severe aortic stenosis and low ejection fraction of 30%.    8/19 patient had another episode of chest pain during dialysis.  EKG  abnormal, discussed with cardiology who did not recommend any further interventions.  Troponin 212 which is stable from troponins drawn earlier this week.  Chest pain resolved while at bedside and dialysis continued.    Hematochezia  Assessment & Plan  -nursing reported marroon colored stool  -occult stool came back positive  -check Q8 H&H- stable for now  -started protonix IV 40 BID  -continue to monitor      Extensor tendon laceration of left hand with open wound  Assessment & Plan  She has been having worsening of her wound on her left hand. COmpleted course of ancef. Now, hand wound healing well, no further signs of infection.X-ray did not show any acute abnormalities.  CT scan of her left hand and it did not show any fluid collection.  Skin biopsy showed no evidence of calciphylaxis or malignancy, and only showed acute inflammation.  Wound care service following.  Continue wound care.      Aortic stenosis, severe- (present on admission)  Assessment & Plan  - Not a candidate for intervention per cardiology.  -Continue to monitor volume status.    Edema of left upper extremity- (present on admission)  Assessment & Plan  - Chronic, at the same site of AV fistula which is functioning well.  -Multifactorial: Poor EF, lymphedema, outflow stenosis.  CTA showed no evidence of central stenosis.  Vascular surgery recommended supportive care with wrapping arm with Ace bandage.  -Continue to monitor.    Diabetes mellitus, type II (HCC)- (present on admission)  Assessment & Plan  - Last HbA1c was 11.7.  Continue Lantus 5 units at night, along with sliding scale insulin coverage.  Continue Accu-Cheks before meals and at bedtime. Goal to keep BG between 140-180 per 2019 ADA guidelines.        Essential hypertension- (present on admission)  Assessment & Plan  - Maintaining good blood pressure control, despite holding home antihypertensives.  Continue to monitor blood pressure trend closely.    Hypothyroidism- (present on  admission)  Assessment & Plan  - Continue Synthroid.    Delirium  Assessment & Plan  - Resolved.    Pain in both lower extremities- (present on admission)  Assessment & Plan  - Chronic.  Continue as needed pain medications.    Advance care planning  Assessment & Plan  She is declining hospice services.  I again discussed with her about goals of care, and she states she wants to continue current management that she is receiving, and wants to continue with hemodialysis.    Generalized weakness- (present on admission)  Assessment & Plan  - SNF placement being pursued.  Placement challenging due to need for transportation back and forth to hemodialysis.  CM/OSCAR on board.       I discussed plan of care with  regarding her current medical condition and discharge plan.  I discussed plan of care with bedside RN on telemetry floor.      VTE prophylaxis: heparin ppx    I have performed a physical exam and reviewed and updated ROS and Plan today (8/19/2022). In review of last note, there are no changes except as documented above.

## 2022-08-20 NOTE — PROGRESS NOTES
4 Eyes Skin Assessment Completed by MAURO Jacobo and MAURO Hobbs.     Head WDL  Ears WDL  Nose WDL  Mouth WDL  Neck WDL  Breast/Chest WDL  Shoulder Blades WDL  Spine WDL  (R) Arm/Elbow/Hand Bruising  (L) Arm/Elbow/Hand Scab, Swelling, and Discoloration, left hand wound with dressing in place  Abdomen WDL  Groin WDL  Scrotum/Coccyx/Buttocks Redness and Blanching, wound  (R) Leg Scab  (L) Leg Scab  (R) Heel/Foot/Toe Redness and Blanching  (L) Heel/Foot/Toe Redness and Blanching              Devices In Places Pulse Ox        Interventions In Place Heel Mepilex, Sacral Mepilex, Waffle Overlay, Pillows, Q2 Turns, Heels Loaded W/Pillows, and Pressure Redistribution Mattress     Possible Skin Injury Yes     Pictures Uploaded Into Epic Yes  Wound Consult Placed N/A, wound already following  RN Wound Prevention Protocol Ordered Yes

## 2022-08-20 NOTE — PROGRESS NOTES
Patient had an episode of dark bloody stool. Vital signs stable, H&H drawn after, Hgb 8.5 stable from previous labs. On call hospitalist Mariana notified.

## 2022-08-20 NOTE — CARE PLAN
The patient is Watcher - Medium risk of patient condition declining or worsening    Shift Goals  Clinical Goals: Pain management, monitor CP pain  Patient Goals: pain control, rest  Family Goals: N/A    Progress made toward(s) clinical / shift goals:  Discussed pain management goals with pt. Discussed fall risk and fall precautions with pt.    Patient is not progressing towards the following goals:      Problem: Pain - Standard  Goal: Alleviation of pain or a reduction in pain to the patient’s comfort goal  Outcome: Progressing     Problem: Knowledge Deficit - Standard  Goal: Patient and family/care givers will demonstrate understanding of plan of care, disease process/condition, diagnostic tests and medications  Outcome: Progressing     Problem: Fall Risk  Goal: Patient will remain free from falls  Outcome: Progressing

## 2022-08-20 NOTE — PROGRESS NOTES
Late entry    Assumed care of patient at bedside report from NOC RN. Updated on POC. Patient currently A & O x 3; on RA; up max assist; with complaints of acute pain, see MAR. Assessment completed Call light within reach. Whiteboard updated. Fall precautions in place. Bed locked and in lowest position. All questions answered. No other needs indicated at this time.

## 2022-08-20 NOTE — PROGRESS NOTES
4 Eyes Skin Assessment Completed by MAURO Wiggins and MAURO Gonzalez.    Head WDL  Ears WDL  Nose WDL  Mouth WDL  Neck WDL  Breast/Chest WDL  Shoulder Blades WDL  Spine WDL  (R) Arm/Elbow/Hand Redness and Blanching  (L) Arm/Elbow/Hand Bruising, Swelling, and Edema, Wound  Abdomen WDL  Groin WDL  Scrotum/Coccyx/Buttocks Redness and Blanching, wound  (R) Leg Bruising and Edema  (L) Leg Scab, Bruising, and Edema  (R) Heel/Foot/Toe WDL  (L) Heel/Foot/Toe WDL          Devices In Places       Interventions In Place Heel Mepilex, Sacral Mepilex, Waffle Overlay, Pillows, Q2 Turns, Heels Loaded W/Pillows, and Pressure Redistribution Mattress    Possible Skin Injury Yes    Pictures Uploaded Into Epic Yes  Wound Consult Placed Yes  RN Wound Prevention Protocol Ordered Yes

## 2022-08-20 NOTE — CARE PLAN
The patient is Watcher - Medium risk of patient condition declining or worsening    Shift Goals  Clinical Goals: Pain management  Patient Goals: Pain management  Family Goals: N/A    Progress made toward(s) clinical / shift goals:      Problem: Pain - Standard  Goal: Alleviation of pain or a reduction in pain to the patient’s comfort goal  Outcome: Progressing  Note: Q6 hr tylenol ordered, oxy PRN to supplement increased pain. Further pain control available for dressing changes. Pt pain tolerable at this time, pt sleeping comfortably.      Problem: Knowledge Deficit - Standard  Goal: Patient and family/care givers will demonstrate understanding of plan of care, disease process/condition, diagnostic tests and medications  Outcome: Progressing     Problem: Fall Risk  Goal: Patient will remain free from falls  Outcome: Progressing       Patient is not progressing towards the following goals:

## 2022-08-20 NOTE — PROGRESS NOTES
Hemodialysis ordered by Dr. Gregory. Treatment started at 1012 and ended at 1312. During tx at 1058 pt c/o chest pain. Dr. Gregory visited and want rapid response. Rapid response called. RNs arrived at dialysis room. Troponin and EKG done. Decreased BFR and UFG. Vss. Tx continue. Primary RN notified. Pt finished tx cont pain chest pain and body aches. Net UF 1.0. pt stable, vss post tx. Report to EL Mak RN. Lt ua avf dsg cdi.

## 2022-08-20 NOTE — PROGRESS NOTES
[facility] -  PROGRESS NOTE               Author: Bebeto Gregory M.D. Date & Time: 8/20/2022  9:01 AM     HPI:  81yoF with PMH significant for ESRD on HD MWF via left arm AVF, HTN, DM II, Anemia secondary to CKD, CKD Bone mineral disorder, admitted with increased edema and weakness and having missed her last last 2-3 outpt HD treatments. Pt is very lethargic at this time and unable to provide much history, majority of the history was obtained through review of the medical record and discussion with primary svc. Pt had reported increased LE edema and fatigue and weakness and worsening of her left arm edema so she came to the ED for evaluation. She apparently has missed her last 2-3 outpt dialysis treatments. Per regular outpt Nephrologist Dr. Gates, she has been non-compliant with her fluid restriction and was told that she needed 4x/week dialysis until her volume status could be optimized but she was non-compliant with that recommendation. She has edema of her left arm where her AVF is located and there is concern for a central stenosis that could be the etiology. She had an appointment at the outpt access center to evaluate for central stenosis and undergo angioplasty if needed on 6/9/22 but pt did not go to the appointment. She has not had any other procedures done to the arm in the past couple of months. She had a left arm us done today that showed no DVT and patent AVF and soft tissue edema. She apparently received pain medication fentanyl and dilaudid as well as benadryl earlier today and she is very drowsy.     DAILY NEPHROLOGY SUMMARY:  **See previous note from 7/31/22 to review prior daily nephrology summary entries.  8/1: Laying in bed eating breakfast. Very tearful this AM, stated that she has to make a decision if she wants to continue with the biopsy on her hand. Pt is concerned that there may not be much we can do about the wound on her left hand. Dr Simon from wound care at  bedside. Will return this afternoon for biopsy procedure per MD. HD today UF 2L, tolerated well. Denies SOB, CP,N/VD. K+ up this am 5.7.   8/2: Resting in bed. C/o fatigue. Reports improved SOB, remains on oxygen.   08/3: Sitting up in chair, states she feels good today, SOB improved , remains on 2L nasal cannula. Denies CP, N/V/D. Discussed about fluid restriction. Pending left hand biopsy results. K+ improved 5.4. States fatigue but due to she recently ambulated with RN, otherwise no complaints. No overnight events, VSS.   08/4: Pt laying in bed. AOX4, HD yesterday tolerated will UF 2800mL. Hypotensive on the last hr of HD reported SBP 89-90's but asymptomatic. BP this am stable. Denies any CP/SOB/N/V/D. Patient on RA. C/o constipation. No overnight events. States she has declined hospice but would like to go to a home care facility.   8/5: iHD today, net UF 1L.  No resting comfortably no complaints. VSS  8/6: Had iHD yesterday with 1 L UF.  Currently sleeping, but awakens easily and has no new complaints. VSS.   8/7: Lying in bed sleeping, awakens and has no new complaints, just fatigue. VSS. Due for iHD tomorrow.  8/8: Seen on HD this am. No acute distress. VSS.   8/9: 2.5 net UF. VSS. Resting in bed. C/o fatigue. Denies CP/SOB.   8/10: 1L net UF. No complaints. VSS.   8/11: Resting comfortably.  Family at bedside.  VSS.  Family reports she had been having pain, feels gabapentin may need to be increased.  No other complaints.   8/12: Seen on HD this am. No complaints.   8/13: iHD yesterday net UF 2.5L.  VSS 3L NC resting comfortably today.  No complaints.   8/14: Sleeping in bed, arouses. VSS. No overnight events.   8/15: Seen on dialysis at bedside.  Denies SOB.  Having back pain.  Chest pain last night, not complaining of at this time  8/16 - Complains of weakness, denies pain or SOB.  Had BM.  HD done yesterday  8/17 - Having some back pain.  No cramping.  Seen on dialysis  8/18: sitting up in bed, wanted to  "leave AMA but was talked out of it, again declines palliative, for iHD tomorrow   8/19: Complaining of chest pain this morning.  RRT called.  UF on dialysis on hold.  Hospitalist notified  8/20: No chest pain this morning.  Arm pain better    REVIEW OF SYSTEMS:    10 point ROS reviewed and is as per HPI/daily summary or otherwise negative    PMH/PSH/SH/FH: Reviewed and unchanged since admission note  CURRENT MEDICATIONS: Reviewed from admission to present day    VS:  /69   Pulse 81   Temp 36.5 °C (97.7 °F) (Temporal)   Resp 14   Ht 1.626 m (5' 4\")   Wt 56.9 kg (125 lb 7.1 oz)   SpO2 95%   BMI 21.53 kg/m²   Physical Exam  Vitals and nursing note reviewed.   Constitutional:       General: She is not in acute distress.     Appearance: She is ill-appearing.   HENT:      Head: Normocephalic and atraumatic.      Mouth/Throat:      Mouth: Mucous membranes are dry.   Eyes:      General: No scleral icterus.  Cardiovascular:      Rate and Rhythm: Normal rate and regular rhythm.      Pulses: Normal pulses.      Heart sounds: Normal heart sounds. Murmur heard.    No friction rub. No gallop.   Pulmonary:      Effort: Pulmonary effort is normal. No respiratory distress.      Breath sounds: No wheezing or rales.      Comments: Decrease breathsounds Bilateral lower lobes  Abdominal:      General: Bowel sounds are normal. There is no distension.      Palpations: Abdomen is soft.      Tenderness: There is no abdominal tenderness. There is no guarding.   Musculoskeletal:         General: Swelling (left arm) or deformity, wrapped. Erythema 1st/4th digits.      Cervical back: Normal range of motion.      Right lower leg: Trace edema.      Left lower leg: Trace edema.      Comments:   LUE AVF + bruit and Thrill   Skin:     General: Skin is warm and dry.      Coloration: Skin is not pale.      Findings: No rash.   Left hand wrapped in gauze   Neurological:      Mental Status: She is alert and oriented to person, place, and " time.   Psychiatric:         Mood and Affect: Mood normal.         Behavior: Behavior normal    Fluids:  In: 1470 [P.O.:970; Dialysis:500]  Out: 1500     LABS:  Recent Labs     08/19/22  0208   SODIUM 135   POTASSIUM 5.2   CHLORIDE 94*   CO2 25   GLUCOSE 158*   BUN 52*   CREATININE 4.07*   CALCIUM 9.0         IMPRESSION:  # ESRD, dependent on HD              - HD via LUE AVF             - HD qMWF  # Fluid overload, improved             - Secondary to non-compliance with fluid restriction and HD treatments  # Left arm edema, slow improvement              - CTA upper ext 6/26 w/o e/o focal stenosis + extensive edema             - AVF patent on left arm US negative for DVT             - Pt no showed to outpt appt to evaluate              - Vascular does not recommend intervention              - Extensor tendon laceration left hand with wound             - Wound care in place, s/p biopsy 8/1             - Left hand skin biopsy negative for calciphylaxis   - Outpatient [facility] appt rescheduled for next month  # HTN, variable control, stable at this time             - Goal BP < 140/90  # Anemia of CKD, at goal              - Goal Hgb 10-11             - Iron 27             -TIBC 142             -%Sat 19             - Ferritin 1419  # CKD-MBD             - Vit D 35            - PTH 99.4             - Managed at OP unit             - On sevelamer with meals  # Hyperkalemia            - Correct w/ HD  # DM II--management per primary svc  # Leg Pain--chronic skin changes and subcutaneous tissue firm to touch             - Vascular does not recommend any intervention  # Leukocytosis resolved  # Hyponatremia  - Likely fluid related, manage w/ HD  # Hypoalbuminemia  - No dietary protein restrictions  # Chest pain    - Management per primary team    PLAN:  - Continue qMWF iHD schedule  - UF as tolerated  - Continue off of all BP meds and diuretics for now.  - Limit sedating meds if possible  - No dietary protein restrictions  -  Fluid restriction 1.2 L   - Low potassium diet  - Dose all meds per ESRD  - Max dose gabapentin in ESRD pt 300mg/day  - Outpt access center appointment rescheduled  - Follow labs  - DC planning underway for SNF         Thank you,

## 2022-08-20 NOTE — PROGRESS NOTES
Assumed care of patient, bedside report received from Lisa WADE. Updated on POC, call light within reach and fall precautions in place. Bed locked and in lowest position. Patient instructed to call for assistance before getting out of bed. All questions answered, no other needs at this time.

## 2022-08-21 NOTE — PROGRESS NOTES
Pt stating 6/10 chest pain. Vital signs stable, pt not in distress. EKG completed, Dr. Davis notified.

## 2022-08-21 NOTE — PROGRESS NOTES
"Dr. Davis and this RN to bedside to discuss code status and goals of care. Explained meaning of full code status and the resuscitation efforts including CPR, defibrillation, and intubation/mechanical ventilation. Discussed that if she chose to be DNR/DNI we would still continue current course of care, but that with her co-morbities her quality of life would likely decrease if she were resuscitated post-cardiac arrest. Pt verbalizes understanding and states she wishes to remain a full code, \"I think I'm getting better and can reverse some of these problems.\"  "

## 2022-08-21 NOTE — CARE PLAN
The patient is Stable - Low risk of patient condition declining or worsening    Shift Goals  Clinical Goals: Pain managemnt  Patient Goals: pain control, Sleep  Family Goals: N/A    Progress made toward(s) clinical / shift goals:      Problem: Pain - Standard  Goal: Alleviation of pain or a reduction in pain to the patient’s comfort goal  Outcome: Progressing  Note: Patient pain remains constant in L arm, 3/10. Patient not requesting additional pain medication besides scheduled tylenol at this time.      Problem: Knowledge Deficit - Standard  Goal: Patient and family/care givers will demonstrate understanding of plan of care, disease process/condition, diagnostic tests and medications  Outcome: Progressing       Patient is not progressing towards the following goals:

## 2022-08-21 NOTE — PROGRESS NOTES
Hospital Medicine Daily Progress Note    Date of Service  8/21/2022    Chief Complaint  Edema    Hospital Course  81 y.o. female with PMHx ESRD on HD MWF, HTN, HLD, and T2DM who was admitted on 6/24/2022 for left upper arm swelling and lower extremity edema after missing HD sessions.  X-rays of the left hand showed only chronic changes.  AV fistula was functioning well.  CTA showed no evidence of central stenosis.  Vascular surgery was consulted and only recommended supportive care such as wrapping the arm with Ace bandage.  She was also found to have severe aortic stenosis for which cardiology was consulted, and gave the opinion that she is not a candidate for intervention.  She was dialyzed while in the hospital with subsequent clinical improvement.  She did have hospital delirium while in the hospital, which has since resolved.  Palliative care was consulted, but patient continued to wish to be full code.  SNF placement was pursued, but proved to be challenging as transfer to dialysis is not always facilitated.  Case management is working on her discharge to SNF and arrangement of hemodialysis.    Interval Problem Update    08/21/22      I evaluated and examined her at the bedside.  Earlier this morning she developed chest pain repeat EKG did not show any acute changes.  I evaluated her at the bedside and she denies any complaint of chest pain.  I had a lengthy discussion with her regarding CODE STATUS I explained full code and DNR.  I had a discussion along with bedside RN.  After lengthy discussion she would like to be full code.  Later this afternoon I received a page from RN when she developed chest pain again.  I ordered repeat troponin as currently she is full code.        Consultants/Specialty  cardiology, nephrology and vascular surgery    Code Status  Full Code    Disposition  Patient is medically cleared for discharge.   Anticipate discharge to to skilled nursing facility.  I have placed the appropriate  orders for post-discharge needs.    Review of Systems  Review of Systems   Constitutional:  Negative for chills, fever and weight loss.   HENT:  Negative for hearing loss and tinnitus.    Eyes:  Negative for blurred vision, double vision, photophobia and pain.   Respiratory:  Negative for cough, sputum production and shortness of breath.    Cardiovascular:  Negative for chest pain (Now resolved), palpitations, orthopnea and leg swelling.   Gastrointestinal:  Negative for abdominal pain, constipation, diarrhea, nausea and vomiting.   Genitourinary:  Negative for dysuria, frequency and urgency.   Musculoskeletal:  Positive for back pain and myalgias. Negative for joint pain and neck pain.   Skin:  Negative for rash.   Neurological:  Negative for dizziness, tingling, tremors, sensory change, speech change, focal weakness and headaches.   Psychiatric/Behavioral:  Negative for hallucinations and substance abuse.    All other systems reviewed and are negative.     Pertinent positives/negatives as mentioned above.     A complete review of systems was personally done by me. All other systems were negative.        Physical Exam  Temp:  [36.8 °C (98.2 °F)-37.1 °C (98.7 °F)] 36.8 °C (98.2 °F)  Pulse:  [87-94] 87  Resp:  [17-20] 17  BP: (104-125)/(56-69) 104/56  SpO2:  [94 %-97 %] 95 %    Physical Exam  Vitals reviewed.   Constitutional:       General: She is not in acute distress.     Appearance: Normal appearance. She is not ill-appearing.   HENT:      Head: Normocephalic and atraumatic.      Nose: No congestion.   Eyes:      General:         Right eye: No discharge.         Left eye: No discharge.      Pupils: Pupils are equal, round, and reactive to light.   Cardiovascular:      Rate and Rhythm: Normal rate and regular rhythm.      Pulses: Normal pulses.      Heart sounds: Murmur heard.   Pulmonary:      Effort: Pulmonary effort is normal. No respiratory distress.      Breath sounds: Normal breath sounds. No stridor.    Abdominal:      General: Bowel sounds are normal. There is no distension.      Palpations: Abdomen is soft.      Tenderness: There is no abdominal tenderness.   Musculoskeletal:         General: Swelling, tenderness and deformity present. Normal range of motion.      Cervical back: Normal range of motion. No rigidity.   Skin:     General: Skin is warm.      Capillary Refill: Capillary refill takes less than 2 seconds.      Coloration: Skin is not jaundiced or pale.      Findings: Erythema and lesion present. No bruising.      Comments: Dressing present on left hand CDI   Neurological:      General: No focal deficit present.      Mental Status: She is alert and oriented to person, place, and time.      Cranial Nerves: No cranial nerve deficit.   Psychiatric:         Mood and Affect: Mood normal.         Behavior: Behavior normal.     I performed physical exam on August 21, 2002 it remains similar without any acute changes.  I reviewed patient's left hand and fingers picture in epic.  Fluids    Intake/Output Summary (Last 24 hours) at 8/21/2022 1610  Last data filed at 8/21/2022 1400  Gross per 24 hour   Intake 440 ml   Output --   Net 440 ml         Laboratory  Recent Labs     08/20/22  1036 08/20/22  1702 08/21/22  0107   HEMOGLOBIN 8.2* 8.1* 8.3*   HEMATOCRIT 26.1* 25.5* 25.9*       Recent Labs     08/19/22  0208   SODIUM 135   POTASSIUM 5.2   CHLORIDE 94*   CO2 25   GLUCOSE 158*   BUN 52*   CREATININE 4.07*   CALCIUM 9.0                       Imaging  US-EXTREMITY ARTERY UPPER UNILAT LEFT   Final Result      DX-CHEST-PORTABLE (1 VIEW)   Final Result         1.  Left pulmonary infiltrates or atelectasis, similar to prior study.   2.  Small to moderate left pleural effusion   3.  Atherosclerosis      CT-HAND W/O LEFT   Final Result      1.  No acute fracture or dislocation.      2.  Previous distal ulnar fracture identified.      3.  Osteoarthritis is most prominent at the first carpometacarpal joint.      4.  No  bone erosions identified.      5.  Fluid collection or soft tissue abscess is identified.      6.  Induration in subcutaneous fat and intramuscular fat planes is noted which could be due to edema or inflammation such as cellulitis.      DX-HAND 3+ LEFT   Final Result      1. No acute fracture or subluxation.   2. Chronic posttraumatic changes in the distal left radius and ulna, stable when compared with the prior study.   3. Stable osteoarthritis involving the left first carpometacarpal joint.      DX-HAND 3+ LEFT   Final Result      1.  No acute fracture or dislocation.   2.  Scattered mild degenerative changes, increased from prior study. No definite erosive arthropathy.   3.  Osteopenia.   4.  Old ulnar styloid process fracture.      DX-CHEST-PORTABLE (1 VIEW)   Final Result      1.  Probable 12 mm right mid/upper lung zone mass. Suggest CT chest without contrast for further assessment      2.  Interstitial pulmonary edema or fibrosis      3.  Enlarged cardiac silhouette      4.  Left pleural effusion      CT-CTA UPPER EXT WITH & W/O-POST PROCESS LEFT   Final Result      1.  Status post brachiocephalic fistula in the left arm. No focal stenosis is identified.      2.  Extensive edema in the subcutaneous tissues of the visualized left lateral chest and arm.      EC-ECHOCARDIOGRAM COMPLETE W/O CONT   Final Result      US-ZAHRAA SINGLE LEVEL BILAT   Final Result      US-EXTREMITY ARTERY LOWER BILAT   Final Result      CT-EXTREMITY, UPPER W/O LEFT   Final Result      1.  There is diffuse subcutaneous edema of the imaged portions of the left upper extremity. There is also diffuse body wall edema in the visualized portions of the chest and abdomen.   2.  No focal fluid collection to suggest abscess.   3.  There is a small left pleural effusion and minimal fluid in the abdomen.   4.  There is postsurgical change of AV fistula. There is small vessel atherosclerosis.      US-EXTREMITY VENOUS UPPER UNILAT LEFT   Final Result       DX-CHEST-PORTABLE (1 VIEW)   Final Result         1.  Interstitial pulmonary parenchymal prominence suggest chronic underlying lung disease, component of interstitial edema and/or infiltrates not excluded.   2.  Small left pleural effusion   3.  Cardiomegaly   4.  Atherosclerosis           Assessment/Plan  * ESRD (end stage renal disease) on dialysis (HCC)- (present on admission)  Assessment & Plan  - Continue hemodialysis per nephrology.  -Continue sevelamer and calcitriol.    Hematochezia  Assessment & Plan  -nursing reported marroon colored stool  -occult stool came back positive  -check Q8 H&H- stable for now  -started protonix IV 40 BID  -continue to monitor      Chest pain  Assessment & Plan  She developed chest pain on August 14, 2022 and she was transferred to telemetry floor with heparin.  She found to have elevated troponin in the setting of end-stage renal disease.  Repeat troponin remain in the similar range.   I evaluated her at the bedside and she reported that her chest pain has completely subsided and currently she has shoulder pain and lidocaine patch and heating pads are helpful.  I discussed with her that I am willing to order troponin 1 more time and if it is remained in the similar range plan is to avoid doing further testing and avoid any unnecessary procedure.  I reviewed EKGs and he did not show any acute abnormalities.  I also reviewed cardiology note from June 29, 2022 it recommended goals of care discussion and palliative care due to severe aortic stenosis and low ejection fraction of 30%.    8/19 patient had another episode of chest pain during dialysis.  EKG abnormal, discussed with cardiology who did not recommend any further interventions.  Troponin 212 which is stable from troponins drawn earlier this week.  Chest pain resolved while at bedside and dialysis continued.    Extensor tendon laceration of left hand with open wound  Assessment & Plan  She has been having worsening of  her wound on her left hand. COmpleted course of ancef. Now, hand wound healing well, no further signs of infection.X-ray did not show any acute abnormalities.  CT scan of her left hand and it did not show any fluid collection.  Skin biopsy showed no evidence of calciphylaxis or malignancy, and only showed acute inflammation.  Wound care service following.  Continue wound care.      Hypothyroidism- (present on admission)  Assessment & Plan  - Continue Synthroid.    Delirium  Assessment & Plan  - Resolved.    Aortic stenosis, severe- (present on admission)  Assessment & Plan  - Not a candidate for intervention per cardiology.  -Continue to monitor volume status.    Pain in both lower extremities- (present on admission)  Assessment & Plan  - Chronic.  Continue as needed pain medications.    Advance care planning  Assessment & Plan  She is declining hospice services.  I again discussed with her about goals of care, and she states she wants to continue current management that she is receiving, and wants to continue with hemodialysis.    Edema of left upper extremity- (present on admission)  Assessment & Plan  - Chronic, at the same site of AV fistula which is functioning well.  -Multifactorial: Poor EF, lymphedema, outflow stenosis.  CTA showed no evidence of central stenosis.  Vascular surgery recommended supportive care with wrapping arm with Ace bandage.  -Continue to monitor.    Generalized weakness- (present on admission)  Assessment & Plan  - SNF placement being pursued.  Placement challenging due to need for transportation back and forth to hemodialysis.  CM/SW on board.    Diabetes mellitus, type II (HCC)- (present on admission)  Assessment & Plan  - Last HbA1c was 11.7.  Continue Lantus 5 units at night, along with sliding scale insulin coverage.  Continue Accu-Cheks before meals and at bedtime. Goal to keep BG between 140-180 per 2019 ADA guidelines.        Essential hypertension- (present on  admission)  Assessment & Plan  - Maintaining good blood pressure control, despite holding home antihypertensives.  Continue to monitor blood pressure trend closely.      I had a lengthy discussion with patient regarding CODE STATUS and explained her full code and DNR.  I had this discussion in presence of bedside RN.  After lengthy discussion she would like to be full code.    EKG did not show any acute abnormalities I ordered troponin as currently she is full code.    Time spend: 38 minutes. > 50 % time spend providing counseling / co ordination of care.      VTE prophylaxis: heparin ppx    I have performed a physical exam and reviewed and updated ROS and Plan today (8/21/2022). In review of last note, there are no changes except as documented above.

## 2022-08-21 NOTE — PROGRESS NOTES
[facility] -  PROGRESS NOTE               Author: THERESE Chaney Date & Time: 8/21/2022  10:10 AM     HPI:  [id number] with PMH significant for ESRD on HD MWF via left arm AVF, HTN, DM II, Anemia secondary to CKD, CKD Bone mineral disorder, admitted with increased edema and weakness and having missed her last last 2-3 outpt HD treatments. Pt is very lethargic at this time and unable to provide much history, majority of the history was obtained through review of the medical record and discussion with primary svc. Pt had reported increased LE edema and fatigue and weakness and worsening of her left arm edema so she came to the ED for evaluation. She apparently has missed her last 2-3 outpt dialysis treatments. Per regular outpt Nephrologist Dr. Gates, she has been non-compliant with her fluid restriction and was told that she needed 4x/week dialysis until her volume status could be optimized but she was non-compliant with that recommendation. She has edema of her left arm where her AVF is located and there is concern for a central stenosis that could be the etiology. She had an appointment at the outpt access center to evaluate for central stenosis and undergo angioplasty if needed on 6/9/22 but pt did not go to the appointment. She has not had any other procedures done to the arm in the past couple of months. She had a left arm us done today that showed no DVT and patent AVF and soft tissue edema. She apparently received pain medication fentanyl and dilaudid as well as benadryl earlier today and she is very drowsy.     DAILY NEPHROLOGY SUMMARY:  **See previous note from 7/31/22 to review prior daily nephrology summary entries.  8/1: Laying in bed eating breakfast. Very tearful this AM, stated that she has to make a decision if she wants to continue with the biopsy on her hand. Pt is concerned that there may not be much we can do about the wound on her left hand. Dr Simon from wound  care at bedside. Will return this afternoon for biopsy procedure per MD. HD today UF 2L, tolerated well. Denies SOB, CP,N/VD. K+ up this am 5.7.   8/2: Resting in bed. C/o fatigue. Reports improved SOB, remains on oxygen.   08/3: Sitting up in chair, states she feels good today, SOB improved , remains on 2L nasal cannula. Denies CP, N/V/D. Discussed about fluid restriction. Pending left hand biopsy results. K+ improved 5.4. States fatigue but due to she recently ambulated with RN, otherwise no complaints. No overnight events, VSS.   08/4: Pt laying in bed. AOX4, HD yesterday tolerated will UF 2800mL. Hypotensive on the last hr of HD reported SBP 89-90's but asymptomatic. BP this am stable. Denies any CP/SOB/N/V/D. Patient on RA. C/o constipation. No overnight events. States she has declined hospice but would like to go to a home care facility.   8/5: iHD today, net UF 1L.  No resting comfortably no complaints. VSS  8/6: Had iHD yesterday with 1 L UF.  Currently sleeping, but awakens easily and has no new complaints. VSS.   8/7: Lying in bed sleeping, awakens and has no new complaints, just fatigue. VSS. Due for iHD tomorrow.  8/8: Seen on HD this am. No acute distress. VSS.   8/9: 2.5 net UF. VSS. Resting in bed. C/o fatigue. Denies CP/SOB.   8/10: 1L net UF. No complaints. VSS.   8/11: Resting comfortably.  Family at bedside.  VSS.  Family reports she had been having pain, feels gabapentin may need to be increased.  No other complaints.   8/12: Seen on HD this am. No complaints.   8/13: iHD yesterday net UF 2.5L.  VSS 3L NC resting comfortably today.  No complaints.   8/14: Sleeping in bed, arouses. VSS. No overnight events.   8/15: Seen on dialysis at bedside.  Denies SOB.  Having back pain.  Chest pain last night, not complaining of at this time  8/16 - Complains of weakness, denies pain or SOB.  Had BM.  HD done yesterday  8/17 - Having some back pain.  No cramping.  Seen on dialysis  8/18: sitting up in bed,  "wanted to leave AMA but was talked out of it, again declines palliative, for iHD tomorrow   8/19: Complaining of chest pain this morning.  RRT called.  UF on dialysis on hold.  Hospitalist notified  8/20: No chest pain this morning.  Arm pain better  8/21: Pt sitting up in bed eating breakfast, says she needs a miracle, labs reviewed, VSS on RA, due for HD tomorrow    REVIEW OF SYSTEMS:    10 point ROS reviewed and is as per HPI/daily summary or otherwise negative    PMH/PSH/SH/FH: Reviewed and unchanged since admission note  CURRENT MEDICATIONS: Reviewed from admission to present day    VS:  /59   Pulse 94   Temp 36.8 °C (98.2 °F) (Temporal)   Resp 17   Ht 1.626 m (5' 4\")   Wt 58 kg (127 lb 13.9 oz)   SpO2 94%   BMI 21.95 kg/m²   Physical Exam  Vitals and nursing note reviewed.   Constitutional:       General: She is not in acute distress.     Appearance: She is ill-appearing.   HENT:      Head: Normocephalic and atraumatic.      Mouth/Throat:      Mouth: Mucous membranes are moist.   Eyes:      General: No scleral icterus.  Cardiovascular:      Rate and Rhythm: Normal rate and regular rhythm.      Pulses: Normal pulses.      Heart sounds: Normal heart sounds. Murmur heard.    No friction rub. No gallop.   Pulmonary:      Effort: Pulmonary effort is normal. No respiratory distress.      Breath sounds: No wheezing or rales.      Comments: Decrease breathsounds Bilateral lower lobes  Abdominal:      General: Bowel sounds are normal. There is no distension.      Palpations: Abdomen is soft.      Tenderness: There is no abdominal tenderness. There is no guarding.   Musculoskeletal:         General: Swelling (left arm), wrapped.      Cervical back: Normal range of motion.      Right lower leg: Trace edema.      Left lower leg: Trace edema.      Comments:   LUE AVF + bruit and Thrill   Skin:     General: Skin is warm and dry.      Coloration: Skin is not pale.      Findings: No rash.   Left hand wrapped in " gauze   Neurological:      Mental Status: She is alert and oriented to person, place, and time.   Psychiatric:         Mood and Affect: Mood normal.         Behavior: Behavior normal    Fluids:  In: 360 [P.O.:360]  Out: -     LABS:  Recent Labs     08/19/22  0208   SODIUM 135   POTASSIUM 5.2   CHLORIDE 94*   CO2 25   GLUCOSE 158*   BUN 52*   CREATININE 4.07*   CALCIUM 9.0       IMPRESSION:  # ESRD, dependent on HD              - HD via LUE AVF             - HD qMWF  # Fluid overload, improved             - Secondary to non-compliance with fluid restriction and HD treatments  # Left arm edema, slow improvement              - CTA upper ext 6/26 w/o e/o focal stenosis + extensive edema             - AVF patent on left arm US negative for DVT             - Pt no showed to outpt appt to evaluate              - Vascular does not recommend intervention              - Extensor tendon laceration left hand with wound             - Wound care in place, s/p biopsy 8/1             - Left hand skin biopsy negative for calciphylaxis             - Outpatient [facility] appt rescheduled for next month  # HTN, variable control, stable at this time             - Goal BP < 140/90  # Anemia of CKD, at goal              - Goal Hgb 10-11             - Iron 27             -TIBC 142             -%Sat 19             - Ferritin 1419  # CKD-MBD             - Vit D 35             - PTH 99.4             - Managed at OP unit             - On sevelamer with meals  # Hyperkalemia            - Correct w/ HD  # DM II--management per primary svc  # Leg Pain--chronic skin changes and subcutaneous tissue firm to touch             - Vascular does not recommend any intervention  # Leukocytosis resolved  # Hyponatremia  - Likely fluid related, manage w/ HD  # Hypoalbuminemia  - No dietary protein restrictions  # Chest pain    - Management per primary team    PLAN:  - Continue qMWF iHD schedule, treatment due MON  - UF as tolerated  - Continue off of all BP  meds and diuretics for now.  - Limit sedating meds if possible  - No dietary protein restrictions  - Fluid restriction 1.2 L   - Low potassium diet  - Dose all meds per ESRD  - Phos binders with meals  - Max dose gabapentin in ESRD pt 300mg/day  - Outpt access center appointment rescheduled  - Follow labs  - DC planning underway for SNF         Thank you,

## 2022-08-21 NOTE — PROGRESS NOTES
Assumed care of patient at bedside report from NOC RN. Updated on POC. Patient currently A & O x 4; on RA; up max assist; Call light within reach. Whiteboard updated. Fall precautions in place. Bed locked and in lowest position. All questions answered. No other needs indicated at this time.

## 2022-08-21 NOTE — PROGRESS NOTES
4 Eyes Skin Assessment Completed by MAURO Wiggins and MAURO Gonzalez.    Head WDL  Ears WDL  Nose WDL  Mouth WDL  Neck WDL  Breast/Chest WDL  Shoulder Blades WDL  Spine WDL  (R) Arm/Elbow/Hand WDL  (L) Arm/Elbow/Hand Redness, Swelling, and Edema, Wound  Abdomen WDL  Groin WDL  Scrotum/Coccyx/Buttocks Redness and Blanching, Wound  (R) Leg WDL  (L) Leg WDL  (R) Heel/Foot/Toe Redness and Scab  (L) Heel/Foot/Toe Redness and Scab          Devices In Places       Interventions In Place Heel Mepilex, Sacral Mepilex, Waffle Overlay, Pillows, Elbow Mepilex, Q2 Turns, Barrier Cream, and Pressure Redistribution Mattress    Possible Skin Injury Yes    Pictures Uploaded Into Epic Yes  Wound Consult Placed Yes  RN Wound Prevention Protocol Ordered Yes

## 2022-08-21 NOTE — PROGRESS NOTES
4 Eyes Skin Assessment Completed by MAURO Jacobo and MAURO Moy.     Head WDL  Ears WDL  Nose WDL  Mouth WDL  Neck WDL  Breast/Chest WDL  Shoulder Blades WDL  Spine WDL  (R) Arm/Elbow/Hand Bruising, Redness, Blanching  (L) Arm/Elbow/Hand Scab, Swelling, and Discoloration, left hand wound with dressing in place  Abdomen WDL  Groin WDL  Scrotum/Coccyx/Buttocks Redness and Blanching, wound  (R) Leg Scab, Bruising  (L) Leg Scab, Bruising  (R) Heel/Foot/Toe Redness and Blanching  (L) Heel/Foot/Toe Redness and Blanching              Devices In Places Pulse Ox        Interventions In Place Heel Mepilex, Sacral Mepilex, Waffle Overlay, Pillows, Q2 Turns, Heels Loaded W/Pillows, and Pressure Redistribution Mattress     Possible Skin Injury Yes     Pictures Uploaded Into Epic Yes  Wound Consult Placed N/A, wound already following  RN Wound Prevention Protocol Ordered Yes

## 2022-08-21 NOTE — CARE PLAN
The patient is Watcher - Medium risk of patient condition declining or worsening    Shift Goals  Clinical Goals: Pain management  Patient Goals: Pain control, rest  Family Goals: N/A    Progress made toward(s) clinical / shift goals:  Discussed use pf call light and fall risk    Patient is not progressing towards the following goals:      Problem: Pain - Standard  Goal: Alleviation of pain or a reduction in pain to the patient’s comfort goal  Outcome: Progressing     Problem: Knowledge Deficit - Standard  Goal: Patient and family/care givers will demonstrate understanding of plan of care, disease process/condition, diagnostic tests and medications  Outcome: Progressing     Problem: Fall Risk  Goal: Patient will remain free from falls  Outcome: Progressing

## 2022-08-22 NOTE — PROGRESS NOTES
Patient back from dialysis. Patient fatigued and slightly more confused than this morning. BG checked and was 57. Low blood sugar protocol initiated. MD Cummins updated.

## 2022-08-22 NOTE — PROGRESS NOTES
HOSPITALIST CROSS COVER    Patient with intermittent chest pain, persistently elevated troponin in the 200s likely secondary to ESRD, and she has had no acute changes on EKGs. Patient developed chest pain this afternoon and subsequent EKG was again negative for acute ST-T changes. Follow up troponin, however, is somewhat more elevated at 374. However, again there are no ST-T changes on EKG. On exam, patient denies any chest pain this evening. Heart RRR, + murmur, lungs CTA.      PLAN  - repeat troponin  - TSH, CMP, mag

## 2022-08-22 NOTE — CARE PLAN
The patient is Stable - Low risk of patient condition declining or worsening    Shift Goals  Clinical Goals: promote sleep, skin integrity, monitor for CP  Patient Goals: sleep  Family Goals: N/A    Progress made toward(s) clinical / shift goals:    Problem: Pain - Standard  Goal: Alleviation of pain or a reduction in pain to the patient’s comfort goal  Outcome: Progressing     Problem: Fall Risk  Goal: Patient will remain free from falls  Outcome: Progressing       Patient is not progressing towards the following goals:

## 2022-08-22 NOTE — DISCHARGE PLANNING
WAN received a call from Addie with [employer].  Addie is trying to find family members that can help with the pt's health care decisions since the pt's SO passed away.  There was a [employer] search and WAN gave Addie the following information:  Beena Bruno   [phone]    Addie will try to contact Beena Bruno.  Addie's phone number is [phone].

## 2022-08-22 NOTE — PROGRESS NOTES
[facility] -  PROGRESS NOTE               Author: STACY Jeffers. Date & Time: 8/22/2022  11:45 AM     HPI:  [id number] with PMH significant for ESRD on HD MWF via left arm AVF, HTN, DM II, Anemia secondary to CKD, CKD Bone mineral disorder, admitted with increased edema and weakness and having missed her last last 2-3 outpt HD treatments. Pt is very lethargic at this time and unable to provide much history, majority of the history was obtained through review of the medical record and discussion with primary svc. Pt had reported increased LE edema and fatigue and weakness and worsening of her left arm edema so she came to the ED for evaluation. She apparently has missed her last 2-3 outpt dialysis treatments. Per regular outpt Nephrologist Dr. Gates, she has been non-compliant with her fluid restriction and was told that she needed 4x/week dialysis until her volume status could be optimized but she was non-compliant with that recommendation. She has edema of her left arm where her AVF is located and there is concern for a central stenosis that could be the etiology. She had an appointment at the outpt access center to evaluate for central stenosis and undergo angioplasty if needed on 6/9/22 but pt did not go to the appointment. She has not had any other procedures done to the arm in the past couple of months. She had a left arm us done today that showed no DVT and patent AVF and soft tissue edema. She apparently received pain medication fentanyl and dilaudid as well as benadryl earlier today and she is very drowsy.     DAILY NEPHROLOGY SUMMARY:  **See previous note from 7/31/22 to review prior daily nephrology summary entries.  8/1: Laying in bed eating breakfast. Very tearful this AM, stated that she has to make a decision if she wants to continue with the biopsy on her hand. Pt is concerned that there may not be much we can do about the wound on her left hand. Dr Simon  from wound care at bedside. Will return this afternoon for biopsy procedure per MD. HD today UF 2L, tolerated well. Denies SOB, CP,N/VD. K+ up this am 5.7.   8/2: Resting in bed. C/o fatigue. Reports improved SOB, remains on oxygen.   08/3: Sitting up in chair, states she feels good today, SOB improved , remains on 2L nasal cannula. Denies CP, N/V/D. Discussed about fluid restriction. Pending left hand biopsy results. K+ improved 5.4. States fatigue but due to she recently ambulated with RN, otherwise no complaints. No overnight events, VSS.   08/4: Pt laying in bed. AOX4, HD yesterday tolerated will UF 2800mL. Hypotensive on the last hr of HD reported SBP 89-90's but asymptomatic. BP this am stable. Denies any CP/SOB/N/V/D. Patient on RA. C/o constipation. No overnight events. States she has declined hospice but would like to go to a home care facility.   8/5: iHD today, net UF 1L.  No resting comfortably no complaints. VSS  8/6: Had iHD yesterday with 1 L UF.  Currently sleeping, but awakens easily and has no new complaints. VSS.   8/7: Lying in bed sleeping, awakens and has no new complaints, just fatigue. VSS. Due for iHD tomorrow.  8/8: Seen on HD this am. No acute distress. VSS.   8/9: 2.5 net UF. VSS. Resting in bed. C/o fatigue. Denies CP/SOB.   8/10: 1L net UF. No complaints. VSS.   8/11: Resting comfortably.  Family at bedside.  VSS.  Family reports she had been having pain, feels gabapentin may need to be increased.  No other complaints.   8/12: Seen on HD this am. No complaints.   8/13: iHD yesterday net UF 2.5L.  VSS 3L NC resting comfortably today.  No complaints.   8/14: Sleeping in bed, arouses. VSS. No overnight events.   8/15: Seen on dialysis at bedside.  Denies SOB.  Having back pain.  Chest pain last night, not complaining of at this time  8/16 - Complains of weakness, denies pain or SOB.  Had BM.  HD done yesterday  8/17 - Having some back pain.  No cramping.  Seen on dialysis  8/18: sitting up  "in bed, wanted to leave AMA but was talked out of it, again declines palliative, for iHD tomorrow   8/19: Complaining of chest pain this morning.  RRT called.  UF on dialysis on hold.  Hospitalist notified  8/20: No chest pain this morning.  Arm pain better  8/21: Pt sitting up in bed eating breakfast, says she needs a miracle, labs reviewed, VSS on RA, due for HD tomorrow  8/22: iHD today, net UF not yet available for review.  Hypoglycemic post dialysis FSBG 57, protocol in place.  VSS RA no complaints otherwise. K was 6.0 prior to dialysis.     REVIEW OF SYSTEMS:    10 point ROS reviewed and is as per HPI/daily summary or otherwise negative    PMH/PSH/SH/FH: Reviewed and unchanged since admission note  CURRENT MEDICATIONS: Reviewed from admission to present day    VS:  /71   Pulse 95   Temp 37 °C (98.6 °F) (Temporal)   Resp 14   Ht 1.626 m (5' 4\")   Wt 58 kg (127 lb 13.9 oz)   SpO2 94%   BMI 21.95 kg/m²   Physical Exam  Vitals and nursing note reviewed.   Constitutional:       General: She is not in acute distress.     Appearance: She is ill-appearing.   HENT:      Head: Normocephalic and atraumatic.      Mouth/Throat:      Mouth: Mucous membranes are moist.   Eyes:      General: No scleral icterus.  Cardiovascular:      Rate and Rhythm: Normal rate and regular rhythm.      Pulses: Normal pulses.      Heart sounds: Normal heart sounds. Murmur heard.    No friction rub. No gallop.   Pulmonary:      Effort: Pulmonary effort is normal. No respiratory distress.      Breath sounds: No wheezing or rales.      Comments: Decrease breathsounds Bilateral lower lobes  Abdominal:      General: Bowel sounds are normal. There is no distension.      Palpations: Abdomen is soft.      Tenderness: There is no abdominal tenderness. There is no guarding.   Musculoskeletal:         General: +Swelling (left arm), wrapped.      Cervical back: Normal range of motion.      Right lower leg: Trace edema.      Left lower leg: " Trace edema.      Comments:   LUE AVF + bruit and Thrill   Skin:     General: Skin is warm and dry.      Coloration: Skin is not pale.      Findings: No rash.   Left hand wrapped in gauze   Neurological:      Mental Status: She is alert and oriented to person, place, and time.   Psychiatric:         Mood and Affect: Mood normal.         Behavior: Behavior normal    Fluids:  In: 320 [P.O.:320]  Out: -     LABS:  Recent Labs     08/22/22  0133   SODIUM 135   POTASSIUM 6.0*   CHLORIDE 94*   CO2 21   GLUCOSE 90   BUN 69*   CREATININE 4.54*   CALCIUM 9.0       IMPRESSION:  # ESRD, dependent on HD              - HD via LUE AVF             - HD qMWF  # Fluid overload, improved             - Secondary to non-compliance with fluid restriction and HD treatments  # Left arm edema, slow improvement              - CTA upper ext 6/26 w/o e/o focal stenosis + extensive edema             - AVF patent on left arm US negative for DVT             - Pt no showed to outpt appt to evaluate              - Vascular does not recommend intervention              - Extensor tendon laceration left hand with wound             - Wound care in place, s/p biopsy 8/1             - Left hand skin biopsy negative for calciphylaxis             - Outpatient [facility] appt rescheduled for next month  # HTN, variable control, stable at this time             - Goal BP < 140/90  # Anemia of CKD, at goal              - Goal Hgb 10-11             - Iron 27             -TIBC 142             -%Sat 19             - Ferritin 1419  # CKD-MBD             - Vit D 35             - PTH 99.4             - Managed at OP unit             - On sevelamer with meals  # Hyperkalemia            - Correct w/ HD  # DM II--management per primary svc  # Leg Pain--chronic skin changes and subcutaneous tissue firm to touch             - Vascular does not recommend any intervention  # Leukocytosis resolved  # Hyponatremia  - Likely fluid related, manage w/ HD  # Hypoalbuminemia  - No  dietary protein restrictions  # Chest pain    - Management per primary team    PLAN:  - Continue qMWF iHD schedule, treatment due next on WED  - UF as tolerated  - Continue off of all BP meds and diuretics for now.  - Limit sedating meds if possible  - No dietary protein restrictions  - Fluid restriction 1.2 L   - Low potassium diet  - Dose all meds per ESRD  - Phos binders with meals  - Max dose gabapentin in ESRD pt 300mg/day  - Outpt access center appointment rescheduled  - Follow labs  - DC planning underway for SNF         Thank you,

## 2022-08-22 NOTE — PROGRESS NOTES
4 Eyes Skin Assessment Completed by MAURO Arvizu and MAURO Mclean.     Head WDL  Ears WDL  Nose WDL  Mouth WDL  Neck WDL  Breast/Chest WDL  Shoulder Blades WDL  Spine WDL  (R) Arm/Elbow/Hand red, blanching, bruising  (L) Arm/Elbow/Hand Redness, Swelling, and Edema, Wound  Abdomen WDL  Groin WDL  Scrotum/Coccyx/Buttocks Redness and Blanching, Wound  (R) Leg scabs, flaky, fragile  (L) Leg scabs, flaky, fragile  (R) Heel/Foot/Toe Redness and Scab  (L) Heel/Foot/Toe Redness and Scab              Devices In Places         Interventions In Place Heel Mepilex, Sacral Mepilex, Waffle Overlay, Pillows, Elbow Mepilex, Q2 Turns, Barrier Cream, and Pressure Redistribution Mattress     Possible Skin Injury Yes     Pictures Uploaded Into Epic Yes  Wound Consult Placed Yes  RN Wound Prevention Protocol Ordered Yes

## 2022-08-22 NOTE — PROGRESS NOTES
Hypoglycemia Intervention    Hypoglycemia protocol intervention:  Blood glucose 57 at 1112.  Intervention: 8 oz of fruit juice   Repeat blood glucose 74 at 1133.  Intervention: 4 oz of fruit juice   Additional interventions needed: None BG at 1150 was 123  Dr. Cummins notified of the above at 1118.

## 2022-08-22 NOTE — DISCHARGE PLANNING
Case Management Discharge Planning    Admission Date: 6/24/2022  GMLOS: 4.3  ALOS: 59    6-Clicks ADL Score: 12  6-Clicks Mobility Score: 11  PT and/or OT Eval ordered: Yes  Post-acute Referrals Ordered: Yes  Post-acute Choice Obtained: Yes  Has referral(s) been sent to post-acute provider:  Yes      Anticipated Discharge Dispo: Discharge Disposition: D/T to SNF with Medicare cert in anticipation of skilled care (03)    DME Needed: No    Action(s) Taken: pt pending acceptance to SNF for Long Term Placement. Received call from Addie  with [facility]. Addie updated that pt still has no accepting facility. Addie states she will assist where she can.     Escalations Completed: Long Length of Stay Committee  and Leadership    Medically Clear: Yes    Next Steps: f/u with WAN for SNF referrals, Addie for assistance if needed.    Barriers to Discharge: Pending Placement

## 2022-08-22 NOTE — PROGRESS NOTES
PT stating 11/10 chest pain. Vital signs stable, pt not in distress. EKG completed, Dr. Davis notified.

## 2022-08-22 NOTE — CARE PLAN
The patient is Watcher - Medium risk of patient condition declining or worsening    Shift Goals  Clinical Goals: Monitor labs & neuro  Patient Goals: Sleep  Family Goals: N/A    Progress made toward(s) clinical / shift goals:      Problem: Fall Risk  Goal: Patient will remain free from falls  Outcome: Progressing  Note: All fall precautions in place and patient educated to use the call light before ambulation        Patient is not progressing towards the following goals:      Problem: Knowledge Deficit - Standard  Goal: Patient and family/care givers will demonstrate understanding of plan of care, disease process/condition, diagnostic tests and medications  Outcome: Not Progressing  Note: Patient is A&O X 3 today and is not understanding her POC or disease process

## 2022-08-22 NOTE — PROGRESS NOTES
Assumed care of patient, received bedside report from NOC RN. Patient is A&O X 3, disoriented to time. Pain 0/10. Vital signs stable overnight, on RA. Patient is medical. POC discussed with patient and she verbalized understanding. Call light within reach and fall precautions in place. Bed locked and in lowest position.

## 2022-08-22 NOTE — DISCHARGE PLANNING
Agency[facility]Facility Name: [facility]  Outcome: DPA left v-mail regarding referral status with request for callback.    0935-  Agency/Facility Name: [facility]   Outcome: DPA was transferred to intake however phone rang through to dial tone. DPA to attempt callback.    0937-  Agency/Facility Name: [facility]   Spoke To: Meena   Outcome: DPA advised  he would like callback regarding referral status.     0938-  Agency[facility]Facility Name: [facility]   Outcome: DPA left v-mail regarding referral status with request for callback.     0940-  Agency/Facility Name: [facility]  Spoke To: Stacey  Outcome: Pt declined due to payer source as well as dialysis needs at this time.     0950-  Agency/Facility Name: [facility]  Outcome: DPA left v-mail regarding referral status with request for callback.

## 2022-08-22 NOTE — PROGRESS NOTES
[facility] Services Progress Note      HD today x 3 hours per Dr. Bender.   Initiated at 0723 and ended at 1023.   Patient assessed prior tx.  Alert and oriented x 3. Confused but able to answer some questions. Too sleepy but wakes up by voice command. VSS. KINGA AVF with bruit and thrill pre dialysis       UF Net: *1,300 mL    Patient tolerated treatment despite low BP episodes during treatment. Awake and responds when asked. BP 's systolic on tx. Adjusted UF goal as needed.       Blood returned. Applied gauze and held KINGA AVF site for 10 minutes. Verified no bleeding post treatment. Bruit and thrill present post dialysis.   Instructions given to Primary RN that if bleeding occurs on the AVF site, change dressing and held the site with pressure.       Report given to Primary CARRILLO Torres RN.

## 2022-08-22 NOTE — PROGRESS NOTES
Bedside report received from MAURO Wiggins. Patient is alert and oriented x  3, room air, medical status. Fall precautions are in place. Call light is in reach.

## 2022-08-22 NOTE — CONSULTS
Brief Behavioral Health Note:    Attempted to meet with patient for a psychotherapy session however patient declined. RN at bedside. Patient reporting chest pains.    Will follow up with patient at a later time when she is able to engage.    Thank you,    Oksana Roth, Ph.D., [facility]

## 2022-08-23 NOTE — PROGRESS NOTES
[facility] -  PROGRESS NOTE               Author: HUGH Arteaga. Date & Time: 8/23/2022  12:48 PM     HPI:  81yoF with PMH significant for ESRD on HD MWF via left arm AVF, HTN, DM II, Anemia secondary to CKD, CKD Bone mineral disorder, admitted with increased edema and weakness and having missed her last last 2-3 outpt HD treatments. Pt is very lethargic at this time and unable to provide much history, majority of the history was obtained through review of the medical record and discussion with primary svc. Pt had reported increased LE edema and fatigue and weakness and worsening of her left arm edema so she came to the ED for evaluation. She apparently has missed her last 2-3 outpt dialysis treatments. Per regular outpt Nephrologist Dr. Gates, she has been non-compliant with her fluid restriction and was told that she needed 4x/week dialysis until her volume status could be optimized but she was non-compliant with that recommendation. She has edema of her left arm where her AVF is located and there is concern for a central stenosis that could be the etiology. She had an appointment at the outpt access center to evaluate for central stenosis and undergo angioplasty if needed on 6/9/22 but pt did not go to the appointment. She has not had any other procedures done to the arm in the past couple of months. She had a left arm us done today that showed no DVT and patent AVF and soft tissue edema. She apparently received pain medication fentanyl and dilaudid as well as benadryl earlier today and she is very drowsy.     DAILY NEPHROLOGY SUMMARY:  **See previous note from 7/31/22 to review prior daily nephrology summary entries.  8/1: Laying in bed eating breakfast. Very tearful this AM, stated that she has to make a decision if she wants to continue with the biopsy on her hand. Pt is concerned that there may not be much we can do about the wound on her left hand. Dr Simon from wound  care at bedside. Will return this afternoon for biopsy procedure per MD. HD today UF 2L, tolerated well. Denies SOB, CP,N/VD. K+ up this am 5.7.   8/2: Resting in bed. C/o fatigue. Reports improved SOB, remains on oxygen.   08/3: Sitting up in chair, states she feels good today, SOB improved , remains on 2L nasal cannula. Denies CP, N/V/D. Discussed about fluid restriction. Pending left hand biopsy results. K+ improved 5.4. States fatigue but due to she recently ambulated with RN, otherwise no complaints. No overnight events, VSS.   08/4: Pt laying in bed. AOX4, HD yesterday tolerated will UF 2800mL. Hypotensive on the last hr of HD reported SBP 89-90's but asymptomatic. BP this am stable. Denies any CP/SOB/N/V/D. Patient on RA. C/o constipation. No overnight events. States she has declined hospice but would like to go to a home care facility.   8/5: iHD today, net UF 1L.  No resting comfortably no complaints. VSS  8/6: Had iHD yesterday with 1 L UF.  Currently sleeping, but awakens easily and has no new complaints. VSS.   8/7: Lying in bed sleeping, awakens and has no new complaints, just fatigue. VSS. Due for iHD tomorrow.  8/8: Seen on HD this am. No acute distress. VSS.   8/9: 2.5 net UF. VSS. Resting in bed. C/o fatigue. Denies CP/SOB.   8/10: 1L net UF. No complaints. VSS.   8/11: Resting comfortably.  Family at bedside.  VSS.  Family reports she had been having pain, feels gabapentin may need to be increased.  No other complaints.   8/12: Seen on HD this am. No complaints.   8/13: iHD yesterday net UF 2.5L.  VSS 3L NC resting comfortably today.  No complaints.   8/14: Sleeping in bed, arouses. VSS. No overnight events.   8/15: Seen on dialysis at bedside.  Denies SOB.  Having back pain.  Chest pain last night, not complaining of at this time  8/16 - Complains of weakness, denies pain or SOB.  Had BM.  HD done yesterday  8/17 - Having some back pain.  No cramping.  Seen on dialysis  8/18: sitting up in bed,  "wanted to leave AMA but was talked out of it, again declines palliative, for iHD tomorrow   8/19: Complaining of chest pain this morning.  RRT called.  UF on dialysis on hold.  Hospitalist notified  8/20: No chest pain this morning.  Arm pain better  8/21: Pt sitting up in bed eating breakfast, says she needs a miracle, labs reviewed, VSS on RA, due for HD tomorrow  8/22: iHD today, net UF not yet available for review.  Hypoglycemic post dialysis FSBG 57, protocol in place.  VSS RA no complaints otherwise. K was 6.0 prior to dialysis.   8/23: HD yest, UF 1.3 L. Says she has no problems with HD treatments. Not a fan of the food at [facility]. K+ has been running 5-6.    REVIEW OF SYSTEMS:    10 point ROS reviewed and is as per HPI/daily summary or otherwise negative    PMH/PSH/SH/FH: Reviewed and unchanged since admission note  CURRENT MEDICATIONS: Reviewed from admission to present day    VS:  /55   Pulse 85   Temp 36.5 °C (97.7 °F) (Temporal)   Resp 18   Ht 1.626 m (5' 4\")   Wt 57 kg (125 lb 10.6 oz)   SpO2 93%   BMI 21.57 kg/m²   Physical Exam  Vitals and nursing note reviewed.   Constitutional:       General: She is not in acute distress.     Appearance: She is ill-appearing.   HENT:      Head: Normocephalic and atraumatic.      Mouth/Throat:      Mouth: Mucous membranes are moist.   Eyes:      General: No scleral icterus.  Cardiovascular:      Rate and Rhythm: Normal rate and regular rhythm.      Pulses: Normal pulses.      Heart sounds: Normal heart sounds. Murmur heard.    No friction rub. No gallop.   Pulmonary:      Effort: Pulmonary effort is normal. No respiratory distress.      Breath sounds: No wheezing or rales.      Comments: Decrease breathsounds Bilateral lower lobes  Abdominal:      General: Bowel sounds are normal. There is no distension.      Palpations: Abdomen is soft.      Tenderness: There is no abdominal tenderness. There is no guarding.   Musculoskeletal:         General: +Swelling " (left arm), wrapped.      Cervical back: Normal range of motion.      Right lower leg: Trace edema.      Left lower leg: Trace edema.      Comments:   LUE AVF + bruit and Thrill   Skin:     General: Skin is warm and dry.      Coloration: Skin is not pale.      Findings: No rash.   Left hand wrapped in gauze. Pressure dresssing on b/l knees  Neurological:      Mental Status: She is alert and oriented to person, place, and time.   Psychiatric:         Mood and Affect: Mood normal.         Behavior: Behavior normal    Fluids:  In: 620 [P.O.:120; Dialysis:500]  Out: 1800     LABS:  Recent Labs     08/22/22  0133 08/23/22  0130   SODIUM 135 135   POTASSIUM 6.0* 4.8   CHLORIDE 94* 93*   CO2 21 24   GLUCOSE 90 145*   BUN 69* 38*   CREATININE 4.54* 2.98*   CALCIUM 9.0 8.8       IMPRESSION:  # ESRD, dependent on HD              - HD via LUE AVF             - HD qMWF  # Fluid overload, improved             - Secondary to non-compliance with fluid restriction and HD treatments  # Left arm edema, slow improvement              - CTA upper ext 6/26 w/o e/o focal stenosis + extensive edema             - AVF patent on left arm US negative for DVT             - Pt no showed to outpt appt to evaluate              - Vascular does not recommend intervention              - Extensor tendon laceration left hand with wound             - Wound care in place, s/p biopsy 8/1             - Left hand skin biopsy negative for calciphylaxis             - Outpatient [facility] appt rescheduled for next month  # HTN, variable control, stable at this time             - Goal BP < 140/90  # Anemia of CKD, at goal              - Goal Hgb 10-11             - Iron 27             -TIBC 142             -%Sat 19             - Ferritin 1419  # CKD-MBD             - Vit D 35             - PTH 99.4             - Managed at OP unit             - On sevelamer with meals  # Hyperkalemia            - Correct w/ HD  # DM II--management per primary svc  # Leg  Pain--chronic skin changes and subcutaneous tissue firm to touch             - Vascular does not recommend any intervention  # Leukocytosis resolved  # Hyponatremia  - Likely fluid related, manage w/ HD  # Hypoalbuminemia  - No dietary protein restrictions  # Chest pain    - Management per primary team    PLAN:  - No need for additional dialysis today   - Continue qMWF iHD schedule, treatment due next on WED  - UF as tolerated  - Continue off of all BP meds and diuretics for now.  - Limit sedating meds if possible  - No dietary protein restrictions  - Fluid restriction 1.2 L   - Low potassium diet  - Start veltassa daily, hold for K+ <4  - Dose all meds per ESRD  - Phos binders with meals  - Max dose gabapentin in ESRD pt 300mg/day  - Outpt access center appointment rescheduled  - Follow labs  - DC planning underway for SNF         Thank you,

## 2022-08-23 NOTE — WOUND TEAM
[facility]  Inpatient Services  Wound and Skin Care Progress Note    Admission Date: 2022     Last order of IP CONSULT TO WOUND CARE was found on 2022 from Hospital Encounter on 2022     HPI, PMH, SH: Reviewed    Past Surgical History:   Procedure Laterality Date    AV FISTULA CREATION Left 2/15/2019    Procedure: AV FISTULA CREATION-  UPPER EXTREMITY BRACHIAL CEPHALIC BANDING;  Surgeon: Fernie Nazario M.D.;  Location: SURGERY [facility];  Service: General    HIP CANNULATED SCREW Right 2017    Procedure: HIP CANNULATED SCREW;  Surgeon: Ar Huerta M.D.;  Location: SURGERY [facility];  Service:     CATARACT PHACO WITH IOL  2014    Performed by Rudolph Barclay M.D. at SURGERY SAME DAY [facility]    CATARACT PHACO WITH IOL  2014    Performed by Rudolph Barclay M.D. at SURGERY SAME DAY [facility]    VENTRAL HERNIA REPAIR LAPAROSCOPIC  3/7/2012    Performed by HUNTER SERNA at SURGERY SAME DAY [facility]    APPENDECTOMY      GYN SURGERY      hysterectomy    OTHER      T&A    OTHER ABDOMINAL SURGERY      abd tramua- horse stepped on abd    OTHER ORTHOPEDIC SURGERY      R & l Shoulder repair     Social History     Tobacco Use    Smoking status: Former     Packs/day: 1.00     Years: 20.00     Pack years: 20.00     Types: Cigarettes     Quit date: 3/5/1979     Years since quittin.4    Smokeless tobacco: Never   Substance Use Topics    Alcohol use: No     Chief Complaint   Patient presents with    Peripheral Edema    Weakness     BIBA from home for weakness, nausea, L arm pain  HX of dialysis with L arm fistula, pt states she has missed 3 dialysis days due to being so weak she cannot drive or walk now  3 weeks ago had a procedure done to her fistula which has lead to L arm swelling     Diagnosis: ESRD (end stage renal disease) on dialysis (HCC) [N18.6, Z99.2]    Unit where seen by Wound Team: [id number]    WOUND CONSULT/FOLLOW UP RELATED TO:  L  hand     WOUND HISTORY:  Per HPI: 81 y.o. female with PMHx ESRD on HD MWF, HTN, HLD, and T2DM who was admitted on 6/24/2022 for left upper arm swelling and lower extremity edema after missing HD sessions.  X-rays of the left hand showed only chronic changes.  AV fistula was functioning well.  CTA showed no evidence of central stenosis.  Vascular surgery was consulted and only recommended supportive care such as wrapping the arm with Ace bandage.  She was also found to have severe aortic stenosis for which cardiology was consulted, and gave the opinion that she is not a candidate for intervention.  She was dialyzed while in the hospital with subsequent clinical improvement.  She did have hospital delirium while in the hospital, which has since resolved.  Palliative care was consulted, but patient continued to wish to be full code.  SNF placement was pursued, but proved to be challenging as transfer to dialysis is not always facilitated.  Case management is working on her discharge to SNF and arrangement of hemodialysis.    8/1/22: punch biopsy of hand performed by MD Das.     WOUND ASSESSMENT/LDA        Wound 07/20/22 Soft Tissue Necrosis Hand Left x2 dorsal, x1 palmar (Active)   Wound Image     08/23/22 1500   Site Assessment Yellow;Red;Tan;Pink 08/23/22 1500   Periwound Assessment Pink;Edema 08/23/22 1500   Margins Defined edges;Unattached edges 08/23/22 1500   Closure Adhesive bandage 08/23/22 1500   Drainage Amount Scant 08/23/22 1500   Drainage Description Foul purulent 08/23/22 1500   Treatments Cleansed;Site care 08/23/22 1500   Wound Cleansing Approved Wound Cleanser 08/23/22 1500   Periwound Protectant Adaptic 08/23/22 1500   Dressing Cleansing/Solutions Not Applicable 08/23/22 1500   Dressing Options Adaptic;Hydrofiber Silver;Carlos;Ace Wrap 08/23/22 1500   Dressing Changed Changed 08/23/22 1500   Dressing Status Clean;Dry;Intact 08/23/22 1500   Dressing Change/Treatment Frequency Every 48 hrs, and As  Needed 08/23/22 1500   NEXT Dressing Change/Treatment Date 08/25/22 08/23/22 1500   NEXT Weekly Photo (Inpatient Only) 08/30/22 08/23/22 1500   Non-staged Wound Description Full thickness 08/23/22 1500   Shape irregular 08/23/22 1500   Wound Odor Mild 08/23/22 1500   Exposed Structures CHIP 08/23/22 1500   WOUND NURSE ONLY - Time Spent with Patient (mins) 75 08/23/22 1500       Wound Pressure Injury Coccyx Unstageable (Active)   Wound Image    08/23/22 1500   Site Assessment Yellow;Red 08/23/22 1500   Periwound Assessment [place] 08/23/22 1500   Margins Unattached edges 08/23/22 1500   Closure Open to air 08/23/22 1500   Drainage Amount None 08/23/22 1500   Treatments Cleansed;Site care 08/23/22 1500   Wound Cleansing Soap and Water 08/23/22 1500   Periwound Protectant Antifungal Therapy 08/23/22 1500   Dressing Cleansing/Solutions Not Applicable 08/23/22 1500   Dressing Options Viscopaste 08/23/22 1500   Dressing Changed New 08/23/22 1500   Dressing Status Open to Air 08/23/22 1500   Dressing Change/Treatment Frequency Every Shift, and As Needed 08/23/22 1500   NEXT Dressing Change/Treatment Date 08/23/22 08/23/22 1500   NEXT Weekly Photo (Inpatient Only) 08/30/22 08/23/22 1500   WOUND NURSE ONLY - Pressure Injury Stage U 08/23/22 1500   Shape irregular 08/23/22 1500   Wound Odor None 08/23/22 1500   Pulses N/A 08/23/22 1500   Exposed Structures CHIP 08/23/22 1500                        Vascular:    Lab Values:    Lab Results   Component Value Date/Time    WBC 7.3 08/14/2022 09:48 PM    RBC 2.50 (L) 08/14/2022 09:48 PM    HEMOGLOBIN 7.7 (L) 08/22/2022 04:45 AM    HEMATOCRIT 25.9 (L) 08/21/2022 01:07 AM    CREACTPROT 7.07 (H) 06/25/2022 04:46 AM    HBA1C 11.7 (H) 06/24/2022 03:35 AM        Culture Results show:  No results found for this or any previous visit (from the past 720 hour(s)).      Pain Level/Medicated:  PO pain meds    INTERVENTIONS BY WOUND TEAM:  Chart and images reviewed. Discussed with bedside RN. All  "areas of concern (based on picture review, LDA review and discussion with bedside RN) have been thoroughly assessed. Documentation of areas based on significant findings. This RN in to assess patient. Performed standard wound care which includes appropriate positioning, dressing removal and non-selective debridement. Pictures and measurements obtained weekly if/when required.  L. Hand   Preparation for Dressing removal: soaked with NS.   Non-selectively Debrided with: wound cleanser and gauze.  Sharp debridement: NA  Bebe wound: Cleansed with wound cleanser and gauze  Primary Dressing: Adaptic layer over wounds then hydrofiber to dorsal wounds, hydrofiber tucked into the wound bed of the palmar hand.   Secondary (Outer) Dressin\" kerlix and tape,loosely wrapped ACE wrap      Coccyx   Non selectively debrided with: soap and water  Periwound cleansed with soap and water   Barrier paste applied and left open to air  Offloaded with pillows.      Interdisciplinary consultation: Patient, Bedside RN, Wound RN Michaela, and Wound RN Pearl     EVALUATION / RATIONALE FOR TREATMENT:  Most Recent Date:  22: L. Hand appears to be slowly healing, but significant swelling noted to the entire L. Arm.  Consulted for patient's coccyx and discovered an unstageable pressure injury.  Yellow tissue over a bony prominence.  Applied barrier paste and offloaded.    22: Continue same plan of care, minimal to no changes. Palmar hand decrease in size and drainage amount. Continue to follow up.     22: Per Dr. Murphy \"Pathology of the skin biopsy from the left hand did not show evidence of calciphylaxis or malignancy, and only showed acute inflammation and necrosis with scattered multinucleated giant cells.\" Left 2nd finger wound mostly covered by eschar. Patient unable to tolerate debridement at this time due to pain. Continued POC for now. Consider alternate dressing  to dorsal hand on next change to autolytically " debride.  8/7/22: exposed tendon to the left pointer finger dorsal, eschar covering majority of the wound but at the proximal portion some tendon exposure.  Continuing with Aquacel Ag, awaiting punch biopsy results.   8/4: Palmar wound with purulent drainage. Hydrofiber silver strip disintigrating, thus removed and wound cavity cleansed. Dorsal fingers wounds with eschar layer. Pt not tolerating dressing changes, so IV medication requested. Nursing to continue changes per order.  8/2/22: punch biopsy performed on 8/1/22, first dressing change today. Difficult to remove so applied adaptic non-adherent layer to base of finger wound beds and covered with piece of aquacel ag. Palmar hand with aquacel ag strip tucked into wound and depth. Covered with gauze and roll gauze. Wound team to continue to follow and monitor. Waiting results.   7/26/22: L hand does not appear to be healing, change dressing to viscopaste for the soothing properties to assist with pain relief.   And Viscopatch zinc impregnated gauze to encourage re-epithelialization of superficial 100% viable wound bed, and to provide a non-stick wound contact layer.  7/23/22: No surgery indicated per Dr. Huerta. There are two wound beds on dorsal surface (1st finger and 4th/5th finger) and one wound over the palmar surface. All wounds with thick yellow drainage that is malodorous. Involved fingers remain erythematous and edematous. Patient reports extreme pain with site care and wound care is difficult despite utilization of lidocaine. Silver powder applied for its antibiotic properties and to address moisture to area due to moderate drainage. Wound team to continue to follow.   7/20: wounds on L hand with fluctuance on dorsal aspect indicating necrosis. X ray results did not indicate bony involvement, thus recommend CT for possible abscess. At this time, wound unlikely to resolve with local wound care if necrosis advances, recommend surgical evaluation. Wound  team will continue to follow.     Goals: Steady decrease in wound area and depth weekly.    WOUND TEAM PLAN OF CARE ([X] for frequency of wound follow up,):   Nursing to follow dressing orders written for wound care. Contact wound team if area fails to progress, deteriorates or with any questions/concerns if something comes up before next scheduled follow up (See below as to whether wound is following and frequency of wound follow up)  Dressing changes by wound team:                   Follow up 3 times weekly:                NPWT change 3 times weekly:     Follow up 1-2 times weekly:  X nursing to perform dsg/skin care; Wound team following.    Follow up Bi-Monthly:                   Follow up as needed:     Other (explain):     NURSING PLAN OF CARE ORDERS (X):  Dressing changes: See Dressing Care orders: x  Skin care: See Skin Care orders: x  RN Prevention Protocol: x  Rectal tube care: See Rectal Tube Care orders:   Other orders:      Anticipated discharge plans: TBD  LTACH:        SNF/Rehab:                  Home Health Care:           Outpatient Wound Center:            Self/Family Care:        Other:                  Vac Discharge Needs:   Not Applicable Pt not on a wound vac:     x  Regular Vac while inpatient, alternative dressing at DC:        Regular Vac in use and continued at DC:            Reg. Vac w/ Skin Sub/Biologic in use. Will need to be changed 2x wkly:      Veraflo Vac while inpatient, ok to transition to Regular Vac on Discharge:           Veraflo Vac while inpatient, will need to remain on Veraflo Vac upon discharge:

## 2022-08-23 NOTE — CARE PLAN
The patient is Stable - Low risk of patient condition declining or worsening    Shift Goals  Clinical Goals: monitor labs, I/Os  Patient Goals: pain control  Family Goals: N/A    Progress made toward(s) clinical / shift goals:      Problem: Pain - Standard  Goal: Alleviation of pain or a reduction in pain to the patient’s comfort goal  Outcome: Progressing     Problem: Skin Integrity - Diabetes  Goal: Patient's skin on legs and feet will remain intact while hospitalized  Outcome: Progressing       Patient is not progressing towards the following goals:

## 2022-08-23 NOTE — CARE PLAN
Problem: Pain - Standard  Goal: Alleviation of pain or a reduction in pain to the patient’s comfort goal  Outcome: Progressing     Problem: Knowledge Deficit - Standard  Goal: Patient and family/care givers will demonstrate understanding of plan of care, disease process/condition, diagnostic tests and medications  Outcome: Progressing     Problem: Fall Risk  Goal: Patient will remain free from falls  Outcome: Progressing     Problem: Diabetes Management  Goal: Patient will achieve and maintain glucose in satisfactory range  Outcome: Progressing     Problem: Discharge Planning - Diabetes  Goal: Patient's continuum of care needs will be met  Outcome: Progressing     Problem: Skin Integrity - Diabetes  Goal: Patient's skin on legs and feet will remain intact while hospitalized  Outcome: Progressing     Problem: Respiratory  Goal: Patient will achieve/maintain optimum respiratory ventilation and gas exchange  Outcome: Progressing     Problem: Fluid Balance or Risk for Fluid Volume Deficit  Goal: Patient will demonstrate adequate hydration and vital signs  Outcome: Progressing     Problem: Nutrition Deficit - Diabetes  Goal: Patient will demonstrate adequate hydration and vital signs  Outcome: Progressing     Problem: Diabetic Ulcer  Goal: Early identification of diabetic foot wound and initiation of appropriate interventions  Outcome: Progressing   The patient is Stable - Low risk of patient condition declining or worsening    Shift Goals  Clinical Goals: Monitor labs and neuro  Patient Goals: comfort and sleep  Family Goals: N/A    Progress made toward(s) clinical / shift goals:  Blood sugar was rechecked prior to bed. Pt remains pain free throughout this RN's shift. Pt was repositioned every 2 hours.    Patient is not progressing towards the following goals:

## 2022-08-23 NOTE — THERAPY
"Physical Therapy   Daily Treatment     Patient Name: Jannet Bruno  Age:  81 y.o., Sex:  female  Medical Record #: [id number]  Today's Date: 8/23/2022     Precautions  Precautions: Fall Risk  Comments: LUE edema    Assessment    Pt received in bed and agreeable to PT session. Pt requesting to get up in a wheelchair to get out of bed and the room. Wheelchair #4 left in room. Pt required mod A for bed mobility and stand pivot transfer to the wheelchair. Pt unable to propel it due to LUE pain and also the wheelchair height preventing LE propulsion. Placement continues to be pending for long term care option. Per chart review, pt's  recently passed away. Will continue to follow for acute PT.    Plan    Continue current treatment plan.    DC Equipment Recommendations: Unable to determine at this time  Discharge Recommendations: Recommend post-acute placement for additional physical therapy services prior to discharge home      Subjective    \"Can you bring a wheelchair for me?\"     Objective       08/23/22 1501   Precautions   Precautions Fall Risk   Comments LUE edema   Pain 0 - 10 Group   Therapist Pain Assessment Post Activity Pain Same as Prior to Activity;Nurse Notified  (no c/o pain during session)   Cognition    Level of Consciousness Alert   Comments Very pleasant. Agreeable to PT session and cooperative.   Balance   Sitting Balance (Static) Fair   Sitting Balance (Dynamic) Fair   Standing Balance (Static) Poor +   Standing Balance (Dynamic) Poor -   Weight Shift Sitting Fair   Weight Shift Standing Poor   Skilled Intervention Verbal Cuing;Tactile Cuing;Facilitation   Comments Facilitation provided in standing to maintain upright position for transfer to wheelchair.   Gait Analysis   Gait Level Of Assist Unable to Participate   Bed Mobility    Supine to Sit Moderate Assist   Sit to Supine   (up in chair)   Scooting Moderate Assist   Skilled Intervention Sequencing;Verbal Cuing   Functional Mobility "   Sit to Stand Moderate Assist   Bed, Chair, Wheelchair Transfer Moderate Assist   Transfer Method Stand Pivot   Mobility bed>wheelchair   Wheelchair Assist Total Assist   Comments Pt required facilitation and cues for mod A stand step transfer to wheelchair. Pt unable to attempt WC mobility due to LUE pain. Pt did request to go sit by the window to look outside for a little bit. RN to assist back to bed.   How much difficulty does the patient currently have...   Turning over in bed (including adjusting bedclothes, sheets and blankets)? 3   Sitting down on and standing up from a chair with arms (e.g., wheelchair, bedside commode, etc.) 1   Moving from lying on back to sitting on the side of the bed? 1   How much help from another person does the patient currently need...   Moving to and from a bed to a chair (including a wheelchair)? 2   Need to walk in a hospital room? 2   Climbing 3-5 steps with a railing? 1   6 clicks Mobility Score 10   Short Term Goals    Short Term Goal # 1 Pt will perform bed mobility with supervision in 6 visits with HOB flat, no rail.   Goal Outcome # 1 goal not met   Short Term Goal # 2 Pt will transfer with supervision in 6 visits to improve functional indep.   Goal Outcome # 2 Goal not met   Short Term Goal # 3 Pt will ambulate x 50 feet using FWW with supervision in 6 visits to improve functional indep.   Goal Outcome # 3 Goal not met   Anticipated Discharge Equipment and Recommendations   DC Equipment Recommendations Unable to determine at this time   Discharge Recommendations Recommend post-acute placement for additional physical therapy services prior to discharge home   Interdisciplinary Plan of Care Collaboration   IDT Collaboration with  Nursing   Patient Position at End of Therapy Seated;Call Light within Reach;Tray Table within Reach;Phone within Reach   Collaboration Comments RN updated. Pt up in wheelchair with needs in reach.   Session Information   Date / Session Number   8/23-15(1/3, 8/25)

## 2022-08-23 NOTE — PROGRESS NOTES
Hospital Medicine Daily Progress Note    Date of Service  8/23/2022    Chief Complaint  Edema    Hospital Course  81 y.o. female with PMHx ESRD on HD MWF, HTN, HLD, and T2DM who was admitted on 6/24/2022 for left upper arm swelling and lower extremity edema after missing HD sessions.  X-rays of the left hand showed only chronic changes.  AV fistula was functioning well.  CTA showed no evidence of central stenosis.  Vascular surgery was consulted and only recommended supportive care such as wrapping the arm with Ace bandage.  She was also found to have severe aortic stenosis for which cardiology was consulted, and gave the opinion that she is not a candidate for intervention.  She was dialyzed while in the hospital with subsequent clinical improvement.  She did have hospital delirium while in the hospital, which has since resolved.  Palliative care was consulted, but patient continued to wish to be full code.  SNF placement was pursued, but proved to be challenging as transfer to dialysis is not always facilitated.  Case management is working on her discharge to SNF and arrangement of hemodialysis.    Interval Problem Update    ANITA ON  Met with her at bedside with Palliative Care / Ethic Director Marcela Gonzalez.  She wishes to live as long as possible.  Advised of the severity of aortic stenosis for which she declined to hear an estimate of prognosis.   Reminded her that she is not a candidate for AVR, for which she has an incurable cardiac disease.  She was advised that CPR would be non-beneficial as the cause of her cardiac arrest would be a terminal process.  She was agreeable to DNAR with ongoing full treatment, signed POLST.  She denies chest pain, dyspnea.  She has pain in her buttocks and back from sitting.  Intermittent nausea without vomiting.    I personally spent 12 minutes in direct ACP counseling including GOC determination, advising of terminal illness and expected course, invitation for prognosis,  alternative therapies, and completion of POLST.    Consultants/Specialty  cardiology, nephrology and vascular surgery    Code Status  DNAR/DNI    Disposition  Patient is medically cleared for discharge.   Anticipate discharge to to skilled nursing facility.  I have placed the appropriate orders for post-discharge needs.    Review of Systems  Review of Systems   Constitutional:  Positive for malaise/fatigue. Negative for weight loss.   HENT:  Negative for ear pain, nosebleeds, sinus pain and sore throat.    Eyes:  Negative for pain.   Respiratory:  Negative for cough, sputum production and shortness of breath.    Cardiovascular:  Positive for leg swelling. Negative for chest pain, palpitations and orthopnea.   Gastrointestinal:  Negative for abdominal pain, blood in stool, constipation, diarrhea, melena, nausea and vomiting.   Genitourinary:  Negative for dysuria.   Musculoskeletal:  Positive for back pain and myalgias. Negative for joint pain and neck pain.   Neurological:  Negative for sensory change and headaches.   Psychiatric/Behavioral:  Negative for depression. The patient is not nervous/anxious.    All other systems reviewed and are negative.       Physical Exam  Temp:  [36.5 °C (97.7 °F)] 36.5 °C (97.7 °F)  Pulse:  [85-88] 85  Resp:  [17-18] 18  BP: (100-115)/(52-60) 108/55  SpO2:  [91 %-93 %] 93 %    Physical Exam  Vitals and nursing note reviewed. Exam conducted with a chaperone present (Primary RN at bedside).   Constitutional:       General: She is not in acute distress.     Appearance: Normal appearance. She is cachectic. She is ill-appearing (Chronically).   HENT:      Head: Normocephalic and atraumatic.      Nose: No congestion.   Eyes:      General:         Right eye: No discharge.         Left eye: No discharge.      Pupils: Pupils are equal, round, and reactive to light.   Cardiovascular:      Rate and Rhythm: Normal rate and regular rhythm.      Pulses: Normal pulses.      Heart sounds: Murmur  (3/6 late-peaking systolic with radiation to carotids) heard.      Comments: LUE fistula. +Thrill, +Heave, +Bruit  Pulmonary:      Effort: Pulmonary effort is normal. No respiratory distress.      Breath sounds: Normal breath sounds. No stridor.   Abdominal:      General: Abdomen is scaphoid. Bowel sounds are normal. There is no distension.      Palpations: Abdomen is soft.      Tenderness: There is no abdominal tenderness. There is no guarding or rebound.   Musculoskeletal:         General: Tenderness and deformity (Mild contractures of all extremities) present.      Cervical back: Normal range of motion. No rigidity.      Right lower leg: Edema present.      Left lower leg: Edema present.      Comments: 1+ BLE pretibial edema   Skin:     General: Skin is warm.      Coloration: Skin is not jaundiced or pale.      Findings: Erythema and lesion present. No bruising.      Comments: Dressing present on left hand CDI   Neurological:      Mental Status: She is alert.      Comments: Appropriately conversant.   Psychiatric:         Attention and Perception: Attention and perception normal.         Mood and Affect: Mood is depressed. Affect is flat.         Speech: Speech normal.         Behavior: Behavior normal. Behavior is cooperative.         Thought Content: Thought content normal.         Cognition and Memory: Cognition and memory normal.         Judgment: Judgment normal.       Fluids    Intake/Output Summary (Last 24 hours) at 8/23/2022 1635  Last data filed at 8/22/2022 1715  Gross per 24 hour   Intake 120 ml   Output --   Net 120 ml         Laboratory  Recent Labs     08/20/22  1702 08/21/22  0107 08/22/22  0445   HEMOGLOBIN 8.1* 8.3* 7.7*   HEMATOCRIT 25.5* 25.9*  --        Recent Labs     08/22/22  0133 08/23/22  0130   SODIUM 135 135   POTASSIUM 6.0* 4.8   CHLORIDE 94* 93*   CO2 21 24   GLUCOSE 90 145*   BUN 69* 38*   CREATININE 4.54* 2.98*   CALCIUM 9.0 8.8                       Imaging  US-EXTREMITY ARTERY  UPPER UNILAT LEFT   Final Result      DX-CHEST-PORTABLE (1 VIEW)   Final Result         1.  Left pulmonary infiltrates or atelectasis, similar to prior study.   2.  Small to moderate left pleural effusion   3.  Atherosclerosis      CT-HAND W/O LEFT   Final Result      1.  No acute fracture or dislocation.      2.  Previous distal ulnar fracture identified.      3.  Osteoarthritis is most prominent at the first carpometacarpal joint.      4.  No bone erosions identified.      5.  Fluid collection or soft tissue abscess is identified.      6.  Induration in subcutaneous fat and intramuscular fat planes is noted which could be due to edema or inflammation such as cellulitis.      DX-HAND 3+ LEFT   Final Result      1. No acute fracture or subluxation.   2. Chronic posttraumatic changes in the distal left radius and ulna, stable when compared with the prior study.   3. Stable osteoarthritis involving the left first carpometacarpal joint.      DX-HAND 3+ LEFT   Final Result      1.  No acute fracture or dislocation.   2.  Scattered mild degenerative changes, increased from prior study. No definite erosive arthropathy.   3.  Osteopenia.   4.  Old ulnar styloid process fracture.      DX-CHEST-PORTABLE (1 VIEW)   Final Result      1.  Probable 12 mm right mid/upper lung zone mass. Suggest CT chest without contrast for further assessment      2.  Interstitial pulmonary edema or fibrosis      3.  Enlarged cardiac silhouette      4.  Left pleural effusion      CT-CTA UPPER EXT WITH & W/O-POST PROCESS LEFT   Final Result      1.  Status post brachiocephalic fistula in the left arm. No focal stenosis is identified.      2.  Extensive edema in the subcutaneous tissues of the visualized left lateral chest and arm.      EC-ECHOCARDIOGRAM COMPLETE W/O CONT   Final Result      US-ZAHRAA SINGLE LEVEL BILAT   Final Result      US-EXTREMITY ARTERY LOWER BILAT   Final Result      CT-EXTREMITY, UPPER W/O LEFT   Final Result      1.  There is  diffuse subcutaneous edema of the imaged portions of the left upper extremity. There is also diffuse body wall edema in the visualized portions of the chest and abdomen.   2.  No focal fluid collection to suggest abscess.   3.  There is a small left pleural effusion and minimal fluid in the abdomen.   4.  There is postsurgical change of AV fistula. There is small vessel atherosclerosis.      US-EXTREMITY VENOUS UPPER UNILAT LEFT   Final Result      DX-CHEST-PORTABLE (1 VIEW)   Final Result         1.  Interstitial pulmonary parenchymal prominence suggest chronic underlying lung disease, component of interstitial edema and/or infiltrates not excluded.   2.  Small left pleural effusion   3.  Cardiomegaly   4.  Atherosclerosis           Assessment/Plan  * ESRD (end stage renal disease) on dialysis (HCC)- (present on admission)  Assessment & Plan  - Continue hemodialysis per nephrology.  -Continue sevelamer and calcitriol.    Aortic stenosis, severe- (present on admission)  Assessment & Plan  Poor prognosis due to development of HFrEF  Not a candidate for intervention per cardiology.  Volume management per HD    Advance care planning  Assessment & Plan  Met with her and Ethics at bedside, agreeable to DNAR in setting of HFrEF and AS not a candidate for valve  POLST completed  She is declining hospice evaluation, advised that when she wishes to focus only on QoL she is a candidate for hospice    Hematochezia  Assessment & Plan  Resolved  RN reported marroon-colored stool  Continue PPI BID  Hgb stable, possible slight ABLA superimposed on AOCKD      Chest pain  Assessment & Plan  Recurrent  She developed chest pain on August 14, 2022 and she was transferred to telemetry floor with heparin.  She found to have elevated troponin in the setting of end-stage renal disease.  Repeat troponin remain in the similar range.   I evaluated her at the bedside and she reported that her chest pain has completely subsided and currently  she has shoulder pain and lidocaine patch and heating pads are helpful.  I discussed with her that I am willing to order troponin 1 more time and if it is remained in the similar range plan is to avoid doing further testing and avoid any unnecessary procedure.  I reviewed EKGs and he did not show any acute abnormalities.  I also reviewed cardiology note from June 29, 2022 it recommended goals of care discussion and palliative care due to severe aortic stenosis and low ejection fraction of 30%.    8/19 patient had another episode of chest pain during dialysis.  EKG abnormal, discussed with cardiology who did not recommend any further interventions.  Troponin 212 which is stable from troponins drawn earlier this week.  Chest pain resolved while at bedside and dialysis continued.    Extensor tendon laceration of left hand with open wound  Assessment & Plan  She has been having worsening of her wound on her left hand. COmpleted course of ancef. Now, hand wound healing well, no further signs of infection.X-ray did not show any acute abnormalities.  CT scan of her left hand and it did not show any fluid collection.  Skin biopsy showed no evidence of calciphylaxis or malignancy, and only showed acute inflammation.  Wound care service following.  Continue wound care.      Delirium  Assessment & Plan  - Resolved.    Edema of left upper extremity- (present on admission)  Assessment & Plan  - Chronic, at the same site of AV fistula which is functioning well.  -Multifactorial: Poor EF, lymphedema, outflow stenosis.  CTA showed no evidence of central stenosis.  Vascular surgery recommended supportive care with wrapping arm with Ace bandage.  -Continue to monitor.    Generalized weakness- (present on admission)  Assessment & Plan  - SNF placement being pursued.  Placement challenging due to need for transportation back and forth to hemodialysis.  CM/SW on board.    Diabetes mellitus, type II (HCC)- (present on  admission)  Assessment & Plan  - Last HbA1c was 11.7.  Continue Lantus 5 units at night, along with sliding scale insulin coverage.  Continue Accu-Cheks before meals and at bedtime. Goal to keep BG between 140-180 per 2019 ADA guidelines.        Acquired hypothyroidism- (present on admission)  Assessment & Plan  - Continue Synthroid.    Pain in both lower extremities- (present on admission)  Assessment & Plan  - Chronic.  Continue as needed pain medications.    Essential hypertension- (present on admission)  Assessment & Plan  - Maintaining good blood pressure control, despite holding home antihypertensives.  Continue to monitor blood pressure trend closely.    VTE prophylaxis: SCDs/TEDs and pharmacologic prophylaxis contraindicated due to hematochezia    I have performed a physical exam and reviewed and updated ROS and Plan today (8/23/2022). In review of last note, there are no changes except as documented above.

## 2022-08-23 NOTE — PROGRESS NOTES
4 Eyes Skin Assessment Completed by MAURO Rm and MAURO Mckinney.    Head WDL  Ears WDL  Nose WDL  Mouth WDL  Neck WDL  Breast/Chest Discoloration and Scab  Shoulder Blades WDL  Spine WDL  (R) Arm/Elbow/Hand Redness, Blanching, Bruising, Scab, and Discoloration  (L) Arm/Elbow/Hand Redness, Blanching, Bruising, Abrasion, Scab, Swelling, and Discoloration  Abdomen WDL  Groin Redness and Excoriation  Scrotum/Coccyx/Buttocks Redness, Blanching, and Excoriation Wound  (R) Leg Redness, Scab, and Bruising Flaky  (L) Leg Redness, Scab, and Bruising Flaky  (R) Heel/Foot/Toe Redness, Blanching, Boggy, and Scab Skin tear  (L) Heel/Foot/Toe Redness, Blanching, Boggy, and Scab Skin tear          Devices In Places Pulse Ox      Interventions In Place Heel Mepilex, Waffle Overlay, Pillows, Q2 Turns, Barrier Cream, Dri-Wil Pads, Heels Loaded W/Pillows, and Pressure Redistribution Mattress    Possible Skin Injury Yes    Pictures Uploaded Into Epic Yes  Wound Consult Placed Yes  RN Wound Prevention Protocol Ordered Yes

## 2022-08-23 NOTE — DISCHARGE PLANNING
Agency/Facility Name: [facility]  Spoke To: Kamille  Outcome: WAN was notified that contact information for daughter was incorrect, individual who answered phone was not aware of Pt or situation. Addie to continue researching Pt financials and will collaborate with [facility] CM as well as [facility] liaison Kamille regarding financials and placement options.     0950-  Agency/Facility Name: [facility]  Outcome: DPA left v-mail regarding referral status with request for callback.     0951-  Agency/Facility Name: [facility]   Outcome: DPA left v-mail regarding referral status with request for callback.     0953-  Agency/Facility Name: [facility]  Outcome: DPA left v-mail regarding referral status with request for callback.     0958-  Agency/Facility Name: [facility]  Outcome: DPA left v-mail regarding referral status with request for callback.     1000-  DPA expanded referrals to [facility] and will follow up upon fax confirmation.

## 2022-08-23 NOTE — PROGRESS NOTES
Assumed care of pt. Bedside report received from RN. Pt was updated on plan of care. AOx4. Pt pain level 0/10. Call light, phone and personal belongings in reach. Bed alarm on and working properly, bed in lowest position, and locked.

## 2022-08-24 NOTE — PROGRESS NOTES
Assumed care of pt. Bedside report received from RN. Pt is asleep. Call light, phone and personal belongings in reach. Bed alarm on and working properly, bed in lowest position, and locked.

## 2022-08-24 NOTE — DISCHARGE PLANNING
Agency/Facility Name: [facility]  Spoke To: Rachel  Outcome: Pt declined due to bed availability.     1015-  Agency/Facility Name: [facility]  Outcome: DPA left v-mail regarding referral status with request for callback.     1017-  Agency/Facility Name: [facility]  Outcome: DPA left v-mail regarding referral status with request for callback.     1150-  Agency/Facility Name: [facility]  Outcome: DPA received v-mail regarding denial due to bed availability.    1250-  Agency/Facility Name: [facility]  Spoke To: Harleen  Outcome: Pt declined due to bed availability.     1255-  Agency/Facility Name: [facility]   Outcome: DPA left v-mail regarding referral status with request for callback.     1340-  Agency/Facility Name: [facility]   Spoke To: Harleen   Outcome: Pt declined due to non contracted insurance provider.

## 2022-08-24 NOTE — DISCHARGE PLANNING
"Care Transition Team Assessment    LSW completed chart review and met with pt at bedside to complete assessment. Pt verified the information on the face sheet. Pt reported she lives with her dtr in a single story house that has a ramp to enter. Pt's son and DIL also live on the property. Prior to this hospitalization pt was independent with ADLs and IADLs. Pt has a wheelchair at home, however does not use it at baseline. Per chart review pt has shower chair, FWW, cane, and O2 at home (pt currently on RA). Pt reported she drives herself and that's how she was getting to dialysis previously. Pt confirmed her son and dtr are good supports for her and assist her with house work.    Pt stated her spouse recently passed away and requested he be removed as her emergency contact. Pt confirmed her DIL, Darling, is okay as an emergency contact. Pt stated her son, Maxx Sam is her DPOA, however does not know his phone number off the top of her head.    Pt reported she receives SSI of about $1,200/month as well as $700-800/month from [employer]. Pt reported she owns the house she lives in as well as a car. Pt does not have any money in saving or checking accounts.    LSW spoke with pt about DC plan and barriers to SNF placement. Pt voiced understanding that we will need to look into other DC options. Pt stated she does not have enough support at home to DC home with [facility]. Pt is open to the possibility of going to a  with [facility]. Pt stated she is okay with a shared room, but would ideally like a  close to her kids in [place].    LSW received VM from pt's ALBERT requesting a physicians note stating pt is not capable of making her own decisions or managing her finances in order for the pt's bank to allow her access to pt's finances. LSW made return call and left VM.    Addendum @1247  LSW verified with MD Cummins that pt has capacity. LSW spoke with pt's ALBERT, \"Deb\". Deb reported she is trying to access pt's bank accounts to " assist her with paying bills. Deb stated that although she has financial POA, one of the fleming is requiring a physicians note stating the pt is in the hospital and unable to leave, as well as a note stating the pt is unable to make her own decisions/manage her finances. LSW notified Deb that the pt does have capacity, so we are not able to provide the second requested note, however we are able to provide a note that pt is in the hospital. Deb specified that the bank requested it be signed by a physician.    LSW attempted to meet with pt at bedside to verify that her DIL has financial POA and she is aware and agreeable with Deb accessing her finances. Pt sleeping and unable to stay awake for conversation at this time.    Addendum @5233  Attempted to meet with pt again to verify she is aware and agreeable with DIL accessing her finances. Pt currently in dialysis.    Information Source  Orientation Level: Oriented X4  Information Given By: Patient  Informant's Name: Jannet Fosterler  Who is responsible for making decisions for patient? : Patient    Readmission Evaluation  Is this a readmission?: No    Elopement Risk  Legal Hold: No  Ambulatory or Self Mobile in Wheelchair: No-Not an Elopement Risk  Disoriented: Situation-At Risk for Elopement  Psychiatric Symptoms: None  History of Wandering: No  Elopement this Admit: No  Vocalizing Wanting to Leave: No  Displays Behaviors, Body Language Wanting to Leave: No-Not at Risk for Elopement  Elopement Risk: Not at Risk for Elopement  Wanderguard On: No (See Comments)    Interdisciplinary Discharge Planning  Lives with - Patient's Self Care Capacity: Other (Comments)  Patient or legal guardian wants to designate a caregiver: No  Support Systems: Other (Comments)  Housing / Facility: [address]  Prior Services: Other (Comments) (Assist for IADLs)  Durable Medical Equipment: Other - Specify (wheelchair)    Discharge Preparedness  What is your plan after discharge?:  Skilled nursing facility  What are your discharge supports?: Child  Prior Functional Level: Ambulatory, Drives Self, Independent with Activities of Daily Living, Independent with Medication Management  Difficulity with ADLs: None  Difficulity with IADLs: None    Functional Assesment  Prior Functional Level: Ambulatory, Drives Self, Independent with Activities of Daily Living, Independent with Medication Management    Finances  Financial Barriers to Discharge: No  Prescription Coverage: Yes    Advance Directive  Advance Directive?: DPOA for Health Care  Durable Power of  Name and Contact : Maxx Sam    Domestic Abuse  Have you ever been the victim of abuse or violence?: No  Physical Abuse or Sexual Abuse: No  Verbal Abuse or Emotional Abuse: No  Possible Abuse/Neglect Reported to:: Not Applicable    Psychological Assessment  History of Substance Abuse: None  History of Psychiatric Problems: No  Non-compliant with Treatment: No  Newly Diagnosed Illness: No    Discharge Risks or Barriers  Discharge risks or barriers?: No  Patient risk factors: Lack of outside supports    Anticipated Discharge Information  Discharge Disposition: D/T to SNF with Medicare cert in anticipation of skilled care (03)

## 2022-08-24 NOTE — CARE PLAN
The patient is Stable - Low risk of patient condition declining or worsening    Shift Goals  Clinical Goals: comfort, pain control  Patient Goals: rest, no disturbances during meals  Family Goals: N/A    Progress made toward(s) clinical / shift goals:        Problem: Knowledge Deficit - Standard  Goal: Patient and family/care givers will demonstrate understanding of plan of care, disease process/condition, diagnostic tests and medications  Outcome: Progressing   Pt is getting more involved in understanding her care    Patient is not progressing towards the following goals:      Problem: Skin Integrity - Diabetes  Goal: Patient's skin on legs and feet will remain intact while hospitalized  Outcome: Not Progressing   Pt does not tolerate turning very well, she will sometimes refused turns

## 2022-08-24 NOTE — PROGRESS NOTES
4 Eyes Skin Assessment Completed by MAURO Henderson and AYANNA Levin.    Head WDL  Ears WDL  Nose WDL  Mouth WDL  Neck WDL  Breast/Chest Scab  Shoulder Blades WDL  Spine Redness  (R) Arm/Elbow/Hand Blanching, Bruising, Scab, and Discoloration  (L) Arm/Elbow/Hand Redness, Blanching, Bruising, Abrasion, Scab, Swelling, and Discoloration  Abdomen WDL  Groin Redness and Excoriation  Scrotum/Coccyx/Buttocks Redness, Blanching, and Excoriation  (R) Leg Redness, Scab, Bruising, and flaky  (L) Leg Redness, scab, bruising, and flaky  (R) Heel/Foot/Toe redness, blanching, boggy, scab, and skin tear  (L) Heel/Foot/Toe redness, blanching, boggy, scab, and skin tear          Devices In Places Pulse Ox      Interventions In Place Heel Mepilex, Waffle Overlay, Pillows, Q2 Turns, Barrier Cream, Heels Loaded W/Pillows, and Pressure Redistribution Mattress    Possible Skin Injury Yes    Pictures Uploaded Into Epic YesYes  Wound Consult Placed Yes  RN Wound Prevention Protocol Ordered

## 2022-08-24 NOTE — DISCHARGE PLANNING
Medical Social Work  OSCAR told during Long Length of Stay patient has stated she has placed her home up for sale.  Web search done, home is not listed for sale.    OSCAR also asked to follow up to see if patient can get dialysis if wheelchair bound.    PC to Dialysis Coordinator, OSCAR told a patient can receive dialysis if wheelchair bound can use a rehana lift to transfer    PC to Louise at [facility], OSCAR called to see if they would consider taking patient with a 2 month DOMINGO after using patient's Medicare days.    Louise stated that her concern is that patient does not have a long term payor source.  Currently working with Percy in finding out patients income/assist.  Louise will check with her administration on accepting patient on al DOMINGO.     PC to Vladimir Greer [phone]: message left to call OSCAR

## 2022-08-24 NOTE — CARE PLAN
Problem: Pain - Standard  Goal: Alleviation of pain or a reduction in pain to the patient’s comfort goal  Outcome: Progressing     Problem: Knowledge Deficit - Standard  Goal: Patient and family/care givers will demonstrate understanding of plan of care, disease process/condition, diagnostic tests and medications  Outcome: Progressing     Problem: Fall Risk  Goal: Patient will remain free from falls  Outcome: Progressing     Problem: Diabetes Management  Goal: Patient will achieve and maintain glucose in satisfactory range  Outcome: Progressing     Problem: Knowledge Deficit - Diabetes  Goal: Patient will demonstrate knowledge of insulin injection, symptoms, and treatment of hypoglycemia and diet prior to discharge  Outcome: Progressing     Problem: Discharge Planning - Diabetes  Goal: Patient's continuum of care needs will be met  Outcome: Progressing     Problem: Skin Integrity - Diabetes  Goal: Patient's skin on legs and feet will remain intact while hospitalized  Outcome: Progressing     Problem: Infection - Diabetes  Goal: Patient will remain free from signs and symptoms of infection  Outcome: Progressing  Goal: Promotion wound healing, line and drain management  Outcome: Progressing     Problem: Respiratory  Goal: Patient will achieve/maintain optimum respiratory ventilation and gas exchange  Outcome: Progressing     Problem: Fluid Balance or Risk for Fluid Volume Deficit  Goal: Patient will demonstrate adequate hydration and vital signs  Outcome: Progressing     Problem: Nutrition Deficit - Diabetes  Goal: Patient will demonstrate adequate hydration and vital signs  Outcome: Progressing   The patient is Stable - Low risk of patient condition declining or worsening    Shift Goals  Clinical Goals: monitor labs, I/Os  Patient Goals: pain control  Family Goals: N/A

## 2022-08-24 NOTE — PROGRESS NOTES
[facility] -  PROGRESS NOTE               Author: Janina Bender M.D. Date & Time: 8/24/2022  3:06 PM     HPI:  81yoF with PMH significant for ESRD on HD MWF via left arm AVF, HTN, DM II, Anemia secondary to CKD, CKD Bone mineral disorder, admitted with increased edema and weakness and having missed her last last 2-3 outpt HD treatments. Pt is very lethargic at this time and unable to provide much history, majority of the history was obtained through review of the medical record and discussion with primary svc. Pt had reported increased LE edema and fatigue and weakness and worsening of her left arm edema so she came to the ED for evaluation. She apparently has missed her last 2-3 outpt dialysis treatments. Per regular outpt Nephrologist Dr. Gates, she has been non-compliant with her fluid restriction and was told that she needed 4x/week dialysis until her volume status could be optimized but she was non-compliant with that recommendation. She has edema of her left arm where her AVF is located and there is concern for a central stenosis that could be the etiology. She had an appointment at the outpt access center to evaluate for central stenosis and undergo angioplasty if needed on 6/9/22 but pt did not go to the appointment. She has not had any other procedures done to the arm in the past couple of months. She had a left arm us done today that showed no DVT and patent AVF and soft tissue edema. She apparently received pain medication fentanyl and dilaudid as well as benadryl earlier today and she is very drowsy.     DAILY NEPHROLOGY SUMMARY:  **See previous note from 7/31/22 to review prior daily nephrology summary entries.  8/1: Laying in bed eating breakfast. Very tearful this AM, stated that she has to make a decision if she wants to continue with the biopsy on her hand. Pt is concerned that there may not be much we can do about the wound on her left hand. Dr Simon from wound care  at bedside. Will return this afternoon for biopsy procedure per MD. HD today UF 2L, tolerated well. Denies SOB, CP,N/VD. K+ up this am 5.7.   8/2: Resting in bed. C/o fatigue. Reports improved SOB, remains on oxygen.   08/3: Sitting up in chair, states she feels good today, SOB improved , remains on 2L nasal cannula. Denies CP, N/V/D. Discussed about fluid restriction. Pending left hand biopsy results. K+ improved 5.4. States fatigue but due to she recently ambulated with RN, otherwise no complaints. No overnight events, VSS.   08/4: Pt laying in bed. AOX4, HD yesterday tolerated will UF 2800mL. Hypotensive on the last hr of HD reported SBP 89-90's but asymptomatic. BP this am stable. Denies any CP/SOB/N/V/D. Patient on RA. C/o constipation. No overnight events. States she has declined hospice but would like to go to a home care facility.   8/5: iHD today, net UF 1L.  No resting comfortably no complaints. VSS  8/6: Had iHD yesterday with 1 L UF.  Currently sleeping, but awakens easily and has no new complaints. VSS.   8/7: Lying in bed sleeping, awakens and has no new complaints, just fatigue. VSS. Due for iHD tomorrow.  8/8: Seen on HD this am. No acute distress. VSS.   8/9: 2.5 net UF. VSS. Resting in bed. C/o fatigue. Denies CP/SOB.   8/10: 1L net UF. No complaints. VSS.   8/11: Resting comfortably.  Family at bedside.  VSS.  Family reports she had been having pain, feels gabapentin may need to be increased.  No other complaints.   8/12: Seen on HD this am. No complaints.   8/13: iHD yesterday net UF 2.5L.  VSS 3L NC resting comfortably today.  No complaints.   8/14: Sleeping in bed, arouses. VSS. No overnight events.   8/15: Seen on dialysis at bedside.  Denies SOB.  Having back pain.  Chest pain last night, not complaining of at this time  8/16 - Complains of weakness, denies pain or SOB.  Had BM.  HD done yesterday  8/17 - Having some back pain.  No cramping.  Seen on dialysis  8/18: sitting up in bed, wanted  "to leave AMA but was talked out of it, again declines palliative, for iHD tomorrow   8/19: Complaining of chest pain this morning.  RRT called.  UF on dialysis on hold.  Hospitalist notified  8/20: No chest pain this morning.  Arm pain better  8/21: Pt sitting up in bed eating breakfast, says she needs a miracle, labs reviewed, VSS on RA, due for HD tomorrow  8/22: iHD today, net UF not yet available for review.  Hypoglycemic post dialysis FSBG 57, protocol in place.  VSS RA no complaints otherwise. K was 6.0 prior to dialysis.   8/23: HD yest, UF 1.3 L. Says she has no problems with HD treatments. Not a fan of the food at [employer]. K+ has been running 5-6.  8/24: No events, seen on dialysis this afternoon, BP stable, denies any CP/SOB but c/o bilat leg pain    REVIEW OF SYSTEMS:    10 point ROS reviewed and is as per HPI/daily summary or otherwise negative    PMH/PSH/SH/FH: Reviewed and unchanged since admission note  CURRENT MEDICATIONS: Reviewed from admission to present day    VS:  /64   Pulse 84   Temp 36.6 °C (97.9 °F) (Temporal)   Resp 18   Ht 1.626 m (5' 4\")   Wt 54.2 kg (119 lb 7.8 oz)   SpO2 94%   BMI 20.51 kg/m²   Physical Exam  Nursing note reviewed.   Constitutional:       General: She is not in acute distress.     Appearance: She is ill-appearing.   HENT:      Head: Normocephalic and atraumatic.   Eyes:      General: No scleral icterus.  Cardiovascular:      Rate and Rhythm: Normal rate and regular rhythm.   Pulmonary:      Effort: Pulmonary effort is normal. No respiratory distress.      Breath sounds: Examination of the right-lower field reveals decreased breath sounds. Examination of the left-lower field reveals decreased breath sounds. Decreased breath sounds present.   Abdominal:      General: Bowel sounds are normal. There is no distension.      Palpations: Abdomen is soft.   Musculoskeletal:         General: Swelling (left arm) present. No deformity.      Right lower leg: Edema " (Trace) present.      Left lower leg: Edema (Trace) present.      Comments: +L arm AVF with thrill/bruit   Skin:     General: Skin is warm and dry.      Findings: No rash.   Neurological:      General: No focal deficit present.      Mental Status: She is oriented to person, place, and time.   Psychiatric:         Mood and Affect: Mood normal.         Behavior: Behavior normal.       Fluids:  No intake/output data recorded.    LABS:  Recent Labs     08/22/22  0133 08/23/22  0130   SODIUM 135 135   POTASSIUM 6.0* 4.8   CHLORIDE 94* 93*   CO2 21 24   GLUCOSE 90 145*   BUN 69* 38*   CREATININE 4.54* 2.98*   CALCIUM 9.0 8.8       IMPRESSION:  # ESRD, dependent on HD              - HD via LUE AVF             - HD qMWF  # Fluid overload, improved             - Secondary to non-compliance with fluid restriction and HD treatments  # Left arm edema, slow improvement              - CTA upper ext 6/26 w/o e/o focal stenosis + extensive edema             - AVF patent on left arm US negative for DVT             - Pt no showed to outpt appt to evaluate              - Vascular does not recommend intervention              - Extensor tendon laceration left hand with wound             - Wound care in place, s/p biopsy 8/1             - Left hand skin biopsy negative for calciphylaxis             - Outpatient [facility] appt rescheduled for next month  # HTN, variable control, stable at this time             - Goal BP < 140/90  # Anemia of CKD, at goal              - Goal Hgb 10-11             - Iron 27             -TIBC 142             -%Sat 19             - Ferritin 1419  # CKD-MBD             - Vit D 35             - PTH 99.4             - Managed at OP unit             - On sevelamer with meals  # Hyperkalemia            - Correct w/ HD  # DM II--management per primary svc  # Leg Pain--chronic skin changes and subcutaneous tissue firm to touch             - Vascular does not recommend any intervention  # Leukocytosis resolved  #  Hyponatremia  - Likely fluid related, manage w/ HD  # Hypoalbuminemia  - No dietary protein restrictions  # Chest pain    - Management per primary team    PLAN:  - Hd today (WED)  - Continue qMWF iHD schedule  - UF as tolerated  - Continue off of all BP meds and diuretics for now.  - Limit sedating meds if possible  - No dietary protein restrictions  - Fluid restriction 1.2 L   - Low potassium diet  - Continue valtessa daily for control of hyperkalemia  - Dose all meds per ESRD  - Phos binders with meals  - Max dose gabapentin in ESRD pt 300mg/day  - Outpt access center appointment rescheduled  - Follow labs  - DC planning underway for SNF

## 2022-08-25 NOTE — CARE PLAN
Problem: Pain - Standard  Goal: Alleviation of pain or a reduction in pain to the patient’s comfort goal  Outcome: Progressing     Problem: Knowledge Deficit - Standard  Goal: Patient and family/care givers will demonstrate understanding of plan of care, disease process/condition, diagnostic tests and medications  Outcome: Progressing     Problem: Fall Risk  Goal: Patient will remain free from falls  Outcome: Progressing     Problem: Diabetes Management  Goal: Patient will achieve and maintain glucose in satisfactory range  Outcome: Progressing     Problem: Knowledge Deficit - Diabetes  Goal: Patient will demonstrate knowledge of insulin injection, symptoms, and treatment of hypoglycemia and diet prior to discharge  Outcome: Progressing     Problem: Discharge Planning - Diabetes  Goal: Patient's continuum of care needs will be met  Outcome: Progressing     Problem: Skin Integrity - Diabetes  Goal: Patient's skin on legs and feet will remain intact while hospitalized  Outcome: Progressing     Problem: Infection - Diabetes  Goal: Patient will remain free from signs and symptoms of infection  Outcome: Progressing     Problem: Respiratory  Goal: Patient will achieve/maintain optimum respiratory ventilation and gas exchange  Outcome: Progressing     Problem: Fluid Balance or Risk for Fluid Volume Deficit  Goal: Patient will demonstrate adequate hydration and vital signs  Outcome: Progressing     Problem: Nutrition Deficit - Diabetes  Goal: Patient will demonstrate adequate hydration and vital signs  Outcome: Progressing     Problem: Diabetic Ulcer  Goal: Early identification of diabetic foot wound and initiation of appropriate interventions  Outcome: Progressing   The patient is Stable - Low risk of patient condition declining or worsening    Shift Goals  Clinical Goals: Pain control  Patient Goals: Comfort  Family Goals: N/A    Progress made toward(s) clinical / shift goals:    Pain control    Patient is not  progressing towards the following goals:

## 2022-08-25 NOTE — PROGRESS NOTES
Assumed care of pt. Bedside report received from RN. Pt asleep. Call light, phone and personal belongings in reach. Bed alarm on and working properly, bed in lowest position, and locked.

## 2022-08-25 NOTE — PROGRESS NOTES
Hemodialysis ordered by Dr. Bender. Treatment started at 1428 and ended at 1728. Pt stable, vss, no c/o post tx. Net UF 1.5 L d/t hypotension during tx. Dr. Bender notified and aware. Report to EL Oropeza RN. [facility] avf dsg cdi.

## 2022-08-25 NOTE — PROGRESS NOTES
4 Eyes Skin Assessment Completed by MAURO Gonzalez and MAURO Burton.    Head WDL  Ears WDL  Nose WDL  Mouth WDL  Neck WDL  Breast/Chest WDL  Shoulder Blades WDL  Spine WDL  (R) Arm/Elbow/Hand Redness and Blanching  (L) Arm/Elbow/Hand edema, redness, IV fistula, hand wounds covered with ace bandage  Abdomen Scab  Groin Redness and Blanching  Scrotum/Coccyx/Buttocks Redness, Blanching, Moisture Fissure, and Scab  (R) Leg Scab  (L) Leg Scab  (R) Heel/Foot/Toe Scab, redness, blanching  (L) Heel/Foot/Toe Redness and Blanching          Devices In Places Tele Box, Blood Pressure Cuff, and Pulse Ox      Interventions In Place Heel Mepilex, Waffle Overlay, Pillows, Q2 Turns, and Barrier Cream    Possible Skin Injury Yes    Pictures Uploaded Into Epic Yes  Wound Consult Placed Yes  RN Wound Prevention Protocol Ordered Yes

## 2022-08-25 NOTE — DISCHARGE PLANNING
"LSW met with pt at bedside to verify she is okay with her DIL, Deb, accessing her finances. Pt confirmed that Deb has financial power of  and she is agreeable with Deb accessing her bank accounts at this time as she is unable to.    Pt's son, Oscar, currently at bedside. LSW spoke with pt and pt's son about the discharge plan. LSW reiterated that although we have been attempting to get pt into a SNF, we have not been able to find an accepting facility and we need to start looking into other discharge options. LSW spoke with pt and Oscar about pt discharging home with [facility] and family support. Oscar inquired to pt if she is able to financially afford returning home, to which pt stated she may not be able to stay in the main house, but would be able to stay in the small trailer. Oscar and pt confirmed that the pt owns the property, however now that they no longer have her spouse's income she may not be able to afford the bills. There is a house as well as two trailers on the property. Pt was previously staying in the small trailer with her spouse, while her children stay in the other trailer and house.    Pt reported she is eager to return home, however does not feel comfortable doing so until she is able to be up in a wheelchair moving herself around more independently. LSW inquired about how much family support pt would be able to have at home. Pt has multiple children staying on her property, including her daughters, Addie and Beena, and sons Oscar and Maxx, as well as her DIL Deb. Pt stated that her children all work and would not be available 24/7 for her. Oscar informed this LSW that her oldest dtr, Addie, is currently unemployed and available 24/7, however she is \"not the care giving type\". Oscar stated that he just returned to [place] from a trip and while he was away, the pt's dtr, Addie, was cleaning out her house because they were under the impression that pt would not be returning " home. Oscar stated he has already asked Addie to stop cleaning out the home and will speak with her and the other siblings about pt returning home. Oscar expressed that it may be beneficial to have a care conference with pt's family members to get everyone on the same page about pt's discharge plan. Oscar was in agreement that they would like the pt to return home.    Oscar provided phone number for Maxx to be added to chart. Oscar unable to provide any other numbers for pt's children as they either do not have phones or he does not know their numbers.

## 2022-08-25 NOTE — THERAPY
Physical Therapy   Daily Treatment     Patient Name: Jannet Bruno  Age:  81 y.o., Sex:  female  Medical Record #: [id number]  Today's Date: 8/25/2022          Assessment    Pt found fatigued, declining session 2/2 left sided chest pain (reproducable with palpation and resolved with repositioning to the right; discussed overall goals of care, pt much more lethargic and fatigued than prior sessions with this therapist; now able to verbalize understanding of w/c mobility goal and has accepted this level of mobility, however reporting she cannot go home with her family per a conversation she had with them; noted suggestion of family conference from social work, discussed awaiting further PT until that conversation has occurred, pt 'would like that'; will follow as remains in goals of care, pt is currently a moderate assist for w/c level mobility if to discharge in current condition and would need 24/7 supervision due to inability to perform IADLs.    Plan    Continue current treatment plan.    DC Equipment Recommendations: Unable to determine at this time  Discharge Recommendations: Recommend post-acute placement for additional physical therapy services prior to discharge home      Abridged Subjective/Objective       08/25/22 1635   Cognition    Cognition / Consciousness X   Comments very lethargic; conversational with questions though; easily arousable; able to verbalize her goals and understanding better than prior sessions   Short Term Goals    Short Term Goal # 1 Pt will perform bed mobility with supervision in 6 visits with HOB flat, no rail.   Goal Outcome # 1 goal not met   Short Term Goal # 2 Pt will transfer with supervision in 6 visits to improve functional indep.   Goal Outcome # 2 Goal not met   Short Term Goal # 3 Pt will ambulate x 50 feet using FWW with supervision in 6 visits to improve functional indep.   Goal Outcome # 3 Goal not met

## 2022-08-25 NOTE — DISCHARGE PLANNING
Emailed [facility] Swing Bed Referral to [email or web address].

## 2022-08-25 NOTE — PROGRESS NOTES
[facility] -  PROGRESS NOTE               Author: Janina Bender M.D. Date & Time: 8/25/2022  3:18 PM     HPI:  81yoF with PMH significant for ESRD on HD MWF via left arm AVF, HTN, DM II, Anemia secondary to CKD, CKD Bone mineral disorder, admitted with increased edema and weakness and having missed her last last 2-3 outpt HD treatments. Pt is very lethargic at this time and unable to provide much history, majority of the history was obtained through review of the medical record and discussion with primary svc. Pt had reported increased LE edema and fatigue and weakness and worsening of her left arm edema so she came to the ED for evaluation. She apparently has missed her last 2-3 outpt dialysis treatments. Per regular outpt Nephrologist Dr. Gates, she has been non-compliant with her fluid restriction and was told that she needed 4x/week dialysis until her volume status could be optimized but she was non-compliant with that recommendation. She has edema of her left arm where her AVF is located and there is concern for a central stenosis that could be the etiology. She had an appointment at the outpt access center to evaluate for central stenosis and undergo angioplasty if needed on 6/9/22 but pt did not go to the appointment. She has not had any other procedures done to the arm in the past couple of months. She had a left arm us done today that showed no DVT and patent AVF and soft tissue edema. She apparently received pain medication fentanyl and dilaudid as well as benadryl earlier today and she is very drowsy.     DAILY NEPHROLOGY SUMMARY:  **See previous note from 7/31/22 to review prior daily nephrology summary entries.  8/1: Laying in bed eating breakfast. Very tearful this AM, stated that she has to make a decision if she wants to continue with the biopsy on her hand. Pt is concerned that there may not be much we can do about the wound on her left hand. Dr Simon from wound care  at bedside. Will return this afternoon for biopsy procedure per MD. HD today UF 2L, tolerated well. Denies SOB, CP,N/VD. K+ up this am 5.7.   8/2: Resting in bed. C/o fatigue. Reports improved SOB, remains on oxygen.   08/3: Sitting up in chair, states she feels good today, SOB improved , remains on 2L nasal cannula. Denies CP, N/V/D. Discussed about fluid restriction. Pending left hand biopsy results. K+ improved 5.4. States fatigue but due to she recently ambulated with RN, otherwise no complaints. No overnight events, VSS.   08/4: Pt laying in bed. AOX4, HD yesterday tolerated will UF 2800mL. Hypotensive on the last hr of HD reported SBP 89-90's but asymptomatic. BP this am stable. Denies any CP/SOB/N/V/D. Patient on RA. C/o constipation. No overnight events. States she has declined hospice but would like to go to a home care facility.   8/5: iHD today, net UF 1L.  No resting comfortably no complaints. VSS  8/6: Had iHD yesterday with 1 L UF.  Currently sleeping, but awakens easily and has no new complaints. VSS.   8/7: Lying in bed sleeping, awakens and has no new complaints, just fatigue. VSS. Due for iHD tomorrow.  8/8: Seen on HD this am. No acute distress. VSS.   8/9: 2.5 net UF. VSS. Resting in bed. C/o fatigue. Denies CP/SOB.   8/10: 1L net UF. No complaints. VSS.   8/11: Resting comfortably.  Family at bedside.  VSS.  Family reports she had been having pain, feels gabapentin may need to be increased.  No other complaints.   8/12: Seen on HD this am. No complaints.   8/13: iHD yesterday net UF 2.5L.  VSS 3L NC resting comfortably today.  No complaints.   8/14: Sleeping in bed, arouses. VSS. No overnight events.   8/15: Seen on dialysis at bedside.  Denies SOB.  Having back pain.  Chest pain last night, not complaining of at this time  8/16 - Complains of weakness, denies pain or SOB.  Had BM.  HD done yesterday  8/17 - Having some back pain.  No cramping.  Seen on dialysis  8/18: sitting up in bed, wanted  "to leave AMA but was talked out of it, again declines palliative, for iHD tomorrow   8/19: Complaining of chest pain this morning.  RRT called.  UF on dialysis on hold.  Hospitalist notified  8/20: No chest pain this morning.  Arm pain better  8/21: Pt sitting up in bed eating breakfast, says she needs a miracle, labs reviewed, VSS on RA, due for HD tomorrow  8/22: iHD today, net UF not yet available for review.  Hypoglycemic post dialysis FSBG 57, protocol in place.  VSS RA no complaints otherwise. K was 6.0 prior to dialysis.   8/23: HD yest, UF 1.3 L. Says she has no problems with HD treatments. Not a fan of the food at [employer]. K+ has been running 5-6.  8/24: No events, seen on dialysis this afternoon, BP stable, denies any CP/SOB but c/o bilat leg pain  8/25: No events, feel ok, tolerated HD yest with 1.5L UF, BP stable, denies any CP/SOB/Abd pain    REVIEW OF SYSTEMS:    10 point ROS reviewed and is as per HPI/daily summary or otherwise negative    PMH/PSH/SH/FH: Reviewed and unchanged since admission note  CURRENT MEDICATIONS: Reviewed from admission to present day    VS:  /68   Pulse 86   Temp 36.9 °C (98.4 °F) (Temporal)   Resp 17   Ht 1.626 m (5' 4\")   Wt 54.8 kg (120 lb 13 oz)   SpO2 97%   BMI 20.74 kg/m²   Physical Exam  Nursing note reviewed.   Constitutional:       General: She is not in acute distress.     Appearance: She is ill-appearing.   HENT:      Head: Normocephalic and atraumatic.   Eyes:      General: No scleral icterus.  Cardiovascular:      Rate and Rhythm: Normal rate and regular rhythm.   Pulmonary:      Effort: Pulmonary effort is normal. No respiratory distress.      Breath sounds: Examination of the right-lower field reveals decreased breath sounds. Examination of the left-lower field reveals decreased breath sounds. Decreased breath sounds present.   Abdominal:      General: Bowel sounds are normal. There is no distension.      Palpations: Abdomen is soft. "   Musculoskeletal:         General: Swelling (left arm) present. No deformity.      Right lower leg: Edema (Trace) present.      Left lower leg: Edema (Trace) present.      Comments: +L arm AVF with thrill/bruit   Skin:     General: Skin is warm and dry.      Findings: No rash.   Neurological:      General: No focal deficit present.      Mental Status: She is oriented to person, place, and time.   Psychiatric:         Mood and Affect: Mood normal.         Behavior: Behavior normal.       Fluids:  In: 500 [Dialysis:500]  Out: 2000     LABS:  Recent Labs     08/23/22  0130 08/25/22  0059   SODIUM 135 137   POTASSIUM 4.8 4.3   CHLORIDE 93* 96   CO2 24 28   GLUCOSE 145* 168*   BUN 38* 24*   CREATININE 2.98* 2.35*   CALCIUM 8.8 8.8       IMPRESSION:  # ESRD, dependent on HD              - HD via LUE AVF             - HD qMWF  # Fluid overload, improved             - Secondary to non-compliance with fluid restriction and HD treatments  # Left arm edema, slow improvement              - CTA upper ext 6/26 w/o e/o focal stenosis + extensive edema             - AVF patent on left arm US negative for DVT             - Pt no showed to outpt appt to evaluate              - Vascular does not recommend intervention              - Extensor tendon laceration left hand with wound             - Wound care in place, s/p biopsy 8/1             - Left hand skin biopsy negative for calciphylaxis             - Outpatient [facility] appt rescheduled for next month  # HTN, variable control, stable at this time             - Goal BP < 140/90  # Anemia of CKD, at goal              - Goal Hgb 10-11             - Iron 27             -TIBC 142             -%Sat 19             - Ferritin 1419  # CKD-MBD             - Vit D 35             - PTH 99.4             - Managed at OP unit             - On sevelamer with meals  # Hyperkalemia            - Correct w/ HD  # DM II--management per primary svc  # Leg Pain--chronic skin changes and subcutaneous  tissue firm to touch             - Vascular does not recommend any intervention  # Leukocytosis resolved  # Hyponatremia  - Likely fluid related, manage w/ HD  # Hypoalbuminemia  - No dietary protein restrictions  # Chest pain    - Management per primary team    PLAN:  - No HD today ([facility])  - Continue qMWF iHD schedule  - UF as tolerated  - Continue off of all BP meds and diuretics for now.  - Limit sedating meds if possible  - No dietary protein restrictions  - Fluid restriction 1.2 L   - Low potassium diet  - Continue valtessa daily for control of hyperkalemia  - Dose all meds per ESRD  - Phos binders with meals  - Max dose gabapentin in ESRD pt 300mg/day  - Outpt access center appointment rescheduled  - Follow labs  - DC planning underway for SNF vs Home with family

## 2022-08-25 NOTE — DOCUMENTATION QUERY
"                                                                         [facility]                                                                       Query Response Note      PATIENT:               DIPAK GILLESPIE  ACCT #:                  [id number]  MRN:                     [id number]  :                      1940  ADMIT DATE:       2022 12:36 AM  DISCH DATE:          RESPONDING  PROVIDER #:        [id number]           QUERY TEXT:    Wound Care Team has seen the patient and documented the findings.  After further review of the documentation, please clarify the findings.    Contact me with questions.     Thank you,   NADINE Ramirez, CDI  [email or web address]      The patient's clinical indicators include:  82yo with dx of ESRD HD dependent, metabolic encephalopathy, chronic systolic HF, DM2, chronic respiratory failure with hypoxia, HTN, cachexia, anemia in chronic kidney disease.     Wound Care Team Consult \"Wound team consulted for coccyx.  Noted small area of peeling skin related to moisture.\"   Wound Care Team Consult \"Wound Pressure Injury Coccyx Unstageable (Active)\"    Risk factors: moisture, ESRD HD dependent, cachexia, DM2, anemia in CKD, metabolic encephalopathy, chronic respiratory failure with hypoxia  Treatment: Wound Care Team consultation and treatment, turn and repositioning, monitoring  Options provided:   -- Pressure Injury to Coccyx Unstageable not POA   -- Other explanation:Other explanation-, please specify   -- Unable to determine      Query created by: Odalis Soto on 2022 1:38 PM    RESPONSE TEXT:    Other explanation:Other explanation- Present on admission per [facility] note 22 - redness and blanching coccyx/buttocks          Electronically signed by:  HALINA VILCHIS MD 2022 4:53 PM              "

## 2022-08-25 NOTE — PROGRESS NOTES
After receiving afternoon medication pt proceeded to promptly vomit. PRN Zofran ODT was given to pt. DR. Cummins notified.

## 2022-08-25 NOTE — CARE PLAN
The patient is Stable - Low risk of patient condition declining or worsening    Shift Goals  Clinical Goals: monitor pain, q2h turns  Patient Goals: rest  Family Goals: N/A    Progress made toward(s) clinical / shift goals:      Problem: Pain - Standard  Goal: Alleviation of pain or a reduction in pain to the patient’s comfort goal  Outcome: Progressing     Problem: Fall Risk  Goal: Patient will remain free from falls  Outcome: Progressing       Patient is not progressing towards the following goals:

## 2022-08-26 NOTE — PROGRESS NOTES
[facility] Progress Note      HD today x 3 hours per Dr. Bender.   Initiated at 0750 and ended at 1050.       UF Net: 1,800 mL    Patient tolerated treatment. Asleep most of the time on dialysis. VSS with low BP episodes but patient denies symptoms of hypotension.    Blood returned. Applied gauze and held KINGA AVF site for 10 minutes. Verified no bleeding post treatment. Bruit and thrill present post dialysis.   Instructions given to Primary RN that if bleeding occurs on the AVF site, change dressing and held the site with pressure.       Report given to Primary EL Sr RN.

## 2022-08-26 NOTE — CONSULTS
ETHICS CONSULTATION  Patient Name: Jannet Real  MRN: [id number]  Date of Service: 8/19/2022-8/23/2022    ETHICAL ISSUE:  An ethics consultation was requested for the patient. The medical team is asking for assistance in determining the plan of care including code status and establishing a discharge plan.      REASON FOR ADMISSION AND MEDICAL INDICATIONS:  Patient is an 81 year old female admitted on 6/24/2022 for left upper arm swelling and lower extremity edema after missing dialysis sessions.  X-rays of the left hand showed only chronic changes.  AV fistula was functional.  CTA showed no evidence of central stenosis.  Vascular surgery was consulted and recommended supportive care. Patient was determined to have severe aortic stenosis for which cardiology was consulted, and gave the opinion that the patient is not a candidate for any interventions. Patient has been dialyzed while in the hospital with subsequent clinical improvement.  Patient did have hospital delirium which has since resolved.   Patient has additional medical history of ESRD on HD MWF, HTN, HLD, DM and hypothyroidism.    DISCUSSIONS WITH MEDICAL TEAM:  Case discussed with [facility]K and Dr. Schulte.  Patient has significant heart disease with a poor prognosis and a life expectancy of less than a year.  Patient would likely not survive resuscitation and there is concern for only causing the patient harm.     Case discussed with Dr. Cummins.  Patient has ESRD on dialysis with severe aortic stenosis and development of heart failure.  Patient per cardiology is not a candidate for interventions.  Patient prognosis is poor and the medical team is recommending DNR/DNI.  Patient has capacity to make medical decisions.   Case also discussed with [facility]. Consultation and conversations with patient reviewed in detail. Patient has been consistent in her expressed goal of prolonging her life and wanting all treatment including  "resuscitation.  Patient has not wanted to execute an advance directive.     Per Psychiatry note:  Patient reports concern that she is not recovering from her current illnesses and is no longer able to walk. Patient demonstrates limited insight regarding her noncompliance with treatment recommendations. However, patient recognizes she is unable to care for herself independent stating, \"if I go home, I would need someone to live with me because I can no longer take care of myself\". Discussed with patient her level of motivation to take better care of her health, patient states, \"oh I'm not ready to die yet, I still have things I want to do\". Patient reports, \"I just need help\". Patient states, she cannot depend upon any of her children to provide care. Patient states, \"I thought one of the younger daughters would help me, but it turns out she is not going to help\". Patient reports minimal contact with the rest of her adult children.    CODE STATUS AT THE TIME OF ETHICS CONSULTATION REQUEST:  FULL CODE    PATIENT'S DECISION MAKING CAPACITY:  Patient has capacity per the medical team.    ADVANCED DIRECTIVE/POLST FORM:  The patient has no advance directive or POLST form on file.      HEALTH CARE DECISION MAKER:  Patient has capacity to make medical decisions.    DISCUSSIONS WITH PATIENT:  Dr. Cummins and this writer met with the patient at bedside.  Introductions made and purpose of an ethics consultation explained to the patient.  Dr. Cummins provide a detailed review of the patient medical condition, hospital course and treatment plan.  Patient was asked to describe the status of her heart failure.  The patient was surprised at Dr. Cummins's description of the extent of her heart disease.  Discussed the medical team's concern regarding resuscitating the patient.  Patient is likely to receive no meaningful benefit and possible harm.  Patient agreed to DNR/DNI.  Patient does not want to know her prognosis or an estimate of " her life expectancy.  Patient goals remains to extend her life as long as possible with all available treatment including dialysis.  Discussed that at some point if patient decided to stop aggressive treatment or her condition declines such that there is no longer effective treatment available hospice would be an option.  Hospice described briefly.  Discharge plan also reviewed.  Patient is not able to return to her home as she lives alone and lacks the appropriate support.  Patient is in the process of selling her house which she hopes will then allow her to be accepted at a SNF.        SOCIAL HISTORY/LIVING SITUATION PRIOR TO HOSPITALIZATION:  Patient prior to this admission was living in her home in her home in [place] with her ex- Peter Real.  The patient was the primary care giver of Peter. Peter unfortunately  during the patient's current hospitalization.  The patient and Peter have one daughter together, Beena.  Patient has two sons (Gonzales Sam & Maxx Sam) and four daughters total (two from a previous marriage, one with Peter, and one adopted daughter Addie Sam.  Addie Sam and her son Gonzales Sam live in adjoining residences on the patient's property.       RECOMMENDATIONS:    POLST form executed during encounter with patient and Dr. Cummins.  Patient elected to be a DNR/DNI based on the recommendations of the medical team.  Patient would like selective treatment including dialysis, patient selected no artificial nutrition or feeding tube.  Patient encouraged to complete a DPOA-HC with the Palliative Care team.  Continue discussions regarding goals of care, plan of care as patient's condition evolves.      Thank you for involving the Clinical Ethics Committee in the care of this patient. Please let me know if you have any other questions or concerns.     Respectfully submitted,   Josie Gonzalez RN, MSN [facility]  Ethics Consultant  Office [phone]  Cell [phone] or Voalte

## 2022-08-26 NOTE — CONSULTS
"Brief Behavioral Health Note:    Length of Intervention: 30 minutes    Session Summary  Patient seen lying on hospital bed. Patient's affect is flat, patient appears depressed and lacking motivation. Patient states she has not experienced an improved mood however feels counseling services is not helpful and no longer wants to participate in psychotherapy sessions.  Patient reports feeling \"tired\" and wants to rest, however she complained of being cold. Retrieved warm blankets to cover patient. Patient expressed appreciation. At this time patient is not benefiting from psychotherapy and is no longer interested in having these services. As this is a voluntary service, will discontinue consult.     Signing off    Thank you for the opportunity    Oksana Roth, Ph.D., [facility]  "

## 2022-08-26 NOTE — PROGRESS NOTES
"Palliative Care Advance Care Planning Progress Note    HPI: Jannet Bruno is a 81 y.o. female with a past medical history of CKD, cardiomyopathy, and heart failure, who was admitted on 6/24/2022 for left upper arm swelling and lower extremity edema.  Non-invasive arterial studies of her lower extremities showed severe peripheral arterial disease.  Surgery Vascular consulted 6/27, and determined patient not a candidate for any type of intervention (with the exception of amputation) with respect to improving circulation to her extremities.  Cardiology consulted for severe aortic stenosis, not a candidate for intervention, due to her advanced age and physical debility.  Nephrology consulted for patient's CKD and patient started on inpatient dialysis M/W/F, which has been ongoing throughout patient's protracted hospital stay.  Patient's long-term dialysis has been a barrier to discharge.     Palliative Care (PC) initially consulted 6/27/22 with re-consult 8/17/22.  Please see my notes from those dates for details of goals of care (GOC) discussions.    Discussion: Attending physician Dr. Cummins and this APRN met with patient at bedside.  Patient recognized me from our previous visits. Patient with very flat, withdrawn affect today.  Patient affirmed she met with PC director Josie Gonzalez and Dr. Cummins a couple of days ago.  At that time patient had verbalized she did not want to know her prognosis.  I asked her today if her feelings about that had changed.  She started discussed her children and said, \"I think they know that I know I'm dying and they don't know what to do.\"    Dr. Cummins provided in-depth medical review, including Jannet she has limited interventional options, as cardiology not offering surgery for patient's heart failure/cardiomyopathy, and vascular surgery not offering interventions for limb ischemia due to patient's advanced age, debility, and functional decline.  He discussed patient's new " Patient is returning the phone call. Please give Adeel a call back at [phone]. "onset GI bleed, indicating the GI bleed \"is most likely due to the heart condition.\"  GI team not offering endoscopy due to patient's frail condition, high risk versus low benefit ratio.  He gently shared his perspective that patient approaching the end of her life and expressed his sincere desire to help her and her family prepare for that.  Jannet asked, \"How many days do I have left?\"  Dr. Cummins provided prognosis of \"a few days to a couple of weeks.\"  Patient followed with, \"I had pretty well reconciled to my passing.\"    Dr. Cummins discussed hospice and educated patient about hospice services/philosophy.  He recommended patient transition to hospice, which would mean cessation of dialysis and any other life-prolonging treatment.  Jannet said, \"That isn't even an option because there's no one there to care for me - not family.\"  I suggested family meeting so Dr. Cummins can speak to her children all at the same time and provided information about her worsening condition/prognosis.  Jannet said, \"As far as that goes, I don't know what advantage that would be.\"  I offered it would provide an opportunity for her children to come together and receive information about her condition.  It would also provide her kids an opportunity to create a plan to hopefully help care for her on hospice.  Patient verbalized she is amenable to meeting taking place, but does not want to participate in meeting.  She would like her sons Maxx and Oscar to be present.  She is okay with her daughters coming if they want to, but she does not want them to feel obligated to attend.  All questions answered.    This APRN applied lotion to patient's leg per request, then with HCA assist, boosted and repositioned patient to help her get into a more comfortable position.    This APRN called son Maxx ([phone]).  Introduced myself and explained my role in PC.  He was at work, so did not have time for an extended discussion.  Set up care " "conference for tomorrow Saturday 8/27/2022 @ 11:00.  He will let his brother Oscar, and sisters Addie, Beena, and Floresita know.  He said, \"I'd like to share this responsibility with the family.\"  I advised his mom said anyone family member was welcome to come, but not obligated to do so.  I also indicated remote family could be included via FaceTime or telephone.  All questions answered.     Provided therapeutic communication including open-ended questions, therapeutic silence, reflective listening, empathic support, validation, and therapeutic touch throughout both encounters. Provided Maxx with palliative care contact information and encouraged him to call with any questions or needs.    Outcome: Care conference scheduled for tomorrow Saturday 8/27/22 @ 11:00.  Patient verbalized she does not wish to participate in meeting, so Dr. Cummins will likely escort family to floor conference room for care conference.    Plan: Palliative care to continue to follow, provide support, and help facilitate decision making as clinical picture evolves.    Updated: Dr. Cummins via Voalte, who will oversee family meeting tomorrow, as this APRN will not be available.    Thank you for allowing Palliative Care to participate in Jannet's care. Please call our team with questions and/or additional needs.    As appropriate, Palliative Care encourages medical team to engage in further conversation, education for patient/family at bedside regarding GOC/POC.    Total visit time was 50 minutes discussing and coordinating advance care planning.    Suma Gonzalez, DNP, APRN, [facility]  Palliative Care Nurse Practitioner  [phone]       "

## 2022-08-26 NOTE — PROGRESS NOTES
4 Eyes Skin Assessment Completed by Stacey CHANEY RN and MAURO Ibarra.    Head WDL  Ears WDL  Nose WDL  Mouth WDL  Neck WDL  Breast/Chest WDL  Shoulder Blades WDL  Spine WDL  (R) Arm/Elbow/Hand Redness and Bruising  (L) Arm/Elbow/Hand Redness, Bruising, and Edema wound on left hand/fingers  Abdomen WDL  Groin WDL  Scrotum/Coccyx/Buttocks Redness and Excoriation wound noted on sacrum   (R) Leg WDL  (L) Leg Scab  (R) Heel/Foot/Toe Boggy  (L) Heel/Foot/Toe Boggy          Devices In Places Blood Pressure Cuff      Interventions In Place Waffle Overlay, Pillows, Q2 Turns, Barrier Cream, Dri-Wil Pads, Heels Loaded W/Pillows, and Pressure Redistribution Mattress    Possible Skin Injury Yes    Pictures Uploaded Into Epic Yes  Wound Consult Placed Yes  RN Wound Prevention Protocol Ordered Yes     Wound is already following patient; wounds that were noted on skin check have previously been identified. No new wounds noted at this time.

## 2022-08-26 NOTE — CARE PLAN
The patient is Stable - Low risk of patient condition declining or worsening    Shift Goals  Clinical Goals: pain control; sleep    Progress made toward(s) clinical / shift goals:  Patient sleeping through night; patient medicated for pain (see MAR); no complaints of pain at this time.     Patient is not progressing towards the following goals:      Problem: Pain - Standard  Goal: Alleviation of pain or a reduction in pain to the patient’s comfort goal  Outcome: Progressing     Problem: Fall Risk  Goal: Patient will remain free from falls  Outcome: Progressing

## 2022-08-27 NOTE — PROGRESS NOTES
[facility] -  PROGRESS NOTE               Author: Janina Bender M.D. Date & Time: 8/26/2022  6:55 PM     HPI:  81yoF with PMH significant for ESRD on HD MWF via left arm AVF, HTN, DM II, Anemia secondary to CKD, CKD Bone mineral disorder, admitted with increased edema and weakness and having missed her last last 2-3 outpt HD treatments. Pt is very lethargic at this time and unable to provide much history, majority of the history was obtained through review of the medical record and discussion with primary svc. Pt had reported increased LE edema and fatigue and weakness and worsening of her left arm edema so she came to the ED for evaluation. She apparently has missed her last 2-3 outpt dialysis treatments. Per regular outpt Nephrologist Dr. Gates, she has been non-compliant with her fluid restriction and was told that she needed 4x/week dialysis until her volume status could be optimized but she was non-compliant with that recommendation. She has edema of her left arm where her AVF is located and there is concern for a central stenosis that could be the etiology. She had an appointment at the outpt access center to evaluate for central stenosis and undergo angioplasty if needed on 6/9/22 but pt did not go to the appointment. She has not had any other procedures done to the arm in the past couple of months. She had a left arm us done today that showed no DVT and patent AVF and soft tissue edema. She apparently received pain medication fentanyl and dilaudid as well as benadryl earlier today and she is very drowsy.     DAILY NEPHROLOGY SUMMARY:  **See previous note from 7/31/22 to review prior daily nephrology summary entries.  8/1: Laying in bed eating breakfast. Very tearful this AM, stated that she has to make a decision if she wants to continue with the biopsy on her hand. Pt is concerned that there may not be much we can do about the wound on her left hand. Dr Simon from wound care  at bedside. Will return this afternoon for biopsy procedure per MD. HD today UF 2L, tolerated well. Denies SOB, CP,N/VD. K+ up this am 5.7.   8/2: Resting in bed. C/o fatigue. Reports improved SOB, remains on oxygen.   08/3: Sitting up in chair, states she feels good today, SOB improved , remains on 2L nasal cannula. Denies CP, N/V/D. Discussed about fluid restriction. Pending left hand biopsy results. K+ improved 5.4. States fatigue but due to she recently ambulated with RN, otherwise no complaints. No overnight events, VSS.   08/4: Pt laying in bed. AOX4, HD yesterday tolerated will UF 2800mL. Hypotensive on the last hr of HD reported SBP 89-90's but asymptomatic. BP this am stable. Denies any CP/SOB/N/V/D. Patient on RA. C/o constipation. No overnight events. States she has declined hospice but would like to go to a home care facility.   8/5: iHD today, net UF 1L.  No resting comfortably no complaints. VSS  8/6: Had iHD yesterday with 1 L UF.  Currently sleeping, but awakens easily and has no new complaints. VSS.   8/7: Lying in bed sleeping, awakens and has no new complaints, just fatigue. VSS. Due for iHD tomorrow.  8/8: Seen on HD this am. No acute distress. VSS.   8/9: 2.5 net UF. VSS. Resting in bed. C/o fatigue. Denies CP/SOB.   8/10: 1L net UF. No complaints. VSS.   8/11: Resting comfortably.  Family at bedside.  VSS.  Family reports she had been having pain, feels gabapentin may need to be increased.  No other complaints.   8/12: Seen on HD this am. No complaints.   8/13: iHD yesterday net UF 2.5L.  VSS 3L NC resting comfortably today.  No complaints.   8/14: Sleeping in bed, arouses. VSS. No overnight events.   8/15: Seen on dialysis at bedside.  Denies SOB.  Having back pain.  Chest pain last night, not complaining of at this time  8/16 - Complains of weakness, denies pain or SOB.  Had BM.  HD done yesterday  8/17 - Having some back pain.  No cramping.  Seen on dialysis  8/18: sitting up in bed, wanted  "to leave AMA but was talked out of it, again declines palliative, for iHD tomorrow   8/19: Complaining of chest pain this morning.  RRT called.  UF on dialysis on hold.  Hospitalist notified  8/20: No chest pain this morning.  Arm pain better  8/21: Pt sitting up in bed eating breakfast, says she needs a miracle, labs reviewed, VSS on RA, due for HD tomorrow  8/22: iHD today, net UF not yet available for review.  Hypoglycemic post dialysis FSBG 57, protocol in place.  VSS RA no complaints otherwise. K was 6.0 prior to dialysis.   8/23: HD yest, UF 1.3 L. Says she has no problems with HD treatments. Not a fan of the food at [employer]. K+ has been running 5-6.  8/24: No events, seen on dialysis this afternoon, BP stable, denies any CP/SOB but c/o bilat leg pain  8/25: No events, feel ok, tolerated HD yest with 1.5L UF, BP stable, denies any CP/SOB/Abd pain  8/26: No events, noted to have Burgundy stool yest with concern for GI bleed, BP stable, seen on HD this am, denies any CP/SOB/Abd pain, feels tired    REVIEW OF SYSTEMS:    10 point ROS reviewed and is as per HPI/daily summary or otherwise negative    PMH/PSH/SH/FH: Reviewed and unchanged since admission note  CURRENT MEDICATIONS: Reviewed from admission to present day    VS:  /62   Pulse 100   Temp 36.2 °C (97.2 °F) (Temporal)   Resp 18   Ht 1.626 m (5' 4\")   Wt 54.8 kg (120 lb 13 oz)   SpO2 93%   BMI 20.74 kg/m²   Physical Exam  Nursing note reviewed.   Constitutional:       General: She is not in acute distress.     Appearance: She is ill-appearing.   HENT:      Head: Normocephalic and atraumatic.   Eyes:      General: No scleral icterus.  Cardiovascular:      Rate and Rhythm: Normal rate and regular rhythm.   Pulmonary:      Effort: Pulmonary effort is normal. No respiratory distress.      Breath sounds: Examination of the right-lower field reveals decreased breath sounds. Examination of the left-lower field reveals decreased breath sounds. Decreased " breath sounds present.   Abdominal:      General: Bowel sounds are normal. There is no distension.      Palpations: Abdomen is soft.   Musculoskeletal:         General: Swelling (left arm) present. No deformity.      Right lower leg: Edema (Trace) present.      Left lower leg: Edema (Trace) present.      Comments: +L arm AVF with thrill/bruit   Skin:     General: Skin is warm and dry.      Findings: No rash.   Neurological:      General: No focal deficit present.      Mental Status: She is oriented to person, place, and time.   Psychiatric:         Mood and Affect: Mood normal.         Behavior: Behavior normal.       Fluids:  In: -   Out: 150     LABS:  Recent Labs     08/25/22  0059 08/26/22  0424   SODIUM 137 133*   POTASSIUM 4.3 5.7*   CHLORIDE 96 93*   CO2 28 26   GLUCOSE 168* 120*   BUN 24* 37*   CREATININE 2.35* 3.07*   CALCIUM 8.8 9.0       IMPRESSION:  # ESRD, dependent on HD              - HD via LUE AVF             - HD qMWF  # Fluid overload, improved             - Secondary to non-compliance with fluid restriction and HD treatments  # Left arm edema, slow improvement              - CTA upper ext 6/26 w/o e/o focal stenosis + extensive edema             - AVF patent on left arm US negative for DVT             - Pt no showed to outpt appt to evaluate              - Vascular does not recommend intervention              - Extensor tendon laceration left hand with wound             - Wound care in place, s/p biopsy 8/1             - Left hand skin biopsy negative for calciphylaxis             - Outpatient [facility] appt rescheduled for next month  # HTN, variable control, stable at this time             - Goal BP < 140/90  # Anemia of CKD, at goal              - Goal Hgb 10-11             - Iron 27             -TIBC 142             -%Sat 19             - Ferritin 1419             - Now with concern for GI bleed, Hospitalist discussed with GI and no intervention planned due to pt's frail status  # CKD-MBD              - Vit D 35             - PTH 99.4             - Managed at OP unit             - On sevelamer with meals  # Hyperkalemia            - Correct w/ HD and on patiromer  # DM II--management per primary svc  # Leg Pain--chronic skin changes and subcutaneous tissue firm to touch             - Vascular does not recommend any intervention  # Leukocytosis resolved  # Hyponatremia--treat with HD  - Likely fluid related, manage w/ HD  # Hypoalbuminemia  - No dietary protein restrictions  # Chest pain    - Management per primary team    PLAN:  - HD today (FRI)  - Continue qMWF iHD schedule  - UF as tolerated  - Continue off of all BP meds and diuretics for now.  - Limit sedating meds if possible  - No dietary protein restrictions  - Fluid restriction 1.2 L   - Low potassium diet  - Continue valtessa daily for control of hyperkalemia  - Dose all meds per ESRD  - Phos binders with meals  - Max dose gabapentin in ESRD pt 300mg/day  - Outpt access center appointment rescheduled  - Follow labs  - DC planning underway for SNF vs Home with family  - Now with possible GI bleed, no intervention by GI planned  - Family meeting planned for tomorrow regarding goals of care    **Discussed with Hospitalist

## 2022-08-27 NOTE — HOSPICE
Received end-of-life consult and spoke to Dr. Cummins today.     Left message for Maxx at [phone] to set up a time for Sunday to discuss hospice services.     Will follow up again Sunday.

## 2022-08-27 NOTE — CARE PLAN
Problem: Pain - Standard  Goal: Alleviation of pain or a reduction in pain to the patient’s comfort goal  Outcome: Progressing     Problem: Knowledge Deficit - Standard  Goal: Patient and family/care givers will demonstrate understanding of plan of care, disease process/condition, diagnostic tests and medications  Outcome: Progressing     Problem: Fall Risk  Goal: Patient will remain free from falls  Outcome: Progressing   The patient is Stable - Low risk of patient condition declining or worsening    Shift Goals  Clinical Goals: pain control; sleep  Patient Goals: rest  Family Goals: N/A    Progress made toward(s) clinical / shift goals:  Progressing    Patient is not progressing towards the following goals:

## 2022-08-27 NOTE — PROGRESS NOTES
Bedside report received from day RN, pt care assumed, assessment completed. Pt is A&O4, pain 0/10, medical. Updated on POC, questions answered. Bed in lowest, locked position, treaded socks on, call light and belongings within reach. Fall precautions in place.

## 2022-08-27 NOTE — CONSULTS
DATE OF SERVICE:  08/26/2022     GASTROENTEROLOGY CONSULTATION     REFERRING PHYSICIAN:  Jm Cummins MD     REASON FOR CONSULTATION:  Concern for GI bleed.     HISTORY OF PRESENT ILLNESS:  This is an 81-year-old lady who is markedly older   than her stated age, who has a number of terminal medical illnesses including   but not limited to end-stage renal disease, on dialysis; congestive heart   failure, severe aortic stenosis with an aortic valve area of 0.4 cm2, who per   report of the RN and CNA has had intermittent episodes of maroon-colored   stools.  The patient reports generalized weakness and states that she cannot   recall whether she has had bright red blood or melena.  As of today, the   patient has not had a bowel movement and has had no signs of active bleeding.    Review of her blood counts reveal her hemoglobin to be in the range of   between 7 and 8.  She has been chronically anemic during this hospitalization   with an MCV of over 100.     PAST MEDICAL HISTORY:  1.  Hypertension.  2.  Obstructive sleep apnea.  3.  Gastroesophageal reflux disease.  4.  Diabetes.  5.  End-stage kidney disease.  6.  Peripheral arterial disease with evaluation by Dr. Nazario, who recommends   supportive therapy with high risk for surgical approach.     PAST SURGICAL HISTORY:  1.  Appendectomy.  2.  Ventral hernia repair.  3.  Cataract surgery.  4.  AV fistula creation.     ALLERGIES:  CODEINE, OXYCODONE, TAPE.     CURRENT MEDICATIONS:  Tylenol, Lipitor, gabapentin, Retacrit, insulin sliding   scale, levothyroxine, Protonix, Veltassa, Requip, Renvela.     REVIEW OF SYSTEMS:  CONSTITUTIONAL:   No nausea, vomiting, fevers or chills.  No weight loss.  CARDIOVASCULAR:  No chest pain, palpitations currently but she has had   intermittent chest pain throughout this hospitalization.  PULMONARY:  Intermittent shortness of breath, fatigue.  No hemoptysis.  GENITOURINARY:  No dysmenorrhea.  PSYCHIATRIC:  Positive depression,  anxiety.  NEUROLOGIC:  No focal weakness or numbness.  MUSCULOSKELETAL:  Overall weakness.     PHYSICAL EXAMINATION:  VITAL SIGNS:   Temperature is 97.2, blood pressure is 114/60, pulse of 85,   respirations 17, 94% on room air.  GENERAL:  The patient is sleeping but awakens with stimulation.  HEENT:  PERRLA. EOMI.  Sclerae are anicteric.  NECK:  Supple.  HEART:  Regular rhythm.  LUNGS:  Clear to auscultation.  ABDOMEN:  Soft, nontender, nondistended, thin, cachectic.  EXTREMITIES:  No edema.     IMPRESSION:  This is an 81-year-old female in the end of life with multiple   medical problems, many of them are terminal.  The most concerning being her   history of cardiomyopathy with aortic stenosis, which is very severe with an   aortic valve area 0.4 who is significantly preload dependent in the setting of   end-stage kidney disease, on dialysis, who has shown signs of burgundy stools   and blood loss in the setting of chronic anemia, likely secondary to anemia   of chronic disease, erythropoietin deficiency, poor nutrition and possible GI   related blood loss.  Considering that her life span is limited to days, weeks   and maybe a month, the utility of endoscopic evaluation is low and the risk is   very high as she is very preload dependent.  Since she is not actively   bleeding at this time, recommendation is for palliative care, comfort care   would be highly advisable.  The risks of endoscopic evaluation reviewed.    Risks included to bleeding, perforation, side effects of medication and most   concerning and high likelihood of death were reviewed.  At this time, defer   endoscopic evaluation.  Continue medical therapy.  Please call with any   further questions or concerns.        ______________________________  MD AGUILAR Shaffer/DWAYNE/CARMEN    DD:  08/26/2022 14:11  DT:  08/26/2022 19:12    Job#:  [id number]

## 2022-08-27 NOTE — PROGRESS NOTES
[facility] -  PROGRESS NOTE               Author: Rupesh Peres M.D. Date & Time: 8/27/2022  10:45 AM     HPI:  81yoF with PMH significant for ESRD on HD MWF via left arm AVF, HTN, DM II, Anemia secondary to CKD, CKD Bone mineral disorder, admitted with increased edema and weakness and having missed her last last 2-3 outpt HD treatments. Pt is very lethargic at this time and unable to provide much history, majority of the history was obtained through review of the medical record and discussion with primary svc. Pt had reported increased LE edema and fatigue and weakness and worsening of her left arm edema so she came to the ED for evaluation. She apparently has missed her last 2-3 outpt dialysis treatments. Per regular outpt Nephrologist Dr. Gates, she has been non-compliant with her fluid restriction and was told that she needed 4x/week dialysis until her volume status could be optimized but she was non-compliant with that recommendation. She has edema of her left arm where her AVF is located and there is concern for a central stenosis that could be the etiology. She had an appointment at the outpt access center to evaluate for central stenosis and undergo angioplasty if needed on 6/9/22 but pt did not go to the appointment. She has not had any other procedures done to the arm in the past couple of months. She had a left arm us done today that showed no DVT and patent AVF and soft tissue edema. She apparently received pain medication fentanyl and dilaudid as well as benadryl earlier today and she is very drowsy.     DAILY NEPHROLOGY SUMMARY:  **See previous note from 7/31/22 to review prior daily nephrology summary entries.  8/01: Laying in bed eating breakfast. Very tearful this AM, stated that she has to make a decision if she wants to continue with the biopsy on her hand. Pt is concerned that there may not be much we can do about the wound on her left hand. Dr Simon from wound care  at bedside. Will return this afternoon for biopsy procedure per MD. HD today UF 2L, tolerated well. Denies SOB, CP,N/VD. K+ up this am 5.7.   8/02: Resting in bed. C/o fatigue. Reports improved SOB, remains on oxygen.   8/03: Sitting up in chair, states she feels good today, SOB improved , remains on 2L nasal cannula. Denies CP, N/V/D. Discussed about fluid restriction. Pending left hand biopsy results. K+ improved 5.4. States fatigue but due to she recently ambulated with RN, otherwise no complaints. No overnight events, VSS.   8/04: Pt laying in bed. AOX4, HD yesterday tolerated will UF 2800mL. Hypotensive on the last hr of HD reported SBP 89-90's but asymptomatic. BP this am stable. Denies any CP/SOB/N/V/D. Patient on RA. C/o constipation. No overnight events. States she has declined hospice but would like to go to a home care facility.   8/05: iHD today, net UF 1L.  No resting comfortably no complaints. VSS  8/06: Had iHD yesterday with 1 L UF.  Currently sleeping, but awakens easily and has no new complaints. VSS.   8/07: Lying in bed sleeping, awakens and has no new complaints, just fatigue. VSS. Due for iHD tomorrow.  8/08: Seen on HD this am. No acute distress. VSS.   8/09: 2.5 net UF. VSS. Resting in bed. C/o fatigue. Denies CP/SOB.   8/10: 1L net UF. No complaints. VSS.   8/11: Resting comfortably.  Family at bedside.  VSS.  Family reports she had been having pain, feels gabapentin may need to be increased.  No other complaints.   8/12: Seen on HD this am. No complaints.   8/13: iHD yesterday net UF 2.5L.  VSS 3L NC resting comfortably today.  No complaints.   8/14: Sleeping in bed, arouses. VSS. No overnight events.   8/15: Seen on dialysis at bedside.  Denies SOB.  Having back pain.  Chest pain last night, not complaining of at this time  8/16 - Complains of weakness, denies pain or SOB.  Had BM.  HD done yesterday  8/17 - Having some back pain.  No cramping.  Seen on dialysis  8/18: sitting up in bed,  "wanted to leave AMA but was talked out of it, again declines palliative, for iHD tomorrow   8/19: Complaining of chest pain this morning.  RRT called.  UF on dialysis on hold.  Hospitalist notified  8/20: No chest pain this morning.  Arm pain better  8/21: Pt sitting up in bed eating breakfast, says she needs a miracle, labs reviewed, VSS on RA, due for HD tomorrow  8/22: iHD today, net UF not yet available for review.  Hypoglycemic post dialysis FSBG 57, protocol in place.  VSS RA no complaints otherwise. K was 6.0 prior to dialysis.   8/23: HD yest, UF 1.3 L. Says she has no problems with HD treatments. Not a fan of the food at [facility]. K+ has been running 5-6.  8/24: No events, seen on dialysis this afternoon, BP stable, denies any CP/SOB but c/o bilat leg pain  8/25: No events, feel ok, tolerated HD yest with 1.5L UF, BP stable, denies any CP/SOB/Abd pain  8/26: No events, noted to have Burgundy stool yest with concern for GI bleed, BP stable, seen on HD this am, denies any CP/SOB/Abd pain, feels tired  8/27: NAEO, lying in bed, awaiting AM meeting with palliative and her kids    REVIEW OF SYSTEMS:    10 point ROS reviewed and is as per HPI/daily summary or otherwise negative    PMH/PSH/SH/FH: Reviewed and unchanged since admission note  CURRENT MEDICATIONS: Reviewed from admission to present day    VS:  /57   Pulse 91   Temp 36.9 °C (98.4 °F) (Temporal)   Resp 17   Ht 1.626 m (5' 4\")   Wt 53.9 kg (118 lb 13.3 oz)   SpO2 93%   BMI 20.40 kg/m²   Physical Exam  Nursing note reviewed.   Constitutional:       General: She is not in acute distress.     Appearance: She is ill-appearing.   HENT:      Head: Normocephalic and atraumatic.   Eyes:      General: No scleral icterus.  Cardiovascular:      Rate and Rhythm: Normal rate and regular rhythm.   Pulmonary:      Effort: Pulmonary effort is normal. No respiratory distress.   Abdominal:      General: There is no distension.   Musculoskeletal:         " General: Swelling (left arm) present.      Right lower leg: Edema (Trace) present.      Left lower leg: Edema (Trace) present.      Comments: +L arm AVF with thrill/bruit   Skin:     General: Skin is warm and dry.      Findings: No rash.   Neurological:      General: No focal deficit present.   Psychiatric:         Behavior: Behavior normal.       Fluids:  In: 740 [P.O.:240; Dialysis:500]  Out: 2300     LABS:  Recent Labs     08/25/22  0059 08/26/22  0424 08/27/22  0137   SODIUM 137 133* 138   POTASSIUM 4.3 5.7* 4.7   CHLORIDE 96 93* 96   CO2 28 26 28   GLUCOSE 168* 120* 190*   BUN 24* 37* 23*   CREATININE 2.35* 3.07* 2.75*   CALCIUM 8.8 9.0 9.1       IMPRESSION:  # ESRD, dependent on HD              - HD via LUE AVF             - HD qMWF  # Fluid overload, improved             - Secondary to non-compliance with fluid restriction and HD treatments  # Left arm edema, slow improvement              - CTA upper ext 6/26 w/o e/o focal stenosis + extensive edema             - AVF patent on left arm US negative for DVT             - Pt no showed to outpt appt to evaluate              - Vascular does not recommend intervention              - Extensor tendon laceration left hand with wound             - Wound care in place, s/p biopsy 8/1             - Left hand skin biopsy negative for calciphylaxis             - Outpatient [facility] appt rescheduled for next month  # HTN, variable control, stable at this time             - Goal BP < 140/90  # Anemia of CKD, at goal              - Goal Hgb 10-11             - Iron 27             -TIBC 142             -%Sat 19             - Ferritin 1419             - Now with concern for GI bleed, Hospitalist discussed with GI and no intervention planned due to pt's frail status  # CKD-MBD             - Vit D 35             - PTH 99.4             - Managed at OP unit             - On sevelamer with meals  # Hyperkalemia            - Correct w/ HD and on patiromer  # DM II--management per  primary svc  # Leg Pain--chronic skin changes and subcutaneous tissue firm to touch             - Vascular does not recommend any intervention  # Leukocytosis resolved  # Hyponatremia--treat with HD  - Likely fluid related, manage w/ HD  # Hypoalbuminemia  - No dietary protein restrictions  # Chest pain    - Management per primary team  # Prognosis  - Poor to terminal  - Has multiple comorbidities including severe AS and not a surgical or TAVR candidate  - Poor long term outlook and would be best served with optimizing quality of care over quantity    PLAN:  - Hold iHD for now until family meeting and GOC defined  - Continue off of all BP meds and diuretics for now.  - Limit sedating meds if possible  - No dietary protein restrictions  - Fluid restriction 1.2 L   - Low potassium diet  - Continue valtessa daily  - Dose all meds per ESRD  - Phos binders with meals  - Max dose gabapentin in ESRD pt 300mg/day  - DC planning underway for SNF vs Home with family  - Agree with hospice and cessation of dialysis and other interventions

## 2022-08-28 NOTE — PROGRESS NOTES
[facility] -  PROGRESS NOTE               Author: Rupesh Peres M.D. Date & Time: 8/28/2022  10:38 AM     HPI:  81yoF with PMH significant for ESRD on HD MWF via left arm AVF, HTN, DM II, Anemia secondary to CKD, CKD Bone mineral disorder, admitted with increased edema and weakness and having missed her last last 2-3 outpt HD treatments. Pt is very lethargic at this time and unable to provide much history, majority of the history was obtained through review of the medical record and discussion with primary svc. Pt had reported increased LE edema and fatigue and weakness and worsening of her left arm edema so she came to the ED for evaluation. She apparently has missed her last 2-3 outpt dialysis treatments. Per regular outpt Nephrologist Dr. Gates, she has been non-compliant with her fluid restriction and was told that she needed 4x/week dialysis until her volume status could be optimized but she was non-compliant with that recommendation. She has edema of her left arm where her AVF is located and there is concern for a central stenosis that could be the etiology. She had an appointment at the outpt access center to evaluate for central stenosis and undergo angioplasty if needed on 6/9/22 but pt did not go to the appointment. She has not had any other procedures done to the arm in the past couple of months. She had a left arm us done today that showed no DVT and patent AVF and soft tissue edema. She apparently received pain medication fentanyl and dilaudid as well as benadryl earlier today and she is very drowsy.     DAILY NEPHROLOGY SUMMARY:  **See previous note from 7/31/22 to review prior daily nephrology summary entries.  8/01: Laying in bed eating breakfast. Very tearful this AM, stated that she has to make a decision if she wants to continue with the biopsy on her hand. Pt is concerned that there may not be much we can do about the wound on her left hand. Dr Simon from wound care  at bedside. Will return this afternoon for biopsy procedure per MD. HD today UF 2L, tolerated well. Denies SOB, CP,N/VD. K+ up this am 5.7.   8/02: Resting in bed. C/o fatigue. Reports improved SOB, remains on oxygen.   8/03: Sitting up in chair, states she feels good today, SOB improved , remains on 2L nasal cannula. Denies CP, N/V/D. Discussed about fluid restriction. Pending left hand biopsy results. K+ improved 5.4. States fatigue but due to she recently ambulated with RN, otherwise no complaints. No overnight events, VSS.   8/04: Pt laying in bed. AOX4, HD yesterday tolerated will UF 2800mL. Hypotensive on the last hr of HD reported SBP 89-90's but asymptomatic. BP this am stable. Denies any CP/SOB/N/V/D. Patient on RA. C/o constipation. No overnight events. States she has declined hospice but would like to go to a home care facility.   8/05: iHD today, net UF 1L.  No resting comfortably no complaints. VSS  8/06: Had iHD yesterday with 1 L UF.  Currently sleeping, but awakens easily and has no new complaints. VSS.   8/07: Lying in bed sleeping, awakens and has no new complaints, just fatigue. VSS. Due for iHD tomorrow.  8/08: Seen on HD this am. No acute distress. VSS.   8/09: 2.5 net UF. VSS. Resting in bed. C/o fatigue. Denies CP/SOB.   8/10: 1L net UF. No complaints. VSS.   8/11: Resting comfortably.  Family at bedside.  VSS.  Family reports she had been having pain, feels gabapentin may need to be increased.  No other complaints.   8/12: Seen on HD this am. No complaints.   8/13: iHD yesterday net UF 2.5L.  VSS 3L NC resting comfortably today.  No complaints.   8/14: Sleeping in bed, arouses. VSS. No overnight events.   8/15: Seen on dialysis at bedside.  Denies SOB.  Having back pain.  Chest pain last night, not complaining of at this time  8/16 - Complains of weakness, denies pain or SOB.  Had BM.  HD done yesterday  8/17 - Having some back pain.  No cramping.  Seen on dialysis  8/18: sitting up in bed,  "wanted to leave AMA but was talked out of it, again declines palliative, for iHD tomorrow   8/19: Complaining of chest pain this morning.  RRT called.  UF on dialysis on hold.  Hospitalist notified  8/20: No chest pain this morning.  Arm pain better  8/21: Pt sitting up in bed eating breakfast, says she needs a miracle, labs reviewed, VSS on RA, due for HD tomorrow  8/22: iHD today, net UF not yet available for review.  Hypoglycemic post dialysis FSBG 57, protocol in place.  VSS RA no complaints otherwise. K was 6.0 prior to dialysis.   8/23: HD yest, UF 1.3 L. Says she has no problems with HD treatments. [facility] K+ has been running 5-6.  8/24: No events, seen on dialysis this afternoon, BP stable, denies any CP/SOB but c/o bilat leg pain  8/25: No events, feel ok, tolerated HD yest with 1.5L UF, BP stable, denies any CP/SOB/Abd pain  8/26: No events, noted to have Burgundy stool yest with concern for GI bleed, BP stable, seen on HD this am, denies any CP/SOB/Abd pain, feels tired  8/27: NAEO, lying in bed, awaiting AM meeting with palliative and her kids  8/28: NAEO, no complaints, family meeting  yesterday and they decided on hospice    REVIEW OF SYSTEMS:    10 point ROS reviewed and is as per HPI/daily summary or otherwise negative    PMH/PSH/SH/FH: Reviewed and unchanged since admission note  CURRENT MEDICATIONS: Reviewed from admission to present day    VS:  /66   Pulse 84   Temp 37 °C (98.6 °F) (Temporal)   Resp 18   Ht 1.626 m (5' 4\")   Wt 55.3 kg (121 lb 14.6 oz)   SpO2 93%   BMI 20.93 kg/m²   Physical Exam  Nursing note reviewed.   Constitutional:       General: She is not in acute distress.     Appearance: She is ill-appearing.   HENT:      Head: Normocephalic and atraumatic.   Eyes:      General: No scleral icterus.  Cardiovascular:      Rate and Rhythm: Normal rate and regular rhythm.   Pulmonary:      Effort: Pulmonary effort is normal. No respiratory distress. "   Abdominal:      General: There is no distension.   Musculoskeletal:         General: Swelling (left arm) present.      Right lower leg: Edema (Trace) present.      Left lower leg: Edema (Trace) present.      Comments: +L arm AVF with thrill/bruit   Skin:     General: Skin is warm and dry.      Findings: No rash.   Neurological:      General: No focal deficit present.   Psychiatric:         Behavior: Behavior normal.       Fluids:  In: 440 [P.O.:440]  Out: 0     LABS:  Recent Labs     08/26/22  0424 08/27/22  0137   SODIUM 133* 138   POTASSIUM 5.7* 4.7   CHLORIDE 93* 96   CO2 26 28   GLUCOSE 120* 190*   BUN 37* 23*   CREATININE 3.07* 2.75*   CALCIUM 9.0 9.1       IMPRESSION:  # ESRD, dependent on HD              - HD via LUE AVF             - HD qMWF  # Fluid overload, improved             - Secondary to non-compliance with fluid restriction and HD treatments  # Left arm edema, slow improvement              - CTA upper ext 6/26 w/o e/o focal stenosis + extensive edema             - AVF patent on left arm US negative for DVT             - Pt no showed to outpt appt to evaluate              - Vascular does not recommend intervention              - Extensor tendon laceration left hand with wound             - Wound care in place, s/p biopsy 8/1             - Left hand skin biopsy negative for calciphylaxis             - Outpatient [facility] appt rescheduled for next month  # HTN, variable control, stable at this time             - Goal BP < 140/90  # Anemia of CKD, at goal              - Goal Hgb 10-11             - Iron 27             -TIBC 142             -%Sat 19             - Ferritin 1419             - Now with concern for GI bleed, Hospitalist discussed with GI and no intervention planned due to pt's frail status  # CKD-MBD             - Vit D 35             - PTH 99.4             - Managed at OP unit             - On sevelamer with meals  # Hyperkalemia            - Correct w/ HD and on patiromer  # DM  II--management per primary svc  # Leg Pain--chronic skin changes and subcutaneous tissue firm to touch             - Vascular does not recommend any intervention  # Leukocytosis resolved  # Hyponatremia--treat with HD  - Likely fluid related, manage w/ HD  # Hypoalbuminemia  - No dietary protein restrictions  # Chest pain    - Management per primary team  # Prognosis  - Poor to terminal  - Has multiple comorbidities including severe AS and not a surgical or TAVR candidate  - Poor long term outlook and would be best served with optimizing quality of care over quantity    PLAN:  - No further RRT needs  - DC all un-necessary labs/meds  - Limit sedating meds if possible  - No dietary protein restrictions  - Dose all meds per ESRD  - Phos binders with meals  - Max dose gabapentin in ESRD pt 300mg/day  - Agree with hospice and cessation of dialysis and other interventions  - Will see her as needed, please call for any questions/concerns

## 2022-08-28 NOTE — HOSPICE
Spoke to patient son Maxx. Set up hospice discussion for 2 PM today. Dr. Cummins notified.     13:21 Son called and cancelled agreed upon meeting for today. He agreed that I could follow up in the morning. Dr. Cummins updated.       Patient approved for [facility] hospice by [place] [facility]

## 2022-08-28 NOTE — PROGRESS NOTES
Hospital Medicine Daily Progress Note    Date of Service  8/27/2022    Chief Complaint  Edema    Hospital Course  81 y.o. female with PMHx ESRD on HD MWF, HTN, HLD, and T2DM who was admitted on 6/24/2022 for left upper arm swelling and lower extremity edema after missing HD sessions.  X-rays of the left hand showed only chronic changes.  AV fistula was functioning well.  CTA showed no evidence of central stenosis.  Vascular surgery was consulted and only recommended supportive care such as wrapping the arm with Ace bandage.  She was also found to have severe aortic stenosis for which cardiology was consulted, and gave the opinion that she is not a candidate for intervention.  She was dialyzed while in the hospital with subsequent clinical improvement.  She did have hospital delirium while in the hospital, which has since resolved.  Palliative care was consulted, but patient continued to wish to be full code.  SNF placement was pursued, but proved to be challenging as transfer to dialysis is not always facilitated.  Case management is working on her discharge to SNF and arrangement of hemodialysis.    Interval Problem Update    ANITA ON  Hgb stable at 7.1  Denies further bloody Bms. Denies pain.  At her guidance, I met with her sons Maxx & Heath, and Maxx' wife Deb.  We discussed her poor prognosis from severe AS, intermittent unactionable GI bleeds, and risk of decompensation during HD. I shared that she was at peace with this.  I advised hospice, which they were familiar with and agree is in line with GO.  I explicitly advised that this would mean cessation of dialysis. They were in agreement.  They will talk amongst family about home hospice or hospice at . [facility] ordered.    POC discussed with hospice liaison. She reached out to Maxx but no response. Will coordinate time to discuss hospice benefits and coordination tomorrow.    POC discussed with Nephrologist Dr. Peres. He was in agreement with  deferring transfusion and further HD pending GOC. No urgent indication for either, for which she may discharge home soon.    Consultants/Specialty  cardiology, nephrology and vascular surgery    Code Status  DNAR/DNI    Disposition  Patient is medically cleared for discharge.   Anticipate discharge to to hospice.  I have placed the appropriate orders for post-discharge needs.    Review of Systems  Review of Systems   Constitutional:  Positive for malaise/fatigue. Negative for weight loss.   HENT:  Negative for ear pain, nosebleeds, sinus pain and sore throat.    Eyes:  Negative for pain.   Respiratory:  Negative for cough, sputum production and shortness of breath.    Cardiovascular:  Positive for leg swelling. Negative for chest pain, palpitations and orthopnea.   Gastrointestinal:  Negative for abdominal pain, blood in stool, constipation, diarrhea, melena, nausea and vomiting.   Genitourinary:  Negative for dysuria.   Musculoskeletal:  Negative for back pain, joint pain, myalgias and neck pain.   Neurological:  Negative for sensory change and headaches.   Psychiatric/Behavioral:  Negative for depression. The patient is not nervous/anxious.    All other systems reviewed and are negative.       Physical Exam  Temp:  [36.8 °C (98.2 °F)-37.1 °C (98.8 °F)] 37 °C (98.6 °F)  Pulse:  [86-92] 90  Resp:  [17-18] 18  BP: ()/(53-67) 126/67  SpO2:  [88 %-93 %] 93 %    Physical Exam  Vitals and nursing note reviewed. Exam conducted with a chaperone present (Sons and DIL at bedside).   Constitutional:       General: She is not in acute distress.     Appearance: Normal appearance. She is cachectic. She is ill-appearing (Chronically).   HENT:      Head: Normocephalic and atraumatic.      Nose: No congestion.   Eyes:      General:         Right eye: No discharge.         Left eye: No discharge.      Pupils: Pupils are equal, round, and reactive to light.   Cardiovascular:      Rate and Rhythm: Normal rate and regular rhythm.       Pulses: Normal pulses.      Heart sounds: Murmur (3/6 late-peaking systolic with radiation to carotids) heard.      Comments: LUE fistula. +Thrill, +Heave, +Bruit  Pulmonary:      Effort: Pulmonary effort is normal. No respiratory distress.      Breath sounds: Normal breath sounds. No stridor.   Abdominal:      General: Abdomen is scaphoid. Bowel sounds are normal. There is no distension.      Palpations: Abdomen is soft.      Tenderness: There is no abdominal tenderness. There is no guarding or rebound.   Musculoskeletal:         General: Tenderness and deformity (Mild contractures of all extremities) present.      Cervical back: Normal range of motion. No rigidity.      Right lower leg: Edema present.      Left lower leg: Edema present.      Comments: 1+ BLE pretibial edema   Skin:     General: Skin is warm.      Coloration: Skin is not jaundiced or pale.      Findings: Erythema and lesion present. No bruising.      Comments: Dressing present on left hand CDI   Neurological:      Mental Status: She is lethargic.      Comments: Appropriately conversant.   Psychiatric:         Attention and Perception: Attention and perception normal.         Mood and Affect: Mood is depressed. Affect is flat.         Speech: Speech is delayed.         Behavior: Behavior is slowed. Behavior is cooperative.         Thought Content: Thought content normal.         Cognition and Memory: Cognition and memory normal.         Judgment: Judgment normal.       Fluids    Intake/Output Summary (Last 24 hours) at 8/27/2022 1858  Last data filed at 8/27/2022 0800  Gross per 24 hour   Intake 200 ml   Output --   Net 200 ml         Laboratory  Recent Labs     08/25/22  0059 08/26/22  0424 08/27/22  0829   WBC 8.9 8.5 9.4   RBC 2.25* 1.97* 2.03*   HEMOGLOBIN 7.9* 7.0* 7.2*   HEMATOCRIT 24.5* 22.1* 22.7*   .9* 112.2* 111.8*   MCH 35.1* 35.5* 35.5*   MCHC 32.2* 31.7* 31.7*   RDW 74.9* 74.1* 76.6*   PLATELETCT 239 233 264   MPV 8.4* 8.6*  8.5*       Recent Labs     08/25/22  0059 08/26/22  0424 08/27/22  0137   SODIUM 137 133* 138   POTASSIUM 4.3 5.7* 4.7   CHLORIDE 96 93* 96   CO2 28 26 28   GLUCOSE 168* 120* 190*   BUN 24* 37* 23*   CREATININE 2.35* 3.07* 2.75*   CALCIUM 8.8 9.0 9.1                       Imaging  US-EXTREMITY ARTERY UPPER UNILAT LEFT   Final Result      DX-CHEST-PORTABLE (1 VIEW)   Final Result         1.  Left pulmonary infiltrates or atelectasis, similar to prior study.   2.  Small to moderate left pleural effusion   3.  Atherosclerosis      CT-HAND W/O LEFT   Final Result      1.  No acute fracture or dislocation.      2.  Previous distal ulnar fracture identified.      3.  Osteoarthritis is most prominent at the first carpometacarpal joint.      4.  No bone erosions identified.      5.  Fluid collection or soft tissue abscess is identified.      6.  Induration in subcutaneous fat and intramuscular fat planes is noted which could be due to edema or inflammation such as cellulitis.      DX-HAND 3+ LEFT   Final Result      1. No acute fracture or subluxation.   2. Chronic posttraumatic changes in the distal left radius and ulna, stable when compared with the prior study.   3. Stable osteoarthritis involving the left first carpometacarpal joint.      DX-HAND 3+ LEFT   Final Result      1.  No acute fracture or dislocation.   2.  Scattered mild degenerative changes, increased from prior study. No definite erosive arthropathy.   3.  Osteopenia.   4.  Old ulnar styloid process fracture.      DX-CHEST-PORTABLE (1 VIEW)   Final Result      1.  Probable 12 mm right mid/upper lung zone mass. Suggest CT chest without contrast for further assessment      2.  Interstitial pulmonary edema or fibrosis      3.  Enlarged cardiac silhouette      4.  Left pleural effusion      CT-CTA UPPER EXT WITH & W/O-POST PROCESS LEFT   Final Result      1.  Status post brachiocephalic fistula in the left arm. No focal stenosis is identified.      2.   Extensive edema in the subcutaneous tissues of the visualized left lateral chest and arm.      EC-ECHOCARDIOGRAM COMPLETE W/O CONT   Final Result      US-ZAHRAA SINGLE LEVEL BILAT   Final Result      US-EXTREMITY ARTERY LOWER BILAT   Final Result      CT-EXTREMITY, UPPER W/O LEFT   Final Result      1.  There is diffuse subcutaneous edema of the imaged portions of the left upper extremity. There is also diffuse body wall edema in the visualized portions of the chest and abdomen.   2.  No focal fluid collection to suggest abscess.   3.  There is a small left pleural effusion and minimal fluid in the abdomen.   4.  There is postsurgical change of AV fistula. There is small vessel atherosclerosis.      US-EXTREMITY VENOUS UPPER UNILAT LEFT   Final Result      DX-CHEST-PORTABLE (1 VIEW)   Final Result         1.  Interstitial pulmonary parenchymal prominence suggest chronic underlying lung disease, component of interstitial edema and/or infiltrates not excluded.   2.  Small left pleural effusion   3.  Cardiomegaly   4.  Atherosclerosis           Assessment/Plan  * ESRD (end stage renal disease) on dialysis (HCC)- (present on admission)  Assessment & Plan  - Continue hemodialysis per nephrology.  -Continue sevelamer and calcitriol.    Aortic stenosis, severe- (present on admission)  Assessment & Plan  Poor prognosis due to development of HFrEF  Not a candidate for intervention per cardiology.  Volume management per HD  Hospice consulted    Advance care planning  Assessment & Plan  Met with her and Ethics at bedside, agreeable to DNAR in setting of HFrEF and AS not a candidate for valve  POLST completed  Hospice consulted, Hospice Liaison to meet with family 8/28    Hematochezia  Assessment & Plan  Resolved  RN reported marroon-colored stool  Continue PPI BID  Hgb stable, possible slight ABLA superimposed on AOCKD  GI consulted, not a candidate for endoscopy due to hemodynamic risk and limited life expectancy      Chest  pain  Assessment & Plan  Recurrent  She developed chest pain on August 14, 2022 and she was transferred to telemetry floor with heparin.  She found to have elevated troponin in the setting of end-stage renal disease.  Repeat troponin remain in the similar range.   I evaluated her at the bedside and she reported that her chest pain has completely subsided and currently she has shoulder pain and lidocaine patch and heating pads are helpful.  I discussed with her that I am willing to order troponin 1 more time and if it is remained in the similar range plan is to avoid doing further testing and avoid any unnecessary procedure.  I reviewed EKGs and he did not show any acute abnormalities.  I also reviewed cardiology note from June 29, 2022 it recommended goals of care discussion and palliative care due to severe aortic stenosis and low ejection fraction of 30%.    8/19 patient had another episode of chest pain during dialysis.  EKG abnormal, discussed with cardiology who did not recommend any further interventions.  Troponin 212 which is stable from troponins drawn earlier this week.  Chest pain resolved while at bedside and dialysis continued.    Extensor tendon laceration of left hand with open wound  Assessment & Plan  She has been having worsening of her wound on her left hand. COmpleted course of ancef. Now, hand wound healing well, no further signs of infection.X-ray did not show any acute abnormalities.  CT scan of her left hand and it did not show any fluid collection.  Skin biopsy showed no evidence of calciphylaxis or malignancy, and only showed acute inflammation.  Wound care service following.  Continue wound care.      Delirium  Assessment & Plan  - Resolved.    Edema of left upper extremity- (present on admission)  Assessment & Plan  - Chronic, at the same site of AV fistula which is functioning well.  -Multifactorial: Poor EF, lymphedema, outflow stenosis.  CTA showed no evidence of central stenosis.   Vascular surgery recommended supportive care with wrapping arm with Ace bandage.  -Continue to monitor.    Generalized weakness- (present on admission)  Assessment & Plan  - SNF placement being pursued.  Placement challenging due to need for transportation back and forth to hemodialysis.  CM/SW on board.    Diabetes mellitus, type II (HCC)- (present on admission)  Assessment & Plan  - Last HbA1c was 11.7.  Continue Lantus 5 units at night, along with sliding scale insulin coverage.  Continue Accu-Cheks before meals and at bedtime. Goal to keep BG between 140-180 per 2019 ADA guidelines.        Acquired hypothyroidism- (present on admission)  Assessment & Plan  - Continue Synthroid.    Pain in both lower extremities- (present on admission)  Assessment & Plan  - Chronic.  Continue as needed pain medications.    Essential hypertension- (present on admission)  Assessment & Plan  - Maintaining good blood pressure control, despite holding home antihypertensives.  Continue to monitor blood pressure trend closely.    VTE prophylaxis: SCDs/TEDs and pharmacologic prophylaxis contraindicated due to hematochezia    I have performed a physical exam and reviewed and updated ROS and Plan today (8/27/2022). In review of last note, there are no changes except as documented above.

## 2022-08-29 NOTE — DISCHARGE PLANNING
Case Management Discharge Planning    Admission Date: 6/24/2022  GMLOS: 4.3  ALOS: 66    6-Clicks ADL Score: 12  6-Clicks Mobility Score: 10  PT and/or OT Eval ordered: Yes  Post-acute Referrals Ordered: Yes  Post-acute Choice Obtained: Yes  Has referral(s) been sent to post-acute provider:  Yes      Anticipated Discharge Dispo: Discharge Disposition: D/T to SNF with Medicare cert in anticipation of skilled care (03)    DME Needed: No    Action(s) Taken: pt pending long term SNF placement, so far, no accepting facility. Pt has been escalated to Long Length of Stay Committee.     Escalations Completed: Long Length of Stay Committee  and Leadership    Medically Clear: Yes    Next Steps: f/u with pending SNF's for acceptance or declination    Barriers to Discharge: Pending Placement    1500 - Contacted Cierra with [facility], H&P and progress note faxed to Cierra at [phone]. Pt will need Group Home waiver and [facility].

## 2022-08-29 NOTE — DISCHARGE PLANNING
Agency/Facility Name: [facility]  Spoke To: Kamille  Outcome: SNF no longer has transportation available to accommodate the Dialysis. Pt declined officially, OSCAR and RN CM notified.

## 2022-08-29 NOTE — PROGRESS NOTES
"  I met with her at bedside today to follow up on discussion she had  with her family yesterday. I had met with her family (son Heath, son Maxx, and DIL Deb) to discuss prognosis, medical updates, their mother's wishes, and hospice. They seemed accepting of her poor prospects of recovery and grim prognosis.     She reports that the discussion they had after the family meeting was \"peaceful\" and \"all were in agreement about my wishes.\" She shared that she had a difficult night due to pain and fear of the unknown after she passes. She feels lonely and is hopeful that her daughters will come to see her. She believes they are angry with her, and she wishes to make peace with them \"so they know I'm not angry before I die.\" She recognizes that Maxx must be reaching out to the whole family and is in a difficult position of \"\" with his many siblings.    She expressed appreciation for the attention from her granddaughter Pily to paint her nails. She is looking forward to her visit to do her hair. She expressed bittersweet appreciation of discussions of their new home with a garden, which she knows now she will not live long enough to see in [facility].     We discussed what care provided by her medical team and family provide quality to her. She is very appreciative of the food, company, and attention she receives from the hospital. She feels afraid and senses the fear in her family of the unknown. I advised that in her remaining days, her medical team should focus on alleviating her pain and distress while providing company and those important to her to be present. I advised that further HD, accuchecks, and frequent VS monitoring is not going to extend her life and provides little perceived objective quality, which she was in agreement. She stated she is \"ready to go to sleep and not wake up.\"     We explored whether she was ready to stop dialysis and enroll in hospice, which she affirmed. She was agreeable to " "transition to comfort-only measures to focus on symptom relief and thoughtful de-escalation of non-beneficial care. She believes her family will not be in agreement with her leaving the hospital. I advised that a hospital is generally not considered an ideal place to die due to limitations in visitors, changes in staff, and ambient noise in the halls. She wishes to be with her family at the end of her life. She shared her children's many successful ventures, demanding work schedules, and ongoing grief with the passing of their father last month. She does not wish to impose on them and is willing to go to a Group Home, if necessary, to accommodate their needs.    I shared that the plan to meet with Maxx and the hospice liaison was rescheduled for tomorrow, which I sensed was due to family conflict. She agreed and speculated that her daughters and sons likely were not going to all be on the same page. I reiterated her statement that she felt that Maxx, Heath, and Deb were in agreement with her based on the information shared with them regarding her prognosis and acceptance of her dying. I questioned whether including persons who have not been present and supportive into medical decision-making would provide peace as she approaches the end of her life, which she was in agreement. She wishes for her family to be present with her. I offered to call Maxx to relay these wishes and provide support to their family in this difficult time.    I called and spoke with her son, Maxx. I asked how their conversation with their mother went at bedside yesterday. He said \"peaceful\" initially, then shared that in discussion it seems that she wants to extend her life further and her children are not in agreement with hospice yet. I shared that this is not consistent with the conversation I'd experienced through yesterday and shared the conversation I had with their mother prior to the phone call. I advised that she has " capacity to decide her care, for which she has opted for comfort-only measures, enrollment in hospice, and transition to home or group home. At this point, the family's inclusion with the hospice liaison is for family support and facilitating a transition to hospice. Maxx asked if any of the sibling's views on the situations would change her care, to which I asked whether they objected to her wishes to no longer suffer and pass comfortably. I advised that she is my patient and I am her advocate. He expressed appreciation that her medical care is being decided in discussions between her and her physician. He appreciated the call and will follow up with the hospice liaison tomorrow.    Orders for comfort care placed. Notified and updated primary RN, nephrologist, and hospice liaison of POC.

## 2022-08-30 NOTE — DISCHARGE PLANNING
DC Transport Scheduled    Received request at: 7835    Transport Company Scheduled:  [employer]  Spoke with Renuka at [facility] to schedule transport.      Scheduled Date: 08/31/2022  Scheduled Time: 1300    Destination: [address]     Notified care team of scheduled transport via Voalte.     If there are any changes needed to the DC transportation scheduled, please contact [facility] at ext. [phone] between the hours of 8289-5939 Mon-Fri. If outside those hours, contact the ED Case Manager at ext. [phone].

## 2022-08-30 NOTE — DISCHARGE PLANNING
Medical Social Work  PC to Marcela Landaverde [phone]; calling to see if they can take patient tomorrow at 10:00.  Marcela Landaverde asked if patient has had a COVID test and TB.  SW stated TB yes and pending on the COVID.    Marcela landaverde stated she can take patient at 1:00 at the following facality  [address]  [facility]    SW emailed upper management DOMINGO for approval.

## 2022-08-30 NOTE — HOSPICE
DME ordered and to be delivered to   [facility] by 1PM tomorrow

## 2022-08-30 NOTE — DISCHARGE PLANNING
Medical Social Work  PC to Marcela Ladnaverde [phone], stated she received the patient's information yesterday from Zach.  OSCAR told that she will accept the patient, asked what Hospice provider.  OSCAR stated we wont' know until later today, family meeting possibly [facility].     Cierra will fax over admit paperwork that needs to be signed.    Patient will be going to the [facility]  [address] [facility]

## 2022-08-30 NOTE — HOSPICE
Family has now decided to take pt home since she requested it this am.    DME being setup for delivery today, please setup transport for 1300.   Address: [address] [place] [address] Dr. Jenifer Sanchez, [phone].    Family is requesting an MD letter to assist them in accessing her financials from the bank.

## 2022-08-30 NOTE — PROGRESS NOTES
Son at bedside, spoke with Hospice nurse about plan of care. Possible transfer to group home tomorrow.

## 2022-08-30 NOTE — PROGRESS NOTES
Received bedside report from MAUOR chester, pt care assumed. VS stable, pt assessment complete. Pt A&Ox1, chronic c/o 6/10 generalized  pain at this time. POC discussed with pt and verbalizes no questions. Pt denies any additional needs at this time. Bed locked and in lowest position, bed alarm on. Pt educated on fall risk and verbalized understanding, call light within reach, hourly rounding initiated.  On comfort care, will provide medication as needed.

## 2022-08-30 NOTE — DISCHARGE PLANNING
Case Management Discharge Planning    Admission Date: 6/24/2022  GMLOS: 4.3  ALOS: 67    6-Clicks ADL Score: 12  6-Clicks Mobility Score: 10  PT and/or OT Eval ordered: Yes  Post-acute Referrals Ordered: Yes  Post-acute Choice Obtained: Yes  Has referral(s) been sent to post-acute provider:  Yes      Anticipated Discharge Dispo: Discharge Disposition: D/T to hospice home (50)    DME Needed: No    Action(s) Taken: LSW attempted to meet with pt at bedside for hospice choice. Pt unable to provide choice. LSW made phone call to [facility] and left MC with callback request. LSW made phone call to Maxx. Maxx requested that this LSW f/u with him for hospice choice after their family meeting today at 1100.    Addendum @6691  Maxx currently signing hospice consents with [facility]. Choice form faxed to [facility].    Addendum @8166  LSW clarified with LSW Tyree that pt will be going to [facility] at [address] [place] [id number], NOT [facility]. LSW faxed transportation forms to ride line to request 1300 transportation time tomorrow.    Escalations Completed: None    Medically Clear: Yes    Next Steps: Care coordination will f/u with ride line to verify transportation time    Barriers to Discharge: none    Is the patient up for discharge tomorrow: Yes    Is transport arranged for discharge disposition: Yes

## 2022-08-30 NOTE — DISCHARGE PLANNING
Received Choice form at 1423  Agency/Facility Name: [facility]  Referral sent per Choice form @ [id number]

## 2022-08-31 PROBLEM — I73.9 PERIPHERAL ARTERY DISEASE (HCC): Status: ACTIVE | Noted: 2022-01-01

## 2022-08-31 PROBLEM — G25.81 RESTLESS LEGS SYNDROME (RLS): Status: ACTIVE | Noted: 2022-01-01

## 2022-08-31 PROBLEM — D62 ACUTE BLOOD LOSS ANEMIA (ABLA): Status: ACTIVE | Noted: 2022-01-01

## 2022-08-31 PROBLEM — I27.20 MODERATE PULMONARY HYPERTENSION (HCC): Status: ACTIVE | Noted: 2022-01-01

## 2022-08-31 PROBLEM — R94.31 PROLONGED Q-T INTERVAL ON ECG: Status: ACTIVE | Noted: 2022-01-01

## 2022-08-31 PROBLEM — D53.9 MACROCYTIC ANEMIA: Status: ACTIVE | Noted: 2022-01-01

## 2022-08-31 PROBLEM — R41.0 DELIRIUM: Status: RESOLVED | Noted: 2022-01-01 | Resolved: 2022-01-01

## 2022-08-31 PROBLEM — G47.33 OSA (OBSTRUCTIVE SLEEP APNEA): Chronic | Status: ACTIVE | Noted: 2022-01-01

## 2022-08-31 PROBLEM — K92.2 GI BLEED: Status: ACTIVE | Noted: 2022-01-01

## 2022-08-31 PROBLEM — E11.65 TYPE 2 DIABETES MELLITUS WITH HYPERGLYCEMIA, WITHOUT LONG-TERM CURRENT USE OF INSULIN (HCC): Status: ACTIVE | Noted: 2017-08-14

## 2022-08-31 PROBLEM — Z66 DNR (DO NOT RESUSCITATE): Status: ACTIVE | Noted: 2022-01-01

## 2022-08-31 PROBLEM — K92.2 GI BLEED: Status: RESOLVED | Noted: 2022-01-01 | Resolved: 2022-01-01

## 2022-08-31 PROBLEM — I77.0 A-V FISTULA (HCC): Status: ACTIVE | Noted: 2022-01-01

## 2022-08-31 PROBLEM — Z71.89 ADVANCE CARE PLANNING: Status: RESOLVED | Noted: 2022-01-01 | Resolved: 2022-01-01

## 2022-08-31 PROBLEM — I50.23 ACUTE ON CHRONIC HFREF (HEART FAILURE WITH REDUCED EJECTION FRACTION) (HCC): Status: ACTIVE | Noted: 2022-01-01

## 2022-08-31 PROBLEM — R07.9 CHEST PAIN: Status: RESOLVED | Noted: 2022-01-01 | Resolved: 2022-01-01

## 2022-08-31 PROBLEM — Z51.5 COMFORT MEASURES ONLY STATUS: Status: ACTIVE | Noted: 2022-01-01

## 2022-08-31 PROBLEM — K92.1 HEMATOCHEZIA: Status: RESOLVED | Noted: 2022-01-01 | Resolved: 2022-01-01

## 2022-08-31 NOTE — DISCHARGE INSTRUCTIONS
Discharge Instructions    Discharged to other by ambulance with escort. Discharged via ambulance, hospital escort: Yes.  Special equipment needed: Not Applicable    Be sure to schedule a follow-up appointment with your primary care doctor or any specialists as instructed.     Discharge Plan:   Diet Plan: Discussed  Activity Level: Discussed  Confirmed Follow up Appointment: Patient to Call and Schedule Appointment  Confirmed Symptoms Management: Discussed  Medication Reconciliation Updated: Yes    I understand that a diet low in cholesterol, fat, and sodium is recommended for good health. Unless I have been given specific instructions below for another diet, I accept this instruction as my diet prescription.   Other diet: Regular    Special Instructions: None    -Is this patient being discharged with medication to prevent blood clots?  No    Is patient discharged on Warfarin / Coumadin?   No     Hospice  Hospice is a service that is designed to provide people who are terminally ill and their families with medical, spiritual, and psychological support. Its aim is to improve your quality of life by keeping you as comfortable as possible in the final stages of life.  Who will be my providers when I begin hospice care?  Hospice teams often include:  A nurse.  A doctor. The hospice doctor will be available for your care, but you can include your regular doctor or nurse practitioner.  A .  A counselor.  A  (such as a ).  A dietitian.  Therapists.  Trained volunteers who can help with care.  What services does hospice provide?  Hospice services can vary depending on the center or organization. Generally, they include:  Ways to keep you comfortable, such as:  Providing care in your home or in a home-like setting.  Working with your family and friends to help meet your needs.  Allowing you to enjoy the support of loved ones by receiving much of your basic care from family and  friends.  Pain relief and symptom management. The staff will supply all necessary medicines and equipment so that you can stay comfortable and alert enough to enjoy the company of your friends and family.  Visits or care from a nurse and doctor. This may include 24-hour on-call services.  Companionship when you are alone.  Allowing you and your family to rest. Hospice staff may do light housekeeping, prepare meals, and run errands.  Counseling. They will make sure your emotional, spiritual, and social needs are being met, as well as those needs of your family members.  Spiritual care. This will be individualized to meet your needs and your family's needs. It may involve:  Helping you and your family understand the dying process.  Helping you say goodbye to your family and friends.  Performing a specific Catholic ceremony or ritual.  Massage.  Nutrition therapy.  Physical and occupational therapy.  Short-term inpatient care, if something cannot be managed in the home.  Art or music therapy.  Bereavement support for grieving family members.  When should hospice care begin?  Most people who use hospice are believed to have less than 6 months to live.  Your family and health care providers can help you decide when hospice services should begin.  If you live longer than 6 months but your condition does not improve, your doctor may be able to approve you for continued hospice care.  If your condition improves, you may discontinue the program.  What should I consider before selecting a program?  Most hospice programs are run by nonprofit, independent organizations. Some are affiliated with hospitals, nursing homes, or home health care agencies. Hospice programs can take place in your home or at a hospice center, hospital, or skilled nursing facility. When choosing a hospice program, ask the following questions:  What services are available to me?  What services will be offered to my loved ones?  How involved will my loved  ones be?  How involved will my health care provider be?  Who makes up the hospice care team? How are they trained or screened?  How will my pain and symptoms be managed?  If my circumstances change, can the services be provided in a different setting, such as my home or in the hospital?  Is the program reviewed and licensed by the state or certified in some other way?  What does it cost? Is it covered by insurance?  If I choose a hospice center or nursing home, where is the hospice center located? Is it convenient for family and friends?  If I choose a hospice center or nursing home, can my family and friends visit any time?  Will you provide emotional and spiritual support?  Who can my family call with questions?  Where can I learn more about hospice?  You can learn about existing hospice programs in your area from your health care providers. You can also read more about hospice online. The websites of the following organizations have helpful information:  National Hospice and Palliative Care Organization (NHPCO): www.nhpco.org  National Association for Home Care & Hospice (NAHC): www.nahc.org  Hospice Foundation of [place] (HFA): www.hospicefoundation.org  American Cancer Society (ACS): www.cancer.org  Hospice Net: www.hospicenet.org  Visiting [facility] Associations of [place] (VNAA): www.vnaa.org  You may also find more information by contacting the following agencies:  A local agency on aging.  Your local [facility] chapter.  Your state's department of health or .  Summary  Hospice is a service that is designed to provide people who are terminally ill and their families with medical, spiritual, and psychological support.  Hospice aims to improve your quality of life by keeping you as comfortable as possible in the final stages of life.  Hospice teams often include a doctor, nurse, , counselor, ,dietitian, therapists, and volunteers.  Hospice care generally includes  medicine for symptom management, visits from doctors and nurses, physical and occupational therapy, nutrition counseling, spiritual and emotional counseling, caregiver support, and bereavement support for grieving family members.  Hospice programs can take place in your home or at a hospice center, hospital, or skilled nursing facility.  This information is not intended to replace advice given to you by your health care provider. Make sure you discuss any questions you have with your health care provider.  Document Released: 04/05/2005 Document Revised: 11/30/2018 Document Reviewed: 01/09/2018  [employer] Patient Education © 2020 [employer]    End-Stage Kidney Disease  End-stage kidney disease occurs when the kidneys are so damaged that they cannot function and cannot get better. This condition may also be referred to as end-stage renal disease or ESRD. The kidneys are two organs that do many important jobs in the body, including:  Removing wastes and extra fluids from the blood.  Making hormones that maintain the amount of fluid in your tissues and blood vessels.  Maintaining the right amount of fluids and chemicals in the body.  Without functioning kidneys, toxins build up in the blood and life-threatening complications can occur.  What are the causes?  This condition usually occurs when a long-term (chronic) kidney disease gets worse and results in permanent damage to the kidneys. It may also be caused by sudden damage to the kidneys (acute kidney injury).  Causes of this condition include:  Having a family history of chronic kidney disease (CKD).  Having chronic kidney disease for many years.  Chronic medical conditions that affect the kidneys, such as:  Cardiovascular disease, including high blood pressure.  Diabetes.  Certain diseases that affect the body's disease-fighting (immune) system.  Overuse of over-the-counter pain medicines.  Being around or being in contact with poisonous (toxic) substances.  What  increases the risk?  The following factors may make you more likely to develop this condition:  Being older than 60.  Being male.  Being of -American, , , , or  descent.  Smoking or a history of smoking.  Obesity.  What are the signs or symptoms?  Symptoms of this condition include:  Swelling (edema) of the face, legs, ankles, or feet.  Numbness, tingling, or loss of feeling in the hands or feet.  Tiredness (lethargy).  Nausea or vomiting.  Confusion, trouble concentrating, or loss of consciousness.  Chest pain.  Shortness of breath.  Passing little or no urine.  Muscle twitches and cramps, especially in the legs.  Dry, itchy skin.  Loss of appetite.  Pale skin due to anemia, including the skin and tissue around the eye (conjunctiva).  Headaches.  Abnormally dark or light skin.  Decrease in muscle size (muscle wasting).  Easy bruising.  Frequent hiccups.  Stopping of the monthly period in women.  Jerky movements (seizures).  How is this diagnosed?  This condition may be diagnosed based on:  A physical exam, including blood pressure measurements.  Urine tests.  Blood tests.  Imaging tests.  A test in which a sample of tissue is removed from the kidneys to be examined under a microscope (kidney biopsy).  How is this treated?  This condition may be treated with:  A procedure that removes toxic wastes from the body (dialysis). There are two types of dialysis:  Dialysis that is done through your abdomen (peritoneal dialysis). This may be done several times a day.  Dialysis that is done by a machine (hemodialysis). This may be done several times a week.  Surgery to receive a new kidney (kidney transplant).  In addition to having dialysis or a kidney transplant, you may need to take medicines:  To control high blood pressure (hypertension).  To control high cholesterol.  To treat diabetes.  To maintain healthy levels of minerals in the blood (electrolytes).  You may also  be given a specific meal plan to follow that includes requirements or limits for:  Salt (sodium).  Protein.  Phosphorous.  Potassium.  Calcium.  Follow these instructions at home:  Medicines  Take over-the-counter and prescription medicines only as told by your health care provider.  Do not take any new medicines, vitamins, or mineral supplements unless approved by your health care provider. Many medicines and supplements can worsen kidney damage.  Follow instructions from your health care provider about adjusting the doses of any medicines you take.  Lifestyle  Do not use any products that contain nicotine or tobacco, such as cigarettes and e-cigarettes. If you need help quitting, ask your health care provider.  Achieve and maintain a healthy weight. If you need help with this, ask your health care provider.  Start or continue an exercise plan. Exercise at least 30 minutes a day, 5 days a week.  Follow your prescribed meal plan.  General instructions  Stay current with your shots (immunizations) as told by your health care provider.  Keep track of your blood pressure. Report changes in your blood pressure as told by your health care provider.  If you are being treated for diabetes, monitor and track your blood sugar (blood glucose) levels as told by your health care provider.  Keep all follow-up visits as told by your health care provider. This is important.  Where to find more information  American Association of Kidney Patients: www.aakp.org  National Kidney Foundation: www.kidney.org  American Kidney Fund: www.akfinc.org  [facility]: www.lifeoptions.org and www.kidneyschool.org  Contact a health care provider if:  Your symptoms get worse.  You develop new symptoms.  Get help right away if:  You have weakness in an arm or leg on one side of your body.  You have difficulty speaking or you are slurring your speech.  You have a sudden change in your vision.  You have a sudden, severe  headache.  You have a sudden weight increase.  You have difficulty breathing.  Your symptoms suddenly get worse.  Summary  End-stage kidney disease occurs when the kidneys are so damaged that they cannot function and cannot get better.  Without functioning kidneys, toxins build up in the blood and life-threatening complications can occur.  Treatment may include dialysis or a kidney transplant along with medicines and lifestyle changes.  This information is not intended to replace advice given to you by your health care provider. Make sure you discuss any questions you have with your health care provider.  Document Released: 03/09/2005 Document Revised: 11/30/2018 Document Reviewed: 01/23/2018  [employer] Patient Education © 2020 [employer]

## 2022-08-31 NOTE — CARE PLAN
The patient is Unstable - High likelihood or risk of patient condition declining or worsening    Shift Goals  Clinical Goals: pain control; sleep  Patient Goals: rest  Family Goals: N/A    Progress made toward(s) clinical / shift goals:    Problem: Pain - Standard  Goal: Alleviation of pain or a reduction in pain to the patient’s comfort goal  Outcome: Progressing  Note: Pt assessed for pain regularly and medicated PRN per MAR.       Problem: Fall Risk  Goal: Patient will remain free from falls  Outcome: Progressing  Note: Fall precautions in place. Bed in lowest position. Non-skid socks in place. Personal possessions within reach. Mobility sign on door. Bed-alarm on. Call light within reach. Pt educated regarding fall prevention. No evidence of learning. Pt not attempting ambulation

## 2022-08-31 NOTE — PROGRESS NOTES
Assumed care of patient, received bedside report from NOC RN. Patient is A&O X 2, disoriented to time and situation. Pain 8/10. Vital signs stable overnight, on RA. Patient is medical. POC discussed with patient and son. Call light within reach and fall precautions in place. Bed locked and in lowest position.

## 2022-08-31 NOTE — PROGRESS NOTES
Hospital Medicine Daily Progress Note    Date of Service  8/30/2022    Chief Complaint  Edema    Hospital Course  81 y.o. female with PMHx ESRD on HD MWF, HTN, HLD, and T2DM who was admitted on 6/24/2022 for left upper arm swelling and lower extremity edema after missing HD sessions.  X-rays of the left hand showed only chronic changes.  AV fistula was functioning well.  CTA showed no evidence of central stenosis.  Vascular surgery was consulted and only recommended supportive care such as wrapping the arm with Ace bandage.  She was also found to have severe aortic stenosis for which cardiology was consulted, and gave the opinion that she is not a candidate for intervention.  She was dialyzed while in the hospital with subsequent clinical improvement.  She did have hospital delirium while in the hospital, which has since resolved.  Palliative care was consulted, but patient continued to wish to be full code.  SNF placement was pursued, but proved to be challenging as transfer to dialysis is not always facilitated.  Case management is working on her discharge to SNF and arrangement of hemodialysis.    Interval Problem Update  No significant events overnight.  Patient is very drowsy today.  She is arousable.  She is on comfort care measures.  I discussed the plan of care with the patient's son at bedside.      TB test negative. COVID test negative.   working on group home placement with hospice.    Consultants/Specialty  cardiology, nephrology and vascular surgery    Code Status  Comfort Care/DNR    Disposition  Patient is medically cleared for discharge.   Anticipate discharge to to hospice.  I have placed the appropriate orders for post-discharge needs.    Review of Systems  Review of Systems   Constitutional:  Positive for malaise/fatigue. Negative for weight loss.   HENT:  Negative for ear pain, nosebleeds, sinus pain and sore throat.    Eyes:  Negative for pain.   Respiratory:  Negative for cough,  sputum production and shortness of breath.    Cardiovascular:  Positive for leg swelling. Negative for chest pain, palpitations and orthopnea.   Gastrointestinal:  Negative for abdominal pain, blood in stool, constipation, diarrhea, melena, nausea and vomiting.   Genitourinary:  Negative for dysuria.   Musculoskeletal:  Negative for back pain, joint pain, myalgias and neck pain.   Neurological:  Negative for sensory change and headaches.   Psychiatric/Behavioral:  Negative for depression. The patient is not nervous/anxious.         Physical Exam  Temp:  [36.6 °C (97.9 °F)] 36.6 °C (97.9 °F)  Pulse:  [85-89] 89  Resp:  [14-18] 15  BP: ()/(35-53) 94/35  SpO2:  [98 %-99 %] 99 %    Physical Exam  Vitals and nursing note reviewed. Exam conducted with a chaperone present (Sons at bedside).   Constitutional:       General: She is sleeping. She is not in acute distress.     Appearance: She is cachectic. She is ill-appearing (Chronically).      Comments: Sleepy but arousable   HENT:      Head: Normocephalic and atraumatic.      Nose: No congestion.   Eyes:      General:         Right eye: No discharge.         Left eye: No discharge.      Pupils: Pupils are equal, round, and reactive to light.   Cardiovascular:      Rate and Rhythm: Normal rate and regular rhythm.      Pulses: Normal pulses.      Heart sounds: Murmur (3/6 late-peaking systolic with radiation to carotids) heard.   Crescendo systolic murmur is present with a grade of 3/6.   Pulmonary:      Effort: Pulmonary effort is normal. No respiratory distress.      Breath sounds: Normal breath sounds. No stridor.   Abdominal:      General: Abdomen is scaphoid. Bowel sounds are normal. There is no distension.      Palpations: Abdomen is soft.      Tenderness: There is no abdominal tenderness. There is no guarding or rebound.   Musculoskeletal:         General: Tenderness and deformity (Mild contractures of all extremities) present.      Cervical back: Normal range  of motion. No rigidity.      Right lower leg: Edema present.      Left lower leg: Edema present.   Skin:     General: Skin is warm and dry.      Coloration: Skin is not jaundiced or pale.      Findings: No bruising.      Comments:  Left upper extremity fistula   Neurological:      Mental Status: She is lethargic.      Motor: Weakness present.      Comments: Somulent   Psychiatric:      Comments: Unable to assess       Fluids    Intake/Output Summary (Last 24 hours) at 8/30/2022 1729  Last data filed at 8/29/2022 2000  Gross per 24 hour   Intake 0 ml   Output no documentation   Net 0 ml       Laboratory                              Imaging  US-EXTREMITY ARTERY UPPER UNILAT LEFT   Final Result      DX-CHEST-PORTABLE (1 VIEW)   Final Result         1.  Left pulmonary infiltrates or atelectasis, similar to prior study.   2.  Small to moderate left pleural effusion   3.  Atherosclerosis      CT-HAND W/O LEFT   Final Result      1.  No acute fracture or dislocation.      2.  Previous distal ulnar fracture identified.      3.  Osteoarthritis is most prominent at the first carpometacarpal joint.      4.  No bone erosions identified.      5.  Fluid collection or soft tissue abscess is identified.      6.  Induration in subcutaneous fat and intramuscular fat planes is noted which could be due to edema or inflammation such as cellulitis.      DX-HAND 3+ LEFT   Final Result      1. No acute fracture or subluxation.   2. Chronic posttraumatic changes in the distal left radius and ulna, stable when compared with the prior study.   3. Stable osteoarthritis involving the left first carpometacarpal joint.      DX-HAND 3+ LEFT   Final Result      1.  No acute fracture or dislocation.   2.  Scattered mild degenerative changes, increased from prior study. No definite erosive arthropathy.   3.  Osteopenia.   4.  Old ulnar styloid process fracture.      DX-CHEST-PORTABLE (1 VIEW)   Final Result      1.  Probable 12 mm right mid/upper  lung zone mass. Suggest CT chest without contrast for further assessment      2.  Interstitial pulmonary edema or fibrosis      3.  Enlarged cardiac silhouette      4.  Left pleural effusion      CT-CTA UPPER EXT WITH & W/O-POST PROCESS LEFT   Final Result      1.  Status post brachiocephalic fistula in the left arm. No focal stenosis is identified.      2.  Extensive edema in the subcutaneous tissues of the visualized left lateral chest and arm.      EC-ECHOCARDIOGRAM COMPLETE W/O CONT   Final Result      US-ZAHRAA SINGLE LEVEL BILAT   Final Result      US-EXTREMITY ARTERY LOWER BILAT   Final Result      CT-EXTREMITY, UPPER W/O LEFT   Final Result      1.  There is diffuse subcutaneous edema of the imaged portions of the left upper extremity. There is also diffuse body wall edema in the visualized portions of the chest and abdomen.   2.  No focal fluid collection to suggest abscess.   3.  There is a small left pleural effusion and minimal fluid in the abdomen.   4.  There is postsurgical change of AV fistula. There is small vessel atherosclerosis.      US-EXTREMITY VENOUS UPPER UNILAT LEFT   Final Result      DX-CHEST-PORTABLE (1 VIEW)   Final Result         1.  Interstitial pulmonary parenchymal prominence suggest chronic underlying lung disease, component of interstitial edema and/or infiltrates not excluded.   2.  Small left pleural effusion   3.  Cardiomegaly   4.  Atherosclerosis           Assessment/Plan  * ESRD (end stage renal disease) on dialysis (HCC)- (present on admission)  Assessment & Plan  HD stopped for comfort care measures.    Placement to group home pending.    Discussed with son at bedside    Hematochezia  Assessment & Plan  Resolved  RN reported marroon-colored stool  Continue PPI BID  Hgb stable, possible slight ABLA superimposed on AOCKD  GI consulted, not a candidate for endoscopy due to hemodynamic risk and limited life expectancy      Chest pain  Assessment & Plan  Recurrent  She developed  chest pain on August 14, 2022 and she was transferred to telemetry floor with heparin.  She found to have elevated troponin in the setting of end-stage renal disease.  Repeat troponin remain in the similar range.   I evaluated her at the bedside and she reported that her chest pain has completely subsided and currently she has shoulder pain and lidocaine patch and heating pads are helpful.  I discussed with her that I am willing to order troponin 1 more time and if it is remained in the similar range plan is to avoid doing further testing and avoid any unnecessary procedure.  I reviewed EKGs and he did not show any acute abnormalities.  I also reviewed cardiology note from June 29, 2022 it recommended goals of care discussion and palliative care due to severe aortic stenosis and low ejection fraction of 30%.    8/19 patient had another episode of chest pain during dialysis.  EKG abnormal, discussed with cardiology who did not recommend any further interventions.  Troponin 212 which is stable from troponins drawn earlier this week.  Chest pain resolved while at bedside and dialysis continued.    Extensor tendon laceration of left hand with open wound  Assessment & Plan  She has been having worsening of her wound on her left hand. COmpleted course of ancef. Now, hand wound healing well, no further signs of infection.X-ray did not show any acute abnormalities.  CT scan of her left hand and it did not show any fluid collection.  Skin biopsy showed no evidence of calciphylaxis or malignancy, and only showed acute inflammation.  Wound care service following.  Continue wound care.      Acquired hypothyroidism- (present on admission)  Assessment & Plan  - Continue Synthroid.    Delirium  Assessment & Plan  - Resolved.    Aortic stenosis, severe- (present on admission)  Assessment & Plan  Poor prognosis due to development of HFrEF  Not a candidate for intervention per cardiology.  Volume management per HD  Hospice  consulted    Pain in both lower extremities- (present on admission)  Assessment & Plan  - Chronic.  Continue as needed pain medications.    Advance care planning  Assessment & Plan  Met with her and Ethics at bedside, agreeable to DNAR in setting of HFrEF and AS not a candidate for valve  POLST completed  Hospice consulted, Hospice Liaison to meet with family 8/28    Edema of left upper extremity- (present on admission)  Assessment & Plan  - Chronic, at the same site of AV fistula which is functioning well.  -Multifactorial: Poor EF, lymphedema, outflow stenosis.  CTA showed no evidence of central stenosis.  Vascular surgery recommended supportive care with wrapping arm with Ace bandage.  -Continue to monitor.    Generalized weakness- (present on admission)  Assessment & Plan  - SNF placement being pursued.  Placement challenging due to need for transportation back and forth to hemodialysis.  CM/SW on board.    Diabetes mellitus, type II (HCC)- (present on admission)  Assessment & Plan  - Last HbA1c was 11.7.      Comfort care measures only.    Essential hypertension- (present on admission)  Assessment & Plan  - Maintaining good blood pressure control, despite holding home antihypertensives.  Continue to monitor blood pressure trend closely.    VTE prophylaxis: SCDs/TEDs and pharmacologic prophylaxis contraindicated due to hematochezia    I have performed a physical exam and reviewed and updated ROS and Plan today (8/30/2022). In review of last note, there are no changes except as documented above.

## 2022-08-31 NOTE — PROGRESS NOTES
Patient discharged to group home with hospice. IV and tele box removed. All belongings with patient. [facility] transporting patient. Family notified.

## 2022-08-31 NOTE — DISCHARGE SUMMARY
Discharge Summary    CHIEF COMPLAINT ON ADMISSION  Chief Complaint   Patient presents with    Peripheral Edema    Weakness     BIBA from home for weakness, nausea, L arm pain  HX of dialysis with L arm fistula, pt states she has missed 3 dialysis days due to being so weak she cannot drive or walk now  3 weeks ago had a procedure done to her fistula which has lead to L arm swelling       Reason for Admission  Fluid overload after missing dialysis sessions in setting of acute on chronic HFrEF and end-stage renal disease    Admission Date  6/24/2022    CODE STATUS  Comfort Care/DNR    HPI & HOSPITAL COURSE  Jannet Bruno is a 81-year-old woman who presented on 6/24/2022 with left upper extremity swelling and lower extremity edema after missing hemodialysis sessions.  This is a pleasant woman with a history of end-stage renal disease on hemodialysis on Mondays, Wednesdays, and Fridays, diabetes mellitus type 2, hypertension, restless leg syndrome, and hyperlipidemia.  The emergency room x-rays of the left hand showed only chronic changes.  Arteriovenous fistula was functioning well.  CT angiogram showed no evidence of central stenosis. Vascular surgery was consulted and only recommended supportive care such as wrapping the arm with Ace bandage.      Patient had an echocardiogram, which showed reduced ejection fraction of 25 to 30% with mild concentric left ventricular hypertrophy.  She also was noted to have severe aortic valve stenosis with a mean gradient of 43 mmHg.  Cardiology was consulted for the severe aortic stenosis, and it was felt that the patient was not a candidate for intervention.  Patient was found to be in acute decompensated heart failure with reduced systolic function.    She was dialyzed while in the hospital with subsequent clinical improvement. She did have hospital delirium while in the hospital, which has since resolved. Palliative care was consulted, but the patient continued to wish to  be full code.  Skilled nursing facility placement was pursued but proved to be challenging as transfer to dialysis was not always facilitated.  Patient also developed some hematochezia and developed mild acute blood loss anemia.  The hematochezia resolved and the patient's hemoglobin remained stable.  Due to concern for GI bleed, gastroenterology was consulted.  However, the patient was deemed to be not a candidate for endoscopic evaluation due to high risk and also since patient is expected life span was limited.    On the night of 8/15/2022, the patient developed chest pain and was started on heparin drip and transferred to the telemetry floor.  She was found to have elevated troponins likely from her end-stage renal disease that remained stable.    Upon further discussion with the hospitalist and the patient's family (including the patient's sons Heath and Maxx as well as daughter-in-law Deb) regarding the patient's poor prognosis and poor prospects of recovery, the family decided to proceed with placing the patient on comfort care measures with placement to group home with hospice.  Patient was, therefore, transition to comfort care measures on 8/28/2022.    Quantiferon Gold tuberculosis testing performed on 8/27/2022 was negative.  COVID-19 antigen testing on 8/30/2022 was negative.    I saw and examined the patient on the day of discharge.  Patient was alert and oriented x2.  She was drowsy but easily arousable.    Therefore, she is discharged in guarded and stable condition to hospice at [facility].    The patient met 2-midnight criteria for an inpatient stay at the time of discharge.    Discharge Date  8/31/2022    FOLLOW UP ITEMS POST DISCHARGE  -As per hospice at [facility].    DISCHARGE DIAGNOSES  Principal Problem:    Acute on chronic HFrEF (heart failure with reduced ejection fraction) (HCC) POA: Yes  Active Problems:    Acute blood loss anemia (ABLA) POA: Yes    Uncontrolled diabetes mellitus  type 2 with peripheral artery disease (HCC) POA: Yes    Essential hypertension (Chronic) POA: Yes    ESRD (end stage renal disease) on dialysis (HCC) POA: Yes    Generalized weakness POA: Yes    Edema of left upper extremity POA: Yes    Pain in both lower extremities POA: Yes    Aortic stenosis, severe POA: Yes    Acquired hypothyroidism POA: Yes    Extensor tendon laceration of left hand with open wound POA: Yes    Macrocytic anemia POA: Yes    Restless legs syndrome (RLS) POA: Yes    Prolonged Q-T interval on ECG POA: Yes    A-V fistula (HCC) POA: Yes    HALIMA (obstructive sleep apnea) (Chronic) POA: Yes    Moderate pulmonary hypertension (HCC) POA: Yes    Comfort measures only status POA: Yes    DNR (do not resuscitate) POA: Yes  Resolved Problems:    GI bleed POA: Yes    Advance care planning POA: No    Delirium POA: No    Chest pain POA: No    Hematochezia POA: No      FOLLOW UP  No future appointments.  No follow-up provider specified.    MEDICATIONS ON DISCHARGE     Medication List        Start taking these medications        Instructions   bisacodyl 10 MG Supp  Commonly known as: DULCOLAX   Insert 1 Suppository into the rectum every day if no BM for 3 days.  Dose: 10 mg     gabapentin 100 MG Caps  Commonly known as: NEURONTIN   Take 1 Capsule by mouth 3 times a day.  Dose: 100 mg     haloperidol 2 MG/ML Conc  Commonly known as: HALDOL   Doctor's comments: Please dispense 14 day supply (15mL). Patient under care with [facility].  Take 1 mL by mouth every 6 hours as needed (agitation despite Ativan, or nausea/vomiting).  Dose: 2 mg     LORazepam 2 MG/ML Conc  Commonly known as: ATIVAN   Doctor's comments: Please dispense 14 day supply (30mL). Patient under care with [facility].  Place 0.5 mL under the tongue every four hours as needed (Anxiety/Restlessness).  Dose: 1 mg     oxyCODONE 20 MG/ML Conc   Doctor's comments: Please dispense 14 day supply (30mL). Patient under care with [facility].  Take 0.5  mL by mouth every 1 hour as needed (moderate to severe pain or shortness of breath).  Dose: 10 mg     senna-docusate 8.6-50 MG Tabs  Commonly known as: Senna S   Take 2 Tablets by mouth 2 times a day as needed for constipation.  Dose: 2 Tablet            Continue taking these medications        Instructions   ROPINIRole 0.25 MG Tabs  Commonly known as: REQUIP   Take 1 Tablet by mouth every day.  Dose: 0.25 mg              Allergies  Allergies   Allergen Reactions    Codeine Unspecified     Personality changes    Oxycodone Unspecified     Personality changes  Tolerated Dilaudid 06/2022    Tape Rash     Adhesive tape-rash, paper tape ok.       DIET  Orders Placed This Encounter   Procedures    Diet Order Diet: Regular     Standing Status:   Standing     Number of Occurrences:   1     Order Specific Question:   Diet:     Answer:   Regular [1]       ACTIVITY  As tolerated.  Weight bearing as tolerated    CONSULTATIONS  Nephrology  Psychiatry  Palliative Care  Vascular Surgery  Gastroenterology    PROCEDURES  Skin Biopsy on 8/1/2022    LABORATORY  Lab Results   Component Value Date    SODIUM 138 08/27/2022    POTASSIUM 4.7 08/27/2022    CHLORIDE 96 08/27/2022    CO2 28 08/27/2022    GLUCOSE 190 (H) 08/27/2022    BUN 23 (H) 08/27/2022    CREATININE 2.75 (H) 08/27/2022    CREATININE 1.2 12/13/2007        Lab Results   Component Value Date    WBC 9.4 08/27/2022    HEMOGLOBIN 7.2 (L) 08/27/2022    HEMATOCRIT 22.7 (L) 08/27/2022    PLATELETCT 264 08/27/2022        Total time of the discharge process exceeds 34 minutes.

## 2022-09-04 ENCOUNTER — HOME CARE VISIT (OUTPATIENT)
Dept: HOSPICE | Facility: HOSPICE | Age: 82
End: 2022-09-04
Payer: MEDICARE

## 2022-11-01 NOTE — PROGRESS NOTES
[facility] Progress Note      HD today x 3 hours per Dr. Gregory.   Initiated at 0943 and ended at 1243 .   Patient assessed prior tx.  Drowsy, sleepy but oriented x 2. Able to answer questions. On O2 at 2lpm, via NC. 97% sat.  KINGA AVF with bruit and thrill pre HD. L hand with gauze, minimal pain noted per patient.      UF Net: 3,000 mL      Blood returned. Applied gauze and held KINGA AVF site for 10 minutes. Verified no bleeding post treatment. Bruit and thrill present post dialysis.   Instructions given to Primary RN that if bleeding occurs on the AVF site, change dressing and held the site with pressure.       Report given to Primary KRUPA Cervantes RN.   [facility] - Medical Surgical Unit  Hospital Medicine  History & Physical    Patient Name: Pilar Reardon  MRN: [id number]  Patient Class: IP- Swing  Admission Date: 10/31/2022  Attending Physician: Anmol Reese DO   Primary Care Provider: Marilee Witt MD         Patient information was obtained from ER records.     Subjective:     Principal Problem:<principal problem not specified>    Chief Complaint: No chief complaint on file.       HPI    Past Medical History:   Diagnosis Date    Actinic keratoses     Arthritis     COPD (chronic obstructive pulmonary disease)     Frequent UTI     GERD (gastroesophageal reflux disease)     High cholesterol     HTN (hypertension)        Past Surgical History:   Procedure Laterality Date    APPENDECTOMY      HYSTERECTOMY      pessary         Review of patient's allergies indicates:   Allergen Reactions    Bactrim [sulfamethoxazole-trimethoprim]     Ceftin [cefuroxime axetil]     Iodinated contrast media Other (See Comments)       No current facility-administered medications on file prior to encounter.     Current Outpatient Medications on File Prior to Encounter   Medication Sig    aspirin (ECOTRIN) 81 MG EC tablet Take 81 mg by mouth once daily.    cholecalciferol, vitamin D3, 125 mcg (5,000 unit) Tab Take 5,000 Units by mouth once daily.    cloNIDine (CATAPRES) 0.1 MG tablet Take 0.1 mg by mouth every 4 (four) hours as needed. For systolic blood pressure  >170    cyanocobalamin 1,000 mcg/mL injection Inject 1,000 mcg into the muscle every 30 days. Next dose due this week    dexAMETHasone (DECADRON) 2 MG tablet Take 2 mg by mouth every Mon, Wed, Fri.    diclofenac sodium (VOLTAREN) 1 % Gel     doxazosin (CARDURA) 2 MG tablet Take 2 mg by mouth 2 (two) times a day.    gabapentin (NEURONTIN) 100 MG capsule Take 100 mg by mouth every evening.    levothyroxine (SYNTHROID) 50 MCG tablet Take 50 mcg by mouth before breakfast.    meloxicam  (MOBIC) 7.5 MG tablet Take 7.5 mg by mouth 2 (two) times a day.    montelukast (SINGULAIR) 10 mg tablet Take by mouth every evening.    nitrofurantoin, macrocrystal-monohydrate, (MACROBID) 100 MG capsule Take 100 mg by mouth every evening.    omeprazole (PRILOSEC) 40 MG capsule Take 40 mg by mouth every morning.    ondansetron (ZOFRAN) 4 MG tablet Take 4 mg by mouth every 6 (six) hours as needed for Nausea.    phenazopyridine (PYRIDIUM) 200 MG tablet Take 200 mg by mouth 3 (three) times daily as needed for Pain.    primidone (MYSOLINE) 50 MG Tab Take 100 mg by mouth 2 (two) times a day.    rOPINIRole (REQUIP) 0.5 MG tablet Take 0.5 mg by mouth every evening.    sertraline (ZOLOFT) 25 MG tablet Take 25 mg by mouth once daily.    travoprost (TRAVATAN Z) 0.004 % ophthalmic solution Place 1 drop into both eyes every evening.    umeclidinium-vilanteroL (ANORO ELLIPTA) 62.5-25 mcg/actuation DsDv Inhale 2 puffs into the lungs once daily.    tiZANidine (ZANAFLEX) 4 MG tablet Take 1 tablet (4 mg total) by mouth every 6 (six) hours as needed.    valACYclovir (VALTREX) 1000 MG tablet Take 1 tablet (1,000 mg total) by mouth 3 (three) times daily. for 10 days     Family History       Problem Relation (Age of Onset)    Diabetes Daughter    Hypertension Daughter    Melanoma Daughter          Tobacco Use    Smoking status: Never    Smokeless tobacco: Never   Substance and Sexual Activity    Alcohol use: Never    Drug use: Never    Sexual activity: Not Currently     Review of Systems   Constitutional: Negative.    HENT: Negative.     Eyes: Negative.    Respiratory: Negative.     Cardiovascular: Negative.    Gastrointestinal: Negative.    Endocrine: Negative.    Genitourinary: Negative.    Musculoskeletal: Negative.         Shortness of breath with generalized muscle weakness.  The patient gets exhausted upon getting an upright position.   Skin: Negative.    Allergic/Immunologic: Negative.    Neurological: Negative.     Hematological: Negative.    Psychiatric/Behavioral: Negative.     All other systems reviewed and are negative.  Objective:     Vital Signs (Most Recent):  Temp: 98.2 °F (36.8 °C) (11/01/22 0735)  Pulse: 70 (11/01/22 1354)  Resp: (!) 22 (11/01/22 1354)  BP: 123/60 (11/01/22 0735)  SpO2: 98 % (11/01/22 1354)   Vital Signs (24h Range):  Temp:  [97.6 °F (36.4 °C)-98.2 °F (36.8 °C)] 98.2 °F (36.8 °C)  Pulse:  [67-86] 70  Resp:  [18-25] 22  SpO2:  [88 %-98 %] 98 %  BP: (119-128)/(57-68) 123/60     Weight: 64.5 kg (142 lb 4.8 oz)  Body mass index is 26.03 kg/m².    Physical Exam  Vitals and nursing note reviewed.   Constitutional:       Appearance: Normal appearance. She is normal weight.   HENT:      Head: Normocephalic and atraumatic.      Right Ear: Tympanic membrane, ear canal and external ear normal.      Left Ear: Tympanic membrane and external ear normal.      Nose: Nose normal.      Mouth/Throat:      Mouth: Mucous membranes are moist.   Eyes:      Extraocular Movements: Extraocular movements intact.      Conjunctiva/sclera: Conjunctivae normal.      Pupils: Pupils are equal, round, and reactive to light.   Cardiovascular:      Rate and Rhythm: Normal rate.      Pulses: Normal pulses.      Heart sounds: Normal heart sounds.   Pulmonary:      Effort: Pulmonary effort is normal.      Breath sounds: Normal breath sounds.   Abdominal:      General: Abdomen is flat. Bowel sounds are normal.      Palpations: Abdomen is soft.   Musculoskeletal:         General: Normal range of motion.      Cervical back: Normal range of motion and neck supple.      Comments: Patient with generalized muscle weakness.  Shortness of breath on exertion.   Skin:     General: Skin is warm and dry.      Capillary Refill: Capillary refill takes less than 2 seconds.   Neurological:      General: No focal deficit present.      Mental Status: She is alert and oriented to person, place, and time. Mental status is at baseline.   Psychiatric:          Mood and Affect: Mood normal.         Behavior: Behavior normal.         Thought Content: Thought content normal.         Judgment: Judgment normal.         CRANIAL NERVES     CN III, IV, VI   Pupils are equal, round, and reactive to light.     Significant Labs: All pertinent labs within the past 24 hours have been reviewed.    Significant Imaging: I have reviewed all pertinent imaging results/findings within the past 24 hours.    Assessment/Plan:     No notes have been filed under this hospital service.  Service: Hospital Medicine    VTE Risk Mitigation (From admission, onward)         Ordered     enoxaparin injection 80 mg  Daily         10/31/22 1648     IP VTE HIGH RISK PATIENT  Once         10/31/22 1648     Place RACHEL hose  Until discontinued         10/31/22 1648     Place sequential compression device  Until discontinued         10/31/22 1648                   Anmol Reese DO  Department of Hospital Medicine   [facility] Surgical [facility]

## 2022-12-09 NOTE — HOSPICE
Met with marcus Moore at bedside. Hospice services reviewed. Also, explained patients symptoms to Oscar.   Patient very nauseous and in pain. RN medicating. Patient starting to hallucinate per RN      Oscar states they are still trying to find one more family member. I explain urgency in making a discharge discission. Home vs GH.     Call placed to marcus Lilly. Set up another family meeting for Tuesday at 11 am.[facility] and Oscar agree.     Zach MCKEON voalte sent to look for group home per family. Also, to get choice.     Attending Attestation (For Attendings USE Only)...

## 2023-03-21 NOTE — PROGRESS NOTES
Received report of patient at start of shift. Patient is AOx3, disoriented to time. Assessment complete, patient on 2.5L O2 via NC. Patient updated on place of care, encouraged to use call light for any needs/assistance. Bed alarm on, safety education provided.    [id number]

## 2023-08-12 NOTE — ASSESSMENT & PLAN NOTE
Poor prognosis due to development of HFrEF  Not a candidate for intervention per cardiology.  Volume management per HD  Hospice consulted   No

## 2025-02-01 NOTE — PROGRESS NOTES
Her/[facility] -  PROGRESS NOTE               Author: THERESE Fragoso Date & Time: 8/10/2022  11:09 AM     HPI:  81yoF with PMH significant for ESRD on HD MWF via left arm AVF, HTN, DM II, Anemia secondary to CKD, CKD Bone mineral disorder, admitted with increased edema and weakness and having missed her last last 2-3 outpt HD treatments. Pt is very lethargic at this time and unable to provide much history, majority of the history was obtained through review of the medical record and discussion with primary svc. Pt had reported increased LE edema and fatigue and weakness and worsening of her left arm edema so she came to the ED for evaluation. She apparently has missed her last 2-3 outpt dialysis treatments. Per regular outpt Nephrologist Dr. Gates, she has been non-compliant with her fluid restriction and was told that she needed 4x/week dialysis until her volume status could be optimized but she was non-compliant with that recommendation. She has edema of her left arm where her AVF is located and there is concern for a central stenosis that could be the etiology. She had an appointment at the outpt access center to evaluate for central stenosis and undergo angioplasty if needed on 6/9/22 but pt did not go to the appointment. She has not had any other procedures done to the arm in the past couple of months. She had a left arm us done today that showed no DVT and patent AVF and soft tissue edema. She apparently received pain medication fentanyl and dilaudid as well as benadryl earlier today and she is very drowsy.     DAILY NEPHROLOGY SUMMARY:  **See previous note from 7/31/22 to review prior daily nephrology summary entries.  8/1: Laying in bed eating breakfast. Very tearful this AM, stated that she has to make a decision if she wants to continue with the biopsy on her hand. Pt is concerned that there may not be much we can do about the wound on her left hand. Dr Simon from wound  "care at bedside. Will return this afternoon for biopsy procedure per MD. HD today UF 2L, tolerated well. Denies SOB, CP,N/VD. K+ up this am 5.7.   8/2: Resting in bed. C/o fatigue. Reports improved SOB, remains on oxygen.   08/3: Sitting up in chair, states she feels good today, SOB improved , remains on 2L nasal cannula. Denies CP, N/V/D. Discussed about fluid restriction. Pending left hand biopsy results. K+ improved 5.4. States fatigue but due to she recently ambulated with RN, otherwise no complaints. No overnight events, VSS.   08/4: Pt laying in bed. AOX4, HD yesterday tolerated will UF 2800mL. Hypotensive on the last hr of HD reported SBP 89-90's but asymptomatic. BP this am stable. Denies any CP/SOB/N/V/D. Patient on RA. C/o constipation. No overnight events. States she has declined hospice but would like to go to a home care facility.   8/5: iHD today, net UF 1L.  No resting comfortably no complaints. VSS  8/6: Had iHD yesterday with 1 L UF.  Currently sleeping, but awakens easily and has no new complaints. VSS.   8/7: Lying in bed sleeping, awakens and has no new complaints, just fatigue. VSS. Due for iHD tomorrow.  8/8: Seen on HD this am. No acute distress. VSS.   8/9: 2.5 net UF. VSS. Resting in bed. C/o fatigue. Denies CP/SOB.   8/10: 1L net UF. No complaints. VSS.     REVIEW OF SYSTEMS:    10 point ROS reviewed and is as per HPI/daily summary or otherwise negative    PMH/PSH/SH/FH: Reviewed and unchanged since admission note  CURRENT MEDICATIONS: Reviewed from admission to present day    VS:  /60   Pulse 84   Temp 36.4 °C (97.6 °F) (Temporal)   Resp 18   Ht 1.626 m (5' 4\")   Wt 63.3 kg (139 lb 8.8 oz)   SpO2 93%   BMI 23.95 kg/m²   Physical Exam  Vitals and nursing note reviewed.   Constitutional:       General: She is not in acute distress.     Appearance: She is ill-appearing.   HENT:      Head: Normocephalic and atraumatic.      Mouth/Throat:      Mouth: Mucous membranes are dry. "   Eyes:      General: No scleral icterus.  Cardiovascular:      Rate and Rhythm: Normal rate and regular rhythm.      Pulses: Normal pulses.      Heart sounds: Normal heart sounds. Murmur heard.    No friction rub. No gallop.   Pulmonary:      Effort: Pulmonary effort is normal. No respiratory distress.      Breath sounds: No wheezing or rales.      Comments: Decrease breathsounds Bilateral lower lobes  Abdominal:      General: Bowel sounds are normal. There is no distension.      Palpations: Abdomen is soft.      Tenderness: There is no abdominal tenderness. There is no guarding.   Musculoskeletal:         General: Swelling (left arm) or deformity.      Cervical back: Normal range of motion.      Right lower leg: Trace edema.      Left lower leg: Trace edema.      Comments:   LUE AVF + bruit and Thrill   Skin:     General: Skin is warm and dry.      Coloration: Skin is not pale.      Findings: No rash.   Neurological:      Mental Status: She is alert and oriented to person, place, and time.   Psychiatric:         Mood and Affect: Mood normal.         Behavior: Behavior normal    Fluids:  In: 840 [P.O.:840]  Out: -     LABS:  Recent Labs     08/08/22  0104 08/09/22  0846   SODIUM 133* 133*   POTASSIUM 5.4 5.2   CHLORIDE 90* 93*   CO2 26 27   GLUCOSE 172* 86   BUN 61* 39*   CREATININE 6.05* 4.01*   CALCIUM 9.1 9.2         IMPRESSION:  # ESRD, dependent on HD              - HD via LUE AVF             - HD qMWF  # Fluid overload, improved             - Secondary to non-compliance with fluid restriction and HD treatments  # Left arm edema, slow improvement              - CTA upper ext 6/26 w/o e/o focal stenosis + extensive edema             - AVF patent on left arm US negative for DVT             - Pt no showed to outpt appt to evaluate              - Vascular does not recommend intervention              - Extensor tendon laceration left hand with wound             - Wound care in place, s/p biopsy 8/1             -  Left hand skin biopsy negative for calciphylaxis   - Outpatient [facility] appt rescheduled for next month  # HTN, variable control, stable at this time             - Goal BP < 140/90             - Not on BP meds              - BP at goal  # Anemia of CKD, at goal              - Goal Hgb 10-11             - Iron 27             -TIBC 142             -%Sat 19             - Ferritin 1419  # CKD-MBD             - CCa 9.8             - PO4 4.7             - Vit D 35            - PTH 99.4             - Managed at OP unit             - On sevelamer with meals  # Hyperkalemia            - Correct w/ HD  # DM II--management per primary svc  # Leg Pain--chronic skin changes and subcutaneous tissue firm to touch             - Please discuss with gen surg for possible skin biopsy             - Vascular does not recommend any intervention             - On ropinirole   # Leukocytosis resolved  # Hyponatremia  - Likely fluid related, manage w/ HD  - Fluid Restriction  - Resolved  # Hypoalbuminemia  - No dietary protein restrictions    PLAN:  - Continue qMWF iHD schedule, Next iHD due (FRI)   - UF as tolerated  - Continue phos binders WM, hold if patient is NPO.  - Continue off of all BP meds and diuretics for now. BP stable during HD.   - Limit sedating meds if possible  - No dietary protein restrictions  - Fluid restriction 1.2 L   - Low potassium diet  - Dose all meds per ESRD  - Outpt access center appointment rescheduled  - Follow labs  - DC planning underway for          Thank you,

## 2025-02-11 NOTE — PROGRESS NOTES
Assumed care of patient, bedside report received from MAURO Nixon. Patient A&O x4, 2L NC, and no complaints of pain at this time. Updated on POC, call light within reach and fall precautions in place. Bed locked and in lowest position. Patient instructed to call for assistance before getting out of bed. All questions answered, no other needs at this time.      2.44

## (undated) DEVICE — TUBING CLEARLINK DUO-VENT - C-FLO (48EA/CA)

## (undated) DEVICE — SYRINGE SAFETY 10 ML 18 GA X 1 1/2 BLUNT LL (100/BX 4BX/CA)

## (undated) DEVICE — GLOVE BIOGEL SZ 8 SURGICAL PF LTX - (50PR/BX 4BX/CA)

## (undated) DEVICE — SUTURE 4-0 SILK 12 X 18 INCH - (36/BX)

## (undated) DEVICE — DRESSING TRANSPARENT FILM TEGADERM 4 X 4.75" (50EA/BX)"

## (undated) DEVICE — CLIP SM INTNL HRZN TI ESCP LGT - (24EA/PK 25PK/BX)

## (undated) DEVICE — SUCTION INSTRUMENT YANKAUER BULBOUS TIP W/O VENT (50EA/CA)

## (undated) DEVICE — GOWN WARMING STANDARD FLEX - (30/CA)

## (undated) DEVICE — LACTATED RINGERS INJ 1000 ML - (14EA/CA 60CA/PF)

## (undated) DEVICE — KIT ROOM DECONTAMINATION

## (undated) DEVICE — KIT ANESTHESIA W/CIRCUIT & 3/LT BAG W/FILTER (20EA/CA)

## (undated) DEVICE — HEAD HOLDER JUNIOR/ADULT

## (undated) DEVICE — GLOVE BIOGEL INDICATOR SZ 8 SURGICAL PF LTX - (50/BX 4BX/CA)

## (undated) DEVICE — GLOVE BIOGEL PI ORTHO SZ 8 PF LF (40PR/BX)

## (undated) DEVICE — SLEEVE, VASO, THIGH, MED

## (undated) DEVICE — DERMABOND ADVANCED - (12EA/BX)

## (undated) DEVICE — GLOVE BIOGEL SZ 6 PF LATEX - (50EA/BX 4BX/CA)

## (undated) DEVICE — SUTURE 5-0 PROLENE C-1 D/A 24 (36PK/BX)"

## (undated) DEVICE — CLOSURE SKIN STRIP 1/2 X 4 IN - (STERI STRIP) (50/BX 4BX/CA)

## (undated) DEVICE — DRAPE SURGICAL U 77X120 - (10/CA)

## (undated) DEVICE — PACK MAJOR ORTHO - (2EA/CA)

## (undated) DEVICE — SET LEADWIRE 5 LEAD BEDSIDE DISPOSABLE ECG (1SET OF 5/EA)

## (undated) DEVICE — NEPTUNE 4 PORT MANIFOLD - (20/PK)

## (undated) DEVICE — SUTURE GENERAL

## (undated) DEVICE — BLADE SURGICAL #11 - (50/BX)

## (undated) DEVICE — ELECTRODE 850 FOAM ADHESIVE - HYDROGEL RADIOTRNSPRNT (50/PK)

## (undated) DEVICE — SENSOR SPO2 NEO LNCS ADHESIVE (20/BX) SEE USER NOTES

## (undated) DEVICE — SOD. CHL. INJ. 0.9% 250 ML - (36/CA 50CA/PF)

## (undated) DEVICE — SPONGE GAUZESTER 4 X 4 4PLY - (128PK/CA)

## (undated) DEVICE — MASK ANESTHESIA ADULT  - (100/CA)

## (undated) DEVICE — VESSELOOP MINI BLUE STERILE - SURG-I-LOOP (10EA/BX)

## (undated) DEVICE — GLOVE BIOGEL SZ 7.5 SURGICAL PF LTX - (50PR/BX 4BX/CA)

## (undated) DEVICE — DRAPE LARGE 3 QUARTER - (20/CA)

## (undated) DEVICE — SUTURE 6-0 PROLENE BV-1 D/A 24 (36PK/BX)"

## (undated) DEVICE — GELAQUASONIC 100 ULTRASOUND - 48/BX 20GM STERILE FOIL POUCH

## (undated) DEVICE — CHLORAPREP 3 ML APPLICATOR - (25/BX 4BX/CS 100/CS)

## (undated) DEVICE — SODIUM CHL IRRIGATION 0.9% 1000ML (12EA/CA)

## (undated) DEVICE — GVL 3 STAT DISPOSABLE - (10/BX)

## (undated) DEVICE — PACK MINOR BASIN - (2EA/CA)

## (undated) DEVICE — STERI STRIP COMPOUND BENZOIN - TINCTURE 0.6ML WITH APPLICATOR (40EA/BX)

## (undated) DEVICE — GLOVE BIOGEL PI INDICATOR SZ 7.0 SURGICAL PF LF - (50/BX 4BX/CA)

## (undated) DEVICE — DRAPE LOWER EXTREMETY - (6/CA)

## (undated) DEVICE — ELECTRODE DUAL RETURN W/ CORD - (50/PK)

## (undated) DEVICE — SUTURE 2-0 VICRYL PLUS CT-1 - 8 X 18 INCH(12/BX)

## (undated) DEVICE — SUTURE 4-0 30CM STRATAFIX SPIRAL PS-2 (12EA/BX)

## (undated) DEVICE — GLOVE BIOGEL PI ORTHO SZ 6 1/2 SURGICAL PF LF (40PR/BX)

## (undated) DEVICE — DRAPE C-ARM LARGE 41IN X 74 IN - (10/BX 2BX/CA)

## (undated) DEVICE — CHLORAPREP 26 ML APPLICATOR - ORANGE TINT(25/CA)

## (undated) DEVICE — GOWN SURGEONS LARGE - (32/CA)

## (undated) DEVICE — PROTECTOR ULNA NERVE - (36PR/CA)

## (undated) DEVICE — GUIDEPIN FOR 7.3MM CANNULATED SCREWS 2.8MM X 300MM (3TX6=18)(6EA/PK)

## (undated) DEVICE — SUTURE 3-0 VICRYL PLUS SH - 8X 18 INCH (12/BX)

## (undated) DEVICE — SYRINGE 20 ML LL (50EA/BX 4BX/CA)

## (undated) DEVICE — CANISTER SUCTION 3000ML MECHANICAL FILTER AUTO SHUTOFF MEDI-VAC NONSTERILE LF DISP  (40EA/CA)

## (undated) DEVICE — TUBE E-T HI-LO CUFF 6.5MM (10EA/BX)

## (undated) DEVICE — PAD PREP 24 X 48 CUFFED - (100/CA)

## (undated) DEVICE — COVER PROBE STERILE CONE (12EA/CA)

## (undated) DEVICE — SET EXTENSION WITH 2 PORTS (48EA/CA) ***PART #2C8610 IS A SUBSTITUTE*****

## (undated) DEVICE — BAG DECANTER (50EA/CS)

## (undated) DEVICE — GLOVE SZ 7.5 BIOGEL PI MICRO - PF LF (50PR/BX)

## (undated) DEVICE — CLIP MED INTNL HRZN TI ESCP - (25/BX)

## (undated) DEVICE — SUTURE CV